# Patient Record
Sex: FEMALE | Race: WHITE | Employment: OTHER | URBAN - METROPOLITAN AREA
[De-identification: names, ages, dates, MRNs, and addresses within clinical notes are randomized per-mention and may not be internally consistent; named-entity substitution may affect disease eponyms.]

---

## 2017-06-08 ENCOUNTER — HOSPITAL ENCOUNTER (OUTPATIENT)
Dept: PULMONOLOGY | Age: 57
Discharge: HOME | End: 2017-06-08
Attending: FAMILY MEDICINE | Admitting: FAMILY MEDICINE

## 2018-12-07 ENCOUNTER — APPOINTMENT (OUTPATIENT)
Dept: GENERAL RADIOLOGY | Age: 58
End: 2018-12-07
Payer: COMMERCIAL

## 2018-12-07 ENCOUNTER — APPOINTMENT (OUTPATIENT)
Dept: CT IMAGING | Age: 58
End: 2018-12-07
Payer: COMMERCIAL

## 2018-12-07 ENCOUNTER — HOSPITAL ENCOUNTER (EMERGENCY)
Age: 58
Discharge: HOME | End: 2018-12-07
Payer: COMMERCIAL

## 2018-12-07 VITALS
OXYGEN SATURATION: 97 % | WEIGHT: 245 LBS | HEART RATE: 96 BPM | DIASTOLIC BLOOD PRESSURE: 96 MMHG | RESPIRATION RATE: 17 BRPM | SYSTOLIC BLOOD PRESSURE: 165 MMHG | BODY MASS INDEX: 40.82 KG/M2 | HEIGHT: 65 IN | TEMPERATURE: 98.2 F

## 2018-12-07 DIAGNOSIS — M79.602 LEFT ARM PAIN: ICD-10-CM
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
## 2018-12-07 DIAGNOSIS — S42.292A OTHER CLOSED DISPLACED FRACTURE OF PROXIMAL END OF LEFT HUMERUS, INITIAL ENCOUNTER: Primary | ICD-10-CM

## 2018-12-07 PROCEDURE — 99284 EMERGENCY DEPT VISIT MOD MDM: CPT

## 2018-12-07 PROCEDURE — 6370000000 HC RX 637 (ALT 250 FOR IP): Performed by: PHYSICIAN ASSISTANT

## 2018-12-07 PROCEDURE — 72125 CT NECK SPINE W/O DYE: CPT

## 2018-12-07 PROCEDURE — 70450 CT HEAD/BRAIN W/O DYE: CPT

## 2018-12-07 PROCEDURE — 73030 X-RAY EXAM OF SHOULDER: CPT

## 2018-12-07 RX ORDER — POLYETHYLENE GLYCOL 3350 17 G/17G
17 POWDER, FOR SOLUTION ORAL 2 TIMES DAILY
Qty: 1020 G | Refills: 0 | Status: SHIPPED | OUTPATIENT
Start: 2018-12-07 | End: 2018-12-07

## 2018-12-07 RX ORDER — POLYETHYLENE GLYCOL 3350 17 G/17G
17 POWDER, FOR SOLUTION ORAL 2 TIMES DAILY
Qty: 1020 G | Refills: 0 | Status: SHIPPED | OUTPATIENT
Start: 2018-12-07 | End: 2019-01-06

## 2018-12-07 RX ORDER — OXYCODONE HYDROCHLORIDE AND ACETAMINOPHEN 5; 325 MG/1; MG/1
1 TABLET ORAL EVERY 6 HOURS PRN
Qty: 12 TABLET | Refills: 0 | Status: ON HOLD | OUTPATIENT
Start: 2018-12-07 | End: 2018-12-10 | Stop reason: HOSPADM

## 2018-12-07 RX ORDER — OXYCODONE HYDROCHLORIDE AND ACETAMINOPHEN 5; 325 MG/1; MG/1
1 TABLET ORAL EVERY 6 HOURS PRN
Qty: 12 TABLET | Refills: 0 | Status: SHIPPED | OUTPATIENT
Start: 2018-12-07 | End: 2018-12-07

## 2018-12-07 RX ORDER — HYDROCODONE BITARTRATE AND ACETAMINOPHEN 5; 325 MG/1; MG/1
1 TABLET ORAL ONCE
Status: COMPLETED | OUTPATIENT
Start: 2018-12-07 | End: 2018-12-07

## 2018-12-07 RX ADMIN — HYDROCODONE BITARTRATE AND ACETAMINOPHEN 1 TABLET: 5; 325 TABLET ORAL at 19:52

## 2018-12-07 RX ADMIN — HYDROCODONE BITARTRATE AND ACETAMINOPHEN 1 TABLET: 5; 325 TABLET ORAL at 18:55

## 2018-12-07 ASSESSMENT — PAIN SCALES - GENERAL
PAINLEVEL_OUTOF10: 10

## 2018-12-07 ASSESSMENT — PAIN DESCRIPTION - PAIN TYPE: TYPE: ACUTE PAIN

## 2018-12-07 ASSESSMENT — PAIN DESCRIPTION - ORIENTATION: ORIENTATION: LEFT

## 2018-12-07 ASSESSMENT — PAIN DESCRIPTION - LOCATION: LOCATION: ARM

## 2018-12-07 NOTE — ED TRIAGE NOTES
Pt presents to ED via EMS for fall and left upper arm pain 10/10. Pt states she was walking into [employer] and tripped over a parking block. Pt states she fell on left arm. Denies LOC. Denies back and neck pain. Geraldo GUZMAN at bedside to assess patient.

## 2018-12-09 ENCOUNTER — HOSPITAL ENCOUNTER (OUTPATIENT)
Age: 58
Setting detail: OBSERVATION
Discharge: HOME HEALTH | End: 2018-12-10
Attending: EMERGENCY MEDICINE | Admitting: HOSPITALIST
Payer: COMMERCIAL

## 2018-12-09 DIAGNOSIS — S42.202D CLOSED FRACTURE OF PROXIMAL END OF LEFT HUMERUS WITH ROUTINE HEALING, UNSPECIFIED FRACTURE MORPHOLOGY, SUBSEQUENT ENCOUNTER: ICD-10-CM

## 2018-12-09 DIAGNOSIS — M25.512 ACUTE PAIN OF LEFT SHOULDER: Primary | ICD-10-CM

## 2018-12-09 PROBLEM — S42.292A HUMERAL HEAD FRACTURE, LEFT, CLOSED, INITIAL ENCOUNTER: Status: ACTIVE | Noted: 2018-12-09

## 2018-12-09 PROBLEM — S42.209A PROXIMAL HUMERAL FRACTURE: Status: ACTIVE | Noted: 2018-12-09

## 2018-12-09 LAB
ALBUMIN SERPL-MCNC: 3.5 GM/DL (ref 3.4–5)
ALP BLD-CCNC: 91 IU/L (ref 40–129)
ALT SERPL-CCNC: 16 U/L (ref 10–40)
ANION GAP SERPL CALCULATED.3IONS-SCNC: 12 MMOL/L (ref 4–16)
AST SERPL-CCNC: 11 IU/L (ref 15–37)
BASOPHILS ABSOLUTE: 0 K/CU MM
BASOPHILS RELATIVE PERCENT: 0.3 % (ref 0–1)
BILIRUB SERPL-MCNC: 0.2 MG/DL (ref 0–1)
BUN BLDV-MCNC: 32 MG/DL (ref 6–23)
CALCIUM SERPL-MCNC: 9.1 MG/DL (ref 8.3–10.6)
CHLORIDE BLD-SCNC: 102 MMOL/L (ref 99–110)
CO2: 23 MMOL/L (ref 21–32)
CREAT SERPL-MCNC: 1.2 MG/DL (ref 0.6–1.1)
DIFFERENTIAL TYPE: ABNORMAL
EOSINOPHILS ABSOLUTE: 0.1 K/CU MM
EOSINOPHILS RELATIVE PERCENT: 1.3 % (ref 0–3)
GFR AFRICAN AMERICAN: 55 ML/MIN/1.73M2
GFR NON-AFRICAN AMERICAN: 46 ML/MIN/1.73M2
GLUCOSE BLD-MCNC: 200 MG/DL (ref 70–99)
GLUCOSE BLD-MCNC: 224 MG/DL (ref 70–99)
HCT VFR BLD CALC: 32.7 % (ref 37–47)
HEMOGLOBIN: 9.8 GM/DL (ref 12.5–16)
IMMATURE NEUTROPHIL %: 0.1 % (ref 0–0.43)
LYMPHOCYTES ABSOLUTE: 2 K/CU MM
LYMPHOCYTES RELATIVE PERCENT: 25.2 % (ref 24–44)
MCH RBC QN AUTO: 28.7 PG (ref 27–31)
MCHC RBC AUTO-ENTMCNC: 30 % (ref 32–36)
MCV RBC AUTO: 95.9 FL (ref 78–100)
MONOCYTES ABSOLUTE: 0.7 K/CU MM
MONOCYTES RELATIVE PERCENT: 8.5 % (ref 0–4)
NUCLEATED RBC %: 0 %
PDW BLD-RTO: 12.7 % (ref 11.7–14.9)
PLATELET # BLD: 238 K/CU MM (ref 140–440)
PMV BLD AUTO: 9.9 FL (ref 7.5–11.1)
POTASSIUM SERPL-SCNC: 4.3 MMOL/L (ref 3.5–5.1)
RBC # BLD: 3.41 M/CU MM (ref 4.2–5.4)
SEGMENTED NEUTROPHILS ABSOLUTE COUNT: 5.1 K/CU MM
SEGMENTED NEUTROPHILS RELATIVE PERCENT: 64.6 % (ref 36–66)
SODIUM BLD-SCNC: 137 MMOL/L (ref 135–145)
TOTAL IMMATURE NEUTOROPHIL: 0.01 K/CU MM
TOTAL NUCLEATED RBC: 0 K/CU MM
TOTAL PROTEIN: 6.2 GM/DL (ref 6.4–8.2)
WBC # BLD: 7.8 K/CU MM (ref 4–10.5)

## 2018-12-09 PROCEDURE — G0378 HOSPITAL OBSERVATION PER HR: HCPCS

## 2018-12-09 PROCEDURE — 6370000000 HC RX 637 (ALT 250 FOR IP): Performed by: HOSPITALIST

## 2018-12-09 PROCEDURE — 85025 COMPLETE CBC W/AUTO DIFF WBC: CPT

## 2018-12-09 PROCEDURE — 82962 GLUCOSE BLOOD TEST: CPT

## 2018-12-09 PROCEDURE — 36415 COLL VENOUS BLD VENIPUNCTURE: CPT

## 2018-12-09 PROCEDURE — 6360000002 HC RX W HCPCS: Performed by: EMERGENCY MEDICINE

## 2018-12-09 PROCEDURE — 96372 THER/PROPH/DIAG INJ SC/IM: CPT

## 2018-12-09 PROCEDURE — 2580000003 HC RX 258: Performed by: HOSPITALIST

## 2018-12-09 PROCEDURE — 99284 EMERGENCY DEPT VISIT MOD MDM: CPT

## 2018-12-09 PROCEDURE — 80053 COMPREHEN METABOLIC PANEL: CPT

## 2018-12-09 RX ORDER — IPRATROPIUM BROMIDE AND ALBUTEROL SULFATE 2.5; .5 MG/3ML; MG/3ML
1 SOLUTION RESPIRATORY (INHALATION) EVERY 4 HOURS PRN
Status: DISCONTINUED | OUTPATIENT
Start: 2018-12-09 | End: 2018-12-10 | Stop reason: HOSPADM

## 2018-12-09 RX ORDER — ONDANSETRON 2 MG/ML
4 INJECTION INTRAMUSCULAR; INTRAVENOUS EVERY 6 HOURS PRN
Status: DISCONTINUED | OUTPATIENT
Start: 2018-12-09 | End: 2018-12-09

## 2018-12-09 RX ORDER — DEXTROSE MONOHYDRATE 50 MG/ML
100 INJECTION, SOLUTION INTRAVENOUS PRN
Status: DISCONTINUED | OUTPATIENT
Start: 2018-12-09 | End: 2018-12-10 | Stop reason: HOSPADM

## 2018-12-09 RX ORDER — ACETAMINOPHEN 325 MG/1
650 TABLET ORAL EVERY 4 HOURS PRN
Status: DISCONTINUED | OUTPATIENT
Start: 2018-12-09 | End: 2018-12-10 | Stop reason: HOSPADM

## 2018-12-09 RX ORDER — SODIUM CHLORIDE 0.9 % (FLUSH) 0.9 %
10 SYRINGE (ML) INJECTION EVERY 12 HOURS SCHEDULED
Status: DISCONTINUED | OUTPATIENT
Start: 2018-12-09 | End: 2018-12-10 | Stop reason: HOSPADM

## 2018-12-09 RX ORDER — ACETAMINOPHEN 325 MG/1
650 TABLET ORAL 4 TIMES DAILY
Status: CANCELLED | OUTPATIENT
Start: 2018-12-09

## 2018-12-09 RX ORDER — OXYCODONE HYDROCHLORIDE AND ACETAMINOPHEN 5; 325 MG/1; MG/1
1 TABLET ORAL EVERY 4 HOURS PRN
Status: DISCONTINUED | OUTPATIENT
Start: 2018-12-09 | End: 2018-12-10 | Stop reason: HOSPADM

## 2018-12-09 RX ORDER — ONDANSETRON 4 MG/1
4 TABLET, ORALLY DISINTEGRATING ORAL ONCE
Status: COMPLETED | OUTPATIENT
Start: 2018-12-09 | End: 2018-12-09

## 2018-12-09 RX ORDER — NICOTINE POLACRILEX 4 MG
15 LOZENGE BUCCAL PRN
Status: DISCONTINUED | OUTPATIENT
Start: 2018-12-09 | End: 2018-12-10 | Stop reason: HOSPADM

## 2018-12-09 RX ORDER — ATORVASTATIN CALCIUM 40 MG/1
40 TABLET, FILM COATED ORAL DAILY
Status: DISCONTINUED | OUTPATIENT
Start: 2018-12-10 | End: 2018-12-10 | Stop reason: HOSPADM

## 2018-12-09 RX ORDER — TOPIRAMATE 50 MG/1
50 TABLET, FILM COATED ORAL NIGHTLY
COMMUNITY

## 2018-12-09 RX ORDER — SODIUM CHLORIDE 0.9 % (FLUSH) 0.9 %
10 SYRINGE (ML) INJECTION PRN
Status: DISCONTINUED | OUTPATIENT
Start: 2018-12-09 | End: 2018-12-10 | Stop reason: HOSPADM

## 2018-12-09 RX ORDER — AMLODIPINE BESYLATE 2.5 MG/1
2.5 TABLET ORAL DAILY
Status: DISCONTINUED | OUTPATIENT
Start: 2018-12-10 | End: 2018-12-10 | Stop reason: HOSPADM

## 2018-12-09 RX ORDER — LISINOPRIL 40 MG/1
40 TABLET ORAL DAILY
Status: DISCONTINUED | OUTPATIENT
Start: 2018-12-10 | End: 2018-12-10 | Stop reason: HOSPADM

## 2018-12-09 RX ORDER — SERTRALINE HYDROCHLORIDE 100 MG/1
150 TABLET, FILM COATED ORAL DAILY
COMMUNITY
End: 2021-08-23 | Stop reason: ALTCHOICE

## 2018-12-09 RX ORDER — METOPROLOL SUCCINATE 25 MG/1
25 TABLET, EXTENDED RELEASE ORAL DAILY
Status: DISCONTINUED | OUTPATIENT
Start: 2018-12-10 | End: 2018-12-10 | Stop reason: HOSPADM

## 2018-12-09 RX ORDER — DEXTROSE MONOHYDRATE 25 G/50ML
12.5 INJECTION, SOLUTION INTRAVENOUS PRN
Status: DISCONTINUED | OUTPATIENT
Start: 2018-12-09 | End: 2018-12-10 | Stop reason: HOSPADM

## 2018-12-09 RX ORDER — CITALOPRAM 40 MG/1
40 TABLET ORAL DAILY
Status: DISCONTINUED | OUTPATIENT
Start: 2018-12-10 | End: 2018-12-09

## 2018-12-09 RX ORDER — POLYETHYLENE GLYCOL 3350 17 G/17G
17 POWDER, FOR SOLUTION ORAL 2 TIMES DAILY
Status: DISCONTINUED | OUTPATIENT
Start: 2018-12-09 | End: 2018-12-10 | Stop reason: HOSPADM

## 2018-12-09 RX ORDER — INSULIN GLARGINE 100 [IU]/ML
32 INJECTION, SOLUTION SUBCUTANEOUS 2 TIMES DAILY
Status: DISCONTINUED | OUTPATIENT
Start: 2018-12-09 | End: 2018-12-10 | Stop reason: HOSPADM

## 2018-12-09 RX ORDER — HYDROMORPHONE HCL 110MG/55ML
0.5 PATIENT CONTROLLED ANALGESIA SYRINGE INTRAVENOUS EVERY 4 HOURS PRN
Status: DISCONTINUED | OUTPATIENT
Start: 2018-12-09 | End: 2018-12-10 | Stop reason: HOSPADM

## 2018-12-09 RX ORDER — OXYCODONE HYDROCHLORIDE AND ACETAMINOPHEN 5; 325 MG/1; MG/1
2 TABLET ORAL EVERY 4 HOURS PRN
Status: DISCONTINUED | OUTPATIENT
Start: 2018-12-09 | End: 2018-12-10 | Stop reason: HOSPADM

## 2018-12-09 RX ORDER — HYDROMORPHONE HCL 110MG/55ML
1 PATIENT CONTROLLED ANALGESIA SYRINGE INTRAVENOUS ONCE
Status: COMPLETED | OUTPATIENT
Start: 2018-12-09 | End: 2018-12-09

## 2018-12-09 RX ORDER — TOPIRAMATE 25 MG/1
50 TABLET ORAL 2 TIMES DAILY
Status: DISCONTINUED | OUTPATIENT
Start: 2018-12-09 | End: 2018-12-10 | Stop reason: HOSPADM

## 2018-12-09 RX ORDER — ONDANSETRON 2 MG/ML
4 INJECTION INTRAMUSCULAR; INTRAVENOUS EVERY 6 HOURS PRN
Status: DISCONTINUED | OUTPATIENT
Start: 2018-12-09 | End: 2018-12-10 | Stop reason: HOSPADM

## 2018-12-09 RX ORDER — CLOPIDOGREL BISULFATE 75 MG/1
75 TABLET ORAL DAILY
Status: DISCONTINUED | OUTPATIENT
Start: 2018-12-10 | End: 2018-12-10 | Stop reason: HOSPADM

## 2018-12-09 RX ORDER — CLOPIDOGREL BISULFATE 75 MG/1
75 TABLET ORAL DAILY
COMMUNITY

## 2018-12-09 RX ADMIN — INSULIN LISPRO 3 UNITS: 100 INJECTION, SOLUTION INTRAVENOUS; SUBCUTANEOUS at 23:34

## 2018-12-09 RX ADMIN — TOPIRAMATE 50 MG: 25 TABLET, FILM COATED ORAL at 23:03

## 2018-12-09 RX ADMIN — OXYCODONE AND ACETAMINOPHEN 2 TABLET: 5; 325 TABLET ORAL at 23:04

## 2018-12-09 RX ADMIN — HYDROMORPHONE HYDROCHLORIDE 1 MG: 2 INJECTION INTRAMUSCULAR; INTRAVENOUS; SUBCUTANEOUS at 17:35

## 2018-12-09 RX ADMIN — SODIUM CHLORIDE, PRESERVATIVE FREE 10 ML: 5 INJECTION INTRAVENOUS at 23:06

## 2018-12-09 RX ADMIN — INSULIN GLARGINE 32 UNITS: 100 INJECTION, SOLUTION SUBCUTANEOUS at 23:33

## 2018-12-09 RX ADMIN — ONDANSETRON 4 MG: 4 TABLET, ORALLY DISINTEGRATING ORAL at 17:35

## 2018-12-09 RX ADMIN — POLYETHYLENE GLYCOL (3350) 17 G: 17 POWDER, FOR SOLUTION ORAL at 23:04

## 2018-12-09 ASSESSMENT — PAIN SCALES - GENERAL
PAINLEVEL_OUTOF10: 9
PAINLEVEL_OUTOF10: 9
PAINLEVEL_OUTOF10: 6
PAINLEVEL_OUTOF10: 6
PAINLEVEL_OUTOF10: 3

## 2018-12-09 ASSESSMENT — PAIN DESCRIPTION - LOCATION
LOCATION: ARM
LOCATION: ARM

## 2018-12-09 ASSESSMENT — PAIN DESCRIPTION - ONSET: ONSET: ON-GOING

## 2018-12-09 ASSESSMENT — PAIN DESCRIPTION - FREQUENCY: FREQUENCY: INTERMITTENT

## 2018-12-09 ASSESSMENT — PAIN DESCRIPTION - ORIENTATION
ORIENTATION: LEFT
ORIENTATION: LEFT

## 2018-12-09 ASSESSMENT — PAIN DESCRIPTION - PAIN TYPE
TYPE: ACUTE PAIN
TYPE: ACUTE PAIN

## 2018-12-09 NOTE — ED PROVIDER NOTES
eMERGENCY dEPARTMENT eNCOUnter      CHIEF COMPLAINT:   Shoulder pain    HPI: Sofiya Gould is a 59 y.o. female who presents to the Emergency Department complaining of severe left shoulder pain. The patient fell 2 days ago and was seen here and had x-rays that showed an impacted proximal left humerus fracture. She was placed in a sling and swath and prescribed Percocet 1 tab every 6 hours. She states that she has been taking the Percocet as directed that her pain is uncontrolled. She rates it as 9 out of 10 pain. It is sharp and achy. The pain is constant. It is worse with movement and better with rest. The patient lives alone and has been having difficulty taking care of herself. The patient denies fevers, chills, chest pain, shortness of breath, abdominal pain, numbness, tingling, weakness, or any other complaints. REVIEW OF SYSTEMS:  CONSTITUTIONAL:  Denies fever, chills, weight loss or weakness  EYES:  Denies photophobia or discharge  ENT:  Denies sore throat or ear pain  CARDIOVASCULAR:  Denies chest pain, palpitations or swelling  RESPIRATORY:  Denies cough or shortness of breath  GI: Denies abdominal pain, nausea, vomiting, or diarrhea  MUSCULOSKELETAL:  Denies back pain, + shoulder pain  SKIN:  No rash  NEUROLOGIC:  Denies headache, focal weakness or sensory changes  All systems negative except as marked. \"Remaining review of systems reviewed and negative. I have reviewed the nursing triage documentation and agree unless otherwise noted below. \"    PAST MEDICAL HISTORY:   Past Medical History:   Diagnosis Date    Arthritis     Asthma     Diabetes mellitus ([address]     Hyperlipidemia     Hypertension        CURRENT MEDICATIONS:   Home medications reviewed. SURGICAL HISTORY:   Past Surgical History:   Procedure Laterality Date    CHOLECYSTECTOMY         FAMILY HISTORY:   History reviewed. No pertinent family history.     SOCIAL HISTORY:   Social History     Social History    Marital status:      Spouse name: N/A    Number of children: N/A    Years of education: N/A     Occupational History    Not on file. Social History Main Topics    Smoking status: Never Smoker    Smokeless tobacco: Never Used    Alcohol use No    Drug use: No    Sexual activity: Not on file     Other Topics Concern    Not on file     Social History Narrative    No narrative on file       ALLERGIES: Patient has no known allergies. PHYSICAL EXAM:  VITAL SIGNS:   ED Triage Vitals   Enc Vitals Group      BP       Pulse       Resp       Temp       Temp src       SpO2       Weight       Height       Head Circumference       Peak Flow       Pain Score       Pain Loc       Pain Edu? Excl. in [address]? Constitutional:  Non-toxic appearance  HENT: Normocephalic, Atraumatic  Eyes: PERRL, conjunctiva normal   Neck: Normal range of motion, No tenderness, Supple, No stridor, No lymphadenopathy  Cardiovascular:  Normal heart rate, Normal rhythm  Pulmonary/Chest:  Normal breath sounds, No respiratory distress, No wheezing  Abdomen:   Bowel sounds normal, Soft, No tenderness, No masses, No pulsatile masses  Back:  No tenderness, No CVA tenderness  Extremities:  Left upper extremity is immobilized in a sling and swath, there is tenderness to palpation of the left shoulder joint, The peripheral pulses are strong, Capillary refill is brisk, there is normal motor and sensory function, the hand is pink, warm, and well perfused, there is no cyanosis  Skin:  Warm, Dry, No erythema, No rash      EKG:    None    Radiology / Procedures:  Labs Reviewed   CBC WITH AUTO DIFFERENTIAL - Abnormal; Notable for the following:        Result Value    RBC 3.41 (*)     Hemoglobin 9.8 (*)     Hematocrit 32.7 (*)     MCHC 30.0 (*)     Monocytes % 8.5 (*)     All other components within normal limits   COMPREHENSIVE METABOLIC PANEL - Abnormal; Notable for the following:     BUN 32 (*)     CREATININE 1.2 (*)     Glucose 224 (*)     Total Protein 6.2 (*)     AST 11 (*)     GFR Non- 46 (*)     GFR  55 (*)     All other components within normal limits   POCT GLUCOSE - Abnormal; Notable for the following:     POC Glucose 200 (*)     All other components within normal limits   BASIC METABOLIC PANEL W/ REFLEX TO MG FOR LOW K   CBC WITH AUTO DIFFERENTIAL   HEMOGLOBIN A1C       ED COURSE & MEDICAL DECISION MAKING:  Pertinent Labs & Imaging studies reviewed. (See chart for details)  On exam, the patient is afebrile and nontoxic appearing. She is hemodynamically stable and neurologically intact. Labs are obtained and are significant for acute on chronic anemia, chronic renal insufficiency and hyperglycemia. The patient was treated with Dilaudid and Zofran and felt significantly better after treatment. I suspect that the patient has Left shoulder pain secondary to recent proximal humerus fracture. I have a low suspicion for DVT, dislocation, compartment syndrome, necrotizing fasciitis, or neurovascular injury. The likelihood of other entities in the differential is insufficient to justify any further testing for them at this time. This was explained to the patient and they were advised that persistent or worsening symptoms would require further evaluation. The patient was seen by case management and ultimately would like to be admitted at least overnight for pain control. I discussed the case with the hospitalist who will admit the patient for further treatment and care. The patient is currently in stable condition awaiting admission. Clinical Impression:  1. Acute pain of left shoulder    2.  Closed fracture of proximal end of left humerus with routine healing, unspecified fracture morphology, subsequent encounter          Comment: Please note this report has been produced using speech recognition software and may contain errors related to that system including errors in grammar, punctuation, and spelling, as well as words and phrases that may be inappropriate. If there are any questions or concerns please feel free to contact the dictating provider for clarification.        Riley Gupta MD  12/10/18 7318

## 2018-12-10 VITALS
HEART RATE: 80 BPM | WEIGHT: 250 LBS | TEMPERATURE: 99.8 F | HEIGHT: 64 IN | RESPIRATION RATE: 12 BRPM | DIASTOLIC BLOOD PRESSURE: 58 MMHG | BODY MASS INDEX: 42.68 KG/M2 | OXYGEN SATURATION: 95 % | SYSTOLIC BLOOD PRESSURE: 127 MMHG

## 2018-12-10 LAB
ANION GAP SERPL CALCULATED.3IONS-SCNC: 14 MMOL/L (ref 4–16)
BASOPHILS ABSOLUTE: 0 K/CU MM
BASOPHILS RELATIVE PERCENT: 0.3 % (ref 0–1)
BUN BLDV-MCNC: 36 MG/DL (ref 6–23)
CALCIUM SERPL-MCNC: 8.8 MG/DL (ref 8.3–10.6)
CHLORIDE BLD-SCNC: 104 MMOL/L (ref 99–110)
CO2: 21 MMOL/L (ref 21–32)
CREAT SERPL-MCNC: 1.4 MG/DL (ref 0.6–1.1)
DIFFERENTIAL TYPE: ABNORMAL
EOSINOPHILS ABSOLUTE: 0.1 K/CU MM
EOSINOPHILS RELATIVE PERCENT: 1.7 % (ref 0–3)
ESTIMATED AVERAGE GLUCOSE: 183 MG/DL
GFR AFRICAN AMERICAN: 46 ML/MIN/1.73M2
GFR NON-AFRICAN AMERICAN: 38 ML/MIN/1.73M2
GLUCOSE BLD-MCNC: 267 MG/DL (ref 70–99)
GLUCOSE BLD-MCNC: 309 MG/DL (ref 70–99)
GLUCOSE BLD-MCNC: 340 MG/DL (ref 70–99)
HBA1C MFR BLD: 8 % (ref 4.2–6.3)
HCT VFR BLD CALC: 34.2 % (ref 37–47)
HEMOGLOBIN: 10 GM/DL (ref 12.5–16)
IMMATURE NEUTROPHIL %: 0.3 % (ref 0–0.43)
LYMPHOCYTES ABSOLUTE: 1.8 K/CU MM
LYMPHOCYTES RELATIVE PERCENT: 22.8 % (ref 24–44)
MCH RBC QN AUTO: 29.1 PG (ref 27–31)
MCHC RBC AUTO-ENTMCNC: 29.2 % (ref 32–36)
MCV RBC AUTO: 99.4 FL (ref 78–100)
MONOCYTES ABSOLUTE: 0.8 K/CU MM
MONOCYTES RELATIVE PERCENT: 9.6 % (ref 0–4)
NUCLEATED RBC %: 0 %
PDW BLD-RTO: 12.8 % (ref 11.7–14.9)
PLATELET # BLD: 239 K/CU MM (ref 140–440)
PMV BLD AUTO: 10.3 FL (ref 7.5–11.1)
POTASSIUM SERPL-SCNC: 5 MMOL/L (ref 3.5–5.1)
RBC # BLD: 3.44 M/CU MM (ref 4.2–5.4)
SEGMENTED NEUTROPHILS ABSOLUTE COUNT: 5.1 K/CU MM
SEGMENTED NEUTROPHILS RELATIVE PERCENT: 65.3 % (ref 36–66)
SODIUM BLD-SCNC: 139 MMOL/L (ref 135–145)
TOTAL IMMATURE NEUTOROPHIL: 0.02 K/CU MM
TOTAL NUCLEATED RBC: 0 K/CU MM
WBC # BLD: 7.8 K/CU MM (ref 4–10.5)

## 2018-12-10 PROCEDURE — 99218 PR INITIAL OBSERVATION CARE/DAY 30 MINUTES: CPT | Performed by: ORTHOPAEDIC SURGERY

## 2018-12-10 PROCEDURE — 83036 HEMOGLOBIN GLYCOSYLATED A1C: CPT

## 2018-12-10 PROCEDURE — G8978 MOBILITY CURRENT STATUS: HCPCS

## 2018-12-10 PROCEDURE — G0378 HOSPITAL OBSERVATION PER HR: HCPCS

## 2018-12-10 PROCEDURE — 2580000003 HC RX 258: Performed by: HOSPITALIST

## 2018-12-10 PROCEDURE — 6360000002 HC RX W HCPCS: Performed by: HOSPITALIST

## 2018-12-10 PROCEDURE — 97162 PT EVAL MOD COMPLEX 30 MIN: CPT

## 2018-12-10 PROCEDURE — 36415 COLL VENOUS BLD VENIPUNCTURE: CPT

## 2018-12-10 PROCEDURE — 97167 OT EVAL HIGH COMPLEX 60 MIN: CPT

## 2018-12-10 PROCEDURE — 6370000000 HC RX 637 (ALT 250 FOR IP): Performed by: HOSPITALIST

## 2018-12-10 PROCEDURE — 80048 BASIC METABOLIC PNL TOTAL CA: CPT

## 2018-12-10 PROCEDURE — 96374 THER/PROPH/DIAG INJ IV PUSH: CPT

## 2018-12-10 PROCEDURE — 85025 COMPLETE CBC W/AUTO DIFF WBC: CPT

## 2018-12-10 PROCEDURE — G8988 SELF CARE GOAL STATUS: HCPCS

## 2018-12-10 PROCEDURE — G8979 MOBILITY GOAL STATUS: HCPCS

## 2018-12-10 PROCEDURE — 96372 THER/PROPH/DIAG INJ SC/IM: CPT

## 2018-12-10 PROCEDURE — G8987 SELF CARE CURRENT STATUS: HCPCS

## 2018-12-10 PROCEDURE — 97116 GAIT TRAINING THERAPY: CPT

## 2018-12-10 PROCEDURE — 82962 GLUCOSE BLOOD TEST: CPT

## 2018-12-10 RX ORDER — ONDANSETRON 4 MG/1
4 TABLET, FILM COATED ORAL DAILY PRN
Qty: 30 TABLET | Refills: 0 | Status: ON HOLD | OUTPATIENT
Start: 2018-12-10 | End: 2020-09-15 | Stop reason: HOSPADM

## 2018-12-10 RX ORDER — TRAMADOL HYDROCHLORIDE 50 MG/1
50 TABLET ORAL EVERY 4 HOURS PRN
Qty: 10 TABLET | Refills: 0 | Status: SHIPPED | OUTPATIENT
Start: 2018-12-10 | End: 2018-12-13

## 2018-12-10 RX ORDER — OXYCODONE HYDROCHLORIDE AND ACETAMINOPHEN 5; 325 MG/1; MG/1
1 TABLET ORAL EVERY 4 HOURS PRN
Qty: 30 TABLET | Refills: 0 | Status: SHIPPED | OUTPATIENT
Start: 2018-12-10 | End: 2018-12-15

## 2018-12-10 RX ADMIN — OXYCODONE AND ACETAMINOPHEN 2 TABLET: 5; 325 TABLET ORAL at 08:58

## 2018-12-10 RX ADMIN — INSULIN LISPRO 12 UNITS: 100 INJECTION, SOLUTION INTRAVENOUS; SUBCUTANEOUS at 12:55

## 2018-12-10 RX ADMIN — POLYETHYLENE GLYCOL (3350) 17 G: 17 POWDER, FOR SOLUTION ORAL at 09:00

## 2018-12-10 RX ADMIN — SERTRALINE HYDROCHLORIDE 150 MG: 100 TABLET ORAL at 08:59

## 2018-12-10 RX ADMIN — ATORVASTATIN CALCIUM 40 MG: 40 TABLET, FILM COATED ORAL at 08:59

## 2018-12-10 RX ADMIN — INSULIN LISPRO 12 UNITS: 100 INJECTION, SOLUTION INTRAVENOUS; SUBCUTANEOUS at 09:01

## 2018-12-10 RX ADMIN — INSULIN GLARGINE 32 UNITS: 100 INJECTION, SOLUTION SUBCUTANEOUS at 09:00

## 2018-12-10 RX ADMIN — ONDANSETRON 4 MG: 2 INJECTION INTRAMUSCULAR; INTRAVENOUS at 14:10

## 2018-12-10 RX ADMIN — OXYCODONE AND ACETAMINOPHEN 2 TABLET: 5; 325 TABLET ORAL at 12:54

## 2018-12-10 RX ADMIN — SODIUM CHLORIDE, PRESERVATIVE FREE 10 ML: 5 INJECTION INTRAVENOUS at 01:41

## 2018-12-10 RX ADMIN — AMLODIPINE BESYLATE 2.5 MG: 2.5 TABLET ORAL at 08:59

## 2018-12-10 RX ADMIN — HYDROMORPHONE HYDROCHLORIDE 0.5 MG: 2 INJECTION INTRAMUSCULAR; INTRAVENOUS; SUBCUTANEOUS at 01:40

## 2018-12-10 RX ADMIN — CLOPIDOGREL BISULFATE 75 MG: 75 TABLET ORAL at 08:59

## 2018-12-10 RX ADMIN — LISINOPRIL 40 MG: 40 TABLET ORAL at 08:58

## 2018-12-10 RX ADMIN — METOPROLOL SUCCINATE 25 MG: 25 TABLET, EXTENDED RELEASE ORAL at 08:59

## 2018-12-10 RX ADMIN — TOPIRAMATE 50 MG: 25 TABLET, FILM COATED ORAL at 08:59

## 2018-12-10 RX ADMIN — ENOXAPARIN SODIUM 40 MG: 40 INJECTION SUBCUTANEOUS at 09:01

## 2018-12-10 ASSESSMENT — PAIN DESCRIPTION - ONSET: ONSET: ON-GOING

## 2018-12-10 ASSESSMENT — PAIN DESCRIPTION - PROGRESSION
CLINICAL_PROGRESSION: GRADUALLY IMPROVING
CLINICAL_PROGRESSION: NOT CHANGED

## 2018-12-10 ASSESSMENT — PAIN SCALES - GENERAL
PAINLEVEL_OUTOF10: 3
PAINLEVEL_OUTOF10: 7
PAINLEVEL_OUTOF10: 9
PAINLEVEL_OUTOF10: 7
PAINLEVEL_OUTOF10: 7

## 2018-12-10 ASSESSMENT — PAIN DESCRIPTION - LOCATION
LOCATION: SHOULDER
LOCATION: ARM;SHOULDER
LOCATION: ARM

## 2018-12-10 ASSESSMENT — PAIN DESCRIPTION - DESCRIPTORS
DESCRIPTORS: ACHING;SHARP
DESCRIPTORS: ACHING;SHARP

## 2018-12-10 ASSESSMENT — PAIN DESCRIPTION - ORIENTATION
ORIENTATION: LEFT

## 2018-12-10 ASSESSMENT — PAIN DESCRIPTION - FREQUENCY
FREQUENCY: CONTINUOUS
FREQUENCY: CONTINUOUS

## 2018-12-10 ASSESSMENT — PAIN DESCRIPTION - PAIN TYPE
TYPE: ACUTE PAIN

## 2018-12-10 ASSESSMENT — ENCOUNTER SYMPTOMS: COLOR CHANGE: 0

## 2018-12-10 NOTE — H&P
S2, no murmurs, rubs, clicks or gallops. No peripheral edema. Dorsal pedis pulses 2+ bilaterally. Respiratory: clear to auscultation, no wheezes, rales or rhonchi, symmetric air entry. Gastrointestinal: soft, nontender, nondistended, no masses or organomegaly. Genitourinary:  No CVA tenderness. Musculoskletal:  no clubbing or cyanosis. No joint swelling, warmth, or tenderness. Skin:  normal coloration and turgor, no rashes, no suspicious skin lesions noted. Neurologic: Normal speech, no focal findings or movement disorder noted. Hematologic/Lymphatic: No cervical lymphadenopathy. Data  (4-points for high level)  Laboratory this visit:  Reviewed    Radiology this visit:  Personally reviewed the following imaging films, and agree with the radiologist readings. Ct Head Wo Contrast    Result Date: 12/7/2018  EXAMINATION: CT OF THE HEAD WITHOUT CONTRAST  12/7/2018 7:23 pm TECHNIQUE: CT of the head was performed without the administration of intravenous contrast. Dose modulation, iterative reconstruction, and/or weight based adjustment of the mA/kV was utilized to reduce the radiation dose to as low as reasonably achievable. COMPARISON: None. HISTORY: ORDERING SYSTEM PROVIDED HISTORY: fall TECHNOLOGIST PROVIDED HISTORY: Has a \"code stroke\" or \"stroke alert\" been called? ->No Ordering Physician Provided Reason for Exam:  walking into [facility] and tripped over a parking block. Pt states she fell Acuity: Acute Type of Exam: Initial Mechanism of Injury: falll Relevant Medical/Surgical History: non sx FINDINGS: BRAIN/VENTRICLES: There is no acute intracranial hemorrhage, mass effect or midline shift. No abnormal extra-axial fluid collection. The gray-white differentiation is maintained without evidence of an acute infarct. There is no evidence of hydrocephalus. ORBITS: The visualized portion of the orbits demonstrate no acute abnormality.  SINUSES: The visualized paranasal sinuses and mastoid air cells

## 2018-12-10 NOTE — CONSULTS
She denies any loss of consciousness stating this was simply a mechanical fall. Review of Systems   Constitutional: Negative for activity change, chills and fever. Musculoskeletal: Positive for arthralgias and myalgias. Negative for gait problem and joint swelling. Skin: Negative for color change, pallor, rash and wound. Neurological: Positive for weakness. Negative for numbness.        Past Medical History:   Diagnosis Date    Arthritis     Asthma     Diabetes mellitus ([facility] [address]     Hyperlipidemia     Hypertension      Current Facility-Administered Medications   Medication Dose Route Frequency Provider Last Rate Last Dose    amLODIPine (NORVASC) tablet 2.5 mg  2.5 mg Oral Daily Cait Clayton MD        atorvastatin (LIPITOR) tablet 40 mg  40 mg Oral Daily Cait Clayton MD        clopidogrel (PLAVIX) tablet 75 mg  75 mg Oral Daily Cait Clayton MD        lisinopril (PRINIVIL;ZESTRIL) tablet 40 mg  40 mg Oral Daily Cait Clayton MD        metoprolol succinate (TOPROL XL) extended release tablet 25 mg  25 mg Oral Daily Cait Clayton MD        polyethylene glycol (GLYCOLAX) packet 17 g  17 g Oral BID Cait Clayton MD   17 g at 12/09/18 2304    sertraline (ZOLOFT) tablet 150 mg  150 mg Oral Daily Cait Clayton MD        topiramate (TOPAMAX) tablet 50 mg  50 mg Oral BID Cait Clayton MD   50 mg at 12/09/18 2303    sodium chloride flush 0.9 % injection 10 mL  10 mL Intravenous 2 times per day Cait Clayton MD   10 mL at 12/09/18 2306    sodium chloride flush 0.9 % injection 10 mL  10 mL Intravenous PRN Cait Clayton MD   10 mL at 12/10/18 0141    magnesium hydroxide (MILK OF MAGNESIA) 400 MG/5ML suspension 30 mL  30 mL Oral Daily PRN Cait Clayton MD        ondansetron (ZOFRAN) injection 4 mg  4 mg Intravenous Q6H PRN Cait Clayton MD        enoxaparin (LOVENOX) injection 40 mg  40 mg Subcutaneous Daily Cati Clayton MD       [facility]

## 2018-12-10 NOTE — CARE COORDINATION
Pt seen on 12-7 for the initial injury to the left humerus-pt was walking in the [employer] parking lot and tripped over a parking block . Pt was released with a follow up with an orthopedic dr on Monday December 10th -but pt claims her discharge instructions were not clear and there pain medicine was percocet 1 every 6 hours-just was not enough to cover the pain involved-(pt still needs to fill her script  for stool softener). Pt does live alone and was told x 2 by MD that its probably not a surgical intervention fracture . Pt says she experienced some relief with pain med giver in [facility] . After some thought pt stated she would rather go home tomorrow, didn't want to stay any longer. -  -was told it would be an observation admission -pt OK with that . CM suggested pt admitted OBSERVATION - pain control, release home tomorrow. Pt verbalized understanding of the plan and would call the orthopedic doctor for a follow up appointment .  Ran Bryan / VERITO/ BENJAMIN

## 2018-12-12 ENCOUNTER — APPOINTMENT (OUTPATIENT)
Dept: ULTRASOUND IMAGING | Age: 58
End: 2018-12-12
Payer: COMMERCIAL

## 2018-12-12 ENCOUNTER — HOSPITAL ENCOUNTER (EMERGENCY)
Age: 58
Discharge: OTHER INSTITUTION | End: 2018-12-12
Attending: EMERGENCY MEDICINE
Payer: COMMERCIAL

## 2018-12-12 ENCOUNTER — APPOINTMENT (OUTPATIENT)
Dept: CT IMAGING | Age: 58
End: 2018-12-12
Payer: COMMERCIAL

## 2018-12-12 VITALS
HEIGHT: 64 IN | HEART RATE: 74 BPM | BODY MASS INDEX: 42.68 KG/M2 | SYSTOLIC BLOOD PRESSURE: 160 MMHG | DIASTOLIC BLOOD PRESSURE: 49 MMHG | TEMPERATURE: 98.1 F | OXYGEN SATURATION: 96 % | RESPIRATION RATE: 16 BRPM | WEIGHT: 250 LBS

## 2018-12-12 DIAGNOSIS — M79.604 RIGHT LEG PAIN: Primary | ICD-10-CM

## 2018-12-12 LAB
ALBUMIN SERPL-MCNC: 3.4 GM/DL (ref 3.4–5)
ALP BLD-CCNC: 122 IU/L (ref 40–129)
ALT SERPL-CCNC: 40 U/L (ref 10–40)
ANION GAP SERPL CALCULATED.3IONS-SCNC: 11 MMOL/L (ref 4–16)
AST SERPL-CCNC: 39 IU/L (ref 15–37)
BASOPHILS ABSOLUTE: 0 K/CU MM
BASOPHILS RELATIVE PERCENT: 0.4 % (ref 0–1)
BILIRUB SERPL-MCNC: 0.3 MG/DL (ref 0–1)
BUN BLDV-MCNC: 36 MG/DL (ref 6–23)
CALCIUM SERPL-MCNC: 8.8 MG/DL (ref 8.3–10.6)
CHLORIDE BLD-SCNC: 100 MMOL/L (ref 99–110)
CO2: 24 MMOL/L (ref 21–32)
CREAT SERPL-MCNC: 1.1 MG/DL (ref 0.6–1.1)
DIFFERENTIAL TYPE: ABNORMAL
EOSINOPHILS ABSOLUTE: 0.2 K/CU MM
EOSINOPHILS RELATIVE PERCENT: 2.2 % (ref 0–3)
GFR AFRICAN AMERICAN: >60 ML/MIN/1.73M2
GFR NON-AFRICAN AMERICAN: 51 ML/MIN/1.73M2
GLUCOSE BLD-MCNC: 341 MG/DL (ref 70–99)
GLUCOSE BLD-MCNC: 366 MG/DL (ref 70–99)
HCT VFR BLD CALC: 31.8 % (ref 37–47)
HEMOGLOBIN: 9.8 GM/DL (ref 12.5–16)
IMMATURE NEUTROPHIL %: 0.4 % (ref 0–0.43)
LYMPHOCYTES ABSOLUTE: 0.9 K/CU MM
LYMPHOCYTES RELATIVE PERCENT: 13.3 % (ref 24–44)
MCH RBC QN AUTO: 29.4 PG (ref 27–31)
MCHC RBC AUTO-ENTMCNC: 30.8 % (ref 32–36)
MCV RBC AUTO: 95.5 FL (ref 78–100)
MONOCYTES ABSOLUTE: 0.5 K/CU MM
MONOCYTES RELATIVE PERCENT: 7.5 % (ref 0–4)
NUCLEATED RBC %: 0 %
PDW BLD-RTO: 12.4 % (ref 11.7–14.9)
PLATELET # BLD: 231 K/CU MM (ref 140–440)
PMV BLD AUTO: 10.1 FL (ref 7.5–11.1)
POTASSIUM SERPL-SCNC: 5.1 MMOL/L (ref 3.5–5.1)
RBC # BLD: 3.33 M/CU MM (ref 4.2–5.4)
SEGMENTED NEUTROPHILS ABSOLUTE COUNT: 5.2 K/CU MM
SEGMENTED NEUTROPHILS RELATIVE PERCENT: 76.2 % (ref 36–66)
SODIUM BLD-SCNC: 135 MMOL/L (ref 135–145)
TOTAL IMMATURE NEUTOROPHIL: 0.03 K/CU MM
TOTAL NUCLEATED RBC: 0 K/CU MM
TOTAL PROTEIN: 6.1 GM/DL (ref 6.4–8.2)
WBC # BLD: 6.8 K/CU MM (ref 4–10.5)

## 2018-12-12 PROCEDURE — 82962 GLUCOSE BLOOD TEST: CPT

## 2018-12-12 PROCEDURE — 97167 OT EVAL HIGH COMPLEX 60 MIN: CPT

## 2018-12-12 PROCEDURE — G8988 SELF CARE GOAL STATUS: HCPCS

## 2018-12-12 PROCEDURE — 97162 PT EVAL MOD COMPLEX 30 MIN: CPT

## 2018-12-12 PROCEDURE — G8979 MOBILITY GOAL STATUS: HCPCS

## 2018-12-12 PROCEDURE — 80053 COMPREHEN METABOLIC PANEL: CPT

## 2018-12-12 PROCEDURE — 97530 THERAPEUTIC ACTIVITIES: CPT

## 2018-12-12 PROCEDURE — 6370000000 HC RX 637 (ALT 250 FOR IP): Performed by: EMERGENCY MEDICINE

## 2018-12-12 PROCEDURE — G8978 MOBILITY CURRENT STATUS: HCPCS

## 2018-12-12 PROCEDURE — 36415 COLL VENOUS BLD VENIPUNCTURE: CPT

## 2018-12-12 PROCEDURE — 93971 EXTREMITY STUDY: CPT

## 2018-12-12 PROCEDURE — 99284 EMERGENCY DEPT VISIT MOD MDM: CPT

## 2018-12-12 PROCEDURE — 72192 CT PELVIS W/O DYE: CPT

## 2018-12-12 PROCEDURE — 85025 COMPLETE CBC W/AUTO DIFF WBC: CPT

## 2018-12-12 PROCEDURE — 97535 SELF CARE MNGMENT TRAINING: CPT

## 2018-12-12 PROCEDURE — G8987 SELF CARE CURRENT STATUS: HCPCS

## 2018-12-12 RX ORDER — TRAMADOL HYDROCHLORIDE 50 MG/1
50 TABLET ORAL ONCE
Status: COMPLETED | OUTPATIENT
Start: 2018-12-12 | End: 2018-12-12

## 2018-12-12 RX ADMIN — TRAMADOL HYDROCHLORIDE 50 MG: 50 TABLET, FILM COATED ORAL at 19:14

## 2018-12-12 RX ADMIN — INSULIN LISPRO 6 UNITS: 100 INJECTION, SOLUTION INTRAVENOUS; SUBCUTANEOUS at 19:39

## 2018-12-12 ASSESSMENT — PAIN SCALES - GENERAL
PAINLEVEL_OUTOF10: 8
PAINLEVEL_OUTOF10: 6
PAINLEVEL_OUTOF10: 6

## 2018-12-12 ASSESSMENT — PAIN DESCRIPTION - ORIENTATION
ORIENTATION: RIGHT
ORIENTATION: RIGHT;UPPER

## 2018-12-12 ASSESSMENT — PAIN DESCRIPTION - LOCATION
LOCATION: LEG
LOCATION: LEG

## 2018-12-12 ASSESSMENT — PAIN DESCRIPTION - PAIN TYPE: TYPE: ACUTE PAIN

## 2018-12-12 NOTE — CARE COORDINATION
Consult request per Dr Jeanine Collins. Chart reviewed. LSW met with pt to initiate discharge planning. Pt was recently discharged on 12/10. Prior to discharge, pt was evaluated by PT/OT and ARU was recommended. Pt declined. Pt is now back in the ED and stated that declining rehab was the \"biggest mistake\" as she states that is not able to do the things that she thought she would be able to do at home. Pt is now requesting ARU. Call to [address]. Pt's PT/OT evals were completed approx 49 hrs ago; must be within 48 hrs for precert. Pt will need updated evaluations. Adrian Watson will review pt's clinicals. Updated Dr Jeanine Collins regarding consult request. [place] requested PT/OT order. Call to PT/OT to make them aware of forthcoming orders for pt. Spoke to Sherryle Mates- they will come to evaluate pt as soon as the order is in and there is a note from Dr Jeanine Collins stating pt is medically cleared for evaluation.

## 2018-12-12 NOTE — DISCHARGE INSTR - COC
Continuity of Care Form    Patient Name: Roscoe Ramos   :  1960  MRN:  [id number]    Admit date:  2018  Discharge date:  ***    Code Status Order: Prior   Advance Directives:     Admitting Physician:  No admitting provider for patient encounter. PCP: Comfort Bailey MD    Discharging Nurse: [facility] Unit/Room#: ED21/ED-21  Discharging Unit Phone Number: ***    Emergency Contact:   Extended Emergency Contact Information  Primary Emergency Contact: [address] [place] [address] Phone: [phone]  Relation: Parent    Past Surgical History:  Past Surgical History:   Procedure Laterality Date    CHOLECYSTECTOMY         Immunization History: There is no immunization history on file for this patient.     Active Problems:  Patient Active Problem List   Diagnosis Code    Proximal humeral fracture S42.209A       Isolation/Infection:   Isolation          No Isolation            Nurse Assessment:  Last Vital Signs: BP (!) 153/64   Pulse 74   Temp 98.1 °F (36.7 °C) (Oral)   Resp 16   Ht 5' 4\" (1.626 m)   Wt 250 lb (113.4 kg)   SpO2 97%   BMI 42.91 kg/m²     Last documented pain score (0-10 scale): Pain Level: 6  Last Weight:   Wt Readings from Last 1 Encounters:   18 250 lb (113.4 kg)     Mental Status:  {IP PT MENTAL STATUS:}    IV Access:  { NANO IV ACCESS:453561234}    Nursing Mobility/ADLs:  Walking   {CHP DME KPMX:309671794}  Transfer  {CHP DME CKOV:552759732}  Bathing  {CHP DME PPYT:605998988}  Dressing  {CHP DME MFHS:205215324}  Toileting  {CHP DME SMFZ:848330774}  Feeding  {CHP DME ZFYO:489436225}  Med Admin  {CHP DME CIJC:577893938}  Med Delivery   [address] [place] [address] MED Delivery:510212528}    Wound Care Documentation and Therapy:        Elimination:  Continence:   · Bowel: {YES / SY:74277}  · Bladder: {YES / RT:11738}  Urinary Catheter: {Urinary Catheter:512699655}   Colostomy/Ileostomy/Ileal Conduit: {YES / DS:}       Date of Last BM:

## 2018-12-12 NOTE — PROGRESS NOTES
Occupational Therapy   Occupational Therapy Initial Assessment  Date: 2018   Patient Name: Jackie Tejada  MRN: [id number]     : 1960    Date of Service: 2018    Discharge Recommendations:  IP Rehab (ARU)  OT Equipment Recommendations  Other: defer      Patient Diagnosis(es): The encounter diagnosis was Right leg pain. has a past medical history of Arthritis; Asthma; Diabetes mellitus ([facility] [address] Hyperlipidemia; and Hypertension. has a past surgical history that includes Cholecystectomy. Restrictions  Restrictions/Precautions  Restrictions/Precautions: Weight Bearing  Upper Extremity Weight Bearing Restrictions  Left Upper Extremity Weight Bearing: Non Weight Bearing  Other: sling   Position Activity Restriction  Other position/activity restrictions: BP cuff, pulse oximeter, sling to LUE    Subjective   Pain Assessment  Patient Currently in Pain: Yes  Pain Level: 6  Pain Type: Acute pain  Pain Location: Leg  Pain Orientation: Right  Pain Intervention: increased movement, repositioned, RN notified       Social/Functional History  Social/Functional History  Lives With: Alone  [facility]  [facility]  Home Layout: One level  Home Access: Level entry  Bathroom Shower/Tub: Tub/Shower unit, Shower chair with back  Bathroom Toilet: Standard  Bathroom Accessibility: Accessible  Home Equipment: 4 wheeled walker, Cane  ADL Assistance: Independent  Homemaking Assistance: Independent  Homemaking Responsibilities: Yes  Ambulation Assistance: Independent  Transfer Assistance: Independent  Active : Yes  Occupation: Full time employment  Type of occupation: [employer]   Additional Comments: Dad near by but hard for him to get out. Friends all work full time.   1 recent fall        Objective        Orientation  Overall Orientation Status: Within Functional Limits  Observation/Palpation  Posture: Fair  Observation: pt w/ LUE in sling, sling placed high and tight,

## 2018-12-12 NOTE — PROGRESS NOTES
Physical Therapy    Facility/Department: [address] [place] [address] EMERGENCY DEPARTMENT  Initial Assessment    NAME: Anjel Farris  : 1960  MRN: [id number]    Date of Service: 2018    Discharge Recommendations:  IP Rehab   PT Equipment Recommendations  Other: to further assess at next level of care     Patient Diagnosis(es): The encounter diagnosis was Right leg pain. has a past medical history of Arthritis; Asthma; Diabetes mellitus ([address] Hyperlipidemia; and Hypertension. has a past surgical history that includes Cholecystectomy.     Restrictions  Restrictions/Precautions  Restrictions/Precautions: Weight Bearing  Upper Extremity Weight Bearing Restrictions  Left Upper Extremity Weight Bearing: Non Weight Bearing  Other: sling   Position Activity Restriction  Other position/activity restrictions: BP cuff, pulse oximeter, sling to LUE  Vision/Hearing  Vision: Impaired  Vision Exceptions: Wears glasses for reading  Hearing: Within functional limits     Subjective  General  Chart Reviewed: Yes  Patient assessed for rehabilitation services?: Yes  Family / Caregiver Present: No  Follows Commands: Within Functional Limits  Pain Screening  Patient Currently in Pain: Yes  Pain Assessment  Pain Level: 6  Pain Location: Leg  Pain Orientation: Right  Vital Signs  Patient Currently in Pain: Yes       Orientation  Orientation  Overall Orientation Status: Within Normal Limits  Social/Functional History  Social/Functional History  Lives With: Alone  Type of Home:  (condo)  Home Layout: One level  Home Access: Level entry  Bathroom Shower/Tub: Tub/Shower unit, Shower chair with back  Bathroom Toilet: Standard  Bathroom Accessibility: Accessible  Home Equipment: 4 wheeled walker, Cane  ADL Assistance: [address]: Independent  Homemaking Responsibilities: Yes  Ambulation Assistance: Independent  Transfer Assistance: Independent  Active : admitted with recent fall and broken L arm with sling and NWB restrictions. Recommend ARU once medically stable. Prior to admission pt was indep in the home and community working full time. Currently, pt is needing 2 person assist for bed mobility and Daysi for short distance ambulation. Pt has no assist at home and is unable to care for herself. She is at a high fall risk and would benefit from continued therapy prior to discharge to address current deficits, dec potential fall risk, and return to OF. Pt able to tolerate 3 hours of therapy and is very motivated to work with therapy to [employer] better and be able to take care of herself\". Treatment Diagnosis: fall   Prognosis: Good  Decision Making: Medium Complexity  Patient Education: POC,role of PT, WB restrictions, DME  REQUIRES PT FOLLOW UP: Yes  Activity Tolerance  Activity Tolerance: Patient Tolerated treatment well;Patient limited by fatigue;Patient limited by pain; Patient limited by endurance         Plan   Plan  Times per week: 3+  Times per day: Daily  Plan weeks: 1  Current Treatment Recommendations: Strengthening, Balance Training, Functional Mobility Training, Transfer Training, ADL/Self-care Training, Gait Training, Endurance Training, Neuromuscular Re-education, Home Exercise Program, Safety Education & Training, Positioning, Equipment Evaluation, Education, & procurement, Patient/Caregiver Education & Training  Safety Devices  Type of devices: All fall risk precautions in place, Gait belt    G-Code  PT G-Codes  Functional Limitation: Mobility: Walking and moving around  Mobility: Walking and Moving Around Current Status (): At least 60 percent but less than 80 percent impaired, limited or restricted  Mobility: Walking and Moving Around Goal Status ():  At least 1 percent but less than 20 percent impaired, limited or restricted  OutComes Score                                           AM-PAC Score  AM-PAC Inpatient Mobility Raw Score :

## 2018-12-12 NOTE — ED TRIAGE NOTES
Pt reports that she woke up this morning with a pain to R thigh. Pt fell Friday, and was seen here for same.

## 2018-12-13 NOTE — ED NOTES
Report called to BENJAMIN Donaldson for transfer to unit and assumption of care     Randy Nageotte, RN  12/12/18 1925

## 2018-12-14 ENCOUNTER — HOSPITAL ENCOUNTER (OUTPATIENT)
Age: 58
Setting detail: SPECIMEN
Discharge: HOME | End: 2018-12-14
Payer: COMMERCIAL

## 2018-12-14 LAB
ALBUMIN SERPL-MCNC: 3.5 GM/DL (ref 3.4–5)
ALP BLD-CCNC: 139 IU/L (ref 40–128)
ALT SERPL-CCNC: 37 U/L (ref 10–40)
ANION GAP SERPL CALCULATED.3IONS-SCNC: 13 MMOL/L (ref 4–16)
AST SERPL-CCNC: 33 IU/L (ref 15–37)
BASOPHILS ABSOLUTE: 0 K/CU MM
BASOPHILS RELATIVE PERCENT: 0.5 % (ref 0–1)
BILIRUB SERPL-MCNC: 0.3 MG/DL (ref 0–1)
BUN BLDV-MCNC: 38 MG/DL (ref 6–23)
CALCIUM SERPL-MCNC: 9 MG/DL (ref 8.3–10.6)
CHLORIDE BLD-SCNC: 98 MMOL/L (ref 99–110)
CO2: 25 MMOL/L (ref 21–32)
CREAT SERPL-MCNC: 1.6 MG/DL (ref 0.6–1.1)
DIFFERENTIAL TYPE: ABNORMAL
EOSINOPHILS ABSOLUTE: 0.2 K/CU MM
EOSINOPHILS RELATIVE PERCENT: 4.1 % (ref 0–3)
GFR AFRICAN AMERICAN: 40 ML/MIN/1.73M2
GFR NON-AFRICAN AMERICAN: 33 ML/MIN/1.73M2
GLUCOSE BLD-MCNC: 355 MG/DL (ref 70–99)
HCT VFR BLD CALC: 33.8 % (ref 37–47)
HEMOGLOBIN: 10.1 GM/DL (ref 12.5–16)
IMMATURE NEUTROPHIL %: 0.3 % (ref 0–0.43)
LYMPHOCYTES ABSOLUTE: 1.5 K/CU MM
LYMPHOCYTES RELATIVE PERCENT: 26.1 % (ref 24–44)
MCH RBC QN AUTO: 28.9 PG (ref 27–31)
MCHC RBC AUTO-ENTMCNC: 29.9 % (ref 32–36)
MCV RBC AUTO: 96.6 FL (ref 78–100)
MONOCYTES ABSOLUTE: 0.5 K/CU MM
MONOCYTES RELATIVE PERCENT: 8.5 % (ref 0–4)
NUCLEATED RBC %: 0 %
PDW BLD-RTO: 12.8 % (ref 11.7–14.9)
PLATELET # BLD: 285 K/CU MM (ref 140–440)
PMV BLD AUTO: 10.6 FL (ref 7.5–11.1)
POTASSIUM SERPL-SCNC: 4.7 MMOL/L (ref 3.5–5.1)
RBC # BLD: 3.5 M/CU MM (ref 4.2–5.4)
SEGMENTED NEUTROPHILS ABSOLUTE COUNT: 3.6 K/CU MM
SEGMENTED NEUTROPHILS RELATIVE PERCENT: 60.5 % (ref 36–66)
SODIUM BLD-SCNC: 136 MMOL/L (ref 135–145)
TOTAL IMMATURE NEUTOROPHIL: 0.02 K/CU MM
TOTAL NUCLEATED RBC: 0 K/CU MM
TOTAL PROTEIN: 5.7 GM/DL (ref 6.4–8.2)
WBC # BLD: 5.9 K/CU MM (ref 4–10.5)

## 2018-12-14 PROCEDURE — 85025 COMPLETE CBC W/AUTO DIFF WBC: CPT

## 2018-12-14 PROCEDURE — 36415 COLL VENOUS BLD VENIPUNCTURE: CPT

## 2018-12-14 PROCEDURE — 80053 COMPREHEN METABOLIC PANEL: CPT

## 2018-12-17 ENCOUNTER — HOSPITAL ENCOUNTER (OUTPATIENT)
Age: 58
Setting detail: SPECIMEN
Discharge: HOME | End: 2018-12-17
Payer: COMMERCIAL

## 2018-12-17 ENCOUNTER — OFFICE VISIT (OUTPATIENT)
Dept: ORTHOPEDIC SURGERY | Age: 58
End: 2018-12-17
Payer: COMMERCIAL

## 2018-12-17 VITALS — RESPIRATION RATE: 14 BRPM | WEIGHT: 264 LBS | HEIGHT: 64 IN | BODY MASS INDEX: 45.07 KG/M2

## 2018-12-17 DIAGNOSIS — R52 PAIN: ICD-10-CM

## 2018-12-17 DIAGNOSIS — S42.295A OTHER CLOSED NONDISPLACED FRACTURE OF PROXIMAL END OF LEFT HUMERUS, INITIAL ENCOUNTER: Primary | ICD-10-CM

## 2018-12-17 PROBLEM — M81.0 OSTEOPOROSIS: Status: ACTIVE | Noted: 2017-06-16

## 2018-12-17 PROBLEM — M54.9 CHRONIC BACK PAIN: Status: ACTIVE | Noted: 2018-12-17

## 2018-12-17 PROBLEM — R10.9 CHRONIC ABDOMINAL PAIN: Status: ACTIVE | Noted: 2018-12-17

## 2018-12-17 PROBLEM — R06.2 WHEEZING: Status: ACTIVE | Noted: 2018-12-17

## 2018-12-17 PROBLEM — G89.29 CHRONIC ABDOMINAL PAIN: Status: ACTIVE | Noted: 2018-12-17

## 2018-12-17 PROBLEM — E78.5 HYPERLIPIDEMIA: Status: ACTIVE | Noted: 2018-12-17

## 2018-12-17 PROBLEM — E78.2 MIXED HYPERLIPIDEMIA: Status: ACTIVE | Noted: 2018-12-17

## 2018-12-17 PROBLEM — E55.9 VITAMIN D DEFICIENCY: Status: ACTIVE | Noted: 2018-12-17

## 2018-12-17 PROBLEM — M19.90 OSTEOARTHROSIS: Status: ACTIVE | Noted: 2018-12-17

## 2018-12-17 PROBLEM — I10 BENIGN ESSENTIAL HYPERTENSION: Status: ACTIVE | Noted: 2018-12-17

## 2018-12-17 PROBLEM — G89.29 CHRONIC BACK PAIN: Status: ACTIVE | Noted: 2018-12-17

## 2018-12-17 PROBLEM — G47.30 SLEEP APNEA: Status: ACTIVE | Noted: 2018-12-17

## 2018-12-17 PROBLEM — J45.909 ASTHMA: Status: ACTIVE | Noted: 2018-12-17

## 2018-12-17 PROBLEM — K76.9 LESION OF LIVER: Status: ACTIVE | Noted: 2018-12-17

## 2018-12-17 PROBLEM — M25.551 PAIN OF BOTH HIP JOINTS: Status: ACTIVE | Noted: 2018-08-24

## 2018-12-17 PROBLEM — J44.9 CHRONIC OBSTRUCTIVE LUNG DISEASE (HCC): Status: ACTIVE | Noted: 2017-06-23

## 2018-12-17 PROBLEM — M25.552 PAIN OF BOTH HIP JOINTS: Status: ACTIVE | Noted: 2018-08-24

## 2018-12-17 PROBLEM — E66.9 OBESITY: Status: ACTIVE | Noted: 2018-02-16

## 2018-12-17 PROBLEM — F32.A DEPRESSIVE DISORDER: Status: ACTIVE | Noted: 2018-12-17

## 2018-12-17 LAB
ALBUMIN SERPL-MCNC: 3.4 GM/DL (ref 3.4–5)
ALP BLD-CCNC: 159 IU/L (ref 40–128)
ALT SERPL-CCNC: 42 U/L (ref 10–40)
ANION GAP SERPL CALCULATED.3IONS-SCNC: 11 MMOL/L (ref 4–16)
AST SERPL-CCNC: 29 IU/L (ref 15–37)
BASOPHILS ABSOLUTE: 0 K/CU MM
BASOPHILS RELATIVE PERCENT: 0.3 % (ref 0–1)
BILIRUB SERPL-MCNC: 0.3 MG/DL (ref 0–1)
BUN BLDV-MCNC: 58 MG/DL (ref 6–23)
CALCIUM SERPL-MCNC: 9 MG/DL (ref 8.3–10.6)
CHLORIDE BLD-SCNC: 102 MMOL/L (ref 99–110)
CO2: 26 MMOL/L (ref 21–32)
CREAT SERPL-MCNC: 1.9 MG/DL (ref 0.6–1.1)
DIFFERENTIAL TYPE: ABNORMAL
EOSINOPHILS ABSOLUTE: 0.3 K/CU MM
EOSINOPHILS RELATIVE PERCENT: 3.7 % (ref 0–3)
ESTIMATED AVERAGE GLUCOSE: 192 MG/DL
GFR AFRICAN AMERICAN: 33 ML/MIN/1.73M2
GFR NON-AFRICAN AMERICAN: 27 ML/MIN/1.73M2
GLUCOSE BLD-MCNC: 161 MG/DL (ref 70–99)
HBA1C MFR BLD: 8.3 % (ref 4.2–6.3)
HCT VFR BLD CALC: 35.1 % (ref 37–47)
HEMOGLOBIN: 10 GM/DL (ref 12.5–16)
IMMATURE NEUTROPHIL %: 0.3 % (ref 0–0.43)
LYMPHOCYTES ABSOLUTE: 1.9 K/CU MM
LYMPHOCYTES RELATIVE PERCENT: 27.3 % (ref 24–44)
MCH RBC QN AUTO: 28.9 PG (ref 27–31)
MCHC RBC AUTO-ENTMCNC: 28.5 % (ref 32–36)
MCV RBC AUTO: 101.4 FL (ref 78–100)
MONOCYTES ABSOLUTE: 0.6 K/CU MM
MONOCYTES RELATIVE PERCENT: 8.1 % (ref 0–4)
NUCLEATED RBC %: 0 %
PDW BLD-RTO: 12.8 % (ref 11.7–14.9)
PLATELET # BLD: 286 K/CU MM (ref 140–440)
PMV BLD AUTO: 10.4 FL (ref 7.5–11.1)
POTASSIUM SERPL-SCNC: 5.1 MMOL/L (ref 3.5–5.1)
RBC # BLD: 3.46 M/CU MM (ref 4.2–5.4)
SEGMENTED NEUTROPHILS ABSOLUTE COUNT: 4.1 K/CU MM
SEGMENTED NEUTROPHILS RELATIVE PERCENT: 60.3 % (ref 36–66)
SODIUM BLD-SCNC: 139 MMOL/L (ref 135–145)
TOTAL IMMATURE NEUTOROPHIL: 0.02 K/CU MM
TOTAL NUCLEATED RBC: 0 K/CU MM
TOTAL PROTEIN: 5.5 GM/DL (ref 6.4–8.2)
WBC # BLD: 6.8 K/CU MM (ref 4–10.5)

## 2018-12-17 PROCEDURE — 85025 COMPLETE CBC W/AUTO DIFF WBC: CPT

## 2018-12-17 PROCEDURE — 99213 OFFICE O/P EST LOW 20 MIN: CPT | Performed by: ORTHOPAEDIC SURGERY

## 2018-12-17 PROCEDURE — 36415 COLL VENOUS BLD VENIPUNCTURE: CPT

## 2018-12-17 PROCEDURE — 80053 COMPREHEN METABOLIC PANEL: CPT

## 2018-12-17 PROCEDURE — 83036 HEMOGLOBIN GLYCOSYLATED A1C: CPT

## 2018-12-17 RX ORDER — INSULIN GLARGINE 100 [IU]/ML
INJECTION, SOLUTION SUBCUTANEOUS
COMMUNITY
End: 2019-01-17

## 2018-12-17 RX ORDER — IBUPROFEN 800 MG/1
TABLET ORAL
COMMUNITY
End: 2019-01-17

## 2018-12-17 RX ORDER — ZOLPIDEM TARTRATE 10 MG/1
TABLET ORAL
COMMUNITY
End: 2018-12-27

## 2018-12-17 RX ORDER — INSULIN GLARGINE 100 [IU]/ML
INJECTION, SOLUTION SUBCUTANEOUS
COMMUNITY
End: 2019-01-17 | Stop reason: CLARIF

## 2018-12-17 RX ORDER — HYDROCHLOROTHIAZIDE 25 MG/1
TABLET ORAL
COMMUNITY
End: 2018-12-27

## 2018-12-17 RX ORDER — ALENDRONATE SODIUM 70 MG/1
TABLET ORAL
COMMUNITY
End: 2019-01-17

## 2018-12-17 RX ORDER — HYDROCODONE BITARTRATE AND ACETAMINOPHEN 5; 325 MG/1; MG/1
TABLET ORAL
COMMUNITY
End: 2018-12-27

## 2018-12-17 ASSESSMENT — ENCOUNTER SYMPTOMS: COLOR CHANGE: 0

## 2018-12-17 NOTE — PROGRESS NOTES
intact. XRAY  X-ray 3 view of the left shoulder obtained and reviewed by me today in the office demonstrates continued comminuted fracture of the left proximal humerus with impaction of the humeral shaft into the humeral head, there has been worsening of the displacement and impaction compared to prior x-rays with posterior angulation of the humeral head, no subluxation or dislocation noted. Assessment:      Left proximal humerus fracture      Plan:      I discussed with her today her x-ray findings. I explained to her that the fracture has displaced more. At this point we could either continue with conservative treatment or could consider surgical treatment. At this point she would like to proceed with surgical treatment. I discussed with her today performing open reduction and internal fixation of her left proximal humerus fracture with possible reverse total shoulder arthroplasty. I explained risks, benefits, possible complications of the procedure and answered all questions for the patient. The patient understands and consents to the procedure. We will schedule surgery at soonest convenience. I explained postoperative rehabilitation protocol and expectations with the patient today. Patient will follow up with their primary care physician prior to surgical treatment for preoperative clearance. No lifting, pushing, pulling with left arm. Ice and elevate as needed. Tylenol or Motrin for pain. Follow up in 1 week postop.         James Guajardo, DO

## 2018-12-18 ENCOUNTER — ANESTHESIA EVENT (OUTPATIENT)
Dept: OPERATING ROOM | Age: 58
End: 2018-12-18
Payer: COMMERCIAL

## 2018-12-18 ENCOUNTER — TELEPHONE (OUTPATIENT)
Dept: ORTHOPEDIC SURGERY | Age: 58
End: 2018-12-18

## 2018-12-18 ENCOUNTER — ANESTHESIA (OUTPATIENT)
Dept: OPERATING ROOM | Age: 58
End: 2018-12-18
Payer: COMMERCIAL

## 2018-12-18 NOTE — ANESTHESIA PRE PROCEDURE
Hyperlipidemia     Hypertension        Past Surgical History:        Procedure Laterality Date    CHOLECYSTECTOMY         Social History:    Social History   Substance Use Topics    Smoking status: Never Smoker    Smokeless tobacco: Never Used    Alcohol use No                                Counseling given: Not Answered      Vital Signs (Current): There were no vitals filed for this visit. BP Readings from Last 3 Encounters:   12/12/18 (!) 160/49   12/10/18 (!) 127/58   12/07/18 (!) 165/96       NPO Status:                                                                                 BMI:   Wt Readings from Last 3 Encounters:   12/17/18 264 lb (119.7 kg)   12/12/18 250 lb (113.4 kg)   12/09/18 250 lb (113.4 kg)     There is no height or weight on file to calculate BMI.    CBC:   Lab Results   Component Value Date    WBC 6.8 12/17/2018    RBC 3.46 12/17/2018    HGB 10.0 12/17/2018    HCT 35.1 12/17/2018    .4 12/17/2018    RDW 12.8 12/17/2018     12/17/2018       CMP:   Lab Results   Component Value Date     12/17/2018    K 5.1 12/17/2018     12/17/2018    CO2 26 12/17/2018    BUN 58 12/17/2018    CREATININE 1.9 12/17/2018    GFRAA 33 12/17/2018    LABGLOM 27 12/17/2018    GLUCOSE 161 12/17/2018    PROT 5.5 12/17/2018    CALCIUM 9.0 12/17/2018    BILITOT 0.3 12/17/2018    ALKPHOS 159 12/17/2018    AST 29 12/17/2018    ALT 42 12/17/2018       POC Tests: No results for input(s): POCGLU, POCNA, POCK, POCCL, POCBUN, POCHEMO, POCHCT in the last 72 hours.     Coags: No results found for: PROTIME, INR, APTT    HCG (If Applicable): No results found for: PREGTESTUR, PREGSERUM, HCG, HCGQUANT     ABGs: No results found for: PHART, PO2ART, LUD1FMQ, BTH4SZE, BEART, G1HRZZYO     Type & Screen (If Applicable):  No results found for: LABABO, [address]    Anesthesia Evaluation  Patient summary reviewed and Nursing notes reviewed  Airway:         Dental:

## 2018-12-18 NOTE — TELEPHONE ENCOUNTER
Received call from patient requesting a returned call as soon as possible to reschedule surgery.  Please contact patient with date immediately at [phone]

## 2018-12-20 ENCOUNTER — HOSPITAL ENCOUNTER (OUTPATIENT)
Age: 58
Setting detail: SPECIMEN
Discharge: HOME | End: 2018-12-20

## 2018-12-20 LAB
ANION GAP SERPL CALCULATED.3IONS-SCNC: 11 MMOL/L (ref 4–16)
BUN BLDV-MCNC: 44 MG/DL (ref 6–23)
CALCIUM SERPL-MCNC: 9 MG/DL (ref 8.3–10.6)
CHLORIDE BLD-SCNC: 101 MMOL/L (ref 99–110)
CO2: 26 MMOL/L (ref 21–32)
CREAT SERPL-MCNC: 1.5 MG/DL (ref 0.6–1.1)
GFR AFRICAN AMERICAN: 43 ML/MIN/1.73M2
GFR NON-AFRICAN AMERICAN: 35 ML/MIN/1.73M2
GLUCOSE BLD-MCNC: 299 MG/DL (ref 70–99)
POTASSIUM SERPL-SCNC: 6.1 MMOL/L (ref 3.5–5.1)
SODIUM BLD-SCNC: 138 MMOL/L (ref 135–145)

## 2018-12-20 PROCEDURE — 36415 COLL VENOUS BLD VENIPUNCTURE: CPT

## 2018-12-20 PROCEDURE — 80048 BASIC METABOLIC PNL TOTAL CA: CPT

## 2018-12-21 ENCOUNTER — TELEPHONE (OUTPATIENT)
Dept: ORTHOPEDIC SURGERY | Age: 58
End: 2018-12-21

## 2018-12-21 ENCOUNTER — HOSPITAL ENCOUNTER (OUTPATIENT)
Age: 58
Setting detail: SPECIMEN
Discharge: HOME | End: 2018-12-21
Payer: COMMERCIAL

## 2018-12-21 DIAGNOSIS — S42.295D OTHER CLOSED NONDISPLACED FRACTURE OF PROXIMAL END OF LEFT HUMERUS WITH ROUTINE HEALING, SUBSEQUENT ENCOUNTER: Primary | ICD-10-CM

## 2018-12-21 LAB
ANION GAP SERPL CALCULATED.3IONS-SCNC: 12 MMOL/L (ref 4–16)
BUN BLDV-MCNC: 44 MG/DL (ref 6–23)
CALCIUM SERPL-MCNC: 8.9 MG/DL (ref 8.3–10.6)
CHLORIDE BLD-SCNC: 101 MMOL/L (ref 99–110)
CO2: 25 MMOL/L (ref 21–32)
CREAT SERPL-MCNC: 1.6 MG/DL (ref 0.6–1.1)
GFR AFRICAN AMERICAN: 40 ML/MIN/1.73M2
GFR NON-AFRICAN AMERICAN: 33 ML/MIN/1.73M2
GLUCOSE BLD-MCNC: 288 MG/DL (ref 70–99)
POTASSIUM SERPL-SCNC: 5 MMOL/L (ref 3.5–5.1)
SODIUM BLD-SCNC: 138 MMOL/L (ref 135–145)

## 2018-12-21 PROCEDURE — 80048 BASIC METABOLIC PNL TOTAL CA: CPT

## 2018-12-21 PROCEDURE — 36415 COLL VENOUS BLD VENIPUNCTURE: CPT

## 2018-12-21 RX ORDER — OXYCODONE HYDROCHLORIDE AND ACETAMINOPHEN 5; 325 MG/1; MG/1
1 TABLET ORAL EVERY 6 HOURS PRN
Qty: 28 TABLET | Refills: 0 | Status: SHIPPED | OUTPATIENT
Start: 2018-12-21 | End: 2018-12-28

## 2018-12-21 RX ORDER — CEPHALEXIN 250 MG/1
250 CAPSULE ORAL 4 TIMES DAILY
Qty: 4 CAPSULE | Refills: 0 | Status: SHIPPED | OUTPATIENT
Start: 2018-12-21 | End: 2018-12-22

## 2018-12-24 ENCOUNTER — HOSPITAL ENCOUNTER (OUTPATIENT)
Age: 58
Setting detail: SPECIMEN
Discharge: HOME | End: 2018-12-24
Payer: COMMERCIAL

## 2018-12-24 LAB
ANION GAP SERPL CALCULATED.3IONS-SCNC: 11 MMOL/L (ref 4–16)
BUN BLDV-MCNC: 21 MG/DL (ref 6–23)
CALCIUM SERPL-MCNC: 9.3 MG/DL (ref 8.3–10.6)
CHLORIDE BLD-SCNC: 103 MMOL/L (ref 99–110)
CO2: 26 MMOL/L (ref 21–32)
CREAT SERPL-MCNC: 1.1 MG/DL (ref 0.6–1.1)
GFR AFRICAN AMERICAN: >60 ML/MIN/1.73M2
GFR NON-AFRICAN AMERICAN: 51 ML/MIN/1.73M2
GLUCOSE BLD-MCNC: 136 MG/DL (ref 70–99)
HCT VFR BLD CALC: 38.8 % (ref 37–47)
HEMOGLOBIN: 11.3 GM/DL (ref 12.5–16)
MCH RBC QN AUTO: 28.9 PG (ref 27–31)
MCHC RBC AUTO-ENTMCNC: 29.1 % (ref 32–36)
MCV RBC AUTO: 99.2 FL (ref 78–100)
PDW BLD-RTO: 12.8 % (ref 11.7–14.9)
PLATELET # BLD: 357 K/CU MM (ref 140–440)
PMV BLD AUTO: 10.6 FL (ref 7.5–11.1)
POTASSIUM SERPL-SCNC: 5.1 MMOL/L (ref 3.5–5.1)
RBC # BLD: 3.91 M/CU MM (ref 4.2–5.4)
SODIUM BLD-SCNC: 140 MMOL/L (ref 135–145)
WBC # BLD: 7.1 K/CU MM (ref 4–10.5)

## 2018-12-24 PROCEDURE — 36415 COLL VENOUS BLD VENIPUNCTURE: CPT

## 2018-12-24 PROCEDURE — 85027 COMPLETE CBC AUTOMATED: CPT

## 2018-12-24 PROCEDURE — 80048 BASIC METABOLIC PNL TOTAL CA: CPT

## 2018-12-27 RX ORDER — GABAPENTIN 100 MG/1
100 CAPSULE ORAL 3 TIMES DAILY
Status: ON HOLD | COMMUNITY
End: 2020-09-15 | Stop reason: HOSPADM

## 2018-12-27 RX ORDER — OXYCODONE HCL 10 MG/1
10 TABLET, FILM COATED, EXTENDED RELEASE ORAL EVERY 12 HOURS
COMMUNITY
End: 2019-01-17

## 2018-12-31 ENCOUNTER — HOSPITAL ENCOUNTER (OUTPATIENT)
Age: 58
Setting detail: OUTPATIENT SURGERY
Discharge: HOME | End: 2018-12-31
Attending: ORTHOPAEDIC SURGERY | Admitting: ORTHOPAEDIC SURGERY
Payer: COMMERCIAL

## 2018-12-31 ENCOUNTER — APPOINTMENT (OUTPATIENT)
Dept: GENERAL RADIOLOGY | Age: 58
End: 2018-12-31
Attending: ORTHOPAEDIC SURGERY
Payer: COMMERCIAL

## 2018-12-31 VITALS
BODY MASS INDEX: 41.65 KG/M2 | WEIGHT: 250 LBS | TEMPERATURE: 97.5 F | RESPIRATION RATE: 16 BRPM | SYSTOLIC BLOOD PRESSURE: 134 MMHG | DIASTOLIC BLOOD PRESSURE: 67 MMHG | HEART RATE: 88 BPM | OXYGEN SATURATION: 99 % | HEIGHT: 65 IN

## 2018-12-31 VITALS
TEMPERATURE: 97.1 F | DIASTOLIC BLOOD PRESSURE: 67 MMHG | SYSTOLIC BLOOD PRESSURE: 121 MMHG | RESPIRATION RATE: 27 BRPM | OXYGEN SATURATION: 100 %

## 2018-12-31 LAB
GLUCOSE BLD-MCNC: 177 MG/DL (ref 70–99)
GLUCOSE BLD-MCNC: 197 MG/DL (ref 70–99)

## 2018-12-31 PROCEDURE — 2720000010 HC SURG SUPPLY STERILE: Performed by: ORTHOPAEDIC SURGERY

## 2018-12-31 PROCEDURE — 7100000000 HC PACU RECOVERY - FIRST 15 MIN: Performed by: ORTHOPAEDIC SURGERY

## 2018-12-31 PROCEDURE — 3600000004 HC SURGERY LEVEL 4 BASE: Performed by: ORTHOPAEDIC SURGERY

## 2018-12-31 PROCEDURE — 76001 FL GREATER THAN 1 HOUR: CPT

## 2018-12-31 PROCEDURE — 2500000003 HC RX 250 WO HCPCS: Performed by: NURSE ANESTHETIST, CERTIFIED REGISTERED

## 2018-12-31 PROCEDURE — 23615 OPTX PROX HUMRL FX W/INT FIX: CPT | Performed by: ORTHOPAEDIC SURGERY

## 2018-12-31 PROCEDURE — 3700000000 HC ANESTHESIA ATTENDED CARE: Performed by: ORTHOPAEDIC SURGERY

## 2018-12-31 PROCEDURE — 7100000001 HC PACU RECOVERY - ADDTL 15 MIN: Performed by: ORTHOPAEDIC SURGERY

## 2018-12-31 PROCEDURE — 7100000010 HC PHASE II RECOVERY - FIRST 15 MIN: Performed by: ORTHOPAEDIC SURGERY

## 2018-12-31 PROCEDURE — 3700000001 HC ADD 15 MINUTES (ANESTHESIA): Performed by: ORTHOPAEDIC SURGERY

## 2018-12-31 PROCEDURE — 64415 NJX AA&/STRD BRCH PLXS IMG: CPT | Performed by: ANESTHESIOLOGY

## 2018-12-31 PROCEDURE — 3600000014 HC SURGERY LEVEL 4 ADDTL 15MIN: Performed by: ORTHOPAEDIC SURGERY

## 2018-12-31 PROCEDURE — C1713 ANCHOR/SCREW BN/BN,TIS/BN: HCPCS | Performed by: ORTHOPAEDIC SURGERY

## 2018-12-31 PROCEDURE — 2580000003 HC RX 258: Performed by: ANESTHESIOLOGY

## 2018-12-31 PROCEDURE — 6360000002 HC RX W HCPCS: Performed by: NURSE ANESTHETIST, CERTIFIED REGISTERED

## 2018-12-31 PROCEDURE — 2709999900 HC NON-CHARGEABLE SUPPLY: Performed by: ORTHOPAEDIC SURGERY

## 2018-12-31 PROCEDURE — 6360000002 HC RX W HCPCS: Performed by: ORTHOPAEDIC SURGERY

## 2018-12-31 PROCEDURE — 7100000011 HC PHASE II RECOVERY - ADDTL 15 MIN: Performed by: ORTHOPAEDIC SURGERY

## 2018-12-31 PROCEDURE — 82962 GLUCOSE BLOOD TEST: CPT

## 2018-12-31 DEVICE — IMPLANTS FOR ORTHOPEDIC BONE FIXATION
Type: IMPLANTABLE DEVICE | Site: HUMERUS | Status: FUNCTIONAL
Brand: VARIABLE ANGLE LOCKING SCREW

## 2018-12-31 DEVICE — IMPLANTS FOR ORTHOPEDIC BONE FIXATION
Type: IMPLANTABLE DEVICE | Status: FUNCTIONAL
Brand: VARIABLE ANGLE LOCKING SCREW

## 2018-12-31 DEVICE — IMPLANTS FOR ORTHOPEDIC BONE FIXATION. ALSO CALLED A BONE SCREW.
Type: IMPLANTABLE DEVICE | Status: FUNCTIONAL
Brand: VARIABLE ANGLE NON-LOCKING SCREW

## 2018-12-31 DEVICE — COMPATIBLE WITH 2.7MM/3.5 MM SCREWS, IMPLANTS FOR ORTHOPEDIC BONE FIXATION
Type: IMPLANTABLE DEVICE | Site: HUMERUS | Status: FUNCTIONAL
Brand: PROXIMAL HUMERUS PLATE, 9  HOLES

## 2018-12-31 DEVICE — IMPLANTABLE DEVICE: Type: IMPLANTABLE DEVICE | Status: FUNCTIONAL

## 2018-12-31 RX ORDER — SODIUM CHLORIDE, SODIUM LACTATE, POTASSIUM CHLORIDE, CALCIUM CHLORIDE 600; 310; 30; 20 MG/100ML; MG/100ML; MG/100ML; MG/100ML
INJECTION, SOLUTION INTRAVENOUS CONTINUOUS
Status: DISCONTINUED | OUTPATIENT
Start: 2018-12-31 | End: 2018-12-31 | Stop reason: HOSPADM

## 2018-12-31 RX ORDER — PROPOFOL 10 MG/ML
INJECTION, EMULSION INTRAVENOUS PRN
Status: DISCONTINUED | OUTPATIENT
Start: 2018-12-31 | End: 2018-12-31 | Stop reason: SDUPTHER

## 2018-12-31 RX ORDER — PROMETHAZINE HYDROCHLORIDE 25 MG/ML
6.25 INJECTION, SOLUTION INTRAMUSCULAR; INTRAVENOUS
Status: DISCONTINUED | OUTPATIENT
Start: 2018-12-31 | End: 2018-12-31 | Stop reason: HOSPADM

## 2018-12-31 RX ORDER — ROCURONIUM BROMIDE 10 MG/ML
INJECTION, SOLUTION INTRAVENOUS PRN
Status: DISCONTINUED | OUTPATIENT
Start: 2018-12-31 | End: 2018-12-31 | Stop reason: SDUPTHER

## 2018-12-31 RX ORDER — KETOROLAC TROMETHAMINE 30 MG/ML
30 INJECTION, SOLUTION INTRAMUSCULAR; INTRAVENOUS EVERY 6 HOURS
Status: CANCELLED | OUTPATIENT
Start: 2018-12-31 | End: 2019-01-02

## 2018-12-31 RX ORDER — CEFAZOLIN SODIUM 2 G/100ML
2 INJECTION, SOLUTION INTRAVENOUS
Status: COMPLETED | OUTPATIENT
Start: 2018-12-31 | End: 2018-12-31

## 2018-12-31 RX ORDER — ACETAMINOPHEN 325 MG/1
650 TABLET ORAL EVERY 4 HOURS PRN
Status: CANCELLED | OUTPATIENT
Start: 2018-12-31

## 2018-12-31 RX ORDER — ONDANSETRON 2 MG/ML
4 INJECTION INTRAMUSCULAR; INTRAVENOUS EVERY 6 HOURS PRN
Status: CANCELLED | OUTPATIENT
Start: 2018-12-31

## 2018-12-31 RX ORDER — SODIUM CHLORIDE 0.9 % (FLUSH) 0.9 %
10 SYRINGE (ML) INJECTION PRN
Status: CANCELLED | OUTPATIENT
Start: 2018-12-31

## 2018-12-31 RX ORDER — CEFAZOLIN SODIUM 2 G/100ML
INJECTION, SOLUTION INTRAVENOUS
Status: COMPLETED
Start: 2018-12-31 | End: 2018-12-31

## 2018-12-31 RX ORDER — SODIUM CHLORIDE 0.9 % (FLUSH) 0.9 %
10 SYRINGE (ML) INJECTION EVERY 12 HOURS SCHEDULED
Status: CANCELLED | OUTPATIENT
Start: 2018-12-31

## 2018-12-31 RX ORDER — ONDANSETRON 2 MG/ML
INJECTION INTRAMUSCULAR; INTRAVENOUS PRN
Status: DISCONTINUED | OUTPATIENT
Start: 2018-12-31 | End: 2018-12-31 | Stop reason: SDUPTHER

## 2018-12-31 RX ORDER — LABETALOL HYDROCHLORIDE 5 MG/ML
5 INJECTION, SOLUTION INTRAVENOUS EVERY 10 MIN PRN
Status: DISCONTINUED | OUTPATIENT
Start: 2018-12-31 | End: 2018-12-31 | Stop reason: HOSPADM

## 2018-12-31 RX ORDER — MEPERIDINE HYDROCHLORIDE 25 MG/ML
12.5 INJECTION INTRAMUSCULAR; INTRAVENOUS; SUBCUTANEOUS EVERY 5 MIN PRN
Status: DISCONTINUED | OUTPATIENT
Start: 2018-12-31 | End: 2018-12-31 | Stop reason: HOSPADM

## 2018-12-31 RX ORDER — HYDROCODONE BITARTRATE AND ACETAMINOPHEN 5; 325 MG/1; MG/1
2 TABLET ORAL EVERY 4 HOURS PRN
Status: CANCELLED | OUTPATIENT
Start: 2018-12-31

## 2018-12-31 RX ORDER — FENTANYL CITRATE 50 UG/ML
50 INJECTION, SOLUTION INTRAMUSCULAR; INTRAVENOUS EVERY 5 MIN PRN
Status: DISCONTINUED | OUTPATIENT
Start: 2018-12-31 | End: 2018-12-31 | Stop reason: HOSPADM

## 2018-12-31 RX ORDER — HYDROMORPHONE HCL 110MG/55ML
0.5 PATIENT CONTROLLED ANALGESIA SYRINGE INTRAVENOUS EVERY 5 MIN PRN
Status: DISCONTINUED | OUTPATIENT
Start: 2018-12-31 | End: 2018-12-31 | Stop reason: HOSPADM

## 2018-12-31 RX ORDER — HYDROCODONE BITARTRATE AND ACETAMINOPHEN 5; 325 MG/1; MG/1
1 TABLET ORAL EVERY 4 HOURS PRN
Status: CANCELLED | OUTPATIENT
Start: 2018-12-31

## 2018-12-31 RX ORDER — SODIUM CHLORIDE 0.9 % (FLUSH) 0.9 %
10 SYRINGE (ML) INJECTION PRN
Status: DISCONTINUED | OUTPATIENT
Start: 2018-12-31 | End: 2018-12-31 | Stop reason: HOSPADM

## 2018-12-31 RX ORDER — DOCUSATE SODIUM 100 MG/1
100 CAPSULE, LIQUID FILLED ORAL 2 TIMES DAILY
Status: CANCELLED | OUTPATIENT
Start: 2018-12-31

## 2018-12-31 RX ORDER — DIPHENHYDRAMINE HYDROCHLORIDE 50 MG/ML
12.5 INJECTION INTRAMUSCULAR; INTRAVENOUS
Status: DISCONTINUED | OUTPATIENT
Start: 2018-12-31 | End: 2018-12-31 | Stop reason: HOSPADM

## 2018-12-31 RX ORDER — ACETAMINOPHEN 650 MG
TABLET, EXTENDED RELEASE ORAL
Status: COMPLETED | OUTPATIENT
Start: 2018-12-31 | End: 2018-12-31

## 2018-12-31 RX ORDER — HYDRALAZINE HYDROCHLORIDE 20 MG/ML
5 INJECTION INTRAMUSCULAR; INTRAVENOUS EVERY 10 MIN PRN
Status: DISCONTINUED | OUTPATIENT
Start: 2018-12-31 | End: 2018-12-31 | Stop reason: HOSPADM

## 2018-12-31 RX ORDER — SODIUM CHLORIDE 0.9 % (FLUSH) 0.9 %
10 SYRINGE (ML) INJECTION EVERY 12 HOURS SCHEDULED
Status: DISCONTINUED | OUTPATIENT
Start: 2018-12-31 | End: 2018-12-31 | Stop reason: HOSPADM

## 2018-12-31 RX ORDER — LIDOCAINE HYDROCHLORIDE 20 MG/ML
INJECTION, SOLUTION INFILTRATION; PERINEURAL PRN
Status: DISCONTINUED | OUTPATIENT
Start: 2018-12-31 | End: 2018-12-31 | Stop reason: SDUPTHER

## 2018-12-31 RX ORDER — SODIUM CHLORIDE, SODIUM LACTATE, POTASSIUM CHLORIDE, CALCIUM CHLORIDE 600; 310; 30; 20 MG/100ML; MG/100ML; MG/100ML; MG/100ML
INJECTION, SOLUTION INTRAVENOUS CONTINUOUS
Status: CANCELLED | OUTPATIENT
Start: 2018-12-31

## 2018-12-31 RX ADMIN — LIDOCAINE HYDROCHLORIDE 50 MG: 20 INJECTION, SOLUTION INFILTRATION; PERINEURAL at 07:46

## 2018-12-31 RX ADMIN — PHENYLEPHRINE HYDROCHLORIDE 100 MCG: 10 INJECTION INTRAVENOUS at 08:35

## 2018-12-31 RX ADMIN — ROCURONIUM BROMIDE 10 MG: 50 INJECTION, SOLUTION INTRAVENOUS at 08:24

## 2018-12-31 RX ADMIN — PHENYLEPHRINE HYDROCHLORIDE 100 MCG: 10 INJECTION INTRAVENOUS at 08:40

## 2018-12-31 RX ADMIN — PROPOFOL 120 MG: 10 INJECTION, EMULSION INTRAVENOUS at 07:46

## 2018-12-31 RX ADMIN — PHENYLEPHRINE HYDROCHLORIDE 100 MCG: 10 INJECTION INTRAVENOUS at 08:12

## 2018-12-31 RX ADMIN — PHENYLEPHRINE HYDROCHLORIDE 100 MCG: 10 INJECTION INTRAVENOUS at 08:15

## 2018-12-31 RX ADMIN — SUGAMMADEX 200 MG: 100 INJECTION, SOLUTION INTRAVENOUS at 09:30

## 2018-12-31 RX ADMIN — SODIUM CHLORIDE, POTASSIUM CHLORIDE, SODIUM LACTATE AND CALCIUM CHLORIDE: 600; 310; 30; 20 INJECTION, SOLUTION INTRAVENOUS at 07:03

## 2018-12-31 RX ADMIN — CEFAZOLIN SODIUM 2 G: 2 INJECTION, SOLUTION INTRAVENOUS at 07:58

## 2018-12-31 RX ADMIN — PHENYLEPHRINE HYDROCHLORIDE 100 MCG: 10 INJECTION INTRAVENOUS at 09:00

## 2018-12-31 RX ADMIN — ONDANSETRON HYDROCHLORIDE 4 MG: 2 SOLUTION INTRAMUSCULAR; INTRAVENOUS at 07:46

## 2018-12-31 RX ADMIN — PHENYLEPHRINE HYDROCHLORIDE 100 MCG: 10 INJECTION INTRAVENOUS at 08:21

## 2018-12-31 RX ADMIN — SODIUM CHLORIDE, POTASSIUM CHLORIDE, SODIUM LACTATE AND CALCIUM CHLORIDE: 600; 310; 30; 20 INJECTION, SOLUTION INTRAVENOUS at 07:38

## 2018-12-31 RX ADMIN — PHENYLEPHRINE HYDROCHLORIDE 100 MCG: 10 INJECTION INTRAVENOUS at 09:15

## 2018-12-31 RX ADMIN — SODIUM CHLORIDE, POTASSIUM CHLORIDE, SODIUM LACTATE AND CALCIUM CHLORIDE: 600; 310; 30; 20 INJECTION, SOLUTION INTRAVENOUS at 08:40

## 2018-12-31 RX ADMIN — PHENYLEPHRINE HYDROCHLORIDE 100 MCG: 10 INJECTION INTRAVENOUS at 08:50

## 2018-12-31 RX ADMIN — PHENYLEPHRINE HYDROCHLORIDE 100 MCG: 10 INJECTION INTRAVENOUS at 08:30

## 2018-12-31 RX ADMIN — ROCURONIUM BROMIDE 50 MG: 50 INJECTION, SOLUTION INTRAVENOUS at 07:46

## 2018-12-31 ASSESSMENT — PULMONARY FUNCTION TESTS
PIF_VALUE: 0
PIF_VALUE: 23
PIF_VALUE: 25
PIF_VALUE: 24
PIF_VALUE: 25
PIF_VALUE: 25
PIF_VALUE: 23
PIF_VALUE: 0
PIF_VALUE: 24
PIF_VALUE: 23
PIF_VALUE: 23
PIF_VALUE: 2
PIF_VALUE: 24
PIF_VALUE: 24
PIF_VALUE: 9
PIF_VALUE: 25
PIF_VALUE: 23
PIF_VALUE: 26
PIF_VALUE: 4
PIF_VALUE: 24
PIF_VALUE: 25
PIF_VALUE: 25
PIF_VALUE: 23
PIF_VALUE: 24
PIF_VALUE: 25
PIF_VALUE: 30
PIF_VALUE: 24
PIF_VALUE: 7
PIF_VALUE: 22
PIF_VALUE: 25
PIF_VALUE: 24
PIF_VALUE: 25
PIF_VALUE: 24
PIF_VALUE: 25
PIF_VALUE: 51
PIF_VALUE: 25
PIF_VALUE: 26
PIF_VALUE: 25
PIF_VALUE: 23
PIF_VALUE: 0
PIF_VALUE: 24
PIF_VALUE: 23
PIF_VALUE: 12
PIF_VALUE: 25
PIF_VALUE: 32
PIF_VALUE: 23
PIF_VALUE: 25
PIF_VALUE: 23
PIF_VALUE: 24
PIF_VALUE: 24
PIF_VALUE: 25
PIF_VALUE: 30
PIF_VALUE: 25
PIF_VALUE: 24
PIF_VALUE: 1
PIF_VALUE: 24
PIF_VALUE: 23
PIF_VALUE: 30
PIF_VALUE: 0
PIF_VALUE: 25
PIF_VALUE: 24
PIF_VALUE: 24
PIF_VALUE: 25
PIF_VALUE: 43
PIF_VALUE: 24
PIF_VALUE: 26
PIF_VALUE: 2
PIF_VALUE: 24
PIF_VALUE: 23
PIF_VALUE: 24
PIF_VALUE: 23
PIF_VALUE: 32
PIF_VALUE: 1
PIF_VALUE: 25
PIF_VALUE: 24
PIF_VALUE: 25
PIF_VALUE: 24
PIF_VALUE: 29
PIF_VALUE: 24
PIF_VALUE: 24
PIF_VALUE: 25
PIF_VALUE: 26
PIF_VALUE: 24
PIF_VALUE: 1
PIF_VALUE: 24
PIF_VALUE: 25
PIF_VALUE: 25
PIF_VALUE: 24
PIF_VALUE: 42
PIF_VALUE: 25
PIF_VALUE: 24
PIF_VALUE: 23
PIF_VALUE: 25
PIF_VALUE: 22
PIF_VALUE: 25
PIF_VALUE: 25
PIF_VALUE: 24
PIF_VALUE: 25
PIF_VALUE: 29
PIF_VALUE: 25
PIF_VALUE: 2
PIF_VALUE: 25
PIF_VALUE: 23
PIF_VALUE: 24
PIF_VALUE: 24
PIF_VALUE: 1
PIF_VALUE: 25
PIF_VALUE: 24
PIF_VALUE: 24
PIF_VALUE: 23
PIF_VALUE: 25
PIF_VALUE: 27
PIF_VALUE: 24

## 2018-12-31 ASSESSMENT — PAIN - FUNCTIONAL ASSESSMENT: PAIN_FUNCTIONAL_ASSESSMENT: 0-10

## 2018-12-31 ASSESSMENT — PAIN DESCRIPTION - DESCRIPTORS: DESCRIPTORS: DISCOMFORT

## 2018-12-31 ASSESSMENT — PAIN SCALES - GENERAL
PAINLEVEL_OUTOF10: 0
PAINLEVEL_OUTOF10: 0

## 2018-12-31 NOTE — ANESTHESIA POSTPROCEDURE EVALUATION
Department of Anesthesiology  Postprocedure Note    Patient: Annie Lyon  MRN: [id number]  YOB: 1960  Date of evaluation: 12/31/2018  Time:  11:28 AM     Procedure Summary     Date:  12/31/18 Room / Location:  [facility] OR [address] [place] [address] OR    Anesthesia Start:  7827 Anesthesia Stop:  2158    Procedures:       HUMERUS OPEN REDUCTION INTERNAL FIXATION LEFT PROXIMAL (Left )      LEFT ARM OPEN REDUCTION INTERNAL FIXATION PROXIMAL HUMERUS POSSIBLE REVERSE SHOULDER ARTHROPLASTY (canceled) Diagnosis:  (LEFT PROXIMAL HUMERUS FRACTURE )    Surgeon:  Hunter Hurtado DO Responsible Provider:  Rober Thomas DO    Anesthesia Type:  general, MAC, regional ASA Status:  3          Anesthesia Type: general, regional    Sathish Phase I: Sathish Score: 9    Sathish Phase II: Sathish Score: 10    Last vitals: Reviewed and per EMR flowsheets.        Anesthesia Post Evaluation    Patient location during evaluation: PACU  Patient participation: complete - patient participated  Level of consciousness: awake and alert  Airway patency: patent  Nausea & Vomiting: no nausea  Complications: no  Cardiovascular status: hemodynamically stable  Respiratory status: acceptable  Hydration status: euvolemic

## 2018-12-31 NOTE — ANESTHESIA PROCEDURE NOTES
Peripheral Block    Patient location during procedure: pre-op  Start time: 12/31/2018 7:28 AM  End time: 12/31/2018 7:36 AM  Staffing  Anesthesiologist: Yoel Venegas  Resident/CRNA: Roby Santoyo  Performed: anesthesiologist   Preanesthetic Checklist  Completed: patient identified, site marked, surgical consent, pre-op evaluation, timeout performed, IV checked, risks and benefits discussed, monitors and equipment checked, anesthesia consent given, oxygen available and patient being monitored  Peripheral Block  Patient position: supine  Prep: ChloraPrep  Patient monitoring: IV access and continuous pulse ox  Block type: Brachial plexus  Laterality: left  Injection technique: single-shot  Procedures: ultrasound guided  Local infiltration: ropivacaine  Infiltration strength: 0.5 %  Dose: 30 mL  Supraclavicular  Provider prep: sterile gloves  Local infiltration: ropivacaine  Assessment  Injection assessment: negative aspiration for heme, no paresthesia on injection and local visualized surrounding nerve on ultrasound  Paresthesia pain: none  Slow fractionated injection: yes  Hemodynamics: stable  Reason for block: post-op pain management

## 2018-12-31 NOTE — ANESTHESIA PRE PROCEDURE
chart 8/2015- TIA    COPD [facility]) [facility]     Diabetes mellitus ([facility]     Diabetic neuropathy (HCC)     per old chart    Fracture     per old chart pt in ER 12/9/2018- after 2 days of arm pain after fall- dx with left humerus fx- for surg 12/31/2018    Hyperlipidemia     Hypertension     Osteoarthritis     per old chart       Past Surgical History:        Procedure Laterality Date    CHOLECYSTECTOMY  2002?  EYE SURGERY      per old chart had right eye cataract ext done 2/2018 and left eye 4/2018    FOOT SURGERY Left 2004?  KNEE SURGERY Right 2009?  LUNG SURGERY  2009?    simone lung lobectomy        Social History:    Social History   Substance Use Topics    Smoking status: Never Smoker    Smokeless tobacco: Never Used      Comment: 12/27/*2018- with phone assessment with caregiver- unsure of social, surgical or family hx for phone assessment    Alcohol use No                                Counseling given: Not Answered      Vital Signs (Current):   Vitals:    12/27/18 1441 12/31/18 0646   BP:  (!) 144/70   Pulse:  72   Resp:  16   Temp:  97.3 °F (36.3 °C)   TempSrc:  Temporal   SpO2:  100%   Weight: 260 lb (117.9 kg) 250 lb (113.4 kg)   Height: 5' 5\" (1.651 m) 5' 5\" (1.651 m)                                              BP Readings from Last 3 Encounters:   12/31/18 (!) 144/70   12/12/18 (!) 160/49   12/10/18 (!) 127/58       NPO Status: Time of last liquid consumption: 2000                        Time of last solid consumption: 2000                        Date of last liquid consumption: 12/30/18                        Date of last solid food consumption: 12/30/18    BMI:   Wt Readings from Last 3 Encounters:   12/31/18 250 lb (113.4 kg)   12/17/18 264 lb (119.7 kg)   12/12/18 250 lb (113.4 kg)     Body mass index is 41.6 kg/m².     CBC:   Lab Results   Component Value Date    WBC 7.1 12/24/2018    RBC 3.91 12/24/2018    HGB 11.3 12/24/2018    HCT 38.8 12/24/2018 and regional     ASA 3       Induction: intravenous. MIPS: Postoperative opioids intended and Prophylactic antiemetics administered. Anesthetic plan and risks discussed with patient. Plan discussed with CRNA.                   Kala Aguero DO   12/31/2018

## 2019-01-03 ENCOUNTER — HOSPITAL ENCOUNTER (OUTPATIENT)
Age: 59
Setting detail: SPECIMEN
Discharge: HOME | End: 2019-01-03

## 2019-01-03 LAB
ANION GAP SERPL CALCULATED.3IONS-SCNC: 11 MMOL/L (ref 4–16)
BASOPHILS ABSOLUTE: 0 K/CU MM
BASOPHILS RELATIVE PERCENT: 0.2 % (ref 0–1)
BUN BLDV-MCNC: 39 MG/DL (ref 6–23)
CALCIUM SERPL-MCNC: 8.3 MG/DL (ref 8.3–10.6)
CHLORIDE BLD-SCNC: 101 MMOL/L (ref 99–110)
CO2: 24 MMOL/L (ref 21–32)
CREAT SERPL-MCNC: 1.5 MG/DL (ref 0.6–1.1)
DIFFERENTIAL TYPE: ABNORMAL
EOSINOPHILS ABSOLUTE: 0.2 K/CU MM
EOSINOPHILS RELATIVE PERCENT: 4.3 % (ref 0–3)
GFR AFRICAN AMERICAN: 43 ML/MIN/1.73M2
GFR NON-AFRICAN AMERICAN: 35 ML/MIN/1.73M2
GLUCOSE BLD-MCNC: 408 MG/DL (ref 70–99)
HCT VFR BLD CALC: 28.2 % (ref 37–47)
HEMOGLOBIN: 8.4 GM/DL (ref 12.5–16)
IMMATURE NEUTROPHIL %: 0.4 % (ref 0–0.43)
LYMPHOCYTES ABSOLUTE: 1.4 K/CU MM
LYMPHOCYTES RELATIVE PERCENT: 27.3 % (ref 24–44)
MCH RBC QN AUTO: 29.6 PG (ref 27–31)
MCHC RBC AUTO-ENTMCNC: 29.8 % (ref 32–36)
MCV RBC AUTO: 99.3 FL (ref 78–100)
MONOCYTES ABSOLUTE: 0.5 K/CU MM
MONOCYTES RELATIVE PERCENT: 8.8 % (ref 0–4)
NUCLEATED RBC %: 0 %
PDW BLD-RTO: 12.4 % (ref 11.7–14.9)
PLATELET # BLD: 191 K/CU MM (ref 140–440)
PMV BLD AUTO: 11.3 FL (ref 7.5–11.1)
POTASSIUM SERPL-SCNC: 5.2 MMOL/L (ref 3.5–5.1)
RBC # BLD: 2.84 M/CU MM (ref 4.2–5.4)
SEGMENTED NEUTROPHILS ABSOLUTE COUNT: 3 K/CU MM
SEGMENTED NEUTROPHILS RELATIVE PERCENT: 59 % (ref 36–66)
SODIUM BLD-SCNC: 136 MMOL/L (ref 135–145)
TOTAL IMMATURE NEUTOROPHIL: 0.02 K/CU MM
TOTAL NUCLEATED RBC: 0 K/CU MM
WBC # BLD: 5.1 K/CU MM (ref 4–10.5)

## 2019-01-03 PROCEDURE — 36415 COLL VENOUS BLD VENIPUNCTURE: CPT

## 2019-01-03 PROCEDURE — 85025 COMPLETE CBC W/AUTO DIFF WBC: CPT

## 2019-01-03 PROCEDURE — 80048 BASIC METABOLIC PNL TOTAL CA: CPT

## 2019-01-07 ENCOUNTER — OFFICE VISIT (OUTPATIENT)
Dept: ORTHOPEDIC SURGERY | Age: 59
End: 2019-01-07

## 2019-01-07 ENCOUNTER — HOSPITAL ENCOUNTER (OUTPATIENT)
Age: 59
Discharge: HOME | End: 2019-01-07
Payer: COMMERCIAL

## 2019-01-07 DIAGNOSIS — R52 PAIN: ICD-10-CM

## 2019-01-07 DIAGNOSIS — Z98.890 S/P ORIF (OPEN REDUCTION INTERNAL FIXATION) FRACTURE: ICD-10-CM

## 2019-01-07 DIAGNOSIS — Z09 POSTOP CHECK: Primary | ICD-10-CM

## 2019-01-07 DIAGNOSIS — Z87.81 S/P ORIF (OPEN REDUCTION INTERNAL FIXATION) FRACTURE: ICD-10-CM

## 2019-01-07 LAB
ANION GAP SERPL CALCULATED.3IONS-SCNC: 11 MMOL/L (ref 4–16)
BUN BLDV-MCNC: 25 MG/DL (ref 6–23)
CALCIUM SERPL-MCNC: 8.9 MG/DL (ref 8.3–10.6)
CHLORIDE BLD-SCNC: 104 MMOL/L (ref 99–110)
CO2: 25 MMOL/L (ref 21–32)
CREAT SERPL-MCNC: 1.2 MG/DL (ref 0.6–1.1)
GFR AFRICAN AMERICAN: 55 ML/MIN/1.73M2
GFR NON-AFRICAN AMERICAN: 46 ML/MIN/1.73M2
GLUCOSE BLD-MCNC: 169 MG/DL (ref 70–99)
HCT VFR BLD CALC: 29.3 % (ref 37–47)
HEMOGLOBIN: 8.8 GM/DL (ref 12.5–16)
MCH RBC QN AUTO: 29.3 PG (ref 27–31)
MCHC RBC AUTO-ENTMCNC: 30 % (ref 32–36)
MCV RBC AUTO: 97.7 FL (ref 78–100)
PDW BLD-RTO: 12.5 % (ref 11.7–14.9)
PLATELET # BLD: 267 K/CU MM (ref 140–440)
PMV BLD AUTO: 10.5 FL (ref 7.5–11.1)
POTASSIUM SERPL-SCNC: 5.2 MMOL/L (ref 3.5–5.1)
RBC # BLD: 3 M/CU MM (ref 4.2–5.4)
SODIUM BLD-SCNC: 140 MMOL/L (ref 135–145)
WBC # BLD: 5.8 K/CU MM (ref 4–10.5)

## 2019-01-07 PROCEDURE — 36415 COLL VENOUS BLD VENIPUNCTURE: CPT

## 2019-01-07 PROCEDURE — 85027 COMPLETE CBC AUTOMATED: CPT

## 2019-01-07 PROCEDURE — 99024 POSTOP FOLLOW-UP VISIT: CPT | Performed by: PHYSICIAN ASSISTANT

## 2019-01-07 PROCEDURE — 80048 BASIC METABOLIC PNL TOTAL CA: CPT

## 2019-01-17 ENCOUNTER — APPOINTMENT (OUTPATIENT)
Dept: CT IMAGING | Age: 59
End: 2019-01-17
Payer: COMMERCIAL

## 2019-01-17 ENCOUNTER — HOSPITAL ENCOUNTER (OUTPATIENT)
Age: 59
Setting detail: OBSERVATION
Discharge: NURSING, REHAB OR LONG-TERM CARE | End: 2019-01-24
Attending: EMERGENCY MEDICINE | Admitting: HOSPITALIST
Payer: COMMERCIAL

## 2019-01-17 DIAGNOSIS — L30.4 INTERTRIGO: ICD-10-CM

## 2019-01-17 DIAGNOSIS — R11.2 NAUSEA VOMITING AND DIARRHEA: Primary | ICD-10-CM

## 2019-01-17 DIAGNOSIS — E86.0 DEHYDRATION: ICD-10-CM

## 2019-01-17 DIAGNOSIS — S42.295D OTHER CLOSED NONDISPLACED FRACTURE OF PROXIMAL END OF LEFT HUMERUS WITH ROUTINE HEALING, SUBSEQUENT ENCOUNTER: ICD-10-CM

## 2019-01-17 DIAGNOSIS — R19.7 NAUSEA VOMITING AND DIARRHEA: Primary | ICD-10-CM

## 2019-01-17 LAB
ALBUMIN SERPL-MCNC: 3.6 GM/DL (ref 3.4–5)
ALP BLD-CCNC: 151 IU/L (ref 40–129)
ALT SERPL-CCNC: 16 U/L (ref 10–40)
ANION GAP SERPL CALCULATED.3IONS-SCNC: 14 MMOL/L (ref 4–16)
AST SERPL-CCNC: 14 IU/L (ref 15–37)
BACTERIA: ABNORMAL /HPF
BASOPHILS ABSOLUTE: 0 K/CU MM
BASOPHILS RELATIVE PERCENT: 0.2 % (ref 0–1)
BETA-HYDROXYBUTYRATE: 19.3 MG/DL (ref 0–3)
BILIRUB SERPL-MCNC: 0.4 MG/DL (ref 0–1)
BILIRUBIN URINE: NEGATIVE MG/DL
BLOOD, URINE: NEGATIVE
BUN BLDV-MCNC: 16 MG/DL (ref 6–23)
CALCIUM SERPL-MCNC: 9.1 MG/DL (ref 8.3–10.6)
CHLORIDE BLD-SCNC: 104 MMOL/L (ref 99–110)
CHP ED QC CHECK: YES
CLARITY: ABNORMAL
CO2: 19 MMOL/L (ref 21–32)
COLOR: YELLOW
CREAT SERPL-MCNC: 1.1 MG/DL (ref 0.6–1.1)
DIFFERENTIAL TYPE: ABNORMAL
EOSINOPHILS ABSOLUTE: 0.1 K/CU MM
EOSINOPHILS RELATIVE PERCENT: 0.6 % (ref 0–3)
GFR AFRICAN AMERICAN: >60 ML/MIN/1.73M2
GFR NON-AFRICAN AMERICAN: 51 ML/MIN/1.73M2
GLUCOSE BLD-MCNC: 192 MG/DL (ref 70–99)
GLUCOSE BLD-MCNC: 209 MG/DL
GLUCOSE BLD-MCNC: 209 MG/DL (ref 70–99)
GLUCOSE, URINE: 150 MG/DL
GRANULAR CASTS: 32 /LPF
HCT VFR BLD CALC: 37 % (ref 37–47)
HEMOGLOBIN: 11.1 GM/DL (ref 12.5–16)
HYALINE CASTS: >20 /LPF
IMMATURE NEUTROPHIL %: 0.4 % (ref 0–0.43)
KETONES, URINE: ABNORMAL MG/DL
LEUKOCYTE ESTERASE, URINE: NEGATIVE
LIPASE: 36 IU/L (ref 13–60)
LYMPHOCYTES ABSOLUTE: 1.4 K/CU MM
LYMPHOCYTES RELATIVE PERCENT: 17.1 % (ref 24–44)
MCH RBC QN AUTO: 29.1 PG (ref 27–31)
MCHC RBC AUTO-ENTMCNC: 30 % (ref 32–36)
MCV RBC AUTO: 97.1 FL (ref 78–100)
MONOCYTES ABSOLUTE: 0.6 K/CU MM
MONOCYTES RELATIVE PERCENT: 6.7 % (ref 0–4)
MUCUS: ABNORMAL HPF
NITRITE URINE, QUANTITATIVE: NEGATIVE
NUCLEATED RBC %: 0 %
PDW BLD-RTO: 12.8 % (ref 11.7–14.9)
PH VENOUS: 7.3 (ref 7.32–7.42)
PH, URINE: 5 (ref 5–8)
PLATELET # BLD: 320 K/CU MM (ref 140–440)
PMV BLD AUTO: 10.1 FL (ref 7.5–11.1)
POTASSIUM SERPL-SCNC: 3.9 MMOL/L (ref 3.5–5.1)
PROTEIN UA: 100 MG/DL
RBC # BLD: 3.81 M/CU MM (ref 4.2–5.4)
RBC URINE: ABNORMAL /HPF (ref 0–6)
SEGMENTED NEUTROPHILS ABSOLUTE COUNT: 6.1 K/CU MM
SEGMENTED NEUTROPHILS RELATIVE PERCENT: 75 % (ref 36–66)
SODIUM BLD-SCNC: 137 MMOL/L (ref 135–145)
SPECIFIC GRAVITY UA: 1.03 (ref 1–1.03)
TOTAL IMMATURE NEUTOROPHIL: 0.03 K/CU MM
TOTAL NUCLEATED RBC: 0 K/CU MM
TOTAL PROTEIN: 6.6 GM/DL (ref 6.4–8.2)
TRICHOMONAS: ABNORMAL /HPF
UROBILINOGEN, URINE: NORMAL MG/DL (ref 0.2–1)
WBC # BLD: 8.2 K/CU MM (ref 4–10.5)
WBC UA: 2 /HPF (ref 0–5)
YEAST: ABNORMAL /HPF

## 2019-01-17 PROCEDURE — 36415 COLL VENOUS BLD VENIPUNCTURE: CPT

## 2019-01-17 PROCEDURE — 83690 ASSAY OF LIPASE: CPT

## 2019-01-17 PROCEDURE — 80053 COMPREHEN METABOLIC PANEL: CPT

## 2019-01-17 PROCEDURE — 99285 EMERGENCY DEPT VISIT HI MDM: CPT

## 2019-01-17 PROCEDURE — 74177 CT ABD & PELVIS W/CONTRAST: CPT

## 2019-01-17 PROCEDURE — 96361 HYDRATE IV INFUSION ADD-ON: CPT

## 2019-01-17 PROCEDURE — 82010 KETONE BODYS QUAN: CPT

## 2019-01-17 PROCEDURE — 81001 URINALYSIS AUTO W/SCOPE: CPT

## 2019-01-17 PROCEDURE — 6360000004 HC RX CONTRAST MEDICATION: Performed by: EMERGENCY MEDICINE

## 2019-01-17 PROCEDURE — 82962 GLUCOSE BLOOD TEST: CPT

## 2019-01-17 PROCEDURE — 2580000003 HC RX 258: Performed by: EMERGENCY MEDICINE

## 2019-01-17 PROCEDURE — 85025 COMPLETE CBC W/AUTO DIFF WBC: CPT

## 2019-01-17 PROCEDURE — 96360 HYDRATION IV INFUSION INIT: CPT

## 2019-01-17 PROCEDURE — 82800 BLOOD PH: CPT

## 2019-01-17 RX ORDER — 0.9 % SODIUM CHLORIDE 0.9 %
1000 INTRAVENOUS SOLUTION INTRAVENOUS ONCE
Status: COMPLETED | OUTPATIENT
Start: 2019-01-17 | End: 2019-01-17

## 2019-01-17 RX ORDER — 0.9 % SODIUM CHLORIDE 0.9 %
10 VIAL (ML) INJECTION
Status: COMPLETED | OUTPATIENT
Start: 2019-01-17 | End: 2019-01-17

## 2019-01-17 RX ORDER — LANOLIN ALCOHOL/MO/W.PET/CERES
650 CREAM (GRAM) TOPICAL
COMMUNITY
End: 2022-07-02

## 2019-01-17 RX ORDER — ONDANSETRON 2 MG/ML
4 INJECTION INTRAMUSCULAR; INTRAVENOUS EVERY 6 HOURS PRN
Status: DISCONTINUED | OUTPATIENT
Start: 2019-01-17 | End: 2019-01-18

## 2019-01-17 RX ORDER — NYSTATIN 100000 [USP'U]/G
POWDER TOPICAL
Qty: 60 G | Refills: 0 | Status: ON HOLD | OUTPATIENT
Start: 2019-01-17 | End: 2020-09-15 | Stop reason: HOSPADM

## 2019-01-17 RX ORDER — IBUPROFEN 200 MG
400 TABLET ORAL EVERY 8 HOURS PRN
Status: ON HOLD | COMMUNITY
End: 2019-01-24 | Stop reason: HOSPADM

## 2019-01-17 RX ADMIN — IOPAMIDOL 75 ML: 755 INJECTION, SOLUTION INTRAVENOUS at 18:20

## 2019-01-17 RX ADMIN — SODIUM CHLORIDE 1000 ML: 9 INJECTION, SOLUTION INTRAVENOUS at 20:07

## 2019-01-17 RX ADMIN — SODIUM CHLORIDE 10 ML: 9 INJECTION, SOLUTION INTRAMUSCULAR; INTRAVENOUS; SUBCUTANEOUS at 18:21

## 2019-01-17 RX ADMIN — SODIUM CHLORIDE 1000 ML: 9 INJECTION, SOLUTION INTRAVENOUS at 17:59

## 2019-01-17 ASSESSMENT — PAIN DESCRIPTION - PAIN TYPE: TYPE: ACUTE PAIN

## 2019-01-17 ASSESSMENT — PAIN DESCRIPTION - LOCATION: LOCATION: ABDOMEN;FLANK;BACK

## 2019-01-17 ASSESSMENT — PAIN DESCRIPTION - DESCRIPTORS: DESCRIPTORS: ACHING;CRAMPING

## 2019-01-17 ASSESSMENT — PAIN SCALES - GENERAL: PAINLEVEL_OUTOF10: 10

## 2019-01-18 LAB
ANION GAP SERPL CALCULATED.3IONS-SCNC: 17 MMOL/L (ref 4–16)
BUN BLDV-MCNC: 17 MG/DL (ref 6–23)
CALCIUM SERPL-MCNC: 8.7 MG/DL (ref 8.3–10.6)
CHLORIDE BLD-SCNC: 106 MMOL/L (ref 99–110)
CO2: 18 MMOL/L (ref 21–32)
CREAT SERPL-MCNC: 1.2 MG/DL (ref 0.6–1.1)
ESTIMATED AVERAGE GLUCOSE: 174 MG/DL
GFR AFRICAN AMERICAN: 55 ML/MIN/1.73M2
GFR NON-AFRICAN AMERICAN: 46 ML/MIN/1.73M2
GLUCOSE BLD-MCNC: 142 MG/DL (ref 70–99)
GLUCOSE BLD-MCNC: 167 MG/DL (ref 70–99)
GLUCOSE BLD-MCNC: 180 MG/DL (ref 70–99)
GLUCOSE BLD-MCNC: 202 MG/DL (ref 70–99)
GLUCOSE BLD-MCNC: 254 MG/DL (ref 70–99)
GLUCOSE BLD-MCNC: 268 MG/DL (ref 70–99)
HBA1C MFR BLD: 7.7 % (ref 4.2–6.3)
HCT VFR BLD CALC: 33.7 % (ref 37–47)
HEMOGLOBIN: 9.9 GM/DL (ref 12.5–16)
MCH RBC QN AUTO: 28.8 PG (ref 27–31)
MCHC RBC AUTO-ENTMCNC: 29.4 % (ref 32–36)
MCV RBC AUTO: 98 FL (ref 78–100)
PDW BLD-RTO: 12.8 % (ref 11.7–14.9)
PLATELET # BLD: 276 K/CU MM (ref 140–440)
PMV BLD AUTO: 10.2 FL (ref 7.5–11.1)
POTASSIUM SERPL-SCNC: 4.2 MMOL/L (ref 3.5–5.1)
RBC # BLD: 3.44 M/CU MM (ref 4.2–5.4)
SODIUM BLD-SCNC: 141 MMOL/L (ref 135–145)
WBC # BLD: 8.7 K/CU MM (ref 4–10.5)

## 2019-01-18 PROCEDURE — C9113 INJ PANTOPRAZOLE SODIUM, VIA: HCPCS | Performed by: INTERNAL MEDICINE

## 2019-01-18 PROCEDURE — G0378 HOSPITAL OBSERVATION PER HR: HCPCS

## 2019-01-18 PROCEDURE — 96372 THER/PROPH/DIAG INJ SC/IM: CPT

## 2019-01-18 PROCEDURE — 96375 TX/PRO/DX INJ NEW DRUG ADDON: CPT

## 2019-01-18 PROCEDURE — 6360000002 HC RX W HCPCS: Performed by: HOSPITALIST

## 2019-01-18 PROCEDURE — 97163 PT EVAL HIGH COMPLEX 45 MIN: CPT

## 2019-01-18 PROCEDURE — 6360000002 HC RX W HCPCS: Performed by: INTERNAL MEDICINE

## 2019-01-18 PROCEDURE — 85027 COMPLETE CBC AUTOMATED: CPT

## 2019-01-18 PROCEDURE — 96376 TX/PRO/DX INJ SAME DRUG ADON: CPT

## 2019-01-18 PROCEDURE — 6370000000 HC RX 637 (ALT 250 FOR IP): Performed by: HOSPITALIST

## 2019-01-18 PROCEDURE — 82962 GLUCOSE BLOOD TEST: CPT

## 2019-01-18 PROCEDURE — 97530 THERAPEUTIC ACTIVITIES: CPT

## 2019-01-18 PROCEDURE — 2500000003 HC RX 250 WO HCPCS: Performed by: HOSPITALIST

## 2019-01-18 PROCEDURE — 2580000003 HC RX 258: Performed by: HOSPITALIST

## 2019-01-18 PROCEDURE — 83036 HEMOGLOBIN GLYCOSYLATED A1C: CPT

## 2019-01-18 PROCEDURE — 97166 OT EVAL MOD COMPLEX 45 MIN: CPT

## 2019-01-18 PROCEDURE — 36415 COLL VENOUS BLD VENIPUNCTURE: CPT

## 2019-01-18 PROCEDURE — 94761 N-INVAS EAR/PLS OXIMETRY MLT: CPT

## 2019-01-18 PROCEDURE — 80048 BASIC METABOLIC PNL TOTAL CA: CPT

## 2019-01-18 RX ORDER — SODIUM CHLORIDE 0.9 % (FLUSH) 0.9 %
10 SYRINGE (ML) INJECTION PRN
Status: DISCONTINUED | OUTPATIENT
Start: 2019-01-18 | End: 2019-01-24 | Stop reason: HOSPADM

## 2019-01-18 RX ORDER — ATORVASTATIN CALCIUM 40 MG/1
40 TABLET, FILM COATED ORAL NIGHTLY
Status: DISCONTINUED | OUTPATIENT
Start: 2019-01-18 | End: 2019-01-24 | Stop reason: HOSPADM

## 2019-01-18 RX ORDER — SODIUM CHLORIDE 9 MG/ML
INJECTION, SOLUTION INTRAVENOUS CONTINUOUS
Status: DISCONTINUED | OUTPATIENT
Start: 2019-01-18 | End: 2019-01-19

## 2019-01-18 RX ORDER — FERROUS SULFATE 325(65) MG
650 TABLET ORAL
Status: DISCONTINUED | OUTPATIENT
Start: 2019-01-18 | End: 2019-01-24 | Stop reason: HOSPADM

## 2019-01-18 RX ORDER — ONDANSETRON 2 MG/ML
4 INJECTION INTRAMUSCULAR; INTRAVENOUS EVERY 6 HOURS PRN
Status: DISCONTINUED | OUTPATIENT
Start: 2019-01-18 | End: 2019-01-24 | Stop reason: HOSPADM

## 2019-01-18 RX ORDER — ONDANSETRON 4 MG/1
4 TABLET, ORALLY DISINTEGRATING ORAL DAILY PRN
Status: DISCONTINUED | OUTPATIENT
Start: 2019-01-18 | End: 2019-01-24 | Stop reason: HOSPADM

## 2019-01-18 RX ORDER — LACTOBACILLUS RHAMNOSUS GG 10B CELL
1 CAPSULE ORAL
Status: DISCONTINUED | OUTPATIENT
Start: 2019-01-19 | End: 2019-01-24 | Stop reason: HOSPADM

## 2019-01-18 RX ORDER — GABAPENTIN 100 MG/1
100 CAPSULE ORAL 3 TIMES DAILY
Status: DISCONTINUED | OUTPATIENT
Start: 2019-01-18 | End: 2019-01-24 | Stop reason: HOSPADM

## 2019-01-18 RX ORDER — INSULIN GLARGINE 100 [IU]/ML
45 INJECTION, SOLUTION SUBCUTANEOUS NIGHTLY
Status: DISCONTINUED | OUTPATIENT
Start: 2019-01-18 | End: 2019-01-19

## 2019-01-18 RX ORDER — METOPROLOL SUCCINATE 25 MG/1
25 TABLET, EXTENDED RELEASE ORAL DAILY
Status: DISCONTINUED | OUTPATIENT
Start: 2019-01-18 | End: 2019-01-19

## 2019-01-18 RX ORDER — METOCLOPRAMIDE HYDROCHLORIDE 5 MG/ML
10 INJECTION INTRAMUSCULAR; INTRAVENOUS EVERY 6 HOURS
Status: DISCONTINUED | OUTPATIENT
Start: 2019-01-18 | End: 2019-01-20

## 2019-01-18 RX ORDER — TOPIRAMATE 25 MG/1
50 TABLET ORAL NIGHTLY
Status: DISCONTINUED | OUTPATIENT
Start: 2019-01-18 | End: 2019-01-24 | Stop reason: HOSPADM

## 2019-01-18 RX ORDER — SODIUM CHLORIDE 0.9 % (FLUSH) 0.9 %
10 SYRINGE (ML) INJECTION EVERY 12 HOURS SCHEDULED
Status: DISCONTINUED | OUTPATIENT
Start: 2019-01-18 | End: 2019-01-24 | Stop reason: HOSPADM

## 2019-01-18 RX ORDER — LISINOPRIL 40 MG/1
40 TABLET ORAL DAILY
Status: DISCONTINUED | OUTPATIENT
Start: 2019-01-18 | End: 2019-01-24 | Stop reason: HOSPADM

## 2019-01-18 RX ORDER — AMLODIPINE BESYLATE 2.5 MG/1
2.5 TABLET ORAL DAILY
Status: DISCONTINUED | OUTPATIENT
Start: 2019-01-18 | End: 2019-01-19

## 2019-01-18 RX ORDER — PANTOPRAZOLE SODIUM 40 MG/10ML
40 INJECTION, POWDER, LYOPHILIZED, FOR SOLUTION INTRAVENOUS 2 TIMES DAILY
Status: DISCONTINUED | OUTPATIENT
Start: 2019-01-18 | End: 2019-01-24 | Stop reason: HOSPADM

## 2019-01-18 RX ORDER — CLOPIDOGREL BISULFATE 75 MG/1
75 TABLET ORAL DAILY
Status: DISCONTINUED | OUTPATIENT
Start: 2019-01-18 | End: 2019-01-24 | Stop reason: HOSPADM

## 2019-01-18 RX ORDER — DICYCLOMINE HYDROCHLORIDE 10 MG/ML
20 INJECTION INTRAMUSCULAR 4 TIMES DAILY
Status: DISCONTINUED | OUTPATIENT
Start: 2019-01-18 | End: 2019-01-19

## 2019-01-18 RX ORDER — PROMETHAZINE HYDROCHLORIDE 25 MG/ML
12.5 INJECTION, SOLUTION INTRAMUSCULAR; INTRAVENOUS EVERY 6 HOURS PRN
Status: DISCONTINUED | OUTPATIENT
Start: 2019-01-18 | End: 2019-01-24 | Stop reason: HOSPADM

## 2019-01-18 RX ADMIN — AMLODIPINE BESYLATE 2.5 MG: 2.5 TABLET ORAL at 08:33

## 2019-01-18 RX ADMIN — INSULIN GLARGINE 22.5 UNITS: 100 INJECTION, SOLUTION SUBCUTANEOUS at 01:51

## 2019-01-18 RX ADMIN — TOPIRAMATE 50 MG: 25 TABLET, FILM COATED ORAL at 23:20

## 2019-01-18 RX ADMIN — PANTOPRAZOLE SODIUM 40 MG: 40 INJECTION, POWDER, FOR SOLUTION INTRAVENOUS at 23:19

## 2019-01-18 RX ADMIN — LISINOPRIL 40 MG: 40 TABLET ORAL at 08:33

## 2019-01-18 RX ADMIN — CLOPIDOGREL BISULFATE 75 MG: 75 TABLET ORAL at 08:33

## 2019-01-18 RX ADMIN — DICYCLOMINE HYDROCHLORIDE 20 MG: 20 INJECTION, SOLUTION INTRAMUSCULAR at 13:48

## 2019-01-18 RX ADMIN — ENOXAPARIN SODIUM 40 MG: 100 INJECTION SUBCUTANEOUS at 08:33

## 2019-01-18 RX ADMIN — INSULIN LISPRO 1 UNITS: 100 INJECTION, SOLUTION INTRAVENOUS; SUBCUTANEOUS at 23:18

## 2019-01-18 RX ADMIN — MICONAZOLE NITRATE: 2 POWDER TOPICAL at 06:06

## 2019-01-18 RX ADMIN — ATORVASTATIN CALCIUM 40 MG: 40 TABLET, FILM COATED ORAL at 01:14

## 2019-01-18 RX ADMIN — FERROUS SULFATE TAB 325 MG (65 MG ELEMENTAL FE) 650 MG: 325 (65 FE) TAB at 08:33

## 2019-01-18 RX ADMIN — GABAPENTIN 100 MG: 100 CAPSULE ORAL at 15:04

## 2019-01-18 RX ADMIN — DICYCLOMINE HYDROCHLORIDE 20 MG: 20 INJECTION, SOLUTION INTRAMUSCULAR at 23:19

## 2019-01-18 RX ADMIN — METOCLOPRAMIDE 10 MG: 5 INJECTION, SOLUTION INTRAMUSCULAR; INTRAVENOUS at 13:46

## 2019-01-18 RX ADMIN — SODIUM CHLORIDE, PRESERVATIVE FREE 10 ML: 5 INJECTION INTRAVENOUS at 06:32

## 2019-01-18 RX ADMIN — TOPIRAMATE 50 MG: 25 TABLET, FILM COATED ORAL at 01:14

## 2019-01-18 RX ADMIN — SODIUM CHLORIDE: 9 INJECTION, SOLUTION INTRAVENOUS at 01:14

## 2019-01-18 RX ADMIN — SERTRALINE HYDROCHLORIDE 150 MG: 100 TABLET ORAL at 08:32

## 2019-01-18 RX ADMIN — SODIUM CHLORIDE, PRESERVATIVE FREE 10 ML: 5 INJECTION INTRAVENOUS at 08:33

## 2019-01-18 RX ADMIN — GABAPENTIN 100 MG: 100 CAPSULE ORAL at 23:47

## 2019-01-18 RX ADMIN — PANTOPRAZOLE SODIUM 40 MG: 40 INJECTION, POWDER, FOR SOLUTION INTRAVENOUS at 13:46

## 2019-01-18 RX ADMIN — METOPROLOL SUCCINATE 25 MG: 25 TABLET, EXTENDED RELEASE ORAL at 08:33

## 2019-01-18 RX ADMIN — SODIUM CHLORIDE: 9 INJECTION, SOLUTION INTRAVENOUS at 13:49

## 2019-01-18 RX ADMIN — GABAPENTIN 100 MG: 100 CAPSULE ORAL at 08:32

## 2019-01-18 RX ADMIN — DICYCLOMINE HYDROCHLORIDE 20 MG: 20 INJECTION, SOLUTION INTRAMUSCULAR at 18:35

## 2019-01-18 RX ADMIN — PROMETHAZINE HYDROCHLORIDE 12.5 MG: 25 INJECTION INTRAMUSCULAR; INTRAVENOUS at 06:32

## 2019-01-18 RX ADMIN — ATORVASTATIN CALCIUM 40 MG: 40 TABLET, FILM COATED ORAL at 23:20

## 2019-01-18 RX ADMIN — LINAGLIPTIN 5 MG: 5 TABLET, FILM COATED ORAL at 08:33

## 2019-01-18 RX ADMIN — METOCLOPRAMIDE 10 MG: 5 INJECTION, SOLUTION INTRAMUSCULAR; INTRAVENOUS at 18:36

## 2019-01-18 RX ADMIN — INSULIN LISPRO 4 UNITS: 100 INJECTION, SOLUTION INTRAVENOUS; SUBCUTANEOUS at 14:01

## 2019-01-18 RX ADMIN — INSULIN GLARGINE 45 UNITS: 100 INJECTION, SOLUTION SUBCUTANEOUS at 23:18

## 2019-01-18 ASSESSMENT — PAIN DESCRIPTION - LOCATION
LOCATION: ABDOMEN
LOCATION: ABDOMEN

## 2019-01-18 ASSESSMENT — PAIN DESCRIPTION - PAIN TYPE
TYPE: ACUTE PAIN
TYPE: ACUTE PAIN

## 2019-01-18 ASSESSMENT — PAIN SCALES - GENERAL
PAINLEVEL_OUTOF10: 9
PAINLEVEL_OUTOF10: 8

## 2019-01-19 LAB
ANION GAP SERPL CALCULATED.3IONS-SCNC: 10 MMOL/L (ref 4–16)
BASOPHILS ABSOLUTE: 0 K/CU MM
BASOPHILS RELATIVE PERCENT: 0.3 % (ref 0–1)
BUN BLDV-MCNC: 14 MG/DL (ref 6–23)
CALCIUM SERPL-MCNC: 8.7 MG/DL (ref 8.3–10.6)
CHLORIDE BLD-SCNC: 114 MMOL/L (ref 99–110)
CO2: 22 MMOL/L (ref 21–32)
CREAT SERPL-MCNC: 1.1 MG/DL (ref 0.6–1.1)
DIFFERENTIAL TYPE: ABNORMAL
EOSINOPHILS ABSOLUTE: 0.1 K/CU MM
EOSINOPHILS RELATIVE PERCENT: 1.8 % (ref 0–3)
GFR AFRICAN AMERICAN: >60 ML/MIN/1.73M2
GFR NON-AFRICAN AMERICAN: 51 ML/MIN/1.73M2
GLUCOSE BLD-MCNC: 193 MG/DL (ref 70–99)
GLUCOSE BLD-MCNC: 202 MG/DL (ref 70–99)
GLUCOSE BLD-MCNC: 247 MG/DL (ref 70–99)
GLUCOSE BLD-MCNC: 45 MG/DL (ref 70–99)
GLUCOSE BLD-MCNC: 52 MG/DL (ref 70–99)
GLUCOSE BLD-MCNC: 63 MG/DL (ref 70–99)
GLUCOSE BLD-MCNC: 64 MG/DL (ref 70–99)
GLUCOSE BLD-MCNC: 72 MG/DL (ref 70–99)
GLUCOSE BLD-MCNC: 88 MG/DL (ref 70–99)
HCT VFR BLD CALC: 35.4 % (ref 37–47)
HEMOGLOBIN: 10.4 GM/DL (ref 12.5–16)
IMMATURE NEUTROPHIL %: 0.3 % (ref 0–0.43)
LYMPHOCYTES ABSOLUTE: 2.3 K/CU MM
LYMPHOCYTES RELATIVE PERCENT: 30.8 % (ref 24–44)
MCH RBC QN AUTO: 28.8 PG (ref 27–31)
MCHC RBC AUTO-ENTMCNC: 29.4 % (ref 32–36)
MCV RBC AUTO: 98.1 FL (ref 78–100)
MONOCYTES ABSOLUTE: 0.6 K/CU MM
MONOCYTES RELATIVE PERCENT: 8.6 % (ref 0–4)
NUCLEATED RBC %: 0 %
PDW BLD-RTO: 12.6 % (ref 11.7–14.9)
PLATELET # BLD: 290 K/CU MM (ref 140–440)
PMV BLD AUTO: 9.9 FL (ref 7.5–11.1)
POTASSIUM SERPL-SCNC: 4 MMOL/L (ref 3.5–5.1)
RBC # BLD: 3.61 M/CU MM (ref 4.2–5.4)
SEGMENTED NEUTROPHILS ABSOLUTE COUNT: 4.3 K/CU MM
SEGMENTED NEUTROPHILS RELATIVE PERCENT: 58.2 % (ref 36–66)
SODIUM BLD-SCNC: 146 MMOL/L (ref 135–145)
TOTAL IMMATURE NEUTOROPHIL: 0.02 K/CU MM
TOTAL NUCLEATED RBC: 0 K/CU MM
WBC # BLD: 7.3 K/CU MM (ref 4–10.5)

## 2019-01-19 PROCEDURE — 36415 COLL VENOUS BLD VENIPUNCTURE: CPT

## 2019-01-19 PROCEDURE — 96372 THER/PROPH/DIAG INJ SC/IM: CPT

## 2019-01-19 PROCEDURE — 6360000002 HC RX W HCPCS: Performed by: INTERNAL MEDICINE

## 2019-01-19 PROCEDURE — 6370000000 HC RX 637 (ALT 250 FOR IP): Performed by: HOSPITALIST

## 2019-01-19 PROCEDURE — 2580000003 HC RX 258: Performed by: INTERNAL MEDICINE

## 2019-01-19 PROCEDURE — 6370000000 HC RX 637 (ALT 250 FOR IP): Performed by: INTERNAL MEDICINE

## 2019-01-19 PROCEDURE — G0378 HOSPITAL OBSERVATION PER HR: HCPCS

## 2019-01-19 PROCEDURE — 82962 GLUCOSE BLOOD TEST: CPT

## 2019-01-19 PROCEDURE — 96361 HYDRATE IV INFUSION ADD-ON: CPT

## 2019-01-19 PROCEDURE — 85025 COMPLETE CBC W/AUTO DIFF WBC: CPT

## 2019-01-19 PROCEDURE — 6360000002 HC RX W HCPCS: Performed by: HOSPITALIST

## 2019-01-19 PROCEDURE — 6370000000 HC RX 637 (ALT 250 FOR IP): Performed by: NURSE PRACTITIONER

## 2019-01-19 PROCEDURE — C9113 INJ PANTOPRAZOLE SODIUM, VIA: HCPCS | Performed by: INTERNAL MEDICINE

## 2019-01-19 PROCEDURE — 80048 BASIC METABOLIC PNL TOTAL CA: CPT

## 2019-01-19 PROCEDURE — 96365 THER/PROPH/DIAG IV INF INIT: CPT

## 2019-01-19 PROCEDURE — 2580000003 HC RX 258: Performed by: HOSPITALIST

## 2019-01-19 PROCEDURE — 6370000000 HC RX 637 (ALT 250 FOR IP)

## 2019-01-19 PROCEDURE — 96376 TX/PRO/DX INJ SAME DRUG ADON: CPT

## 2019-01-19 RX ORDER — METOPROLOL SUCCINATE 50 MG/1
50 TABLET, EXTENDED RELEASE ORAL DAILY
Status: DISCONTINUED | OUTPATIENT
Start: 2019-01-20 | End: 2019-01-24 | Stop reason: HOSPADM

## 2019-01-19 RX ORDER — DEXTROSE MONOHYDRATE 50 MG/ML
100 INJECTION, SOLUTION INTRAVENOUS PRN
Status: DISCONTINUED | OUTPATIENT
Start: 2019-01-19 | End: 2019-01-24 | Stop reason: HOSPADM

## 2019-01-19 RX ORDER — HYDRALAZINE HYDROCHLORIDE 20 MG/ML
10 INJECTION INTRAMUSCULAR; INTRAVENOUS EVERY 6 HOURS PRN
Status: DISCONTINUED | OUTPATIENT
Start: 2019-01-19 | End: 2019-01-19 | Stop reason: CLARIF

## 2019-01-19 RX ORDER — DICYCLOMINE HCL 20 MG
20 TABLET ORAL
Status: DISCONTINUED | OUTPATIENT
Start: 2019-01-19 | End: 2019-01-24 | Stop reason: HOSPADM

## 2019-01-19 RX ORDER — DEXTROSE MONOHYDRATE 25 G/50ML
12.5 INJECTION, SOLUTION INTRAVENOUS PRN
Status: DISCONTINUED | OUTPATIENT
Start: 2019-01-19 | End: 2019-01-24 | Stop reason: HOSPADM

## 2019-01-19 RX ORDER — NICOTINE POLACRILEX 4 MG
15 LOZENGE BUCCAL PRN
Status: DISCONTINUED | OUTPATIENT
Start: 2019-01-19 | End: 2019-01-24 | Stop reason: HOSPADM

## 2019-01-19 RX ORDER — AMLODIPINE BESYLATE 5 MG/1
5 TABLET ORAL DAILY
Status: DISCONTINUED | OUTPATIENT
Start: 2019-01-20 | End: 2019-01-24 | Stop reason: HOSPADM

## 2019-01-19 RX ORDER — INSULIN GLARGINE 100 [IU]/ML
12 INJECTION, SOLUTION SUBCUTANEOUS NIGHTLY
Status: DISCONTINUED | OUTPATIENT
Start: 2019-01-19 | End: 2019-01-20

## 2019-01-19 RX ORDER — NICOTINE POLACRILEX 4 MG
LOZENGE BUCCAL
Status: COMPLETED
Start: 2019-01-19 | End: 2019-01-19

## 2019-01-19 RX ORDER — SODIUM CHLORIDE 450 MG/100ML
INJECTION, SOLUTION INTRAVENOUS CONTINUOUS
Status: DISCONTINUED | OUTPATIENT
Start: 2019-01-19 | End: 2019-01-21

## 2019-01-19 RX ADMIN — AMLODIPINE BESYLATE 2.5 MG: 2.5 TABLET ORAL at 10:07

## 2019-01-19 RX ADMIN — DEXTROSE 15 G: 15 GEL ORAL at 07:01

## 2019-01-19 RX ADMIN — METOCLOPRAMIDE 10 MG: 5 INJECTION, SOLUTION INTRAMUSCULAR; INTRAVENOUS at 07:03

## 2019-01-19 RX ADMIN — PROMETHAZINE HYDROCHLORIDE 12.5 MG: 25 INJECTION INTRAMUSCULAR; INTRAVENOUS at 11:01

## 2019-01-19 RX ADMIN — ATORVASTATIN CALCIUM 40 MG: 40 TABLET, FILM COATED ORAL at 21:07

## 2019-01-19 RX ADMIN — GABAPENTIN 100 MG: 100 CAPSULE ORAL at 10:08

## 2019-01-19 RX ADMIN — SODIUM CHLORIDE: 4.5 INJECTION, SOLUTION INTRAVENOUS at 11:44

## 2019-01-19 RX ADMIN — METOPROLOL SUCCINATE 25 MG: 25 TABLET, EXTENDED RELEASE ORAL at 10:09

## 2019-01-19 RX ADMIN — DEXTROSE 15 G: 15 GEL ORAL at 06:34

## 2019-01-19 RX ADMIN — TOPIRAMATE 50 MG: 25 TABLET, FILM COATED ORAL at 21:07

## 2019-01-19 RX ADMIN — DICYCLOMINE HYDROCHLORIDE 20 MG: 20 INJECTION, SOLUTION INTRAMUSCULAR at 10:21

## 2019-01-19 RX ADMIN — HYDRALAZINE HYDROCHLORIDE 10 MG: 20 INJECTION INTRAMUSCULAR; INTRAVENOUS at 18:56

## 2019-01-19 RX ADMIN — PANTOPRAZOLE SODIUM 40 MG: 40 INJECTION, POWDER, FOR SOLUTION INTRAVENOUS at 21:08

## 2019-01-19 RX ADMIN — SERTRALINE HYDROCHLORIDE 150 MG: 100 TABLET ORAL at 10:09

## 2019-01-19 RX ADMIN — METOCLOPRAMIDE 10 MG: 5 INJECTION, SOLUTION INTRAMUSCULAR; INTRAVENOUS at 01:28

## 2019-01-19 RX ADMIN — GABAPENTIN 100 MG: 100 CAPSULE ORAL at 21:07

## 2019-01-19 RX ADMIN — Medication 1 CAPSULE: at 08:27

## 2019-01-19 RX ADMIN — INSULIN GLARGINE 12 UNITS: 100 INJECTION, SOLUTION SUBCUTANEOUS at 21:08

## 2019-01-19 RX ADMIN — FERROUS SULFATE TAB 325 MG (65 MG ELEMENTAL FE) 650 MG: 325 (65 FE) TAB at 08:27

## 2019-01-19 RX ADMIN — SODIUM CHLORIDE: 9 INJECTION, SOLUTION INTRAVENOUS at 01:28

## 2019-01-19 RX ADMIN — INSULIN LISPRO 2 UNITS: 100 INJECTION, SOLUTION INTRAVENOUS; SUBCUTANEOUS at 21:08

## 2019-01-19 RX ADMIN — GABAPENTIN 100 MG: 100 CAPSULE ORAL at 14:07

## 2019-01-19 RX ADMIN — LISINOPRIL 40 MG: 40 TABLET ORAL at 10:08

## 2019-01-19 RX ADMIN — DICYCLOMINE HYDROCHLORIDE 20 MG: 20 TABLET ORAL at 21:07

## 2019-01-19 RX ADMIN — INSULIN LISPRO 4 UNITS: 100 INJECTION, SOLUTION INTRAVENOUS; SUBCUTANEOUS at 13:17

## 2019-01-19 RX ADMIN — INSULIN LISPRO 2 UNITS: 100 INJECTION, SOLUTION INTRAVENOUS; SUBCUTANEOUS at 17:27

## 2019-01-19 RX ADMIN — ENOXAPARIN SODIUM 40 MG: 100 INJECTION SUBCUTANEOUS at 10:07

## 2019-01-19 RX ADMIN — METOCLOPRAMIDE 10 MG: 5 INJECTION, SOLUTION INTRAMUSCULAR; INTRAVENOUS at 13:23

## 2019-01-19 RX ADMIN — SODIUM CHLORIDE, PRESERVATIVE FREE 10 ML: 5 INJECTION INTRAVENOUS at 21:07

## 2019-01-19 RX ADMIN — PANTOPRAZOLE SODIUM 40 MG: 40 INJECTION, POWDER, FOR SOLUTION INTRAVENOUS at 10:05

## 2019-01-19 RX ADMIN — CLOPIDOGREL BISULFATE 75 MG: 75 TABLET ORAL at 10:07

## 2019-01-19 RX ADMIN — DICYCLOMINE HYDROCHLORIDE 20 MG: 20 TABLET ORAL at 17:26

## 2019-01-19 RX ADMIN — SODIUM CHLORIDE: 4.5 INJECTION, SOLUTION INTRAVENOUS at 21:18

## 2019-01-19 ASSESSMENT — PAIN SCALES - GENERAL: PAINLEVEL_OUTOF10: 0

## 2019-01-20 LAB
GLUCOSE BLD-MCNC: 215 MG/DL (ref 70–99)
GLUCOSE BLD-MCNC: 232 MG/DL (ref 70–99)
GLUCOSE BLD-MCNC: 250 MG/DL (ref 70–99)
GLUCOSE BLD-MCNC: 328 MG/DL (ref 70–99)

## 2019-01-20 PROCEDURE — G0378 HOSPITAL OBSERVATION PER HR: HCPCS

## 2019-01-20 PROCEDURE — 6370000000 HC RX 637 (ALT 250 FOR IP): Performed by: HOSPITALIST

## 2019-01-20 PROCEDURE — 6360000002 HC RX W HCPCS: Performed by: INTERNAL MEDICINE

## 2019-01-20 PROCEDURE — 94761 N-INVAS EAR/PLS OXIMETRY MLT: CPT

## 2019-01-20 PROCEDURE — 6370000000 HC RX 637 (ALT 250 FOR IP): Performed by: NURSE PRACTITIONER

## 2019-01-20 PROCEDURE — 6360000002 HC RX W HCPCS: Performed by: HOSPITALIST

## 2019-01-20 PROCEDURE — 82962 GLUCOSE BLOOD TEST: CPT

## 2019-01-20 PROCEDURE — 96372 THER/PROPH/DIAG INJ SC/IM: CPT

## 2019-01-20 PROCEDURE — 96366 THER/PROPH/DIAG IV INF ADDON: CPT

## 2019-01-20 PROCEDURE — C9113 INJ PANTOPRAZOLE SODIUM, VIA: HCPCS | Performed by: INTERNAL MEDICINE

## 2019-01-20 PROCEDURE — 96376 TX/PRO/DX INJ SAME DRUG ADON: CPT

## 2019-01-20 PROCEDURE — 2580000003 HC RX 258: Performed by: HOSPITALIST

## 2019-01-20 PROCEDURE — 96361 HYDRATE IV INFUSION ADD-ON: CPT

## 2019-01-20 PROCEDURE — 6370000000 HC RX 637 (ALT 250 FOR IP): Performed by: INTERNAL MEDICINE

## 2019-01-20 PROCEDURE — 2580000003 HC RX 258: Performed by: INTERNAL MEDICINE

## 2019-01-20 RX ORDER — INSULIN GLARGINE 100 [IU]/ML
20 INJECTION, SOLUTION SUBCUTANEOUS NIGHTLY
Status: DISCONTINUED | OUTPATIENT
Start: 2019-01-20 | End: 2019-01-21

## 2019-01-20 RX ORDER — METOCLOPRAMIDE 10 MG/1
10 TABLET ORAL
Status: DISCONTINUED | OUTPATIENT
Start: 2019-01-20 | End: 2019-01-24 | Stop reason: HOSPADM

## 2019-01-20 RX ADMIN — MICONAZOLE NITRATE: 2 POWDER TOPICAL at 10:53

## 2019-01-20 RX ADMIN — PANTOPRAZOLE SODIUM 40 MG: 40 INJECTION, POWDER, FOR SOLUTION INTRAVENOUS at 09:25

## 2019-01-20 RX ADMIN — SODIUM CHLORIDE: 4.5 INJECTION, SOLUTION INTRAVENOUS at 05:56

## 2019-01-20 RX ADMIN — CLOPIDOGREL BISULFATE 75 MG: 75 TABLET ORAL at 09:07

## 2019-01-20 RX ADMIN — GABAPENTIN 100 MG: 100 CAPSULE ORAL at 22:03

## 2019-01-20 RX ADMIN — DICYCLOMINE HYDROCHLORIDE 20 MG: 20 TABLET ORAL at 12:20

## 2019-01-20 RX ADMIN — INSULIN LISPRO 4 UNITS: 100 INJECTION, SOLUTION INTRAVENOUS; SUBCUTANEOUS at 22:02

## 2019-01-20 RX ADMIN — DICYCLOMINE HYDROCHLORIDE 20 MG: 20 TABLET ORAL at 22:03

## 2019-01-20 RX ADMIN — Medication 1 CAPSULE: at 09:05

## 2019-01-20 RX ADMIN — METOCLOPRAMIDE 10 MG: 5 INJECTION, SOLUTION INTRAMUSCULAR; INTRAVENOUS at 02:40

## 2019-01-20 RX ADMIN — SODIUM CHLORIDE: 4.5 INJECTION, SOLUTION INTRAVENOUS at 16:36

## 2019-01-20 RX ADMIN — PROMETHAZINE HYDROCHLORIDE 12.5 MG: 25 INJECTION INTRAMUSCULAR; INTRAVENOUS at 10:23

## 2019-01-20 RX ADMIN — GABAPENTIN 100 MG: 100 CAPSULE ORAL at 09:07

## 2019-01-20 RX ADMIN — INSULIN LISPRO 6 UNITS: 100 INJECTION, SOLUTION INTRAVENOUS; SUBCUTANEOUS at 13:50

## 2019-01-20 RX ADMIN — ENOXAPARIN SODIUM 40 MG: 100 INJECTION SUBCUTANEOUS at 09:12

## 2019-01-20 RX ADMIN — ATORVASTATIN CALCIUM 40 MG: 40 TABLET, FILM COATED ORAL at 22:03

## 2019-01-20 RX ADMIN — FERROUS SULFATE TAB 325 MG (65 MG ELEMENTAL FE) 650 MG: 325 (65 FE) TAB at 09:05

## 2019-01-20 RX ADMIN — SODIUM CHLORIDE, PRESERVATIVE FREE 10 ML: 5 INJECTION INTRAVENOUS at 10:50

## 2019-01-20 RX ADMIN — HYDRALAZINE HYDROCHLORIDE 10 MG: 20 INJECTION INTRAMUSCULAR; INTRAVENOUS at 20:15

## 2019-01-20 RX ADMIN — HYDRALAZINE HYDROCHLORIDE 10 MG: 20 INJECTION INTRAMUSCULAR; INTRAVENOUS at 05:52

## 2019-01-20 RX ADMIN — DICYCLOMINE HYDROCHLORIDE 20 MG: 20 TABLET ORAL at 16:34

## 2019-01-20 RX ADMIN — TOPIRAMATE 50 MG: 25 TABLET, FILM COATED ORAL at 22:02

## 2019-01-20 RX ADMIN — PANTOPRAZOLE SODIUM 40 MG: 40 INJECTION, POWDER, FOR SOLUTION INTRAVENOUS at 22:02

## 2019-01-20 RX ADMIN — METOCLOPRAMIDE 10 MG: 5 INJECTION, SOLUTION INTRAMUSCULAR; INTRAVENOUS at 09:06

## 2019-01-20 RX ADMIN — SERTRALINE HYDROCHLORIDE 150 MG: 100 TABLET ORAL at 09:25

## 2019-01-20 RX ADMIN — INSULIN LISPRO 4 UNITS: 100 INJECTION, SOLUTION INTRAVENOUS; SUBCUTANEOUS at 17:25

## 2019-01-20 RX ADMIN — GABAPENTIN 100 MG: 100 CAPSULE ORAL at 14:23

## 2019-01-20 RX ADMIN — DICYCLOMINE HYDROCHLORIDE 20 MG: 20 TABLET ORAL at 09:06

## 2019-01-20 RX ADMIN — METOPROLOL SUCCINATE 50 MG: 50 TABLET, EXTENDED RELEASE ORAL at 09:07

## 2019-01-20 RX ADMIN — LISINOPRIL 40 MG: 40 TABLET ORAL at 10:49

## 2019-01-20 RX ADMIN — INSULIN GLARGINE 20 UNITS: 100 INJECTION, SOLUTION SUBCUTANEOUS at 22:02

## 2019-01-20 RX ADMIN — AMLODIPINE BESYLATE 5 MG: 5 TABLET ORAL at 09:07

## 2019-01-20 RX ADMIN — METOCLOPRAMIDE 10 MG: 5 INJECTION, SOLUTION INTRAMUSCULAR; INTRAVENOUS at 13:47

## 2019-01-20 RX ADMIN — METOCLOPRAMIDE 10 MG: 10 TABLET ORAL at 22:03

## 2019-01-20 RX ADMIN — INSULIN LISPRO 4 UNITS: 100 INJECTION, SOLUTION INTRAVENOUS; SUBCUTANEOUS at 08:57

## 2019-01-20 RX ADMIN — METOCLOPRAMIDE 10 MG: 10 TABLET ORAL at 17:15

## 2019-01-20 ASSESSMENT — PAIN SCALES - GENERAL: PAINLEVEL_OUTOF10: 0

## 2019-01-21 LAB
ANION GAP SERPL CALCULATED.3IONS-SCNC: 14 MMOL/L (ref 4–16)
BASOPHILS ABSOLUTE: 0 K/CU MM
BASOPHILS RELATIVE PERCENT: 0.4 % (ref 0–1)
BUN BLDV-MCNC: 15 MG/DL (ref 6–23)
CALCIUM SERPL-MCNC: 8.3 MG/DL (ref 8.3–10.6)
CHLORIDE BLD-SCNC: 103 MMOL/L (ref 99–110)
CO2: 19 MMOL/L (ref 21–32)
CREAT SERPL-MCNC: 1.1 MG/DL (ref 0.6–1.1)
DIFFERENTIAL TYPE: ABNORMAL
EOSINOPHILS ABSOLUTE: 0.1 K/CU MM
EOSINOPHILS RELATIVE PERCENT: 2.6 % (ref 0–3)
GFR AFRICAN AMERICAN: >60 ML/MIN/1.73M2
GFR NON-AFRICAN AMERICAN: 51 ML/MIN/1.73M2
GLUCOSE BLD-MCNC: 213 MG/DL (ref 70–99)
GLUCOSE BLD-MCNC: 304 MG/DL (ref 70–99)
GLUCOSE BLD-MCNC: 318 MG/DL (ref 70–99)
GLUCOSE BLD-MCNC: 327 MG/DL (ref 70–99)
GLUCOSE BLD-MCNC: 363 MG/DL (ref 70–99)
HCT VFR BLD CALC: 30.3 % (ref 37–47)
HEMOGLOBIN: 9.1 GM/DL (ref 12.5–16)
IMMATURE NEUTROPHIL %: 0.2 % (ref 0–0.43)
LYMPHOCYTES ABSOLUTE: 1.7 K/CU MM
LYMPHOCYTES RELATIVE PERCENT: 31.5 % (ref 24–44)
MCH RBC QN AUTO: 28.6 PG (ref 27–31)
MCHC RBC AUTO-ENTMCNC: 30 % (ref 32–36)
MCV RBC AUTO: 95.3 FL (ref 78–100)
MONOCYTES ABSOLUTE: 0.5 K/CU MM
MONOCYTES RELATIVE PERCENT: 8.9 % (ref 0–4)
NUCLEATED RBC %: 0 %
PDW BLD-RTO: 12.5 % (ref 11.7–14.9)
PLATELET # BLD: 210 K/CU MM (ref 140–440)
PMV BLD AUTO: 10.2 FL (ref 7.5–11.1)
POTASSIUM SERPL-SCNC: 3.5 MMOL/L (ref 3.5–5.1)
RBC # BLD: 3.18 M/CU MM (ref 4.2–5.4)
SEGMENTED NEUTROPHILS ABSOLUTE COUNT: 3 K/CU MM
SEGMENTED NEUTROPHILS RELATIVE PERCENT: 56.4 % (ref 36–66)
SODIUM BLD-SCNC: 136 MMOL/L (ref 135–145)
TOTAL IMMATURE NEUTOROPHIL: 0.01 K/CU MM
TOTAL NUCLEATED RBC: 0 K/CU MM
WBC # BLD: 5.4 K/CU MM (ref 4–10.5)

## 2019-01-21 PROCEDURE — 96376 TX/PRO/DX INJ SAME DRUG ADON: CPT

## 2019-01-21 PROCEDURE — 96361 HYDRATE IV INFUSION ADD-ON: CPT

## 2019-01-21 PROCEDURE — 97530 THERAPEUTIC ACTIVITIES: CPT

## 2019-01-21 PROCEDURE — 80048 BASIC METABOLIC PNL TOTAL CA: CPT

## 2019-01-21 PROCEDURE — 97110 THERAPEUTIC EXERCISES: CPT

## 2019-01-21 PROCEDURE — 82962 GLUCOSE BLOOD TEST: CPT

## 2019-01-21 PROCEDURE — 94761 N-INVAS EAR/PLS OXIMETRY MLT: CPT

## 2019-01-21 PROCEDURE — 85025 COMPLETE CBC W/AUTO DIFF WBC: CPT

## 2019-01-21 PROCEDURE — G0378 HOSPITAL OBSERVATION PER HR: HCPCS

## 2019-01-21 PROCEDURE — 2580000003 HC RX 258: Performed by: HOSPITALIST

## 2019-01-21 PROCEDURE — 2580000003 HC RX 258: Performed by: INTERNAL MEDICINE

## 2019-01-21 PROCEDURE — C9113 INJ PANTOPRAZOLE SODIUM, VIA: HCPCS | Performed by: INTERNAL MEDICINE

## 2019-01-21 PROCEDURE — 97116 GAIT TRAINING THERAPY: CPT

## 2019-01-21 PROCEDURE — 6370000000 HC RX 637 (ALT 250 FOR IP): Performed by: INTERNAL MEDICINE

## 2019-01-21 PROCEDURE — 96366 THER/PROPH/DIAG IV INF ADDON: CPT

## 2019-01-21 PROCEDURE — 6360000002 HC RX W HCPCS: Performed by: INTERNAL MEDICINE

## 2019-01-21 PROCEDURE — 6370000000 HC RX 637 (ALT 250 FOR IP): Performed by: NURSE PRACTITIONER

## 2019-01-21 PROCEDURE — 6370000000 HC RX 637 (ALT 250 FOR IP): Performed by: HOSPITALIST

## 2019-01-21 PROCEDURE — 36415 COLL VENOUS BLD VENIPUNCTURE: CPT

## 2019-01-21 RX ORDER — INSULIN GLARGINE 100 [IU]/ML
45 INJECTION, SOLUTION SUBCUTANEOUS NIGHTLY
Status: DISCONTINUED | OUTPATIENT
Start: 2019-01-21 | End: 2019-01-24 | Stop reason: HOSPADM

## 2019-01-21 RX ADMIN — GABAPENTIN 100 MG: 100 CAPSULE ORAL at 21:57

## 2019-01-21 RX ADMIN — METOCLOPRAMIDE 10 MG: 10 TABLET ORAL at 21:58

## 2019-01-21 RX ADMIN — PANTOPRAZOLE SODIUM 40 MG: 40 INJECTION, POWDER, FOR SOLUTION INTRAVENOUS at 21:57

## 2019-01-21 RX ADMIN — INSULIN LISPRO 10 UNITS: 100 INJECTION, SOLUTION INTRAVENOUS; SUBCUTANEOUS at 17:12

## 2019-01-21 RX ADMIN — INSULIN GLARGINE 45 UNITS: 100 INJECTION, SOLUTION SUBCUTANEOUS at 21:58

## 2019-01-21 RX ADMIN — INSULIN LISPRO 8 UNITS: 100 INJECTION, SOLUTION INTRAVENOUS; SUBCUTANEOUS at 12:43

## 2019-01-21 RX ADMIN — Medication 1 CAPSULE: at 09:42

## 2019-01-21 RX ADMIN — TOPIRAMATE 50 MG: 25 TABLET, FILM COATED ORAL at 21:57

## 2019-01-21 RX ADMIN — GABAPENTIN 100 MG: 100 CAPSULE ORAL at 14:59

## 2019-01-21 RX ADMIN — SODIUM CHLORIDE, PRESERVATIVE FREE 10 ML: 5 INJECTION INTRAVENOUS at 21:58

## 2019-01-21 RX ADMIN — SODIUM CHLORIDE: 4.5 INJECTION, SOLUTION INTRAVENOUS at 03:37

## 2019-01-21 RX ADMIN — INSULIN LISPRO 8 UNITS: 100 INJECTION, SOLUTION INTRAVENOUS; SUBCUTANEOUS at 17:12

## 2019-01-21 RX ADMIN — METOCLOPRAMIDE 10 MG: 10 TABLET ORAL at 09:42

## 2019-01-21 RX ADMIN — DICYCLOMINE HYDROCHLORIDE 20 MG: 20 TABLET ORAL at 12:19

## 2019-01-21 RX ADMIN — DICYCLOMINE HYDROCHLORIDE 20 MG: 20 TABLET ORAL at 21:58

## 2019-01-21 RX ADMIN — PANTOPRAZOLE SODIUM 40 MG: 40 INJECTION, POWDER, FOR SOLUTION INTRAVENOUS at 09:42

## 2019-01-21 RX ADMIN — DICYCLOMINE HYDROCHLORIDE 20 MG: 20 TABLET ORAL at 17:00

## 2019-01-21 RX ADMIN — AMLODIPINE BESYLATE 5 MG: 5 TABLET ORAL at 09:41

## 2019-01-21 RX ADMIN — METOCLOPRAMIDE 10 MG: 10 TABLET ORAL at 12:19

## 2019-01-21 RX ADMIN — INSULIN LISPRO 8 UNITS: 100 INJECTION, SOLUTION INTRAVENOUS; SUBCUTANEOUS at 09:44

## 2019-01-21 RX ADMIN — SERTRALINE HYDROCHLORIDE 150 MG: 100 TABLET ORAL at 09:40

## 2019-01-21 RX ADMIN — INSULIN LISPRO 2 UNITS: 100 INJECTION, SOLUTION INTRAVENOUS; SUBCUTANEOUS at 22:02

## 2019-01-21 RX ADMIN — ATORVASTATIN CALCIUM 40 MG: 40 TABLET, FILM COATED ORAL at 21:57

## 2019-01-21 RX ADMIN — LISINOPRIL 40 MG: 40 TABLET ORAL at 09:42

## 2019-01-21 RX ADMIN — GABAPENTIN 100 MG: 100 CAPSULE ORAL at 09:41

## 2019-01-21 RX ADMIN — METOCLOPRAMIDE 10 MG: 10 TABLET ORAL at 17:00

## 2019-01-21 RX ADMIN — METOPROLOL SUCCINATE 50 MG: 50 TABLET, EXTENDED RELEASE ORAL at 09:41

## 2019-01-21 RX ADMIN — FERROUS SULFATE TAB 325 MG (65 MG ELEMENTAL FE) 650 MG: 325 (65 FE) TAB at 09:41

## 2019-01-21 RX ADMIN — SODIUM CHLORIDE, PRESERVATIVE FREE 10 ML: 5 INJECTION INTRAVENOUS at 09:42

## 2019-01-21 RX ADMIN — HYDRALAZINE HYDROCHLORIDE 10 MG: 20 INJECTION INTRAMUSCULAR; INTRAVENOUS at 06:16

## 2019-01-21 RX ADMIN — CLOPIDOGREL BISULFATE 75 MG: 75 TABLET ORAL at 09:41

## 2019-01-21 RX ADMIN — DICYCLOMINE HYDROCHLORIDE 20 MG: 20 TABLET ORAL at 09:41

## 2019-01-21 ASSESSMENT — PAIN SCALES - GENERAL
PAINLEVEL_OUTOF10: 0
PAINLEVEL_OUTOF10: 0

## 2019-01-22 ENCOUNTER — TELEPHONE (OUTPATIENT)
Dept: ORTHOPEDIC SURGERY | Age: 59
End: 2019-01-22

## 2019-01-22 ENCOUNTER — APPOINTMENT (OUTPATIENT)
Dept: GENERAL RADIOLOGY | Age: 59
End: 2019-01-22
Payer: COMMERCIAL

## 2019-01-22 LAB
GLUCOSE BLD-MCNC: 156 MG/DL (ref 70–99)
GLUCOSE BLD-MCNC: 225 MG/DL (ref 70–99)
GLUCOSE BLD-MCNC: 250 MG/DL (ref 70–99)
GLUCOSE BLD-MCNC: 97 MG/DL (ref 70–99)

## 2019-01-22 PROCEDURE — 2580000003 HC RX 258: Performed by: INTERNAL MEDICINE

## 2019-01-22 PROCEDURE — 6360000002 HC RX W HCPCS: Performed by: INTERNAL MEDICINE

## 2019-01-22 PROCEDURE — 6370000000 HC RX 637 (ALT 250 FOR IP): Performed by: INTERNAL MEDICINE

## 2019-01-22 PROCEDURE — C9113 INJ PANTOPRAZOLE SODIUM, VIA: HCPCS | Performed by: INTERNAL MEDICINE

## 2019-01-22 PROCEDURE — 96366 THER/PROPH/DIAG IV INF ADDON: CPT

## 2019-01-22 PROCEDURE — G0378 HOSPITAL OBSERVATION PER HR: HCPCS

## 2019-01-22 PROCEDURE — 96376 TX/PRO/DX INJ SAME DRUG ADON: CPT

## 2019-01-22 PROCEDURE — 73030 X-RAY EXAM OF SHOULDER: CPT

## 2019-01-22 PROCEDURE — 6370000000 HC RX 637 (ALT 250 FOR IP): Performed by: HOSPITALIST

## 2019-01-22 PROCEDURE — 6370000000 HC RX 637 (ALT 250 FOR IP): Performed by: NURSE PRACTITIONER

## 2019-01-22 PROCEDURE — 2580000003 HC RX 258: Performed by: HOSPITALIST

## 2019-01-22 PROCEDURE — 82962 GLUCOSE BLOOD TEST: CPT

## 2019-01-22 RX ADMIN — Medication 1 CAPSULE: at 09:24

## 2019-01-22 RX ADMIN — DICYCLOMINE HYDROCHLORIDE 20 MG: 20 TABLET ORAL at 06:30

## 2019-01-22 RX ADMIN — PANTOPRAZOLE SODIUM 40 MG: 40 INJECTION, POWDER, FOR SOLUTION INTRAVENOUS at 22:06

## 2019-01-22 RX ADMIN — ATORVASTATIN CALCIUM 40 MG: 40 TABLET, FILM COATED ORAL at 22:05

## 2019-01-22 RX ADMIN — INSULIN GLARGINE 45 UNITS: 100 INJECTION, SOLUTION SUBCUTANEOUS at 22:01

## 2019-01-22 RX ADMIN — GABAPENTIN 100 MG: 100 CAPSULE ORAL at 09:24

## 2019-01-22 RX ADMIN — DICYCLOMINE HYDROCHLORIDE 20 MG: 20 TABLET ORAL at 17:03

## 2019-01-22 RX ADMIN — GABAPENTIN 100 MG: 100 CAPSULE ORAL at 14:50

## 2019-01-22 RX ADMIN — METOPROLOL SUCCINATE 50 MG: 50 TABLET, EXTENDED RELEASE ORAL at 09:24

## 2019-01-22 RX ADMIN — DICYCLOMINE HYDROCHLORIDE 20 MG: 20 TABLET ORAL at 11:16

## 2019-01-22 RX ADMIN — LISINOPRIL 40 MG: 40 TABLET ORAL at 09:24

## 2019-01-22 RX ADMIN — GABAPENTIN 100 MG: 100 CAPSULE ORAL at 22:05

## 2019-01-22 RX ADMIN — INSULIN LISPRO 2 UNITS: 100 INJECTION, SOLUTION INTRAVENOUS; SUBCUTANEOUS at 12:27

## 2019-01-22 RX ADMIN — INSULIN LISPRO 10 UNITS: 100 INJECTION, SOLUTION INTRAVENOUS; SUBCUTANEOUS at 12:27

## 2019-01-22 RX ADMIN — INSULIN LISPRO 3 UNITS: 100 INJECTION, SOLUTION INTRAVENOUS; SUBCUTANEOUS at 22:01

## 2019-01-22 RX ADMIN — SODIUM CHLORIDE, PRESERVATIVE FREE 10 ML: 5 INJECTION INTRAVENOUS at 09:37

## 2019-01-22 RX ADMIN — SODIUM CHLORIDE, PRESERVATIVE FREE 10 ML: 5 INJECTION INTRAVENOUS at 05:40

## 2019-01-22 RX ADMIN — METOCLOPRAMIDE 10 MG: 10 TABLET ORAL at 06:29

## 2019-01-22 RX ADMIN — PANTOPRAZOLE SODIUM 40 MG: 40 INJECTION, POWDER, FOR SOLUTION INTRAVENOUS at 09:26

## 2019-01-22 RX ADMIN — SODIUM CHLORIDE, PRESERVATIVE FREE 10 ML: 5 INJECTION INTRAVENOUS at 22:07

## 2019-01-22 RX ADMIN — INSULIN LISPRO 10 UNITS: 100 INJECTION, SOLUTION INTRAVENOUS; SUBCUTANEOUS at 09:32

## 2019-01-22 RX ADMIN — DICYCLOMINE HYDROCHLORIDE 20 MG: 20 TABLET ORAL at 22:05

## 2019-01-22 RX ADMIN — METOCLOPRAMIDE 10 MG: 10 TABLET ORAL at 22:05

## 2019-01-22 RX ADMIN — SERTRALINE HYDROCHLORIDE 150 MG: 100 TABLET ORAL at 09:25

## 2019-01-22 RX ADMIN — AMLODIPINE BESYLATE 5 MG: 5 TABLET ORAL at 09:26

## 2019-01-22 RX ADMIN — TOPIRAMATE 50 MG: 25 TABLET, FILM COATED ORAL at 22:05

## 2019-01-22 RX ADMIN — CLOPIDOGREL BISULFATE 75 MG: 75 TABLET ORAL at 09:26

## 2019-01-22 RX ADMIN — METOCLOPRAMIDE 10 MG: 10 TABLET ORAL at 11:16

## 2019-01-22 RX ADMIN — INSULIN LISPRO 4 UNITS: 100 INJECTION, SOLUTION INTRAVENOUS; SUBCUTANEOUS at 09:32

## 2019-01-22 RX ADMIN — HYDRALAZINE HYDROCHLORIDE 10 MG: 20 INJECTION INTRAMUSCULAR; INTRAVENOUS at 05:40

## 2019-01-22 RX ADMIN — METOCLOPRAMIDE 10 MG: 10 TABLET ORAL at 17:04

## 2019-01-22 RX ADMIN — FERROUS SULFATE TAB 325 MG (65 MG ELEMENTAL FE) 650 MG: 325 (65 FE) TAB at 09:24

## 2019-01-22 ASSESSMENT — PAIN SCALES - GENERAL
PAINLEVEL_OUTOF10: 0
PAINLEVEL_OUTOF10: 0

## 2019-01-23 PROBLEM — E11.43 DIABETIC GASTROPARESIS (HCC): Status: ACTIVE | Noted: 2019-01-23

## 2019-01-23 PROBLEM — K31.84 DIABETIC GASTROPARESIS (HCC): Status: ACTIVE | Noted: 2019-01-23

## 2019-01-23 LAB
GLUCOSE BLD-MCNC: 189 MG/DL (ref 70–99)
GLUCOSE BLD-MCNC: 257 MG/DL (ref 70–99)
GLUCOSE BLD-MCNC: 276 MG/DL (ref 70–99)
GLUCOSE BLD-MCNC: 78 MG/DL (ref 70–99)

## 2019-01-23 PROCEDURE — 96375 TX/PRO/DX INJ NEW DRUG ADDON: CPT

## 2019-01-23 PROCEDURE — G0378 HOSPITAL OBSERVATION PER HR: HCPCS

## 2019-01-23 PROCEDURE — 97116 GAIT TRAINING THERAPY: CPT

## 2019-01-23 PROCEDURE — 6370000000 HC RX 637 (ALT 250 FOR IP): Performed by: HOSPITALIST

## 2019-01-23 PROCEDURE — 94761 N-INVAS EAR/PLS OXIMETRY MLT: CPT

## 2019-01-23 PROCEDURE — C9113 INJ PANTOPRAZOLE SODIUM, VIA: HCPCS | Performed by: INTERNAL MEDICINE

## 2019-01-23 PROCEDURE — 82962 GLUCOSE BLOOD TEST: CPT

## 2019-01-23 PROCEDURE — 6360000002 HC RX W HCPCS: Performed by: INTERNAL MEDICINE

## 2019-01-23 PROCEDURE — 97530 THERAPEUTIC ACTIVITIES: CPT

## 2019-01-23 PROCEDURE — 96376 TX/PRO/DX INJ SAME DRUG ADON: CPT

## 2019-01-23 PROCEDURE — 97110 THERAPEUTIC EXERCISES: CPT

## 2019-01-23 PROCEDURE — 2580000003 HC RX 258: Performed by: INTERNAL MEDICINE

## 2019-01-23 PROCEDURE — 96372 THER/PROPH/DIAG INJ SC/IM: CPT

## 2019-01-23 PROCEDURE — 6360000002 HC RX W HCPCS: Performed by: HOSPITALIST

## 2019-01-23 PROCEDURE — 6370000000 HC RX 637 (ALT 250 FOR IP): Performed by: NURSE PRACTITIONER

## 2019-01-23 PROCEDURE — 96366 THER/PROPH/DIAG IV INF ADDON: CPT

## 2019-01-23 PROCEDURE — 6370000000 HC RX 637 (ALT 250 FOR IP): Performed by: INTERNAL MEDICINE

## 2019-01-23 PROCEDURE — 2580000003 HC RX 258: Performed by: HOSPITALIST

## 2019-01-23 RX ADMIN — METOPROLOL SUCCINATE 50 MG: 50 TABLET, EXTENDED RELEASE ORAL at 09:46

## 2019-01-23 RX ADMIN — HYDRALAZINE HYDROCHLORIDE 10 MG: 20 INJECTION INTRAMUSCULAR; INTRAVENOUS at 04:46

## 2019-01-23 RX ADMIN — GABAPENTIN 100 MG: 100 CAPSULE ORAL at 14:09

## 2019-01-23 RX ADMIN — Medication 1 CAPSULE: at 09:46

## 2019-01-23 RX ADMIN — GABAPENTIN 100 MG: 100 CAPSULE ORAL at 21:06

## 2019-01-23 RX ADMIN — SODIUM CHLORIDE, PRESERVATIVE FREE 10 ML: 5 INJECTION INTRAVENOUS at 21:15

## 2019-01-23 RX ADMIN — INSULIN LISPRO 2 UNITS: 100 INJECTION, SOLUTION INTRAVENOUS; SUBCUTANEOUS at 09:47

## 2019-01-23 RX ADMIN — DICYCLOMINE HYDROCHLORIDE 20 MG: 20 TABLET ORAL at 17:02

## 2019-01-23 RX ADMIN — ENOXAPARIN SODIUM 40 MG: 100 INJECTION SUBCUTANEOUS at 09:46

## 2019-01-23 RX ADMIN — TOPIRAMATE 50 MG: 25 TABLET, FILM COATED ORAL at 21:06

## 2019-01-23 RX ADMIN — METOCLOPRAMIDE 10 MG: 10 TABLET ORAL at 06:32

## 2019-01-23 RX ADMIN — DICYCLOMINE HYDROCHLORIDE 20 MG: 20 TABLET ORAL at 11:52

## 2019-01-23 RX ADMIN — INSULIN GLARGINE 45 UNITS: 100 INJECTION, SOLUTION SUBCUTANEOUS at 21:06

## 2019-01-23 RX ADMIN — ONDANSETRON 4 MG: 2 INJECTION INTRAMUSCULAR; INTRAVENOUS at 11:52

## 2019-01-23 RX ADMIN — PANTOPRAZOLE SODIUM 40 MG: 40 INJECTION, POWDER, FOR SOLUTION INTRAVENOUS at 09:46

## 2019-01-23 RX ADMIN — METOCLOPRAMIDE 10 MG: 10 TABLET ORAL at 21:06

## 2019-01-23 RX ADMIN — CLOPIDOGREL BISULFATE 75 MG: 75 TABLET ORAL at 09:47

## 2019-01-23 RX ADMIN — PANTOPRAZOLE SODIUM 40 MG: 40 INJECTION, POWDER, FOR SOLUTION INTRAVENOUS at 21:05

## 2019-01-23 RX ADMIN — SERTRALINE HYDROCHLORIDE 150 MG: 100 TABLET ORAL at 09:46

## 2019-01-23 RX ADMIN — METOCLOPRAMIDE 10 MG: 10 TABLET ORAL at 17:02

## 2019-01-23 RX ADMIN — METOCLOPRAMIDE 10 MG: 10 TABLET ORAL at 11:52

## 2019-01-23 RX ADMIN — SODIUM CHLORIDE, PRESERVATIVE FREE 10 ML: 5 INJECTION INTRAVENOUS at 04:46

## 2019-01-23 RX ADMIN — INSULIN LISPRO 10 UNITS: 100 INJECTION, SOLUTION INTRAVENOUS; SUBCUTANEOUS at 09:49

## 2019-01-23 RX ADMIN — SODIUM CHLORIDE, PRESERVATIVE FREE 10 ML: 5 INJECTION INTRAVENOUS at 09:47

## 2019-01-23 RX ADMIN — GABAPENTIN 100 MG: 100 CAPSULE ORAL at 09:46

## 2019-01-23 RX ADMIN — INSULIN LISPRO 3 UNITS: 100 INJECTION, SOLUTION INTRAVENOUS; SUBCUTANEOUS at 21:08

## 2019-01-23 RX ADMIN — INSULIN LISPRO 10 UNITS: 100 INJECTION, SOLUTION INTRAVENOUS; SUBCUTANEOUS at 12:26

## 2019-01-23 RX ADMIN — ATORVASTATIN CALCIUM 40 MG: 40 TABLET, FILM COATED ORAL at 21:06

## 2019-01-23 RX ADMIN — DICYCLOMINE HYDROCHLORIDE 20 MG: 20 TABLET ORAL at 06:32

## 2019-01-23 RX ADMIN — DICYCLOMINE HYDROCHLORIDE 20 MG: 20 TABLET ORAL at 21:06

## 2019-01-23 RX ADMIN — FERROUS SULFATE TAB 325 MG (65 MG ELEMENTAL FE) 650 MG: 325 (65 FE) TAB at 09:47

## 2019-01-24 VITALS
WEIGHT: 242.9 LBS | HEIGHT: 65 IN | OXYGEN SATURATION: 98 % | TEMPERATURE: 97.5 F | RESPIRATION RATE: 17 BRPM | SYSTOLIC BLOOD PRESSURE: 151 MMHG | HEART RATE: 80 BPM | DIASTOLIC BLOOD PRESSURE: 67 MMHG | BODY MASS INDEX: 40.47 KG/M2

## 2019-01-24 LAB
GLUCOSE BLD-MCNC: 117 MG/DL (ref 70–99)
GLUCOSE BLD-MCNC: 207 MG/DL (ref 70–99)
GLUCOSE BLD-MCNC: 224 MG/DL (ref 70–99)
GLUCOSE BLD-MCNC: 226 MG/DL (ref 70–99)
GLUCOSE BLD-MCNC: 399 MG/DL (ref 70–99)
GLUCOSE BLD-MCNC: 460 MG/DL (ref 70–99)
GLUCOSE BLD-MCNC: 500 MG/DL (ref 70–99)

## 2019-01-24 PROCEDURE — 6370000000 HC RX 637 (ALT 250 FOR IP): Performed by: HOSPITALIST

## 2019-01-24 PROCEDURE — 96376 TX/PRO/DX INJ SAME DRUG ADON: CPT

## 2019-01-24 PROCEDURE — 96372 THER/PROPH/DIAG INJ SC/IM: CPT

## 2019-01-24 PROCEDURE — 97530 THERAPEUTIC ACTIVITIES: CPT

## 2019-01-24 PROCEDURE — 6360000002 HC RX W HCPCS: Performed by: INTERNAL MEDICINE

## 2019-01-24 PROCEDURE — 94761 N-INVAS EAR/PLS OXIMETRY MLT: CPT

## 2019-01-24 PROCEDURE — 6370000000 HC RX 637 (ALT 250 FOR IP): Performed by: NURSE PRACTITIONER

## 2019-01-24 PROCEDURE — 6360000002 HC RX W HCPCS: Performed by: HOSPITALIST

## 2019-01-24 PROCEDURE — 6370000000 HC RX 637 (ALT 250 FOR IP): Performed by: INTERNAL MEDICINE

## 2019-01-24 PROCEDURE — 36415 COLL VENOUS BLD VENIPUNCTURE: CPT

## 2019-01-24 PROCEDURE — C9113 INJ PANTOPRAZOLE SODIUM, VIA: HCPCS | Performed by: INTERNAL MEDICINE

## 2019-01-24 PROCEDURE — 97116 GAIT TRAINING THERAPY: CPT

## 2019-01-24 PROCEDURE — 97110 THERAPEUTIC EXERCISES: CPT

## 2019-01-24 PROCEDURE — 82962 GLUCOSE BLOOD TEST: CPT

## 2019-01-24 PROCEDURE — G0378 HOSPITAL OBSERVATION PER HR: HCPCS

## 2019-01-24 RX ORDER — METOPROLOL SUCCINATE 50 MG/1
50 TABLET, EXTENDED RELEASE ORAL DAILY
Qty: 30 TABLET | Refills: 0
Start: 2019-01-25 | End: 2022-07-18

## 2019-01-24 RX ORDER — AMLODIPINE BESYLATE 5 MG/1
5 TABLET ORAL DAILY
Qty: 30 TABLET | Refills: 0 | Status: ON HOLD | OUTPATIENT
Start: 2019-01-25 | End: 2020-09-15 | Stop reason: HOSPADM

## 2019-01-24 RX ORDER — TRAMADOL HYDROCHLORIDE 50 MG/1
50 TABLET ORAL EVERY 6 HOURS PRN
Qty: 12 TABLET | Refills: 0 | Status: SHIPPED | OUTPATIENT
Start: 2019-01-24 | End: 2019-01-27

## 2019-01-24 RX ORDER — PANTOPRAZOLE SODIUM 20 MG/1
20 TABLET, DELAYED RELEASE ORAL
Qty: 30 TABLET | Refills: 0 | Status: SHIPPED | OUTPATIENT
Start: 2019-01-24 | End: 2019-01-24 | Stop reason: HOSPADM

## 2019-01-24 RX ORDER — PANTOPRAZOLE SODIUM 40 MG/1
40 TABLET, DELAYED RELEASE ORAL
Qty: 60 TABLET | Refills: 0
Start: 2019-01-24 | End: 2022-07-18

## 2019-01-24 RX ORDER — METOCLOPRAMIDE 10 MG/1
10 TABLET ORAL
Qty: 120 TABLET | Refills: 0
Start: 2019-01-24 | End: 2019-01-24

## 2019-01-24 RX ORDER — METOCLOPRAMIDE 10 MG/1
10 TABLET ORAL
Qty: 120 TABLET | Refills: 0 | Status: ON HOLD
Start: 2019-01-24 | End: 2020-09-15 | Stop reason: HOSPADM

## 2019-01-24 RX ORDER — DICYCLOMINE HCL 20 MG
20 TABLET ORAL
Qty: 120 TABLET | Refills: 0
Start: 2019-01-24 | End: 2022-07-02

## 2019-01-24 RX ADMIN — INSULIN LISPRO 10 UNITS: 100 INJECTION, SOLUTION INTRAVENOUS; SUBCUTANEOUS at 12:28

## 2019-01-24 RX ADMIN — METOPROLOL SUCCINATE 50 MG: 50 TABLET, EXTENDED RELEASE ORAL at 10:50

## 2019-01-24 RX ADMIN — SERTRALINE HYDROCHLORIDE 150 MG: 100 TABLET ORAL at 10:50

## 2019-01-24 RX ADMIN — DICYCLOMINE HYDROCHLORIDE 20 MG: 20 TABLET ORAL at 06:37

## 2019-01-24 RX ADMIN — LISINOPRIL 40 MG: 40 TABLET ORAL at 10:49

## 2019-01-24 RX ADMIN — INSULIN LISPRO 10 UNITS: 100 INJECTION, SOLUTION INTRAVENOUS; SUBCUTANEOUS at 12:34

## 2019-01-24 RX ADMIN — DICYCLOMINE HYDROCHLORIDE 20 MG: 20 TABLET ORAL at 17:13

## 2019-01-24 RX ADMIN — GABAPENTIN 100 MG: 100 CAPSULE ORAL at 12:26

## 2019-01-24 RX ADMIN — INSULIN LISPRO 10 UNITS: 100 INJECTION, SOLUTION INTRAVENOUS; SUBCUTANEOUS at 11:06

## 2019-01-24 RX ADMIN — METOCLOPRAMIDE 10 MG: 10 TABLET ORAL at 10:50

## 2019-01-24 RX ADMIN — FERROUS SULFATE TAB 325 MG (65 MG ELEMENTAL FE) 650 MG: 325 (65 FE) TAB at 10:49

## 2019-01-24 RX ADMIN — CLOPIDOGREL BISULFATE 75 MG: 75 TABLET ORAL at 10:50

## 2019-01-24 RX ADMIN — METOCLOPRAMIDE 10 MG: 10 TABLET ORAL at 17:13

## 2019-01-24 RX ADMIN — PANTOPRAZOLE SODIUM 40 MG: 40 INJECTION, POWDER, FOR SOLUTION INTRAVENOUS at 10:49

## 2019-01-24 RX ADMIN — GABAPENTIN 100 MG: 100 CAPSULE ORAL at 10:49

## 2019-01-24 RX ADMIN — INSULIN LISPRO 4 UNITS: 100 INJECTION, SOLUTION INTRAVENOUS; SUBCUTANEOUS at 10:58

## 2019-01-24 RX ADMIN — Medication 1 CAPSULE: at 10:49

## 2019-01-24 RX ADMIN — AMLODIPINE BESYLATE 5 MG: 5 TABLET ORAL at 10:49

## 2019-01-24 RX ADMIN — DICYCLOMINE HYDROCHLORIDE 20 MG: 20 TABLET ORAL at 10:50

## 2019-01-24 RX ADMIN — ENOXAPARIN SODIUM 40 MG: 100 INJECTION SUBCUTANEOUS at 10:49

## 2019-01-24 RX ADMIN — METOCLOPRAMIDE 10 MG: 10 TABLET ORAL at 06:37

## 2019-01-25 ENCOUNTER — HOSPITAL ENCOUNTER (OUTPATIENT)
Age: 59
Setting detail: SPECIMEN
Discharge: HOME | End: 2019-01-25

## 2019-01-25 LAB
ALBUMIN SERPL-MCNC: 3.3 GM/DL (ref 3.4–5)
ALP BLD-CCNC: 118 IU/L (ref 40–128)
ALT SERPL-CCNC: 17 U/L (ref 10–40)
ANION GAP SERPL CALCULATED.3IONS-SCNC: 11 MMOL/L (ref 4–16)
AST SERPL-CCNC: 11 IU/L (ref 15–37)
BASOPHILS ABSOLUTE: 0 K/CU MM
BASOPHILS RELATIVE PERCENT: 0.4 % (ref 0–1)
BILIRUB SERPL-MCNC: 0.2 MG/DL (ref 0–1)
BUN BLDV-MCNC: 22 MG/DL (ref 6–23)
CALCIUM SERPL-MCNC: 8.5 MG/DL (ref 8.3–10.6)
CHLORIDE BLD-SCNC: 106 MMOL/L (ref 99–110)
CO2: 25 MMOL/L (ref 21–32)
CREAT SERPL-MCNC: 1.3 MG/DL (ref 0.6–1.1)
DIFFERENTIAL TYPE: ABNORMAL
EOSINOPHILS ABSOLUTE: 0.1 K/CU MM
EOSINOPHILS RELATIVE PERCENT: 2.8 % (ref 0–3)
GFR AFRICAN AMERICAN: 51 ML/MIN/1.73M2
GFR NON-AFRICAN AMERICAN: 42 ML/MIN/1.73M2
GLUCOSE BLD-MCNC: 234 MG/DL (ref 70–99)
HCT VFR BLD CALC: 31.5 % (ref 37–47)
HEMOGLOBIN: 9.4 GM/DL (ref 12.5–16)
IMMATURE NEUTROPHIL %: 0.2 % (ref 0–0.43)
LYMPHOCYTES ABSOLUTE: 1.7 K/CU MM
LYMPHOCYTES RELATIVE PERCENT: 34.7 % (ref 24–44)
MCH RBC QN AUTO: 28.8 PG (ref 27–31)
MCHC RBC AUTO-ENTMCNC: 29.8 % (ref 32–36)
MCV RBC AUTO: 96.6 FL (ref 78–100)
MONOCYTES ABSOLUTE: 0.6 K/CU MM
MONOCYTES RELATIVE PERCENT: 11.7 % (ref 0–4)
NUCLEATED RBC %: 0 %
PDW BLD-RTO: 12.7 % (ref 11.7–14.9)
PLATELET # BLD: 213 K/CU MM (ref 140–440)
PMV BLD AUTO: 11 FL (ref 7.5–11.1)
POTASSIUM SERPL-SCNC: 4.3 MMOL/L (ref 3.5–5.1)
RBC # BLD: 3.26 M/CU MM (ref 4.2–5.4)
SEGMENTED NEUTROPHILS ABSOLUTE COUNT: 2.5 K/CU MM
SEGMENTED NEUTROPHILS RELATIVE PERCENT: 50.2 % (ref 36–66)
SODIUM BLD-SCNC: 142 MMOL/L (ref 135–145)
TOTAL IMMATURE NEUTOROPHIL: 0.01 K/CU MM
TOTAL NUCLEATED RBC: 0 K/CU MM
TOTAL PROTEIN: 5.1 GM/DL (ref 6.4–8.2)
WBC # BLD: 5 K/CU MM (ref 4–10.5)

## 2019-01-25 PROCEDURE — 80053 COMPREHEN METABOLIC PANEL: CPT

## 2019-01-25 PROCEDURE — 36415 COLL VENOUS BLD VENIPUNCTURE: CPT

## 2019-01-25 PROCEDURE — 85025 COMPLETE CBC W/AUTO DIFF WBC: CPT

## 2019-01-31 DIAGNOSIS — S42.295D OTHER CLOSED NONDISPLACED FRACTURE OF PROXIMAL END OF LEFT HUMERUS WITH ROUTINE HEALING, SUBSEQUENT ENCOUNTER: Primary | ICD-10-CM

## 2019-02-07 ENCOUNTER — OFFICE VISIT (OUTPATIENT)
Dept: ORTHOPEDIC SURGERY | Age: 59
End: 2019-02-07

## 2019-02-07 ENCOUNTER — HOSPITAL ENCOUNTER (OUTPATIENT)
Age: 59
Discharge: HOME | End: 2019-02-07
Payer: COMMERCIAL

## 2019-02-07 ENCOUNTER — HOSPITAL ENCOUNTER (OUTPATIENT)
Dept: GENERAL RADIOLOGY | Age: 59
Discharge: HOME | End: 2019-02-07
Payer: COMMERCIAL

## 2019-02-07 VITALS
HEART RATE: 82 BPM | OXYGEN SATURATION: 95 % | RESPIRATION RATE: 14 BRPM | HEIGHT: 63 IN | WEIGHT: 245 LBS | BODY MASS INDEX: 43.41 KG/M2

## 2019-02-07 DIAGNOSIS — Z09 POSTOP CHECK: Primary | ICD-10-CM

## 2019-02-07 DIAGNOSIS — S42.295D OTHER CLOSED NONDISPLACED FRACTURE OF PROXIMAL END OF LEFT HUMERUS WITH ROUTINE HEALING, SUBSEQUENT ENCOUNTER: ICD-10-CM

## 2019-02-07 DIAGNOSIS — Z09 POSTOP CHECK: ICD-10-CM

## 2019-02-07 PROCEDURE — 73060 X-RAY EXAM OF HUMERUS: CPT

## 2019-02-07 PROCEDURE — 99024 POSTOP FOLLOW-UP VISIT: CPT | Performed by: PHYSICIAN ASSISTANT

## 2019-02-08 ENCOUNTER — HOSPITAL ENCOUNTER (OUTPATIENT)
Dept: PHYSICAL THERAPY | Age: 59
Setting detail: THERAPIES SERIES
Discharge: HOME | End: 2019-02-08
Payer: COMMERCIAL

## 2019-02-08 PROCEDURE — 97162 PT EVAL MOD COMPLEX 30 MIN: CPT

## 2019-02-08 PROCEDURE — 97140 MANUAL THERAPY 1/> REGIONS: CPT

## 2019-02-08 PROCEDURE — 97110 THERAPEUTIC EXERCISES: CPT

## 2019-02-08 ASSESSMENT — PAIN DESCRIPTION - FREQUENCY: FREQUENCY: INTERMITTENT

## 2019-02-08 ASSESSMENT — PAIN DESCRIPTION - ORIENTATION: ORIENTATION: LEFT

## 2019-02-08 ASSESSMENT — PAIN DESCRIPTION - PAIN TYPE: TYPE: SURGICAL PAIN

## 2019-02-11 ENCOUNTER — HOSPITAL ENCOUNTER (OUTPATIENT)
Dept: PHYSICAL THERAPY | Age: 59
Setting detail: THERAPIES SERIES
Discharge: HOME | End: 2019-02-11
Payer: COMMERCIAL

## 2019-02-11 PROCEDURE — 97110 THERAPEUTIC EXERCISES: CPT

## 2019-02-11 PROCEDURE — 97140 MANUAL THERAPY 1/> REGIONS: CPT

## 2019-02-11 PROCEDURE — 97535 SELF CARE MNGMENT TRAINING: CPT

## 2019-02-14 ENCOUNTER — HOSPITAL ENCOUNTER (OUTPATIENT)
Dept: PHYSICAL THERAPY | Age: 59
Setting detail: THERAPIES SERIES
Discharge: HOME | End: 2019-02-14
Payer: COMMERCIAL

## 2019-02-14 PROCEDURE — 97110 THERAPEUTIC EXERCISES: CPT

## 2019-02-14 PROCEDURE — 97140 MANUAL THERAPY 1/> REGIONS: CPT

## 2019-02-14 PROCEDURE — 97016 VASOPNEUMATIC DEVICE THERAPY: CPT

## 2019-02-19 ENCOUNTER — HOSPITAL ENCOUNTER (OUTPATIENT)
Dept: PHYSICAL THERAPY | Age: 59
Setting detail: THERAPIES SERIES
Discharge: HOME | End: 2019-02-19
Payer: COMMERCIAL

## 2019-02-19 PROCEDURE — 97016 VASOPNEUMATIC DEVICE THERAPY: CPT

## 2019-02-19 PROCEDURE — 97110 THERAPEUTIC EXERCISES: CPT

## 2019-02-19 PROCEDURE — 97140 MANUAL THERAPY 1/> REGIONS: CPT

## 2019-02-21 ENCOUNTER — HOSPITAL ENCOUNTER (OUTPATIENT)
Dept: PHYSICAL THERAPY | Age: 59
Discharge: HOME | End: 2019-02-21

## 2019-02-21 ENCOUNTER — OFFICE VISIT (OUTPATIENT)
Dept: ORTHOPEDIC SURGERY | Age: 59
End: 2019-02-21

## 2019-02-21 DIAGNOSIS — R52 PAIN: Primary | ICD-10-CM

## 2019-02-21 PROCEDURE — 99024 POSTOP FOLLOW-UP VISIT: CPT | Performed by: PHYSICIAN ASSISTANT

## 2019-02-21 NOTE — FLOWSHEET NOTE
Physical Therapy  Cancellation/No-show Note  Patient Name:  Ilya Berger  :  1960   Date:  2019  Cancelled visits to date: 1  No-shows to date: 0    For today's appointment patient:  [x]  Cancelled  []  Rescheduled appointment  []  No-show     Reason given by patient:  []  Patient ill  []  Conflicting appointment  []  No transportation    []  Conflict with work  []  No reason given  [x]  Other:     Comments:  Patient got into see MD office - states fell again, NO new Fracture, No lifting over 2#, and brace on for carpel tunnel     Electronically signed by:  Robe Davis PTA

## 2019-02-22 PROBLEM — E86.0 DEHYDRATION: Status: RESOLVED | Noted: 2019-01-17 | Resolved: 2019-02-22

## 2019-02-26 ENCOUNTER — HOSPITAL ENCOUNTER (OUTPATIENT)
Dept: PHYSICAL THERAPY | Age: 59
Setting detail: THERAPIES SERIES
Discharge: HOME | End: 2019-02-26
Payer: COMMERCIAL

## 2019-02-26 PROCEDURE — 97110 THERAPEUTIC EXERCISES: CPT

## 2019-02-26 PROCEDURE — 97016 VASOPNEUMATIC DEVICE THERAPY: CPT

## 2019-02-26 PROCEDURE — 97140 MANUAL THERAPY 1/> REGIONS: CPT

## 2019-03-02 ENCOUNTER — HOSPITAL ENCOUNTER (OUTPATIENT)
Dept: PHYSICAL THERAPY | Age: 59
Setting detail: THERAPIES SERIES
Discharge: HOME | End: 2019-03-02
Payer: COMMERCIAL

## 2019-03-02 PROCEDURE — 97110 THERAPEUTIC EXERCISES: CPT

## 2019-03-02 PROCEDURE — 97140 MANUAL THERAPY 1/> REGIONS: CPT

## 2019-03-02 PROCEDURE — 97016 VASOPNEUMATIC DEVICE THERAPY: CPT

## 2019-03-04 ENCOUNTER — HOSPITAL ENCOUNTER (OUTPATIENT)
Dept: PHYSICAL THERAPY | Age: 59
Discharge: HOME | End: 2019-03-04

## 2019-03-04 NOTE — FLOWSHEET NOTE
Physical Therapy  Cancellation/No-show Note  Patient Name:  Cele Median  :  1960   Date:  3/4/2019  Cancelled visits to date: 2  No-shows to date: 0    For today's appointment patient:  [x]  Cancelled  []  Rescheduled appointment  []  No-show     Reason given by patient:  []  Patient ill  []  Conflicting appointment  []  No transportation    []  Conflict with work  []  No reason given  [x]  Other:     Comments:  Patient states unable to make appt    Electronically signed by:  Brenda Sherman PTA

## 2019-03-06 ENCOUNTER — HOSPITAL ENCOUNTER (OUTPATIENT)
Dept: PHYSICAL THERAPY | Age: 59
Setting detail: THERAPIES SERIES
Discharge: HOME | End: 2019-03-06
Payer: COMMERCIAL

## 2019-03-06 PROCEDURE — 97110 THERAPEUTIC EXERCISES: CPT

## 2019-03-06 PROCEDURE — 97016 VASOPNEUMATIC DEVICE THERAPY: CPT

## 2019-03-06 PROCEDURE — 97140 MANUAL THERAPY 1/> REGIONS: CPT

## 2019-03-13 ENCOUNTER — HOSPITAL ENCOUNTER (OUTPATIENT)
Dept: PHYSICAL THERAPY | Age: 59
Setting detail: THERAPIES SERIES
Discharge: HOME | End: 2019-03-13
Payer: COMMERCIAL

## 2019-03-13 PROCEDURE — 97016 VASOPNEUMATIC DEVICE THERAPY: CPT

## 2019-03-13 PROCEDURE — 97110 THERAPEUTIC EXERCISES: CPT

## 2019-03-13 PROCEDURE — 97140 MANUAL THERAPY 1/> REGIONS: CPT

## 2019-03-16 ENCOUNTER — HOSPITAL ENCOUNTER (OUTPATIENT)
Dept: PHYSICAL THERAPY | Age: 59
Setting detail: THERAPIES SERIES
Discharge: HOME | End: 2019-03-16
Payer: COMMERCIAL

## 2019-03-16 PROCEDURE — 97140 MANUAL THERAPY 1/> REGIONS: CPT

## 2019-03-16 PROCEDURE — 97110 THERAPEUTIC EXERCISES: CPT

## 2019-03-21 ENCOUNTER — HOSPITAL ENCOUNTER (OUTPATIENT)
Dept: PHYSICAL THERAPY | Age: 59
Setting detail: THERAPIES SERIES
Discharge: HOME | End: 2019-03-21
Payer: COMMERCIAL

## 2019-03-21 PROCEDURE — 97016 VASOPNEUMATIC DEVICE THERAPY: CPT

## 2019-03-21 PROCEDURE — 97110 THERAPEUTIC EXERCISES: CPT

## 2019-03-21 PROCEDURE — 97140 MANUAL THERAPY 1/> REGIONS: CPT

## 2019-03-23 ENCOUNTER — HOSPITAL ENCOUNTER (OUTPATIENT)
Dept: PHYSICAL THERAPY | Age: 59
Setting detail: THERAPIES SERIES
Discharge: HOME | End: 2019-03-23
Payer: COMMERCIAL

## 2019-03-23 PROCEDURE — 97016 VASOPNEUMATIC DEVICE THERAPY: CPT

## 2019-03-23 PROCEDURE — 97110 THERAPEUTIC EXERCISES: CPT

## 2019-03-23 PROCEDURE — 97140 MANUAL THERAPY 1/> REGIONS: CPT

## 2019-03-25 ENCOUNTER — OFFICE VISIT (OUTPATIENT)
Dept: ORTHOPEDIC SURGERY | Age: 59
End: 2019-03-25

## 2019-03-25 VITALS — HEART RATE: 92 BPM | RESPIRATION RATE: 14 BRPM | OXYGEN SATURATION: 99 %

## 2019-03-25 DIAGNOSIS — S42.295D OTHER CLOSED NONDISPLACED FRACTURE OF PROXIMAL END OF LEFT HUMERUS WITH ROUTINE HEALING, SUBSEQUENT ENCOUNTER: ICD-10-CM

## 2019-03-25 DIAGNOSIS — Z98.890 S/P ORIF (OPEN REDUCTION INTERNAL FIXATION) FRACTURE: Primary | ICD-10-CM

## 2019-03-25 DIAGNOSIS — Z87.81 S/P ORIF (OPEN REDUCTION INTERNAL FIXATION) FRACTURE: Primary | ICD-10-CM

## 2019-03-25 PROBLEM — K21.9 GASTROESOPHAGEAL REFLUX DISEASE: Status: ACTIVE | Noted: 2019-02-08

## 2019-03-25 PROBLEM — G25.81 RESTLESS LEGS: Status: ACTIVE | Noted: 2019-02-13

## 2019-03-25 PROCEDURE — 99024 POSTOP FOLLOW-UP VISIT: CPT | Performed by: ORTHOPAEDIC SURGERY

## 2019-03-25 ASSESSMENT — ENCOUNTER SYMPTOMS: COLOR CHANGE: 0

## 2019-03-27 ENCOUNTER — HOSPITAL ENCOUNTER (OUTPATIENT)
Dept: PHYSICAL THERAPY | Age: 59
Discharge: HOME | End: 2019-03-27

## 2019-03-27 NOTE — FLOWSHEET NOTE
Physical Therapy  Cancellation/No-show Note  Patient Name:  Radhika Rawls  :  1960   Date:  3/27/2019  Cancelled visits to date: 1  No-shows to date: 0    For today's appointment patient:  [x]  Cancelled  []  Rescheduled appointment  []  No-show     Reason given by patient:  []  Patient ill  []  Conflicting appointment  []  No transportation    []  Conflict with work  []  No reason given  [x]  Other:     Comments:  Stuck in traffic in [place] will not make it here in time.   Electronically signed by:  Leon Crowe, 3/27/2019, 4:25 PM

## 2019-04-02 ENCOUNTER — HOSPITAL ENCOUNTER (OUTPATIENT)
Dept: PHYSICAL THERAPY | Age: 59
Setting detail: THERAPIES SERIES
Discharge: HOME | End: 2019-04-02
Payer: COMMERCIAL

## 2019-04-02 PROCEDURE — 97016 VASOPNEUMATIC DEVICE THERAPY: CPT

## 2019-04-02 PROCEDURE — 97110 THERAPEUTIC EXERCISES: CPT

## 2019-04-02 PROCEDURE — 97140 MANUAL THERAPY 1/> REGIONS: CPT

## 2019-04-02 NOTE — FLOWSHEET NOTE
Outpatient Physical Therapy  [place]           [x] Phone: [phone]   Fax: [phone]  [place]           [] Phone: [phone]   Fax: [phone]        Physical Therapy Daily Treatment Note  Date:  2019    Patient Name:  Radhika Rawls    :  1960  MRN: [id number]  Restrictions/Precautions: Other position/activity restrictions: None noted  Diagnosis:   Diagnosis: Left Humerus Fx  Date of Injury/Surgery: ORIF on 18. Treatment Diagnosis: Treatment Diagnosis: Muscular weakness, decreased left shoulder AROM, decreased function    Insurance/Certification information: PT Insurance Information: [facility] ($30 co-pay)  Referring Physician:  Referring Practitioner: Lian Black PA-C  Next Doctor Visit:    Plan of care signed (Y/N):    Outcome Measure: Quick DASH  Visit# / total visits:  12/10  Pain level: 4/10 @ rest;   Goals:          Long term goals  Time Frame for Long term goals :  All goals to be assessed by the 10th visit  Long term goal 1: Pt to be independent with HEP  Long term goal 2: Pt to increase left shoulder AROM 19  Seated AROM flex  135°, scaption 100°,EXT 45°  Long term goal 3: Pt to increase left shoulder strength 19 flex 3-/5, abd 3-/5, IR 2/5 ER 2/5  Long term goal 4: Pt to improve Quick DASH 19    Summary of Evaluation: Assessment: Patient is a  58 yo Female who presents with Left Humerus Fx which impacts on reaching out/overhead, ADL's, working;patient's goal is to improve ROM and strength back to normal activities ;PT to address patient's goals, impairments and activity limitations with skilled interventions checked in plan of care;patient's level of function prior to onset of Left Humerus Fx was [facility]; did not observe any barriers to learning during PT eval; learning preferences include demonstration, practice, and handouts; patient expressed understanding of HEP; patient appears to be motivated to participate in an active PT program and to be compliant with HEP expectations;patient assisted in developing treatment plan and goals; [employer] is currently being used; Subjective:  Patient reports of 4/10 pain upon arrival        Reports  changes in Ambulatory Summary Sheet? None    Objective:  Seated AROM flex  135°, scaption 100°,EXT 45°             Exercises: (No more than 4 columns)   Exercise/Equipment 3-16-19 #9 3/21/19 3/23/19 4-2-19 #12       Check goals- Quick DASH  Quick dash 47 points    81%   WARM UP                        Manual   See below     Table slides flex/scaption x15 ea --     ER  Sitting  2 x 10 ---     Supine punch AROM /supine FLEX  5 reps ea 10x punches 10x punches   pulleys  FLEX x15 Not today    Supine ER AROM @45* ABD- L stars on ribs  Arm on towell roll 10 ct isometric @ 8 ER  mikel ER 10x mikel ER 10x             Supine AAAROMwith R hand on L wrist  5ct 2 x 5reps next 10x5\" 10x5\"   STANDING       Walk aways 5ct x 8 reps 5ct x 8 reps 10x5\" 10x5\"   Seated beach chair position  FLEX AROM x10 reps Flex 10x Flex 10x   Standing 45* table angle  FLEX x10 reps 10x3\" 10x3\"   Wall slide vertical   X 5 reps 10x3\" Declined                              PROPRIOCEPTION                                          MODALITIES See  below See  below  See  below                     Other Therapeutic Activities/Education: Pt to use ice for soreness after therapy/HEP x 10-15 Min. Home Exercise Program:  2/8/2019 - HEP established as above, HO to pt/chart   2-11-19 added ER and scap squeeze reviewed and given written handout      Manual Treatments: PROM into shoulder flexion, abduction, ER and IR 45* abduction in supine see measurement in Objective       Modalities: Patient received vasocompression on their L shoulder for pain and inflammation for 10min on low pressure. Patient had negative skin reaction afterwards.    Declined today had to leave for another appt      Communication with other providers:  none      Assessment:7/10 pain

## 2019-04-03 ENCOUNTER — TELEPHONE (OUTPATIENT)
Dept: ORTHOPEDIC SURGERY | Age: 59
End: 2019-04-03

## 2019-04-03 NOTE — TELEPHONE ENCOUNTER
Patient states that she had a fall recently and hurt her L Wrist and Patient had EMG done and states that she is in a severe pain and she wants to know If she can follow up here or f/u with a neuro surgeon

## 2019-04-08 ENCOUNTER — HOSPITAL ENCOUNTER (OUTPATIENT)
Dept: PHYSICAL THERAPY | Age: 59
Setting detail: THERAPIES SERIES
Discharge: HOME | End: 2019-04-08
Payer: COMMERCIAL

## 2019-04-08 PROCEDURE — 97140 MANUAL THERAPY 1/> REGIONS: CPT

## 2019-04-08 PROCEDURE — 97110 THERAPEUTIC EXERCISES: CPT

## 2019-04-08 NOTE — FLOWSHEET NOTE
Outpatient Physical Therapy  [place]           [x] Phone: [phone]   Fax: [phone]  [place]           [] Phone: [phone]   Fax: [phone]        Physical Therapy Daily Treatment Note  Date:  2019    Patient Name:  Sarah Dukes    :  1960  MRN: [id number]  Restrictions/Precautions: Other position/activity restrictions: None noted  Diagnosis:   Diagnosis: Left Humerus Fx  Date of Injury/Surgery: ORIF on 18. Treatment Diagnosis: Treatment Diagnosis: Muscular weakness, decreased left shoulder AROM, decreased function    Insurance/Certification information: PT Insurance Information: [facility] ($30 co-pay)  Referring Physician:  Referring Practitioner: Ron Monroy. Hu Grier PA-C  Next Doctor Visit:   KATHY MUNIZ//   [address] [place] [address] signed (Y/N):    Outcome Measure: Quick DASH  Visit# / total visits:    Pain level: .5/10   Goals:          Long term goals  Time Frame for Long term goals :  All goals to be assessed by the 10th visit  Long term goal 1: Pt to be independent with HEP  Long term goal 2: Pt to increase left shoulder AROM -19  Seated AROM flex  135°, scaption 100°,EXT 45°  Long term goal 3: Pt to increase left shoulder strength 19 flex 3-/5, abd 3-/5, IR 2/5 ER 2/5  Long term goal 4: Pt to improve Quick DASH -    Summary of Evaluation: Assessment: Patient is a  58 yo Female who presents with Left Humerus Fx which impacts on reaching out/overhead, ADL's, working;patient's goal is to improve ROM and strength back to normal activities ;PT to address patient's goals, impairments and activity limitations with skilled interventions checked in plan of care;patient's level of function prior to onset of Left Humerus Fx was [facility]; did not observe any barriers to learning during PT eval; learning preferences include demonstration, practice, and handouts; patient expressed understanding of HEP; patient appears to be motivated to participate in an active PT program and to be compliant with HEP expectations;patient assisted in developing treatment plan and goals; [employer] is currently being used; Subjective:  Patient reports of .5/10 pain upon arrival and reports that she slept good last night. I see the Dr on Thursday and get my EMG results      Reports  changes in Ambulatory Summary Sheet? None    Objective:  Seated AROM flex  132°,   Strength ER  3-/5   Swollen fingers             Exercises: (No more than 4 columns)   Exercise/Equipment 3-16-19 #9 3/21/19 3/23/19 4-2-19 #12 4-8-19 #13       Check goals- Quick DASH  Quick dash 47 points    81%    WARM UP                           Manual   See below      Table slides flex/scaption x15 ea --   x15 ea   ER  Sitting  2 x 10 ---      Supine punch AROM /supine FLEX  5 reps ea 10x punches 10x punches 15x punches  12x flex   pulleys  FLEX x15 Not today  FLEX x15   Supine ER AROM @45* ABD- L stars on ribs  Arm on towell roll 10 ct isometric @ 8 ER  mikel ER 10x mikel ER 10x mikel ER 10x              Supine AAAROMwith R hand on L wrist  5ct 2 x 5reps next 10x5\" 10x5\" 10x5\"   STANDING        Walk aways 5ct x 8 reps 5ct x 8 reps 10x5\" 10x5\" 10x5\"   Seated beach chair position  FLEX AROM x10 reps Flex 10x Flex 10x Flex 10x   Standing 45* table angle  FLEX x10 reps 10x3\" 10x3\" 10x3\"   Wall slide vertical   X 5 reps 10x3\" Declined  10x3\"                                PROPRIOCEPTION                                                MODALITIES See  below See  below  See  below declined                       Other Therapeutic Activities/Education: Pt to use ice for soreness after therapy/HEP x 10-15 Min.        Home Exercise Program:  2/8/2019 - HEP established as above, HO to pt/chart   2-11-19 added ER and scap squeeze reviewed and given written handout  4-8-19 added wall slides      Manual Treatments: PROM into shoulder flexion, abduction, ER and IR 45* abduction in supine see measurement in Objective       Modalities: Declined today had to leave for appt      Communication with other providers:  none      Assessment:.5/10  pain post exercises but >6/10 with stretching then decreases - see measurements in objective        Plan for Next Session: Specific instructions for Next Treatment: Pt to increase ex for ROM, strength, posture and may use modalities as needed for pain and swelling      Time In / Time Out:   434/515    Timed Code/Total Treatment Minutes:  39' 15man  26TE    Next Progress Note due:  10th visit or 3/22/2019      Plan of Care Interventions:  [x] Therapeutic Exercise  [] Modalities:  [x] Therapeutic Activity     [] Ultrasound  [x] Estim  [] Gait Training      [] Cervical Traction [] Lumbar Traction  [x] Neuromuscular Re-education    [x] Cold/hotpack [] Iontophoresis   [x] Instruction in HEP      [x] Vasopneumatic   [] Dry Needling    [x] Manual Therapy               [] Aquatic Therapy     [x] Self care home management         Electronically signed by:  Quentin eHrnandez PTA 4/8/2019, 4:34 PM

## 2019-04-10 ENCOUNTER — HOSPITAL ENCOUNTER (OUTPATIENT)
Dept: PHYSICAL THERAPY | Age: 59
Setting detail: THERAPIES SERIES
Discharge: HOME | End: 2019-04-10
Payer: COMMERCIAL

## 2019-04-10 PROCEDURE — 97110 THERAPEUTIC EXERCISES: CPT

## 2019-04-10 PROCEDURE — 97140 MANUAL THERAPY 1/> REGIONS: CPT

## 2019-04-10 NOTE — FLOWSHEET NOTE
Outpatient Physical Therapy  [place]           [x] Phone: [phone]   Fax: [phone]  [place]           [] Phone: [phone]   Fax: [phone]        Physical Therapy Daily Treatment Note  Date:  4/10/2019    Patient Name:  Louann Joaquin    :  1960  MRN: [id number]  Restrictions/Precautions: Other position/activity restrictions: None noted  Diagnosis:   Diagnosis: Left Humerus Fx  Date of Injury/Surgery: ORIF on 18. Treatment Diagnosis: Treatment Diagnosis: Muscular weakness, decreased left shoulder AROM, decreased function    Insurance/Certification information: PT Insurance Information: [facility] ($30 co-pay)  Referring Physician:  Referring Practitioner: Nneka Mann. Lavonne Fontana PA-C  Next Doctor Visit:   KATHY MUNIZ//  [facility]    Outcome Measure: Quick DASH  Visit# / total visits:    Pain level: .5/10   Goals:          Long term goals  Time Frame for Long term goals :  All goals to be assessed by the 10th visit  Long term goal 1: Pt to be independent with HEP  Long term goal 2: Pt to increase left shoulder AROM 19  Seated AROM flex  135°, scaption 100°,EXT 45°  Long term goal 3: Pt to increase left shoulder strength 19 flex 3-/5, abd 3-/5, IR 2/5 ER 2/5  Long term goal 4: Pt to improve Quick DASH 19    Summary of Evaluation: Assessment: Patient is a  60 yo Female who presents with Left Humerus Fx which impacts on reaching out/overhead, ADL's, working;patient's goal is to improve ROM and strength back to normal activities ;PT to address patient's goals, impairments and activity limitations with skilled interventions checked in plan of care;patient's level of function prior to onset of Left Humerus Fx was [facility]; did not observe any barriers to learning during PT eval; learning preferences include demonstration, practice, and handouts; patient expressed understanding of HEP; patient appears to be motivated to participate in an active PT program and to be compliant with HEP expectations;patient assisted in developing treatment plan and goals; [employer] is currently being used; Subjective:  Patient reports of .5/10 pain upon arrival I see the Dr on Thursday and get my EMG results . 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 None    Objective:  Seated AROM flex  132°,   Strength ER  3-/5   Swollen fingers             Exercises: (No more than 4 columns)   Exercise/Equipment 3/23/19 4-2-19 #12 4-8-19 #13 4-10-19 #14       Quick dash 47 points    81%     WARM UP                        Manual        Table slides flex/scaption   x15 ea See below   ER        Supine punch AROM /supine FLEX 10x punches 10x punches 15x punches  12x flex 15x punches     pulleys Not today  FLEX x15 FLEX x15   Supine ER AROM @45* ABD- L stars on ribs mikel ER 10x mikel ER 10x mikel ER 10x mikel ER 10x   rhythmic stabs    Next       Supine AAAROMwith R hand on L wrist 10x5\" 10x5\" 10x5\" 10x5\"   STANDING       Walk aways 10x5\" 10x5\" 10x5\" 10x5\"   Seated beach chair position Flex 10x Flex 10x Flex 10x Flex 15x   Standing 45* table angle 10x3\" 10x3\" 10x3\" x15 ea   Wall slide vertical  10x3\" Declined  10x3\" 10x5\"                             PROPRIOCEPTION                                          MODALITIES  See  below declined declined                     Other Therapeutic Activities/Education: Pt to use ice for soreness after therapy/HEP x 10-15 Min.        Home Exercise Program:  2/8/2019 - HEP established as above, HO to pt/chart   2-11-19 added ER and scap squeeze reviewed and given written handout  4-8-19 added wall slides      Manual Treatments: PROM into shoulder flexion,  ER and IR 45* abduction in supine see measurement in Objective       Modalities:   Declined today had to leave for appt      Communication with other providers: none      Assessment:   5/10  pain post exercises- see measurements in objective        Plan for Next Session: Specific instructions for Next Treatment: Pt to increase ex for ROM, strength, posture and may use modalities as needed for pain and swelling      Time In / Time Out:   441/519    Timed Code/Total Treatment Minutes:  45' 10man  28TE    Next Progress Note due:  10th visit or 3/22/2019      Plan of Care Interventions:  [x] Therapeutic Exercise  [] Modalities:  [x] Therapeutic Activity     [] Ultrasound  [x] Estim  [] Gait Training      [] Cervical Traction [] Lumbar Traction  [x] Neuromuscular Re-education    [x] Cold/hotpack [] Iontophoresis   [x] Instruction in HEP      [x] Vasopneumatic   [] Dry Needling    [x] Manual Therapy               [] Aquatic Therapy     [x] Self care home management         Electronically signed by:  Lenore Azar PTA 4/10/2019, 4:40 PM

## 2019-04-15 ENCOUNTER — HOSPITAL ENCOUNTER (OUTPATIENT)
Dept: PHYSICAL THERAPY | Age: 59
Setting detail: THERAPIES SERIES
Discharge: HOME | End: 2019-04-15
Payer: COMMERCIAL

## 2019-04-15 PROCEDURE — 97016 VASOPNEUMATIC DEVICE THERAPY: CPT

## 2019-04-15 PROCEDURE — 97140 MANUAL THERAPY 1/> REGIONS: CPT

## 2019-04-15 PROCEDURE — 97110 THERAPEUTIC EXERCISES: CPT

## 2019-04-15 NOTE — FLOWSHEET NOTE
Outpatient Physical Therapy  [place]           [x] Phone: [phone]   Fax: [phone]  [place]           [] Phone: [phone]   Fax: [phone]        Physical Therapy Daily Treatment Note  Date:  4/15/2019    Patient Name:  Dano Cooper    :  1960  MRN: [id number]  Restrictions/Precautions: Other position/activity restrictions: None noted  Diagnosis:   Diagnosis: Left Humerus Fx  Date of Injury/Surgery: ORIF on 18. Treatment Diagnosis: Treatment Diagnosis: Muscular weakness, decreased left shoulder AROM, decreased function    Insurance/Certification information: PT Insurance Information: [facility] ($30 co-pay)  Referring Physician:  Referring Practitioner: Snow Black PA-C  [facility]   [facility] ([facility]    Outcome Measure: Quick DASH  Visit# / total visits:  15/20  Pain level: 7/10   Goals:          Long term goals  Time Frame for Long term goals :  All goals to be assessed by the 10th visit  Long term goal 1: Pt to be independent with HEP  Long term goal 2: Pt to increase left shoulder AROM 19  Seated AROM flex  135°, scaption 100°,EXT 45°  Long term goal 3: Pt to increase left shoulder strength 19 flex 3-/5, abd 3-/5, IR 2/5 ER 2/5  Long term goal 4: Pt to improve Quick DASH -    Summary of Evaluation: Assessment: Patient is a  58 yo Female who presents with Left Humerus Fx which impacts on reaching out/overhead, ADL's, working;patient's goal is to improve ROM and strength back to normal activities ;PT to address patient's goals, impairments and activity limitations with skilled interventions checked in plan of care;patient's level of function prior to onset of Left Humerus Fx was [facility]; did not observe any barriers to learning during PT eval; learning preferences include demonstration, practice, and handouts; patient expressed understanding of HEP; patient appears to be motivated to participate in an active PT program and to shoulder for pain and inflammation for 10min on low pressure. Patient had negative skin reaction afterwards.          Communication with other providers:  none      Assessment:   2/10  pain post exercises in hand 0/10 in shoulder- see measurements in objective        Plan for Next Session: Specific instructions for Next Treatment: Pt to increase ex for ROM, strength, posture and may use modalities as needed for pain and swelling      Time In / Time Out:   1640/1735    Timed Code/Total Treatment Minutes:  40'/55' 10 'MAN, 30'TE , Vaso 15'    Next Progress Note due:  10th visit or 3/22/2019      Plan of Care Interventions:  [x] Therapeutic Exercise  [] Modalities:  [x] Therapeutic Activity     [] Ultrasound  [x] Estim  [] Gait Training      [] Cervical Traction [] Lumbar Traction  [x] Neuromuscular Re-education    [x] Cold/hotpack [] Iontophoresis   [x] Instruction in HEP      [x] Vasopneumatic   [] Dry Needling    [x] Manual Therapy               [] Aquatic Therapy     [x] Self care home management         Electronically signed by:  Antionette Cuevas PTA 4/15/2019, 4:35 PM

## 2019-04-17 ENCOUNTER — HOSPITAL ENCOUNTER (OUTPATIENT)
Dept: PHYSICAL THERAPY | Age: 59
Discharge: HOME | End: 2019-04-17

## 2019-04-17 NOTE — FLOWSHEET NOTE
Physical Therapy  Cancellation/No-show Note  Patient Name:  Lary Young  :  1960   Date:  2019  Cancelled visits to date: 1  No-shows to date: 0    For today's appointment patient:  [x]  Cancelled  []  Rescheduled appointment  []  No-show     Reason given by patient:  []  Patient ill  []  Conflicting appointment  []  No transportation    []  Conflict with work  [x]  No reason given  []  Other:     Comments:      Electronically signed by:  Bryanna Gonzalez, 2019, 2:01 PM

## 2019-04-22 ENCOUNTER — HOSPITAL ENCOUNTER (OUTPATIENT)
Dept: PHYSICAL THERAPY | Age: 59
Setting detail: THERAPIES SERIES
Discharge: HOME | End: 2019-04-22
Payer: COMMERCIAL

## 2019-04-22 ENCOUNTER — OFFICE VISIT (OUTPATIENT)
Dept: ORTHOPEDIC SURGERY | Age: 59
End: 2019-04-22
Payer: COMMERCIAL

## 2019-04-22 VITALS — BODY MASS INDEX: 42.68 KG/M2 | HEIGHT: 64 IN | WEIGHT: 250 LBS | RESPIRATION RATE: 14 BRPM

## 2019-04-22 DIAGNOSIS — G56.02 LEFT CARPAL TUNNEL SYNDROME: Primary | ICD-10-CM

## 2019-04-22 PROCEDURE — 1036F TOBACCO NON-USER: CPT | Performed by: ORTHOPAEDIC SURGERY

## 2019-04-22 PROCEDURE — G8417 CALC BMI ABV UP PARAM F/U: HCPCS | Performed by: ORTHOPAEDIC SURGERY

## 2019-04-22 PROCEDURE — 3017F COLORECTAL CA SCREEN DOC REV: CPT | Performed by: ORTHOPAEDIC SURGERY

## 2019-04-22 PROCEDURE — 97116 GAIT TRAINING THERAPY: CPT

## 2019-04-22 PROCEDURE — 97164 PT RE-EVAL EST PLAN CARE: CPT

## 2019-04-22 PROCEDURE — 99214 OFFICE O/P EST MOD 30 MIN: CPT | Performed by: ORTHOPAEDIC SURGERY

## 2019-04-22 PROCEDURE — G8427 DOCREV CUR MEDS BY ELIG CLIN: HCPCS | Performed by: ORTHOPAEDIC SURGERY

## 2019-04-22 PROCEDURE — 97110 THERAPEUTIC EXERCISES: CPT

## 2019-04-22 RX ORDER — FERROUS SULFATE 325(65) MG
TABLET ORAL
COMMUNITY
End: 2019-04-22

## 2019-04-22 RX ORDER — AMLODIPINE BESYLATE 10 MG/1
TABLET ORAL
Status: ON HOLD | COMMUNITY
End: 2022-07-18 | Stop reason: HOSPADM

## 2019-04-22 NOTE — PROGRESS NOTES
INITIAL EVALUATION/RE-EVALUATION  PHYSICAL THERAPY      Patient Name: Julieth Jackson  MRN: [id number]  : 1960      Diagnosis: Idiopathic Peripheral Neuropathy (work on gait, balance, strength training)  Referring Physician: JOAO Wilkes  Date of Surgery if applicable: None        Diagnostic Tests completed: N/A  Complicating Dx/ factors that impair severity:    Previous PT: Not for balance    Social History:  Pt resides with  self    Steps to enter the home: 2-3   Steps within the home:  0      Work/recreation: Works a sit down job       ADLs: Pt has difficulty with vacuuming, takes multiple breaks to finish one room. Equipment in home:    cane       Number of falls in the past month: 2 tripping incidents, but did not fall to the ground  Prior level of function: Independent walker without limitations prior to low back pain onset several years ago. Chief complaint:  Pt c/o tripping and falling quite often. She is using a cane to help with walking. The neurologist has recommended the therapy to help with her balance. Pt does tend to run into things also. Pt also reports that the EMG of the lower spine shows a pinched nerve and she is unable to walk long distances. Walking x 5 min or less, then after this she starts dragging her feet. Pt has kaitlin LE neuropathy (lower legs and feet). Pt states as soon as she sits her pain goes away.       Orientation:  A&Ox3      Patient's goals: Pt wants to have better balance and get pointers of how to correct self  Pain  No pain in hips/back or legs      Sensation  Numbness or tingling?: in kaitlin LE extremities  Sensation on testing:    Abnormal: decreased to light touch in the lower legs    Tone:  Normal tone       Reflexes 2+ throughout        Coordination       AROM  (pain denoted by a *)        Bed Mobility:   Independent with all      Strength testing    Lower extremities:   Grossly intact       Lower Extremities:  Decreased:       Left       Right  Ankle

## 2019-04-26 ASSESSMENT — ENCOUNTER SYMPTOMS: COLOR CHANGE: 0

## 2019-04-26 NOTE — PROGRESS NOTES
Subjective:      Patient ID: Ranell Dandy is a 59 y.o. female. Patients is a 59year old female who complains of left and tingling pain and numbness. Pain level 3/10. Pain increases at night. Onset was approximately 2 months and ago and progressively worsened. EMG complete. She comes in today for a recheck of her left hand numbness and tingling and pain and follow-up after EMG. She states that overall her small finger remains chronically retracted and she continues to have intermittent tingling in the fingers of her left hand. She states that the pain will radiate from her fingers up into her neck. She is scheduled to undergo a nerve block on May 19 to try to help with her neck radiculopathy symptoms. At this point her main issue is continued pain, numbness and tingling in all the fingers of her left hand. She did have an EMG performed at [facility]. Wrist Pain    Associated symptoms include numbness. Pertinent negatives include no fever. Review of Systems   Constitutional: Negative for activity change, chills and fever. Musculoskeletal: Positive for arthralgias and myalgias. Negative for gait problem and joint swelling. Skin: Negative for color change, pallor, rash and wound. Neurological: Positive for weakness and numbness. Objective:   Physical Exam   Constitutional: She is oriented to person, place, and time. Vital signs are normal. She appears well-developed and well-nourished. HENT:   Head: Normocephalic and atraumatic. Eyes: Pupils are equal, round, and reactive to light. Neck: Normal range of motion. Musculoskeletal: She exhibits no edema, tenderness or deformity. Right shoulder: She exhibits normal range of motion, no tenderness, no bony tenderness, no swelling, no effusion, no crepitus, no deformity, no laceration, no pain, no spasm, normal pulse and normal strength.         Left shoulder: She exhibits decreased range of motion, bony tenderness, pain and decreased strength. She exhibits no tenderness, no swelling, no effusion, no crepitus, no deformity, no laceration, no spasm and normal pulse. Left hand: She exhibits disruption of two-point discrimination. Decreased sensation noted. Decreased sensation is present in the ulnar distribution, is present in the medial distribution and is present in the radial distribution. Decreased strength noted. Neurological: She is alert and oriented to person, place, and time. She has normal strength. A sensory deficit is present. Skin: Skin is warm and dry. No rash noted. No erythema. No pallor. Left shoulder-incision well healed, no redness, no drainage, no signs of infection. Abduction 90°  Flexion 130°  External rotation 50°  Internal rotation 35°    Left hand-decreased sensation in the median nerve distribution, small finger is chronically flexed with decreased sensation in the small finger.  strength 4/5. EMG report of the left upper extremity from March 1, 2019 reviewed by me today in the office demonstrates findings consistent with mild left carpal tunnel syndrome with early chronic medial cord compression. Assessment:      Left carpal tunnel syndrome, mild  Left medial cord syndrome      Plan:      I discussed with her today her EMG findings. I explained to her that she does have signs of mild carpal tunnel syndrome. I did briefly discuss proceeding with left carpal tunnel release with her today. However at this point given her upcoming nerve block I recommend that she try that first before we consider surgical treatment. I also discussed with her today that some of her symptoms may be coming from her brachial plexus and that left carpal tunnel release would not help with this. Continue weight-bearing as tolerated. Continue range of motion exercises as instructed. Ice and elevate as needed. Tylenol or Motrin for pain.   If the nerve block does not help with her symptoms and she will contact the office for further discussion of proceeding with left carpal tunnel release. Follow up as needed.           Yvrose Billings DO

## 2019-04-30 ENCOUNTER — HOSPITAL ENCOUNTER (OUTPATIENT)
Dept: PHYSICAL THERAPY | Age: 59
Setting detail: THERAPIES SERIES
Discharge: HOME | End: 2019-04-30
Payer: COMMERCIAL

## 2019-04-30 PROCEDURE — 97110 THERAPEUTIC EXERCISES: CPT

## 2019-04-30 PROCEDURE — 97140 MANUAL THERAPY 1/> REGIONS: CPT

## 2019-04-30 NOTE — FLOWSHEET NOTE
Outpatient Physical Therapy  [place]           [x] Phone: [phone]   Fax: [phone]  [place]           [] Phone: [phone]   Fax: [phone]        Physical Therapy Daily Treatment Note  Date:  2019    Patient Name:  Yuniel Jacobs    :  1960  MRN: [id number]  Restrictions/Precautions: Other position/activity restrictions: None noted  Diagnosis:   Diagnosis: Left Humerus Fx  Date of Injury/Surgery: ORIF on 18. Treatment Diagnosis: Treatment Diagnosis: Muscular weakness, decreased left shoulder AROM, decreased function    Insurance/Certification information: PT Insurance Information: [facility] ($30 co-pay)  Referring Physician:  Referring Practitioner: Samuel Black PA-C  Next Doctor Visit:   pain block/ Family Dr 5-3-19  Plan of care signed (Y/N):    Outcome Measure: Quick DASH  Visit# / total visits:  15/20  Pain level: 6-7/10 hand , 2/10 shoulder  Goals:          Long term goals  Time Frame for Long term goals :  All goals to be assessed by the 10th visit  Long term goal 1: Pt to be independent with HEP  Long term goal 2: Pt to increase left shoulder AROM 19  Seated AROM flex  135°, scaption 100°,EXT 45°  Long term goal 3: Pt to increase left shoulder strength 19 flex 3-/5, abd 3-/5, IR 2/5 ER 2/5  Long term goal 4: Pt to improve Quick DASH 19    Summary of Evaluation: Assessment: Patient is a  60 yo Female who presents with Left Humerus Fx which impacts on reaching out/overhead, ADL's, working;patient's goal is to improve ROM and strength back to normal activities ;PT to address patient's goals, impairments and activity limitations with skilled interventions checked in plan of care;patient's level of function prior to onset of Left Humerus Fx was [facility]; did not observe any barriers to learning during PT eval; learning preferences include demonstration, practice, and handouts; patient expressed understanding of HEP; patient appears to be motivated to participate in an active PT program and to be compliant with HEP expectations;patient assisted in developing treatment plan and goals; [employer] is currently being used; Subjective:  Patient reports of 6- 7/10 pain  In hand. My shoulder isn't badThe  told me I have neuropathy B arms, a pinched nerve L ulnar. My sleeping has been terrible lately and getting hard for me to type with my L hand      Reports  changes in Ambulatory Summary Sheet?   Elavil in evening    Objective:  Seated AROM flex  136°,   Strength ER  3-/5   Swollen fingers and hand very tender  Discussed with patient she might need to get an 30°elbow resting splint for night , to help with pain             Exercises: (No more than 4 columns)   Exercise/Equipment 3/23/19 4-2-19 #12 4-8-19 #13 4-10-19 #14 4-15-19 #15 4-30-19 #16       Quick dash 47 points    81%       WARM UP                              Manual          ER          Supine punch AROM /supine FLEX 10x punches 10x punches 15x punches  12x flex 15x punches   15x punches 20x punches  15x flex   pulleys Not today  FLEX x15 FLEX x15 FLEX x15 FLEX x20   Supine ER AROM @45* ABD- L stars on ribs mikel ER 10x mikel ER 10x mikel ER 10x mikel ER 10x mikel ER 10x mikel ER 10x  ( pain from elbow to fingers   rhythmic stabs    Next  Next  3 x 20 sec       Supine AAAROM with R hand on L wrist 10x5\" 10x5\" 10x5\" 10x5\" 10x5\" 15x5\"   STANDING         Walk aways 10x5\" 10x5\" 10x5\" 10x5\" 10x5\" 10x5\"   Seated beach chair position Flex 10x Flex 10x Flex 10x Flex 15x Flex 15x Flex 15x   Standing 45* table angle flex/scaption 10x3\" 10x3\" 10x3\" x15 ea x15 ea Declined -pain   Wall slide vertical  10x3\" Declined  10x3\" 10x5\" Declined -pain Declined -pain                                   PROPRIOCEPTION                                                      MODALITIES  See  below declined declined See  below declined                         Other Therapeutic Activities/Education: Pt to use ice for soreness after therapy/HEP x 10-15 Min. Home Exercise Program:  2/8/2019 - HEP established as above, HO to pt/chart   2-11-19 added ER and scap squeeze reviewed and given written handout  4-8-19 added wall slides      Manual Treatments: PROM into shoulder flexion,  ER and IR 45* abduction in supine see measurement in Objective       Modalities:  Declined really  bothers hand from the temp change    Communication with other providers:  none      Assessment:   3/10  pain post exercises in hand . 5/10 in shoulder- see measurements in objective        Plan for Next Session: Specific instructions for Next Treatment: Pt to increase ex for ROM, strength, posture and may use modalities as needed for pain and swelling      Time In / Time Out:   1637/1720    Timed Code/Total Treatment Minutes:  43'10 'MAN, 33'TE ,     Next Progress Note due:  10th visit or 3/22/2019      Plan of Care Interventions:  [x] Therapeutic Exercise  [] Modalities:  [x] Therapeutic Activity     [] Ultrasound  [x] Estim  [] Gait Training      [] Cervical Traction [] Lumbar Traction  [x] Neuromuscular Re-education    [x] Cold/hotpack [] Iontophoresis   [x] Instruction in HEP      [x] Vasopneumatic   [] Dry Needling    [x] Manual Therapy               [] Aquatic Therapy     [x] Self care home management         Electronically signed by:  Prachi Bustos PTA 4/30/2019, 4:37 PM

## 2019-05-02 ENCOUNTER — HOSPITAL ENCOUNTER (OUTPATIENT)
Dept: PHYSICAL THERAPY | Age: 59
Setting detail: THERAPIES SERIES
Discharge: HOME | End: 2019-05-02
Payer: COMMERCIAL

## 2019-05-02 PROCEDURE — 97112 NEUROMUSCULAR REEDUCATION: CPT

## 2019-05-02 PROCEDURE — 97110 THERAPEUTIC EXERCISES: CPT

## 2019-05-02 NOTE — FLOWSHEET NOTE
Outpatient Physical Therapy  [place]           [x] Phone: [phone]   Fax: [phone]  [place] [place]           [] Phone: [phone]   Fax: [phone]        Physical Therapy Daily Treatment Note  Date:  2019    Patient Name:  Yuniel Jacobs    :  1960  MRN: [id number]  Restrictions/Precautions: Other position/activity restrictions: None noted  Diagnosis:   Diagnosis: Left Humerus Fx  Date of Injury/Surgery: ORIF on 18. Treatment Diagnosis: Treatment Diagnosis: Muscular weakness, decreased left shoulder AROM, decreased function    Insurance/Certification information: PT Insurance Information: [facility] ($30 co-pay)  Referring Physician:  Referring Practitioner: Samuel Black PA-C  Next Doctor Visit:   pain block/ Family Dr 5-3-19  Plan of care signed (Y/N):    Outcome Measure: Quick DASH  Visit# / total visits:    Pain level: 5/10 hand , 1/10 shoulder  Goals:          Long term goals  Time Frame for Long term goals :  All goals to be assessed by the 10th visit  Long term goal 1: Pt to be independent with HEP  Long term goal 2: Pt to increase left shoulder AROM 19  Seated AROM flex  135°, scaption 100°,EXT 45°  Long term goal 3: Pt to increase left shoulder strength 19 flex 3-/5, abd 3-/5, IR 2/5 ER 2/5  Long term goal 4: Pt to improve Quick DASH 19    Summary of Evaluation: Assessment: Patient is a  58 yo Female who presents with Left Humerus Fx which impacts on reaching out/overhead, ADL's, working;patient's goal is to improve ROM and strength back to normal activities ;PT to address patient's goals, impairments and activity limitations with skilled interventions checked in plan of care;patient's level of function prior to onset of Left Humerus Fx was [facility]; did not observe any barriers to learning during PT eval; learning preferences include demonstration, practice, and handouts; patient expressed understanding of HEP; patient appears to be motivated to use ice for soreness after therapy/HEP x 10-15 Min. Home Exercise Program:  2/8/2019 - HEP established as above, HO to pt/chart   2-11-19 added ER and scap squeeze reviewed and given written handout  4-8-19 added wall slides  5-2-19 Added B hip ABD - RTB, B hip ADD- ball squeeze    Manual Treatments: PROM into shoulder flexion,  ER and IR 45* abduction in supine see measurement in Objective       Modalities:  Declined really  bothers hand from the temp change    Communication with other providers:  none      Assessment: Pt reports seeing family MD tomorrow and hopefully will get answers to her questions, Did well with balance activities.  Also to ask MD for resting splint for OT to make so possibly help with pain in hand    Plan for Next Session: Specific instructions for Next Treatment: Pt to increase ex for ROM, strength, posture and may use modalities as needed for pain and swelling      Time In / Time Out:   1648/1721    Timed Code/Total Treatment Minutes:  29'' 15 'TE , 19' NR    Next Progress Note due:  10th visit or 3/22/2019      Plan of Care Interventions:  [x] Therapeutic Exercise  [] Modalities:  [x] Therapeutic Activity     [] Ultrasound  [x] Estim  [] Gait Training      [] Cervical Traction [] Lumbar Traction  [x] Neuromuscular Re-education    [x] Cold/hotpack [] Iontophoresis   [x] Instruction in HEP      [x] Vasopneumatic   [] Dry Needling    [x] Manual Therapy               [] Aquatic Therapy     [x] Self care home management         Electronically signed by:  Yuridia Taylor PTA 5/2/2019, 4:48 PM

## 2019-05-02 NOTE — PLAN OF CARE
Ultrasound  [] Elec Stimulation  [x] Gait Training     [] Cervical Traction [] Lumbar Traction  [x] Neuromuscular Re-education [] Cold/hotpack [] Iontophoresis   [x] Instruction in HEP       [x] Manual Therapy     [] vasopneumatic            [x] Self care home management        []Dry needling trigger point point/pain management      Plan of Care Date Range:  4/22/2019 to 6/30/2019    ? Frequency/Duration:  # Days per week: [] 1 day # Weeks: [] 1 week [x] 5 weeks     [x] 2 days? [] 2 weeks [] 6 weeks     [] 3 days   [] 3 weeks [] 7 weeks     [] 4 days   [] 4 weeks [] 8 weeks    Rehab Potential: [] Excellent [] Good [x] Fair  [] Poor     Goals:  Long term goals  Time Frame for Long term goals : All goals to be assessed by the 10th visit  Long term goal 1: Pt to be independent with HEP  Long term goal 2: Pt to increase standing balance  Long term goal 3: Pt to increase kaitlin LE strength   Long term goal 4: Pt to improve TUG Test      Electronically signed by:  Lord Johnson PT, 5/2/2019, 3:26 PM          If you have any questions or concerns, please don't hesitate to call.   Thank you for your referral.    Physician Signature:_________________Date:____________Time: ________  By signing above, therapists plan is approved by physician

## 2019-05-06 ENCOUNTER — HOSPITAL ENCOUNTER (OUTPATIENT)
Dept: PHYSICAL THERAPY | Age: 59
Discharge: HOME | End: 2019-05-06

## 2019-05-06 NOTE — FLOWSHEET NOTE
Physical Therapy  Cancellation/No-show Note  Patient Name:  Adama Rich  :  1960   Date:  2019  Cancelled visits to date: 3  No-shows to date: 0     For today's appointment patient:  [x]  Cancelled  []  Rescheduled appointment  []  No-show     Reason given by patient:  []  Patient ill  []  Conflicting appointment  []  No transportation                  []  Conflict with work  [x]  No reason given  []  Other:                Comments:  Electronically signed by:  Reginald Suh PTA

## 2019-05-09 ENCOUNTER — HOSPITAL ENCOUNTER (OUTPATIENT)
Dept: PHYSICAL THERAPY | Age: 59
Setting detail: THERAPIES SERIES
Discharge: HOME | End: 2019-05-09
Payer: COMMERCIAL

## 2019-05-09 PROCEDURE — 97112 NEUROMUSCULAR REEDUCATION: CPT

## 2019-05-09 PROCEDURE — 97110 THERAPEUTIC EXERCISES: CPT

## 2019-05-09 PROCEDURE — 97140 MANUAL THERAPY 1/> REGIONS: CPT

## 2019-05-09 NOTE — FLOWSHEET NOTE
Outpatient Physical Therapy  [place]           [x] Phone: [phone]   Fax: [phone]  [place]           [] Phone: [phone]   Fax: [phone]        Physical Therapy Daily Treatment Note  Date:  2019    Patient Name:  Isaura Phillips    :  1960  MRN: [id number]  Restrictions/Precautions: Other position/activity restrictions: None noted  Diagnosis:   Diagnosis: Left Humerus Fx  Date of Injury/Surgery: ORIF on 18. Treatment Diagnosis: Treatment Diagnosis: Muscular weakness, decreased left shoulder AROM, decreased function    Insurance/Certification information: PT Insurance Information: [facility] ($30 co-pay)  Referring Physician:  Referring Practitioner: cSott Officer. ALDA Black  Next Doctor Visit:   pain block/ Family  5-3-19  Plan of care signed (Y/N):    Outcome Measure: Quick DASH  Visit# / total visits:    Pain level: 2/10 hand , 2/10 shoulder  Goals:          Long term goals  Time Frame for Long term goals :  All goals to be assessed by the 10th visit  Long term goal 1: Pt to be independent with HEP  Long term goal 2: Pt to increase left shoulder AROM 19  Seated AROM flex  135°, scaption 100°,EXT 45°  Long term goal 3: Pt to increase left shoulder strength 19 flex 3-/5, abd 3-/5, IR 2/5 ER 2/5  Long term goal 4: Pt to improve Quick DASH 19    Summary of Evaluation: Assessment: Patient is a  60 yo Female who presents with Left Humerus Fx which impacts on reaching out/overhead, ADL's, working;patient's goal is to improve ROM and strength back to normal activities ;PT to address patient's goals, impairments and activity limitations with skilled interventions checked in plan of care;patient's level of function prior to onset of Left Humerus Fx was [facility]; did not observe any barriers to learning during PT eval; learning preferences include demonstration, practice, and handouts; patient expressed understanding of HEP; patient appears to be motivated to participate in an active PT program and to be compliant with HEP expectations;patient assisted in developing treatment plan and goals; [employer] is currently being used; Subjective:  Patient reports of 2/10 pain  In hand. My sleeping has been terrible lately and getting hard for me to type with my L hand    Pt states that No falls. I get my nerve block in 2 weeks. Reports  changes in Ambulatory Summary Sheet?   Elavil in evening    Objective:  Seated AROM flex  139°, scaption 112°,EXT Rot 43°     fingers decreased in swelling /hand but Tenderness better      No falls but has had some stumbles             Exercises: (No more than 4 columns)   Exercise/Equipment 4-2-19 #12 4-15-19 #15 4-30-19 #16 5-2-19 #17 5-9-19 #18      Quick dash 47 points    81%   Balance      WARM UP  Seat/ arm 11    Nustep x5 min R UE only                       Manual         ER         Supine punch AROM /supine FLEX 10x punches 15x punches 20x punches  15x flex  20x punches  15x flex   pulleys  FLEX x15 FLEX x20  FLEX x20   Supine ER AROM @45* ABD- L stars on ribs tandem mikel ER 10x mikel ER 10x mikel ER 10x  ( pain from elbow to fingers  Supine ER AROM @45*  w cane  X 5 reps   rhythmic stabs  Next  3 x 20 sec   3 x 25 sec       Supine AAAROM with R hand on L wrist 10x5\" 10x5\" 15x5\"  15x5\"   STANDING        Walk aways 10x5\" 10x5\" 10x5\"  10x5\"   Seated beach chair position Flex 10x Flex 15x Flex 15x  Flex 15x   Standing 45* table angle flex/scaption 10x3\" x15 ea Declined -pain  --   Wall slide vertical  Declined  Declined -pain Declined -pain  2 x5 reps                                PROPRIOCEPTION        tandem gait     3 laps    Foam ankle sways    X 20 f/b, s/s    Foam side step    R/L x 10 ea    Marching in place     ll bars  X 20 ea    Toe taps 4\"    X 20 ea    B hip ADD- ball squeeze    X 20    B hip ABD -    RTB  X 20            MODALITIES See  below See  below declined  declined                        Other Therapeutic Activities/Education: Pt to use ice for soreness after therapy/HEP x 10-15 Min.        Home Exercise Program:  2/8/2019 - HEP established as above, HO to pt/chart   2-11-19 added ER and scap squeeze reviewed and given written handout  4-8-19 added wall slides  5-2-19 Added B hip ABD - RTB, B hip ADD- ball squeeze  5-9-19 added EXT ROT w cane - reviewqedd and given written handout    Manual Treatments: PROM into shoulder flexion,  ER 45* abduction in supine see measurement in Objective       Modalities:  Declined really  bothers hand from the temp change    Communication with other providers:  none      Assessment: Pt reports she is getting a pain block in 2 weeks She is so happy pain varies from 2/10>8/10 but goes away right away when done stretching     Plan for Next Session: Specific instructions for Next Treatment: Pt to increase ex for ROM, strength, posture and may use modalities as needed for pain and swelling      Time In / Time Out:   1640/1728    Timed Code/Total Treatment Minutes:  50'' 15 'TE , 18' NR, Man 13'     Next Progress Note due:  10th visit or 3/22/2019      Plan of Care Interventions:  [x] Therapeutic Exercise  [] Modalities:  [x] Therapeutic Activity     [] Ultrasound  [x] Estim  [] Gait Training      [] Cervical Traction [] Lumbar Traction  [x] Neuromuscular Re-education    [x] Cold/hotpack [] Iontophoresis   [x] Instruction in HEP      [x] Vasopneumatic   [] Dry Needling    [x] Manual Therapy               [] Aquatic Therapy     [x] Self care home management         Electronically signed by:  Magaly Rodríguez PTA 5/9/2019, 4:40 PM

## 2019-05-13 ENCOUNTER — HOSPITAL ENCOUNTER (OUTPATIENT)
Dept: PHYSICAL THERAPY | Age: 59
Setting detail: THERAPIES SERIES
Discharge: HOME | End: 2019-05-13
Payer: COMMERCIAL

## 2019-05-13 PROCEDURE — 97110 THERAPEUTIC EXERCISES: CPT

## 2019-05-13 PROCEDURE — 97112 NEUROMUSCULAR REEDUCATION: CPT

## 2019-05-13 NOTE — FLOWSHEET NOTE
Outpatient Physical Therapy  [place]           [x] Phone: [phone]   Fax: [phone]  [place]           [] Phone: [phone]   Fax: [phone]        Physical Therapy Daily Treatment Note  Date:  2019    Patient Name:  Leslee Katz    :  1960  MRN: [id number]  Restrictions/Precautions: Other position/activity restrictions: None noted  Diagnosis:   Diagnosis: Left Humerus Fx  Date of Injury/Surgery: ORIF on 18. Treatment Diagnosis: Treatment Diagnosis: Muscular weakness, decreased left shoulder AROM, decreased function    Insurance/Certification information: PT Insurance Information: [facility] ($30 co-pay)  Referring Physician:  Referring Practitioner: Abena Black PA-C  Next Doctor Visit:   pain block/   Plan of care signed (Y/N):  Y  Outcome Measure: Quick DASH  Visit# / total visits:    Pain level: 2/10 hand , 210 shoulder  Goals:          Long term goals  Time Frame for Long term goals :  All goals to be assessed by the 10th visit  Long term goal 1: Pt to be independent with HEP  Long term goal 2: Pt to increase left shoulder AROM 19  Seated AROM flex  135°, scaption 100°,EXT 45°  Long term goal 3: Pt to increase left shoulder strength 19 flex 3-/5, abd 3-/5, IR 2/5 ER 2/5  Long term goal 4: Pt to improve Quick DASH -    Summary of Evaluation: Assessment: Patient is a  58 yo Female who presents with Left Humerus Fx which impacts on reaching out/overhead, ADL's, working;patient's goal is to improve ROM and strength back to normal activities ;PT to address patient's goals, impairments and activity limitations with skilled interventions checked in plan of care;patient's level of function prior to onset of Left Humerus Fx was [facility]; did not observe any barriers to learning during PT eval; learning preferences include demonstration, practice, and handouts; patient expressed understanding of HEP; patient appears to be motivated to participate in an active PT program and to be compliant with HEP expectations;patient assisted in developing treatment plan and goals; [employer] is currently being used; Subjective:  Patient reports of 2/10 pain In hand. My sleeping has been terrible lately pain increases and getting hard for me to type with my L hand. Pt states that No falls- nothing specific with balance just every once in while it hits me. I get my nerve block in 1 week. Reports  changes in Ambulatory Summary Sheet?   Elavil in evening    Objective:      fingers decreased in swelling /hand but Tenderness  Is worse   Discussed with patient  to get an 30°elbow resting splint for night , to help with pain scheduled next week with OT   No falls but has had some stumbles             Exercises: (No more than 4 columns)   Exercise/Equipment 4-2-19 #12 4-30-19 #16 5-2-19 #17 5-9-19 #18 5-13-19 #19      Quick dash 47 points    81%  Balance    Balance   WARM UP  Seat/ arm 11   Nustep x5 min R UE only  Nustep x8 min Hu UEs                       Manual         ER         Supine punch AROM /supine FLEX 10x punches 20x punches  15x flex  20x punches  15x flex    pulleys  FLEX x20  FLEX x20 FLEX x20   Supine ER AROM @45* ABD- L stars on ribs tandem mikel ER 10x mikel ER 10x  ( pain from elbow to fingers  Supine ER AROM @45*  w cane  X 5 reps    rhythmic stabs  3 x 20 sec   3 x 25 sec        Supine AAAROM with R hand on L wrist 10x5\" 15x5\"  15x5\"    STANDING        Walk aways 10x5\" 10x5\"  10x5\"    Seated beach chair position Flex 10x Flex 15x  Flex 15x    Standing 45* table angle flex/scaption 10x3\" Declined -pain  --    Wall slide vertical  Declined  Declined -pain  2 x5 reps                                 PROPRIOCEPTION        tandem gait    3 laps  4 laps   Foam ankle sways   X 20 f/b, s/s  X 20 f/b, s/s   Foam side step   R/L x 10 ea  R/L x 15 ea   Marching in place    ll bars  X 20 ea  ll bars  X 20 ea   Toe taps 4\"   X 20 ea  X 20 ea   B hip ADD- ball squeeze X 20  X 30   B hip ABD -   RTB  X 20  RTB  X 30   Knee to chest  2\" step     1 x 10 ea R/L    MODALITIES See  below declined  declined                         Other Therapeutic Activities/Education: Pt to use ice for soreness after therapy/HEP x 10-15 Min.        Home Exercise Program:  2/8/2019 - HEP established as above, HO to pt/chart   2-11-19 added ER and scap squeeze reviewed and given written handout  4-8-19 added wall slides  5-2-19 Added B hip ABD - RTB, B hip ADD- ball squeeze  5-9-19 added EXT ROT w cane - reviewqedd and given written handout    Manual Treatments:       Modalities:  Declined really  bothers hand from the temp change    Communication with other providers:  none      Assessment: Pt reports she is getting a pain block next week - I sure hope it helps- her  pain 2/10    Plan for Next Session: Specific instructions for Next Treatment: Pt to increase ex for ROM, strength, posture and may use modalities as needed for pain and swelling      Time In / Time Out:   1645/1729    Timed Code/Total Treatment Minutes:  40'' 15 'TE , 29' NR,    Next Progress Note due:  10th visit or 3/22/2019      Plan of Care Interventions:  [x] Therapeutic Exercise  [] Modalities:  [x] Therapeutic Activity     [] Ultrasound  [x] Estim  [] Gait Training      [] Cervical Traction [] Lumbar Traction  [x] Neuromuscular Re-education    [x] Cold/hotpack [] Iontophoresis   [x] Instruction in HEP      [x] Vasopneumatic   [] Dry Needling    [x] Manual Therapy               [] Aquatic Therapy     [x] Self care home management         Electronically signed by:  Salome Martínez PTA 5/13/2019, 4:44 PM

## 2019-05-15 ENCOUNTER — HOSPITAL ENCOUNTER (OUTPATIENT)
Dept: PHYSICAL THERAPY | Age: 59
Setting detail: THERAPIES SERIES
Discharge: HOME | End: 2019-05-15
Payer: COMMERCIAL

## 2019-05-15 PROCEDURE — 97112 NEUROMUSCULAR REEDUCATION: CPT

## 2019-05-15 PROCEDURE — 97110 THERAPEUTIC EXERCISES: CPT

## 2019-05-15 NOTE — FLOWSHEET NOTE
Outpatient Physical Therapy  [place]           [[place]] Phone: [phone]   Fax: [phone]  [place]           [] Phone: [phone]   Fax: [phone]        Physical Therapy Daily Treatment Note  Date:  5/15/2019    Patient Name:  Roni Morrow    :  1960  MRN: [id number]  Restrictions/Precautions: Other position/activity restrictions: None noted  Diagnosis:   Diagnosis: Left Humerus Fx  Date of Injury/Surgery: ORIF on 18. Treatment Diagnosis: Treatment Diagnosis: Muscular weakness, decreased left shoulder AROM, decreased function    Insurance/Certification information: PT Insurance Information: [facility] ($30 co-pay)  Referring Physician:  Referring Practitioner: Seth Albright. ALDA Black  Next Doctor Visit:   pain block/   Plan of care signed (Y/N):  Y  Outcome Measure: Quick DASH  Visit# / total visits:    Pain level: 1/10 hand , 1/10 shoulder  Goals:          Long term goals  Time Frame for Long term goals :  All goals to be assessed by the 10th visit  Long term goal 1: Pt to be independent with HEP  Long term goal 2: Pt to increase left shoulder AROM 19  Seated AROM flex  135°, scaption 100°,EXT 45°  Long term goal 3: Pt to increase left shoulder strength 19 flex 3-/5, abd 3-/5, IR 2/5 ER 2/5  Long term goal 4: Pt to improve Quick DASH -    Summary of Evaluation: Assessment: Patient is a  58 yo Female who presents with Left Humerus Fx which impacts on reaching out/overhead, ADL's, working;patient's goal is to improve ROM and strength back to normal activities ;PT to address patient's goals, impairments and activity limitations with skilled interventions checked in plan of care;patient's level of function prior to onset of Left Humerus Fx was [facility]; did not observe any barriers to learning during PT eval; learning preferences include demonstration, practice, and handouts; patient expressed understanding of HEP; patient appears to be motivated to participate in an

## 2019-05-20 ENCOUNTER — HOSPITAL ENCOUNTER (OUTPATIENT)
Dept: PHYSICAL THERAPY | Age: 59
Discharge: HOME | End: 2019-05-20

## 2019-05-20 NOTE — FLOWSHEET NOTE
Physical Therapy  Cancellation/No-show Note  Patient Name:  Shila Hernandez  :  1960   Date:  2019  Cancelled visits to date: 4  No-shows to date: 0    For today's appointment patient:  [x]  Cancelled  []  Rescheduled appointment  []  No-show     Reason given by patient:  []  Patient ill  []  Conflicting appointment  []  No transportation    []  Conflict with work  [x]  No reason given  []  Other:     Comments:      Electronically signed by:  Yash Dougherty, 2019, 2:22 PM

## 2019-05-22 ENCOUNTER — HOSPITAL ENCOUNTER (OUTPATIENT)
Dept: OCCUPATIONAL THERAPY | Age: 59
Setting detail: THERAPIES SERIES
Discharge: HOME | End: 2019-05-22
Payer: COMMERCIAL

## 2019-05-22 ENCOUNTER — HOSPITAL ENCOUNTER (OUTPATIENT)
Dept: PHYSICAL THERAPY | Age: 59
Setting detail: THERAPIES SERIES
Discharge: HOME | End: 2019-05-22
Payer: COMMERCIAL

## 2019-05-22 PROCEDURE — 97165 OT EVAL LOW COMPLEX 30 MIN: CPT

## 2019-05-22 PROCEDURE — 97112 NEUROMUSCULAR REEDUCATION: CPT

## 2019-05-22 PROCEDURE — 97110 THERAPEUTIC EXERCISES: CPT

## 2019-05-22 PROCEDURE — 97760 ORTHOTIC MGMT&TRAING 1ST ENC: CPT

## 2019-05-22 NOTE — FLOWSHEET NOTE
Outpatient Physical Therapy  [place]           [x] Phone: [phone]   Fax: [phone]  [place]           [] Phone: [phone]   Fax: [phone]        Physical Therapy Daily Treatment Note  Date:  2019    Patient Name:  Roel Jones    :  1960  MRN: [id number]  Restrictions/Precautions: Other position/activity restrictions: None noted  Diagnosis:   Diagnosis: Left Humerus Fx  Date of Injury/Surgery: ORIF on 18. Treatment Diagnosis: Treatment Diagnosis: Muscular weakness, decreased left shoulder AROM, decreased function    Insurance/Certification information: PT Insurance Information: [facility] ($30 co-pay)  Referring Physician:  Referring Practitioner: Niyah Black PA-C  Next Doctor Visit:   pain block/   Plan of care signed (Y/N):  Y  Outcome Measure: Quick DASH  41 points  Visit# / total visits:    Pain level: 1/10 hand , 1/10 shoulder  Goals:          Long term goals  Time Frame for Long term goals :  All goals to be assessed by the  visit  Long term goal 1: Pt to be independent with HEP  Long term goal 2: Pt to increase left shoulder AROM 19  Seated AROM flex  135°, scaption 100°,EXT 45°  Long term goal 3: Pt to increase left shoulder strength 19 flex 3-/5, abd 3-/5, IR 2/5 ER /  Long term goal 4: Pt to improve Quick DASH 19    Summary of Evaluation: Assessment: Patient is a  60 yo Female who presents with Left Humerus Fx which impacts on reaching out/overhead, ADL's, working;patient's goal is to improve ROM and strength back to normal activities ;PT to address patient's goals, impairments and activity limitations with skilled interventions checked in plan of care;patient's level of function prior to onset of Left Humerus Fx was [facility]; did not observe any barriers to learning during PT eval; learning preferences include demonstration, practice, and handouts; patient expressed understanding of HEP; patient appears to be motivated to participate in an active PT program and to be compliant with HEP expectations;patient assisted in developing treatment plan and goals; [employer] is currently being used; Subjective:  Patient reports of 1/10 pain In hand. My sleeping has been terrible lately pain increases when I roll over on it and wakes me up getting hard for me to type with my L hand at work   nothing  I get my nerve block tomorrow    Reports  changes in Ambulatory Summary Sheet?   Elavil in evening    Objective:  Supine  AROM flex  140°,sitting flex AROM 133°EXT Rot 45°  Strength ER  3-/5    fingers decreased in swelling /hand but Tenderness  Is worse    patient  to get an 30°elbow resting splint for night , to help with pain scheduled  with OT today   No falls but has had some stumbles             Exercises: (No more than 4 columns)   Exercise/Equipment 4-2-19 #12 5-2-19 #17 5-9-19 #18 5-13-19 #19 5-15-19 #20 5-22-19 #21      Quick dash 47 points    81% Balance    Balance Quick dash   Next visit Quick dash  41 points   WARM UP  Seat/ arm 11  Nustep x5 min R UE only  Nustep x8 min Hu UEs                           Manual          ER          Supine punch AROM /supine FLEX 10x punches  20x punches  15x flex  20x punches  15x flex 20x punches  15x flex   pulleys   FLEX x20 FLEX x20 FLEX x20 FLEX x20   Supine ER AROM @45* ABD- L stars on ribs tandem mikel ER 10x  Supine ER AROM @45*  w cane  X 5 reps  Supine ER AROM @45*  w cane  X 10 reps Supine ER AROM @45*  w cane  X 10 reps   rhythmic stabs   3 x 25 sec   3 x 25 sec  3 x 25 sec      Supine AAAROM with R hand on L wrist 10x5\"  15x5\"  15x5\" 15x5\"   STANDING         Walk aways 10x5\"  10x5\"      Seated  chair position Flex 10x  Flex 15x  Flex 15x Flex 15x   Standing 45* table angle flex/scaption 10x3\"  --      Wall slide vertical  Declined   2 x5 reps   declined                                   PROPRIOCEPTION         tandem gait   3 laps  4 laps     Foam ankle sways  X 20 f/b, s/s  X 20 f/b, s/s     Foam side step  R/L x 10 ea  R/L x 15 ea     Marching in place   ll bars  X 20 ea  ll bars  X 20 ea     Toe taps 4\"  X 20 ea  X 20 ea     B hip ADD- ball squeeze  X 20  X 30     B hip ABD -  RTB  X 20  RTB  X 30     Knee to chest  2\" step    1 x 10 ea R/L      MODALITIES See  below  declined                             Other Therapeutic Activities/Education: Pt to use ice for soreness after therapy/HEP x 10-15 Min.        Home Exercise Program:  2/8/2019 - HEP established as above, HO to pt/chart   2-11-19 added ER and scap squeeze reviewed and given written handout  4-8-19 added wall slides  5-2-19 Added B hip ABD - RTB, B hip ADD- ball squeeze  5-9-19 added EXT ROT w cane - reviewed and given written handout    Manual Treatments: PROM into shoulder flexion,       Modalities:  Declined really  bothers hand from the temp change    Communication with other providers:  none      Assessment: Pt reports she is getting a pain block tomorrow-\" I sure hope it helps\"- her  pain 2/10>6/10 following    Plan for Next Session: Specific instructions for Next Treatment: Pt to increase ex for ROM, strength, posture and may use modalities as needed for pain and swelling      Time In / Time Out:   1625/1707    Timed Code/Total Treatment Minutes:  43'' TE25 ' NR,17'    Next Progress Note due:  10th visit or 3/22/2019      Plan of Care Interventions:  [x] Therapeutic Exercise  [] Modalities:  [x] Therapeutic Activity     [] Ultrasound  [x] Estim  [] Gait Training      [] Cervical Traction [] Lumbar Traction  [x] Neuromuscular Re-education    [x] Cold/hotpack [] Iontophoresis   [x] Instruction in HEP      [x] Vasopneumatic   [] Dry Needling    [x] Manual Therapy               [] Aquatic Therapy     [x] Self care home management         Electronically signed by:  Marii Contreras PTA 5/22/2019, 4:27 PM

## 2019-05-22 NOTE — PROGRESS NOTES
Occupational Therapy  Occupational Therapy Initial Assessment  Date:  2019    Patient Name: Arlyn Paget  MRN: [id number]     :  1960     Treatment Diagnosis: M79.642    Restrictions  Restrictions/Precautions  Restrictions/Precautions: General Precautions(Pt seeing PT for exercise)  Subjective   General  Additional Pertinent Hx: Pt fell a few months ago and fractured proximal humerus and has been getting PT. Pt is having ulnar nerve symptoms with numbness in fingers. Is worse at night. Is ordered for an elbow resting splint at 30  degrees. Referring Practitioner: Dr Milly Bailey  Diagnosis: Proximal humerus fx left  Subjective  Subjective: States burning is mostly in thumb  General Comment  Comments: Ordered only for splint for OT        Date of onset: 2019  Date of surgery: 2019  Hand Dominance: Right  Chief complaint:  Pain and numbness L hand    Pain:  4/10 at present  10/10 at worst    Sensation: Reports numbness in all 5 fingers   Barriers to Learning: none noted    Reason for Splint: Positioning of elbow to rest nerves and prevent symptoms    Type of Splint: 30 degree elbow resting splint formed from ezeform. Pt. Instructed in wear and care of splint and verbalized understanding. Pt instructed to call or notify when in PT if any problems    Assessment   Assessment  Performance deficits / Impairments: Decreased sensation;Decreased ROM  Assessment: Pt is multiple nerve symptoms in hand. ROM of wrist is WNL. Finger ext WNL but limited with L finger flexion-painful. Numbness in all fingers with burning pain in thumb. Worse at night. Shoulder is being treated by PT.   Treatment Diagnosis: M79.642  Prognosis: Fair  Decision Making: Low Complexity  History: PMH: Osteoporosis, OA, MI, COPD, asthma   Meds: See list   NkA  PSH: L shoulder, RLE, lung  Exam: ROM, pain, edema, sensation  Assistance / Modification: Mod  Patient Education: Instructed in wear and care of splint  Barriers to Learning: none  REQUIRES OT FOLLOW UP: No(To notify if any problems)  Treatment Initiated : Splint fabrication       Plan   Plan  Times per week: 1  Plan weeks: 1        Therapy Time   Individual Concurrent Group Co-treatment   Time In 1710         Time Out 1745         Minutes 243 [facility], OTRL T

## 2019-05-28 ENCOUNTER — HOSPITAL ENCOUNTER (OUTPATIENT)
Dept: PHYSICAL THERAPY | Age: 59
Discharge: HOME | End: 2019-05-28

## 2019-05-28 NOTE — FLOWSHEET NOTE
Physical Therapy  Cancellation/No-show Note  Patient Name:  Clearance Blizzard  :  1960   Date:  2019  Cancelled visits to date: 5  No-shows to date: 0    For today's appointment patient:  [x]  Cancelled  []  Rescheduled appointment  []  No-show     Reason given by patient:  []  Patient ill  []  Conflicting appointment  []  No transportation    []  Conflict with work  [x]  No reason given  []  Other:     Comments:      Electronically signed by:  Andrade Contreras, 2019, 3:44 PM

## 2019-05-30 ENCOUNTER — HOSPITAL ENCOUNTER (OUTPATIENT)
Dept: PHYSICAL THERAPY | Age: 59
Setting detail: THERAPIES SERIES
Discharge: HOME | End: 2019-05-30
Payer: COMMERCIAL

## 2019-05-30 PROCEDURE — 97112 NEUROMUSCULAR REEDUCATION: CPT

## 2019-05-30 PROCEDURE — 97110 THERAPEUTIC EXERCISES: CPT

## 2019-05-30 NOTE — FLOWSHEET NOTE
reps Supine ER AROM @45*  w cane  X 10 reps   rhythmic stabs   3 x 25 sec   3 x 25 sec  3 x 25 sec 2 x 25 sec      Supine AAAROM with R hand on L wrist 10x5\"  15x5\"  15x5\" 15x5\" 15x5\"   STANDING          Walk aways 10x5\"  10x5\"       Seated  chair position Flex 10x  Flex 15x  Flex 15x Flex 15x    Standing 45* table angle flex/scaption 10x3\"  --       Wall slide vertical  Declined   2 x5 reps   declined                                       PROPRIOCEPTION          tandem gait   3 laps  4 laps      Foam ankle sways  X 20 f/b, s/s  X 20 f/b, s/s      Foam side step  R/L x 10 ea  R/L x 15 ea      Marching in place   ll bars  X 20 ea  ll bars  X 20 ea      Toe taps 4\"  X 20 ea  X 20 ea      B hip ADD- ball squeeze  X 20  X 30      B hip ABD -  RTB  X 20  RTB  X 30      Knee to chest  2\" step    1 x 10 ea R/L       MODALITIES See  below  declined                                Other Therapeutic Activities/Education: Pt to use ice for soreness after therapy/HEP x 10-15 Min. Home Exercise Program:  2/8/2019 - HEP established as above, HO to pt/chart   2-11-19 added ER and scap squeeze reviewed and given written handout  4-8-19 added wall slides  5-2-19 Added B hip ABD - RTB, B hip ADD- ball squeeze  5-9-19 added EXT ROT w cane - reviewed and given written handout    Manual Treatments: PROM into shoulder flexion,       Modalities: -----    Communication with other providers:  none      Assessment:   pain 3/10 following Rx    Plan for Next Session: Specific instructions for Next Treatment: Discharged from shoulder treatment. Pt on hold until after she sees the Neurologist at the end of this month.       Time In / Time Out:   1634/1715    Timed Code/Total Treatment Minutes:  39'' TE28 ' NR,13'    Next Progress Note due:  10th visit       Plan of Care Interventions:  [x] Therapeutic Exercise  [] Modalities:  [x] Therapeutic Activity     [] Ultrasound  [x] Estim  [] Gait Training      [] Cervical Traction [] Lumbar Traction  [x] Neuromuscular Re-education    [x] Cold/hotpack [] Iontophoresis   [x] Instruction in HEP      [x] Vasopneumatic   [] Dry Needling    [x] Manual Therapy               [] Aquatic Therapy     [x] Self care home management         Electronically signed by:  Kaitlin Morrison PT 5/30/2019, 4:34 PM

## 2019-09-05 ENCOUNTER — APPOINTMENT (OUTPATIENT)
Dept: GENERAL RADIOLOGY | Age: 59
End: 2019-09-05
Payer: COMMERCIAL

## 2019-09-05 ENCOUNTER — APPOINTMENT (OUTPATIENT)
Dept: CT IMAGING | Age: 59
End: 2019-09-05
Payer: COMMERCIAL

## 2019-09-05 ENCOUNTER — HOSPITAL ENCOUNTER (EMERGENCY)
Age: 59
Discharge: HOME | End: 2019-09-05
Attending: EMERGENCY MEDICINE
Payer: COMMERCIAL

## 2019-09-05 VITALS
BODY MASS INDEX: 41.65 KG/M2 | HEART RATE: 69 BPM | SYSTOLIC BLOOD PRESSURE: 144 MMHG | OXYGEN SATURATION: 98 % | TEMPERATURE: 98.4 F | DIASTOLIC BLOOD PRESSURE: 52 MMHG | HEIGHT: 65 IN | RESPIRATION RATE: 18 BRPM | WEIGHT: 250 LBS

## 2019-09-05 DIAGNOSIS — S52.501A CLOSED FRACTURE OF DISTAL END OF RIGHT RADIUS, UNSPECIFIED FRACTURE MORPHOLOGY, INITIAL ENCOUNTER: ICD-10-CM

## 2019-09-05 DIAGNOSIS — S52.614A CLOSED NONDISPLACED FRACTURE OF STYLOID PROCESS OF RIGHT ULNA, INITIAL ENCOUNTER: ICD-10-CM

## 2019-09-05 DIAGNOSIS — S09.90XA INJURY OF HEAD, INITIAL ENCOUNTER: ICD-10-CM
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
## 2019-09-05 DIAGNOSIS — R11.0 NAUSEA: ICD-10-CM

## 2019-09-05 DIAGNOSIS — M79.603 PAIN OF UPPER EXTREMITY, UNSPECIFIED LATERALITY: ICD-10-CM

## 2019-09-05 PROCEDURE — 73130 X-RAY EXAM OF HAND: CPT

## 2019-09-05 PROCEDURE — 73110 X-RAY EXAM OF WRIST: CPT

## 2019-09-05 PROCEDURE — 73090 X-RAY EXAM OF FOREARM: CPT

## 2019-09-05 PROCEDURE — 99283 EMERGENCY DEPT VISIT LOW MDM: CPT

## 2019-09-05 PROCEDURE — 71045 X-RAY EXAM CHEST 1 VIEW: CPT

## 2019-09-05 PROCEDURE — 70450 CT HEAD/BRAIN W/O DYE: CPT

## 2019-09-05 PROCEDURE — 6370000000 HC RX 637 (ALT 250 FOR IP): Performed by: EMERGENCY MEDICINE

## 2019-09-05 PROCEDURE — 72125 CT NECK SPINE W/O DYE: CPT

## 2019-09-05 PROCEDURE — 4500000028 HC INTERMEDIATE PROCEDURE

## 2019-09-05 RX ORDER — ONDANSETRON 2 MG/ML
4 INJECTION INTRAMUSCULAR; INTRAVENOUS EVERY 30 MIN PRN
Status: DISCONTINUED | OUTPATIENT
Start: 2019-09-05 | End: 2019-09-05 | Stop reason: HOSPADM

## 2019-09-05 RX ORDER — FENTANYL CITRATE 50 UG/ML
50 INJECTION, SOLUTION INTRAMUSCULAR; INTRAVENOUS
Status: DISCONTINUED | OUTPATIENT
Start: 2019-09-05 | End: 2019-09-05 | Stop reason: HOSPADM

## 2019-09-05 RX ORDER — POLYETHYLENE GLYCOL 3350 17 G/17G
17 POWDER, FOR SOLUTION ORAL DAILY PRN
Qty: 527 G | Refills: 1 | Status: SHIPPED | OUTPATIENT
Start: 2019-09-05 | End: 2019-10-05

## 2019-09-05 RX ORDER — ONDANSETRON 4 MG/1
4 TABLET, ORALLY DISINTEGRATING ORAL ONCE
Status: COMPLETED | OUTPATIENT
Start: 2019-09-05 | End: 2019-09-05

## 2019-09-05 RX ORDER — HYDROCODONE BITARTRATE AND ACETAMINOPHEN 5; 325 MG/1; MG/1
1-2 TABLET ORAL EVERY 6 HOURS PRN
Qty: 15 TABLET | Refills: 0 | Status: SHIPPED | OUTPATIENT
Start: 2019-09-05 | End: 2019-09-07

## 2019-09-05 RX ORDER — HYDROCODONE BITARTRATE AND ACETAMINOPHEN 5; 325 MG/1; MG/1
2 TABLET ORAL ONCE
Status: COMPLETED | OUTPATIENT
Start: 2019-09-05 | End: 2019-09-05

## 2019-09-05 RX ORDER — HYDROCODONE BITARTRATE AND ACETAMINOPHEN 5; 325 MG/1; MG/1
1 TABLET ORAL ONCE
Status: DISCONTINUED | OUTPATIENT
Start: 2019-09-05 | End: 2019-09-05

## 2019-09-05 RX ORDER — ONDANSETRON 4 MG/1
4 TABLET, ORALLY DISINTEGRATING ORAL EVERY 8 HOURS PRN
Qty: 15 TABLET | Refills: 0 | Status: ON HOLD | OUTPATIENT
Start: 2019-09-05 | End: 2020-09-15 | Stop reason: HOSPADM

## 2019-09-05 RX ORDER — NAPROXEN 500 MG/1
500 TABLET ORAL 2 TIMES DAILY
Qty: 60 TABLET | Refills: 0 | Status: ON HOLD | OUTPATIENT
Start: 2019-09-05 | End: 2020-09-15 | Stop reason: HOSPADM

## 2019-09-05 RX ADMIN — ONDANSETRON 4 MG: 4 TABLET, ORALLY DISINTEGRATING ORAL at 21:23

## 2019-09-05 RX ADMIN — HYDROCODONE BITARTRATE AND ACETAMINOPHEN 2 TABLET: 5; 325 TABLET ORAL at 21:23

## 2019-09-05 ASSESSMENT — ENCOUNTER SYMPTOMS
RESPIRATORY NEGATIVE: 1
NAUSEA: 1
EYES NEGATIVE: 1
ALLERGIC/IMMUNOLOGIC NEGATIVE: 1

## 2019-09-05 ASSESSMENT — PAIN SCALES - GENERAL
PAINLEVEL_OUTOF10: 6
PAINLEVEL_OUTOF10: 7

## 2019-09-05 ASSESSMENT — PAIN DESCRIPTION - PAIN TYPE: TYPE: ACUTE PAIN

## 2019-09-05 NOTE — ED PROVIDER NOTES
encounter   Closed nondisplaced fracture of styloid process of right ulna, initial encounter       (Please note that portions of this note may have been completed with a voice recognition program. Efforts were made to edit the dictations but occasionally words aremis-transcribed.)    DISPOSITION REFERRAL (if applicable):  Diya Saul MD  [address] [place] [address]  [phone]    In 1 day      [facility] Emergency Department  [place] [address] Esther Odom [facility] [place] [address]  [phone]    Schedule an appointment as soon as possible for a visit in 1 day        DISPOSITION MEDICATIONS (if applicable):  New Prescriptions    HYDROCODONE-ACETAMINOPHEN (NORCO) 5-325 MG PER TABLET    Take 1-2 tablets by mouth every 6 hours as needed for Pain for up to 2 days.     NAPROXEN (NAPROSYN) 500 MG TABLET    Take 1 tablet by mouth 2 times daily    ONDANSETRON (ZOFRAN ODT) 4 MG DISINTEGRATING TABLET    Take 1 tablet by mouth every 8 hours as needed for Nausea    POLYETHYLENE GLYCOL (MIRALAX) PACKET    Take 17 g by mouth daily as needed for Other (Constipation)          DO Kyle Barfield DO  09/05/19 [id number]

## 2019-12-10 ENCOUNTER — HOSPITAL ENCOUNTER (OUTPATIENT)
Dept: OCCUPATIONAL THERAPY | Age: 59
Setting detail: THERAPIES SERIES
Discharge: HOME | End: 2019-12-10
Payer: COMMERCIAL

## 2019-12-10 PROCEDURE — 97022 WHIRLPOOL THERAPY: CPT

## 2019-12-10 PROCEDURE — 97110 THERAPEUTIC EXERCISES: CPT

## 2019-12-10 PROCEDURE — 97140 MANUAL THERAPY 1/> REGIONS: CPT

## 2019-12-16 ENCOUNTER — HOSPITAL ENCOUNTER (OUTPATIENT)
Dept: OCCUPATIONAL THERAPY | Age: 59
Setting detail: THERAPIES SERIES
Discharge: HOME | End: 2019-12-16
Payer: COMMERCIAL

## 2019-12-23 ENCOUNTER — HOSPITAL ENCOUNTER (OUTPATIENT)
Dept: OCCUPATIONAL THERAPY | Age: 59
Discharge: HOME | End: 2019-12-23

## 2020-01-03 ENCOUNTER — HOSPITAL ENCOUNTER (OUTPATIENT)
Dept: OCCUPATIONAL THERAPY | Age: 60
Setting detail: THERAPIES SERIES
Discharge: HOME | End: 2020-01-03
Payer: COMMERCIAL

## 2020-01-03 PROCEDURE — 97140 MANUAL THERAPY 1/> REGIONS: CPT

## 2020-01-03 PROCEDURE — 97110 THERAPEUTIC EXERCISES: CPT

## 2020-01-03 PROCEDURE — 97022 WHIRLPOOL THERAPY: CPT

## 2020-01-03 NOTE — FLOWSHEET NOTE
Occupational Therapy Out Patient Daily Treatment Note     [x]Raywick Radha [address]      [facility]     [address] [facility] [address]       [facility], [address]       [address]     [phone]  [phone] [phone]  ______________________________________________________________________  Date:  1/3/2020  Patient Name:  Tucker Dai    :  1960  Restrictions/Precautions:  General  Diagnosis:   ORIF R colles fx  Treatment Diagnosis:   hand pain  Insurance/Certification information:  [facility]  Referring Physician:   Dr Evelyn Davidson of care signed (Y/N):    Visit# / total visits:   Pain level: 2/10     Subjective: Pt states she is still so surprised by the pain and talked to doctor about that. Assured pt is not atypical to have pain now. States she will go 3-4 nights without pain and then will keep her up.  Most irritation is in fingers  Prior Level of Function: Full functional use R hand before injury   Patient Goals: Decrease pain    Treatment Flowsheet   Right Left     Fluidotherapy hand x    Massage scar and palm x    AROM x    PROM x    Tendon gliding x    Nerve gliding x      Issued and instructed in HEP x      Issued sponge football for  ex at home x      Instructed in desensitization x                                                                            Interventions/Modalities used:  [x] Therapeutic Exercise   [x] Modalities: prn  [x] Therapeutic Activity    [] Ultrasound [] Elec Stimulation   [] Total Motion Release    [x] Fluido [] Kinesiotaping  [] Neuromuscular Re-education   [] Ionto [] Coldpack/hotpack   [x] Instruction in HEP    Other:    Objective Findings: AROM R wrist ext 45 flex 60  13.6#    Communication with other providers: POC to physician    Education provided to patient: Issued and instructed in HEP    Adverse Reactions to treatment:none  Time in:  1615  Time out:  1700  Timed treatment minutes:  25   Total treatment time: 45      Treatment/Activity Tolerance:     [x]  Patient tolerated treatment well []  Patient limited by fatique    []  Patient limited by pain []  Patient limited by other medical complications   []  Other:     Goals   Pt will decrease pain 1-2/10 at all times to increase functional activity tolerance and get more sleep.   Pt will increase AROM R hand to WNL to increase functional mobility for work   Pt will increase R  15-20# to increase functional grasp   Pt will follow thru and be independent with HEP   Scar management     LTG:       Pt will decrease Quick dash below 30 for significant performance improvement.     Patient Requires Follow-up:  [x]  Yes  []  No    Plan: [x]  Continue per plan of care []  Alter current plan (see comments)   [x]  Plan of care initiated []  Hold pending MD visit []  Discharge    Plan for Next Session:  Start strengthening    Electronically signed by:  ARAMIS Tsang, 1/3/2020, 5:18 PM

## 2020-01-13 ENCOUNTER — HOSPITAL ENCOUNTER (OUTPATIENT)
Dept: OCCUPATIONAL THERAPY | Age: 60
Setting detail: THERAPIES SERIES
Discharge: HOME | End: 2020-01-13
Payer: COMMERCIAL

## 2020-01-13 PROCEDURE — 97530 THERAPEUTIC ACTIVITIES: CPT

## 2020-01-13 PROCEDURE — 97022 WHIRLPOOL THERAPY: CPT

## 2020-01-13 PROCEDURE — 97110 THERAPEUTIC EXERCISES: CPT

## 2020-01-13 NOTE — FLOWSHEET NOTE
treatment well []  Patient limited by fatique    []  Patient limited by pain []  Patient limited by other medical complications   []  Other:     Goals   Pt will decrease pain 1-2/10 at all times to increase functional activity tolerance and get more sleep.   Pt will increase AROM R hand to WNL to increase functional mobility for work   Pt will increase R  15-20# to increase functional grasp   Pt will follow thru and be independent with HEP   Scar management     LTG:       Pt will decrease Quick dash below 30 for significant performance improvement.     Patient Requires Follow-up:  [x]  Yes  []  No    Plan: [x]  Continue per plan of care []  Alter current plan (see comments)   [x]  Plan of care initiated []  Hold pending MD visit []  Discharge    Plan for Next Session:  Start strengthening    Electronically signed by:  CLAUDIO Watosn/L, 1/13/2020, 6:18 PM

## 2020-01-15 ENCOUNTER — HOSPITAL ENCOUNTER (OUTPATIENT)
Dept: OCCUPATIONAL THERAPY | Age: 60
Setting detail: THERAPIES SERIES
Discharge: HOME | End: 2020-01-15
Payer: COMMERCIAL

## 2020-01-15 PROCEDURE — 97140 MANUAL THERAPY 1/> REGIONS: CPT

## 2020-01-15 PROCEDURE — 97022 WHIRLPOOL THERAPY: CPT

## 2020-01-15 PROCEDURE — 97110 THERAPEUTIC EXERCISES: CPT

## 2020-01-20 ENCOUNTER — HOSPITAL ENCOUNTER (OUTPATIENT)
Dept: OCCUPATIONAL THERAPY | Age: 60
Setting detail: THERAPIES SERIES
Discharge: HOME | End: 2020-01-20
Payer: COMMERCIAL

## 2020-01-20 PROCEDURE — 97110 THERAPEUTIC EXERCISES: CPT

## 2020-01-20 PROCEDURE — 97140 MANUAL THERAPY 1/> REGIONS: CPT

## 2020-01-20 PROCEDURE — 97022 WHIRLPOOL THERAPY: CPT

## 2020-01-22 ENCOUNTER — HOSPITAL ENCOUNTER (OUTPATIENT)
Dept: OCCUPATIONAL THERAPY | Age: 60
Setting detail: THERAPIES SERIES
Discharge: HOME | End: 2020-01-22
Payer: COMMERCIAL

## 2020-01-22 PROCEDURE — 97110 THERAPEUTIC EXERCISES: CPT

## 2020-01-22 PROCEDURE — 97140 MANUAL THERAPY 1/> REGIONS: CPT

## 2020-01-22 PROCEDURE — 97022 WHIRLPOOL THERAPY: CPT

## 2020-01-22 NOTE — FLOWSHEET NOTE
1715  Time out:  800  Timed treatment minutes:  25   Total treatment time: 45      Treatment/Activity Tolerance:     [x]  Patient tolerated treatment well []  Patient limited by fatique    []  Patient limited by pain []  Patient limited by other medical complications   []  Other:     Goals   Pt will decrease pain 1-2/10 at all times to increase functional activity tolerance and get more sleep.   Pt will increase AROM R hand to WNL to increase functional mobility for work   Pt will increase R  15-20# to increase functional grasp   Pt will follow thru and be independent with HEP   Scar management     LTG:       Pt will decrease Quick dash below 30 for significant performance improvement.     Patient Requires Follow-up:  [x]  Yes  []  No    Plan: [x]  Continue per plan of care []  Alter current plan (see comments)   [x]  Plan of care initiated []  Hold pending MD visit []  Discharge    Plan for Next Session:  Start strengthening    Electronically signed by:  CLAUDIO Quigley/L, 1/22/2020, 5:40 PM

## 2020-01-29 ENCOUNTER — HOSPITAL ENCOUNTER (OUTPATIENT)
Dept: OCCUPATIONAL THERAPY | Age: 60
Setting detail: THERAPIES SERIES
Discharge: HOME | End: 2020-01-29
Payer: COMMERCIAL

## 2020-01-29 PROCEDURE — 97140 MANUAL THERAPY 1/> REGIONS: CPT

## 2020-01-29 PROCEDURE — 97022 WHIRLPOOL THERAPY: CPT

## 2020-01-29 PROCEDURE — 97110 THERAPEUTIC EXERCISES: CPT

## 2020-01-29 NOTE — FLOWSHEET NOTE
Occupational Therapy Out Patient Daily Treatment Note     [x][place] [address]      [facility]     [address] [facility] [address]       [facility], [address], [address]       [address]     [phone]  [phone] [phone]  ______________________________________________________________________  Date:  2020  Patient Name:  Adelia Aase    :  1960  Restrictions/Precautions:  General  Diagnosis:   ORIF R colles fx  Treatment Diagnosis:   hand pain  Insurance/Certification information:  [facility]  Referring Physician:   Dr Anya Ochoa of care signed (Y/N):    Visit# / total visits:   Pain level: 3/10    Subjective: Pt states hand and wrist are feeling much better.   Prior Level of Function: Full functional use R hand before injury   Patient Goals: Decrease pain    Treatment Flowsheet   Right Left     Fluidotherapy hand x    Massage scar and palm x    AROM x    PROM x    Tendon gliding x    Nerve gliding x      Issued and instructed in HEP x      Issued sponge football for  ex at home       Instructed in desensitization x    3# digiflex x    2# pinch pin lateral and palmar x    Wrist flex 1# X ext difficult    Sup/pron with 8oz hammer x      HP after x                                         Interventions/Modalities used:  [x] Therapeutic Exercise   [x] Modalities: prn  [x] Therapeutic Activity    [] Ultrasound [] Elec Stimulation   [] Total Motion Release    [x] Fluido [] Kinesiotaping  [] Neuromuscular Re-education   [] Ionto [] Coldpack/hotpack   [x] Instruction in HEP    Other:    Objective Findings: AROM R wrist ext 45 flex 60 Supination 75  29.2 States felt better to do supination exercise    Communication with other providers: POC to physician    Education provided to patient: Issued and instructed in HEP    Adverse Reactions to treatment:none  Time in:  6018  Time out:

## 2020-01-31 ENCOUNTER — HOSPITAL ENCOUNTER (OUTPATIENT)
Dept: OCCUPATIONAL THERAPY | Age: 60
Setting detail: THERAPIES SERIES
Discharge: HOME | End: 2020-01-31
Payer: COMMERCIAL

## 2020-01-31 PROCEDURE — 97530 THERAPEUTIC ACTIVITIES: CPT

## 2020-01-31 PROCEDURE — 97110 THERAPEUTIC EXERCISES: CPT

## 2020-01-31 PROCEDURE — 97140 MANUAL THERAPY 1/> REGIONS: CPT

## 2020-01-31 PROCEDURE — 97022 WHIRLPOOL THERAPY: CPT

## 2020-02-07 ENCOUNTER — HOSPITAL ENCOUNTER (OUTPATIENT)
Dept: OCCUPATIONAL THERAPY | Age: 60
Discharge: HOME | End: 2020-02-07

## 2020-08-29 ENCOUNTER — APPOINTMENT (OUTPATIENT)
Dept: CT IMAGING | Age: 60
End: 2020-08-29
Payer: COMMERCIAL

## 2020-08-29 ENCOUNTER — HOSPITAL ENCOUNTER (EMERGENCY)
Age: 60
Discharge: ANOTHER HOSPITAL | End: 2020-08-29
Attending: EMERGENCY MEDICINE
Payer: COMMERCIAL

## 2020-08-29 VITALS
RESPIRATION RATE: 18 BRPM | WEIGHT: 250 LBS | BODY MASS INDEX: 42.68 KG/M2 | SYSTOLIC BLOOD PRESSURE: 164 MMHG | HEART RATE: 69 BPM | DIASTOLIC BLOOD PRESSURE: 56 MMHG | TEMPERATURE: 98.2 F | OXYGEN SATURATION: 98 % | HEIGHT: 64 IN

## 2020-08-29 PROCEDURE — 72125 CT NECK SPINE W/O DYE: CPT

## 2020-08-29 PROCEDURE — 99285 EMERGENCY DEPT VISIT HI MDM: CPT

## 2020-08-29 PROCEDURE — 6370000000 HC RX 637 (ALT 250 FOR IP): Performed by: PHYSICIAN ASSISTANT

## 2020-08-29 PROCEDURE — 71250 CT THORAX DX C-: CPT

## 2020-08-29 PROCEDURE — 70450 CT HEAD/BRAIN W/O DYE: CPT

## 2020-08-29 RX ORDER — IBUPROFEN 400 MG/1
400 TABLET ORAL ONCE
Status: COMPLETED | OUTPATIENT
Start: 2020-08-29 | End: 2020-08-29

## 2020-08-29 RX ORDER — HYDROCODONE BITARTRATE AND ACETAMINOPHEN 5; 325 MG/1; MG/1
1 TABLET ORAL ONCE
Status: COMPLETED | OUTPATIENT
Start: 2020-08-29 | End: 2020-08-29

## 2020-08-29 RX ORDER — LIDOCAINE 4 G/G
2 PATCH TOPICAL ONCE
Status: DISCONTINUED | OUTPATIENT
Start: 2020-08-29 | End: 2020-08-30 | Stop reason: HOSPADM

## 2020-08-29 RX ADMIN — HYDROCODONE BITARTRATE AND ACETAMINOPHEN 1 TABLET: 5; 325 TABLET ORAL at 20:48

## 2020-08-29 RX ADMIN — IBUPROFEN 400 MG: 400 TABLET ORAL at 20:48

## 2020-08-29 ASSESSMENT — PAIN SCALES - GENERAL
PAINLEVEL_OUTOF10: 3
PAINLEVEL_OUTOF10: 6
PAINLEVEL_OUTOF10: 7

## 2020-08-29 ASSESSMENT — PAIN DESCRIPTION - PROGRESSION: CLINICAL_PROGRESSION: GRADUALLY IMPROVING

## 2020-08-29 ASSESSMENT — PAIN DESCRIPTION - ORIENTATION: ORIENTATION: RIGHT;UPPER

## 2020-08-29 ASSESSMENT — PAIN DESCRIPTION - LOCATION: LOCATION: RIB CAGE

## 2020-08-29 NOTE — ED NOTES
Pt presents to ED for right sided rib pain, headache and right shoulder pain. PT states she was trying to hang a picture and fell back on the chair.  Pt denies LOC      Suzy Thomas RN  08/29/20 1912

## 2020-08-29 NOTE — ED PROVIDER NOTES
Triage Chief Complaint:   Fall; Hand Injury; and Rib Injury (pain after fall)    [place]:  Today in the ED I had the pleasure of caring for Ana Santana who is a 62 y.o. female that presents today to the ED after a fall. Pt tried to stand on her bed and fell off hitting her head and her R sided. She endorses R sided rib pain, and chest wall pain underneath the R breast. She endorses a slight headache. Rib pain is ranked 7/10. No syncope, she has felt lightheaded, no changes in vision. ROS:  REVIEW OF SYSTEMS    At least 10 systems reviewed      All other review of systems are negative  See HPI and nursing notes for additional information       Past Medical History:   Diagnosis Date    Arthritis     Asthma     Cerebral artery occlusion with [facility])     per old chart 8/2015- TIA    [facility]ch[facility]     Diabetes mellitus [facility]     Diabetic neuropathy ([facility]     per old chart    Fracture     per old chart pt in ER 12/9/2018- after 2 days of arm pain after fall- dx with left humerus fx- for surg 12/31/2018    Hyperlipidemia     Hypertension     Muscle weakness (generalized)     Osteoarthritis     per old chart     Past Surgical History:   Procedure Laterality Date    CHOLECYSTECTOMY  2002?  EYE SURGERY      per old chart had right eye cataract ext done 2/2018 and left eye 4/2018    FOOT SURGERY Left 2004?  HUMERUS FRACTURE SURGERY Left 12/31/2018    HUMERUS OPEN REDUCTION INTERNAL FIXATION LEFT PROXIMAL performed by Zeb Amaro DO at [id number] [place] [address] Right 2009?  LUNG SURGERY  2009?    simone lung lobectomy      History reviewed. No pertinent family history.   Social History     Socioeconomic History    Marital status:      Spouse name: Not on file    Number of children: Not on file    Years of education: Not on file    Highest education level: Not on file   Occupational History    Not on file   Social Needs    Financial resource strain: Not on file    Food insecurity     Worry: Not on file     Inability: Not on file    Transportation needs     Medical: Not on file     Non-medical: Not on file   Tobacco Use    Smoking status: Never Smoker    Smokeless tobacco: Never Used    Tobacco comment: 12/27/*2018- with phone assessment with caregiver- unsure of social, surgical or family hx for phone assessment   Substance and Sexual Activity    Alcohol use: No    Drug use: No    Sexual activity: Not on file   Lifestyle    Physical activity     Days per week: Not on file     Minutes per session: Not on file    Stress: Not on file   Relationships    Social connections     Talks on phone: Not on file     Gets together: Not on file     Attends [employer] service: Not on file     Active member of club or organization: Not on file     Attends meetings of clubs or organizations: Not on file     Relationship status: Not on file    Intimate partner violence     Fear of current or ex partner: Not on file     Emotionally abused: Not on file     Physically abused: Not on file     Forced sexual activity: Not on file   Other Topics Concern    Not on file   Social History Narrative    Not on file     Current Facility-Administered Medications   Medication Dose Route Frequency Provider Last Rate Last Dose    lidocaine 4 % external patch 2 patch  2 patch Transdermal Once [address], PATristinC   2 patch at 08/29/20 2053     Current Outpatient Medications   Medication Sig Dispense Refill    ondansetron (ZOFRAN ODT) 4 MG disintegrating tablet Take 1 tablet by mouth every 8 hours as needed for Nausea 15 tablet 0    naproxen (NAPROSYN) 500 MG tablet Take 1 tablet by mouth 2 times daily 60 tablet 0    Lactobacillus (ACIDOPHILUS) 0.5 MG TABS Acidophilus      amLODIPine (NORVASC) 10 MG tablet amlodipine 10 mg tablet   TAKE ONE TABLET BY MOUTH DAILY      metoprolol succinate (TOPROL XL) 50 MG extended release tablet Take 1 tablet by mouth daily 30 tablet 0    amLODIPine (NORVASC) 5 MG tablet Take 1 tablet by mouth daily 30 tablet 0    dicyclomine (BENTYL) 20 MG tablet Take 1 tablet by mouth 4 times daily (before meals and nightly) 120 tablet 0    metoclopramide (REGLAN) 10 MG tablet Take 1 tablet by mouth 4 times daily (before meals and nightly) Hold and stop if you have any involuntary movements. Will need to taper in next 2 wks so 10mg 4 times daily for 5 days  then 5mg 4 times for 5 days  for then 5mg twice daily for 4 days and stop. 120 tablet 0    pantoprazole (PROTONIX) 40 MG tablet Take 1 tablet by mouth 2 times daily (before meals) 60 tablet 0    insulin glargine (BASAGLAR KWIKPEN) 100 UNIT/ML injection pen Inject 45 Units into the skin nightly      ferrous sulfate 325 (65 Fe) MG EC tablet Take 650 mg by mouth daily (with breakfast)      linagliptin (TRADJENTA) 5 MG tablet Take 5 mg by mouth daily      nystatin (MYCOSTATIN) 696044 UNIT/GM powder Apply topically 4 times daily. 60 g 0    gabapentin (NEURONTIN) 100 MG capsule Take 100 mg by mouth 3 times daily. .      ondansetron (ZOFRAN) 4 MG tablet Take 1 tablet by mouth daily as needed for Nausea or Vomiting 30 tablet 0    sertraline (ZOLOFT) 100 MG tablet Take 150 mg by mouth daily      topiramate (TOPAMAX) 50 MG tablet Take 50 mg by mouth nightly       clopidogrel (PLAVIX) 75 MG tablet Take 75 mg by mouth daily      atorvastatin (LIPITOR) 40 MG tablet Take 40 mg by mouth nightly       lisinopril (PRINIVIL;ZESTRIL) 40 MG tablet Take 40 mg by mouth daily       No Known Allergies    Nursing Notes Reviewed    Physical Exam:  ED Triage Vitals [08/29/20 1835]   Enc Vitals Group      BP (!) 144/80      Pulse 64      Resp 18      Temp 98.2 °F (36.8 °C)      Temp Source Oral      SpO2 98 %      Weight 250 lb (113.4 kg)      Height 5' 4\" (1.626 m)      Head Circumference       Peak Flow       Pain Score       Pain Loc       Pain Edu? Excl. in [address]?       General :Patient is awake alert oriented person place and time no acute distress nontoxic appearing  HEENT: Pupils are equally round and reactive to light extraocular motors are intact conjunctivae clear sclerae white there is no injection no icterus. Nose without any rhinorrhea or epistaxis. Oral mucosa is moist no exudate buccal mucosa shows no ulcerations. Uvula is midline    Neck: Neck is supple full range of motion trachea midline thyroid nonpalpable  Cardiac: Heart regular rate rhythm no murmurs rubs clicks or gallops  Lungs: Lungs are clear to auscultation there is no wheezing rhonchi or rales. There is no use of accessory muscles no nasal flaring identified. Chest wall: There is no tenderness to palpation over the chest wall or over ribs  Abdomen: Abdomen is soft nontender nondistended. There is no firm or pulsatile masses no rebound rigidity or guarding negative Menard's negative McBurney, no peritoneal signs  Suprapubic:  there is no tenderness to palpation over the external bladder   Musculoskeletal: 5 out of 5 strength in all 4 extremities full flexion extension abduction and adduction supination pronation of all extremities and all digits. No obvious muscle atrophy is noted. No focal muscle deficits are appreciated  Dermatology: Skin is warm and dry there is no obvious abscesses lacerations or lesions noted  Psych: Mentation is grossly normal cognition is grossly normal. Affect is appropriate  Neuro: Motor intact sensory intact cranial nerves II through XII are intact level of consciousness is normal cerebellar function is normal reflexes are grossly normal. No evidence of incontinence or loss of bowel or bladder no saddle anesthesia noted Lymphatic: There is no submandibular or cervical adenopathy appreciated. I have reviewed and interpreted all of the currently available lab results from this visit (if applicable):  No results found for this visit on 08/29/20.    Radiographs (if obtained):  [] The following phrases that may be inappropriate. If there are any questions or concerns please feel free to contact the dictating provider for clarification.       Lofton Loges, [address]  08/29/20 2931

## 2020-08-30 NOTE — ED PROVIDER NOTES
I independently examined and evaluated [address] [place] [address] DrMarkus In brief their history revealed patient fell while attempting to hang a picture above her bed. Patient fell backwards striking her head and back. Patient was down for approximately 4 hours before she could get assistance to get up. Patient is having primarily right-sided chest wall pain that is currently constant, 7 out of 10 and nonradiating. Pain is worse with any breathing or attempting to cough. No neck or back pains. No abdominal pain. No numbness or weakness other than baseline neuropathy. No pain to extremities other than her right knee where she feels like she \"skin did a little\". Their focused exam revealed the patient is afebrile, slightly hypertensive otherwise hemodynamically stable on room air. The patient appears age appropriate, appears well-hydrated, well-nourished. Appears slightly deconditioned. Mucous membranes are moist. Speech is clear. Breathing is unlabored. Speaking clearly in full sentences. Chest wall is with point tenderness palpation primarily on the right. No crepitance. Elevated BMI but abdomen is completely nontender. Skin is dry. Mental status is normal. The patient moves all extremities and is without facial droop. No midline bony TLS tenderness or step-off. ED course: Pt presents as above. Emergent conditions considered. Presentation prompted initial imaging as above. Imaging does demonstrate 5 rib fractures and given patient's underlying COPD, obesity and deconditioned status decision made to transfer the patient to [facility] for trauma assessment. Patient is agreeable with this plan. Pain controlled with Norco.  Transportation to be arranged patient be transferred. Questions sought and answered with the patient. They voice understanding and agree with plan.       All diagnostic, treatment, and disposition decisions were made by myself in conjunction with the Advanced Practice Provider. For all further details of the patient's emergency department visit, please see the Advanced Practice Provider's documentation.        Julee Jerome MD  08/29/20 3035

## 2020-09-04 ENCOUNTER — HOSPITAL ENCOUNTER (INPATIENT)
Age: 60
LOS: 10 days | Discharge: OTHER INSTITUTION | DRG: 420 | End: 2020-09-15
Attending: EMERGENCY MEDICINE | Admitting: INTERNAL MEDICINE
Payer: MEDICAID

## 2020-09-04 PROCEDURE — 99285 EMERGENCY DEPT VISIT HI MDM: CPT

## 2020-09-04 PROCEDURE — 83690 ASSAY OF LIPASE: CPT

## 2020-09-04 PROCEDURE — 85025 COMPLETE CBC W/AUTO DIFF WBC: CPT

## 2020-09-04 PROCEDURE — 82550 ASSAY OF CK (CPK): CPT

## 2020-09-04 PROCEDURE — 80053 COMPREHEN METABOLIC PANEL: CPT

## 2020-09-04 PROCEDURE — 84484 ASSAY OF TROPONIN QUANT: CPT

## 2020-09-04 PROCEDURE — 83880 ASSAY OF NATRIURETIC PEPTIDE: CPT

## 2020-09-05 ENCOUNTER — APPOINTMENT (OUTPATIENT)
Dept: GENERAL RADIOLOGY | Age: 60
DRG: 420 | End: 2020-09-05
Payer: MEDICAID

## 2020-09-05 ENCOUNTER — APPOINTMENT (OUTPATIENT)
Dept: CT IMAGING | Age: 60
DRG: 420 | End: 2020-09-05
Payer: MEDICAID

## 2020-09-05 PROBLEM — E11.10 KETOACIDOSIS, DIABETIC, TYPE 2, NO COMA (HCC): Status: ACTIVE | Noted: 2020-09-05

## 2020-09-05 LAB
ALBUMIN SERPL-MCNC: 3.8 GM/DL (ref 3.4–5)
ALP BLD-CCNC: 135 IU/L (ref 40–128)
ALT SERPL-CCNC: 34 U/L (ref 10–40)
ANION GAP SERPL CALCULATED.3IONS-SCNC: 12 MMOL/L (ref 4–16)
ANION GAP SERPL CALCULATED.3IONS-SCNC: 14 MMOL/L (ref 4–16)
ANION GAP SERPL CALCULATED.3IONS-SCNC: 27 MMOL/L (ref 4–16)
ANION GAP SERPL CALCULATED.3IONS-SCNC: 27 MMOL/L (ref 4–16)
AST SERPL-CCNC: 24 IU/L (ref 15–37)
BACTERIA: ABNORMAL /HPF
BACTERIA: NEGATIVE /HPF
BASE EXCESS: 19 (ref 0–2.4)
BASOPHILS ABSOLUTE: 0 K/CU MM
BASOPHILS RELATIVE PERCENT: 0.3 % (ref 0–1)
BETA-HYDROXYBUTYRATE: >20.8 MG/DL (ref 0–3)
BILIRUB SERPL-MCNC: 0.7 MG/DL (ref 0–1)
BILIRUBIN URINE: NEGATIVE MG/DL
BILIRUBIN URINE: NEGATIVE MG/DL
BLOOD, URINE: ABNORMAL
BLOOD, URINE: ABNORMAL
BUN BLDV-MCNC: 47 MG/DL (ref 6–23)
BUN BLDV-MCNC: 49 MG/DL (ref 6–23)
BUN BLDV-MCNC: 49 MG/DL (ref 6–23)
BUN BLDV-MCNC: 50 MG/DL (ref 6–23)
CALCIUM SERPL-MCNC: 8.4 MG/DL (ref 8.3–10.6)
CALCIUM SERPL-MCNC: 8.5 MG/DL (ref 8.3–10.6)
CALCIUM SERPL-MCNC: 9.6 MG/DL (ref 8.3–10.6)
CALCIUM SERPL-MCNC: 9.8 MG/DL (ref 8.3–10.6)
CHLORIDE BLD-SCNC: 104 MMOL/L (ref 99–110)
CHLORIDE BLD-SCNC: 104 MMOL/L (ref 99–110)
CHLORIDE BLD-SCNC: 97 MMOL/L (ref 99–110)
CHLORIDE BLD-SCNC: 97 MMOL/L (ref 99–110)
CLARITY: ABNORMAL
CLARITY: CLEAR
CO2: 12 MMOL/L (ref 21–32)
CO2: 13 MMOL/L (ref 21–32)
CO2: 19 MMOL/L (ref 21–32)
CO2: 21 MMOL/L (ref 21–32)
COLOR: ABNORMAL
COLOR: YELLOW
COMMENT: ABNORMAL
CREAT SERPL-MCNC: 1.7 MG/DL (ref 0.6–1.1)
CREAT SERPL-MCNC: 1.7 MG/DL (ref 0.6–1.1)
CREAT SERPL-MCNC: 1.8 MG/DL (ref 0.6–1.1)
CREAT SERPL-MCNC: 1.9 MG/DL (ref 0.6–1.1)
DIFFERENTIAL TYPE: ABNORMAL
EKG ATRIAL RATE: 98 BPM
EKG DIAGNOSIS: NORMAL
EKG P AXIS: 57 DEGREES
EKG P-R INTERVAL: 180 MS
EKG Q-T INTERVAL: 392 MS
EKG QRS DURATION: 104 MS
EKG QTC CALCULATION (BAZETT): 500 MS
EKG R AXIS: -7 DEGREES
EKG T AXIS: 79 DEGREES
EKG VENTRICULAR RATE: 98 BPM
EOSINOPHILS ABSOLUTE: 0 K/CU MM
EOSINOPHILS RELATIVE PERCENT: 0 % (ref 0–3)
ESTIMATED AVERAGE GLUCOSE: 180 MG/DL
GFR AFRICAN AMERICAN: 33 ML/MIN/1.73M2
GFR AFRICAN AMERICAN: 35 ML/MIN/1.73M2
GFR AFRICAN AMERICAN: 37 ML/MIN/1.73M2
GFR AFRICAN AMERICAN: 37 ML/MIN/1.73M2
GFR NON-AFRICAN AMERICAN: 27 ML/MIN/1.73M2
GFR NON-AFRICAN AMERICAN: 29 ML/MIN/1.73M2
GFR NON-AFRICAN AMERICAN: 31 ML/MIN/1.73M2
GFR NON-AFRICAN AMERICAN: 31 ML/MIN/1.73M2
GLUCOSE BLD-MCNC: 134 MG/DL (ref 70–99)
GLUCOSE BLD-MCNC: 185 MG/DL (ref 70–99)
GLUCOSE BLD-MCNC: 206 MG/DL (ref 70–99)
GLUCOSE BLD-MCNC: 221 MG/DL (ref 70–99)
GLUCOSE BLD-MCNC: 224 MG/DL (ref 70–99)
GLUCOSE BLD-MCNC: 239 MG/DL (ref 70–99)
GLUCOSE BLD-MCNC: 283 MG/DL (ref 70–99)
GLUCOSE BLD-MCNC: 305 MG/DL (ref 70–99)
GLUCOSE BLD-MCNC: 346 MG/DL (ref 70–99)
GLUCOSE BLD-MCNC: 362 MG/DL (ref 70–99)
GLUCOSE BLD-MCNC: 411 MG/DL (ref 70–99)
GLUCOSE BLD-MCNC: 443 MG/DL (ref 70–99)
GLUCOSE BLD-MCNC: 517 MG/DL (ref 70–99)
GLUCOSE BLD-MCNC: 644 MG/DL (ref 70–99)
GLUCOSE BLD-MCNC: 741 MG/DL (ref 70–99)
GLUCOSE BLD-MCNC: >600 MG/DL (ref 70–99)
GLUCOSE, URINE: >500 MG/DL
GLUCOSE, URINE: >500 MG/DL
HBA1C MFR BLD: 7.9 % (ref 4.2–6.3)
HCO3 VENOUS: 7.8 MMOL/L (ref 19–25)
HCT VFR BLD CALC: 40.8 % (ref 37–47)
HEMOGLOBIN: 11.9 GM/DL (ref 12.5–16)
HYALINE CASTS: 0 /LPF
HYALINE CASTS: 2 /LPF
IMMATURE NEUTROPHIL %: 0.4 % (ref 0–0.43)
KETONES, URINE: ABNORMAL MG/DL
KETONES, URINE: ABNORMAL MG/DL
LEUKOCYTE ESTERASE, URINE: ABNORMAL
LEUKOCYTE ESTERASE, URINE: NEGATIVE
LIPASE: 6 IU/L (ref 13–60)
LYMPHOCYTES ABSOLUTE: 0.9 K/CU MM
LYMPHOCYTES RELATIVE PERCENT: 6.2 % (ref 24–44)
MAGNESIUM: 1.9 MG/DL (ref 1.8–2.4)
MAGNESIUM: 1.9 MG/DL (ref 1.8–2.4)
MCH RBC QN AUTO: 28.8 PG (ref 27–31)
MCHC RBC AUTO-ENTMCNC: 29.2 % (ref 32–36)
MCV RBC AUTO: 98.8 FL (ref 78–100)
MONOCYTES ABSOLUTE: 0.7 K/CU MM
MONOCYTES RELATIVE PERCENT: 5 % (ref 0–4)
MUCUS: ABNORMAL HPF
NITRITE URINE, QUANTITATIVE: NEGATIVE
NITRITE URINE, QUANTITATIVE: NEGATIVE
NUCLEATED RBC %: 0 %
O2 SAT, VEN: 81.2 % (ref 50–70)
PCO2, VEN: 21 MMHG (ref 38–52)
PDW BLD-RTO: 12.5 % (ref 11.7–14.9)
PH VENOUS: 7.18 (ref 7.32–7.42)
PH, URINE: 5 (ref 5–8)
PH, URINE: 5 (ref 5–8)
PHOSPHORUS: 3.2 MG/DL (ref 2.5–4.9)
PHOSPHORUS: 3.3 MG/DL (ref 2.5–4.9)
PLATELET # BLD: 287 K/CU MM (ref 140–440)
PMV BLD AUTO: 10.3 FL (ref 7.5–11.1)
PO2, VEN: 49 MMHG (ref 28–48)
POTASSIUM SERPL-SCNC: 4.4 MMOL/L (ref 3.5–5.1)
POTASSIUM SERPL-SCNC: 5.1 MMOL/L (ref 3.5–5.1)
POTASSIUM SERPL-SCNC: 5.2 MMOL/L (ref 3.5–5.1)
POTASSIUM SERPL-SCNC: 7.1 MMOL/L (ref 3.5–5.1)
PRO-BNP: 1254 PG/ML
PROTEIN UA: 100 MG/DL
PROTEIN UA: 100 MG/DL
RBC # BLD: 4.13 M/CU MM (ref 4.2–5.4)
RBC URINE: <1 /HPF (ref 0–6)
RBC URINE: ABNORMAL /HPF (ref 0–6)
SEGMENTED NEUTROPHILS ABSOLUTE COUNT: 12.2 K/CU MM
SEGMENTED NEUTROPHILS RELATIVE PERCENT: 88.1 % (ref 36–66)
SODIUM BLD-SCNC: 136 MMOL/L (ref 135–145)
SODIUM BLD-SCNC: 137 MMOL/L (ref 135–145)
SPECIFIC GRAVITY UA: 1.02 (ref 1–1.03)
SPECIFIC GRAVITY UA: 1.02 (ref 1–1.03)
SQUAMOUS EPITHELIAL: 1 /HPF
SQUAMOUS EPITHELIAL: 2 /HPF
TOTAL CK: 636 IU/L (ref 26–140)
TOTAL IMMATURE NEUTOROPHIL: 0.06 K/CU MM
TOTAL NUCLEATED RBC: 0 K/CU MM
TOTAL PROTEIN: 7 GM/DL (ref 6.4–8.2)
TRANSITIONAL EPITHELIAL: <1 /HPF
TRICHOMONAS: ABNORMAL /HPF
TRICHOMONAS: ABNORMAL /HPF
TROPONIN T: <0.01 NG/ML
UROBILINOGEN, URINE: NORMAL MG/DL (ref 0.2–1)
UROBILINOGEN, URINE: NORMAL MG/DL (ref 0.2–1)
WBC # BLD: 13.8 K/CU MM (ref 4–10.5)
WBC UA: 3 /HPF (ref 0–5)
WBC UA: 35 /HPF (ref 0–5)

## 2020-09-05 PROCEDURE — 96376 TX/PRO/DX INJ SAME DRUG ADON: CPT

## 2020-09-05 PROCEDURE — 6370000000 HC RX 637 (ALT 250 FOR IP): Performed by: EMERGENCY MEDICINE

## 2020-09-05 PROCEDURE — 80048 BASIC METABOLIC PNL TOTAL CA: CPT

## 2020-09-05 PROCEDURE — 70450 CT HEAD/BRAIN W/O DYE: CPT

## 2020-09-05 PROCEDURE — 96361 HYDRATE IV INFUSION ADD-ON: CPT

## 2020-09-05 PROCEDURE — 2580000003 HC RX 258: Performed by: EMERGENCY MEDICINE

## 2020-09-05 PROCEDURE — 84100 ASSAY OF PHOSPHORUS: CPT

## 2020-09-05 PROCEDURE — 96365 THER/PROPH/DIAG IV INF INIT: CPT

## 2020-09-05 PROCEDURE — 6370000000 HC RX 637 (ALT 250 FOR IP): Performed by: FAMILY MEDICINE

## 2020-09-05 PROCEDURE — 81001 URINALYSIS AUTO W/SCOPE: CPT

## 2020-09-05 PROCEDURE — 71046 X-RAY EXAM CHEST 2 VIEWS: CPT

## 2020-09-05 PROCEDURE — 83735 ASSAY OF MAGNESIUM: CPT

## 2020-09-05 PROCEDURE — 2580000003 HC RX 258: Performed by: FAMILY MEDICINE

## 2020-09-05 PROCEDURE — 2580000003 HC RX 258: Performed by: PHYSICIAN ASSISTANT

## 2020-09-05 PROCEDURE — 93010 ELECTROCARDIOGRAM REPORT: CPT | Performed by: INTERNAL MEDICINE

## 2020-09-05 PROCEDURE — 6360000002 HC RX W HCPCS: Performed by: PHYSICIAN ASSISTANT

## 2020-09-05 PROCEDURE — 6360000002 HC RX W HCPCS: Performed by: EMERGENCY MEDICINE

## 2020-09-05 PROCEDURE — 94761 N-INVAS EAR/PLS OXIMETRY MLT: CPT

## 2020-09-05 PROCEDURE — 82010 KETONE BODYS QUAN: CPT

## 2020-09-05 PROCEDURE — 87086 URINE CULTURE/COLONY COUNT: CPT

## 2020-09-05 PROCEDURE — 6370000000 HC RX 637 (ALT 250 FOR IP): Performed by: PHYSICIAN ASSISTANT

## 2020-09-05 PROCEDURE — 93005 ELECTROCARDIOGRAM TRACING: CPT | Performed by: EMERGENCY MEDICINE

## 2020-09-05 PROCEDURE — 82805 BLOOD GASES W/O2 SATURATION: CPT

## 2020-09-05 PROCEDURE — 82962 GLUCOSE BLOOD TEST: CPT

## 2020-09-05 PROCEDURE — 83036 HEMOGLOBIN GLYCOSYLATED A1C: CPT

## 2020-09-05 PROCEDURE — 96366 THER/PROPH/DIAG IV INF ADDON: CPT

## 2020-09-05 PROCEDURE — 94640 AIRWAY INHALATION TREATMENT: CPT

## 2020-09-05 PROCEDURE — 2000000000 HC ICU R&B

## 2020-09-05 RX ORDER — DEXTROSE AND SODIUM CHLORIDE 5; .45 G/100ML; G/100ML
INJECTION, SOLUTION INTRAVENOUS CONTINUOUS PRN
Status: DISCONTINUED | OUTPATIENT
Start: 2020-09-05 | End: 2020-09-05

## 2020-09-05 RX ORDER — FERROUS SULFATE 325(65) MG
650 TABLET ORAL
Status: DISCONTINUED | OUTPATIENT
Start: 2020-09-05 | End: 2020-09-15 | Stop reason: HOSPADM

## 2020-09-05 RX ORDER — CALCIUM GLUCONATE 94 MG/ML
1 INJECTION, SOLUTION INTRAVENOUS ONCE
Status: COMPLETED | OUTPATIENT
Start: 2020-09-05 | End: 2020-09-05

## 2020-09-05 RX ORDER — DEXTROSE MONOHYDRATE 25 G/50ML
12.5 INJECTION, SOLUTION INTRAVENOUS PRN
Status: DISCONTINUED | OUTPATIENT
Start: 2020-09-05 | End: 2020-09-05 | Stop reason: SDUPTHER

## 2020-09-05 RX ORDER — 0.9 % SODIUM CHLORIDE 0.9 %
1000 INTRAVENOUS SOLUTION INTRAVENOUS ONCE
Status: COMPLETED | OUTPATIENT
Start: 2020-09-05 | End: 2020-09-05

## 2020-09-05 RX ORDER — INSULIN GLARGINE 100 [IU]/ML
45 INJECTION, SOLUTION SUBCUTANEOUS NIGHTLY
Status: DISCONTINUED | OUTPATIENT
Start: 2020-09-05 | End: 2020-09-06

## 2020-09-05 RX ORDER — METOPROLOL SUCCINATE 50 MG/1
50 TABLET, EXTENDED RELEASE ORAL DAILY
Status: DISCONTINUED | OUTPATIENT
Start: 2020-09-05 | End: 2020-09-15 | Stop reason: HOSPADM

## 2020-09-05 RX ORDER — DEXTROSE MONOHYDRATE 50 MG/ML
100 INJECTION, SOLUTION INTRAVENOUS PRN
Status: DISCONTINUED | OUTPATIENT
Start: 2020-09-05 | End: 2020-09-05 | Stop reason: SDUPTHER

## 2020-09-05 RX ORDER — SODIUM CHLORIDE 9 MG/ML
INJECTION, SOLUTION INTRAVENOUS CONTINUOUS
Status: DISCONTINUED | OUTPATIENT
Start: 2020-09-05 | End: 2020-09-05

## 2020-09-05 RX ORDER — DEXTROSE AND SODIUM CHLORIDE 5; .45 G/100ML; G/100ML
INJECTION, SOLUTION INTRAVENOUS CONTINUOUS
Status: DISCONTINUED | OUTPATIENT
Start: 2020-09-05 | End: 2020-09-05

## 2020-09-05 RX ORDER — DEXTROSE MONOHYDRATE 50 MG/ML
100 INJECTION, SOLUTION INTRAVENOUS PRN
Status: DISCONTINUED | OUTPATIENT
Start: 2020-09-05 | End: 2020-09-15 | Stop reason: HOSPADM

## 2020-09-05 RX ORDER — NICOTINE POLACRILEX 4 MG
15 LOZENGE BUCCAL PRN
Status: DISCONTINUED | OUTPATIENT
Start: 2020-09-05 | End: 2020-09-05 | Stop reason: SDUPTHER

## 2020-09-05 RX ORDER — NICOTINE POLACRILEX 4 MG
15 LOZENGE BUCCAL PRN
Status: DISCONTINUED | OUTPATIENT
Start: 2020-09-05 | End: 2020-09-15 | Stop reason: HOSPADM

## 2020-09-05 RX ORDER — SODIUM CHLORIDE 9 MG/ML
INJECTION, SOLUTION INTRAVENOUS CONTINUOUS
Status: DISCONTINUED | OUTPATIENT
Start: 2020-09-05 | End: 2020-09-07

## 2020-09-05 RX ORDER — ALBUTEROL SULFATE 2.5 MG/3ML
2.5 SOLUTION RESPIRATORY (INHALATION) ONCE
Status: COMPLETED | OUTPATIENT
Start: 2020-09-05 | End: 2020-09-05

## 2020-09-05 RX ORDER — LIDOCAINE 4 G/G
1 PATCH TOPICAL DAILY
Status: DISCONTINUED | OUTPATIENT
Start: 2020-09-05 | End: 2020-09-15 | Stop reason: HOSPADM

## 2020-09-05 RX ORDER — CLOPIDOGREL BISULFATE 75 MG/1
75 TABLET ORAL DAILY
Status: DISCONTINUED | OUTPATIENT
Start: 2020-09-05 | End: 2020-09-15 | Stop reason: HOSPADM

## 2020-09-05 RX ORDER — DEXTROSE MONOHYDRATE 25 G/50ML
12.5 INJECTION, SOLUTION INTRAVENOUS PRN
Status: DISCONTINUED | OUTPATIENT
Start: 2020-09-05 | End: 2020-09-15 | Stop reason: HOSPADM

## 2020-09-05 RX ORDER — MAGNESIUM SULFATE 1 G/100ML
1 INJECTION INTRAVENOUS PRN
Status: DISCONTINUED | OUTPATIENT
Start: 2020-09-05 | End: 2020-09-15 | Stop reason: HOSPADM

## 2020-09-05 RX ORDER — HEPARIN SODIUM 5000 [USP'U]/ML
5000 INJECTION, SOLUTION INTRAVENOUS; SUBCUTANEOUS EVERY 8 HOURS SCHEDULED
Status: DISCONTINUED | OUTPATIENT
Start: 2020-09-05 | End: 2020-09-15 | Stop reason: HOSPADM

## 2020-09-05 RX ADMIN — HEPARIN SODIUM 5000 UNITS: 5000 INJECTION, SOLUTION INTRAVENOUS; SUBCUTANEOUS at 06:14

## 2020-09-05 RX ADMIN — SODIUM CHLORIDE: 9 INJECTION, SOLUTION INTRAVENOUS at 15:11

## 2020-09-05 RX ADMIN — HEPARIN SODIUM 5000 UNITS: 5000 INJECTION, SOLUTION INTRAVENOUS; SUBCUTANEOUS at 15:10

## 2020-09-05 RX ADMIN — CLOPIDOGREL BISULFATE 75 MG: 75 TABLET ORAL at 09:29

## 2020-09-05 RX ADMIN — INSULIN GLARGINE 45 UNITS: 100 INJECTION, SOLUTION SUBCUTANEOUS at 21:11

## 2020-09-05 RX ADMIN — SODIUM CHLORIDE: 9 INJECTION, SOLUTION INTRAVENOUS at 06:10

## 2020-09-05 RX ADMIN — METOPROLOL SUCCINATE 50 MG: 50 TABLET, EXTENDED RELEASE ORAL at 09:30

## 2020-09-05 RX ADMIN — CALCIUM GLUCONATE 1 G: 98 INJECTION, SOLUTION INTRAVENOUS at 01:28

## 2020-09-05 RX ADMIN — DEXTROSE AND SODIUM CHLORIDE: 5; 450 INJECTION, SOLUTION INTRAVENOUS at 12:08

## 2020-09-05 RX ADMIN — SODIUM CHLORIDE 0.1 UNITS/KG/HR: 9 INJECTION, SOLUTION INTRAVENOUS at 03:03

## 2020-09-05 RX ADMIN — SODIUM CHLORIDE 1000 ML: 9 INJECTION, SOLUTION INTRAVENOUS at 01:29

## 2020-09-05 RX ADMIN — INSULIN HUMAN 10 UNITS: 100 INJECTION, SOLUTION PARENTERAL at 01:36

## 2020-09-05 RX ADMIN — FERROUS SULFATE TAB 325 MG (65 MG ELEMENTAL FE) 650 MG: 325 (65 FE) TAB at 09:28

## 2020-09-05 RX ADMIN — ALBUTEROL SULFATE 2.5 MG: 2.5 SOLUTION RESPIRATORY (INHALATION) at 02:19

## 2020-09-05 RX ADMIN — HEPARIN SODIUM 5000 UNITS: 5000 INJECTION, SOLUTION INTRAVENOUS; SUBCUTANEOUS at 21:16

## 2020-09-05 RX ADMIN — SODIUM CHLORIDE 1000 ML: 9 INJECTION, SOLUTION INTRAVENOUS at 03:50

## 2020-09-05 ASSESSMENT — ENCOUNTER SYMPTOMS
STRIDOR: 0
SINUS PAIN: 0
RESPIRATORY NEGATIVE: 1
EYE ITCHING: 0
FACIAL SWELLING: 0
BLOOD IN STOOL: 0
CHEST TIGHTNESS: 0
VISUAL CHANGE: 0
COUGH: 0
VOICE CHANGE: 0
EYES NEGATIVE: 1
COLOR CHANGE: 0
CHOKING: 0
DIARRHEA: 0
NAUSEA: 0
GASTROINTESTINAL NEGATIVE: 1
CONSTIPATION: 0
SINUS PRESSURE: 0
EYE REDNESS: 0
RECTAL PAIN: 0
ABDOMINAL PAIN: 0
VOMITING: 0
EYE PAIN: 0
BOWEL INCONTINENCE: 0
APNEA: 0
WHEEZING: 0
BACK PAIN: 0
EYE DISCHARGE: 0
RHINORRHEA: 0
SHORTNESS OF BREATH: 0
TROUBLE SWALLOWING: 0
PHOTOPHOBIA: 0

## 2020-09-05 ASSESSMENT — PAIN SCALES - GENERAL
PAINLEVEL_OUTOF10: 3
PAINLEVEL_OUTOF10: 3

## 2020-09-05 NOTE — H&P
[facility] History & Physical      Name:  Jori Sanabria /Age/Sex: 1960  (62 y.o. female)   MRN & CSN:  [id number] & [id number] Admission Date/Time: 2020 11:21 PM   Location:  ED18/ED-18 PCP: Jess Rodrigues MD       Date of Admission: 2020    Chief Complaint: Fall (down for 30 hours, recent fall with 5 ribs broken from Aug 29th. trying to hang picture this time)      History of Present Illness: The patient is a 62 y.o. female with a history of hypertension, PRIMITIVO, hyperlipidemia, diabetes, diabetic neuropathy, CVA, COPD, asthma, multiple recent falls presents for evaluation in the ER after a fall. Patient states that she fell the day before out of her bed when she was getting up and lay on her abdomen for approximately 30 hours until she was able to roll onto her back for another 3 hours until her family came to check on her. Patient states that on  she also had a fall and broke her ribs on both sides of her chest.  Patient denies any dizziness, chest pain, syncope, or dyspnea prior to the fall. Describes as mechanical.  During the time on the ground she was not able to eat, drink, or take any of her medications. Patient is questioning whether she is able to take care of herself at home anymore. Patient states at this time she is thirsty and feels overall weak. Denies head injuries. Denies chest pain, shortness of breath, fever, chills, abdominal pain, extremity pain, or lightheadedness. Patient was found to have hyperkalemia, rhabdomyolysis, and was found to be in DKA. Patient was started on insulin drip, given IV insulin, calcium gluconate, albuterol. Patient was admitted to the ICU. Patient's past medical, social, and family history have been reviewed.     Patient case discussed with ED provider Dr. Celina Massey    Patient assessment and plan discussed and reviewed with admitting physician:   Dr. Wendy Mayorga    Ten point ROS: reviewed negative, unless as noted in above HPI.    Past Medical History:        Diagnosis Date    Arthritis     Asthma     Cerebral artery occlusion with cerebral infarction [facility])     per old chart 8/2015- TIA    [facility]ch[facility] obstructive [facility] [address]     Diabetes mellitus ([facility] [address]     Diabetic neuropathy ([facility] [address]     per old chart    Fracture     per old chart pt in ER 12/9/2018- after 2 days of arm pain after fall- dx with left humerus fx- for surg 12/31/2018    Hyperlipidemia     Hypertension     Muscle weakness (generalized)     Osteoarthritis     per old chart       Past Surgical History:        Procedure Laterality Date    CHOLECYSTECTOMY  2002?  EYE SURGERY      per old chart had right eye cataract ext done 2/2018 and left eye 4/2018    FOOT SURGERY Left 2004?  HUMERUS FRACTURE SURGERY Left 12/31/2018    HUMERUS OPEN REDUCTION INTERNAL FIXATION LEFT PROXIMAL performed by Jenaro Frazier DO at [place] [id number] Right 2009? [place] LUNG SURGERY  2009?    simone lung lobectomy        Medications Prior to Admission:    Prior to Admission medications    Medication Sig Start Date End Date Taking?  Authorizing Provider   ondansetron (ZOFRAN ODT) 4 MG disintegrating tablet Take 1 tablet by mouth every 8 hours as needed for Nausea 9/5/19   Hetsurya Oregon, DO   naproxen (NAPROSYN) 500 MG tablet Take 1 tablet by mouth 2 times daily 9/5/19   [facility], DO   Lactobacillus (ACIDOPHILUS) 0.5 MG TABS Acidophilus    Historical Provider, MD   amLODIPine (NORVASC) 10 MG tablet amlodipine 10 mg tablet   TAKE ONE TABLET BY MOUTH DAILY    Historical Provider, MD   metoprolol succinate (TOPROL XL) 50 MG extended release tablet Take 1 tablet by mouth daily 1/25/19   Wes Charles MD   amLODIPine (NORVASC) 5 MG tablet Take 1 tablet by mouth daily 1/25/19   Wes Charles MD   dicyclomine (BENTYL) 20 MG tablet Take 1 tablet by mouth 4 times daily (before meals and nightly) 1/24/19   Wes Charles MD   metoclopramide (REGLAN) 10 MG tablet Take 1 tablet by mouth 4 times daily (before meals and nightly) Hold and stop if you have any involuntary movements. Will need to taper in next 2 wks so 10mg 4 times daily for 5 days  then 5mg 4 times for 5 days  for then 5mg twice daily for 4 days and stop. 1/24/19   Sudhakar Avalos MD   pantoprazole (PROTONIX) 40 MG tablet Take 1 tablet by mouth 2 times daily (before meals) 1/24/19   Sudhakar Avalos MD   insulin glargine (BASAGLAR KWIKPEN) 100 UNIT/ML injection pen Inject 45 Units into the skin nightly    Historical Provider, MD   ferrous sulfate 325 (65 Fe) MG EC tablet Take 650 mg by mouth daily (with breakfast)    Historical Provider, MD   linagliptin (TRADJENTA) 5 MG tablet Take 5 mg by mouth daily    Historical Provider, MD   nystatin (MYCOSTATIN) 017828 UNIT/GM powder Apply topically 4 times daily. 1/17/19   Mehreen Ruth DO   gabapentin (NEURONTIN) 100 MG capsule Take 100 mg by mouth 3 times daily. Ronold Kanner Historical Provider, MD   ondansetron (ZOFRAN) 4 MG tablet Take 1 tablet by mouth daily as needed for Nausea or Vomiting 12/10/18   Rosa Maria Slater MD   sertraline (ZOLOFT) 100 MG tablet Take 150 mg by mouth daily    Historical Provider, MD   topiramate (TOPAMAX) 50 MG tablet Take 50 mg by mouth nightly     Historical Provider, MD   clopidogrel (PLAVIX) 75 MG tablet Take 75 mg by mouth daily    Historical Provider, MD   atorvastatin (LIPITOR) 40 MG tablet Take 40 mg by mouth nightly     Historical Provider, MD   lisinopril (PRINIVIL;ZESTRIL) 40 MG tablet Take 40 mg by mouth daily    Historical Provider, MD       Allergies:    Patient has no known allergies. Social History:    TOBACCO:   reports that she has never smoked. She has never used smokeless tobacco.  ETOH:   reports no history of alcohol use. Family History:    History reviewed. No pertinent family history.     Vitals:   Vitals:    09/05/20 0009 09/05/20 0010 09/05/20 0015   BP:   (!) 155/52   Pulse: 87     Resp: (!) 3 14    Temp: 98.2 °F (36.8 °C) TempSrc: Oral     SpO2: 100%     Weight: 250 lb (113.4 kg)     Height: 5' 4\" (1.626 m)         Physical Exam  GEN -Awake. NAD. Obese. Overall weak. EYES -PERRLA. No scleral erythema, discharge, or conjunctivitis. HENT -MM are dry. Oral pharynx without exudates, no evidence of thrush. NECK -Supple, no apparent thyromegaly or masses. RESP -CTA, no wheezes, rales or rhonchi. Symmetric chest movement while on room air. C/V -S1/S2 auscultated. RRR without appreciable M/R/G. No JVD or carotid bruits. Peripheral pulses equal bilaterally and palpable. Cap refill <3 sec. No peripheral edema. GI -Abdomen is soft non distended and without significant tenderness to palpation. + BS. No masses or guarding.  -No CVA/ flank tenderness. Ramos catheter is not present. LYMPH-No palpable cervical lymphadenopathy and no hepatosplenomegaly. No petechiae or ecchymoses. MS -No gross joint deformities. SKIN -Normal coloration, warm, dry. Jacquiline Brush, alert, oriented x  person, place, time, situation. Cranial nerves appear grossly intact, normal speech, no lateralizing weakness. PSYC- Appropriate affect. Imaging: Reviewed. Xr Chest (2 Vw)    Result Date: 9/5/2020  EXAMINATION: TWO XRAY VIEWS OF THE CHEST 9/5/2020 12:49 am COMPARISON: CT on 08/29/2020 HISTORY: Fall. FINDINGS: Cardiomediastinal silhouette and pulmonary vasculature are normal. No consolidation, pleural effusion, or pneumothorax. No acute osseous abnormality seen. Osteopenia. Postoperative changes of the left humerus. No acute abnormality seen. Ct Head Wo Contrast    Result Date: 9/5/2020  EXAMINATION: CT OF THE HEAD WITHOUT CONTRAST  9/5/2020 12:45 am TECHNIQUE: CT of the head was performed without the administration of intravenous contrast. Dose modulation, iterative reconstruction, and/or weight based adjustment of the mA/kV was utilized to reduce the radiation dose to as low as reasonably achievable.  COMPARISON: August 29, 2020 HISTORY: ORDERING SYSTEM PROVIDED HISTORY: Fall. Blood thinners TECHNOLOGIST PROVIDED HISTORY: Reason for exam:->Fall. Blood thinners Has a \"code stroke\" or \"stroke alert\" been called? ->No Reason for Exam: fall blood thinners Acuity: Acute Type of Exam: Initial Mechanism of Injury: pain Relevant Medical/Surgical History: pt alert FINDINGS: BRAIN/VENTRICLES: There is no acute intracranial hemorrhage, mass effect or midline shift. No abnormal extra-axial fluid collection. The gray-white differentiation is maintained without evidence of an acute infarct. There is no evidence of hydrocephalus. ORBITS: The visualized portion of the orbits demonstrate no acute abnormality. SINUSES: The visualized paranasal sinuses and mastoid air cells demonstrate no acute abnormality. SOFT TISSUES/SKULL:  No acute abnormality of the visualized skull or soft tissues. No acute intracranial abnormality. Ct Head Wo Contrast    Result Date: 8/29/2020  EXAMINATION: CT OF THE HEAD WITHOUT CONTRAST  8/29/2020 7:40 pm TECHNIQUE: CT of the head was performed without the administration of intravenous contrast. Dose modulation, iterative reconstruction, and/or weight based adjustment of the mA/kV was utilized to reduce the radiation dose to as low as reasonably achievable. COMPARISON: None. HISTORY: ORDERING SYSTEM PROVIDED HISTORY: head pain TECHNOLOGIST PROVIDED HISTORY: Has a \"code stroke\" or \"stroke alert\" been called? ->No Reason for exam:->head pain Reason for Exam: head pain, fall FINDINGS: BRAIN/VENTRICLES: There is no acute intracranial hemorrhage, mass effect or midline shift. No abnormal extra-axial fluid collection. The gray-white differentiation is maintained without evidence of an acute infarct. There is no evidence of hydrocephalus. There are nonspecific hypoattenuating foci in the subcortical and periventricular white matter that most likely represent chronic microangiopathic ischemic changes in a patient of this age. ORBITS: The visualized portion of the orbits demonstrate no acute abnormality. SINUSES: The visualized paranasal sinuses and mastoid air cells demonstrate no acute abnormality. SOFT TISSUES/SKULL:  No acute abnormality of the visualized skull or soft tissues. No acute intracranial abnormality. Labs:  Reviewed  Lab Results:  CBC   Recent Labs     09/04/20 2350   WBC 13.8*   HGB 11.9*   HCT 40.8         RENAL  Recent Labs     09/04/20 2350      K 7.1*   CL 97*   CO2 13*   BUN 47*   CREATININE 1.7*     LFT'S  Recent Labs     09/04/20  2350   AST 24   ALT 34   BILITOT 0.7   ALKPHOS 135*     COAG  No results for input(s): INR in the last 72 hours. CARDIAC ENZYMES  Recent Labs     09/04/20 2350   CKTOTAL 636*     U/A:    Lab Results   Component Value Date    COLORU STRAW 09/05/2020    WBCUA 3 09/05/2020    RBCUA NONE SEEN 09/05/2020    MUCUS MANY 01/17/2019    BACTERIA NEGATIVE 09/05/2020    CLARITYU CLEAR 09/05/2020    SPECGRAV 1.020 09/05/2020    LEUKOCYTESUR NEGATIVE 09/05/2020    BLOODU SMALL 09/05/2020     ABG  No results found for: WEJ9JZC, BEART, H9OSITJB, PHART, THGBART, SUH4NMN, PO2ART, BHG3RYH      Medical Decision Making:  DKA   Hx of DM II. Likely due to 2/2 recent fall with prolonged time down (33 hours) and inability to take medications, eat, or drink.    VBG pending    Hold at home insulin and PO medications while inpatient  Check HgbA1C   NPO               Monitor FSBS and cover with DKA Insulin protocol               IVF hydration              BMP q4h, replace electrolytes as needed   Antiemetics PRN              Endocrinology consult placed    Mechanical fall likely resulting in rhabdomyolysis and MODESTO with associated hyperkalemia   Was down on her abdomen/side for 33 hours total.   Trend BMP, monitor response to fluids   Given albuterol, calcium gluconate, insulin IV and 2L fluid bolus in ER   Continue IVF, expect K to improve with tx of DKA   EKG showing prolonged QTC, monitor    CM consult placed, may need home health or placement    Hold nephrotoxic home medications     Chronic conditions:  COPD  Asthma  Hyperlipidemia   Continue lipitor  Hypertension   Hold lisinopril due to MODESTO   Continue toprol  Diabetic neuropathy   Hold gabapentin due to MODESTO  History of TIA   Continue plavix    DVT Prophylaxis: Heparin   Diet: NPO   Code Status: full    MEGAN Nazario-C  [facility]

## 2020-09-05 NOTE — ED PROVIDER NOTES
Negative. Negative for photophobia, pain, discharge, redness, itching and visual disturbance. Respiratory: Negative. Negative for apnea, cough, choking, chest tightness, shortness of breath, wheezing and stridor. Cardiovascular: Negative. Negative for chest pain, palpitations and leg swelling. Gastrointestinal: Negative. Negative for abdominal pain, blood in stool, bowel incontinence, constipation, diarrhea, nausea, rectal pain and vomiting. Endocrine: Negative for polyuria. Genitourinary: Negative. Negative for dysuria, flank pain, frequency, hematuria and urgency. Musculoskeletal: Positive for gait problem. Negative for arthralgias, back pain, myalgias, neck pain and neck stiffness. Skin: Negative. Negative for color change, pallor, rash and wound. Neurological: Negative for dizziness, tingling, loss of consciousness, speech difficulty, light-headedness, numbness and headaches. Psychiatric/Behavioral: Negative. Negative for agitation, confusion, self-injury, sleep disturbance and suicidal ideas. The patient is not nervous/anxious. All other systems reviewed and are negative. Except as noted above the remainder of the review of systems was reviewed and negative. PAST MEDICAL HISTORY     Past Medical History:   Diagnosis Date    Arthritis     Asthma     Cerebral artery occlusion with cerebral infarction [facility])     per old chart 8/2015- TIA    COPD [facility]chronic obstructive [facility] disease) [facility]     Diabetes mellitus ([facility]     Diabetic neuropathy ([facility]     per old chart    Fracture     per old chart pt in ER 12/9/2018- after 2 days of arm pain after fall- dx with left humerus fx- for surg 12/31/2018    Hyperlipidemia     Hypertension     Muscle weakness (generalized)     Osteoarthritis     per old chart       Prior to Admission medications    Medication Sig Start Date End Date Taking?  Authorizing Provider   ondansetron (ZOFRAN ODT) 4 MG disintegrating tablet Take 1 tablet by mouth every 8 hours as needed for Nausea 9/5/19   Niki Fay DO   naproxen (NAPROSYN) 500 MG tablet Take 1 tablet by mouth 2 times daily 9/5/19   Niki Fay DO   Lactobacillus (ACIDOPHILUS) 0.5 MG TABS Acidophilus    Historical Provider, MD   amLODIPine (NORVASC) 10 MG tablet amlodipine 10 mg tablet   TAKE ONE TABLET BY MOUTH DAILY    Historical Provider, MD   metoprolol succinate (TOPROL XL) 50 MG extended release tablet Take 1 tablet by mouth daily 1/25/19   Drew Crouch MD   amLODIPine (NORVASC) 5 MG tablet Take 1 tablet by mouth daily 1/25/19   Drew Crouch MD   dicyclomine (BENTYL) 20 MG tablet Take 1 tablet by mouth 4 times daily (before meals and nightly) 1/24/19   Drew Crouch MD   metoclopramide (REGLAN) 10 MG tablet Take 1 tablet by mouth 4 times daily (before meals and nightly) Hold and stop if you have any involuntary movements. Will need to taper in next 2 wks so 10mg 4 times daily for 5 days  then 5mg 4 times for 5 days  for then 5mg twice daily for 4 days and stop. 1/24/19   Drew Crouch MD   pantoprazole (PROTONIX) 40 MG tablet Take 1 tablet by mouth 2 times daily (before meals) 1/24/19   Drew Crouch MD   insulin glargine (BASAGLAR KWIKPEN) 100 UNIT/ML injection pen Inject 45 Units into the skin nightly    Historical Provider, MD   ferrous sulfate 325 (65 Fe) MG EC tablet Take 650 mg by mouth daily (with breakfast)    Historical Provider, MD   linagliptin (TRADJENTA) 5 MG tablet Take 5 mg by mouth daily    Historical Provider, MD   nystatin (MYCOSTATIN) 835857 UNIT/GM powder Apply topically 4 times daily. 1/17/19   Jorge Alberto Velasco DO   gabapentin (NEURONTIN) 100 MG capsule Take 100 mg by mouth 3 times daily. Wale Vincent     Historical Provider, MD   ondansetron (ZOFRAN) 4 MG tablet Take 1 tablet by mouth daily as needed for Nausea or Vomiting 12/10/18   Brynn Libman, MD   sertraline (ZOLOFT) 100 MG tablet Take 150 mg by mouth daily    Historical Provider, MD   topiramate (TOPAMAX) 50 MG tablet Take 50 mg by mouth nightly     Historical Provider, MD   clopidogrel (PLAVIX) 75 MG tablet Take 75 mg by mouth daily    Historical Provider, MD   atorvastatin (LIPITOR) 40 MG tablet Take 40 mg by mouth nightly     Historical Provider, MD   lisinopril (PRINIVIL;ZESTRIL) 40 MG tablet Take 40 mg by mouth daily    Historical Provider, MD        Patient Active Problem List   Diagnosis    Proximal humeral fracture    Asthma    Benign essential hypertension    Bereavement    Pain of both hip joints    Chest pain    Chronic abdominal pain    Chronic back pain    Chronic obstructive lung disease (HCC)    Corns and callosities    Depressive disorder    Type II diabetes mellitus, uncontrolled ([address]    Diabetic polyneuropathy ([address]    HTN (hypertension)    Hyperlipidemia    Lesion of liver    Menopausal symptom    Mixed hyperlipidemia    Onychomycosis    Obesity    Osteoarthrosis    Osteoporosis    Pain in both feet    Sleep apnea    TIA (transient ischemic attack)    Vitamin D deficiency    Wheezing    S/P ORIF (open reduction internal fixation) fracture    Diabetic gastroparesis (HCC)    Gastroesophageal reflux disease    Restless legs    Ketoacidosis, diabetic, type 2, no coma ([address]         SURGICAL HISTORY       Past Surgical History:   Procedure Laterality Date    CHOLECYSTECTOMY  2002?  EYE SURGERY      per old chart had right eye cataract ext done 2/2018 and left eye 4/2018    FOOT SURGERY Left 2004?  HUMERUS FRACTURE SURGERY Left 12/31/2018    HUMERUS OPEN REDUCTION INTERNAL FIXATION LEFT PROXIMAL performed by Vangie Vasquez DO at [facility] 2009?     LUNG SURGERY  2009?    simone lung lobectomy          CURRENT MEDICATIONS       Previous Medications    AMLODIPINE (NORVASC) 10 MG TABLET    amlodipine 10 mg tablet   TAKE ONE TABLET BY MOUTH DAILY    AMLODIPINE (NORVASC) 5 MG TABLET    Take 1 tablet by mouth daily    ATORVASTATIN (LIPITOR) 40 MG TABLET Take 40 mg by mouth nightly     CLOPIDOGREL (PLAVIX) 75 MG TABLET    Take 75 mg by mouth daily    DICYCLOMINE (BENTYL) 20 MG TABLET    Take 1 tablet by mouth 4 times daily (before meals and nightly)    FERROUS SULFATE 325 (65 FE) MG EC TABLET    Take 650 mg by mouth daily (with breakfast)    GABAPENTIN (NEURONTIN) 100 MG CAPSULE    Take 100 mg by mouth 3 times daily. .    INSULIN GLARGINE (BASAGLAR KWIKPEN) 100 UNIT/ML INJECTION PEN    Inject 45 Units into the skin nightly    LACTOBACILLUS (ACIDOPHILUS) 0.5 MG TABS    Acidophilus    LINAGLIPTIN (TRADJENTA) 5 MG TABLET    Take 5 mg by mouth daily    LISINOPRIL (PRINIVIL;ZESTRIL) 40 MG TABLET    Take 40 mg by mouth daily    METOCLOPRAMIDE (REGLAN) 10 MG TABLET    Take 1 tablet by mouth 4 times daily (before meals and nightly) Hold and stop if you have any involuntary movements. Will need to taper in next 2 wks so 10mg 4 times daily for 5 days  then 5mg 4 times for 5 days  for then 5mg twice daily for 4 days and stop. METOPROLOL SUCCINATE (TOPROL XL) 50 MG EXTENDED RELEASE TABLET    Take 1 tablet by mouth daily    NAPROXEN (NAPROSYN) 500 MG TABLET    Take 1 tablet by mouth 2 times daily    NYSTATIN (MYCOSTATIN) 821612 UNIT/GM POWDER    Apply topically 4 times daily. ONDANSETRON (ZOFRAN ODT) 4 MG DISINTEGRATING TABLET    Take 1 tablet by mouth every 8 hours as needed for Nausea    ONDANSETRON (ZOFRAN) 4 MG TABLET    Take 1 tablet by mouth daily as needed for Nausea or Vomiting    PANTOPRAZOLE (PROTONIX) 40 MG TABLET    Take 1 tablet by mouth 2 times daily (before meals)    SERTRALINE (ZOLOFT) 100 MG TABLET    Take 150 mg by mouth daily    TOPIRAMATE (TOPAMAX) 50 MG TABLET    Take 50 mg by mouth nightly        ALLERGIES     Patient has no known allergies. FAMILY HISTORY     History reviewed. No pertinent family history.        SOCIAL HISTORY       Social History     Socioeconomic History    Marital status:      Spouse name: None    Number of children: None    Years of education: None    Highest education level: None   Occupational History    None   Social Needs    Financial resource strain: None    Food insecurity     Worry: None     Inability: None    Transportation needs     Medical: None     Non-medical: None   Tobacco Use    Smoking status: Never Smoker    Smokeless tobacco: Never Used    Tobacco comment: 12/27/*2018- with phone assessment with caregiver- unsure of social, surgical or family hx for phone assessment   Substance and Sexual Activity    Alcohol use: No    Drug use: No    Sexual activity: None   Lifestyle    Physical activity     Days per week: None     Minutes per session: None    Stress: None   Relationships    Social connections     Talks on phone: None     Gets together: None     Attends [employer] service: None     Active member of club or organization: None     Attends meetings of clubs or organizations: None     Relationship status: None    Intimate partner violence     Fear of current or ex partner: None     Emotionally abused: None     Physically abused: None     Forced sexual activity: None   Other Topics Concern    None   Social History Narrative    None       SCREENINGS               PHYSICAL EXAM    (up to 7 for level 4, 8 or more for level 5)     ED Triage Vitals   BP Temp Temp src Pulse Resp SpO2 Height Weight   -- -- -- -- -- -- -- --       Physical Exam  Vitals signs and nursing note reviewed. Constitutional:       General: She is not in acute distress. Appearance: She is well-developed. She is not diaphoretic. HENT:      Head: Normocephalic and atraumatic. Right Ear: External ear normal.      Left Ear: External ear normal.      Nose: Nose normal.      Mouth/Throat:      Pharynx: No oropharyngeal exudate. Eyes:      General: No scleral icterus. Right eye: No discharge. Left eye: No discharge. Pupils: Pupils are equal, round, and reactive to light.    Neck:      Musculoskeletal: Normal range of motion. Thyroid: No thyromegaly. Vascular: No JVD. Trachea: No tracheal deviation. Cardiovascular:      Rate and Rhythm: Normal rate and regular rhythm. Heart sounds: Normal heart sounds. No murmur. No friction rub. No gallop. Pulmonary:      Effort: Pulmonary effort is normal. No respiratory distress. Breath sounds: Normal breath sounds. No stridor. No wheezing or rales. Chest:      Chest wall: No tenderness. Abdominal:      General: Bowel sounds are normal. There is no distension. Palpations: Abdomen is soft. There is no mass. Tenderness: There is no abdominal tenderness. There is no right CVA tenderness, left CVA tenderness, guarding or rebound. Hernia: No hernia is present. Musculoskeletal: Normal range of motion. General: No swelling, tenderness, deformity or signs of injury. Right lower leg: Edema present. Left lower leg: Edema present. Lymphadenopathy:      Cervical: No cervical adenopathy. Skin:     General: Skin is warm. Capillary Refill: Capillary refill takes less than 2 seconds. Coloration: Skin is not pale. Findings: No erythema or rash. Neurological:      Mental Status: She is alert and oriented to person, place, and time. Cranial Nerves: No cranial nerve deficit. Sensory: No sensory deficit. Motor: No abnormal muscle tone. Coordination: Coordination normal.      Deep Tendon Reflexes: Reflexes normal.   Psychiatric:         Behavior: Behavior normal.         Thought Content:  Thought content normal.         Judgment: Judgment normal.         DIAGNOSTIC RESULTS     Labs Reviewed   CBC WITH AUTO DIFFERENTIAL - Abnormal; Notable for the following components:       Result Value    WBC 13.8 (*)     RBC 4.13 (*)     Hemoglobin 11.9 (*)     MCHC 29.2 (*)     Segs Relative 88.1 (*)     Lymphocytes % 6.2 (*)     Monocytes % 5.0 (*)     All other components within normal limits are read by the radiologist. Plain radiographic images are visualized and preliminarily interpreted by the emergency physician. Interpretation per the Radiologist below, if available at the time of this note:    XR CHEST (2 VW)   Final Result   No acute abnormality seen. CT Head WO Contrast   Final Result   No acute intracranial abnormality. ED BEDSIDE ULTRASOUND:   Performed by ED Physician [place] DO       LABS:  Labs Reviewed   CBC WITH AUTO DIFFERENTIAL - Abnormal; Notable for the following components:       Result Value    WBC 13.8 (*)     RBC 4.13 (*)     Hemoglobin 11.9 (*)     MCHC 29.2 (*)     Segs Relative 88.1 (*)     Lymphocytes % 6.2 (*)     Monocytes % 5.0 (*)     All other components within normal limits   COMPREHENSIVE METABOLIC PANEL W/ REFLEX TO MG FOR LOW K - Abnormal; Notable for the following components:    Potassium 7.1 (*)     Chloride 97 (*)     CO2 13 (*)     BUN 47 (*)     CREATININE 1.7 (*)     Glucose 741 (*)     Alkaline Phosphatase 135 (*)     GFR Non- 31 (*)     GFR  37 (*)     Anion Gap 27 (*)     All other components within normal limits   LIPASE - Abnormal; Notable for the following components:    Lipase 6 (*)     All other components within normal limits   BRAIN NATRIURETIC PEPTIDE - Abnormal; Notable for the following components:    Pro-BNP 1,254 (*)     All other components within normal limits   URINALYSIS - Abnormal; Notable for the following components:    Color, UA STRAW (*)     Glucose, Urine >500 (*)     Ketones, Urine MODERATE (*)     Blood, Urine SMALL (*)     Protein,  (*)     All other components within normal limits   CK - Abnormal; Notable for the following components:     Total  (*)     All other components within normal limits   BETA-HYDROXYBUTYRATE - Abnormal; Notable for the following components:    Beta-Hydroxybutyrate >20.8 (*)     All other components within normal limits   CULTURE, URINE   TROPONIN   BLOOD GAS, VENOUS   POCT GLUCOSE   POCT GLUCOSE   POCT GLUCOSE   POCT GLUCOSE   POCT GLUCOSE   POCT GLUCOSE   POCT GLUCOSE   POCT GLUCOSE   POCT GLUCOSE   POCT GLUCOSE   POCT GLUCOSE   POCT GLUCOSE   POCT GLUCOSE   POCT GLUCOSE       All other labs were within normal range or not returned as of this dictation. EMERGENCY DEPARTMENT COURSE and DIFFERENTIAL DIAGNOSIS/MDM:   Vitals:    Vitals:    09/05/20 0009 09/05/20 0010 09/05/20 0015   BP:   (!) 155/52   Pulse: 87     Resp: (!) 3 14    Temp: 98.2 °F (36.8 °C)     TempSrc: Oral     SpO2: 100%     Weight: 250 lb (113.4 kg)     Height: 5' 4\" (1.626 m)             MDM  Number of Diagnoses or Management Options  Diagnosis management comments: 58-year-old female presents emergency department status post fall that occurred yesterday. Patient reports that she was lying on the ground for approximately 30 hours until her father arrived and called 46. CPK is elevated. Blood glucose is above 700. Patient is in DKA. Patient was given IV fluids, insulin. Patient is also hypokalemic. Patient received calcium gluconate, insulin, albuterol. Patient is to be admitted to hospitalist service for further care. Vital signs are stable at this time. We will continue to monitor. Amount and/or Complexity of Data Reviewed  Clinical lab tests: ordered and reviewed  Tests in the radiology section of CPT®: ordered and reviewed  Tests in the medicine section of CPT®: ordered and reviewed    Risk of Complications, Morbidity, and/or Mortality  Presenting problems: moderate  Diagnostic procedures: moderate  Management options: moderate    Critical Care  Total time providing critical care: < 30 minutes    Patient Progress  Patient progress: improved        REASSESSMENT          CRITICAL CARE TIME     This excludes seperately billable procedures and family discussion time.  Critical care time provided for obtaining history, conducting a physical exam, performing and monitoring interventions, ordering, collecting and interpreting tests, and establishing medical decision-making. There was a potential for life/limb threatening pathology requiring close evaluation and intervention with concern for patient decompensation. CONSULTS:  IP CONSULT TO HOSPITALIST  IP CONSULT TO HOSPITALIST  IP CONSULT TO ENDOCRINOLOGY    PROCEDURES:  None performed unless otherwise noted below     Procedures        FINAL IMPRESSION      1. Fall, initial encounter    2. Traumatic rhabdomyolysis, initial encounter ([facility]    3. Diabetic ketoacidosis without coma associated with type 2 diabetes mellitus ([facility]    4. Hyperkalemia          DISPOSITION/PLAN   DISPOSITION        PATIENT REFERRED TO:  Collette Milch, MD  [address] [place] [address] Drive  [phone]            DISCHARGE MEDICATIONS:  New Prescriptions    No medications on file       ED Provider Disposition Time  DISPOSITION        Appropriate personal protective equipment was worn during the patient's evaluation. These included surgical, eye protection, surgical mask or in 95 respirator and gloves. The patient was also placed in a surgical mask for the prevention of possible spread of respiratory viral illnesses. The Patient was instructed to read the package inserts with any medication that was prescribed. Major potential reactions and medication interactions were discussed. The Patient understands that there are numerous possible adverse reactions not covered. The patient was also instructed to arrange follow-up with his or her primary care provider for review of any pending labwork or incidental findings on any radiology results that were obtained. All efforts were made to discuss any incidental findings that require further monitoring. Controlled Substances Monitoring:     No flowsheet data found.     (Please note that portions of this note were completed with a voice recognition program.  Efforts were made to edit the dictations but occasionally words are mis-transcribed.)    Isabel Menjivar DO (electronically signed)  Attending Emergency Physician            Isabel Menjivar DO  09/05/20 3694

## 2020-09-05 NOTE — ED NOTES
Report given to [facility] on ICU.      Lyla Edmonds RN  09/05/20 5789

## 2020-09-05 NOTE — FLOWSHEET NOTE
Dr Diania Hashimoto called back for consult and states he is out of town today and hospitalist will need to manage her today. Perfect Serve to Dr Lynn Vasquez and made aware.

## 2020-09-05 NOTE — PROGRESS NOTES
Hospitalist Progress Note      Name:  Luca Real /Age/Sex: 1960  (62 y.o. female)   MRN & CSN:  [id number] & [id number] Admission Date/Time: 2020 11:21 PM   Location:  -A PCP: Nuvia Jarrett MD       Luca Real is a 62 y.o.  female  who presents with Fall (down for 30 hours, recent fall with 5 ribs broken from Aug 29th. trying to hang picture this time)      Assessment and Plan:   DKA   Hx of DM II. Likely due to 2/2 recent fall with prolonged time down (33 hours) and inability to take medications, eat, or drink.    Hold at home insulin and PO medications while inpatient  Insulin gtt  Check HgbA1C: 7.9              start clears now              Monitor FSBS and cover with DKA Insulin protocol               IVF hydration              BMP q4h, replace electrolytes as needed              Antiemetics PRN              Endocrinology consult      Mechanical fall resulting in rhabdomyolysis and MODESOT with associated hyperkalemia    - improving              Was down on her abdomen/side for 33 hours total.              Trend BMP              Given albuterol, calcium gluconate, insulin IV and 2L fluid bolus in ER (potassium normalized now)              Continue IVF              EKG showing prolonged QTC, monitor               CM consult placed, may need home health or placement                   Hold nephrotoxic home medications     Nondisplaced rib fx on left 5th and 6th ribs and also right 4th through 6th  - occurred   - lidoderm patch         Chronic conditions:  COPD  Asthma  Hyperlipidemia              Continue lipitor  Hypertension              Hold lisinopril due to MODESTO              Continue toprol  Diabetic neuropathy              Hold gabapentin due to MODESTO  History of TIA              Continue plavix               Diet DIET CLEAR LIQUID; Carb Control: 4 carb choices (60 gms)/meal   Code Status Full Code     Medications:   Medications:    clopidogrel  75 mg Oral Daily  ferrous sulfate  650 mg Oral Daily with breakfast    metoprolol succinate  50 mg Oral Daily    heparin (porcine)  5,000 Units Subcutaneous 3 times per day    lidocaine  1 patch Transdermal Daily      Infusions:    dextrose      insulin 3 Units/hr (09/05/20 0940)    sodium chloride 250 mL/hr at 09/05/20 0610    dextrose 5 % and 0.45 % NaCl       PRN Meds: glucose, 15 g, PRN  glucagon (rDNA), 1 mg, PRN  dextrose, 100 mL/hr, PRN  dextrose, 12.5 g, PRN  magnesium sulfate, 1 g, PRN  sodium phosphate IVPB, 10 mmol, PRN    Or  sodium phosphate IVPB, 15 mmol, PRN    Or  sodium phosphate IVPB, 20 mmol, PRN  dextrose 5 % and 0.45 % NaCl, , Continuous PRN      Subjective:     Doing ok, no distress, wants something to drink  Objective: Intake/Output Summary (Last 24 hours) at 9/5/2020 1007  Last data filed at 9/5/2020 0803  Gross per 24 hour   Intake 1875 ml   Output 50 ml   Net 1825 ml      Vitals:   Vitals:    09/05/20 0803   BP: (!) 156/89   Pulse: 93   Resp: 17   Temp: 98.6 °F (37 °C)   SpO2: 100%     Physical Exam:   Gen:  awake, alert, no apparent distress  Head/Eyes:  Normocephalic atraumatic, EOMI   NECK:   symmetrical, trachea midline  LUNGS: Normal Effort   CARDIOVASCULAR:  Normal rate  ABDOMEN:  non distended  MUSCULOSKELETAL:  ROM limited  NEUROLOGIC: Alert and Oriented,  Cranial nerves II-XII are grossly intact.    SKIN:  no bruising or bleeding, normal skin color,  no redness      Data:       CBC   Recent Labs     09/04/20  2350   WBC 13.8*   HGB 11.9*   HCT 40.8         BMP   Recent Labs     09/04/20  2350 09/05/20  0400 09/05/20  0815    136 137   K 7.1* 5.2* 5.1   CL 97* 97* 104   CO2 13* 12* 19*   PHOS  --   --  3.2   BUN 47* 49* 49*   CREATININE 1.7* 1.9* 1.8*         Electronically signed by Stefani Bell MD on 9/5/2020 at 10:07 AM

## 2020-09-06 LAB
ANION GAP SERPL CALCULATED.3IONS-SCNC: 11 MMOL/L (ref 4–16)
BUN BLDV-MCNC: 42 MG/DL (ref 6–23)
CALCIUM SERPL-MCNC: 8.3 MG/DL (ref 8.3–10.6)
CHLORIDE BLD-SCNC: 107 MMOL/L (ref 99–110)
CO2: 19 MMOL/L (ref 21–32)
CREAT SERPL-MCNC: 1.4 MG/DL (ref 0.6–1.1)
GFR AFRICAN AMERICAN: 46 ML/MIN/1.73M2
GFR NON-AFRICAN AMERICAN: 38 ML/MIN/1.73M2
GLUCOSE BLD-MCNC: 178 MG/DL (ref 70–99)
GLUCOSE BLD-MCNC: 210 MG/DL (ref 70–99)
GLUCOSE BLD-MCNC: 250 MG/DL (ref 70–99)
GLUCOSE BLD-MCNC: 274 MG/DL (ref 70–99)
GLUCOSE BLD-MCNC: 93 MG/DL (ref 70–99)
MAGNESIUM: 2.1 MG/DL (ref 1.8–2.4)
PHOSPHORUS: 2.9 MG/DL (ref 2.5–4.9)
POTASSIUM SERPL-SCNC: 4.7 MMOL/L (ref 3.5–5.1)
SODIUM BLD-SCNC: 137 MMOL/L (ref 135–145)
TOTAL CK: 411 IU/L (ref 26–140)

## 2020-09-06 PROCEDURE — 2580000003 HC RX 258: Performed by: FAMILY MEDICINE

## 2020-09-06 PROCEDURE — 6370000000 HC RX 637 (ALT 250 FOR IP): Performed by: PHYSICIAN ASSISTANT

## 2020-09-06 PROCEDURE — 6360000002 HC RX W HCPCS: Performed by: FAMILY MEDICINE

## 2020-09-06 PROCEDURE — 6360000002 HC RX W HCPCS: Performed by: NURSE PRACTITIONER

## 2020-09-06 PROCEDURE — 84100 ASSAY OF PHOSPHORUS: CPT

## 2020-09-06 PROCEDURE — 6360000002 HC RX W HCPCS: Performed by: PHYSICIAN ASSISTANT

## 2020-09-06 PROCEDURE — 94761 N-INVAS EAR/PLS OXIMETRY MLT: CPT

## 2020-09-06 PROCEDURE — 94150 VITAL CAPACITY TEST: CPT

## 2020-09-06 PROCEDURE — 1200000000 HC SEMI PRIVATE

## 2020-09-06 PROCEDURE — 6370000000 HC RX 637 (ALT 250 FOR IP): Performed by: FAMILY MEDICINE

## 2020-09-06 PROCEDURE — 82550 ASSAY OF CK (CPK): CPT

## 2020-09-06 PROCEDURE — 80048 BASIC METABOLIC PNL TOTAL CA: CPT

## 2020-09-06 PROCEDURE — 6370000000 HC RX 637 (ALT 250 FOR IP): Performed by: NURSE PRACTITIONER

## 2020-09-06 PROCEDURE — 83735 ASSAY OF MAGNESIUM: CPT

## 2020-09-06 RX ORDER — LISINOPRIL 20 MG/1
40 TABLET ORAL DAILY
Status: DISCONTINUED | OUTPATIENT
Start: 2020-09-06 | End: 2020-09-06

## 2020-09-06 RX ORDER — TOPIRAMATE 25 MG/1
50 TABLET ORAL NIGHTLY
Status: DISCONTINUED | OUTPATIENT
Start: 2020-09-06 | End: 2020-09-15 | Stop reason: HOSPADM

## 2020-09-06 RX ORDER — AMLODIPINE BESYLATE 10 MG/1
10 TABLET ORAL DAILY
Status: DISCONTINUED | OUTPATIENT
Start: 2020-09-06 | End: 2020-09-15 | Stop reason: HOSPADM

## 2020-09-06 RX ORDER — ONDANSETRON 2 MG/ML
4 INJECTION INTRAMUSCULAR; INTRAVENOUS EVERY 6 HOURS PRN
Status: DISCONTINUED | OUTPATIENT
Start: 2020-09-06 | End: 2020-09-15 | Stop reason: HOSPADM

## 2020-09-06 RX ORDER — HYDRALAZINE HYDROCHLORIDE 20 MG/ML
10 INJECTION INTRAMUSCULAR; INTRAVENOUS EVERY 6 HOURS PRN
Status: DISCONTINUED | OUTPATIENT
Start: 2020-09-06 | End: 2020-09-15 | Stop reason: HOSPADM

## 2020-09-06 RX ORDER — DICYCLOMINE HCL 20 MG
20 TABLET ORAL 4 TIMES DAILY PRN
Status: DISCONTINUED | OUTPATIENT
Start: 2020-09-06 | End: 2020-09-15 | Stop reason: HOSPADM

## 2020-09-06 RX ORDER — OXYCODONE HYDROCHLORIDE AND ACETAMINOPHEN 5; 325 MG/1; MG/1
1 TABLET ORAL EVERY 4 HOURS PRN
Status: DISCONTINUED | OUTPATIENT
Start: 2020-09-06 | End: 2020-09-15 | Stop reason: HOSPADM

## 2020-09-06 RX ORDER — INSULIN GLARGINE 100 [IU]/ML
50 INJECTION, SOLUTION SUBCUTANEOUS NIGHTLY
Status: DISCONTINUED | OUTPATIENT
Start: 2020-09-06 | End: 2020-09-09

## 2020-09-06 RX ORDER — ATORVASTATIN CALCIUM 40 MG/1
40 TABLET, FILM COATED ORAL NIGHTLY
Status: DISCONTINUED | OUTPATIENT
Start: 2020-09-06 | End: 2020-09-15 | Stop reason: HOSPADM

## 2020-09-06 RX ADMIN — CLOPIDOGREL BISULFATE 75 MG: 75 TABLET ORAL at 08:20

## 2020-09-06 RX ADMIN — HYDRALAZINE HYDROCHLORIDE 10 MG: 20 INJECTION INTRAMUSCULAR; INTRAVENOUS at 09:43

## 2020-09-06 RX ADMIN — INSULIN LISPRO 4 UNITS: 100 INJECTION, SOLUTION INTRAVENOUS; SUBCUTANEOUS at 16:32

## 2020-09-06 RX ADMIN — FERROUS SULFATE TAB 325 MG (65 MG ELEMENTAL FE) 650 MG: 325 (65 FE) TAB at 08:20

## 2020-09-06 RX ADMIN — ATORVASTATIN CALCIUM 40 MG: 40 TABLET, FILM COATED ORAL at 21:35

## 2020-09-06 RX ADMIN — ONDANSETRON 4 MG: 2 INJECTION INTRAMUSCULAR; INTRAVENOUS at 04:34

## 2020-09-06 RX ADMIN — HEPARIN SODIUM 5000 UNITS: 5000 INJECTION, SOLUTION INTRAVENOUS; SUBCUTANEOUS at 16:32

## 2020-09-06 RX ADMIN — SODIUM CHLORIDE: 9 INJECTION, SOLUTION INTRAVENOUS at 16:38

## 2020-09-06 RX ADMIN — INSULIN LISPRO 6 UNITS: 100 INJECTION, SOLUTION INTRAVENOUS; SUBCUTANEOUS at 08:20

## 2020-09-06 RX ADMIN — HEPARIN SODIUM 5000 UNITS: 5000 INJECTION, SOLUTION INTRAVENOUS; SUBCUTANEOUS at 21:46

## 2020-09-06 RX ADMIN — SODIUM CHLORIDE: 9 INJECTION, SOLUTION INTRAVENOUS at 04:49

## 2020-09-06 RX ADMIN — TOPIRAMATE 50 MG: 25 TABLET, FILM COATED ORAL at 21:35

## 2020-09-06 RX ADMIN — HEPARIN SODIUM 5000 UNITS: 5000 INJECTION, SOLUTION INTRAVENOUS; SUBCUTANEOUS at 06:55

## 2020-09-06 RX ADMIN — DICYCLOMINE HYDROCHLORIDE 20 MG: 20 TABLET ORAL at 21:36

## 2020-09-06 RX ADMIN — INSULIN LISPRO 15 UNITS: 100 INJECTION, SOLUTION INTRAVENOUS; SUBCUTANEOUS at 16:34

## 2020-09-06 RX ADMIN — OXYCODONE HYDROCHLORIDE AND ACETAMINOPHEN 1 TABLET: 5; 325 TABLET ORAL at 04:33

## 2020-09-06 RX ADMIN — AMLODIPINE BESYLATE 10 MG: 10 TABLET ORAL at 10:01

## 2020-09-06 RX ADMIN — ONDANSETRON 4 MG: 2 INJECTION INTRAMUSCULAR; INTRAVENOUS at 10:47

## 2020-09-06 RX ADMIN — HYDRALAZINE HYDROCHLORIDE 10 MG: 20 INJECTION INTRAMUSCULAR; INTRAVENOUS at 22:54

## 2020-09-06 RX ADMIN — SERTRALINE HYDROCHLORIDE 150 MG: 50 TABLET ORAL at 10:01

## 2020-09-06 RX ADMIN — METOPROLOL SUCCINATE 50 MG: 50 TABLET, EXTENDED RELEASE ORAL at 08:20

## 2020-09-06 ASSESSMENT — PAIN - FUNCTIONAL ASSESSMENT: PAIN_FUNCTIONAL_ASSESSMENT: PREVENTS OR INTERFERES SOME ACTIVE ACTIVITIES AND ADLS

## 2020-09-06 ASSESSMENT — PAIN DESCRIPTION - DESCRIPTORS: DESCRIPTORS: THROBBING

## 2020-09-06 ASSESSMENT — PAIN SCALES - GENERAL
PAINLEVEL_OUTOF10: 10
PAINLEVEL_OUTOF10: 6
PAINLEVEL_OUTOF10: 0
PAINLEVEL_OUTOF10: 0

## 2020-09-06 ASSESSMENT — PAIN DESCRIPTION - PROGRESSION: CLINICAL_PROGRESSION: NOT CHANGED

## 2020-09-06 ASSESSMENT — PAIN DESCRIPTION - PAIN TYPE: TYPE: ACUTE PAIN

## 2020-09-06 ASSESSMENT — PAIN DESCRIPTION - LOCATION: LOCATION: RIB CAGE

## 2020-09-06 ASSESSMENT — PAIN DESCRIPTION - FREQUENCY: FREQUENCY: INTERMITTENT

## 2020-09-06 ASSESSMENT — PAIN DESCRIPTION - ORIENTATION: ORIENTATION: RIGHT

## 2020-09-06 ASSESSMENT — PAIN DESCRIPTION - ONSET: ONSET: ON-GOING

## 2020-09-06 NOTE — CARE COORDINATION
Cm attempted to contact pt by phone in her room with no answer. Chart reviewed and pt was found down at home and had been down approx 30 hrs. Pt lives alone in a 1 floor condo. Pt has PCP and insurance to assist with cost of Rxs. Pt's only emergency contact listed is a parent. CM anticipates possible need for SNF placement. PT/OT ordered today and whiteboard message left re; need for evals and that if SNF needed would require precert. CM to follow up with pt once PT/OT evals complete and recs noted.

## 2020-09-06 NOTE — PROGRESS NOTES
Hospitalist Progress Note      Name:  Brionna Nash /Age/Sex: 1960  (62 y.o. female)   MRN & CSN:  [id number] & [id number] Admission Date/Time: 2020 11:21 PM   Location:  -A PCP: Herb Lowry MD       Brionna Nash is a 62 y.o.  female  who presents with Fall (down for 30 hours, recent fall with 5 ribs broken from Aug 29th. trying to hang picture this time)      Assessment and Plan:   DKA     Resolved and Gap closed, added glargine nightly with SSI  Hx of DM II. Likely due to 2/2 recent fall with prolonged time down (33 hours) and inability to take medications, eat, or drink.    Hold at home insulin and PO medications while inpatient  Insulin gtt off now  Check HgbA1C: 7.9              advanced to carb diet              Monitor FSBS and cover with DKA Insulin protocol               IVF decreased              BMP q4h, replace electrolytes as needed              Antiemetics PRN              Endocrinology consult      Mechanical fall resulting in rhabdomyolysis and MODESTO with associated hyperkalemia               - CK and Cr improving with IVF              Was down on her abdomen/side for 33 hours total.              Trend BMP              Given albuterol, calcium gluconate, insulin IV and 2L fluid bolus in ER (potassium normalized now)               EKG showing prolonged QTC, monitor               CM consult placed, may need home health or placement                   Hold nephrotoxic home medications     - pt/ot    Nondisplaced rib fx on left 5th and 6th ribs and also right 4th through 6th  - occurred   - lidoderm patch  - IS           Chronic conditions:  COPD  Asthma  Hyperlipidemia              Continue lipitor  Hypertension              Hold lisinopril due to MODESTO              Continue toprol    - resume norvasc   Diabetic neuropathy              Hold gabapentin due to MODESTO  History of TIA              Continue plavix    tx to med surg               Diet DIET CARB CONTROL; Carb Control: 4 carb choices (60 gms)/meal   Code Status Full Code     Medications:   Medications:    clopidogrel  75 mg Oral Daily    ferrous sulfate  650 mg Oral Daily with breakfast    metoprolol succinate  50 mg Oral Daily    heparin (porcine)  5,000 Units Subcutaneous 3 times per day    lidocaine  1 patch Transdermal Daily    insulin glargine  45 Units Subcutaneous Nightly    insulin lispro  0-12 Units Subcutaneous TID WC    insulin lispro  0-6 Units Subcutaneous Nightly      Infusions:    dextrose      sodium chloride 100 mL/hr at 09/06/20 0449     PRN Meds: oxyCODONE-acetaminophen, 1 tablet, Q4H PRN  ondansetron, 4 mg, Q6H PRN  magnesium sulfate, 1 g, PRN  sodium phosphate IVPB, 10 mmol, PRN    Or  sodium phosphate IVPB, 15 mmol, PRN    Or  sodium phosphate IVPB, 20 mmol, PRN  glucose, 15 g, PRN  dextrose, 12.5 g, PRN  glucagon (rDNA), 1 mg, PRN  dextrose, 100 mL/hr, PRN      Subjective:   Doing better, no distress    Objective: Intake/Output Summary (Last 24 hours) at 9/6/2020 0858  Last data filed at 9/6/2020 0874  Gross per 24 hour   Intake 355 ml   Output 750 ml   Net -395 ml      Vitals:   Vitals:    09/06/20 0818   BP:    Pulse:    Resp:    Temp:    SpO2: 95%     Physical Exam:   Gen:  awake, alert, no apparent distress  Head/Eyes:  Normocephalic atraumatic, EOMI   NECK:   symmetrical, trachea midline  LUNGS: Normal Effort   CARDIOVASCULAR:  Normal rate  ABDOMEN:  non distended  MUSCULOSKELETAL:  ROM WlimitedNL  NEUROLOGIC: Alert and Oriented,  Cranial nerves II-XII are grossly intact.    SKIN:  no bruising or bleeding, normal skin color,  no redness      Data:       CBC   Recent Labs     09/04/20  2350   WBC 13.8*   HGB 11.9*   HCT 40.8         BMP   Recent Labs     09/05/20  0815 09/05/20  1140 09/06/20  0335    137 137   K 5.1 4.4 4.7    104 107   CO2 19* 21 19*   PHOS 3.2 3.3 2.9   BUN 49* 50* 42*   CREATININE 1.8* 1.7* 1.4*         Electronically signed by Kennth Cheadle, MD on 9/6/2020 at 8:58 AM

## 2020-09-07 LAB
ANION GAP SERPL CALCULATED.3IONS-SCNC: 8 MMOL/L (ref 4–16)
BUN BLDV-MCNC: 25 MG/DL (ref 6–23)
CALCIUM SERPL-MCNC: 8.6 MG/DL (ref 8.3–10.6)
CHLORIDE BLD-SCNC: 110 MMOL/L (ref 99–110)
CO2: 23 MMOL/L (ref 21–32)
CREAT SERPL-MCNC: 1.2 MG/DL (ref 0.6–1.1)
GFR AFRICAN AMERICAN: 55 ML/MIN/1.73M2
GFR NON-AFRICAN AMERICAN: 46 ML/MIN/1.73M2
GLUCOSE BLD-MCNC: 135 MG/DL (ref 70–99)
GLUCOSE BLD-MCNC: 148 MG/DL (ref 70–99)
GLUCOSE BLD-MCNC: 165 MG/DL (ref 70–99)
GLUCOSE BLD-MCNC: 166 MG/DL (ref 70–99)
GLUCOSE BLD-MCNC: 183 MG/DL (ref 70–99)
GLUCOSE BLD-MCNC: 255 MG/DL (ref 70–99)
MAGNESIUM: 2.1 MG/DL (ref 1.8–2.4)
PHOSPHORUS: 2.5 MG/DL (ref 2.5–4.9)
POTASSIUM SERPL-SCNC: 4 MMOL/L (ref 3.5–5.1)
SODIUM BLD-SCNC: 141 MMOL/L (ref 135–145)
TOTAL CK: 189 IU/L (ref 26–140)

## 2020-09-07 PROCEDURE — 1200000000 HC SEMI PRIVATE

## 2020-09-07 PROCEDURE — 6360000002 HC RX W HCPCS: Performed by: FAMILY MEDICINE

## 2020-09-07 PROCEDURE — 36415 COLL VENOUS BLD VENIPUNCTURE: CPT

## 2020-09-07 PROCEDURE — 97530 THERAPEUTIC ACTIVITIES: CPT

## 2020-09-07 PROCEDURE — U0002 COVID-19 LAB TEST NON-CDC: HCPCS

## 2020-09-07 PROCEDURE — 6360000002 HC RX W HCPCS: Performed by: NURSE PRACTITIONER

## 2020-09-07 PROCEDURE — 80048 BASIC METABOLIC PNL TOTAL CA: CPT

## 2020-09-07 PROCEDURE — 6360000002 HC RX W HCPCS: Performed by: PHYSICIAN ASSISTANT

## 2020-09-07 PROCEDURE — 6370000000 HC RX 637 (ALT 250 FOR IP): Performed by: PHYSICIAN ASSISTANT

## 2020-09-07 PROCEDURE — 94150 VITAL CAPACITY TEST: CPT

## 2020-09-07 PROCEDURE — 6370000000 HC RX 637 (ALT 250 FOR IP): Performed by: FAMILY MEDICINE

## 2020-09-07 PROCEDURE — 83735 ASSAY OF MAGNESIUM: CPT

## 2020-09-07 PROCEDURE — 97166 OT EVAL MOD COMPLEX 45 MIN: CPT

## 2020-09-07 PROCEDURE — 82962 GLUCOSE BLOOD TEST: CPT

## 2020-09-07 PROCEDURE — 84100 ASSAY OF PHOSPHORUS: CPT

## 2020-09-07 PROCEDURE — 6370000000 HC RX 637 (ALT 250 FOR IP): Performed by: INTERNAL MEDICINE

## 2020-09-07 PROCEDURE — 97535 SELF CARE MNGMENT TRAINING: CPT

## 2020-09-07 PROCEDURE — 82550 ASSAY OF CK (CPK): CPT

## 2020-09-07 PROCEDURE — 94664 DEMO&/EVAL PT USE INHALER: CPT

## 2020-09-07 PROCEDURE — 94761 N-INVAS EAR/PLS OXIMETRY MLT: CPT

## 2020-09-07 PROCEDURE — 97162 PT EVAL MOD COMPLEX 30 MIN: CPT

## 2020-09-07 RX ORDER — MORPHINE SULFATE 2 MG/ML
2 INJECTION, SOLUTION INTRAMUSCULAR; INTRAVENOUS ONCE
Status: COMPLETED | OUTPATIENT
Start: 2020-09-07 | End: 2020-09-07

## 2020-09-07 RX ORDER — LOPERAMIDE HYDROCHLORIDE 2 MG/1
4 CAPSULE ORAL ONCE
Status: COMPLETED | OUTPATIENT
Start: 2020-09-07 | End: 2020-09-07

## 2020-09-07 RX ORDER — HYDROCHLOROTHIAZIDE 25 MG/1
25 TABLET ORAL DAILY
Status: DISCONTINUED | OUTPATIENT
Start: 2020-09-07 | End: 2020-09-15 | Stop reason: HOSPADM

## 2020-09-07 RX ADMIN — INSULIN LISPRO 15 UNITS: 100 INJECTION, SOLUTION INTRAVENOUS; SUBCUTANEOUS at 08:45

## 2020-09-07 RX ADMIN — HYDROCHLOROTHIAZIDE 25 MG: 25 TABLET ORAL at 12:55

## 2020-09-07 RX ADMIN — LOPERAMIDE HYDROCHLORIDE 4 MG: 2 CAPSULE ORAL at 23:02

## 2020-09-07 RX ADMIN — ATORVASTATIN CALCIUM 40 MG: 40 TABLET, FILM COATED ORAL at 20:51

## 2020-09-07 RX ADMIN — INSULIN LISPRO 2 UNITS: 100 INJECTION, SOLUTION INTRAVENOUS; SUBCUTANEOUS at 13:41

## 2020-09-07 RX ADMIN — SERTRALINE HYDROCHLORIDE 150 MG: 50 TABLET ORAL at 08:45

## 2020-09-07 RX ADMIN — HEPARIN SODIUM 5000 UNITS: 5000 INJECTION, SOLUTION INTRAVENOUS; SUBCUTANEOUS at 14:45

## 2020-09-07 RX ADMIN — FERROUS SULFATE TAB 325 MG (65 MG ELEMENTAL FE) 650 MG: 325 (65 FE) TAB at 08:45

## 2020-09-07 RX ADMIN — MORPHINE SULFATE 2 MG: 2 INJECTION, SOLUTION INTRAMUSCULAR; INTRAVENOUS at 08:45

## 2020-09-07 RX ADMIN — AMLODIPINE BESYLATE 10 MG: 10 TABLET ORAL at 08:45

## 2020-09-07 RX ADMIN — INSULIN LISPRO 2 UNITS: 100 INJECTION, SOLUTION INTRAVENOUS; SUBCUTANEOUS at 08:50

## 2020-09-07 RX ADMIN — HEPARIN SODIUM 5000 UNITS: 5000 INJECTION, SOLUTION INTRAVENOUS; SUBCUTANEOUS at 06:29

## 2020-09-07 RX ADMIN — CLOPIDOGREL BISULFATE 75 MG: 75 TABLET ORAL at 08:45

## 2020-09-07 RX ADMIN — HEPARIN SODIUM 5000 UNITS: 5000 INJECTION, SOLUTION INTRAVENOUS; SUBCUTANEOUS at 20:53

## 2020-09-07 RX ADMIN — METOPROLOL SUCCINATE 50 MG: 50 TABLET, EXTENDED RELEASE ORAL at 08:45

## 2020-09-07 RX ADMIN — ONDANSETRON 4 MG: 2 INJECTION INTRAMUSCULAR; INTRAVENOUS at 10:31

## 2020-09-07 RX ADMIN — INSULIN GLARGINE 50 UNITS: 100 INJECTION, SOLUTION SUBCUTANEOUS at 20:52

## 2020-09-07 RX ADMIN — TOPIRAMATE 50 MG: 25 TABLET, FILM COATED ORAL at 20:51

## 2020-09-07 ASSESSMENT — PAIN SCALES - GENERAL
PAINLEVEL_OUTOF10: 9
PAINLEVEL_OUTOF10: 4

## 2020-09-07 ASSESSMENT — PAIN DESCRIPTION - PROGRESSION: CLINICAL_PROGRESSION: NOT CHANGED

## 2020-09-07 ASSESSMENT — PAIN DESCRIPTION - LOCATION: LOCATION: RIB CAGE

## 2020-09-07 NOTE — CONSULTS
[address] [facility], 1960, 3013/3013-A, 9/7/2020    Discharge Recommendation: Jeremy Ryan      History:  [place]:  The primary encounter diagnosis was Fall, initial encounter. Diagnoses of Traumatic rhabdomyolysis, initial encounter [facility]), Diabetic ketoacidosis without coma associated with type 2 diabetes mellitus ([facility] [address] and Hyperkalemia were also pertinent to this visit. Subjective:  Patient states: Agreeable to therapy. Pain: 6/10 pain in ribs. Pt reports 10/10 pain in RUE with movement. Pt reports pain in L foot with slight edema. Communication with other providers: PT, RN  Restrictions: General Precautions, Fall Risk    Home Setup/Prior level of function:  Social/Functional History  Lives With: Alone  Type of Home: (Condo)  Home Layout: One level  Home Access: Level entry  Bathroom Shower/Tub: Tub/Shower unit  Bathroom Toilet: Handicap height  Bathroom Equipment: Shower chair, Grab bars in shower  Bathroom Accessibility: [facility]: 4 wheeled walker, Cane(Pt reports she walks with 4WW/cane evenly, occasionally walks without AD.)  Receives Help From: (Pt reports she does not have assistance locally.)  ADL Assistance: Independent  Homemaking Assistance: Independent  Homemaking Responsibilities: Yes  Ambulation Assistance: Independent  Transfer Assistance: Independent  Active : Yes  Mode of Transportation: Car  Occupation: Retired  Type of occupation: [employer] Examination:  · Observation: Supine in bed upon arrival.   · Vision: [facility]  · Hearing: [facility]  · Vitals: Stable vitals throughout session    Body Systems and functions:  · ROM: PROM WFL for BUE, pt expressed increased pain when raising RUE to approx 60 degrees. Pt cannot recall is she fell on RUE. · Strength: Not formally tested d/t pain.    · Sensation: WFL  · Tone: Normal  · Coordination: WFL  · Perception: WNL    Activities of Daily Living (ADLs):  · Feeding: Julia (anticipate pt would require Julia d/t increased pain when lifting BUE, specifically RUE). · Grooming: ModA (pt sat EOB and brushed teeth with setup and increased time. Unable to brush teeth with dominant hand (R) d/t increased pain when engaging in shoulder flexion. Pt encouraged to brush with LUE while experiencing pain. Pt required complete assist to comb hair d/t increased pain when engaging in shoulder flexion to approx 90 degrees). · UB bathing: Julia (with setup and increased time pt washed face with warm wash cloth, anticipate increased level of assist to wash thoroughly d/t pain). · LB bathing: MaxA  · UB dressing: ModA  · LB dressing: MaxA  · Toileting: MaxA (anticipate assist with toilet transfers, yusra care and LB clothing in standing). Cognitive and Psychosocial Functioning:  · Overall cognitive status: WFL (self, time, location). · Affect: Normal     Balance:   · Sitting: SBA-ModA (pt sat EOB requiring ModA to remain upright initially d/t increased pain and posterior lean, as time persisted, pt's tolerance increased and pt required SBA for seated at EOB. Pt tolerated upright sitting for approx 30 min). · Standing: Pt attempted stand from EOB with RW, however was unable this date d/t increased pain and fear. Functional Mobility:  · Bed Mobility: MaxAx2 (supine to seated bed mobility with assist for leg positioning and trunk support, VC throughout for sequencing. Pt was DEP for seated to supine bed mobility and scooting toward HOB). · Transfers: Attempted STS with RW, however was unable this date d/t pain and fear. · Ambulation: NT this date. AM-PAC 6 click short form for inpatient daily activity:   How much help from another person does the patient currently need. .. Unable  Dep A Lot  Max A A Lot   Mod A A Little  Min A A Little   CGA  SBA None   Mod I  Indep  Sup   1. Putting on and taking off regular lower body clothing?  [] 1    [x] 2 [] 2   [] 3   [] 3   [] 4      2. Bathing (including washing, rinsing, drying)? [] 1   [] 2   [x] 2 [] 3 [] 3 [] 4   3. Toileting, which includes using toilet, bedpan, or urinal? [] 1    [x] 2   [] 2   [] 3   [] 3   [] 4     4. Putting on and taking off regular upper body clothing? [] 1   [] 2   [x] 2   [] 3   [] 3    [] 4      5. Taking care of personal grooming such as brushing teeth? [] 1   [] 2    [x] 2 [] 3    [] 3   [] 4      6. Eating meals? [] 1   [] 2   [] 2   [x] 3   [] 3   [] 4      Raw Score:  13     [24=0% impaired(CH), 23=1-19%(CI), 20-22=20-39%(CJ), 15-19=40-59%(CK), 10-14=60-79%(CL), 7-9=80-99%(CM), 6=100%(CN)]     Treatment:  Self Care Training:   Cues were given for safety, sequence, UE/LE placement, visual cues, and balance. Activities performed today included grooming, bathing. Therapeutic Activity Training:   Therapeutic activity training was instructed today. Cues were given for safety, sequence, UE/LE placement, awareness, and balance. Activities performed today included bed mobility training, sup-sit, sit-stand (attempted). Safety Measures: Gait belt used, Left in*, Alarm in place    Assessment:  Assessment  Performance deficits / Impairments: Decreased functional mobility , Decreased ROM, Decreased high-level IADLs, Decreased ADL status, Decreased strength, Decreased posture  Treatment Diagnosis: Ketoacidosis, diabetic, type 2, no coma  Prognosis: Good  Decision Making: Medium Complexity  REQUIRES OT FOLLOW UP: Yes  Discharge Recommendations: [address]      Pt is a 62year old F admitted for ketoacidosis, diabetic type 2, and recent fall. Pt recently fell resulting in 5 fx ribs, pt had another fall on 9/3 where she laid on the floor for 30+ hours unable to get up d/t pain from rib fx. Pt reports that prior to admission she was independent in all ADLs, IADLs, and Ajay for functional mobility.  This date, pt demo increased pain with movement, resulting in increased assist for supine to seated and seated to supine bed mobility. Pt was unable to stand this date. Pt also required increased assist with ADLs d/t pain. Pt is currently functioning below baseline and would benefit from skilled OT services at SNF. Plan:  Plan  Times per week: 2+  Times per day: Daily      Goals:  1. Pt will complete all aspects of bed mobility for EOB/OOB ADLs with Julia. 2. Pt will complete UB/LB bathing with Julia and AE as needed. 3. Pt will complete all aspects of LB dressing with Julia. 4. Pt will complete all functional transfers to and from bed, chair, toilet, shower chair with Julia and RW.   5. Pt will ambulate HH distance to bathroom for toileting with Julia and RW. 6. Pt will complete all aspects of toileting task with Julia. 7. Pt will complete oral hygiene/grooming routine in standing at sink with Julia demo good dynamic standing balance for approx 8 minutes. 8. Pt will complete ther ex/ther act with focus on UB strengthening. Time:   Time in: 847  Time out: 938  Timed treatment minutes: 39  Total time: 51      Electronically signed by:       CLAUDIO Mccain/L, [address] [place] [id number]

## 2020-09-07 NOTE — PROGRESS NOTES
Hospitalist Progress Note      Name:  Clarisse Lesch /Age/Sex: 1960  (62 y.o. female)   MRN & CSN:  [id number] & [id number] Admission Date/Time: 2020 11:21 PM   Location:  [address] [place] [address] PCP: Anthony Massey MD       Clarisse Lesch is a 62 y.o.  female  who presents with Fall (down for 30 hours, recent fall with 5 ribs broken from Aug 29th. trying to hang picture this time)      Assessment and Plan:   DKA                   Resolved and Gap closed  Hx of DM II. Likely due to 2/2 recent fall with prolonged time down (33 hours) and inability to take medications, eat, or drink.    Hold at home insulin and PO medications while inpatient  Insulin gtt off now and on subq  Check HgbA1C: 7.9              advanced to carb diet              Monitor FSBS and cover with DKA Insulin protocol               IVF d/c              BMP q4h, replace electrolytes as needed              Antiemetics PRN              Endocrinology consult      Mechanical fall resulting in rhabdomyolysis and MODESTO with associated hyperkalemia               - CK and Cr improved              Was down on her abdomen/side for 33 hours total.              Trend BMP              Given albuterol, calcium gluconate, insulin IV and 2L fluid bolus in ER (potassium normalized now)               EKG showing prolonged QTC, monitor               CM consult placed, may need home health or placement                   Hold nephrotoxic home medications               - pt/ot for rehab needs     Nondisplaced rib fx on left 5th and 6th ribs and also right 4th through 6th  - occurred   - lidoderm patch  - IS  - having a lot of pain and percocet not working, try IV morphine x 1           Chronic conditions:  COPD  Asthma  Hyperlipidemia              Continue lipitor  Hypertension              Hold lisinopril due to MODESTO              Continue toprol               - resume norvasc    - add HCTZ 25  Diabetic neuropathy              Hold gabapentin due to MODESTO  History of TIA              Continue plavix     tx to med surg               Diet DIET CARB CONTROL; Carb Control: 4 carb choices (60 gms)/meal   Code Status Full Code     Medications:   Medications:    atorvastatin  40 mg Oral Nightly    sertraline  150 mg Oral Daily    topiramate  50 mg Oral Nightly    amLODIPine  10 mg Oral Daily    insulin lispro  15 Units Subcutaneous TID WC    insulin glargine  50 Units Subcutaneous Nightly    insulin lispro  0-6 Units Subcutaneous 2 times per day    clopidogrel  75 mg Oral Daily    ferrous sulfate  650 mg Oral Daily with breakfast    metoprolol succinate  50 mg Oral Daily    heparin (porcine)  5,000 Units Subcutaneous 3 times per day    lidocaine  1 patch Transdermal Daily    insulin lispro  0-12 Units Subcutaneous TID WC      Infusions:    dextrose      sodium chloride 50 mL/hr at 09/06/20 1638     PRN Meds: oxyCODONE-acetaminophen, 1 tablet, Q4H PRN  ondansetron, 4 mg, Q6H PRN  hydrALAZINE, 10 mg, Q6H PRN  dicyclomine, 20 mg, 4x Daily PRN  magnesium sulfate, 1 g, PRN  glucose, 15 g, PRN  dextrose, 12.5 g, PRN  glucagon (rDNA), 1 mg, PRN  dextrose, 100 mL/hr, PRN      Subjective:   States rib hurts still    Objective: Intake/Output Summary (Last 24 hours) at 9/7/2020 0950  Last data filed at 9/7/2020 0634  Gross per 24 hour   Intake 600 ml   Output 1600 ml   Net -1000 ml      Vitals:   Vitals:    09/07/20 0843   BP: (!) 184/70   Pulse: 77   Resp: 15   Temp: 98.3 °F (36.8 °C)   SpO2:      Physical Exam:   Gen:  awake, alert, no apparent distress  Head/Eyes:  Normocephalic atraumatic, EOMI   NECK:   symmetrical, trachea midline  LUNGS: Normal Effort   CARDIOVASCULAR:  Normal rate  ABDOMEN:  non distended  MUSCULOSKELETAL:  ROM limited  NEUROLOGIC: Alert and Oriented,  Cranial nerves II-XII are grossly intact.    SKIN:  no bruising or bleeding, normal skin color,  no redness      Data:       CBC   Recent Labs     09/04/20  2350   WBC 13.8*   HGB 11.9*

## 2020-09-07 NOTE — CONSULTS
[address] [facility], 1960, 3013/3013-A, 9/7/2020    History  [place]:  The primary encounter diagnosis was Fall, initial encounter. Diagnoses of Traumatic rhabdomyolysis, initial encounter [facility]), Diabetic ketoacidosis without coma associated with type 2 diabetes mellitus ([facility] [address] and Hyperkalemia were also pertinent to this visit. Patient  has a past medical history of Arthritis, Asthma, Cerebral artery occlusion with cerebral infarction ([address] COPD (chronic obstructive pulmonary disease) ([address] Diabetes mellitus ([address] Diabetic neuropathy ([address] Fracture, Hyperlipidemia, Hypertension, Muscle weakness (generalized), and Osteoarthritis. Patient  has a past surgical history that includes Foot surgery (Left, 2004?); knee surgery (Right, 2009?); Cholecystectomy (2002?); Lung surgery (2009?); eye surgery; and Humerus fracture surgery (Left, 12/31/2018). Subjective:  Patient states:  Patient amenable to therapy. Pain:  8/10, ribs (R worse than L). Communication with other providers:  Handoff to RN, co-eval with AREN Trujillo.   Restrictions: General rehab precautions, Fall risk, R&L Rib protection (fractures 8/29; R 4-6 and L5 & 6)    Home Setup/Prior level of function  Social/Functional History  Lives With: Alone  Type of Home: (Condo)  Home Layout: One level  Home Access: Level entry  Bathroom Shower/Tub: Tub/Shower unit  Bathroom Toilet: Handicap height  Bathroom Equipment: Shower chair, Grab bars in shower  Bathroom Accessibility: Accessible  Home Equipment: 4 wheeled walker, Cane(Pt reports she walks with 4WW/cane evenly, occasionally walks without AD.)  Receives Help From: (Pt reports she does not have assistance locally.)  ADL Assistance: Independent  Homemaking Assistance: Independent  Homemaking Responsibilities: Yes  Ambulation Assistance: Independent  Transfer Assistance: Independent  Active : Yes  Mode of Transportation: Car  Occupation: Retired  Type of occupation: [employer]. Examination of body systems (includes body structures/functions, activity/participation limitations):  · Observation:  Supine in bed upon arrival  · Vision:  Appears WFL  · Hearing:  Appears WFL  · Cardiopulmonary:  WFL, no O2 needs and no significant change in vitals during session  · Cognition: Appears intact, oriented x4, see OT/SLP note for further evaluation. Musculoskeletal  · ROM R/L:  WFL. · Strength R/L:  3+/5, moderate impairment in function and endurance. · Neuro:  Appears intact, does report that L foot feels \"heavy\"      Mobility:  · Rollingt:  Max assist x2  · Supine to sit:  Max assist x2  · Transfers: Attempted but unable to complete due to pain  · Sitting balance:  Initially min assist progressing to SBA at EOB. · Standing balance:  Unable to achieve standing. · Gait: Unable d/t pain    [facility] 6 Clicks Inpatient Mobility:  AM-PAC Inpatient Mobility Raw Score : 8    Treatment:  Pt supine in bed and agreeable to participate in therapy after encouragement. Pt significantly limited d/t R rib pain. Pt completed supine to sit with extended time and max assist x2, cues for flexing knees prior to rolling and utilization of log roll technique for rib protection. Education on exhaling during mobility to prevent Valsalva and promote improved pain control. With initial sit at EOB, pt required min assist to maintain balance d/t posterior lean. Cues for anterior weight shift and relaxation with pt's sitting balance improving to SBA. Pt sat EOB >30 minutes while completing dynamic seated balance tasks including reaching outside JUAN CARLOS and weight shifting laterally. Attempted sit to stand transfers with wheeled walker, pt able to place hands in correct position with cues but on attempts to transfer pt reports significant increase in R rib pain, unable to achieve full or partial standing.  Pt returned to supine with max assist x2,

## 2020-09-07 NOTE — CONSULTS
(!) 184/70   Pulse 77   Temp 98.3 °F (36.8 °C) (Oral)   Resp 15   Ht 5' 4\" (1.626 m)   Wt 261 lb 4.8 oz (118.5 kg)   SpO2 96%   BMI 44.85 kg/m²     CONSTITUTIONAL:  awake, alert, cooperative, appears stated age   EYES:  vision intact Fundoscopic Exam not performed   ENT:Normal  NECK:  Supple, No JVD. Thyroid Exam:Normal   LUNGS:  Has Vesicular Breath Sounds, tenderness to chest wall due to broken ribs  CARDIOVASCULAR:  Normal apical impulse, regular rate and rhythm, normal S1 and S2, no S3 or S4, and has no  murmur   ABDOMEN:  No scars, normal bowel sounds, soft, non-distended, non-tender, no masses palpated, no hepatolienomegaly  Musculoskeletal: Normal  Extremities: Normal, peripheral pulses normal, , has no edema   NEUROLOGIC:  Awake, alert, oriented to name, place and time. Cranial nerves II-XII are grossly intact. Motor weakness t. Sensory neuropathy t. ,  and gait is abnormal.  Unstable    DATA:    CBC:   Recent Labs     09/04/20  2350   WBC 13.8*   HGB 11.9*       CMP:  Recent Labs     09/04/20  2350  09/05/20  1140 09/06/20  0335 09/07/20  0402      < > 137 137 141   K 7.1*   < > 4.4 4.7 4.0   CL 97*   < > 104 107 110   CO2 13*   < > 21 19* 23   BUN 47*   < > 50* 42* 25*   CREATININE 1.7*   < > 1.7* 1.4* 1.2*   CALCIUM 9.8   < > 8.4 8.3 8.6   PROT 7.0  --   --   --   --    LABALBU 3.8  --   --   --   --    BILITOT 0.7  --   --   --   --    ALKPHOS 135*  --   --   --   --    AST 24  --   --   --   --    ALT 34  --   --   --   --     < > = values in this interval not displayed.      Lipids: No results found for: CHOL, HDL, TRIG  Glucose:   Recent Labs     09/06/20  2122 09/07/20  0247 09/07/20  0631   POCGLU 93 166* 183*     Hemoglobin A1C:   Lab Results   Component Value Date    LABA1C 7.9 09/05/2020     Free T4: No results found for: T4FREE  Free T3: No results found for: FT3  TSH High Sensitivity: No results found for: [facility]    Xr Chest (2 Vw)    Result Date: 9/5/2020  EXAMINATION:

## 2020-09-08 LAB
ANION GAP SERPL CALCULATED.3IONS-SCNC: 9 MMOL/L (ref 4–16)
BUN BLDV-MCNC: 20 MG/DL (ref 6–23)
CALCIUM SERPL-MCNC: 8.7 MG/DL (ref 8.3–10.6)
CHLORIDE BLD-SCNC: 107 MMOL/L (ref 99–110)
CO2: 24 MMOL/L (ref 21–32)
CREAT SERPL-MCNC: 1.2 MG/DL (ref 0.6–1.1)
CULTURE: NORMAL
GFR AFRICAN AMERICAN: 55 ML/MIN/1.73M2
GFR NON-AFRICAN AMERICAN: 46 ML/MIN/1.73M2
GLUCOSE BLD-MCNC: 127 MG/DL (ref 70–99)
GLUCOSE BLD-MCNC: 128 MG/DL (ref 70–99)
GLUCOSE BLD-MCNC: 147 MG/DL (ref 70–99)
GLUCOSE BLD-MCNC: 165 MG/DL (ref 70–99)
GLUCOSE BLD-MCNC: 176 MG/DL (ref 70–99)
GLUCOSE BLD-MCNC: 279 MG/DL (ref 70–99)
Lab: NORMAL
MAGNESIUM: 1.9 MG/DL (ref 1.8–2.4)
PHOSPHORUS: 2.6 MG/DL (ref 2.5–4.9)
POTASSIUM SERPL-SCNC: 4 MMOL/L (ref 3.5–5.1)
SODIUM BLD-SCNC: 140 MMOL/L (ref 135–145)
SPECIMEN: NORMAL
TOTAL CK: 110 IU/L (ref 26–140)

## 2020-09-08 PROCEDURE — 6360000002 HC RX W HCPCS: Performed by: NURSE PRACTITIONER

## 2020-09-08 PROCEDURE — 6360000002 HC RX W HCPCS: Performed by: PHYSICIAN ASSISTANT

## 2020-09-08 PROCEDURE — 6370000000 HC RX 637 (ALT 250 FOR IP): Performed by: INTERNAL MEDICINE

## 2020-09-08 PROCEDURE — 80048 BASIC METABOLIC PNL TOTAL CA: CPT

## 2020-09-08 PROCEDURE — 84100 ASSAY OF PHOSPHORUS: CPT

## 2020-09-08 PROCEDURE — 36415 COLL VENOUS BLD VENIPUNCTURE: CPT

## 2020-09-08 PROCEDURE — 94761 N-INVAS EAR/PLS OXIMETRY MLT: CPT

## 2020-09-08 PROCEDURE — 6370000000 HC RX 637 (ALT 250 FOR IP): Performed by: PHYSICIAN ASSISTANT

## 2020-09-08 PROCEDURE — 6370000000 HC RX 637 (ALT 250 FOR IP): Performed by: FAMILY MEDICINE

## 2020-09-08 PROCEDURE — 83735 ASSAY OF MAGNESIUM: CPT

## 2020-09-08 PROCEDURE — 94150 VITAL CAPACITY TEST: CPT

## 2020-09-08 PROCEDURE — 82550 ASSAY OF CK (CPK): CPT

## 2020-09-08 PROCEDURE — 1200000000 HC SEMI PRIVATE

## 2020-09-08 RX ADMIN — FERROUS SULFATE TAB 325 MG (65 MG ELEMENTAL FE) 650 MG: 325 (65 FE) TAB at 08:48

## 2020-09-08 RX ADMIN — TOPIRAMATE 50 MG: 25 TABLET, FILM COATED ORAL at 20:48

## 2020-09-08 RX ADMIN — ATORVASTATIN CALCIUM 40 MG: 40 TABLET, FILM COATED ORAL at 20:48

## 2020-09-08 RX ADMIN — INSULIN LISPRO 2 UNITS: 100 INJECTION, SOLUTION INTRAVENOUS; SUBCUTANEOUS at 17:38

## 2020-09-08 RX ADMIN — METOPROLOL SUCCINATE 50 MG: 50 TABLET, EXTENDED RELEASE ORAL at 08:48

## 2020-09-08 RX ADMIN — AMLODIPINE BESYLATE 10 MG: 10 TABLET ORAL at 08:48

## 2020-09-08 RX ADMIN — ONDANSETRON 4 MG: 2 INJECTION INTRAMUSCULAR; INTRAVENOUS at 09:05

## 2020-09-08 RX ADMIN — INSULIN LISPRO 2 UNITS: 100 INJECTION, SOLUTION INTRAVENOUS; SUBCUTANEOUS at 08:50

## 2020-09-08 RX ADMIN — HEPARIN SODIUM 5000 UNITS: 5000 INJECTION, SOLUTION INTRAVENOUS; SUBCUTANEOUS at 17:37

## 2020-09-08 RX ADMIN — HYDROCHLOROTHIAZIDE 25 MG: 25 TABLET ORAL at 08:48

## 2020-09-08 RX ADMIN — HEPARIN SODIUM 5000 UNITS: 5000 INJECTION, SOLUTION INTRAVENOUS; SUBCUTANEOUS at 07:02

## 2020-09-08 RX ADMIN — INSULIN GLARGINE 50 UNITS: 100 INJECTION, SOLUTION SUBCUTANEOUS at 20:48

## 2020-09-08 RX ADMIN — CLOPIDOGREL BISULFATE 75 MG: 75 TABLET ORAL at 08:48

## 2020-09-08 RX ADMIN — SERTRALINE HYDROCHLORIDE 150 MG: 50 TABLET ORAL at 08:48

## 2020-09-08 RX ADMIN — HEPARIN SODIUM 5000 UNITS: 5000 INJECTION, SOLUTION INTRAVENOUS; SUBCUTANEOUS at 20:48

## 2020-09-08 ASSESSMENT — PAIN DESCRIPTION - LOCATION: LOCATION: RIB CAGE

## 2020-09-08 ASSESSMENT — PAIN DESCRIPTION - DESCRIPTORS: DESCRIPTORS: SORE

## 2020-09-08 ASSESSMENT — PAIN DESCRIPTION - ORIENTATION: ORIENTATION: RIGHT

## 2020-09-08 ASSESSMENT — PAIN DESCRIPTION - PAIN TYPE: TYPE: ACUTE PAIN

## 2020-09-08 ASSESSMENT — PAIN SCALES - GENERAL
PAINLEVEL_OUTOF10: 4
PAINLEVEL_OUTOF10: 3
PAINLEVEL_OUTOF10: 3

## 2020-09-08 ASSESSMENT — PAIN DESCRIPTION - PROGRESSION
CLINICAL_PROGRESSION: NOT CHANGED

## 2020-09-08 NOTE — PROGRESS NOTES
Hospitalist Progress Note      Name:  Fariba Alberts /Age/Sex: 1960  (62 y.o. female)   MRN & CSN:  [id number] & [id number] Admission Date/Time: 2020 11:21 PM   Location:  [address] [place] [address] PCP: Mickey Lewis MD       Fariba Alberts is a 62 y.o.  female  who presents with Fall (down for 30 hours, recent fall with 5 ribs broken from Aug 29th. trying to hang picture this time)      Assessment and Plan:   DKA                   Resolved and Gap closed  Hx of DM II. Likely due to 2/2 recent fall with prolonged time down (33 hours) and inability to take medications, eat, or drink. Hold at home insulin and PO medications while inpatient  Insulin gtt off now and on subq  HgbA1C: 7.9              advanced to carb diet              Monitor FSBS and cover with DKA Insulin protocol               IVF d/c              Antiemetics PRN  -urine culture: Mixed justo. Endocrinology following.     Mechanical fall resulting in rhabdomyolysis and MODESTO with associated hyperkalemia               - CK and Cr improved              Was down on her abdomen/side for 33 hours total.              Trend BMP              Given albuterol, calcium gluconate, insulin IV and 2L fluid bolus in ER (potassium normalized now)               EKG showing prolonged QTC, monitor               CM consult placed, may need home health or placement                   Hold nephrotoxic home medications               - pt/ot for rehab needs  -Creatinine 1.2.   .     Nondisplaced rib fx on left 5th and 6th ribs and also right 4th through 6th  - occurred   - lidoderm patch  - IS  - having a lot of pain and percocet not working, try IV morphine x 1  -Pain appears to be improved.      Hypertension              Hold lisinopril due to MODESTO              Continue toprol               - resume norvasc    - add HCTZ 25     Chronic conditions:  COPD  Asthma  Hyperlipidemia              Continue lipitor    Diabetic neuropathy              Hold gabapentin due to MODESTO  History of TIA              Continue plavix        Disposition: PT OT recommending SNF. Case management consult. Diet DIET CARB CONTROL; Carb Control: 4 carb choices (60 gms)/meal   Code Status Full Code     Medications:   Medications:    hydroCHLOROthiazide  25 mg Oral Daily    atorvastatin  40 mg Oral Nightly    sertraline  150 mg Oral Daily    topiramate  50 mg Oral Nightly    amLODIPine  10 mg Oral Daily    insulin lispro  15 Units Subcutaneous TID WC    insulin glargine  50 Units Subcutaneous Nightly    insulin lispro  0-6 Units Subcutaneous 2 times per day    clopidogrel  75 mg Oral Daily    ferrous sulfate  650 mg Oral Daily with breakfast    metoprolol succinate  50 mg Oral Daily    heparin (porcine)  5,000 Units Subcutaneous 3 times per day    lidocaine  1 patch Transdermal Daily    insulin lispro  0-12 Units Subcutaneous TID WC      Infusions:    dextrose       PRN Meds: oxyCODONE-acetaminophen, 1 tablet, Q4H PRN  ondansetron, 4 mg, Q6H PRN  hydrALAZINE, 10 mg, Q6H PRN  dicyclomine, 20 mg, 4x Daily PRN  magnesium sulfate, 1 g, PRN  glucose, 15 g, PRN  dextrose, 12.5 g, PRN  glucagon (rDNA), 1 mg, PRN  dextrose, 100 mL/hr, PRN      Subjective:     -she says she is doing ok  -still having some rib pain, but doing ok    Objective: Intake/Output Summary (Last 24 hours) at 9/8/2020 1521  Last data filed at 9/8/2020 1200  Gross per 24 hour   Intake 700 ml   Output 1200 ml   Net -500 ml      Vitals:   Vitals:    09/08/20 1152   BP:    Pulse:    Resp:    Temp:    SpO2: 96%     Physical Exam:   Gen:  awake, alert, no apparent distress, laying in bed  Head/Eyes:  Normocephalic atraumatic  LUNGS: Normal Effort   CARDIOVASCULAR:  Normal rate  ABDOMEN:  non distended  MUSCULOSKELETAL:  ROM limited  NEUROLOGIC: Alert and Oriented to person, place, month, year, president,  Cranial nerves II-XII are grossly intact.  Has mild tremor in hands  SKIN:  no bruising or bleeding, normal skin color,  no redness      Data:       CBC   No results for input(s): WBC, HGB, HCT, PLT in the last 72 hours.    BMP   Recent Labs     09/06/20  0335 09/07/20  0402 09/08/20  0408    141 140   K 4.7 4.0 4.0    110 107   CO2 19* 23 24   PHOS 2.9 2.5 2.6   BUN 42* 25* 20   CREATININE 1.4* 1.2* 1.2*         Electronically signed by Justa Posadas MD on 9/8/2020 at 3:21 PM

## 2020-09-09 LAB
ANION GAP SERPL CALCULATED.3IONS-SCNC: 9 MMOL/L (ref 4–16)
BUN BLDV-MCNC: 23 MG/DL (ref 6–23)
CALCIUM SERPL-MCNC: 9.3 MG/DL (ref 8.3–10.6)
CHLORIDE BLD-SCNC: 104 MMOL/L (ref 99–110)
CO2: 27 MMOL/L (ref 21–32)
CREAT SERPL-MCNC: 1.3 MG/DL (ref 0.6–1.1)
GFR AFRICAN AMERICAN: 50 ML/MIN/1.73M2
GFR NON-AFRICAN AMERICAN: 42 ML/MIN/1.73M2
GLUCOSE BLD-MCNC: 111 MG/DL (ref 70–99)
GLUCOSE BLD-MCNC: 116 MG/DL (ref 70–99)
GLUCOSE BLD-MCNC: 127 MG/DL (ref 70–99)
GLUCOSE BLD-MCNC: 152 MG/DL (ref 70–99)
GLUCOSE BLD-MCNC: 203 MG/DL (ref 70–99)
GLUCOSE BLD-MCNC: 80 MG/DL (ref 70–99)
GLUCOSE BLD-MCNC: 82 MG/DL (ref 70–99)
MAGNESIUM: 1.9 MG/DL (ref 1.8–2.4)
PHOSPHORUS: 3.8 MG/DL (ref 2.5–4.9)
POTASSIUM SERPL-SCNC: 3.8 MMOL/L (ref 3.5–5.1)
SARS-COV-2: NOT DETECTED
SODIUM BLD-SCNC: 140 MMOL/L (ref 135–145)
SOURCE: NORMAL
TOTAL CK: 94 IU/L (ref 26–140)

## 2020-09-09 PROCEDURE — 97530 THERAPEUTIC ACTIVITIES: CPT

## 2020-09-09 PROCEDURE — 6370000000 HC RX 637 (ALT 250 FOR IP): Performed by: FAMILY MEDICINE

## 2020-09-09 PROCEDURE — 6360000002 HC RX W HCPCS: Performed by: PHYSICIAN ASSISTANT

## 2020-09-09 PROCEDURE — 97116 GAIT TRAINING THERAPY: CPT

## 2020-09-09 PROCEDURE — 2500000003 HC RX 250 WO HCPCS: Performed by: NURSE PRACTITIONER

## 2020-09-09 PROCEDURE — 36415 COLL VENOUS BLD VENIPUNCTURE: CPT

## 2020-09-09 PROCEDURE — 94761 N-INVAS EAR/PLS OXIMETRY MLT: CPT

## 2020-09-09 PROCEDURE — 80048 BASIC METABOLIC PNL TOTAL CA: CPT

## 2020-09-09 PROCEDURE — 97110 THERAPEUTIC EXERCISES: CPT

## 2020-09-09 PROCEDURE — 82962 GLUCOSE BLOOD TEST: CPT

## 2020-09-09 PROCEDURE — 6370000000 HC RX 637 (ALT 250 FOR IP): Performed by: INTERNAL MEDICINE

## 2020-09-09 PROCEDURE — 84100 ASSAY OF PHOSPHORUS: CPT

## 2020-09-09 PROCEDURE — 94150 VITAL CAPACITY TEST: CPT

## 2020-09-09 PROCEDURE — 82550 ASSAY OF CK (CPK): CPT

## 2020-09-09 PROCEDURE — 83735 ASSAY OF MAGNESIUM: CPT

## 2020-09-09 PROCEDURE — 6370000000 HC RX 637 (ALT 250 FOR IP): Performed by: PHYSICIAN ASSISTANT

## 2020-09-09 PROCEDURE — 1200000000 HC SEMI PRIVATE

## 2020-09-09 RX ORDER — INSULIN GLARGINE 100 [IU]/ML
22 INJECTION, SOLUTION SUBCUTANEOUS ONCE
Status: COMPLETED | OUTPATIENT
Start: 2020-09-09 | End: 2020-09-09

## 2020-09-09 RX ORDER — INSULIN GLARGINE 100 [IU]/ML
45 INJECTION, SOLUTION SUBCUTANEOUS NIGHTLY
Status: DISCONTINUED | OUTPATIENT
Start: 2020-09-09 | End: 2020-09-13

## 2020-09-09 RX ADMIN — SERTRALINE HYDROCHLORIDE 150 MG: 50 TABLET ORAL at 08:11

## 2020-09-09 RX ADMIN — INSULIN LISPRO 4 UNITS: 100 INJECTION, SOLUTION INTRAVENOUS; SUBCUTANEOUS at 17:05

## 2020-09-09 RX ADMIN — MICONAZOLE NITRATE: 20 POWDER TOPICAL at 22:21

## 2020-09-09 RX ADMIN — CLOPIDOGREL BISULFATE 75 MG: 75 TABLET ORAL at 08:11

## 2020-09-09 RX ADMIN — ATORVASTATIN CALCIUM 40 MG: 40 TABLET, FILM COATED ORAL at 20:29

## 2020-09-09 RX ADMIN — FERROUS SULFATE TAB 325 MG (65 MG ELEMENTAL FE) 650 MG: 325 (65 FE) TAB at 08:11

## 2020-09-09 RX ADMIN — METOPROLOL SUCCINATE 50 MG: 50 TABLET, EXTENDED RELEASE ORAL at 08:11

## 2020-09-09 RX ADMIN — HEPARIN SODIUM 5000 UNITS: 5000 INJECTION, SOLUTION INTRAVENOUS; SUBCUTANEOUS at 05:49

## 2020-09-09 RX ADMIN — INSULIN GLARGINE 22 UNITS: 100 INJECTION, SOLUTION SUBCUTANEOUS at 21:13

## 2020-09-09 RX ADMIN — AMLODIPINE BESYLATE 10 MG: 10 TABLET ORAL at 08:11

## 2020-09-09 RX ADMIN — HEPARIN SODIUM 5000 UNITS: 5000 INJECTION, SOLUTION INTRAVENOUS; SUBCUTANEOUS at 20:29

## 2020-09-09 RX ADMIN — HYDROCHLOROTHIAZIDE 25 MG: 25 TABLET ORAL at 08:11

## 2020-09-09 RX ADMIN — HEPARIN SODIUM 5000 UNITS: 5000 INJECTION, SOLUTION INTRAVENOUS; SUBCUTANEOUS at 14:40

## 2020-09-09 RX ADMIN — TOPIRAMATE 50 MG: 25 TABLET, FILM COATED ORAL at 20:29

## 2020-09-09 ASSESSMENT — PAIN SCALES - GENERAL: PAINLEVEL_OUTOF10: 2

## 2020-09-09 NOTE — PROGRESS NOTES
Physical Therapy    Physical Therapy Treatment Note  Name: Mounika Dean MRN: [id number] :   1960   Date:  2020   Admission Date: 2020 Room:  [address] [place] [address]   Restrictions/Precautions:        rib fractures ; R 4-6 and L5 & 6)  Communication with other providers:  OT  Subjective:  Patient states:  She would like to walk and then return to bed  Pain:   Location, Type, Intensity (0/10 to 10/10):  3/10 ribs  Objective:    Observation:  Pt was sitting up in the chair  Treatment, including education/measures:  Transfers  Sit to supine :VC's and CGA  Scooting :VC's and CGA  Rolling :Ind  Sit to stand :VC's for hand placements and CGA  Stand to sit :VC's for hand placements and CGA  Gait:  Pt amb with RW for 70 ft x 2 with CGA  Gait Comments: with VC's' and TC's for B LE placement, walker placement and sequence throughout ambulation; Safety  Patient left safely in the bed, with call light/phone in reach with alarm applied. Gait belt was used for transfers and gait.   Assessment / Impression:     Patient's tolerance of treatment:  Good, pt is very motivated but painful with movements  Adverse Reaction: none  Significant change in status and impact:  none  Barriers to improvement:  pain  Plan for Next Session:    Will cont to work towards pt's goals per her tolerance  Time in:  1430  Time out:  1515  Timed treatment minutes:  45  Total treatment time:  39  Previously filed items:  Social/Functional History  Lives With: Alone  Type of Home: (Condo)  Home Layout: One level  Home Access: Level entry  Bathroom Shower/Tub: Tub/Shower unit  Bathroom Toilet: Handicap height  Bathroom Equipment: Shower chair, Grab bars in shower  Bathroom Accessibility: Accessible  Home Equipment: 4 wheeled walker, Cane(Pt reports she walks with 4WW/cane evenly, occasionally walks without AD.)  Receives Help From: (Pt reports she does not have assistance locally.)  ADL Assistance: [address] [place] [address] Avenue: Independent  Homemaking Responsibilities: Yes  Ambulation Assistance: Independent  Transfer Assistance: Independent  Active : Yes  Mode of Transportation: Car  Occupation: Retired  Type of occupation: [employer]. Short term goals  Time Frame for Short term goals: 1 week  Short term goal 1: Pt will complete bed mobility with minimal assist.  Short term goal 2: Pt will complete all functional transfers with minimal assist.  Short term goal 3: Pt will ambulate 21' with LRAD with minimal assist.   Electronically signed by:     Tamra Linares PTA  9/9/2020, 3:11 PM

## 2020-09-09 NOTE — PROGRESS NOTES
Hospitalist Progress Note      Name:  Luis Moreno /Age/Sex: 1960  (62 y.o. female)   MRN & CSN:  [id number] & [id number] Admission Date/Time: 2020 11:21 PM   Location:  [address] [place] [address] PCP: Grabiel Meraz MD       Luis Moreno is a 62 y.o.  female  who presents with Fall (down for 30 hours, recent fall with 5 ribs broken from Aug 29th. trying to hang picture this time)      Assessment and Plan:   DKA                   Resolved and Gap closed  Hx of DM II. Likely due to 2/2 recent fall with prolonged time down (33 hours) and inability to take medications, eat, or drink. Hold at home insulin and PO medications while inpatient  Insulin gtt off now and on subq  HgbA1C: 7.9              Monitor FSBS and cover with DKA Insulin protocol               IVF d/c              Antiemetics PRN  Urine culture: Mixed justo. Endocrinology following. -COVID-19 negative.   On carb diet.     Mechanical fall resulting in rhabdomyolysis and MODESTO with associated hyperkalemia               - CK and Cr improved              Was down on her abdomen/side for 33 hours total.              Trend BMP              Given albuterol, calcium gluconate, insulin IV and 2L fluid bolus in ER (potassium normalized now)               EKG showing prolonged QTC, monitor               CM consult placed, may need home health or placement                   Hold nephrotoxic home medications               - pt/ot for rehab needs  .  -Creatinine up to 1.3.       Nondisplaced rib fx on left 5th and 6th ribs and also right 4th through 6th  - occurred   - lidoderm patch  - IS  - having a lot of pain and percocet not working, try IV morphine x 1  Pain appears to be improved.      Hypertension              Hold lisinopril due to MODESTO              Continue toprol               - resume norvasc    -Blood pressure improved with HCTZ.     Chronic conditions:  COPD  Asthma  Hyperlipidemia              Continue lipitor    Diabetic

## 2020-09-09 NOTE — CARE COORDINATION
Chart reviewed, CM called and spoke with patient on the hospital phone for dc planning. Introduced self and reason for call. Pt was admitted for fall and was down for 30 hours. States she lives at home alone and needs rehab to get back to baseline. States she has been to [facility] previously following shoulder surgery and is interested in returning. 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 Called referral to Miesha/admissions at [facility] at this time and faxed face sheet. CM following.       3:21 PM  Received call back from Miesha/admissions at [facility] who states patient has been accepted and pre-cert will be initiated today. Updated patient. CM following.

## 2020-09-09 NOTE — PLAN OF CARE
Problem: Skin Integrity:  Goal: Will show no infection signs and symptoms  Description: Will show no infection signs and symptoms  9/9/2020 0123 by Khloe James LPN  Outcome: Ongoing  9/8/2020 1718 by Lizzie Ganser. Adilia Mcmahon RN  Outcome: Ongoing  Goal: Absence of new skin breakdown  Description: Absence of new skin breakdown  9/9/2020 0123 by Khloe James LPN  Outcome: Ongoing  9/8/2020 1718 by Lizzie Ganser. Adilia Mcmahon RN  Outcome: Ongoing     Problem: Falls - Risk of:  Goal: Will remain free from falls  Description: Will remain free from falls  9/9/2020 0123 by Khloe James LPN  Outcome: Ongoing  9/8/2020 1718 by Lizzie Ganser. Adilia Mcmahon RN  Outcome: Ongoing  Goal: Absence of physical injury  Description: Absence of physical injury  9/9/2020 0123 by Khloe James LPN  Outcome: Ongoing  9/8/2020 1718 by Lizzie Ganser. Adilia Mcmahon RN  Outcome: Ongoing     Problem: Pain:  Goal: Pain level will decrease  Description: Pain level will decrease  9/9/2020 0123 by Khloe James LPN  Outcome: Ongoing  9/8/2020 1718 by Lizzie Ganser. Adilia Mcmahon RN  Outcome: Ongoing  Goal: Control of acute pain  Description: Control of acute pain  9/9/2020 0123 by Khloe James LPN  Outcome: Ongoing  9/8/2020 1718 by Lizzie Ganser. Adilia Mcmahon RN  Outcome: Ongoing  Goal: Control of chronic pain  Description: Control of chronic pain  9/9/2020 0123 by Khloe James LPN  Outcome: Ongoing  9/8/2020 1718 by Lizzie Ganser. Adilia Mcmahon RN  Outcome: Ongoing     Problem: Discharge Planning:  Goal: Discharged to appropriate level of care  Description: Discharged to appropriate level of care  9/9/2020 0123 by Khloe James LPN  Outcome: Ongoing  9/8/2020 1718 by Lizzie Ganser.  Adilia Mcmahon RN  Outcome: Ongoing     Problem: Serum Glucose Level - Abnormal:  Goal: Ability to maintain appropriate glucose levels will improve  Description: Ability to maintain appropriate glucose levels will improve  9/9/2020 0123 by Khloe James LPN  Outcome: Ongoing  9/8/2020 1718 by Kvng Medina.  Marilynn Mcduffie, RN  Outcome: Ongoing

## 2020-09-09 NOTE — DISCHARGE INSTR - COC
[facility] E11.65    Diabetic polyneuropathy (HCC) E11.42    HTN (hypertension) I10    Hyperlipidemia E78.5    Lesion of liver K76.9    Menopausal symptom N95.1    Mixed hyperlipidemia E78.2    Onychomycosis B35.1    Obesity E66.9    Osteoarthrosis M19.90    Osteoporosis M81.0    Pain in both feet M79.671, M79.672    Sleep apnea G47.30    TIA (transient ischemic attack) G45.9    Vitamin D deficiency E55.9    Wheezing R06.2    S/P ORIF (open reduction internal fixation) fracture Z98.890, Z87.81    Diabetic gastroparesis (HCC) E11.43, K31.84    Gastroesophageal reflux disease K21.9    Restless legs G25.81    Ketoacidosis, diabetic, type 2, no coma (HCC) E11.10       Isolation/Infection:   Isolation            No Isolation          Patient Infection Status       Infection Onset Added Last Indicated Last Indicated By Review Planned Expiration Resolved Resolved By    None active    Resolved    COVID-19 Rule Out 09/07/20 09/07/20 09/07/20 Covid-19 Ambulatory (Ordered)   09/09/20 Rule-Out Test Resulted            Nurse Assessment:  Last Vital Signs: BP (!) 159/58   Pulse 66   Temp 98.2 °F (36.8 °C) (Oral)   Resp 14   Ht 5' 4\" (1.626 m)   Wt 259 lb 9.6 oz (117.8 kg)   SpO2 96%   BMI 44.56 kg/m²     Last documented pain score (0-10 scale): Pain Level: 2  Last Weight:   Wt Readings from Last 1 Encounters:   09/09/20 259 lb 9.6 oz (117.8 kg)     Mental Status:  alert    IV Access:  - None    Nursing Mobility/ADLs:  Walking   Assisted  Transfer  Assisted  Bathing  Assisted  Dressing  Assisted  Toileting  Assisted  Feeding  Independent  Med Admin  Independent  Med Delivery   whole    Wound Care Documentation and Therapy:        Elimination:  Continence:   · Bowel:  Yes  · Bladder: Yes  Urinary Catheter: None   Colostomy/Ileostomy/Ileal Conduit: No       Date of Last BM:     Intake/Output Summary (Last 24 hours) at 9/9/2020 1435  Last data filed at 9/9/2020 1117  Gross per 24 hour   Intake 120 ml   Output -- Net 120 ml     I/O last 3 completed shifts: In: 18 [P.O.:480]  Out: -     Safety Concerns:     None    Impairments/Disabilities:      None    Patient's personal belongings (please select all that are sent with patient):  None    RN SIGNATURE:  Electronically signed by Miky Gracia LPN on 8/83/24 at 82:95 AM EDT              PHYSICIAN SECTION    Nutrition Therapy:  Current Nutrition Therapy:   - Oral Diet:  Carb Control 4 carbs/meal (1800kcals/day)    Routes of Feeding: Oral  Liquids: No Restrictions  Daily Fluid Restriction: no  Last Modified Barium Swallow with Video (Video Swallowing Test): not done    Treatments at the Time of Hospital Discharge:   Respiratory Treatments:   Oxygen Therapy:  is not on home oxygen therapy. Ventilator:    - No ventilator support    Rehab Therapies: Physical Therapy and Occupational Therapy  Weight Bearing Status/Restrictions: No weight bearing restirctions  Other Medical Equipment (for information only, NOT a DME order):  wheelchair  Other Treatments:     Prognosis: Good    Condition at Discharge: Stable    Rehab Potential (if transferring to Rehab): Good    Recommended Labs or Other Treatments After Discharge:  BMP in one week    Physician Certification: I certify the above information and transfer of Chris Sung  is necessary for the continuing treatment of the diagnosis listed and that she requires [facility] for greater 30 days.      Update Admission H&P: No change in H&P    PHYSICIAN SIGNATURE:  Electronically signed by Isiah Tolbert MD on 9/15/20 at 10:44 AM EDT

## 2020-09-09 NOTE — PROGRESS NOTES
Occupational Therapy    Occupational Therapy Treatment Note  Name: Lindsey Nettles MRN: [id number] :   1960   Date:  2020   Admission Date: 2020 Room:  3013/3013-A   Restrictions/Precautions:    fall risk  Communication with other providers:   Cleared for treatment by Macho Ramos RN. Subjective:  Patient states:  \"I need to get stronger\"  Pain:   Location, Type, Intensity (0/10 to 10/10):  6/10 R ribs    Objective:    Observation:  Pt alert and oriented. Treatment, including education:  Transfers    Sit to stand :Min A requires assistance due to rib pain  Stand to sit :CGA      Therapeutic Exercise:  Cues were given for technique, safety, recruitment, and rationale. Cues were verbal and/or tactile. Pt completed AROM as tolerated to BUEs but limited with movement due to pain in ribs. Pt required min rest breaks due to pain and nausea throughout session. Therapeutic Activity Training:   Therapeutic activity training was instructed today. Cues were given for safety, sequence, UE/LE placement, awareness, and balance. Activities performed today included bed mobility training, sup-sit, sit-stand, SPT. Pt stood x 4 min at RW with CGA while completing dynamic stand task while reaching outside base of support to facilitate increased balance for ADL tasks and transfers. Safety Measures: Gait belt used, Left in bed, Pull/Bed Alarm activated and call light left in reach          Assessment / Impression:        Patient's tolerance of treatment: Good   Adverse Reaction: None  Significant change in status and impact:  None  Barriers to improvement:  Pain and nausea    Plan for Next Session:    Continue with POC.     Time in:  1345  Time out:  1415  Timed treatment minutes:  30  Total treatment time:  30  Electronically signed by:    Robe Dudley [address]    2020, 1:38 PM    Previously filed values:

## 2020-09-10 LAB
ANION GAP SERPL CALCULATED.3IONS-SCNC: 11 MMOL/L (ref 4–16)
BUN BLDV-MCNC: 25 MG/DL (ref 6–23)
CALCIUM SERPL-MCNC: 8.9 MG/DL (ref 8.3–10.6)
CHLORIDE BLD-SCNC: 102 MMOL/L (ref 99–110)
CO2: 26 MMOL/L (ref 21–32)
CREAT SERPL-MCNC: 1.5 MG/DL (ref 0.6–1.1)
GFR AFRICAN AMERICAN: 43 ML/MIN/1.73M2
GFR NON-AFRICAN AMERICAN: 35 ML/MIN/1.73M2
GLUCOSE BLD-MCNC: 167 MG/DL (ref 70–99)
GLUCOSE BLD-MCNC: 174 MG/DL (ref 70–99)
GLUCOSE BLD-MCNC: 195 MG/DL (ref 70–99)
GLUCOSE BLD-MCNC: 270 MG/DL (ref 70–99)
GLUCOSE BLD-MCNC: 58 MG/DL (ref 70–99)
GLUCOSE BLD-MCNC: 79 MG/DL (ref 70–99)
GLUCOSE BLD-MCNC: 89 MG/DL (ref 70–99)
GLUCOSE BLD-MCNC: 98 MG/DL (ref 70–99)
MAGNESIUM: 1.8 MG/DL (ref 1.8–2.4)
PHOSPHORUS: 4 MG/DL (ref 2.5–4.9)
POTASSIUM SERPL-SCNC: 3.6 MMOL/L (ref 3.5–5.1)
SODIUM BLD-SCNC: 139 MMOL/L (ref 135–145)
TOTAL CK: 77 IU/L (ref 26–140)

## 2020-09-10 PROCEDURE — 6370000000 HC RX 637 (ALT 250 FOR IP): Performed by: INTERNAL MEDICINE

## 2020-09-10 PROCEDURE — 97530 THERAPEUTIC ACTIVITIES: CPT

## 2020-09-10 PROCEDURE — 2580000003 HC RX 258: Performed by: HOSPITALIST

## 2020-09-10 PROCEDURE — 82550 ASSAY OF CK (CPK): CPT

## 2020-09-10 PROCEDURE — 80048 BASIC METABOLIC PNL TOTAL CA: CPT

## 2020-09-10 PROCEDURE — 6370000000 HC RX 637 (ALT 250 FOR IP): Performed by: FAMILY MEDICINE

## 2020-09-10 PROCEDURE — 97535 SELF CARE MNGMENT TRAINING: CPT

## 2020-09-10 PROCEDURE — 84100 ASSAY OF PHOSPHORUS: CPT

## 2020-09-10 PROCEDURE — 6360000002 HC RX W HCPCS: Performed by: PHYSICIAN ASSISTANT

## 2020-09-10 PROCEDURE — 6370000000 HC RX 637 (ALT 250 FOR IP): Performed by: PHYSICIAN ASSISTANT

## 2020-09-10 PROCEDURE — 36415 COLL VENOUS BLD VENIPUNCTURE: CPT

## 2020-09-10 PROCEDURE — 82962 GLUCOSE BLOOD TEST: CPT

## 2020-09-10 PROCEDURE — 1200000000 HC SEMI PRIVATE

## 2020-09-10 PROCEDURE — 83735 ASSAY OF MAGNESIUM: CPT

## 2020-09-10 RX ORDER — 0.9 % SODIUM CHLORIDE 0.9 %
1000 INTRAVENOUS SOLUTION INTRAVENOUS ONCE
Status: COMPLETED | OUTPATIENT
Start: 2020-09-10 | End: 2020-09-11

## 2020-09-10 RX ADMIN — ATORVASTATIN CALCIUM 40 MG: 40 TABLET, FILM COATED ORAL at 21:27

## 2020-09-10 RX ADMIN — MICONAZOLE NITRATE: 20 POWDER TOPICAL at 12:05

## 2020-09-10 RX ADMIN — HEPARIN SODIUM 5000 UNITS: 5000 INJECTION, SOLUTION INTRAVENOUS; SUBCUTANEOUS at 21:26

## 2020-09-10 RX ADMIN — INSULIN LISPRO 6 UNITS: 100 INJECTION, SOLUTION INTRAVENOUS; SUBCUTANEOUS at 12:06

## 2020-09-10 RX ADMIN — INSULIN LISPRO 15 UNITS: 100 INJECTION, SOLUTION INTRAVENOUS; SUBCUTANEOUS at 09:19

## 2020-09-10 RX ADMIN — TOPIRAMATE 50 MG: 25 TABLET, FILM COATED ORAL at 21:27

## 2020-09-10 RX ADMIN — SODIUM CHLORIDE 1000 ML: 9 INJECTION, SOLUTION INTRAVENOUS at 21:37

## 2020-09-10 RX ADMIN — INSULIN LISPRO 2 UNITS: 100 INJECTION, SOLUTION INTRAVENOUS; SUBCUTANEOUS at 09:18

## 2020-09-10 RX ADMIN — HEPARIN SODIUM 5000 UNITS: 5000 INJECTION, SOLUTION INTRAVENOUS; SUBCUTANEOUS at 06:09

## 2020-09-10 RX ADMIN — HEPARIN SODIUM 5000 UNITS: 5000 INJECTION, SOLUTION INTRAVENOUS; SUBCUTANEOUS at 14:50

## 2020-09-10 ASSESSMENT — PAIN SCALES - GENERAL
PAINLEVEL_OUTOF10: 5
PAINLEVEL_OUTOF10: 0
PAINLEVEL_OUTOF10: 5

## 2020-09-10 ASSESSMENT — PAIN DESCRIPTION - ORIENTATION: ORIENTATION: RIGHT

## 2020-09-10 ASSESSMENT — PAIN DESCRIPTION - LOCATION: LOCATION: RIB CAGE

## 2020-09-10 ASSESSMENT — PAIN DESCRIPTION - PAIN TYPE: TYPE: ACUTE PAIN

## 2020-09-10 NOTE — PLAN OF CARE
Problem: Skin Integrity:  Goal: Will show no infection signs and symptoms  Description: Will show no infection signs and symptoms  Outcome: Ongoing  Goal: Absence of new skin breakdown  Description: Absence of new skin breakdown  Outcome: Ongoing     Problem: Falls - Risk of:  Goal: Will remain free from falls  Description: Will remain free from falls  Outcome: Ongoing  Goal: Absence of physical injury  Description: Absence of physical injury  Outcome: Ongoing     Problem: Pain:  Goal: Pain level will decrease  Description: Pain level will decrease  Outcome: Ongoing  Goal: Control of acute pain  Description: Control of acute pain  Outcome: Ongoing  Goal: Control of chronic pain  Description: Control of chronic pain  Outcome: Ongoing     Problem: Discharge Planning:  Goal: Discharged to appropriate level of care  Description: Discharged to appropriate level of care  Outcome: Ongoing     Problem: Serum Glucose Level - Abnormal:  Goal: Ability to maintain appropriate glucose levels will improve  Description: Ability to maintain appropriate glucose levels will improve  Outcome: Ongoing

## 2020-09-10 NOTE — PROGRESS NOTES
Hospitalist Progress Note      Name:  Prosper Scott /Age/Sex: 1960  (62 y.o. female)   MRN & CSN:  [id number] & [id number] Admission Date/Time: 2020 11:21 PM   Location:  [address] [place] [address] PCP: Abigail Butler MD       Prosper Scott is a 62 y.o.  female  who presents with Fall (down for 30 hours, recent fall with 5 ribs broken from Aug 29th. trying to hang picture this time)      Assessment and Plan:   DKA                   Resolved and Gap closed  Hx of DM II. Likely due to 2/2 recent fall with prolonged time down (33 hours) and inability to take medications, eat, or drink. Hold at home insulin and PO medications while inpatient  Insulin gtt off now and on subq  HgbA1C: 7.9              Monitor FSBS and cover with DKA Insulin protocol               IVF d/c              Antiemetics PRN  Urine culture: Mixed justo. Endocrinology following. COVID-19 negative. On carb diet.     Mechanical fall resulting in rhabdomyolysis and MODESTO with associated hyperkalemia               - CK and Cr improved              Was down on her abdomen/side for 33 hours total.              Trend BMP              Given albuterol, calcium gluconate, insulin IV and 2L fluid bolus in ER (potassium normalized now)               EKG showing prolonged QTC, monitor               CM consult placed, may need home health or placement                   Hold nephrotoxic home medications               - pt/ot for rehab needs  .  -Creatinine rising up to 1.5. Bladder scan. Give 1 L normal saline. Hold HCTZ. ACE inhibitor is already been held.     Nondisplaced rib fx on left 5th and 6th ribs and also right 4th through 6th  - occurred   - lidoderm patch  Incentive spirometry  Pain appears to be improved.   -On as needed Percocet      Hypertension              Hold lisinopril due to MODESTO              Continue toprol               Resume norvasc   -Hold HCTZ due to MODESTO.     Chronic conditions:  COPD  Asthma  Hyperlipidemia              Continue lipitor    Diabetic neuropathy              Hold gabapentin due to MODESTO  History of TIA              Continue plavix        Disposition: PT OT recommending SNF. Case management consult. Diet DIET CARB CONTROL; Carb Control: 4 carb choices (60 gms)/meal   Code Status Full Code     Medications:   Medications:    insulin glargine  45 Units Subcutaneous Nightly    miconazole   Topical BID    hydroCHLOROthiazide  25 mg Oral Daily    atorvastatin  40 mg Oral Nightly    sertraline  150 mg Oral Daily    topiramate  50 mg Oral Nightly    amLODIPine  10 mg Oral Daily    insulin lispro  15 Units Subcutaneous TID WC    insulin lispro  0-6 Units Subcutaneous 2 times per day    clopidogrel  75 mg Oral Daily    ferrous sulfate  650 mg Oral Daily with breakfast    metoprolol succinate  50 mg Oral Daily    heparin (porcine)  5,000 Units Subcutaneous 3 times per day    lidocaine  1 patch Transdermal Daily    insulin lispro  0-12 Units Subcutaneous TID WC      Infusions:    dextrose       PRN Meds: oxyCODONE-acetaminophen, 1 tablet, Q4H PRN  ondansetron, 4 mg, Q6H PRN  hydrALAZINE, 10 mg, Q6H PRN  dicyclomine, 20 mg, 4x Daily PRN  magnesium sulfate, 1 g, PRN  glucose, 15 g, PRN  dextrose, 12.5 g, PRN  glucagon (rDNA), 1 mg, PRN  dextrose, 100 mL/hr, PRN      Subjective:     -no complaints  -she states she is making urine    Objective:        Intake/Output Summary (Last 24 hours) at 9/10/2020 1444  Last data filed at 9/9/2020 1853  Gross per 24 hour   Intake 480 ml   Output --   Net 480 ml      Vitals:   Vitals:    09/10/20 0826   BP: (!) 145/85   Pulse: 72   Resp: 19   Temp: 98.4 °F (36.9 °C)   SpO2: 98%     Physical Exam:   Gen:  awake, alert, no apparent distress, laying in bed  Head/Eyes:  Normocephalic atraumatic  LUNGS: Normal Effort   CARDIOVASCULAR:  Normal rate  ABDOMEN:  non distended  MUSCULOSKELETAL:  ROM limited  NEUROLOGIC: Alert and

## 2020-09-10 NOTE — PROGRESS NOTES
Progress Note( Dr. Manan Anaya)    Subjective:   Admit Date: 9/4/2020  PCP: Jadiel Brown MD    Admitted For : History of fall patient lying down on the floor for over 30 hours has broken ribs    Consulted For: Better control of blood glucose    Interval History: Planes of chest wall pain    Denies any chest pains,   Has mild SOB . Denies nausea or vomiting. No new bowel or bladder symptoms. Intake/Output Summary (Last 24 hours) at 9/9/2020 2207  Last data filed at 9/9/2020 1853  Gross per 24 hour   Intake 840 ml   Output --   Net 840 ml       DATA    CBC: No results for input(s): WBC, HGB, PLT in the last 72 hours. CMP:  Recent Labs     09/07/20  0402 09/08/20  0408 09/09/20  0335    140 140   K 4.0 4.0 3.8    107 104   CO2 23 24 27   BUN 25* 20 23   CREATININE 1.2* 1.2* 1.3*   CALCIUM 8.6 8.7 9.3     Lipids: No results found for: CHOL, HDL, TRIG  Glucose:  Recent Labs     09/09/20  1157 09/09/20  1632 09/09/20 2014   POCGLU 111* 203* 152*     [id number]:  Lab Results   Component Value Date    LABA1C 7.9 09/05/2020     High Sensitivity TSH: No results found for: TSHHS  Free T3: No results found for: FT3  Free T4:No results found for: T4FREE    Xr Chest (2 Vw)    Result Date: 9/5/2020  EXAMINATION: TWO XRAY VIEWS OF THE CHEST 9/5/2020 12:49 am COMPARISON: CT on 08/29/2020 HISTORY: Fall. FINDINGS: Cardiomediastinal silhouette and pulmonary vasculature are normal. No consolidation, pleural effusion, or pneumothorax. No acute osseous abnormality seen. Osteopenia. Postoperative changes of the left humerus. No acute abnormality seen.      Ct Head Wo Contrast    Result Date: 9/5/2020  EXAMINATION: CT OF THE HEAD WITHOUT CONTRAST  9/5/2020 12:45 am TECHNIQUE: CT of the head was performed without the administration of intravenous contrast. Dose modulation, iterative reconstruction, and/or weight based adjustment of the mA/kV was utilized to reduce the radiation dose to as low as reasonably achievable. COMPARISON: August 29, 2020 HISTORY: ORDERING SYSTEM PROVIDED HISTORY: Fall. Blood thinners TECHNOLOGIST PROVIDED HISTORY: Reason for exam:->Fall. Blood thinners Has a \"code stroke\" or \"stroke alert\" been called? ->No Reason for Exam: fall blood thinners Acuity: Acute Type of Exam: Initial Mechanism of Injury: pain Relevant Medical/Surgical History: pt alert FINDINGS: BRAIN/VENTRICLES: There is no acute intracranial hemorrhage, mass effect or midline shift. No abnormal extra-axial fluid collection. The gray-white differentiation is maintained without evidence of an acute infarct. There is no evidence of hydrocephalus. ORBITS: The visualized portion of the orbits demonstrate no acute abnormality. SINUSES: The visualized paranasal sinuses and mastoid air cells demonstrate no acute abnormality. SOFT TISSUES/SKULL:  No acute abnormality of the visualized skull or soft tissues. No acute intracranial abnormality.        Scheduled Medicines   Medications:    insulin glargine  45 Units Subcutaneous Nightly    miconazole   Topical BID    hydroCHLOROthiazide  25 mg Oral Daily    atorvastatin  40 mg Oral Nightly    sertraline  150 mg Oral Daily    topiramate  50 mg Oral Nightly    amLODIPine  10 mg Oral Daily    insulin lispro  15 Units Subcutaneous TID     insulin lispro  0-6 Units Subcutaneous 2 times per day    clopidogrel  75 mg Oral Daily    ferrous sulfate  650 mg Oral Daily with breakfast    metoprolol succinate  50 mg Oral Daily    heparin (porcine)  5,000 Units Subcutaneous 3 times per day    lidocaine  1 patch Transdermal Daily    insulin lispro  0-12 Units Subcutaneous TID WC      Infusions:    dextrose           Objective:   Vitals: BP (!) 148/82   Pulse 69   Temp 99 °F (37.2 °C) (Oral)   Resp 13   Ht 5' 4\" (1.626 m)   Wt 259 lb 9.6 oz (117.8 kg)   SpO2 96%   BMI 44.56 kg/m²   General appearance: alert and cooperative with exam  Neck: no JVD or bruit  Thyroid : Normal lobes Lungs: Has Vesicular Breath sounds   Heart:  regular rate and rhythm  Abdomen: soft, non-tender; bowel sounds normal; no masses,  no organomegaly  Musculoskeletal: Normal  Extremities: extremities normal, , no edema  Neurologic:  Awake, alert, oriented to name, place and time. Cranial nerves II-XII are grossly intact. Motor weakness. Sensory i neuropathy t.,  and gait is abnormal.    Assessment:     Patient Active Problem List:     Proximal humeral fracture     Asthma     Benign essential hypertension     Bereavement     Pain of both hip joints     Chest pain     Chronic abdominal pain     Chronic back pain     Chronic obstructive lung disease (HCC)     Corns and callosities     Depressive disorder     Type II diabetes mellitus, uncontrolled (HCC)     Diabetic polyneuropathy (HCC)     HTN (hypertension)     Hyperlipidemia     Lesion of liver     Menopausal symptom     Mixed hyperlipidemia     Onychomycosis     Obesity     Osteoarthrosis     Osteoporosis     Pain in both feet     Sleep apnea     TIA (transient ischemic attack)     Vitamin D deficiency     Wheezing     S/P ORIF (open reduction internal fixation) fracture     Diabetic gastroparesis (HCC)     Gastroesophageal reflux disease     Restless legs     Ketoacidosis, diabetic, type 2, no coma ([facility] [address]      Plan:     1. Reviewed POC blood glucose . Labs and X ray results   2. Reviewed Current Medicines   3. On meal/ Correction bolus Humalog/ Basal Lantus Insulin regime /  4. Monitor Blood glucose frequently   5. Modified  the dose of Insulin/ other medicines as needed   6. Will follow     .      Vidya Ledesma MD

## 2020-09-10 NOTE — PROGRESS NOTES
Occupational Therapy    Occupational Therapy Treatment Note  Name: Jori Sanabria MRN: [id number] :   1960   Date:  9/10/2020   Admission Date: 2020 Room:  [address] [place] [address]-   Restrictions/Precautions:    fall risk    Communication with other providers:   Cleared for treatment by Monique Welch RN. Subjective:  Patient states:  \"I'm going to [facility] for rehab later today\"  Pain:   Location, Type, Intensity (0/10 to 10/10):  6/10 R rib area    Objective:    Observation:  Pt alert and oriented. Objective Measures:  HR 77    Treatment, including education:  Transfers  Supine to sit :Mod A due to pain with extra Rest breaks  Scooting :Min A  extra rest breaks due to pain  Sit to stand :CGA  Stand to sit :CGA with min cue to reach back for surface. SPT:CGA with RW      Self Care Training:   Cues were given for safety, sequence, UE/LE placement, visual cues, and balance. Activities performed today included dressing, toileting, hand hygiene, and grooming. Pt required extra time to perform all ADL tasks to due severe pain in ribs. Grooming  Mod I to brush teeth and comb hair. Pt washed hair with shampoo shower cap with sup. Bathing   Mod A for B LEs and buttocks pt unable to reach due to pain. Pt able to stand at RW to wash yusra area. UB Dress  Pt donned gown. LB Dress  Dep for B socks unable to reach due to pain. Therapeutic Activity Training:   Therapeutic activity training was instructed today. Cues were given for safety, sequence, UE/LE placement, awareness, and balance. Activities performed today included bed mobility training, sup-sit, sit-stand, SPT. Pt able to complete transfers with CGA but has pain with transitional movements especially with sit to stand to sit or bed mobility. Pt is able to pace self enough to manage her pain level.       Safety Measures: Gait belt used, Left in bed, Pull/Bed Alarm activated and call light left in reach          Assessment / Impression: Patient's tolerance of treatment: Good   Adverse Reaction: None  Significant change in status and impact:  None  Barriers to improvement:  pain    Plan for Next Session:    Continue with POC.     Time in:  0815  Time out:  0925  Timed treatment minutes:  70  Total treatment time:  79  Electronically signed by:    Josh Reid, [address]    9/10/2020, 9:33 AM    Previously filed values:

## 2020-09-10 NOTE — PROGRESS NOTES
Progress Note( Dr. Rosemarie Morales)  9/9/2020  Subjective:   Admit Date: 9/4/2020  PCP: Jess Rodrigues MD    Admitted For : History of fall patient lying down on the floor for over 30 hours has broken ribs     Consulted For: Better control of blood glucose     Interval History: Planes of chest wall pain     Denies any chest pains,   Has mild SOB . Denies nausea or vomiting. No new bowel or bladder symptoms. Intake/Output Summary (Last 24 hours) at 9/9/2020 2206  Last data filed at 9/9/2020 1853  Gross per 24 hour   Intake 840 ml   Output --   Net 840 ml       DATA    CBC: No results for input(s): WBC, HGB, PLT in the last 72 hours. CMP:  Recent Labs     09/07/20  0402 09/08/20  0408 09/09/20  0335    140 140   K 4.0 4.0 3.8    107 104   CO2 23 24 27   BUN 25* 20 23   CREATININE 1.2* 1.2* 1.3*   CALCIUM 8.6 8.7 9.3     Lipids: No results found for: CHOL, HDL, TRIG  Glucose:  Recent Labs     09/09/20  1157 09/09/20  1632 09/09/20 2014   POCGLU 111* 203* 152*     [id number]:  Lab Results   Component Value Date    LABA1C 7.9 09/05/2020     High Sensitivity TSH: No results found for: TSHHS  Free T3: No results found for: FT3  Free T4:No results found for: T4FREE    Xr Chest (2 Vw)    Result Date: 9/5/2020  EXAMINATION: TWO XRAY VIEWS OF THE CHEST 9/5/2020 12:49 am COMPARISON: CT on 08/29/2020 HISTORY: Fall. FINDINGS: Cardiomediastinal silhouette and pulmonary vasculature are normal. No consolidation, pleural effusion, or pneumothorax. No acute osseous abnormality seen. Osteopenia. Postoperative changes of the left humerus. No acute abnormality seen.      Ct Head Wo Contrast    Result Date: 9/5/2020  EXAMINATION: CT OF THE HEAD WITHOUT CONTRAST  9/5/2020 12:45 am TECHNIQUE: CT of the head was performed without the administration of intravenous contrast. Dose modulation, iterative reconstruction, and/or weight based adjustment of the mA/kV was utilized to reduce the radiation dose to as low as reasonably achievable. COMPARISON: August 29, 2020 HISTORY: ORDERING SYSTEM PROVIDED HISTORY: Fall. Blood thinners TECHNOLOGIST PROVIDED HISTORY: Reason for exam:->Fall. Blood thinners Has a \"code stroke\" or \"stroke alert\" been called? ->No Reason for Exam: fall blood thinners Acuity: Acute Type of Exam: Initial Mechanism of Injury: pain Relevant Medical/Surgical History: pt alert FINDINGS: BRAIN/VENTRICLES: There is no acute intracranial hemorrhage, mass effect or midline shift. No abnormal extra-axial fluid collection. The gray-white differentiation is maintained without evidence of an acute infarct. There is no evidence of hydrocephalus. ORBITS: The visualized portion of the orbits demonstrate no acute abnormality. SINUSES: The visualized paranasal sinuses and mastoid air cells demonstrate no acute abnormality. SOFT TISSUES/SKULL:  No acute abnormality of the visualized skull or soft tissues. No acute intracranial abnormality.        Scheduled Medicines   Medications:    insulin glargine  45 Units Subcutaneous Nightly    miconazole   Topical BID    hydroCHLOROthiazide  25 mg Oral Daily    atorvastatin  40 mg Oral Nightly    sertraline  150 mg Oral Daily    topiramate  50 mg Oral Nightly    amLODIPine  10 mg Oral Daily    insulin lispro  15 Units Subcutaneous TID WC    insulin lispro  0-6 Units Subcutaneous 2 times per day    clopidogrel  75 mg Oral Daily    ferrous sulfate  650 mg Oral Daily with breakfast    metoprolol succinate  50 mg Oral Daily    heparin (porcine)  5,000 Units Subcutaneous 3 times per day    lidocaine  1 patch Transdermal Daily    insulin lispro  0-12 Units Subcutaneous TID WC      Infusions:    dextrose           Objective:   Vitals: BP (!) 148/82   Pulse 69   Temp 99 °F (37.2 °C) (Oral)   Resp 13   Ht 5' 4\" (1.626 m)   Wt 259 lb 9.6 oz (117.8 kg)   SpO2 96%   BMI 44.56 kg/m²   General appearance: alert and cooperative with exam  Neck: no JVD or bruit  Thyroid : Normal lobes   Lungs: Has Vesicular Breath sounds   Heart:  regular rate and rhythm  Abdomen: soft, non-tender; bowel sounds normal; no masses,  no organomegaly  Musculoskeletal: Normal  Extremities: extremities normal, , no edema  Neurologic:  Awake, alert, oriented to name, place and time. Cranial nerves II-XII are grossly intact. Motor weakness t. Sensory neuropathy t.,  and gait is abnormal.    Assessment:     Patient Active Problem List:     Proximal humeral fracture     Asthma     Benign essential hypertension     Bereavement     Pain of both hip joints     Chest pain     Chronic abdominal pain     Chronic back pain     Chronic obstructive lung disease (HCC)     Corns and callosities     Depressive disorder     Type II diabetes mellitus, uncontrolled (HCC)     Diabetic polyneuropathy (HCC)     HTN (hypertension)     Hyperlipidemia     Lesion of liver     Menopausal symptom     Mixed hyperlipidemia     Onychomycosis     Obesity     Osteoarthrosis     Osteoporosis     Pain in both feet     Sleep apnea     TIA (transient ischemic attack)     Vitamin D deficiency     Wheezing     S/P ORIF (open reduction internal fixation) fracture     Diabetic gastroparesis (HCC)     Gastroesophageal reflux disease     Restless legs     Ketoacidosis, diabetic, type 2, no coma ([facility] [address]      Plan:     1. Reviewed POC blood glucose . Labs and X ray results   2. Reviewed Current Medicines   3. On meal/ Correction bolus Humalog/ Basal Lantus Insulin regime /  4. Monitor Blood glucose frequently   5. Modified  the dose of Insulin/ other medicines as needed   6. Will follow     .      Kyle Zambrano MD

## 2020-09-11 LAB
ANION GAP SERPL CALCULATED.3IONS-SCNC: 8 MMOL/L (ref 4–16)
BUN BLDV-MCNC: 20 MG/DL (ref 6–23)
CALCIUM SERPL-MCNC: 8.7 MG/DL (ref 8.3–10.6)
CHLORIDE BLD-SCNC: 106 MMOL/L (ref 99–110)
CO2: 27 MMOL/L (ref 21–32)
CREAT SERPL-MCNC: 1.3 MG/DL (ref 0.6–1.1)
GFR AFRICAN AMERICAN: 50 ML/MIN/1.73M2
GFR NON-AFRICAN AMERICAN: 42 ML/MIN/1.73M2
GLUCOSE BLD-MCNC: 122 MG/DL (ref 70–99)
GLUCOSE BLD-MCNC: 139 MG/DL (ref 70–99)
GLUCOSE BLD-MCNC: 151 MG/DL (ref 70–99)
GLUCOSE BLD-MCNC: 159 MG/DL (ref 70–99)
GLUCOSE BLD-MCNC: 310 MG/DL (ref 70–99)
GLUCOSE BLD-MCNC: 330 MG/DL (ref 70–99)
MAGNESIUM: 1.8 MG/DL (ref 1.8–2.4)
PHOSPHORUS: 3.3 MG/DL (ref 2.5–4.9)
POTASSIUM SERPL-SCNC: 3.6 MMOL/L (ref 3.5–5.1)
SODIUM BLD-SCNC: 141 MMOL/L (ref 135–145)
TOTAL CK: 53 IU/L (ref 26–140)

## 2020-09-11 PROCEDURE — 94761 N-INVAS EAR/PLS OXIMETRY MLT: CPT

## 2020-09-11 PROCEDURE — 94150 VITAL CAPACITY TEST: CPT

## 2020-09-11 PROCEDURE — 82550 ASSAY OF CK (CPK): CPT

## 2020-09-11 PROCEDURE — 82962 GLUCOSE BLOOD TEST: CPT

## 2020-09-11 PROCEDURE — 6370000000 HC RX 637 (ALT 250 FOR IP): Performed by: INTERNAL MEDICINE

## 2020-09-11 PROCEDURE — 84100 ASSAY OF PHOSPHORUS: CPT

## 2020-09-11 PROCEDURE — 6370000000 HC RX 637 (ALT 250 FOR IP): Performed by: PHYSICIAN ASSISTANT

## 2020-09-11 PROCEDURE — 80048 BASIC METABOLIC PNL TOTAL CA: CPT

## 2020-09-11 PROCEDURE — 6370000000 HC RX 637 (ALT 250 FOR IP): Performed by: FAMILY MEDICINE

## 2020-09-11 PROCEDURE — 36415 COLL VENOUS BLD VENIPUNCTURE: CPT

## 2020-09-11 PROCEDURE — 83735 ASSAY OF MAGNESIUM: CPT

## 2020-09-11 PROCEDURE — 6360000002 HC RX W HCPCS: Performed by: PHYSICIAN ASSISTANT

## 2020-09-11 PROCEDURE — 1200000000 HC SEMI PRIVATE

## 2020-09-11 RX ADMIN — INSULIN GLARGINE 45 UNITS: 100 INJECTION, SOLUTION SUBCUTANEOUS at 20:26

## 2020-09-11 RX ADMIN — MICONAZOLE NITRATE: 20 POWDER TOPICAL at 09:45

## 2020-09-11 RX ADMIN — HEPARIN SODIUM 5000 UNITS: 5000 INJECTION, SOLUTION INTRAVENOUS; SUBCUTANEOUS at 14:35

## 2020-09-11 RX ADMIN — ATORVASTATIN CALCIUM 40 MG: 40 TABLET, FILM COATED ORAL at 20:24

## 2020-09-11 RX ADMIN — CLOPIDOGREL BISULFATE 75 MG: 75 TABLET ORAL at 09:44

## 2020-09-11 RX ADMIN — SERTRALINE HYDROCHLORIDE 150 MG: 50 TABLET ORAL at 09:35

## 2020-09-11 RX ADMIN — MICONAZOLE NITRATE: 20 POWDER TOPICAL at 20:31

## 2020-09-11 RX ADMIN — INSULIN LISPRO 15 UNITS: 100 INJECTION, SOLUTION INTRAVENOUS; SUBCUTANEOUS at 14:38

## 2020-09-11 RX ADMIN — AMLODIPINE BESYLATE 10 MG: 10 TABLET ORAL at 09:35

## 2020-09-11 RX ADMIN — HEPARIN SODIUM 5000 UNITS: 5000 INJECTION, SOLUTION INTRAVENOUS; SUBCUTANEOUS at 06:23

## 2020-09-11 RX ADMIN — INSULIN LISPRO 8 UNITS: 100 INJECTION, SOLUTION INTRAVENOUS; SUBCUTANEOUS at 14:34

## 2020-09-11 RX ADMIN — HEPARIN SODIUM 5000 UNITS: 5000 INJECTION, SOLUTION INTRAVENOUS; SUBCUTANEOUS at 20:24

## 2020-09-11 RX ADMIN — INSULIN LISPRO 8 UNITS: 100 INJECTION, SOLUTION INTRAVENOUS; SUBCUTANEOUS at 17:45

## 2020-09-11 RX ADMIN — METOPROLOL SUCCINATE 50 MG: 50 TABLET, EXTENDED RELEASE ORAL at 09:35

## 2020-09-11 RX ADMIN — INSULIN LISPRO 15 UNITS: 100 INJECTION, SOLUTION INTRAVENOUS; SUBCUTANEOUS at 17:45

## 2020-09-11 RX ADMIN — TOPIRAMATE 50 MG: 25 TABLET, FILM COATED ORAL at 20:30

## 2020-09-11 ASSESSMENT — PAIN SCALES - GENERAL: PAINLEVEL_OUTOF10: 0

## 2020-09-11 NOTE — PROGRESS NOTES
Comprehensive Nutrition Assessment    Type and Reason for Visit:  Initial(los 7)    Nutrition Recommendations/Plan:   Continue current diet    Nutrition Assessment:  Labs improving after pt found down following a fall. Improved intake dos, feeding self and generally consuming greater than 76% to all of meals. No recent wt loss reported nor noted. No wounds. Pt is at low nutrition risk at this time.     Malnutrition Assessment:  Malnutrition Status:  No malnutrition    Context:  Acute Illness       Wounds:  None       Current Nutrition Therapies:    DIET CARB CONTROL; Carb Control: 4 carb choices (60 gms)/meal    Anthropometric Measures:  · Height: 5' 4\" (162.6 cm)  · Current Body Weight: 254 lb 9.6 oz (115.5 kg)   · Ideal Body Weight: 120 lbs; % Ideal Body Weight 212.2 %   · BMI: 43.7  · BMI Categories: Obese Class 3 (BMI 40.0 or greater)       Nutrition Diagnosis:   No nutrition diagnosis at this time     Nutrition Interventions:   Food and/or Nutrient Delivery:  Continue Current Diet  Nutrition Education/Counseling:  No recommendation at this time   Coordination of Nutrition Care:  Continued Inpatient Monitoring    Goals:  Pt will continue to consume greater than 50% of her meals and supplements       Nutrition Monitoring and Evaluation:   Food/Nutrient Intake Outcomes:  Food and Nutrient Intake  Physical Signs/Symptoms Outcomes:  Biochemical Data, Weight     Discharge Planning:    No discharge needs at this time     Electronically signed by Alfonzo Garcia RD, LD on 9/11/20 at 12:28 PM EDT    Contact: [phone]

## 2020-09-11 NOTE — PLAN OF CARE
Problem: Skin Integrity:  Goal: Will show no infection signs and symptoms  Description: Will show no infection signs and symptoms  9/11/2020 1111 by Carissa Gaxiola LPN  Outcome: Ongoing  9/11/2020 0128 by Lisa Cramer LPN  Outcome: Ongoing  Goal: Absence of new skin breakdown  Description: Absence of new skin breakdown  9/11/2020 1111 by Carissa Gaxiola LPN  Outcome: Ongoing  9/11/2020 0128 by Lisa Cramer LPN  Outcome: Ongoing     Problem: Falls - Risk of:  Goal: Will remain free from falls  Description: Will remain free from falls  9/11/2020 1111 by Carissa Gaxiola LPN  Outcome: Ongoing  9/11/2020 0128 by Lisa Cramer LPN  Outcome: Ongoing  Goal: Absence of physical injury  Description: Absence of physical injury  9/11/2020 1111 by Carissa Gaxiola LPN  Outcome: Ongoing  9/11/2020 0128 by Lisa Cramer LPN  Outcome: Ongoing     Problem: Pain:  Goal: Pain level will decrease  Description: Pain level will decrease  9/11/2020 1111 by Carissa Gaxiola LPN  Outcome: Ongoing  9/11/2020 0128 by Lisa Cramer LPN  Outcome: Ongoing  Goal: Control of acute pain  Description: Control of acute pain  9/11/2020 1111 by Carissa Gaxiola LPN  Outcome: Ongoing  9/11/2020 0128 by Lisa Cramer LPN  Outcome: Ongoing  Goal: Control of chronic pain  Description: Control of chronic pain  9/11/2020 1111 by Carissa Gaxiola LPN  Outcome: Ongoing  9/11/2020 0128 by Lisa Cramer LPN  Outcome: Ongoing     Problem: Discharge Planning:  Goal: Discharged to appropriate level of care  Description: Discharged to appropriate level of care  9/11/2020 1111 by Carissa Gaxiola LPN  Outcome: Ongoing  9/11/2020 0128 by Lisa Cramer LPN  Outcome: Ongoing     Problem: Serum Glucose Level - Abnormal:  Goal: Ability to maintain appropriate glucose levels will improve  Description: Ability to maintain appropriate glucose levels will improve  9/11/2020 1111 by Carissa Gaxiola LPN  Outcome:

## 2020-09-11 NOTE — PROGRESS NOTES
Hospitalist Progress Note      Name:  Mounika Dean /Age/Sex: 1960  (62 y.o. female)   MRN & CSN:  [id number] & [id number] Admission Date/Time: 2020 11:21 PM   Location:  [address] [place] [address] PCP: Poncho Torres MD       Mounika Dean is a 62 y.o.  female  who presents with Fall (down for 30 hours, recent fall with 5 ribs broken from Aug 29th. trying to hang picture this time)      Assessment and Plan:   DKA                   Resolved and Gap closed  Hx of DM II. Likely due to 2/2 recent fall with prolonged time down (33 hours) and inability to take medications, eat, or drink. Hold at home insulin and PO medications while inpatient  Insulin gtt off now and on subq  HgbA1C: 7.9              Monitor FSBS and cover with DKA Insulin protocol               IVF d/c              Antiemetics PRN  Urine culture: Mixed justo. Endocrinology following. COVID-19 negative. On carb diet. -Stable     Mechanical fall resulting in rhabdomyolysis and MODESTO with associated hyperkalemia               - CK and Cr improved              Was down on her abdomen/side for 33 hours total.              Trend BMP following him of no was  Acute abdominal              Given albuterol, calcium gluconate, insulin IV and 2L fluid bolus in ER (potassium normalized now)               EKG showing prolonged QTC, monitor               CM consult placed, may need home health or placement                   Hold nephrotoxic home medications               - pt/ot for rehab needs  . Creatinine rising up to 1.5. Hold HCTZ. ACE inhibitor is already been held. -Creatinine improved 1.3 with IVF.     Nondisplaced rib fx on left 5th and 6th ribs and also right 4th through 6th  - occurred   Lidoderm patch  Incentive spirometry  Pain appears to be improved.   On as needed Percocet  -pulmonary toilet.      Hypertension              Hold lisinopril due to MODESTO              Continue toprol               Resume norvasc   Hold HCTZ due to MODESTO.     Chronic conditions:  COPD  Asthma  Hyperlipidemia              Continue lipitor    Diabetic neuropathy              Hold gabapentin due to MODESTO  History of TIA              Continue plavix        Disposition:-Awaiting SNF placement.     Diet DIET CARB CONTROL; Carb Control: 4 carb choices (60 gms)/meal   Code Status Full Code     Medications:   Medications:    insulin glargine  45 Units Subcutaneous Nightly    miconazole   Topical BID    [Held by provider] hydroCHLOROthiazide  25 mg Oral Daily    atorvastatin  40 mg Oral Nightly    sertraline  150 mg Oral Daily    topiramate  50 mg Oral Nightly    amLODIPine  10 mg Oral Daily    insulin lispro  15 Units Subcutaneous TID WC    insulin lispro  0-6 Units Subcutaneous 2 times per day    clopidogrel  75 mg Oral Daily    ferrous sulfate  650 mg Oral Daily with breakfast    metoprolol succinate  50 mg Oral Daily    heparin (porcine)  5,000 Units Subcutaneous 3 times per day    lidocaine  1 patch Transdermal Daily    insulin lispro  0-12 Units Subcutaneous TID WC      Infusions:    dextrose       PRN Meds: oxyCODONE-acetaminophen, 1 tablet, Q4H PRN  ondansetron, 4 mg, Q6H PRN  hydrALAZINE, 10 mg, Q6H PRN  dicyclomine, 20 mg, 4x Daily PRN  magnesium sulfate, 1 g, PRN  glucose, 15 g, PRN  dextrose, 12.5 g, PRN  glucagon (rDNA), 1 mg, PRN  dextrose, 100 mL/hr, PRN      Subjective:     -occasional rib pain with movement  -diet is picking up  -urinating without difficulty      Objective:     No intake or output data in the 24 hours ending 09/11/20 0949   Vitals:   Vitals:    09/11/20 0934   BP: (!) 172/93   Pulse: 86   Resp: 17   Temp: 98.3 °F (36.8 °C)   SpO2: 96%     Physical Exam:   Gen:  awake, alert, no apparent distress, sitting in chair  Head/Eyes:  Normocephalic atraumatic  LUNGS: Normal Effort   CARDIOVASCULAR:  Normal rate  ABDOMEN:  non distended  MUSCULOSKELETAL:  Appears move her extremities  NEUROLOGIC: Alert and Oriented,  Cranial nerves II-XII are grossly intact. SKIN:  no bruising or bleeding, normal skin color,  no redness      Data:       CBC   No results for input(s): WBC, HGB, HCT, PLT in the last 72 hours.    BMP   Recent Labs     09/09/20  0335 09/10/20  0553 09/11/20  0600    139 141   K 3.8 3.6 3.6    102 106   CO2 27 26 27   PHOS 3.8 4.0 3.3   BUN 23 25* 20   CREATININE 1.3* 1.5* 1.3*         Electronically signed by Josiah Forde MD on 9/11/2020 at 9:49 AM

## 2020-09-11 NOTE — PROGRESS NOTES
Progress Note( Dr. Henri Siemens)  9/10/2020  Subjective:   Admit Date: 9/4/2020  PCP: Eliezer Marin MD    AAdmitted For : History of fall patient lying down on the floor for over 30 hours has broken ribs     Consulted For: Better control of blood glucose     Interval History: Planes of chest wall pain     Denies any chest pains,   Has mild SOB . Denies nausea or vomiting. No new bowel or bladder symptoms. No intake or output data in the 24 hours ending 09/10/20 2226    DATA    CBC: No results for input(s): WBC, HGB, PLT in the last 72 hours. CMP:  Recent Labs     09/08/20  0408 09/09/20  0335 09/10/20  0553    140 139   K 4.0 3.8 3.6    104 102   CO2 24 27 26   BUN 20 23 25*   CREATININE 1.2* 1.3* 1.5*   CALCIUM 8.7 9.3 8.9     Lipids: No results found for: CHOL, HDL, TRIG  Glucose:  Recent Labs     09/10/20  1137 09/10/20  1633 09/10/20  2123   POCGLU 270* 79 167*     [id number]:  Lab Results   Component Value Date    LABA1C 7.9 09/05/2020     High Sensitivity TSH: No results found for: TSHHS  Free T3: No results found for: FT3  Free T4:No results found for: T4FREE    Xr Chest (2 Vw)    Result Date: 9/5/2020  EXAMINATION: TWO XRAY VIEWS OF THE CHEST 9/5/2020 12:49 am COMPARISON: CT on 08/29/2020 HISTORY: Fall. FINDINGS: Cardiomediastinal silhouette and pulmonary vasculature are normal. No consolidation, pleural effusion, or pneumothorax. No acute osseous abnormality seen. Osteopenia. Postoperative changes of the left humerus. No acute abnormality seen. Ct Head Wo Contrast    Result Date: 9/5/2020  EXAMINATION: CT OF THE HEAD WITHOUT CONTRAST  9/5/2020 12:45 am TECHNIQUE: CT of the head was performed without the administration of intravenous contrast. Dose modulation, iterative reconstruction, and/or weight based adjustment of the mA/kV was utilized to reduce the radiation dose to as low as reasonably achievable.  COMPARISON: August 29, 2020 HISTORY: ORDERING SYSTEM PROVIDED HISTORY: Fall. Blood thinners TECHNOLOGIST PROVIDED HISTORY: Reason for exam:->Fall. Blood thinners Has a \"code stroke\" or \"stroke alert\" been called? ->No Reason for Exam: fall blood thinners Acuity: Acute Type of Exam: Initial Mechanism of Injury: pain Relevant Medical/Surgical History: pt alert FINDINGS: BRAIN/VENTRICLES: There is no acute intracranial hemorrhage, mass effect or midline shift. No abnormal extra-axial fluid collection. The gray-white differentiation is maintained without evidence of an acute infarct. There is no evidence of hydrocephalus. ORBITS: The visualized portion of the orbits demonstrate no acute abnormality. SINUSES: The visualized paranasal sinuses and mastoid air cells demonstrate no acute abnormality. SOFT TISSUES/SKULL:  No acute abnormality of the visualized skull or soft tissues. No acute intracranial abnormality.        Scheduled Medicines   Medications:    sodium chloride  1,000 mL Intravenous Once    insulin glargine  45 Units Subcutaneous Nightly    miconazole   Topical BID    [Held by provider] hydroCHLOROthiazide  25 mg Oral Daily    atorvastatin  40 mg Oral Nightly    sertraline  150 mg Oral Daily    topiramate  50 mg Oral Nightly    amLODIPine  10 mg Oral Daily    insulin lispro  15 Units Subcutaneous TID WC    insulin lispro  0-6 Units Subcutaneous 2 times per day    clopidogrel  75 mg Oral Daily    ferrous sulfate  650 mg Oral Daily with breakfast    metoprolol succinate  50 mg Oral Daily    heparin (porcine)  5,000 Units Subcutaneous 3 times per day    lidocaine  1 patch Transdermal Daily    insulin lispro  0-12 Units Subcutaneous TID WC      Infusions:    dextrose           Objective:   Vitals: BP (!) 153/58   Pulse 70   Temp 98 °F (36.7 °C) (Oral)   Resp 19   Ht 5' 4\" (1.626 m)   Wt 259 lb 13.9 oz (117.9 kg)   SpO2 98%   BMI 44.61 kg/m²   General appearance: alert and cooperative with exam  Neck: no JVD or bruit  Thyroid : Normal lobes   Lungs: Has Vesicular Breath sounds small tenderness  Heart:  regular rate and rhythm  Abdomen: soft, non-tender; bowel sounds normal; no masses,  no organomegaly  Musculoskeletal: Normal  Extremities: extremities normal, , no edema  Neurologic:  Awake, alert, oriented to name, place and time. Cranial nerves II-XII are grossly intact. Motor weakness. Sensory neuropathy. ,  and gait is abnormal.    Assessment:     Patient Active Problem List:     Proximal humeral fracture     Asthma     Benign essential hypertension     Bereavement     Pain of both hip joints     Chest pain     Chronic abdominal pain     Chronic back pain     Chronic obstructive lung disease (HCC)     Corns and callosities     Depressive disorder     Type II diabetes mellitus, uncontrolled (HCC)     Diabetic polyneuropathy (HCC)     HTN (hypertension)     Hyperlipidemia     Lesion of liver     Menopausal symptom     Mixed hyperlipidemia     Onychomycosis     Obesity     Osteoarthrosis     Osteoporosis     Pain in both feet     Sleep apnea     TIA (transient ischemic attack)     Vitamin D deficiency     Wheezing     S/P ORIF (open reduction internal fixation) fracture     Diabetic gastroparesis (HCC)     Gastroesophageal reflux disease     Restless legs     Ketoacidosis, diabetic, type 2, no coma ([facility]      Plan:     1. Reviewed POC blood glucose . Labs and X ray results   2. Reviewed Current Medicines   3. On meal/ Correction bolus Humalog/ Basal Lantus Insulin regime /  4. Monitor Blood glucose frequently   5. Modified  the dose of Insulin/ other medicines as needed   6. Will follow     .      Diane Mcqueen MD

## 2020-09-11 NOTE — CARE COORDINATION
Spoke with Miesha/indira at [facility] who states she is awaiting pre-cert. CM following. Pt new to [facility] at discharge. Please call report to [phone] and fax orders, AVS, and discharge summaries to [phone].

## 2020-09-12 LAB
ANION GAP SERPL CALCULATED.3IONS-SCNC: 10 MMOL/L (ref 4–16)
BUN BLDV-MCNC: 26 MG/DL (ref 6–23)
CALCIUM SERPL-MCNC: 8.9 MG/DL (ref 8.3–10.6)
CHLORIDE BLD-SCNC: 105 MMOL/L (ref 99–110)
CO2: 26 MMOL/L (ref 21–32)
CREAT SERPL-MCNC: 1.5 MG/DL (ref 0.6–1.1)
GFR AFRICAN AMERICAN: 43 ML/MIN/1.73M2
GFR NON-AFRICAN AMERICAN: 35 ML/MIN/1.73M2
GLUCOSE BLD-MCNC: 154 MG/DL (ref 70–99)
GLUCOSE BLD-MCNC: 185 MG/DL (ref 70–99)
GLUCOSE BLD-MCNC: 188 MG/DL (ref 70–99)
GLUCOSE BLD-MCNC: 191 MG/DL (ref 70–99)
GLUCOSE BLD-MCNC: 279 MG/DL (ref 70–99)
GLUCOSE BLD-MCNC: 55 MG/DL (ref 70–99)
GLUCOSE BLD-MCNC: 62 MG/DL (ref 70–99)
GLUCOSE BLD-MCNC: 83 MG/DL (ref 70–99)
GLUCOSE BLD-MCNC: 84 MG/DL (ref 70–99)
MAGNESIUM: 1.9 MG/DL (ref 1.8–2.4)
PHOSPHORUS: 3.7 MG/DL (ref 2.5–4.9)
POTASSIUM SERPL-SCNC: 3.8 MMOL/L (ref 3.5–5.1)
SODIUM BLD-SCNC: 141 MMOL/L (ref 135–145)
TOTAL CK: 51 IU/L (ref 26–140)

## 2020-09-12 PROCEDURE — 82962 GLUCOSE BLOOD TEST: CPT

## 2020-09-12 PROCEDURE — 6360000002 HC RX W HCPCS: Performed by: PHYSICIAN ASSISTANT

## 2020-09-12 PROCEDURE — 84100 ASSAY OF PHOSPHORUS: CPT

## 2020-09-12 PROCEDURE — 97530 THERAPEUTIC ACTIVITIES: CPT

## 2020-09-12 PROCEDURE — 51798 US URINE CAPACITY MEASURE: CPT

## 2020-09-12 PROCEDURE — 6370000000 HC RX 637 (ALT 250 FOR IP): Performed by: INTERNAL MEDICINE

## 2020-09-12 PROCEDURE — 83735 ASSAY OF MAGNESIUM: CPT

## 2020-09-12 PROCEDURE — 2580000003 HC RX 258: Performed by: HOSPITALIST

## 2020-09-12 PROCEDURE — 1200000000 HC SEMI PRIVATE

## 2020-09-12 PROCEDURE — 94150 VITAL CAPACITY TEST: CPT

## 2020-09-12 PROCEDURE — 97116 GAIT TRAINING THERAPY: CPT

## 2020-09-12 PROCEDURE — 80048 BASIC METABOLIC PNL TOTAL CA: CPT

## 2020-09-12 PROCEDURE — 82550 ASSAY OF CK (CPK): CPT

## 2020-09-12 PROCEDURE — 6370000000 HC RX 637 (ALT 250 FOR IP): Performed by: FAMILY MEDICINE

## 2020-09-12 PROCEDURE — 36415 COLL VENOUS BLD VENIPUNCTURE: CPT

## 2020-09-12 PROCEDURE — 6370000000 HC RX 637 (ALT 250 FOR IP): Performed by: PHYSICIAN ASSISTANT

## 2020-09-12 PROCEDURE — 97110 THERAPEUTIC EXERCISES: CPT

## 2020-09-12 PROCEDURE — 94761 N-INVAS EAR/PLS OXIMETRY MLT: CPT

## 2020-09-12 RX ORDER — 0.9 % SODIUM CHLORIDE 0.9 %
1000 INTRAVENOUS SOLUTION INTRAVENOUS ONCE
Status: COMPLETED | OUTPATIENT
Start: 2020-09-12 | End: 2020-09-12

## 2020-09-12 RX ADMIN — SODIUM CHLORIDE 1000 ML: 9 INJECTION, SOLUTION INTRAVENOUS at 14:01

## 2020-09-12 RX ADMIN — INSULIN GLARGINE 45 UNITS: 100 INJECTION, SOLUTION SUBCUTANEOUS at 20:50

## 2020-09-12 RX ADMIN — INSULIN LISPRO 15 UNITS: 100 INJECTION, SOLUTION INTRAVENOUS; SUBCUTANEOUS at 11:39

## 2020-09-12 RX ADMIN — HEPARIN SODIUM 5000 UNITS: 5000 INJECTION, SOLUTION INTRAVENOUS; SUBCUTANEOUS at 13:58

## 2020-09-12 RX ADMIN — SERTRALINE HYDROCHLORIDE 150 MG: 50 TABLET ORAL at 09:14

## 2020-09-12 RX ADMIN — TOPIRAMATE 50 MG: 25 TABLET, FILM COATED ORAL at 20:50

## 2020-09-12 RX ADMIN — INSULIN LISPRO 6 UNITS: 100 INJECTION, SOLUTION INTRAVENOUS; SUBCUTANEOUS at 11:40

## 2020-09-12 RX ADMIN — MICONAZOLE NITRATE: 20 POWDER TOPICAL at 12:38

## 2020-09-12 RX ADMIN — HEPARIN SODIUM 5000 UNITS: 5000 INJECTION, SOLUTION INTRAVENOUS; SUBCUTANEOUS at 20:50

## 2020-09-12 RX ADMIN — AMLODIPINE BESYLATE 10 MG: 10 TABLET ORAL at 09:14

## 2020-09-12 RX ADMIN — METOPROLOL SUCCINATE 50 MG: 50 TABLET, EXTENDED RELEASE ORAL at 09:14

## 2020-09-12 RX ADMIN — ATORVASTATIN CALCIUM 40 MG: 40 TABLET, FILM COATED ORAL at 20:51

## 2020-09-12 RX ADMIN — INSULIN LISPRO 15 UNITS: 100 INJECTION, SOLUTION INTRAVENOUS; SUBCUTANEOUS at 09:17

## 2020-09-12 RX ADMIN — CLOPIDOGREL BISULFATE 75 MG: 75 TABLET ORAL at 09:14

## 2020-09-12 RX ADMIN — HEPARIN SODIUM 5000 UNITS: 5000 INJECTION, SOLUTION INTRAVENOUS; SUBCUTANEOUS at 06:45

## 2020-09-12 RX ADMIN — INSULIN LISPRO 2 UNITS: 100 INJECTION, SOLUTION INTRAVENOUS; SUBCUTANEOUS at 09:16

## 2020-09-12 ASSESSMENT — PAIN DESCRIPTION - LOCATION: LOCATION: RIB CAGE

## 2020-09-12 ASSESSMENT — PAIN SCALES - GENERAL: PAINLEVEL_OUTOF10: 1

## 2020-09-12 ASSESSMENT — PAIN DESCRIPTION - PAIN TYPE: TYPE: ACUTE PAIN

## 2020-09-12 ASSESSMENT — PAIN DESCRIPTION - ORIENTATION: ORIENTATION: RIGHT

## 2020-09-12 NOTE — PROGRESS NOTES
Hospitalist Progress Note      Name:  Chela Rutherford /Age/Sex: 1960  (62 y.o. female)   MRN & CSN:  [id number] & [id number] Admission Date/Time: 2020 11:21 PM   Location:  7780/1058-D PCP: Tammy Duarte MD       Chela Rutherford is a 62 y.o.  female  who presents with Fall (down for 30 hours, recent fall with 5 ribs broken from Aug 29th. trying to hang picture this time)      Assessment and Plan:   DKA                   Resolved and Gap closed  Hx of DM II. Likely due to recent fall with prolonged time down (33 hours) and inability to take medications, eat, or drink. Hold at home insulin and PO medications while inpatient  Insulin gtt off now and on subq  HgbA1C: 7.9              Antiemetics PRN  Urine culture: Mixed justo. Endocrinology following. COVID-19 negative. On carb diet. -Stable and no change.     [place]STO with associated hyperkalemia   likely due to rhabdomyolysis and poor oral intake              Was down on her abdomen/side for 33 hours total.              Given albuterol, calcium gluconate, insulin IV and 2L fluid bolus in ER (potassium normalized now)              Hold nephrotoxic home medications   Hold HCTZ. ACE inhibitor is already been held. -HCTZ and ACE inhibitor held due to elevated creatinine. Creatinine again elevated to 1.5. Give additional IVF. Rhabdomyolysis likely due to mechanical fall.  -CK improved. Resolved. Mechanical fall  - Receiving PT/OT.     Nondisplaced rib fx on left 5th and 6th ribs and also right 4th through 6th  Occurred   Lidoderm patch  Incentive spirometry  Pain appears to be improved.   On as needed Percocet  Pulmonary toilet.      Hypertension              Continue toprol               Resume norvasc   Hold HCTZ and lisinopril due to MODESTO.     Chronic conditions:  COPD  Asthma  Hyperlipidemia              Continue lipitor  Diabetic neuropathy              Hold gabapentin due to MODESTO  History of TIA              Continue plavix        Disposition:-Awaiting SNF placement. Diet DIET CARB CONTROL; Carb Control: 4 carb choices (60 gms)/meal   Code Status Full Code     Medications:   Medications:    insulin glargine  45 Units Subcutaneous Nightly    miconazole   Topical BID    [Held by provider] hydroCHLOROthiazide  25 mg Oral Daily    atorvastatin  40 mg Oral Nightly    sertraline  150 mg Oral Daily    topiramate  50 mg Oral Nightly    amLODIPine  10 mg Oral Daily    insulin lispro  15 Units Subcutaneous TID WC    insulin lispro  0-6 Units Subcutaneous 2 times per day    clopidogrel  75 mg Oral Daily    ferrous sulfate  650 mg Oral Daily with breakfast    metoprolol succinate  50 mg Oral Daily    heparin (porcine)  5,000 Units Subcutaneous 3 times per day    lidocaine  1 patch Transdermal Daily    insulin lispro  0-12 Units Subcutaneous TID WC      Infusions:    dextrose       PRN Meds: oxyCODONE-acetaminophen, 1 tablet, Q4H PRN  ondansetron, 4 mg, Q6H PRN  hydrALAZINE, 10 mg, Q6H PRN  dicyclomine, 20 mg, 4x Daily PRN  magnesium sulfate, 1 g, PRN  glucose, 15 g, PRN  dextrose, 12.5 g, PRN  glucagon (rDNA), 1 mg, PRN  dextrose, 100 mL/hr, PRN      Subjective:     -No complaints, she states she is drinking water and no difficulty urinating      Objective:     No intake or output data in the 24 hours ending 09/12/20 1121   Vitals:   Vitals:    09/12/20 0926   BP:    Pulse:    Resp: 16   Temp:    SpO2: 96%     Physical Exam:   Gen:  awake, alert, no apparent distress, sitting in chair  Head/Eyes:  Normocephalic atraumatic  LUNGS: Normal Effort   CARDIOVASCULAR:  RRR  ABDOMEN:  non distended  MUSCULOSKELETAL:  Appears move her extremities  NEUROLOGIC: Alert and Oriented,  Cranial nerves II-XII are grossly intact. SKIN:  no bruising or bleeding, normal skin color,  no redness      Data:       CBC   No results for input(s): WBC, HGB, HCT, PLT in the last 72 hours.    BMP   Recent Labs     09/10/20  0553 09/11/20  0600

## 2020-09-12 NOTE — PROGRESS NOTES
Physical Therapy    Physical Therapy Treatment Note  Name: Luis Moreno MRN: [id number] :   1960   Date:  2020   Admission Date: 2020 Room:  [address] [place] [address]-   Restrictions/Precautions:      rib fractures  Communication with other providers:ok to see  Subjective:  Patient states:  Pt stated that she would be agreeable to walk. Pt stated that she didn't want anything for pain because it just made her nauseated  Pain:   Location, Type, Intensity (0/10 to 10/10):  5/10   Objective:    Observation:  Asleep in bed    When patient stood up, PTA noticed 5 pills underneath her. Nursing was notified. Treatment, including education/measures:  Pt performed B AP, B QS, B GS, B knee flexion, B hip abduction 10 x2 ea   Pt transferred supine to sit with help of elevated bed and min/ mod assist of 1. Pt came sit to stand with min/mod assist. Pt ambulated a total of 160 ft with  RW  with slow gt and uneven steps. Pt transferred to toilet with min assist. Pt came sit to stand from toilet with mod assist. Pt needed total assist for hygiene. Pt ambulated from toilet to BS chair. Pt left up in chair with alarm, call light and TV remote,  and lunch  Assessment / Impression:       Patient's tolerance of treatment:  Fair .  Pt was difficult to wake for exericses   Adverse Reaction: NA  Significant change in status and impact:  NA  Barriers to improvement:  Endurance and pain due to rib fractures  Plan for Next Session:    Per POC  Time in: 1057   Time out: 1150  Timed treatment minutes:  53'  Total treatment time:  48'    Previously filed items:  Social/Functional History  Lives With: Alone  Type of Home: (Condo)  Home Layout: One level  Home Access: Level entry  Bathroom Shower/Tub: Tub/Shower unit  Bathroom Toilet: Handicap height  Bathroom Equipment: Shower chair, Grab bars in shower  Bathroom Accessibility: Accessible  Home Equipment: 4 wheeled walker, Cane(Pt reports she walks with 4WW/cane evenly, occasionally walks without AD.)  Receives Help From: (Pt reports she does not have assistance locally.)  ADL Assistance: [address]: Independent  Homemaking Responsibilities: Yes  Ambulation Assistance: Independent  Transfer Assistance: Independent  Active : Yes  Mode of Transportation: Car  Occupation: Retired  Type of occupation: [employer].   Short term goals  Time Frame for Short term goals: 1 week  Short term goal 1: Pt will complete bed mobility with minimal assist.  Short term goal 2: Pt will complete all functional transfers with minimal assist.  Short term goal 3: Pt will ambulate 21' with LRAD with minimal assist.       Electronically signed by:    Melody Andrew, PTA  9/12/2020, 11:41 AM

## 2020-09-12 NOTE — PROGRESS NOTES
Pt's BS dropped to 55 during shift. Pt given crackers, peanut butter, and juice. Rechecked and it was 84. 13 [facility] NP made aware and stated to recheck an hr later. Rechecked as instructed and it was 154. 13 [facility] NP made aware. Pt asymptomatic during situation.

## 2020-09-12 NOTE — PROGRESS NOTES
Progress Note( Dr. Afua Rosales)  9/11/2020  Subjective:   Admit Date: 9/4/2020  PCP: Edgar Vann MD    Admitted For : History of fall patient lying down on the floor for over 30 hours has broken ribs     Consulted For: Better control of blood glucose     Interval History: Planes of chest wall pain     Denies any chest pains,   Has mild SOB . Denies nausea or vomiting. No new bowel or bladder symptoms. No intake or output data in the 24 hours ending 09/11/20 2042    DATA    CBC: No results for input(s): WBC, HGB, PLT in the last 72 hours. CMP:  Recent Labs     09/09/20  0335 09/10/20  0553 09/11/20  0600    139 141   K 3.8 3.6 3.6    102 106   CO2 27 26 27   BUN 23 25* 20   CREATININE 1.3* 1.5* 1.3*   CALCIUM 9.3 8.9 8.7     Lipids: No results found for: CHOL, HDL, TRIG  Glucose:  Recent Labs     09/11/20  1428 09/11/20  1737 09/11/20 2021   POCGLU 330* 310* 151*     [id number]:  Lab Results   Component Value Date    LABA1C 7.9 09/05/2020     High Sensitivity TSH: No results found for: TSHHS  Free T3: No results found for: FT3  Free T4:No results found for: T4FREE    Xr Chest (2 Vw)    Result Date: 9/5/2020  EXAMINATION: TWO XRAY VIEWS OF THE CHEST 9/5/2020 12:49 am COMPARISON: CT on 08/29/2020 HISTORY: Fall. FINDINGS: Cardiomediastinal silhouette and pulmonary vasculature are normal. No consolidation, pleural effusion, or pneumothorax. No acute osseous abnormality seen. Osteopenia. Postoperative changes of the left humerus. No acute abnormality seen. Ct Head Wo Contrast    Result Date: 9/5/2020  EXAMINATION: CT OF THE HEAD WITHOUT CONTRAST  9/5/2020 12:45 am TECHNIQUE: CT of the head was performed without the administration of intravenous contrast. Dose modulation, iterative reconstruction, and/or weight based adjustment of the mA/kV was utilized to reduce the radiation dose to as low as reasonably achievable.  COMPARISON: August 29, 2020 HISTORY: ORDERING SYSTEM PROVIDED HISTORY: Fall. Blood thinners TECHNOLOGIST PROVIDED HISTORY: Reason for exam:->Fall. Blood thinners Has a \"code stroke\" or \"stroke alert\" been called? ->No Reason for Exam: fall blood thinners Acuity: Acute Type of Exam: Initial Mechanism of Injury: pain Relevant Medical/Surgical History: pt alert FINDINGS: BRAIN/VENTRICLES: There is no acute intracranial hemorrhage, mass effect or midline shift. No abnormal extra-axial fluid collection. The gray-white differentiation is maintained without evidence of an acute infarct. There is no evidence of hydrocephalus. ORBITS: The visualized portion of the orbits demonstrate no acute abnormality. SINUSES: The visualized paranasal sinuses and mastoid air cells demonstrate no acute abnormality. SOFT TISSUES/SKULL:  No acute abnormality of the visualized skull or soft tissues. No acute intracranial abnormality.        Scheduled Medicines   Medications:    insulin glargine  45 Units Subcutaneous Nightly    miconazole   Topical BID    [Held by provider] hydroCHLOROthiazide  25 mg Oral Daily    atorvastatin  40 mg Oral Nightly    sertraline  150 mg Oral Daily    topiramate  50 mg Oral Nightly    amLODIPine  10 mg Oral Daily    insulin lispro  15 Units Subcutaneous TID WC    insulin lispro  0-6 Units Subcutaneous 2 times per day    clopidogrel  75 mg Oral Daily    ferrous sulfate  650 mg Oral Daily with breakfast    metoprolol succinate  50 mg Oral Daily    heparin (porcine)  5,000 Units Subcutaneous 3 times per day    lidocaine  1 patch Transdermal Daily    insulin lispro  0-12 Units Subcutaneous TID WC      Infusions:    dextrose           Objective:   Vitals: /65   Pulse 68   Temp 98.4 °F (36.9 °C) (Oral)   Resp 14   Ht 5' 4\" (1.626 m)   Wt 254 lb 9.6 oz (115.5 kg)   SpO2 96%   BMI 43.70 kg/m²   General appearance: alert and cooperative with exam  Neck: no JVD or bruit  Thyroid : Normal lobes   Lungs: Has Vesicular Breath sounds   Heart:  regular rate and rhythm  Abdomen: soft, non-tender; bowel sounds normal; no masses,  no organomegaly  Musculoskeletal: Normal  Extremities: extremities normal, , no edema  Neurologic:  Awake, alert, oriented to name, place and time. Cranial nerves II-XII are grossly intact. Motor is  intact. Sensory neuropathy and gait is abnormal.    Assessment:     Patient Active Problem List:     Proximal humeral fracture     Asthma     Benign essential hypertension     Bereavement     Pain of both hip joints     Chest pain     Chronic abdominal pain     Chronic back pain     Chronic obstructive lung disease (HCC)     Corns and callosities     Depressive disorder     Type II diabetes mellitus, uncontrolled (HCC)     Diabetic polyneuropathy (HCC)     HTN (hypertension)     Hyperlipidemia     Lesion of liver     Menopausal symptom     Mixed hyperlipidemia     Onychomycosis     Obesity     Osteoarthrosis     Osteoporosis     Pain in both feet     Sleep apnea     TIA (transient ischemic attack)     Vitamin D deficiency     Wheezing     S/P ORIF (open reduction internal fixation) fracture     Diabetic gastroparesis (HCC)     Gastroesophageal reflux disease     Restless legs     Ketoacidosis, diabetic, type 2, no coma ([facility]      Plan:     1. Reviewed POC blood glucose . Labs and X ray results   2. Reviewed Current Medicines   3. On meal/ Correction bolus Humalog/ Basal Lantus Insulin regime /  4. Monitor Blood glucose frequently   5. Modified  the dose of Insulin/ other medicines as needed   6. Still waiting on insurance approval for transfer to skilled nursing unit  7. Will follow     .      Driss Matthews MD

## 2020-09-13 LAB
ANION GAP SERPL CALCULATED.3IONS-SCNC: 8 MMOL/L (ref 4–16)
BUN BLDV-MCNC: 24 MG/DL (ref 6–23)
CALCIUM SERPL-MCNC: 9.1 MG/DL (ref 8.3–10.6)
CHLORIDE BLD-SCNC: 106 MMOL/L (ref 99–110)
CO2: 26 MMOL/L (ref 21–32)
CREAT SERPL-MCNC: 1.4 MG/DL (ref 0.6–1.1)
GFR AFRICAN AMERICAN: 46 ML/MIN/1.73M2
GFR NON-AFRICAN AMERICAN: 38 ML/MIN/1.73M2
GLUCOSE BLD-MCNC: 126 MG/DL (ref 70–99)
GLUCOSE BLD-MCNC: 161 MG/DL (ref 70–99)
GLUCOSE BLD-MCNC: 163 MG/DL (ref 70–99)
GLUCOSE BLD-MCNC: 199 MG/DL (ref 70–99)
GLUCOSE BLD-MCNC: 296 MG/DL (ref 70–99)
GLUCOSE BLD-MCNC: 78 MG/DL (ref 70–99)
MAGNESIUM: 2 MG/DL (ref 1.8–2.4)
PHOSPHORUS: 3.2 MG/DL (ref 2.5–4.9)
POTASSIUM SERPL-SCNC: 3.9 MMOL/L (ref 3.5–5.1)
SODIUM BLD-SCNC: 140 MMOL/L (ref 135–145)

## 2020-09-13 PROCEDURE — 94150 VITAL CAPACITY TEST: CPT

## 2020-09-13 PROCEDURE — 84100 ASSAY OF PHOSPHORUS: CPT

## 2020-09-13 PROCEDURE — 6370000000 HC RX 637 (ALT 250 FOR IP): Performed by: INTERNAL MEDICINE

## 2020-09-13 PROCEDURE — 6370000000 HC RX 637 (ALT 250 FOR IP): Performed by: FAMILY MEDICINE

## 2020-09-13 PROCEDURE — 1200000000 HC SEMI PRIVATE

## 2020-09-13 PROCEDURE — 82962 GLUCOSE BLOOD TEST: CPT

## 2020-09-13 PROCEDURE — 6360000002 HC RX W HCPCS: Performed by: PHYSICIAN ASSISTANT

## 2020-09-13 PROCEDURE — 94761 N-INVAS EAR/PLS OXIMETRY MLT: CPT

## 2020-09-13 PROCEDURE — 83735 ASSAY OF MAGNESIUM: CPT

## 2020-09-13 PROCEDURE — 36415 COLL VENOUS BLD VENIPUNCTURE: CPT

## 2020-09-13 PROCEDURE — 6370000000 HC RX 637 (ALT 250 FOR IP): Performed by: PHYSICIAN ASSISTANT

## 2020-09-13 PROCEDURE — 80048 BASIC METABOLIC PNL TOTAL CA: CPT

## 2020-09-13 RX ORDER — INSULIN GLARGINE 100 [IU]/ML
40 INJECTION, SOLUTION SUBCUTANEOUS NIGHTLY
Status: DISCONTINUED | OUTPATIENT
Start: 2020-09-13 | End: 2020-09-14

## 2020-09-13 RX ADMIN — HEPARIN SODIUM 5000 UNITS: 5000 INJECTION, SOLUTION INTRAVENOUS; SUBCUTANEOUS at 15:20

## 2020-09-13 RX ADMIN — MICONAZOLE NITRATE: 20 POWDER TOPICAL at 21:34

## 2020-09-13 RX ADMIN — ATORVASTATIN CALCIUM 40 MG: 40 TABLET, FILM COATED ORAL at 21:24

## 2020-09-13 RX ADMIN — SERTRALINE HYDROCHLORIDE 150 MG: 50 TABLET ORAL at 08:32

## 2020-09-13 RX ADMIN — INSULIN LISPRO 6 UNITS: 100 INJECTION, SOLUTION INTRAVENOUS; SUBCUTANEOUS at 16:33

## 2020-09-13 RX ADMIN — HEPARIN SODIUM 5000 UNITS: 5000 INJECTION, SOLUTION INTRAVENOUS; SUBCUTANEOUS at 07:01

## 2020-09-13 RX ADMIN — AMLODIPINE BESYLATE 10 MG: 10 TABLET ORAL at 08:32

## 2020-09-13 RX ADMIN — CLOPIDOGREL BISULFATE 75 MG: 75 TABLET ORAL at 08:32

## 2020-09-13 RX ADMIN — INSULIN LISPRO 15 UNITS: 100 INJECTION, SOLUTION INTRAVENOUS; SUBCUTANEOUS at 16:56

## 2020-09-13 RX ADMIN — MICONAZOLE NITRATE: 20 POWDER TOPICAL at 08:36

## 2020-09-13 RX ADMIN — INSULIN GLARGINE 40 UNITS: 100 INJECTION, SOLUTION SUBCUTANEOUS at 21:28

## 2020-09-13 RX ADMIN — TOPIRAMATE 50 MG: 25 TABLET, FILM COATED ORAL at 21:24

## 2020-09-13 RX ADMIN — HEPARIN SODIUM 5000 UNITS: 5000 INJECTION, SOLUTION INTRAVENOUS; SUBCUTANEOUS at 21:28

## 2020-09-13 RX ADMIN — METOPROLOL SUCCINATE 50 MG: 50 TABLET, EXTENDED RELEASE ORAL at 08:32

## 2020-09-13 ASSESSMENT — PAIN SCALES - GENERAL
PAINLEVEL_OUTOF10: 3
PAINLEVEL_OUTOF10: 0

## 2020-09-13 NOTE — PROGRESS NOTES
to MODESTO  History of TIA              Continue plavix        Disposition:-Awaiting SNF placement. Diet DIET CARB CONTROL; Carb Control: 4 carb choices (60 gms)/meal   Code Status Full Code     Medications:   Medications:    insulin glargine  40 Units Subcutaneous Nightly    miconazole   Topical BID    [Held by provider] hydroCHLOROthiazide  25 mg Oral Daily    atorvastatin  40 mg Oral Nightly    sertraline  150 mg Oral Daily    topiramate  50 mg Oral Nightly    amLODIPine  10 mg Oral Daily    insulin lispro  15 Units Subcutaneous TID WC    insulin lispro  0-6 Units Subcutaneous 2 times per day    clopidogrel  75 mg Oral Daily    ferrous sulfate  650 mg Oral Daily with breakfast    metoprolol succinate  50 mg Oral Daily    heparin (porcine)  5,000 Units Subcutaneous 3 times per day    lidocaine  1 patch Transdermal Daily    insulin lispro  0-12 Units Subcutaneous TID WC      Infusions:    dextrose       PRN Meds: oxyCODONE-acetaminophen, 1 tablet, Q4H PRN  ondansetron, 4 mg, Q6H PRN  hydrALAZINE, 10 mg, Q6H PRN  dicyclomine, 20 mg, 4x Daily PRN  magnesium sulfate, 1 g, PRN  glucose, 15 g, PRN  dextrose, 12.5 g, PRN  glucagon (rDNA), 1 mg, PRN  dextrose, 100 mL/hr, PRN      Subjective:     -No complaints  -does not feel like eating or drinking much      Objective:     No intake or output data in the 24 hours ending 09/13/20 1537   Vitals:   Vitals:    09/13/20 1436   BP: (!) 143/70   Pulse: 82   Resp: 21   Temp: 98.2 °F (36.8 °C)   SpO2: 97%     Physical Exam:   Gen:  awake, alert, no apparent distress, sitting in chair, relaxed  Head/Eyes:  Normocephalic atraumatic  LUNGS: Normal Effort   CARDIOVASCULAR:  RRR  ABDOMEN:  non distended  MUSCULOSKELETAL:  Appears move her extremities  NEUROLOGIC: Alert and Oriented x3,  Cranial nerves II-XII are grossly intact.    SKIN:  no bruising or bleeding, normal skin color,  no redness      Data:       CBC   No results for input(s): WBC, HGB, HCT, PLT in the last 72 hours.    BMP   Recent Labs     09/11/20  0600 09/12/20  0259 09/13/20  0243    141 140   K 3.6 3.8 3.9    105 106   CO2 27 26 26   PHOS 3.3 3.7 3.2   BUN 20 26* 24*   CREATININE 1.3* 1.5* 1.4*         Electronically signed by Sharri Candelaria MD on 9/13/2020 at 3:37 PM

## 2020-09-14 LAB
ANION GAP SERPL CALCULATED.3IONS-SCNC: 8 MMOL/L (ref 4–16)
BUN BLDV-MCNC: 24 MG/DL (ref 6–23)
CALCIUM SERPL-MCNC: 9 MG/DL (ref 8.3–10.6)
CHLORIDE BLD-SCNC: 107 MMOL/L (ref 99–110)
CO2: 27 MMOL/L (ref 21–32)
CREAT SERPL-MCNC: 1.4 MG/DL (ref 0.6–1.1)
GFR AFRICAN AMERICAN: 46 ML/MIN/1.73M2
GFR NON-AFRICAN AMERICAN: 38 ML/MIN/1.73M2
GLUCOSE BLD-MCNC: 107 MG/DL (ref 70–99)
GLUCOSE BLD-MCNC: 144 MG/DL (ref 70–99)
GLUCOSE BLD-MCNC: 287 MG/DL (ref 70–99)
GLUCOSE BLD-MCNC: 79 MG/DL (ref 70–99)
GLUCOSE BLD-MCNC: 86 MG/DL (ref 70–99)
GLUCOSE BLD-MCNC: 97 MG/DL (ref 70–99)
MAGNESIUM: 2 MG/DL (ref 1.8–2.4)
PHOSPHORUS: 3.3 MG/DL (ref 2.5–4.9)
POTASSIUM SERPL-SCNC: 4 MMOL/L (ref 3.5–5.1)
SODIUM BLD-SCNC: 142 MMOL/L (ref 135–145)

## 2020-09-14 PROCEDURE — 97110 THERAPEUTIC EXERCISES: CPT

## 2020-09-14 PROCEDURE — 97530 THERAPEUTIC ACTIVITIES: CPT

## 2020-09-14 PROCEDURE — 6370000000 HC RX 637 (ALT 250 FOR IP): Performed by: FAMILY MEDICINE

## 2020-09-14 PROCEDURE — 36415 COLL VENOUS BLD VENIPUNCTURE: CPT

## 2020-09-14 PROCEDURE — 84100 ASSAY OF PHOSPHORUS: CPT

## 2020-09-14 PROCEDURE — 6360000002 HC RX W HCPCS: Performed by: NURSE PRACTITIONER

## 2020-09-14 PROCEDURE — 94150 VITAL CAPACITY TEST: CPT

## 2020-09-14 PROCEDURE — 94761 N-INVAS EAR/PLS OXIMETRY MLT: CPT

## 2020-09-14 PROCEDURE — 83735 ASSAY OF MAGNESIUM: CPT

## 2020-09-14 PROCEDURE — 1200000000 HC SEMI PRIVATE

## 2020-09-14 PROCEDURE — 6360000002 HC RX W HCPCS: Performed by: PHYSICIAN ASSISTANT

## 2020-09-14 PROCEDURE — 6370000000 HC RX 637 (ALT 250 FOR IP): Performed by: PHYSICIAN ASSISTANT

## 2020-09-14 PROCEDURE — 97116 GAIT TRAINING THERAPY: CPT

## 2020-09-14 PROCEDURE — 82962 GLUCOSE BLOOD TEST: CPT

## 2020-09-14 PROCEDURE — 80048 BASIC METABOLIC PNL TOTAL CA: CPT

## 2020-09-14 RX ORDER — INSULIN GLARGINE 100 [IU]/ML
30 INJECTION, SOLUTION SUBCUTANEOUS NIGHTLY
Status: DISCONTINUED | OUTPATIENT
Start: 2020-09-14 | End: 2020-09-15 | Stop reason: HOSPADM

## 2020-09-14 RX ADMIN — HEPARIN SODIUM 5000 UNITS: 5000 INJECTION, SOLUTION INTRAVENOUS; SUBCUTANEOUS at 14:38

## 2020-09-14 RX ADMIN — HEPARIN SODIUM 5000 UNITS: 5000 INJECTION, SOLUTION INTRAVENOUS; SUBCUTANEOUS at 20:30

## 2020-09-14 RX ADMIN — INSULIN LISPRO 6 UNITS: 100 INJECTION, SOLUTION INTRAVENOUS; SUBCUTANEOUS at 12:04

## 2020-09-14 RX ADMIN — TOPIRAMATE 50 MG: 25 TABLET, FILM COATED ORAL at 20:30

## 2020-09-14 RX ADMIN — INSULIN LISPRO 15 UNITS: 100 INJECTION, SOLUTION INTRAVENOUS; SUBCUTANEOUS at 12:07

## 2020-09-14 RX ADMIN — HEPARIN SODIUM 5000 UNITS: 5000 INJECTION, SOLUTION INTRAVENOUS; SUBCUTANEOUS at 06:03

## 2020-09-14 RX ADMIN — ATORVASTATIN CALCIUM 40 MG: 40 TABLET, FILM COATED ORAL at 20:30

## 2020-09-14 RX ADMIN — MICONAZOLE NITRATE: 20 POWDER TOPICAL at 08:19

## 2020-09-14 RX ADMIN — ONDANSETRON 4 MG: 2 INJECTION INTRAMUSCULAR; INTRAVENOUS at 08:23

## 2020-09-14 RX ADMIN — METOPROLOL SUCCINATE 50 MG: 50 TABLET, EXTENDED RELEASE ORAL at 08:17

## 2020-09-14 ASSESSMENT — PAIN SCALES - GENERAL
PAINLEVEL_OUTOF10: 0
PAINLEVEL_OUTOF10: 2
PAINLEVEL_OUTOF10: 0
PAINLEVEL_OUTOF10: 0

## 2020-09-14 NOTE — PROGRESS NOTES
Prog  Subjective:   Admit Date: 9/4/2020  PCP: Nuvia Jarrett MD    AAdmitted For : History of fall patient lying down on the floor for over 30 hours has broken ribs     Consulted For: Better control of blood glucose     Interval History: Planes of chest wall pain     Denies any chest pains,   Has mild SOB . Denies nausea or vomiting. No new bowel or bladder symptoms. .     No intake or output data in the 24 hours ending 09/13/20 2038    DATA    CBC: No results for input(s): WBC, HGB, PLT in the last 72 hours. CMP:  Recent Labs     09/11/20  0600 09/12/20  0259 09/13/20  0243    141 140   K 3.6 3.8 3.9    105 106   CO2 27 26 26   BUN 20 26* 24*   CREATININE 1.3* 1.5* 1.4*   CALCIUM 8.7 8.9 9.1     Lipids: No results found for: CHOL, HDL, TRIG  Glucose:  Recent Labs     09/13/20  0700 09/13/20  1051 09/13/20  1632   POCGLU 78 126* 296*     [id number]:  Lab Results   Component Value Date    LABA1C 7.9 09/05/2020     High Sensitivity TSH: No results found for: TSHHS  Free T3: No results found for: FT3  Free T4:No results found for: T4FREE    Xr Chest (2 Vw)    Result Date: 9/5/2020  EXAMINATION: TWO XRAY VIEWS OF THE CHEST 9/5/2020 12:49 am COMPARISON: CT on 08/29/2020 HISTORY: Fall. FINDINGS: Cardiomediastinal silhouette and pulmonary vasculature are normal. No consolidation, pleural effusion, or pneumothorax. No acute osseous abnormality seen. Osteopenia. Postoperative changes of the left humerus. No acute abnormality seen. Ct Head Wo Contrast    Result Date: 9/5/2020  EXAMINATION: CT OF THE HEAD WITHOUT CONTRAST  9/5/2020 12:45 am TECHNIQUE: CT of the head was performed without the administration of intravenous contrast. Dose modulation, iterative reconstruction, and/or weight based adjustment of the mA/kV was utilized to reduce the radiation dose to as low as reasonably achievable. COMPARISON: August 29, 2020 HISTORY: ORDERING SYSTEM PROVIDED HISTORY: Fall.  Blood thinners TECHNOLOGIST PROVIDED HISTORY: Reason for exam:->Fall. Blood thinners Has a \"code stroke\" or \"stroke alert\" been called? ->No Reason for Exam: fall blood thinners Acuity: Acute Type of Exam: Initial Mechanism of Injury: pain Relevant Medical/Surgical History: pt alert FINDINGS: BRAIN/VENTRICLES: There is no acute intracranial hemorrhage, mass effect or midline shift. No abnormal extra-axial fluid collection. The gray-white differentiation is maintained without evidence of an acute infarct. There is no evidence of hydrocephalus. ORBITS: The visualized portion of the orbits demonstrate no acute abnormality. SINUSES: The visualized paranasal sinuses and mastoid air cells demonstrate no acute abnormality. SOFT TISSUES/SKULL:  No acute abnormality of the visualized skull or soft tissues. No acute intracranial abnormality.        Scheduled Medicines   Medications:    insulin glargine  40 Units Subcutaneous Nightly    miconazole   Topical BID    [Held by provider] hydroCHLOROthiazide  25 mg Oral Daily    atorvastatin  40 mg Oral Nightly    sertraline  150 mg Oral Daily    topiramate  50 mg Oral Nightly    amLODIPine  10 mg Oral Daily    insulin lispro  15 Units Subcutaneous TID WC    insulin lispro  0-6 Units Subcutaneous 2 times per day    clopidogrel  75 mg Oral Daily    ferrous sulfate  650 mg Oral Daily with breakfast    metoprolol succinate  50 mg Oral Daily    heparin (porcine)  5,000 Units Subcutaneous 3 times per day    lidocaine  1 patch Transdermal Daily    insulin lispro  0-12 Units Subcutaneous TID WC      Infusions:    dextrose           Objective:   Vitals: BP (!) 143/70   Pulse 82   Temp 98.2 °F (36.8 °C) (Oral)   Resp 21   Ht 5' 4\" (1.626 m)   Wt 251 lb 11.2 oz (114.2 kg)   SpO2 97%   BMI 43.20 kg/m²   General appearance: alert and cooperative with exam  Neck: no JVD or bruit  Thyroid : Normal lobes   Lungs: Has Vesicular Breath sounds   Heart:  regular rate and rhythm  Abdomen: soft, non-tender; bowel sounds normal; no masses,  no organomegaly  Musculoskeletal: Normal  Extremities: extremities normal, , no edema  Neurologic:  Awake, alert, oriented to name, place and time. Cranial nerves II-XII are grossly intact. Motor weakness t. Sensory paresthesia. ,  and gait is abnormal.  Unstable    Assessment:     Patient Active Problem List:     Proximal humeral fracture     Asthma     Benign essential hypertension     Bereavement     Pain of both hip joints     Chest pain     Chronic abdominal pain     Chronic back pain     Chronic obstructive lung disease (HCC)     Corns and callosities     Depressive disorder     Type II diabetes mellitus, uncontrolled (HCC)     Diabetic polyneuropathy (HCC)     HTN (hypertension)     Hyperlipidemia     Lesion of liver     Menopausal symptom     Mixed hyperlipidemia     Onychomycosis     Obesity     Osteoarthrosis     Osteoporosis     Pain in both feet     Sleep apnea     TIA (transient ischemic attack)     Vitamin D deficiency     Wheezing     S/P ORIF (open reduction internal fixation) fracture     Diabetic gastroparesis (HCC)     Gastroesophageal reflux disease     Restless legs     Ketoacidosis, diabetic, type 2, no coma ([facility] [address]      Plan:     1. Reviewed POC blood glucose . Labs and X ray results   2. Reviewed Current Medicines   3. On meal/ Correction bolus Humalog/ Basal Lantus Insulin regime /  4. Monitor Blood glucose frequently   5. Modified  the dose of Insulin/ other medicines as needed   6. Still waiting on insurance approval for transfer to skilled nursing unit  7. Will follow     .      Sherly Godinez MD

## 2020-09-14 NOTE — PROGRESS NOTES
Cane(Pt reports she walks with 4WW/cane evenly, occasionally walks without AD.)  Receives Help From: (Pt reports she does not have assistance locally.)  ADL Assistance: [address]: Independent  Homemaking Responsibilities: Yes  Ambulation Assistance: Independent  Transfer Assistance: Independent  Active : Yes  Mode of Transportation: Car  Occupation: Retired  Type of occupation: [employer]. Short term goals  Time Frame for Short term goals: 1 week  Short term goal 1: Pt will complete bed mobility with minimal assist.  Short term goal 2: Pt will complete all functional transfers with minimal assist.  Short term goal 3: Pt will ambulate 21' with LRAD with minimal assist.   Electronically signed by:     Hunter Bullock PTA  9/14/2020, 2:13 PM

## 2020-09-14 NOTE — CARE COORDINATION
Spoke with Miesha/indira at [facility] who states she is still awaiting update from [facility] though needs updated therapy notes. White board entered to alert therapy to need for notes.

## 2020-09-14 NOTE — PROGRESS NOTES
Progress Note( Dr. Catina Jackman)  9/13/2020  Subjective:   Admit Date: 9/4/2020  PCP: Mickey Lewis MD    Admitted For : History of fall patient lying down on the floor for over 30 hours has broken ribs     Consulted For: Better control of blood glucose     Interval History: Planes of chest wall pain     Denies any chest pains,   Has mild SOB . Denies nausea or vomiting. No new bowel or bladder symptoms. No intake or output data in the 24 hours ending 09/13/20 2037    DATA    CBC: No results for input(s): WBC, HGB, PLT in the last 72 hours. CMP:  Recent Labs     09/11/20  0600 09/12/20  0259 09/13/20  0243    141 140   K 3.6 3.8 3.9    105 106   CO2 27 26 26   BUN 20 26* 24*   CREATININE 1.3* 1.5* 1.4*   CALCIUM 8.7 8.9 9.1     Lipids: No results found for: CHOL, HDL, TRIG  Glucose:  Recent Labs     09/13/20  0700 09/13/20  1051 09/13/20  1632   POCGLU 78 126* 296*     WeiyrrzlgtK6M:  Lab Results   Component Value Date    LABA1C 7.9 09/05/2020     High Sensitivity TSH: No results found for: TSHHS  Free T3: No results found for: FT3  Free T4:No results found for: T4FREE    Xr Chest (2 Vw)    Result Date: 9/5/2020  EXAMINATION: TWO XRAY VIEWS OF THE CHEST 9/5/2020 12:49 am COMPARISON: CT on 08/29/2020 HISTORY: Fall. FINDINGS: Cardiomediastinal silhouette and pulmonary vasculature are normal. No consolidation, pleural effusion, or pneumothorax. No acute osseous abnormality seen. Osteopenia. Postoperative changes of the left humerus. No acute abnormality seen. Ct Head Wo Contrast    Result Date: 9/5/2020  EXAMINATION: CT OF THE HEAD WITHOUT CONTRAST  9/5/2020 12:45 am TECHNIQUE: CT of the head was performed without the administration of intravenous contrast. Dose modulation, iterative reconstruction, and/or weight based adjustment of the mA/kV was utilized to reduce the radiation dose to as low as reasonably achievable. COMPARISON: August 29, 2020 HISTORY: ORDERING SYSTEM PROVIDED HISTORY: Fall. Blood thinners TECHNOLOGIST PROVIDED HISTORY: Reason for exam:->Fall. Blood thinners Has a \"code stroke\" or \"stroke alert\" been called? ->No Reason for Exam: fall blood thinners Acuity: Acute Type of Exam: Initial Mechanism of Injury: pain Relevant Medical/Surgical History: pt alert FINDINGS: BRAIN/VENTRICLES: There is no acute intracranial hemorrhage, mass effect or midline shift. No abnormal extra-axial fluid collection. The gray-white differentiation is maintained without evidence of an acute infarct. There is no evidence of hydrocephalus. ORBITS: The visualized portion of the orbits demonstrate no acute abnormality. SINUSES: The visualized paranasal sinuses and mastoid air cells demonstrate no acute abnormality. SOFT TISSUES/SKULL:  No acute abnormality of the visualized skull or soft tissues. No acute intracranial abnormality.        Scheduled Medicines   Medications:    insulin glargine  40 Units Subcutaneous Nightly    miconazole   Topical BID    [Held by provider] hydroCHLOROthiazide  25 mg Oral Daily    atorvastatin  40 mg Oral Nightly    sertraline  150 mg Oral Daily    topiramate  50 mg Oral Nightly    amLODIPine  10 mg Oral Daily    insulin lispro  15 Units Subcutaneous TID WC    insulin lispro  0-6 Units Subcutaneous 2 times per day    clopidogrel  75 mg Oral Daily    ferrous sulfate  650 mg Oral Daily with breakfast    metoprolol succinate  50 mg Oral Daily    heparin (porcine)  5,000 Units Subcutaneous 3 times per day    lidocaine  1 patch Transdermal Daily    insulin lispro  0-12 Units Subcutaneous TID WC      Infusions:    dextrose           Objective:   Vitals: BP (!) 143/70   Pulse 82   Temp 98.2 °F (36.8 °C) (Oral)   Resp 21   Ht 5' 4\" (1.626 m)   Wt 251 lb 11.2 oz (114.2 kg)   SpO2 97%   BMI 43.20 kg/m²   General appearance: alert and cooperative with exam  Neck: no JVD or bruit  Thyroid : Normal lobes   Lungs: Has Vesicular Breath sounds   Heart:  regular rate and rhythm  Abdomen: soft, non-tender; bowel sounds normal; no masses,  no organomegaly  Musculoskeletal: Normal  Extremities: extremities normal, , no edema  Neurologic:  Awake, alert, oriented to name, place and time. Cranial nerves II-XII are grossly intact. Motor weakness t. Sensory esthesia,  and gait is abnormal.  And unstable    Assessment:     Patient Active Problem List:     Proximal humeral fracture     Asthma     Benign essential hypertension     Bereavement     Pain of both hip joints     Chest pain     Chronic abdominal pain     Chronic back pain     Chronic obstructive lung disease (HCC)     Corns and callosities     Depressive disorder     Type II diabetes mellitus, uncontrolled (HCC)     Diabetic polyneuropathy (HCC)     HTN (hypertension)     Hyperlipidemia     Lesion of liver     Menopausal symptom     Mixed hyperlipidemia     Onychomycosis     Obesity     Osteoarthrosis     Osteoporosis     Pain in both feet     Sleep apnea     TIA (transient ischemic attack)     Vitamin D deficiency     Wheezing     S/P ORIF (open reduction internal fixation) fracture     Diabetic gastroparesis (HCC)     Gastroesophageal reflux disease     Restless legs     Ketoacidosis, diabetic, type 2, no coma ([facility] [address]      Plan:     1. Reviewed POC blood glucose . Labs and X ray results   2. Reviewed Current Medicines   3. On meal/ Correction bolus Humalog/ Basal Lantus Insulin regime /  4. Monitor Blood glucose frequently   5. Modified  the dose of Insulin/ other medicines as needed   6. Still waiting on insurance approval for transfer to skilled nursing unit  7. Will follow     .      Helena Smith MD

## 2020-09-14 NOTE — PROGRESS NOTES
BMP   Recent Labs     09/12/20  0259 09/13/20  0243 09/14/20  0554    140 142   K 3.8 3.9 4.0    106 107   CO2 26 26 27   PHOS 3.7 3.2 3.3   BUN 26* 24* 24*   CREATININE 1.5* 1.4* 1.4*         Electronically signed by Genet Medellin MD on 9/14/2020 at 11:30 AM

## 2020-09-14 NOTE — PROGRESS NOTES
Spoke with Dr. Radha Canales in regards to pt's condition and labs. New orders to follow.  Red Rogel RN

## 2020-09-14 NOTE — PROGRESS NOTES
ambulate HH distance to bathroom for toileting with Julia and RW. 6. Pt will complete all aspects of toileting task with Julia. 7. Pt will complete oral hygiene/grooming routine in standing at sink with Julia demo good dynamic standing balance for approx 8 minutes. 8. Pt will complete ther ex/ther act with focus on UB strengthening.      Safety: Left in chair chair with all needs in reach. Left with PTA. Gait belt used for transfer and mobility. Time in:  1355  Time out:  1405  Timed treatment minutes:  10  Total treatment time:  10    Electronically signed by:     [id number], OTR/L, [place]   [id number]   2:21 PM, 9/14/2020      Previously filed values:

## 2020-09-14 NOTE — PLAN OF CARE
Problem: Falls - Risk of:  Goal: Will remain free from falls  Description: Will remain free from falls  Outcome: Met This Shift  Goal: Absence of physical injury  Description: Absence of physical injury  Outcome: Met This Shift     Problem: Skin Integrity:  Goal: Will show no infection signs and symptoms  Description: Will show no infection signs and symptoms  Outcome: Ongoing  Goal: Absence of new skin breakdown  Description: Absence of new skin breakdown  Outcome: Ongoing     Problem: Pain:  Goal: Pain level will decrease  Description: Pain level will decrease  Outcome: Ongoing  Goal: Control of acute pain  Description: Control of acute pain  Outcome: Ongoing  Goal: Control of chronic pain  Description: Control of chronic pain  Outcome: Ongoing     Problem: Discharge Planning:  Goal: Discharged to appropriate level of care  Description: Discharged to appropriate level of care  Outcome: Ongoing     Problem: Serum Glucose Level - Abnormal:  Goal: Ability to maintain appropriate glucose levels will improve  Description: Ability to maintain appropriate glucose levels will improve  Outcome: Ongoing

## 2020-09-15 VITALS
SYSTOLIC BLOOD PRESSURE: 133 MMHG | WEIGHT: 256 LBS | RESPIRATION RATE: 15 BRPM | BODY MASS INDEX: 43.71 KG/M2 | TEMPERATURE: 98 F | DIASTOLIC BLOOD PRESSURE: 71 MMHG | HEART RATE: 69 BPM | HEIGHT: 64 IN | OXYGEN SATURATION: 95 %

## 2020-09-15 LAB
GLUCOSE BLD-MCNC: 119 MG/DL (ref 70–99)
GLUCOSE BLD-MCNC: 133 MG/DL (ref 70–99)
HCT VFR BLD CALC: 34.2 % (ref 37–47)
HEMOGLOBIN: 10.4 GM/DL (ref 12.5–16)
MCH RBC QN AUTO: 29.4 PG (ref 27–31)
MCHC RBC AUTO-ENTMCNC: 30.4 % (ref 32–36)
MCV RBC AUTO: 96.6 FL (ref 78–100)
PDW BLD-RTO: 12.9 % (ref 11.7–14.9)
PLATELET # BLD: 288 K/CU MM (ref 140–440)
PMV BLD AUTO: 9.7 FL (ref 7.5–11.1)
RBC # BLD: 3.54 M/CU MM (ref 4.2–5.4)
WBC # BLD: 6.4 K/CU MM (ref 4–10.5)

## 2020-09-15 PROCEDURE — 36415 COLL VENOUS BLD VENIPUNCTURE: CPT

## 2020-09-15 PROCEDURE — 82962 GLUCOSE BLOOD TEST: CPT

## 2020-09-15 PROCEDURE — 94150 VITAL CAPACITY TEST: CPT

## 2020-09-15 PROCEDURE — 94761 N-INVAS EAR/PLS OXIMETRY MLT: CPT

## 2020-09-15 PROCEDURE — 6370000000 HC RX 637 (ALT 250 FOR IP): Performed by: PHYSICIAN ASSISTANT

## 2020-09-15 PROCEDURE — 85027 COMPLETE CBC AUTOMATED: CPT

## 2020-09-15 PROCEDURE — 6370000000 HC RX 637 (ALT 250 FOR IP): Performed by: FAMILY MEDICINE

## 2020-09-15 PROCEDURE — 6360000002 HC RX W HCPCS: Performed by: PHYSICIAN ASSISTANT

## 2020-09-15 RX ORDER — INSULIN GLARGINE 100 [IU]/ML
30 INJECTION, SOLUTION SUBCUTANEOUS NIGHTLY
Qty: 1 VIAL | Refills: 3 | Status: ON HOLD | OUTPATIENT
Start: 2020-09-15 | End: 2022-03-15 | Stop reason: HOSPADM

## 2020-09-15 RX ORDER — OXYCODONE HYDROCHLORIDE AND ACETAMINOPHEN 5; 325 MG/1; MG/1
1 TABLET ORAL EVERY 4 HOURS PRN
Qty: 15 TABLET | Refills: 0 | Status: SHIPPED | OUTPATIENT
Start: 2020-09-15 | End: 2020-09-18

## 2020-09-15 RX ORDER — LIDOCAINE 4 G/G
1 PATCH TOPICAL DAILY
Qty: 1 BOX | Refills: 1 | Status: ON HOLD | OUTPATIENT
Start: 2020-09-16 | End: 2022-07-30 | Stop reason: SDUPTHER

## 2020-09-15 RX ADMIN — METOPROLOL SUCCINATE 50 MG: 50 TABLET, EXTENDED RELEASE ORAL at 09:06

## 2020-09-15 RX ADMIN — HEPARIN SODIUM 5000 UNITS: 5000 INJECTION, SOLUTION INTRAVENOUS; SUBCUTANEOUS at 06:39

## 2020-09-15 RX ADMIN — MICONAZOLE NITRATE: 20 POWDER TOPICAL at 09:12

## 2020-09-15 RX ADMIN — AMLODIPINE BESYLATE 10 MG: 10 TABLET ORAL at 09:07

## 2020-09-15 RX ADMIN — CLOPIDOGREL BISULFATE 75 MG: 75 TABLET ORAL at 09:06

## 2020-09-15 ASSESSMENT — PAIN SCALES - GENERAL
PAINLEVEL_OUTOF10: 0
PAINLEVEL_OUTOF10: 0

## 2020-09-15 NOTE — CARE COORDINATION
Spoke with Miesha/admissions at [place] who states pre-cert has been received. Updated Dr. Dillon Comment who states he will place discharge orders. Updated patient who is agreeable. [facility] with stretcher secured for 12 pm. Updated patient, Delmis Hortonbertrand ROBBINS and Miesha/admissions at [place]. 11:21 AM  Faxed discharge clinicals and completed packet. Pt new to [place] at discharge. Please call report to [phone].

## 2020-09-15 NOTE — PROGRESS NOTES
Physical Therapy  Pt is leaving at noon to go to [facility]. Pt is packing up, eating lunch and getting ready to leave. No therapy today since pt is leaving soon. Marlon Bruno.  Tristin Bender PTA

## 2020-09-15 NOTE — PROGRESS NOTES
Progress Note( Dr. Katerin Castellon)  9/14/2020  Subjective:   Admit Date: 9/4/2020  PCP: Surjit Farias MD    Admitted For : History of fall patient lying down on the floor for over 30 hours has broken ribs     Consulted For: Better control of blood glucose     Interval History: Planes of chest wall pain     Denies any chest pains,   Has mild SOB . Denies nausea or vomiting. No new bowel or bladder symptoms. Intake/Output Summary (Last 24 hours) at 9/14/2020 2254  Last data filed at 9/14/2020 1722  Gross per 24 hour   Intake 695 ml   Output --   Net 695 ml       DATA    CBC: No results for input(s): WBC, HGB, PLT in the last 72 hours. CMP:  Recent Labs     09/12/20  0259 09/13/20  0243 09/14/20  0554    140 142   K 3.8 3.9 4.0    106 107   CO2 26 26 27   BUN 26* 24* 24*   CREATININE 1.5* 1.4* 1.4*   CALCIUM 8.9 9.1 9.0     Lipids: No results found for: CHOL, HDL, TRIG  Glucose:  Recent Labs     09/14/20  1138 09/14/20  1641 09/14/20 2029   POCGLU 287* 107* 97     [id number]:  Lab Results   Component Value Date    LABA1C 7.9 09/05/2020     High Sensitivity TSH: No results found for: TSHHS  Free T3: No results found for: FT3  Free T4:No results found for: T4FREE    Xr Chest (2 Vw)    Result Date: 9/5/2020  EXAMINATION: TWO XRAY VIEWS OF THE CHEST 9/5/2020 12:49 am COMPARISON: CT on 08/29/2020 HISTORY: Fall. FINDINGS: Cardiomediastinal silhouette and pulmonary vasculature are normal. No consolidation, pleural effusion, or pneumothorax. No acute osseous abnormality seen. Osteopenia. Postoperative changes of the left humerus. No acute abnormality seen.      Ct Head Wo Contrast    Result Date: 9/5/2020  EXAMINATION: CT OF THE HEAD WITHOUT CONTRAST  9/5/2020 12:45 am TECHNIQUE: CT of the head was performed without the administration of intravenous contrast. Dose modulation, iterative reconstruction, and/or weight based adjustment of the mA/kV was utilized to reduce the radiation dose to as low as reasonably achievable. COMPARISON: August 29, 2020 HISTORY: ORDERING SYSTEM PROVIDED HISTORY: Fall. Blood thinners TECHNOLOGIST PROVIDED HISTORY: Reason for exam:->Fall. Blood thinners Has a \"code stroke\" or \"stroke alert\" been called? ->No Reason for Exam: fall blood thinners Acuity: Acute Type of Exam: Initial Mechanism of Injury: pain Relevant Medical/Surgical History: pt alert FINDINGS: BRAIN/VENTRICLES: There is no acute intracranial hemorrhage, mass effect or midline shift. No abnormal extra-axial fluid collection. The gray-white differentiation is maintained without evidence of an acute infarct. There is no evidence of hydrocephalus. ORBITS: The visualized portion of the orbits demonstrate no acute abnormality. SINUSES: The visualized paranasal sinuses and mastoid air cells demonstrate no acute abnormality. SOFT TISSUES/SKULL:  No acute abnormality of the visualized skull or soft tissues. No acute intracranial abnormality.        Scheduled Medicines   Medications:    insulin glargine  30 Units Subcutaneous Nightly    miconazole   Topical BID    [Held by provider] hydroCHLOROthiazide  25 mg Oral Daily    atorvastatin  40 mg Oral Nightly    sertraline  150 mg Oral Daily    topiramate  50 mg Oral Nightly    amLODIPine  10 mg Oral Daily    insulin lispro  15 Units Subcutaneous TID     insulin lispro  0-6 Units Subcutaneous 2 times per day    clopidogrel  75 mg Oral Daily    ferrous sulfate  650 mg Oral Daily with breakfast    metoprolol succinate  50 mg Oral Daily    heparin (porcine)  5,000 Units Subcutaneous 3 times per day    lidocaine  1 patch Transdermal Daily    insulin lispro  0-12 Units Subcutaneous TID WC      Infusions:    dextrose           Objective:   Vitals: BP (!) 141/54   Pulse 68   Temp 98.5 °F (36.9 °C) (Oral)   Resp 18   Ht 5' 4\" (1.626 m)   Wt 251 lb 11.2 oz (114.2 kg)   SpO2 98%   BMI 43.20 kg/m²   General appearance: alert and cooperative with exam  Neck: no JVD or bruit  Thyroid : Normal lobes   Lungs: Has Vesicular Breath sounds   Heart:  regular rate and rhythm  Abdomen: soft, non-tender; bowel sounds normal; no masses,  no organomegaly  Musculoskeletal: Normal  Extremities: extremities normal, , no edema  Neurologic:  Awake, alert, oriented to name, place and time. Cranial nerves II-XII are grossly intact. Motor weakness t. Sensory i anesthesia. ,  and gait is abnormal.    Assessment:     Patient Active Problem List:     Proximal humeral fracture     Asthma     Benign essential hypertension     Bereavement     Pain of both hip joints     Chest pain     Chronic abdominal pain     Chronic back pain     Chronic obstructive lung disease (HCC)     Corns and callosities     Depressive disorder     Type II diabetes mellitus, uncontrolled (HCC)     Diabetic polyneuropathy (HCC)     HTN (hypertension)     Hyperlipidemia     Lesion of liver     Menopausal symptom     Mixed hyperlipidemia     Onychomycosis     Obesity     Osteoarthrosis     Osteoporosis     Pain in both feet     Sleep apnea     TIA (transient ischemic attack)     Vitamin D deficiency     Wheezing     S/P ORIF (open reduction internal fixation) fracture     Diabetic gastroparesis (HCC)     Gastroesophageal reflux disease     Restless legs     Ketoacidosis, diabetic, type 2, no coma ([facility] [address]      Plan:     1. Reviewed POC blood glucose . Labs and X ray results   2. Reviewed Current Medicines   3. On meal/ Correction bolus Humalog/ Basal Lantus Insulin regime /  4. Monitor Blood glucose frequently   5. Modified  the dose of Insulin/ other medicines as needed   6. Still waiting on insurance approval for transfer to skilled nursing unit  7. Will follow     .      Oswaldo Burton MD

## 2020-09-15 NOTE — PROGRESS NOTES
Progress Note( Dr. Karoline Banks)  9/15/2020  Subjective:   Admit Date: 9/4/2020  PCP: Chloe Paniagua MD    Admitted For : History of fall patient lying down on the floor for over 30 hours has broken ribs     Consulted For: Better control of blood glucose     Interval History: Planes of chest wall pain feels somewhat better     Denies any chest pains,   Has mild SOB . Denies nausea or vomiting. No new bowel or bladder symptoms. .       Intake/Output Summary (Last 24 hours) at 9/15/2020 0813  Last data filed at 9/14/2020 1722  Gross per 24 hour   Intake 695 ml   Output --   Net 695 ml       DATA    CBC:   Recent Labs     09/15/20  0334   WBC 6.4   HGB 10.4*       CMP:  Recent Labs     09/13/20  0243 09/14/20  0554    142   K 3.9 4.0    107   CO2 26 27   BUN 24* 24*   CREATININE 1.4* 1.4*   CALCIUM 9.1 9.0     Lipids: No results found for: CHOL, HDL, TRIG  Glucose:  Recent Labs     09/14/20  2029 09/15/20  0214 09/15/20  0641   POCGLU 97 119* 133*     [id number]:  Lab Results   Component Value Date    LABA1C 7.9 09/05/2020     High Sensitivity TSH: No results found for: TSHHS  Free T3: No results found for: FT3  Free T4:No results found for: T4FREE    Xr Chest (2 Vw)    Result Date: 9/5/2020  EXAMINATION: TWO XRAY VIEWS OF THE CHEST 9/5/2020 12:49 am COMPARISON: CT on 08/29/2020 HISTORY: Fall. FINDINGS: Cardiomediastinal silhouette and pulmonary vasculature are normal. No consolidation, pleural effusion, or pneumothorax. No acute osseous abnormality seen. Osteopenia. Postoperative changes of the left humerus. No acute abnormality seen.      Ct Head Wo Contrast    Result Date: 9/5/2020  EXAMINATION: CT OF THE HEAD WITHOUT CONTRAST  9/5/2020 12:45 am TECHNIQUE: CT of the head was performed without the administration of intravenous contrast. Dose modulation, iterative reconstruction, and/or weight based adjustment of the mA/kV was utilized to reduce the radiation dose to as low as reasonably achievable. COMPARISON: August 29, 2020 HISTORY: ORDERING SYSTEM PROVIDED HISTORY: Fall. Blood thinners TECHNOLOGIST PROVIDED HISTORY: Reason for exam:->Fall. Blood thinners Has a \"code stroke\" or \"stroke alert\" been called? ->No Reason for Exam: fall blood thinners Acuity: Acute Type of Exam: Initial Mechanism of Injury: pain Relevant Medical/Surgical History: pt alert FINDINGS: BRAIN/VENTRICLES: There is no acute intracranial hemorrhage, mass effect or midline shift. No abnormal extra-axial fluid collection. The gray-white differentiation is maintained without evidence of an acute infarct. There is no evidence of hydrocephalus. ORBITS: The visualized portion of the orbits demonstrate no acute abnormality. SINUSES: The visualized paranasal sinuses and mastoid air cells demonstrate no acute abnormality. SOFT TISSUES/SKULL:  No acute abnormality of the visualized skull or soft tissues. No acute intracranial abnormality.        Scheduled Medicines   Medications:    insulin glargine  30 Units Subcutaneous Nightly    miconazole   Topical BID    [Held by provider] hydroCHLOROthiazide  25 mg Oral Daily    atorvastatin  40 mg Oral Nightly    sertraline  150 mg Oral Daily    topiramate  50 mg Oral Nightly    amLODIPine  10 mg Oral Daily    insulin lispro  15 Units Subcutaneous TID     insulin lispro  0-6 Units Subcutaneous 2 times per day    clopidogrel  75 mg Oral Daily    ferrous sulfate  650 mg Oral Daily with breakfast    metoprolol succinate  50 mg Oral Daily    heparin (porcine)  5,000 Units Subcutaneous 3 times per day    lidocaine  1 patch Transdermal Daily    insulin lispro  0-12 Units Subcutaneous TID WC      Infusions:    dextrose           Objective:   Vitals: BP (!) 168/55   Pulse 75   Temp 98.2 °F (36.8 °C) (Oral)   Resp 18   Ht 5' 4\" (1.626 m)   Wt 256 lb (116.1 kg)   SpO2 96%   BMI 43.94 kg/m²   General appearance: alert and cooperative with exam  Neck: no JVD or bruit  Thyroid : Normal lobes   Lungs: Has Vesicular Breath sounds chest wall but better  Heart:  regular rate and rhythm  Abdomen: soft, non-tender; bowel sounds normal; no masses,  no organomegaly  Musculoskeletal: Normal  Extremities: extremities normal, , no edema  Neurologic:  Awake, alert, oriented to name, place and time. Cranial nerves II-XII are grossly intact. Motor weakness. Sensory esthesia t.,  and gait is abnormal.    Assessment:     Patient Active Problem List:     Proximal humeral fracture     Asthma     Benign essential hypertension     Bereavement     Pain of both hip joints     Chest pain     Chronic abdominal pain     Chronic back pain     Chronic obstructive lung disease (HCC)     Corns and callosities     Depressive disorder     Type II diabetes mellitus, uncontrolled (HCC)     Diabetic polyneuropathy (HCC)     HTN (hypertension)     Hyperlipidemia     Lesion of liver     Menopausal symptom     Mixed hyperlipidemia     Onychomycosis     Obesity     Osteoarthrosis     Osteoporosis     Pain in both feet     Sleep apnea     TIA (transient ischemic attack)     Vitamin D deficiency     Wheezing     S/P ORIF (open reduction internal fixation) fracture     Diabetic gastroparesis (HCC)     Gastroesophageal reflux disease     Restless legs     Ketoacidosis, diabetic, type 2, no coma ([facility] [address]      Plan:     1. Reviewed POC blood glucose . Labs and X ray results   2. Reviewed Current Medicines   3. On meal/ Correction bolus Humalog/ Basal Lantus Insulin regime /  4. Monitor Blood glucose frequently   5. Modified  the dose of Insulin/ other medicines as needed   6. Still waiting on insurance approval for transfer to skilled nursing unit  7. Will follow     .      Carmen Amador MD

## 2020-09-15 NOTE — DISCHARGE SUMMARY
Discharge Summary    Name:  Lizandro Amaro /Age/Sex: 1960  (62 y.o. female)   MRN & CSN:  [id number] & [id number] Admission Date/Time: 2020 11:21 PM   Attending:  Landy Wagoner MD Discharging Physician: Francesco Duenas MD     HPI and Hospital Course:   Lizandro Amaro is a 62 y.o.  female  who presents with Fall (down for 30 hours, recent fall with 5 ribs broken from Aug 29th. trying to hang picture this time)    HPI- as per H and [address]  1-DKA - unclear precipitating factor- resolved. Seen by endo and insulin regimen adjusted. 2-MODESTO with hyperkalemia- improved. 3-Fall with rib fractures- lidocaine patch, and oxy prn  4-HTN- on norvasc/metoprolol. 5-Debility- going to [facility]    Has hx of asthma/copd- on room air and doing okay without signs of exacerbation    The patient expressed appropriate understanding of and agreement with the discharge recommendations, medications, and plan.      Consults this admission:  IP CONSULT TO HOSPITALIST  IP CONSULT TO HOSPITALIST  IP CONSULT TO ENDOCRINOLOGY  IP CONSULT TO CASE MANAGEMENT  IP CONSULT TO CASE MANAGEMENT    Discharge Instruction:   Follow up appointments:   Primary care physician:  within 2 weeks    Diet:  General/cardiac/ADA/as tolerated  Activity: {discharge activity: as tolerated  Disposition: Discharged to:   []Home, []C, [x]SNF, []Acute Rehab, []Hospice   Condition on discharge: Stable    Discharge Medications:      Gita Garcia, [address] Medication Instructions XKX:[id number]    Printed on:09/15/20 104   Medication Information                      amLODIPine (NORVASC) 10 MG tablet  amlodipine 10 mg tablet   TAKE ONE TABLET BY MOUTH DAILY             atorvastatin (LIPITOR) 40 MG tablet  Take 40 mg by mouth nightly              clopidogrel (PLAVIX) 75 MG tablet  Take 75 mg by mouth daily             dicyclomine (BENTYL) 20 MG tablet  Take 1 tablet by mouth 4 times daily (before meals and nightly) ferrous sulfate 325 (65 Fe) MG EC tablet  Take 650 mg by mouth daily (with breakfast)             insulin glargine (LANTUS) 100 UNIT/ML injection vial  Inject 30 Units into the skin nightly             insulin lispro (HUMALOG) 100 UNIT/ML injection vial  Check blood glucose three times daily before meals and at bedtime- for blood glucose <150- no insulin, 151-200=2, 201-250=4, 251-300=6, 301-350=8, 351-400=10, >400=12 units and call MD             lidocaine 4 % external patch  Place 1 patch onto the skin daily             metoprolol succinate (TOPROL XL) 50 MG extended release tablet  Take 1 tablet by mouth daily             oxyCODONE-acetaminophen (PERCOCET) 5-325 MG per tablet  Take 1 tablet by mouth every 4 hours as needed for Pain for up to 3 days. pantoprazole (PROTONIX) 40 MG tablet  Take 1 tablet by mouth 2 times daily (before meals)             sertraline (ZOLOFT) 100 MG tablet  Take 150 mg by mouth daily             topiramate (TOPAMAX) 50 MG tablet  Take 50 mg by mouth nightly                  Objective Findings at Discharge:   /71   Pulse 69   Temp 98 °F (36.7 °C) (Oral)   Resp 15   Ht 5' 4\" (1.626 m)   Wt 256 lb (116.1 kg)   SpO2 95%   BMI 43.94 kg/m²            PHYSICAL EXAM   GEN Awake female, laying in bed in no apparent distress. EYES Pupils are equally round. No scleral discharge  HENT Atraumatic and symmetric head  NECK No apparent thyromegaly  RESP Symmetric chest movement while on room air. CARDIO/VASC Peripheral pulses equal bilaterally and palpable. No peripheral edema. GI Abdomen is not distended. Rectal exam deferred.  Ramos catheter is not present. HEME/LYMPH No petechiae or ecchymoses. MSK Spontaneous movement of BL upper extremities  SKIN Normal coloration, warm, dry. NEURO Cranial nerves appear grossly intact  PSYCH Awake, alert.     BMP/CBC  Recent Labs     09/13/20  0243 09/14/20  0554 09/15/20  0334    142  --    K 3.9 4.0  --     107  --    CO2 26 27  --    BUN 24* 24*  --    CREATININE 1.4* 1.4*  --    WBC  --   --  6.4   HCT  --   --  34.2*   PLT  --   --  288     SIGNIFICANT IMAGING AND LABS:      Discharge Time of 31 minutes    Electronically signed by Kamaljit Calvin MD on 9/15/2020 at 10:47 AM

## 2020-09-15 NOTE — PLAN OF CARE
Problem: Skin Integrity:  Goal: Will show no infection signs and symptoms  Description: Will show no infection signs and symptoms  9/15/2020 0450 by Rosa Pickett LPN  Outcome: Ongoing  9/14/2020 1854 by Vanda Quintero RN  Outcome: Ongoing  Goal: Absence of new skin breakdown  Description: Absence of new skin breakdown  9/15/2020 0450 by Rosa Pickett LPN  Outcome: Ongoing  9/14/2020 1854 by Vanda Quintero RN  Outcome: Ongoing     Problem: Falls - Risk of:  Goal: Will remain free from falls  Description: Will remain free from falls  9/15/2020 0450 by Rosa Pickett LPN  Outcome: Ongoing  9/14/2020 1854 by Vanda Quintero RN  Outcome: Met This Shift  Goal: Absence of physical injury  Description: Absence of physical injury  9/15/2020 0450 by Rosa Pickett LPN  Outcome: Ongoing  9/14/2020 1854 by Vanda Quintero RN  Outcome: Met This Shift     Problem: Pain:  Goal: Pain level will decrease  Description: Pain level will decrease  9/15/2020 0450 by Rosa Pickett LPN  Outcome: Ongoing  9/14/2020 1854 by Vanda Quintero RN  Outcome: Ongoing  Goal: Control of acute pain  Description: Control of acute pain  9/15/2020 0450 by Rosa Pickett LPN  Outcome: Ongoing  9/14/2020 1854 by Vanda Quintero RN  Outcome: Ongoing  Goal: Control of chronic pain  Description: Control of chronic pain  9/15/2020 0450 by Rosa Pickett LPN  Outcome: Ongoing  9/14/2020 1854 by Vanda Quintero RN  Outcome: Ongoing     Problem: Discharge Planning:  Goal: Discharged to appropriate level of care  Description: Discharged to appropriate level of care  9/15/2020 0450 by Rosa Pickett LPN  Outcome: Ongoing  9/14/2020 1854 by Vanda Quintero RN  Outcome: Ongoing     Problem: Serum Glucose Level - Abnormal:  Goal: Ability to maintain appropriate glucose levels will improve  Description: Ability to maintain appropriate glucose levels will improve  9/15/2020 0450 by Rosa Pickett LPN  Outcome: Ongoing  9/14/2020 1854 by Rosi Weston RN  Outcome: Ongoing

## 2020-09-16 ENCOUNTER — HOSPITAL ENCOUNTER (OUTPATIENT)
Age: 60
Setting detail: SPECIMEN
Discharge: HOME | End: 2020-09-16
Payer: MEDICAID

## 2020-09-16 LAB
ALBUMIN SERPL-MCNC: 3.5 GM/DL (ref 3.4–5)
ALP BLD-CCNC: 163 IU/L (ref 40–128)
ALT SERPL-CCNC: 52 U/L (ref 10–40)
ANION GAP SERPL CALCULATED.3IONS-SCNC: 12 MMOL/L (ref 4–16)
AST SERPL-CCNC: 30 IU/L (ref 15–37)
BASOPHILS ABSOLUTE: 0 K/CU MM
BASOPHILS RELATIVE PERCENT: 0.5 % (ref 0–1)
BILIRUB SERPL-MCNC: 0.4 MG/DL (ref 0–1)
BUN BLDV-MCNC: 26 MG/DL (ref 6–23)
CALCIUM SERPL-MCNC: 9 MG/DL (ref 8.3–10.6)
CHLORIDE BLD-SCNC: 104 MMOL/L (ref 99–110)
CO2: 25 MMOL/L (ref 21–32)
CREAT SERPL-MCNC: 1.4 MG/DL (ref 0.6–1.1)
DIFFERENTIAL TYPE: ABNORMAL
EOSINOPHILS ABSOLUTE: 0.2 K/CU MM
EOSINOPHILS RELATIVE PERCENT: 3.1 % (ref 0–3)
GFR AFRICAN AMERICAN: 46 ML/MIN/1.73M2
GFR NON-AFRICAN AMERICAN: 38 ML/MIN/1.73M2
GLUCOSE BLD-MCNC: 120 MG/DL (ref 70–99)
HCT VFR BLD CALC: 34.5 % (ref 37–47)
HEMOGLOBIN: 10.4 GM/DL (ref 12.5–16)
IMMATURE NEUTROPHIL %: 0.3 % (ref 0–0.43)
LYMPHOCYTES ABSOLUTE: 2.1 K/CU MM
LYMPHOCYTES RELATIVE PERCENT: 34.3 % (ref 24–44)
MCH RBC QN AUTO: 29.4 PG (ref 27–31)
MCHC RBC AUTO-ENTMCNC: 30.1 % (ref 32–36)
MCV RBC AUTO: 97.5 FL (ref 78–100)
MONOCYTES ABSOLUTE: 0.7 K/CU MM
MONOCYTES RELATIVE PERCENT: 11.3 % (ref 0–4)
NUCLEATED RBC %: 0 %
PDW BLD-RTO: 12.9 % (ref 11.7–14.9)
PLATELET # BLD: 304 K/CU MM (ref 140–440)
PMV BLD AUTO: 10.1 FL (ref 7.5–11.1)
POTASSIUM SERPL-SCNC: 4.4 MMOL/L (ref 3.5–5.1)
RBC # BLD: 3.54 M/CU MM (ref 4.2–5.4)
SEGMENTED NEUTROPHILS ABSOLUTE COUNT: 3.1 K/CU MM
SEGMENTED NEUTROPHILS RELATIVE PERCENT: 50.5 % (ref 36–66)
SODIUM BLD-SCNC: 141 MMOL/L (ref 135–145)
TOTAL IMMATURE NEUTOROPHIL: 0.02 K/CU MM
TOTAL NUCLEATED RBC: 0 K/CU MM
TOTAL PROTEIN: 5.4 GM/DL (ref 6.4–8.2)
WBC # BLD: 6.1 K/CU MM (ref 4–10.5)

## 2020-09-16 PROCEDURE — 80053 COMPREHEN METABOLIC PANEL: CPT

## 2020-09-16 PROCEDURE — 85025 COMPLETE CBC W/AUTO DIFF WBC: CPT

## 2020-09-16 PROCEDURE — 36415 COLL VENOUS BLD VENIPUNCTURE: CPT

## 2020-11-03 PROBLEM — I10 BENIGN ESSENTIAL HYPERTENSION: Status: RESOLVED | Noted: 2018-12-17 | Resolved: 2020-11-03

## 2021-03-03 ENCOUNTER — HOSPITAL ENCOUNTER (OUTPATIENT)
Dept: DIABETES SERVICES | Age: 61
Setting detail: THERAPIES SERIES
Discharge: HOME | End: 2021-03-03
Payer: MEDICAID

## 2021-03-03 PROCEDURE — 97802 MEDICAL NUTRITION INDIV IN: CPT

## 2021-03-03 NOTE — PROGRESS NOTES
Outpatient Medical Nutrition Therapy    03/03/2021   10:33-11:40    Reason for referral: Type 1 DM ( E10.40)    Client History:    Pertinent medications: vitamin D, lispro 15 units breakfast, 15 units lunch, 20 units supper, lantus 50 units at 21:00     Social hx: Lives alone. In the past year stopped working, now on medical disability. Rare alcohol use and does not use tobacco products. Family hx: parents type 2 DM, 2 sisters also type 1 DM -both on insulin pump     Pertinent Medical hx: Dm dx 42 years ago, osteoporosis spine and hips, neuropathy hands and feet, frequent falls with hx fractures-femur, wrist, ribs, shoulder -has life alert     Activity habits: Limited due to neuropathy. Uses cane or walker. 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 Food/nutrition habits: Eats 2-3 meals per day. Breakfast 7:30-9:30. Lunch meal 15:00-16:00, snack 20:00. Likes most foods, \"not too picky\". Drinks plain coffee in morning, diet soda during the day, some flavored water. Had stopped drinking soda pop, encouraged by nephrology, but started drinking again. May dine out/get carry out meals. Evening snack usually just an apple. Reports change in meal habits with bout of depression. Biochemical data: SMBG daily in the morning, happy if result under 200 mg/dL. Admits not good about testing, until recently was not testing at all. Plans to discuss option of getting CGM with endocrinology. Anthropometrics:  Ht:64\"   Wt: 250# (stated), hx 256#   IBW: 120-132# % of IBW:189          BMI: 43.9, obese    Weight hx: Gradual gain in past 15 years, after age 37. Diet hx: Hx of DM ed over past 42 years, familiar with exchange lists    Estimated needs: 8102-6035 daily calories (based on current weight with Augmentixin .shawn, low active) reduce for loss 1500 calories    Nutrition dx:  Altered nutrition related lab values related to endocrine dysfunction and hx poor self care as evidenced by patient report and now

## 2021-04-02 ENCOUNTER — HOSPITAL ENCOUNTER (OUTPATIENT)
Age: 61
Setting detail: SPECIMEN
Discharge: HOME | End: 2021-04-02
Payer: MEDICAID

## 2021-04-02 LAB
ALBUMIN SERPL-MCNC: 3.5 GM/DL (ref 3.4–5)
ALP BLD-CCNC: 124 IU/L (ref 40–128)
ALT SERPL-CCNC: 50 U/L (ref 10–40)
ANION GAP SERPL CALCULATED.3IONS-SCNC: 9 MMOL/L (ref 4–16)
AST SERPL-CCNC: 41 IU/L (ref 15–37)
BASOPHILS ABSOLUTE: 0 K/CU MM
BASOPHILS RELATIVE PERCENT: 0.2 % (ref 0–1)
BILIRUB SERPL-MCNC: 0.2 MG/DL (ref 0–1)
BUN BLDV-MCNC: 21 MG/DL (ref 6–23)
CALCIUM SERPL-MCNC: 8.8 MG/DL (ref 8.3–10.6)
CHLORIDE BLD-SCNC: 104 MMOL/L (ref 99–110)
CO2: 27 MMOL/L (ref 21–32)
CREAT SERPL-MCNC: 1.1 MG/DL (ref 0.6–1.1)
DIFFERENTIAL TYPE: ABNORMAL
EOSINOPHILS ABSOLUTE: 0.2 K/CU MM
EOSINOPHILS RELATIVE PERCENT: 3.3 % (ref 0–3)
GFR AFRICAN AMERICAN: >60 ML/MIN/1.73M2
GFR NON-AFRICAN AMERICAN: 50 ML/MIN/1.73M2
GLUCOSE BLD-MCNC: 143 MG/DL (ref 70–99)
HCT VFR BLD CALC: 34.2 % (ref 37–47)
HEMOGLOBIN: 10.8 GM/DL (ref 12.5–16)
IMMATURE NEUTROPHIL %: 0.2 % (ref 0–0.43)
LYMPHOCYTES ABSOLUTE: 1.7 K/CU MM
LYMPHOCYTES RELATIVE PERCENT: 33.7 % (ref 24–44)
MCH RBC QN AUTO: 29.5 PG (ref 27–31)
MCHC RBC AUTO-ENTMCNC: 31.6 % (ref 32–36)
MCV RBC AUTO: 93.4 FL (ref 78–100)
MONOCYTES ABSOLUTE: 0.4 K/CU MM
MONOCYTES RELATIVE PERCENT: 8.3 % (ref 0–4)
NUCLEATED RBC %: 0 %
PDW BLD-RTO: 13 % (ref 11.7–14.9)
PLATELET # BLD: 250 K/CU MM (ref 140–440)
PMV BLD AUTO: 10.2 FL (ref 7.5–11.1)
POTASSIUM SERPL-SCNC: 4.2 MMOL/L (ref 3.5–5.1)
RBC # BLD: 3.66 M/CU MM (ref 4.2–5.4)
SEGMENTED NEUTROPHILS ABSOLUTE COUNT: 2.8 K/CU MM
SEGMENTED NEUTROPHILS RELATIVE PERCENT: 54.3 % (ref 36–66)
SODIUM BLD-SCNC: 140 MMOL/L (ref 135–145)
TOTAL IMMATURE NEUTOROPHIL: 0.01 K/CU MM
TOTAL NUCLEATED RBC: 0 K/CU MM
TOTAL PROTEIN: 5.6 GM/DL (ref 6.4–8.2)
WBC # BLD: 5.1 K/CU MM (ref 4–10.5)

## 2021-04-02 PROCEDURE — 80053 COMPREHEN METABOLIC PANEL: CPT

## 2021-04-02 PROCEDURE — 36415 COLL VENOUS BLD VENIPUNCTURE: CPT

## 2021-04-02 PROCEDURE — 85025 COMPLETE CBC W/AUTO DIFF WBC: CPT

## 2021-04-08 ENCOUNTER — HOSPITAL ENCOUNTER (OUTPATIENT)
Age: 61
Setting detail: SPECIMEN
Discharge: HOME | End: 2021-04-08
Payer: MEDICAID

## 2021-04-08 LAB
ALBUMIN SERPL-MCNC: 3.3 GM/DL (ref 3.4–5)
ALP BLD-CCNC: 119 IU/L (ref 40–128)
ALT SERPL-CCNC: 29 U/L (ref 10–40)
ANION GAP SERPL CALCULATED.3IONS-SCNC: 10 MMOL/L (ref 4–16)
AST SERPL-CCNC: 19 IU/L (ref 15–37)
BILIRUB SERPL-MCNC: 0.2 MG/DL (ref 0–1)
BUN BLDV-MCNC: 45 MG/DL (ref 6–23)
CALCIUM SERPL-MCNC: 8.6 MG/DL (ref 8.3–10.6)
CHLORIDE BLD-SCNC: 99 MMOL/L (ref 99–110)
CO2: 25 MMOL/L (ref 21–32)
CREAT SERPL-MCNC: 1.8 MG/DL (ref 0.6–1.1)
GFR AFRICAN AMERICAN: 34 ML/MIN/1.73M2
GFR NON-AFRICAN AMERICAN: 29 ML/MIN/1.73M2
GLUCOSE BLD-MCNC: 290 MG/DL (ref 70–99)
HCT VFR BLD CALC: 34.5 % (ref 37–47)
HEMOGLOBIN: 10.7 GM/DL (ref 12.5–16)
MCH RBC QN AUTO: 29.5 PG (ref 27–31)
MCHC RBC AUTO-ENTMCNC: 31 % (ref 32–36)
MCV RBC AUTO: 95 FL (ref 78–100)
PDW BLD-RTO: 12.6 % (ref 11.7–14.9)
PLATELET # BLD: 232 K/CU MM (ref 140–440)
PMV BLD AUTO: 10.7 FL (ref 7.5–11.1)
POTASSIUM SERPL-SCNC: 4.7 MMOL/L (ref 3.5–5.1)
RBC # BLD: 3.63 M/CU MM (ref 4.2–5.4)
SODIUM BLD-SCNC: 134 MMOL/L (ref 135–145)
TOTAL PROTEIN: 5.3 GM/DL (ref 6.4–8.2)
WBC # BLD: 5.1 K/CU MM (ref 4–10.5)

## 2021-04-08 PROCEDURE — 80053 COMPREHEN METABOLIC PANEL: CPT

## 2021-04-08 PROCEDURE — 36415 COLL VENOUS BLD VENIPUNCTURE: CPT

## 2021-04-08 PROCEDURE — 85027 COMPLETE CBC AUTOMATED: CPT

## 2021-06-07 ENCOUNTER — HOSPITAL ENCOUNTER (EMERGENCY)
Age: 61
Discharge: HOME | End: 2021-06-07
Payer: MEDICAID

## 2021-06-07 VITALS
OXYGEN SATURATION: 99 % | HEART RATE: 86 BPM | HEIGHT: 64 IN | DIASTOLIC BLOOD PRESSURE: 50 MMHG | RESPIRATION RATE: 15 BRPM | TEMPERATURE: 98.7 F | WEIGHT: 250 LBS | SYSTOLIC BLOOD PRESSURE: 165 MMHG | BODY MASS INDEX: 42.68 KG/M2

## 2021-06-07 DIAGNOSIS — S21.001A WOUND OF RIGHT BREAST, INITIAL ENCOUNTER: Primary | ICD-10-CM

## 2021-06-07 PROCEDURE — 99283 EMERGENCY DEPT VISIT LOW MDM: CPT

## 2021-06-07 RX ORDER — SULFAMETHOXAZOLE AND TRIMETHOPRIM 800; 160 MG/1; MG/1
1 TABLET ORAL 2 TIMES DAILY
Qty: 20 TABLET | Refills: 0 | Status: SHIPPED | OUTPATIENT
Start: 2021-06-07 | End: 2021-06-17

## 2021-06-07 RX ORDER — CEPHALEXIN 500 MG/1
500 CAPSULE ORAL 4 TIMES DAILY
Qty: 40 CAPSULE | Refills: 0 | Status: SHIPPED | OUTPATIENT
Start: 2021-06-07 | End: 2021-06-17

## 2021-06-07 NOTE — ED PROVIDER NOTES
Left 2004?  HUMERUS FRACTURE SURGERY Left 12/31/2018    HUMERUS OPEN REDUCTION INTERNAL FIXATION LEFT PROXIMAL performed by Austin Patten DO at [address] Right 2009?     LUNG SURGERY  2009?    simone lung lobectomy        CURRENT MEDICATIONS    Current Outpatient Rx   Medication Sig Dispense Refill    sulfamethoxazole-trimethoprim (BACTRIM DS) 800-160 MG per tablet Take 1 tablet by mouth 2 times daily for 10 days 20 tablet 0    cephALEXin (KEFLEX) 500 MG capsule Take 1 capsule by mouth 4 times daily for 10 days 40 capsule 0    insulin glargine (LANTUS) 100 UNIT/ML injection vial Inject 30 Units into the skin nightly 1 vial 3    lidocaine 4 % external patch Place 1 patch onto the skin daily 1 box 1    insulin lispro (HUMALOG) 100 UNIT/ML injection vial Check blood glucose three times daily before meals and at bedtime- for blood glucose <150- no insulin, 151-200=2, 201-250=4, 251-300=6, 301-350=8, 351-400=10, >400=12 units and call MD 1 vial 3    amLODIPine (NORVASC) 10 MG tablet amlodipine 10 mg tablet   TAKE ONE TABLET BY MOUTH DAILY      metoprolol succinate (TOPROL XL) 50 MG extended release tablet Take 1 tablet by mouth daily 30 tablet 0    dicyclomine (BENTYL) 20 MG tablet Take 1 tablet by mouth 4 times daily (before meals and nightly) 120 tablet 0    pantoprazole (PROTONIX) 40 MG tablet Take 1 tablet by mouth 2 times daily (before meals) 60 tablet 0    ferrous sulfate 325 (65 Fe) MG EC tablet Take 650 mg by mouth daily (with breakfast)      sertraline (ZOLOFT) 100 MG tablet Take 150 mg by mouth daily      topiramate (TOPAMAX) 50 MG tablet Take 50 mg by mouth nightly       clopidogrel (PLAVIX) 75 MG tablet Take 75 mg by mouth daily      atorvastatin (LIPITOR) 40 MG tablet Take 40 mg by mouth nightly          ALLERGIES    No Known Allergies    SOCIAL & FAMILY HISTORY    Social History     Socioeconomic History    Marital status:      Spouse name: Not on file    Number of children: Not on file    Years of education: Not on file    Highest education level: Not on file   Occupational History    Not on file   Tobacco Use    Smoking status: Never Smoker    Smokeless tobacco: Never Used    Tobacco comment: 12/27/*2018- with phone assessment with caregiver- unsure of social, surgical or family hx for phone assessment   Vaping Use    Vaping Use: Never used   Substance and Sexual Activity    Alcohol use: No    Drug use: No    Sexual activity: Not on file   Other Topics Concern    Not on file   Social History Narrative    Not on file     Social Determinants of Health     Financial Resource Strain:     Difficulty of Paying Living Expenses:    Food Insecurity:     Worried About Running Out of Food in the Last Year:     [address] in the Last Year:    Transportation Needs:     Lack of Transportation (Medical):  Lack of Transportation (Non-Medical):    Physical Activity:     Days of Exercise per Week:     Minutes of Exercise per Session:    Stress:     Feeling of Stress :    Social Connections:     Frequency of Communication with Friends and Family:     Frequency of Social Gatherings with Friends and Family:     Attends [employer] Services:     Active Member of Clubs or Organizations:     Attends Club or Organization Meetings:     Marital Status:    Intimate Partner Violence:     Fear of Current or Ex-Partner:     Emotionally Abused:     Physically Abused:     Sexually Abused:      No family history on file. PHYSICAL EXAM    VITAL SIGNS: BP (!) 165/50   Pulse 86   Temp 98.7 °F (37.1 °C) (Oral)   Resp 15   Ht 5' 4\" (1.626 m)   Wt 250 lb (113.4 kg)   SpO2 99%   BMI 42.91 kg/m²   Constitutional:  Well developed, well nourished  HENT:  Atraumatic, no facial or lip swelling  ORAL EXAM:   No tongue swelling, airway patent/Throat is clear  Neck/Lymphatics: supple, no JVD, no swollen nodes  Respiratory:  Nonlabored breathing. Lungs clear.   Cardiovascular: No JVD   Musculoskeletal:  No edema, no acute deformities. Integument:   There are 2, approximately nickel sized wounds to lateral aspect of the right breast.  There is central scabbing, possible eschar area of both of these wounds and mild surrounding erythema. There is surrounding dry, flaking skin. No palpable induration. No palpable fluctuance or drainage from wounds. ED COURSE & MEDICAL DECISION MAKING       Vital signs and nursing notes reviewed during ED course. I have independently evaluated this patient. Supervising MD present in the Emergency Department, available for consultation, throughout entirety of patient care. All pertinent lab data and radiographic results reviewed with patient at bedside. Patient presents as above. She is hypertensive, mild tachycardic with pulse of 105. She is afebrile. Overall very well-appearing, denies current pain. Wounds appear to be overall healing well, there is some mild surrounding erythema. As she is diabetic, we will plan on starting patient on oral antibiotics as well. We discussed the importance of close glycemic control. She will need to have the area rechecked in the next 2 to 3 days, however discussed with signs and symptoms which would necessitate emergent return including worsening erythema, drainage from wound, fevers, nausea, vomiting or poor glycemic control. Diagnosis, disposition, and plan discussed in detail with patient who understands and agrees. Patient understands and agrees to follow up with PCP in the next 2-3 days. Patient understands and agrees to return to the emergency department for any new or worsening symptoms including but not limited to worsening rash, fever, mouth/tongue/lip swelling, trouble breathing or swallowing, or any new symptoms not present today. Clinical  IMPRESSION    1.  Wound of right breast, initial encounter                Comment: Please note this report has been produced using speech recognition software and may contain errors related to that system including errors in grammar, punctuation, and spelling, as well as words and phrases that may be inappropriate. If there are any questions or concerns please feel free to contact the dictating provider for clarification.       MEGAN Perez  06/07/21 1149

## 2021-06-07 NOTE — ED NOTES
Discharge instructions reviewed with pt and questions addressed at this time. Pt alert and oriented x 4 at time of discharge, no signs of acute distress noted. Pt ambulatory to Emergency Department waiting room and steady gait noted.         Kelsy Conte RN  06/07/21 [id number]

## 2021-07-12 ENCOUNTER — HOSPITAL ENCOUNTER (OUTPATIENT)
Age: 61
Discharge: HOME | End: 2021-07-12
Payer: MEDICAID

## 2021-07-12 ENCOUNTER — HOSPITAL ENCOUNTER (OUTPATIENT)
Dept: GENERAL RADIOLOGY | Age: 61
Discharge: HOME | End: 2021-07-12
Payer: MEDICAID

## 2021-07-12 DIAGNOSIS — M25.551 BILATERAL HIP PAIN: ICD-10-CM

## 2021-07-12 DIAGNOSIS — M25.552 BILATERAL HIP PAIN: ICD-10-CM

## 2021-07-12 PROCEDURE — 73502 X-RAY EXAM HIP UNI 2-3 VIEWS: CPT

## 2021-07-13 ENCOUNTER — HOSPITAL ENCOUNTER (OUTPATIENT)
Dept: WOMENS IMAGING | Age: 61
Discharge: HOME | End: 2021-07-13
Payer: MEDICAID

## 2021-07-13 DIAGNOSIS — Z12.31 ENCOUNTER FOR SCREENING MAMMOGRAM FOR MALIGNANT NEOPLASM OF BREAST: ICD-10-CM

## 2021-07-13 PROCEDURE — 77063 BREAST TOMOSYNTHESIS BI: CPT

## 2021-08-23 ENCOUNTER — OFFICE VISIT (OUTPATIENT)
Dept: ORTHOPEDIC SURGERY | Age: 61
End: 2021-08-23
Payer: MEDICAID

## 2021-08-23 VITALS
RESPIRATION RATE: 18 BRPM | HEART RATE: 77 BPM | WEIGHT: 250 LBS | BODY MASS INDEX: 42.68 KG/M2 | HEIGHT: 64 IN | OXYGEN SATURATION: 98 %

## 2021-08-23 DIAGNOSIS — M25.551 HIP PAIN, BILATERAL: Primary | ICD-10-CM

## 2021-08-23 DIAGNOSIS — M16.12 PRIMARY OSTEOARTHRITIS OF LEFT HIP: ICD-10-CM

## 2021-08-23 DIAGNOSIS — M25.552 HIP PAIN, BILATERAL: Primary | ICD-10-CM

## 2021-08-23 DIAGNOSIS — M16.11 PRIMARY OSTEOARTHRITIS OF RIGHT HIP: ICD-10-CM

## 2021-08-23 PROCEDURE — 1036F TOBACCO NON-USER: CPT | Performed by: ORTHOPAEDIC SURGERY

## 2021-08-23 PROCEDURE — 3017F COLORECTAL CA SCREEN DOC REV: CPT | Performed by: ORTHOPAEDIC SURGERY

## 2021-08-23 PROCEDURE — 20610 DRAIN/INJ JOINT/BURSA W/O US: CPT | Performed by: ORTHOPAEDIC SURGERY

## 2021-08-23 PROCEDURE — G8427 DOCREV CUR MEDS BY ELIG CLIN: HCPCS | Performed by: ORTHOPAEDIC SURGERY

## 2021-08-23 PROCEDURE — 99214 OFFICE O/P EST MOD 30 MIN: CPT | Performed by: ORTHOPAEDIC SURGERY

## 2021-08-23 PROCEDURE — G8417 CALC BMI ABV UP PARAM F/U: HCPCS | Performed by: ORTHOPAEDIC SURGERY

## 2021-08-23 RX ORDER — DULOXETIN HYDROCHLORIDE 60 MG/1
60 CAPSULE, DELAYED RELEASE ORAL DAILY
Status: ON HOLD | COMMUNITY
Start: 2021-04-02 | End: 2022-03-15 | Stop reason: HOSPADM

## 2021-08-23 RX ORDER — AMITRIPTYLINE HYDROCHLORIDE 25 MG/1
25 TABLET, FILM COATED ORAL NIGHTLY
Status: ON HOLD | COMMUNITY
End: 2022-07-05 | Stop reason: HOSPADM

## 2021-08-23 RX ORDER — GABAPENTIN 100 MG/1
400 CAPSULE ORAL 2 TIMES DAILY
Status: ON HOLD | COMMUNITY
End: 2022-03-15 | Stop reason: HOSPADM

## 2021-08-23 RX ORDER — BUSPIRONE HYDROCHLORIDE 10 MG/1
10 TABLET ORAL 3 TIMES DAILY
COMMUNITY
Start: 2021-03-27

## 2021-08-23 RX ORDER — LISINOPRIL 40 MG/1
TABLET ORAL
Status: ON HOLD | COMMUNITY
End: 2022-03-15 | Stop reason: SDUPTHER

## 2021-08-23 RX ORDER — ALENDRONATE SODIUM 70 MG/1
70 TABLET ORAL WEEKLY
COMMUNITY
End: 2022-07-02

## 2021-08-23 ASSESSMENT — ENCOUNTER SYMPTOMS
CHEST TIGHTNESS: 0
COLOR CHANGE: 0
BACK PAIN: 0

## 2021-08-23 NOTE — PROGRESS NOTES
Subjective:      Patient ID: Nancy Vieyra is a 56 y.o. female. Patient returns to the office with complaints of bilateral hip pain with the right hip worse than the left with pain described as a dull, aching pain in office which she rates at a 2/10 along the lateral aspect of the hip joint which has been chronic for the past 20 years and progressively become worse. Pain radiates down the bilateral leg with numbing sensation and intensifies upon prolonged periods of ambulation or standing. Denies previous injuries or surgeries, but has had previous steroid injections administered which did not provide relief. Hx of OA , osteoporosis, and neuropathy issues which are currently being treated by her PCP, Isaias Barker with upcoming Neurology appointment scheduled for September. .           XR HIP LEFT (2-3- VIEWS)  Impression  Mild symmetric osteoarthritic changes involving both hip joints.     Several unchanged sclerotic foci in the right femoral neck and  intertrochanteric femur, sequelae of osteonecrosis versus enchondroma of the  most likely considerations.  No evidence of osteonecrosis of either femoral  head.     Atherosclerotic disease related wall calcifications of iliac arteries and  femoral arteries bilaterally. She comes in today for evaluation of her bilateral hip pain, right worse than left. She states that she has been dealing with sharp stabbing pain in the lateral aspect of both of her hips for the past couple of months. She states that now she is having difficulty standing or walking due to the pain. She also states that she has been having intermittent numbness and tingling as well as burning pains which will radiate from her lower back down her legs. She denies any recent injury to her hips, but denies any groin pain and denies any fever or chills. She denies any loss of bowel or bladder control.         Review of Systems   Constitutional: Negative for activity change, chills and fever.   Respiratory: Negative for chest tightness. Cardiovascular: Negative for chest pain. Musculoskeletal: Positive for arthralgias, gait problem and myalgias. Negative for back pain and joint swelling. Skin: Negative for color change, pallor, rash and wound. Neurological: Positive for numbness. Negative for weakness. Past Medical History:   Diagnosis Date    Arthritis     Asthma     Cerebral artery occlusion with cerebral infarction [facility])     per old chart 8/2015- TIA    COPD (chronic obstructive pulmonary disease) ([facility] [address]     Diabetes mellitus ([facility]     Diabetic neuropathy (HCC)     per old chart    Fracture     per old chart pt in ER 12/9/2018- after 2 days of arm pain after fall- dx with left humerus fx- for surg 12/31/2018    Hyperlipidemia     Hypertension     Muscle weakness (generalized)     Osteoarthritis     per old chart       Objective:   Physical Exam  Constitutional:       Appearance: She is well-developed. HENT:      Head: Normocephalic. Eyes:      Pupils: Pupils are equal, round, and reactive to light. Pulmonary:      Effort: Pulmonary effort is normal.   Musculoskeletal:         General: Tenderness present. No deformity. Normal range of motion. Cervical back: Normal range of motion. Right hip: Tenderness and bony tenderness present. No deformity, lacerations or crepitus. Normal range of motion. Normal strength. Left hip: Tenderness and bony tenderness present. No deformity, lacerations or crepitus. Normal range of motion. Normal strength. Right knee: Normal.      Left knee: Normal.   Skin:     General: Skin is warm and dry. Capillary Refill: Capillary refill takes less than 2 seconds. Coloration: Skin is not pale. Findings: No erythema or rash. Neurological:      Mental Status: She is alert and oriented to person, place, and time. Right hip-Skin intact with no erythema, ecchymosis or lacerations present.   No groin pain with applied. Complications:  None; patient tolerated the procedure well. Symptoms were improved immediately after the injection. Trochanteric bursitis Injection Procedure Note    Pre-operative Diagnosis: left hip trochanteric bursitis    Post-operative Diagnosis: same    Indications: Symptomatic relief of bursitis    Anesthesia: Lidocaine 1% and marcaine 0.5% without epinephrine without added sodium bicarbonate    Procedure Details     Verbal consent was obtained for the procedure. The point of maximal tenderness over the left greater trochanter was localized and prepped with alcohol. A 22 gauge needle was advanced down to the greater trochanter without difficulty  The space was then injected with 1 ml 1% lidocaine and 1 ml 0.5% marcaine and 1 ml of triamcinolone (KENALOG) 40mg/ml. The injection site was cleansed with isopropyl alcohol and a dressing was applied. Complications:  None; patient tolerated the procedure well. Symptoms were improved immediately after the injection.         James 97, DO

## 2021-08-23 NOTE — PATIENT INSTRUCTIONS
Bilateral hip injection  Rest the bilateral hip for 24-48 hours  Follow up with Neurology as scheduled  May continue to take OTC pain medication as needed  Rest, ice, and elevate as needed  Weightbearing and activities as tolerated  Follow up in 6 weeks

## 2021-08-23 NOTE — PROGRESS NOTES
Patient returns to the office with complaints of bilateral hip pain with the right hip worse than the left with pain described as a dull, aching pain in office which she rates at a 2/10 along the lateral aspect of the hip joint which has been chronic for the past 20 years and progressively become worse. Pain radiates down the bilateral leg with numbing sensation and intensifies upon prolonged periods of ambulation or standing. Denies previous injuries or surgeries, but has had previous steroid injections administered which did not provide relief. Hx of OA , osteoporosis, and neuropathy issues which are currently being treated by her PCP, Hipolito Zhu with upcoming Neurology appointment scheduled for September. .           XR HIP LEFT (2-3- VIEWS)  Impression   Mild symmetric osteoarthritic changes involving both hip joints.       Several unchanged sclerotic foci in the right femoral neck and   intertrochanteric femur, sequelae of osteonecrosis versus enchondroma of the   most likely considerations.  No evidence of osteonecrosis of either femoral   head.       Atherosclerotic disease related wall calcifications of iliac arteries and   femoral arteries bilaterally.

## 2021-09-23 ENCOUNTER — APPOINTMENT (OUTPATIENT)
Dept: CT IMAGING | Age: 61
End: 2021-09-23
Payer: MEDICAID

## 2021-09-23 ENCOUNTER — APPOINTMENT (OUTPATIENT)
Dept: GENERAL RADIOLOGY | Age: 61
End: 2021-09-23
Payer: MEDICAID

## 2021-09-23 ENCOUNTER — HOSPITAL ENCOUNTER (EMERGENCY)
Age: 61
Discharge: HOME | End: 2021-09-24
Attending: STUDENT IN AN ORGANIZED HEALTH CARE EDUCATION/TRAINING PROGRAM
Payer: MEDICAID

## 2021-09-23 DIAGNOSIS — S09.90XA CLOSED HEAD INJURY, INITIAL ENCOUNTER: Primary | ICD-10-CM
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
## 2021-09-23 LAB
ANION GAP SERPL CALCULATED.3IONS-SCNC: 14 MMOL/L (ref 4–16)
BASOPHILS ABSOLUTE: 0 K/CU MM
BASOPHILS RELATIVE PERCENT: 0.5 % (ref 0–1)
BUN BLDV-MCNC: 38 MG/DL (ref 6–23)
CALCIUM SERPL-MCNC: 8.4 MG/DL (ref 8.3–10.6)
CHLORIDE BLD-SCNC: 101 MMOL/L (ref 99–110)
CO2: 22 MMOL/L (ref 21–32)
CREAT SERPL-MCNC: 1.4 MG/DL (ref 0.6–1.1)
DIFFERENTIAL TYPE: ABNORMAL
EOSINOPHILS ABSOLUTE: 0.1 K/CU MM
EOSINOPHILS RELATIVE PERCENT: 1.6 % (ref 0–3)
GFR AFRICAN AMERICAN: 46 ML/MIN/1.73M2
GFR NON-AFRICAN AMERICAN: 38 ML/MIN/1.73M2
GLUCOSE BLD-MCNC: 131 MG/DL (ref 70–99)
HCT VFR BLD CALC: 39.3 % (ref 37–47)
HEMOGLOBIN: 12 GM/DL (ref 12.5–16)
IMMATURE NEUTROPHIL %: 0.2 % (ref 0–0.43)
LYMPHOCYTES ABSOLUTE: 1.3 K/CU MM
LYMPHOCYTES RELATIVE PERCENT: 30.6 % (ref 24–44)
MCH RBC QN AUTO: 28.8 PG (ref 27–31)
MCHC RBC AUTO-ENTMCNC: 30.5 % (ref 32–36)
MCV RBC AUTO: 94.5 FL (ref 78–100)
MONOCYTES ABSOLUTE: 0.5 K/CU MM
MONOCYTES RELATIVE PERCENT: 11.5 % (ref 0–4)
NUCLEATED RBC %: 0 %
PDW BLD-RTO: 13.2 % (ref 11.7–14.9)
PLATELET # BLD: 205 K/CU MM (ref 140–440)
PMV BLD AUTO: 9.8 FL (ref 7.5–11.1)
POTASSIUM SERPL-SCNC: 4.4 MMOL/L (ref 3.5–5.1)
RBC # BLD: 4.16 M/CU MM (ref 4.2–5.4)
SEGMENTED NEUTROPHILS ABSOLUTE COUNT: 2.4 K/CU MM
SEGMENTED NEUTROPHILS RELATIVE PERCENT: 55.6 % (ref 36–66)
SODIUM BLD-SCNC: 137 MMOL/L (ref 135–145)
TOTAL IMMATURE NEUTOROPHIL: 0.01 K/CU MM
TOTAL NUCLEATED RBC: 0 K/CU MM
TROPONIN T: <0.01 NG/ML
WBC # BLD: 4.3 K/CU MM (ref 4–10.5)

## 2021-09-23 PROCEDURE — 71045 X-RAY EXAM CHEST 1 VIEW: CPT

## 2021-09-23 PROCEDURE — 96361 HYDRATE IV INFUSION ADD-ON: CPT

## 2021-09-23 PROCEDURE — 99285 EMERGENCY DEPT VISIT HI MDM: CPT

## 2021-09-23 PROCEDURE — 70450 CT HEAD/BRAIN W/O DYE: CPT

## 2021-09-23 PROCEDURE — 2580000003 HC RX 258: Performed by: STUDENT IN AN ORGANIZED HEALTH CARE EDUCATION/TRAINING PROGRAM

## 2021-09-23 PROCEDURE — 84484 ASSAY OF TROPONIN QUANT: CPT

## 2021-09-23 PROCEDURE — 72125 CT NECK SPINE W/O DYE: CPT

## 2021-09-23 PROCEDURE — 93005 ELECTROCARDIOGRAM TRACING: CPT | Performed by: STUDENT IN AN ORGANIZED HEALTH CARE EDUCATION/TRAINING PROGRAM

## 2021-09-23 PROCEDURE — 85025 COMPLETE CBC W/AUTO DIFF WBC: CPT

## 2021-09-23 PROCEDURE — 80048 BASIC METABOLIC PNL TOTAL CA: CPT

## 2021-09-23 PROCEDURE — 96360 HYDRATION IV INFUSION INIT: CPT

## 2021-09-23 RX ORDER — 0.9 % SODIUM CHLORIDE 0.9 %
1000 INTRAVENOUS SOLUTION INTRAVENOUS ONCE
Status: COMPLETED | OUTPATIENT
Start: 2021-09-23 | End: 2021-09-24

## 2021-09-23 RX ADMIN — SODIUM CHLORIDE 1000 ML: 9 INJECTION, SOLUTION INTRAVENOUS at 22:00

## 2021-09-23 ASSESSMENT — PAIN DESCRIPTION - LOCATION: LOCATION: HEAD

## 2021-09-23 ASSESSMENT — PAIN SCALES - GENERAL: PAINLEVEL_OUTOF10: 6

## 2021-09-24 VITALS
HEART RATE: 84 BPM | TEMPERATURE: 99.2 F | BODY MASS INDEX: 39.4 KG/M2 | DIASTOLIC BLOOD PRESSURE: 66 MMHG | OXYGEN SATURATION: 96 % | WEIGHT: 260 LBS | SYSTOLIC BLOOD PRESSURE: 137 MMHG | HEIGHT: 68 IN | RESPIRATION RATE: 18 BRPM

## 2021-09-24 LAB
EKG ATRIAL RATE: 86 BPM
EKG DIAGNOSIS: NORMAL
EKG P AXIS: 34 DEGREES
EKG P-R INTERVAL: 162 MS
EKG Q-T INTERVAL: 382 MS
EKG QRS DURATION: 84 MS
EKG QTC CALCULATION (BAZETT): 457 MS
EKG R AXIS: -8 DEGREES
EKG T AXIS: 108 DEGREES
EKG VENTRICULAR RATE: 86 BPM

## 2021-09-24 PROCEDURE — 93010 ELECTROCARDIOGRAM REPORT: CPT | Performed by: INTERNAL MEDICINE

## 2021-09-24 NOTE — ED NOTES
Bed: H-02  Expected date:   Expected time:   Means of arrival:   Comments:  dick Tiwari RN  09/23/21 2027

## 2021-09-24 NOTE — ED PROVIDER NOTES
Emergency Department Encounter    Patient: El Webb  MRN: [id number]  : 1960  Date of Evaluation: 2021  ED Provider:  Rojas Julio MD    Triage Chief Complaint:   Fall (Stood up from the couch and feet fell forward. Pt is on blood thinners and did hit her head. Denies any LOC)    [place]:  El Webb is a 56 y.o. female presenting with mechanical fall at home. Patient states for the past several months to year she has intermittent episodes or her legs will come out from under her and give out. Patient states she was getting up off the couch and her legs gave out. Patient states she fell to the floor gently but something fell off a shelf and hit her on the head. Patient states she is on Plavix and want to come in for evaluation. Initially the way patient was describing her leg giving out on her it sounded as though she was getting lightheaded but on reevaluation patient denied any lightheadedness or dizziness. Denies chest pain or shortness of breath. States this is purely a mechanical fall and has been having the symptoms for several months to year. Patient states she has follow-up with an orthopedic surgeon and has an appointment with neurology coming up. Patient states she feels safe at home and in the ED is walking without difficulty and has a stable gait. Patient does use walker at home. Patient states she does not need to be admitted to her evaluated inpatient by PT OT and feel safe at home but is coming in for evaluation of headache and neck pain after fall. Patient denies chest pain or shortness of breath, cough or sputum production, abdominal pain, and back pain, pain in her upper or lower extremities. Denies recent fevers or chills or infectious symptoms. Denies change in urination, change in bowel habits, nausea vomiting.     ROS - see HPI, below listed is current ROS at time of my eval:  At least 10 point review of system reviewed, negative other HPI.    Past Medical History:   Diagnosis Date    Arthritis     Asthma     Cerebral artery occlusion with cerebral infarction [facility])     per old chart 8/2015- TIA    [facility] [address]     Diabetes mellitus ([facility] [address] [facility]     Diabetic neuropathy ([facility] [address]     per old chart    Fracture     per old chart pt in ER 12/9/2018- after 2 days of arm pain after fall- dx with left humerus fx- for surg 12/31/2018    Hyperlipidemia     Hypertension     Muscle weakness (generalized)     Osteoarthritis     per old chart     Past Surgical History:   Procedure Laterality Date    BREAST BIOPSY Right     CHOLECYSTECTOMY  2002?  EYE SURGERY      per old chart had right eye cataract ext done 2/2018 and left eye 4/2018    FOOT SURGERY Left 2004?  HUMERUS FRACTURE SURGERY Left 12/31/2018    HUMERUS OPEN REDUCTION INTERNAL FIXATION LEFT PROXIMAL performed by Clement Lowry DO at [facility] Right 2009?    [facility] LUNG SURGERY  2009?    simone lung lobectomy      Family History   Problem Relation Age of Onset    Breast Cancer Maternal Aunt 48    Breast Cancer Maternal Aunt 48     Social History     Socioeconomic History    Marital status:      Spouse name: Not on file    Number of children: Not on file    Years of education: Not on file    Highest education level: Not on file   Occupational History    Not on file   Tobacco Use    Smoking status: Never Smoker    Smokeless tobacco: Never Used    Tobacco comment: 12/27/*2018- with phone assessment with caregiver- unsure of social, surgical or family hx for phone assessment   Vaping Use    Vaping Use: Never used   Substance and Sexual Activity    Alcohol use: No    Drug use: No    Sexual activity: Not on file   Other Topics Concern    Not on file   Social History Narrative    Not on file     Social Determinants of Health     Financial Resource Strain:     Difficulty of Paying Living Expenses:    Food Insecurity:     Worried About Running Out of Food in the Last Year:     Denita of Food in the Last Year:    Transportation Needs:     Lack of Transportation (Medical):  Lack of Transportation (Non-Medical):    Physical Activity:     Days of Exercise per Week:     Minutes of Exercise per Session:    Stress:     Feeling of Stress :    Social Connections:     Frequency of Communication with Friends and Family:     Frequency of Social Gatherings with Friends and Family:     Attends [employer] Services:     Active Member of Clubs or Organizations:     Attends Club or Organization Meetings:     Marital Status:    Intimate Partner Violence:     Fear of Current or Ex-Partner:     Emotionally Abused:     Physically Abused:     Sexually Abused:      No current facility-administered medications for this encounter. Current Outpatient Medications   Medication Sig Dispense Refill    busPIRone (BUSPAR) 10 MG tablet Take 1 tablet by mouth 3 times daily      DULoxetine (CYMBALTA) 60 MG extended release capsule Take 60 mg by mouth daily      gabapentin (NEURONTIN) 100 MG capsule Take 400 mg by mouth 2 times daily.       lisinopril (PRINIVIL;ZESTRIL) 40 MG tablet Take by mouth      Cholecalciferol 50 MCG (2000 UT) CAPS Take 1 tablet by mouth daily      amitriptyline (ELAVIL) 25 MG tablet Take by mouth      alendronate (FOSAMAX) 70 MG tablet Take 70 mg by mouth once a week      insulin glargine (LANTUS) 100 UNIT/ML injection vial Inject 30 Units into the skin nightly 1 vial 3    lidocaine 4 % external patch Place 1 patch onto the skin daily (Patient not taking: Reported on 8/23/2021) 1 box 1    insulin lispro (HUMALOG) 100 UNIT/ML injection vial Check blood glucose three times daily before meals and at bedtime- for blood glucose <150- no insulin, 151-200=2, 201-250=4, 251-300=6, 301-350=8, 351-400=10, >400=12 units and call MD 1 vial 3    amLODIPine (NORVASC) 10 MG tablet amlodipine 10 mg tablet   TAKE ONE TABLET BY MOUTH DAILY      metoprolol succinate (TOPROL XL) 50 MG extended release tablet Take 1 tablet by mouth daily 30 tablet 0    dicyclomine (BENTYL) 20 MG tablet Take 1 tablet by mouth 4 times daily (before meals and nightly) 120 tablet 0    pantoprazole (PROTONIX) 40 MG tablet Take 1 tablet by mouth 2 times daily (before meals) 60 tablet 0    ferrous sulfate 325 (65 Fe) MG EC tablet Take 650 mg by mouth daily (with breakfast)      topiramate (TOPAMAX) 50 MG tablet Take 50 mg by mouth nightly       clopidogrel (PLAVIX) 75 MG tablet Take 75 mg by mouth daily      atorvastatin (LIPITOR) 40 MG tablet Take 40 mg by mouth nightly        No Known Allergies    Nursing Notes Reviewed    Physical Exam:  Triage VS:    ED Triage Vitals [09/23/21 2031]   Enc Vitals Group      BP (!) 142/100      Pulse 98      Resp 16      Temp 99.2 °F (37.3 °C)      Temp Source Oral      SpO2 98 %      Weight 260 lb (117.9 kg)      Height 5' 8\" (1.727 m)      Head Circumference       Peak Flow       Pain Score       Pain Loc       Pain Edu? Excl. in [address]? My pulse ox interpretation is - normal    General appearance:  No acute distress. Skin:  Warm. Dry. Eye:  Extraocular movements intact. Ears, nose, mouth and throat:  Oral mucosa moist   Neck:  Trachea midline. Extremity:  No swelling. Normal ROM     Heart:  Regular rate and rhythm, normal S1 & S2, no extra heart sounds. Perfusion:  intact  Respiratory:  Lungs clear to auscultation bilaterally. Respirations nonlabored. Abdominal:  Normal bowel sounds. Soft. Nontender. Non distended. Back:  No CVA tenderness to palpation     Neurological:  Alert and oriented times 3. No focal neuro deficits.              Psychiatric:  Appropriate    I have reviewed and interpreted all of the currently available lab results from this visit (if applicable):  Results for orders placed or performed during the hospital encounter of 09/23/21   CBC auto diff   Result Value Ref Range WBC 4.3 4.0 - 10.5 K/CU MM    RBC 4.16 (L) 4.2 - 5.4 M/CU MM    Hemoglobin 12.0 (L) 12.5 - 16.0 GM/DL    Hematocrit 39.3 37 - 47 %    MCV 94.5 78 - 100 FL    MCH 28.8 27 - 31 PG    MCHC 30.5 (L) 32.0 - 36.0 %    RDW 13.2 11.7 - 14.9 %    Platelets 399 309 - 476 K/CU MM    MPV 9.8 7.5 - 11.1 FL    Differential Type AUTOMATED DIFFERENTIAL     Segs Relative 55.6 36 - 66 %    Lymphocytes % 30.6 24 - 44 %    Monocytes % 11.5 (H) 0 - 4 %    Eosinophils % 1.6 0 - 3 %    Basophils % 0.5 0 - 1 %    Segs Absolute 2.4 K/CU MM    Lymphocytes Absolute 1.3 K/CU MM    Monocytes Absolute 0.5 K/CU MM    Eosinophils Absolute 0.1 K/CU MM    Basophils Absolute 0.0 K/CU MM    Nucleated RBC % 0.0 %    Total Nucleated RBC 0.0 K/CU MM    Total Immature Neutrophil 0.01 K/CU MM    Immature Neutrophil % 0.2 0 - 0.43 %   BMP   Result Value Ref Range    Sodium 137 135 - 145 MMOL/L    Potassium 4.4 3.5 - 5.1 MMOL/L    Chloride 101 99 - 110 mMol/L    CO2 22 21 - 32 MMOL/L    Anion Gap 14 4 - 16    BUN 38 (H) 6 - 23 MG/DL    CREATININE 1.4 (H) 0.6 - 1.1 MG/DL    Glucose 131 (H) 70 - 99 MG/DL    Calcium 8.4 8.3 - 10.6 MG/DL    GFR Non- 38 (L) >60 mL/min/1.73m2    GFR  46 (L) >60 mL/min/1.73m2   Troponin   Result Value Ref Range    Troponin T <0.010 <0.01 NG/ML   EKG 12 Lead   Result Value Ref Range    Ventricular Rate 86 BPM    Atrial Rate 86 BPM    P-R Interval 162 ms    QRS Duration 84 ms    Q-T Interval 382 ms    QTc Calculation (Bazett) 457 ms    P Axis 34 degrees    R Axis -8 degrees    T Axis 108 degrees    Diagnosis       Normal sinus rhythm  ST & T wave abnormality, consider lateral ischemia  Abnormal ECG  When compared with ECG of 05-SEP-2020 01:54,  QRS duration has decreased  T wave inversion more evident in Lateral leads        Radiographs (if obtained):  Radiologist's Report Reviewed:  CT HEAD WO CONTRAST    Result Date: 9/23/2021  EXAMINATION: CT OF THE HEAD WITHOUT CONTRAST; CT OF THE CERVICAL SPINE ethmoid sinus opacification. The frontal sinus and mastoid air cells are clear. SOFT TISSUES/SKULL: The calvarium is intact. Extracranial soft tissues are unremarkable. CERVICAL SPINE CT: BONES/ALIGNMENT: Fine bony detail is limited within the mid to lower cervical spine and upper thoracic spine secondary to streak artifact generated by the patient's shoulders. This is especially noted on sagittal reconstructed images. Having said that, no acute fracture or traumatic malalignment is seen within the cervical spine. There is mild reversal the normal cervical lordosis. DEGENERATIVE CHANGES: No significant degenerative changes. SOFT TISSUES: There is a retropharyngeal course of the carotid arteries. The prevertebral soft tissues are otherwise unremarkable. Visualized lungs are clear. Head CT: No acute intracranial abnormality detected. Multiple air-fluid levels within the paranasal sinuses paracoronal with any clinical evidence of sinusitis. Cervical spine CT: No acute fracture or traumatic malalignment. Mild reversal of the normal cervical lordosis. That may be secondary to patient positioning, but also raises the possibility of muscle spasm. CT CERVICAL SPINE WO CONTRAST    Result Date: 9/23/2021  EXAMINATION: CT OF THE HEAD WITHOUT CONTRAST; CT OF THE CERVICAL SPINE WITHOUT CONTRAST 9/23/2021 5:53 pm TECHNIQUE: CT of the head was performed without the administration of intravenous contrast. Dose modulation, iterative reconstruction, and/or weight based adjustment of the mA/kV was utilized to reduce the radiation dose to as low as reasonably achievable.; CT of the cervical spine was performed without the administration of intravenous contrast. Multiplanar reformatted images are provided for review. Dose modulation, iterative reconstruction, and/or weight based adjustment of the mA/kV was utilized to reduce the radiation dose to as low as reasonably achievable.  COMPARISON: Head CT 09/05/2020 Cervical spine CT 08/29/2020 HISTORY: ORDERING SYSTEM PROVIDED HISTORY: pain after fall TECHNOLOGIST PROVIDED HISTORY: Reason for exam:->pain after fall Has a \"code stroke\" or \"stroke alert\" been called? ->No Decision Support Exception - unselect if not a suspected or confirmed emergency medical condition->Emergency Medical Condition (MA) Reason for Exam: pain after fall Acuity: Acute Type of Exam: Initial; ORDERING SYSTEM PROVIDED HISTORY: pain after fall TECHNOLOGIST PROVIDED HISTORY: Reason for exam:->pain after fall Decision Support Exception - unselect if not a suspected or confirmed emergency medical condition->Emergency Medical Condition (MA) Reason for Exam: PAIN AFTER FALL Acuity: Acute Type of Exam: Initial FINDINGS: HEAD CT: BRAIN/VENTRICLES:  No acute loss of the gray-white matter differentiation is identified to suggest acute or subacute infarct. No masses or hemorrhages within the brain parenchyma are found. No evidence of midline shift. There is mild periventricular low-attenuation, compatible with chronic small vessel ischemic disease. The intracranial vasculature, including the dural venous sinuses, is within normal limits. ORBITS: No acute orbital abnormalities are identified. SINUSES: Air-fluid levels are noted within the maxillary sinuses and the sphenoid sinus. Moderate ethmoid sinus opacification. The frontal sinus and mastoid air cells are clear. SOFT TISSUES/SKULL: The calvarium is intact. Extracranial soft tissues are unremarkable. CERVICAL SPINE CT: BONES/ALIGNMENT: Fine bony detail is limited within the mid to lower cervical spine and upper thoracic spine secondary to streak artifact generated by the patient's shoulders. This is especially noted on sagittal reconstructed images. Having said that, no acute fracture or traumatic malalignment is seen within the cervical spine. There is mild reversal the normal cervical lordosis. DEGENERATIVE CHANGES: No significant degenerative changes.  SOFT TISSUES: There is a retropharyngeal course of the carotid arteries. The prevertebral soft tissues are otherwise unremarkable. Visualized lungs are clear. Head CT: No acute intracranial abnormality detected. Multiple air-fluid levels within the paranasal sinuses paracoronal with any clinical evidence of sinusitis. Cervical spine CT: No acute fracture or traumatic malalignment. Mild reversal of the normal cervical lordosis. That may be secondary to patient positioning, but also raises the possibility of muscle spasm. XR CHEST PORTABLE    Result Date: 9/23/2021  EXAMINATION: ONE XRAY VIEW OF THE CHEST 9/23/2021 8:59 pm COMPARISON: 09/05/2020 HISTORY: ORDERING SYSTEM PROVIDED HISTORY: syncopy TECHNOLOGIST PROVIDED HISTORY: Reason for exam:->syncopy Reason for Exam: syncopy Acuity: Acute Type of Exam: Initial Mechanism of Injury: syncopy Relevant Medical/Surgical History: syncopy FINDINGS: The lungs are clear. The cardiac and mediastinal contours are normal.  There is no pleural effusion or pneumothorax. No acute osseous abnormality is identified. Proximal left humeral hardware is partially imaged     No acute cardiopulmonary abnormality. EKG (if obtained): (All EKG's are interpreted by myself in the absence of a cardiologist)  Normal sinus rhythm, ventricular rate 86, IA interval 162, QRS duration 84, QTc 457, T wave inversions in lateral leads that are mildly more prominent than previous no ST elevation or depression    MDM:    58-year-old female presenting with mechanical fall at home concerned for head injury in setting of taking Plavix. Initially incident like patient was feeling lightheaded before the event but on reevaluation patient states it was purely mechanical.  She denied any lightheadedness or dizziness. Denied any chest pain or shortness of breath.   States this has been going on for months to years and is following with orthopedics as well as has an appointment with neurology in the near future for evaluation of legs intermittently going on on her. Patient denies any back pain. Denies change in bowel or bladder. States she is back to baseline and walking around the ED without difficulty. EKG read can be seen above. They are mildly more prominent T wave inversions in the lateral leads. There is no ST elevation or depression and initial Trope was within normal limits. CBC is reassuring. BMP reveals a creatinine of 1.4. Patient's previous creatinine was 1.8. Patient was given fluids in the ED. Chest x-ray shows no acute cardiopulmonary abnormalities and CT head and C-spine were nonacute. Patient states the symptoms have been going on for months to a year and denies any changes. Denies any back pain, urinary symptoms or change in bowel habits. Denies any infectious symptoms. I did offer patient admission for PT OT and further evaluation and treatment patient declines and states she feels safe for discharge especially since has been going on for so long and she has close follow-up with neurology. Patient has a steady gait when I walk around the ED. Discussed strict return precautions with patient and patient was discharged home. Clinical Impression:  1. Closed head injury, initial encounter    2.  Fall from standing, initial encounter      Disposition referral (if applicable):  Dylan Marmolejo MD  [address] [place] [address]  [phone]    In 1 day      [facility] Emergency Department  [place] [id number] [phone]    If symptoms worsen    Disposition medications (if applicable):  Discharge Medication List as of 9/24/2021 12:43 AM        ED Provider Disposition Time  DISPOSITION Decision To Discharge 09/24/2021 12:33:35 AM      Comment: Please note this report has been produced using speech recognition software and may contain errors related to that system including errors in grammar, punctuation, and spelling, as well as words and phrases that may be inappropriate. Efforts were made to edit the dictations.         Desiree Burger MD  09/27/21 [id number]

## 2021-09-24 NOTE — ED NOTES
Orthostatic BPs:    Orthostatic B/P and Pulse?: Yes  Blood Pressure Lyin/65  Pulse Lyin PER MINUTE  Blood Pressure Sittin/85  Pulse Sittin PER MINUTE  Blood Pressure Standin/68  Pulse Standin PER MINUTE  Resp: 18  SpO2: 97 %     Alo Baptiste RN  [phone]

## 2021-10-04 ENCOUNTER — OFFICE VISIT (OUTPATIENT)
Dept: ORTHOPEDIC SURGERY | Age: 61
End: 2021-10-04
Payer: MEDICAID

## 2021-10-04 VITALS
HEIGHT: 68 IN | OXYGEN SATURATION: 95 % | BODY MASS INDEX: 39.4 KG/M2 | WEIGHT: 260 LBS | HEART RATE: 76 BPM | RESPIRATION RATE: 15 BRPM

## 2021-10-04 DIAGNOSIS — M16.12 PRIMARY OSTEOARTHRITIS OF LEFT HIP: Primary | ICD-10-CM

## 2021-10-04 DIAGNOSIS — M16.11 PRIMARY OSTEOARTHRITIS OF RIGHT HIP: ICD-10-CM

## 2021-10-04 PROCEDURE — G8417 CALC BMI ABV UP PARAM F/U: HCPCS | Performed by: ORTHOPAEDIC SURGERY

## 2021-10-04 PROCEDURE — 99212 OFFICE O/P EST SF 10 MIN: CPT | Performed by: ORTHOPAEDIC SURGERY

## 2021-10-04 PROCEDURE — 1036F TOBACCO NON-USER: CPT | Performed by: ORTHOPAEDIC SURGERY

## 2021-10-04 PROCEDURE — G8427 DOCREV CUR MEDS BY ELIG CLIN: HCPCS | Performed by: ORTHOPAEDIC SURGERY

## 2021-10-04 PROCEDURE — 3017F COLORECTAL CA SCREEN DOC REV: CPT | Performed by: ORTHOPAEDIC SURGERY

## 2021-10-04 PROCEDURE — G8484 FLU IMMUNIZE NO ADMIN: HCPCS | Performed by: ORTHOPAEDIC SURGERY

## 2021-10-04 RX ORDER — DONEPEZIL HYDROCHLORIDE 10 MG/1
20 TABLET, FILM COATED ORAL NIGHTLY
COMMUNITY
Start: 2021-09-13

## 2021-10-04 RX ORDER — GABAPENTIN 600 MG/1
1200 TABLET ORAL 2 TIMES DAILY
Status: ON HOLD | COMMUNITY
Start: 2021-09-13 | End: 2022-07-18 | Stop reason: HOSPADM

## 2021-10-04 RX ORDER — FLUOXETINE HYDROCHLORIDE 40 MG/1
80 CAPSULE ORAL DAILY
Status: ON HOLD | COMMUNITY
End: 2022-03-15 | Stop reason: HOSPADM

## 2021-10-04 RX ORDER — DULOXETIN HYDROCHLORIDE 30 MG/1
CAPSULE, DELAYED RELEASE ORAL
Status: ON HOLD | COMMUNITY
Start: 2021-10-02 | End: 2022-03-15 | Stop reason: HOSPADM

## 2021-10-04 RX ORDER — ATORVASTATIN CALCIUM 80 MG/1
TABLET, FILM COATED ORAL
Status: ON HOLD | COMMUNITY
Start: 2021-10-03 | End: 2022-03-15 | Stop reason: HOSPADM

## 2021-10-04 ASSESSMENT — ENCOUNTER SYMPTOMS
BACK PAIN: 0
COLOR CHANGE: 0
CHEST TIGHTNESS: 0

## 2021-10-04 NOTE — PROGRESS NOTES
Subjective:      Patient ID: Shraddha Tim is a 56 y.o. female. Patient returns to the office today for bilateral hip injections. Pt states the injections did help with the pain. Pt states that pain. Right hip 1/10 left hip 1/10. Pt states she does have some achy pain along the lateral aspect of the hips    She comes in today for recheck of her bilateral hip pain and follow-up after cortisone injections. She states that since the injection she has had significant improvement in her symptoms. She states that her pain was completely resolved until this weekend and now she is just beginning to feel mild sharp and stabbing pain in both of her hips again. She denies any recent injury to her hips, but denies any groin pain and denies any fever or chills. She denies any loss of bowel or bladder control. Hip Pain   Associated symptoms include numbness. Review of Systems   Constitutional: Negative for activity change, chills and fever. Respiratory: Negative for chest tightness. Cardiovascular: Negative for chest pain. Musculoskeletal: Negative for arthralgias, back pain, gait problem, joint swelling and myalgias. Skin: Negative for color change, pallor, rash and wound. Neurological: Positive for numbness. Negative for weakness. Past Medical History:   Diagnosis Date    Arthritis     Asthma     Cerebral artery occlusion with cerebral infarction [facility])     per old chart 8/2015- TIA    [facility] [address]     Diabetes mellitus ([facility] [address]     Diabetic neuropathy ([facility])     per old chart    Fracture     per old chart pt in ER 12/9/2018- after 2 days of arm pain after fall- dx with left humerus fx- for surg 12/31/2018    Hyperlipidemia     Hypertension     Muscle weakness (generalized)     Osteoarthritis     per old chart       Objective:   Physical Exam  Constitutional:       Appearance: She is well-developed. HENT:      Head: Normocephalic.    Eyes:

## 2021-10-04 NOTE — PROGRESS NOTES
Patient returns to the office today for bilateral hip injections. Pt states the injections did help with the pain. Pt states that pain. Right hip 1/10 left hip 1/10.  Pt states she does have some achy pain along the lateral aspect of the hips

## 2021-10-11 ENCOUNTER — HOSPITAL ENCOUNTER (OUTPATIENT)
Dept: GENERAL RADIOLOGY | Age: 61
Discharge: HOME | End: 2021-10-11
Payer: MEDICAID

## 2021-10-11 ENCOUNTER — HOSPITAL ENCOUNTER (OUTPATIENT)
Age: 61
Discharge: HOME | End: 2021-10-11
Payer: MEDICAID

## 2021-10-11 DIAGNOSIS — M54.50 ACUTE BILATERAL LOW BACK PAIN WITHOUT SCIATICA: ICD-10-CM

## 2021-10-11 PROCEDURE — 72110 X-RAY EXAM L-2 SPINE 4/>VWS: CPT

## 2021-10-20 ENCOUNTER — HOSPITAL ENCOUNTER (OUTPATIENT)
Dept: MRI IMAGING | Age: 61
Discharge: HOME | End: 2021-10-20
Payer: MEDICAID

## 2021-10-20 DIAGNOSIS — R27.0 ATAXIA: ICD-10-CM

## 2021-10-20 DIAGNOSIS — R29.6 FALLS FREQUENTLY: ICD-10-CM

## 2021-10-20 DIAGNOSIS — M54.6 PAIN IN THORACIC SPINE: ICD-10-CM

## 2021-10-20 DIAGNOSIS — F03.90 SENILE DEMENTIA, UNCOMPLICATED (HCC): ICD-10-CM

## 2021-10-20 DIAGNOSIS — M48.05 SPINAL STENOSIS OF THORACOLUMBAR REGION: ICD-10-CM

## 2021-10-20 DIAGNOSIS — M48.07 LUMBOSACRAL STENOSIS: ICD-10-CM

## 2021-10-20 PROCEDURE — 72146 MRI CHEST SPINE W/O DYE: CPT

## 2021-10-20 PROCEDURE — 70551 MRI BRAIN STEM W/O DYE: CPT

## 2021-10-20 PROCEDURE — 72148 MRI LUMBAR SPINE W/O DYE: CPT

## 2021-11-08 ENCOUNTER — OFFICE VISIT (OUTPATIENT)
Dept: ORTHOPEDIC SURGERY | Age: 61
End: 2021-11-08
Payer: MEDICAID

## 2021-11-08 VITALS
OXYGEN SATURATION: 95 % | BODY MASS INDEX: 43.32 KG/M2 | WEIGHT: 260 LBS | HEART RATE: 75 BPM | TEMPERATURE: 97.8 F | HEIGHT: 65 IN

## 2021-11-08 DIAGNOSIS — M25.552 HIP PAIN, BILATERAL: ICD-10-CM

## 2021-11-08 DIAGNOSIS — M16.11 PRIMARY OSTEOARTHRITIS OF RIGHT HIP: ICD-10-CM

## 2021-11-08 DIAGNOSIS — M16.12 PRIMARY OSTEOARTHRITIS OF LEFT HIP: Primary | ICD-10-CM

## 2021-11-08 DIAGNOSIS — M25.551 HIP PAIN, BILATERAL: ICD-10-CM

## 2021-11-08 PROCEDURE — 20610 DRAIN/INJ JOINT/BURSA W/O US: CPT | Performed by: ORTHOPAEDIC SURGERY

## 2021-11-08 PROCEDURE — G8417 CALC BMI ABV UP PARAM F/U: HCPCS | Performed by: ORTHOPAEDIC SURGERY

## 2021-11-08 PROCEDURE — 99213 OFFICE O/P EST LOW 20 MIN: CPT | Performed by: ORTHOPAEDIC SURGERY

## 2021-11-08 PROCEDURE — G8484 FLU IMMUNIZE NO ADMIN: HCPCS | Performed by: ORTHOPAEDIC SURGERY

## 2021-11-08 PROCEDURE — 3017F COLORECTAL CA SCREEN DOC REV: CPT | Performed by: ORTHOPAEDIC SURGERY

## 2021-11-08 PROCEDURE — G8427 DOCREV CUR MEDS BY ELIG CLIN: HCPCS | Performed by: ORTHOPAEDIC SURGERY

## 2021-11-08 PROCEDURE — 1036F TOBACCO NON-USER: CPT | Performed by: ORTHOPAEDIC SURGERY

## 2021-11-08 RX ORDER — BACLOFEN 10 MG/1
10 TABLET ORAL 3 TIMES DAILY
Status: ON HOLD | COMMUNITY
Start: 2021-09-14 | End: 2022-03-22 | Stop reason: HOSPADM

## 2021-11-08 NOTE — PROGRESS NOTES
Patient returns to the office with b/l hip pain. Pt states her right is worse than a left. Pt states her pain started back about a month ago. Pt states her pain will increase with prolonged walking around and when she has been standing too long. Pt states her pain is a 9/10 today and has not subsided since walking around the store today. Pt denies any new injuries.

## 2021-11-08 NOTE — PATIENT INSTRUCTIONS
Continue weight-bearing as tolerated. Continue range of motion exercises as instructed. Ice and elevate as needed. Tylenol or Motrin for pain. B/L hip injections given today.   Follow up as needed

## 2021-11-09 ASSESSMENT — ENCOUNTER SYMPTOMS
COLOR CHANGE: 0
BACK PAIN: 0
CHEST TIGHTNESS: 0

## 2021-11-09 NOTE — PROGRESS NOTES
Subjective:      Patient ID: Bassam Fountain is a 56 y.o. female. Patient returns to the office with b/l hip pain. Pt states her right is worse than a left. Pt states her pain started back about a month ago. Pt states her pain will increase with prolonged walking around and when she has been standing too long. Pt states her pain is a 9/10 today and has not subsided since walking around the store today. Pt denies any new injuries. She comes in today for recheck of her bilateral hip pain. She states that since the last cortisone injection she has been doing very well and they lasted just over 3 months but now the pain is beginning to return. She continues having sharp, stabbing pain in the lateral aspect of her hips. She denies any groin pain. Patient denies any new injury to the involved extremity/ joint, denies numbness or tingling in the involved extremity and denies fever or chills. She would like additional cortisone injections today. Review of Systems   Constitutional: Negative for activity change, chills and fever. Respiratory: Negative for chest tightness. Cardiovascular: Negative for chest pain. Musculoskeletal: Positive for arthralgias and myalgias. Negative for back pain, gait problem and joint swelling. Skin: Negative for color change, pallor, rash and wound. Neurological: Negative for weakness and numbness.        Past Medical History:   Diagnosis Date    Arthritis     Asthma     Cerebral artery occlusion with cerebral infarction [facility])     per old chart 8/2015- TIA    [facility] [address]     Diabetes mellitus [facility] [address]     Diabetic neuropathy (HCC)     per old chart    Fracture     per old chart pt in ER 12/9/2018- after 2 days of arm pain after fall- dx with left humerus fx- for surg 12/31/2018    Hyperlipidemia     Hypertension     Muscle weakness (generalized)     Osteoarthritis     per old chart       Objective:   Physical Exam  Constitutional:       Appearance: She is well-developed. HENT:      Head: Normocephalic. Eyes:      Pupils: Pupils are equal, round, and reactive to light. Pulmonary:      Effort: Pulmonary effort is normal.   Musculoskeletal:         General: Tenderness present. No deformity. Normal range of motion. Cervical back: Normal range of motion. Right hip: Tenderness present. No deformity, lacerations, bony tenderness or crepitus. Normal range of motion. Normal strength. Left hip: Tenderness present. No deformity, lacerations, bony tenderness or crepitus. Normal range of motion. Normal strength. Right knee: Normal.      Left knee: Normal.   Skin:     General: Skin is warm and dry. Capillary Refill: Capillary refill takes less than 2 seconds. Coloration: Skin is not pale. Findings: No erythema or rash. Neurological:      Mental Status: She is alert and oriented to person, place, and time. Right hip-Skin intact with no erythema, ecchymosis or lacerations present. No groin pain with range of motion of the right hip. Moderate tenderness over the right greater trochanter. Left hip-Skin intact with no erythema, ecchymosis or lacerations present. No groin pain with range of motion of the left hip. Moderate tenderness over the left greater trochanter. Assessment:      Right trochanteric bursitis  Left trochanteric bursitis      Plan:      I discussed with her today her response to previous cortisone injections for her hips. At this point given her improved symptoms I recommend that we continue with conservative treatment. I discussed performing a cortisone injection with the patient today. The patient agrees and would like to proceed with the cortisone injection. I explained to them that we can repeat these injections every 3 months if needed. Continue weight-bearing as tolerated. Continue range of motion exercises as instructed.   Ice and elevate as needed. Tylenol or Motrin for pain. Follow up as needed for additional injections. Trochanteric bursitis Injection Procedure Note    Pre-operative Diagnosis: Right hip trochanteric bursitis    Post-operative Diagnosis: same    Indications: Symptomatic relief of bursitis    Anesthesia: Lidocaine 1% and marcaine 0.5% without epinephrine without added sodium bicarbonate    Procedure Details     Verbal consent was obtained for the procedure. The point of maximal tenderness over the right greater trochanter was localized and prepped with alcohol. A 22 gauge needle was advanced down to the greater trochanter without difficulty  The space was then injected with 1 ml 1% lidocaine and 1 ml 0.5% marcaine and 1 ml of triamcinolone (KENALOG) 40mg/ml. The injection site was cleansed with isopropyl alcohol and a dressing was applied. Complications:  None; patient tolerated the procedure well. Symptoms were improved immediately after the injection. Trochanteric bursitis Injection Procedure Note    Pre-operative Diagnosis: left hip trochanteric bursitis    Post-operative Diagnosis: same    Indications: Symptomatic relief of bursitis    Anesthesia: Lidocaine 1% and marcaine 0.5% without epinephrine without added sodium bicarbonate    Procedure Details     Verbal consent was obtained for the procedure. The point of maximal tenderness over the left greater trochanter was localized and prepped with alcohol. A 22 gauge needle was advanced down to the greater trochanter without difficulty  The space was then injected with 1 ml 1% lidocaine and 1 ml 0.5% marcaine and 1 ml of triamcinolone (KENALOG) 40mg/ml. The injection site was cleansed with isopropyl alcohol and a dressing was applied. Complications:  None; patient tolerated the procedure well. Symptoms were improved immediately after the injection.           Andreia Ramirez DO

## 2022-03-11 ENCOUNTER — APPOINTMENT (OUTPATIENT)
Dept: CT IMAGING | Age: 62
DRG: 420 | End: 2022-03-11
Payer: MEDICAID

## 2022-03-11 ENCOUNTER — HOSPITAL ENCOUNTER (INPATIENT)
Age: 62
LOS: 4 days | Discharge: HOME | DRG: 420 | End: 2022-03-15
Attending: EMERGENCY MEDICINE | Admitting: HOSPITALIST
Payer: MEDICAID

## 2022-03-11 ENCOUNTER — APPOINTMENT (OUTPATIENT)
Dept: GENERAL RADIOLOGY | Age: 62
DRG: 420 | End: 2022-03-11
Payer: MEDICAID

## 2022-03-11 DIAGNOSIS — R73.9 HYPERGLYCEMIA: ICD-10-CM

## 2022-03-11 DIAGNOSIS — I10 HYPERTENSION, UNSPECIFIED TYPE: ICD-10-CM

## 2022-03-11 DIAGNOSIS — E13.10 DIABETIC KETOACIDOSIS WITHOUT COMA ASSOCIATED WITH OTHER SPECIFIED DIABETES MELLITUS (HCC): Primary | ICD-10-CM

## 2022-03-11 PROBLEM — E11.10 DKA, TYPE 2, NOT AT GOAL (HCC): Status: ACTIVE | Noted: 2022-03-11

## 2022-03-11 LAB
ALBUMIN SERPL-MCNC: 3.5 GM/DL (ref 3.4–5)
ALP BLD-CCNC: 128 IU/L (ref 40–129)
ALT SERPL-CCNC: 24 U/L (ref 10–40)
ANION GAP SERPL CALCULATED.3IONS-SCNC: 18 MMOL/L (ref 4–16)
ANION GAP SERPL CALCULATED.3IONS-SCNC: 20 MMOL/L (ref 4–16)
AST SERPL-CCNC: 14 IU/L (ref 15–37)
BACTERIA: NEGATIVE /HPF
BASE EXCESS: 7 (ref 0–2.4)
BASOPHILS ABSOLUTE: 0 K/CU MM
BASOPHILS RELATIVE PERCENT: 0.2 % (ref 0–1)
BETA-HYDROXYBUTYRATE: >20.8 MG/DL (ref 0–3)
BILIRUB SERPL-MCNC: 0.6 MG/DL (ref 0–1)
BILIRUBIN URINE: NEGATIVE MG/DL
BLOOD, URINE: ABNORMAL
BUN BLDV-MCNC: 21 MG/DL (ref 6–23)
BUN BLDV-MCNC: 25 MG/DL (ref 6–23)
CALCIUM SERPL-MCNC: 9.1 MG/DL (ref 8.3–10.6)
CALCIUM SERPL-MCNC: 9.2 MG/DL (ref 8.3–10.6)
CHLORIDE BLD-SCNC: 103 MMOL/L (ref 99–110)
CHLORIDE BLD-SCNC: 99 MMOL/L (ref 99–110)
CHP ED QC CHECK: NORMAL
CHP ED QC CHECK: NORMAL
CHP ED QC CHECK: YES
CLARITY: CLEAR
CO2: 18 MMOL/L (ref 21–32)
CO2: 19 MMOL/L (ref 21–32)
COLOR: YELLOW
COMMENT: ABNORMAL
CREAT SERPL-MCNC: 1.3 MG/DL (ref 0.6–1.1)
CREAT SERPL-MCNC: 1.4 MG/DL (ref 0.6–1.1)
DIFFERENTIAL TYPE: ABNORMAL
EOSINOPHILS ABSOLUTE: 0 K/CU MM
EOSINOPHILS RELATIVE PERCENT: 0.1 % (ref 0–3)
GFR AFRICAN AMERICAN: 46 ML/MIN/1.73M2
GFR AFRICAN AMERICAN: 50 ML/MIN/1.73M2
GFR NON-AFRICAN AMERICAN: 38 ML/MIN/1.73M2
GFR NON-AFRICAN AMERICAN: 41 ML/MIN/1.73M2
GLUCOSE BLD-MCNC: 157 MG/DL (ref 70–99)
GLUCOSE BLD-MCNC: 191 MG/DL (ref 70–99)
GLUCOSE BLD-MCNC: 212 MG/DL
GLUCOSE BLD-MCNC: 212 MG/DL (ref 70–99)
GLUCOSE BLD-MCNC: 214 MG/DL (ref 70–99)
GLUCOSE BLD-MCNC: 227 MG/DL (ref 70–99)
GLUCOSE BLD-MCNC: 251 MG/DL (ref 70–99)
GLUCOSE BLD-MCNC: 354 MG/DL (ref 70–99)
GLUCOSE BLD-MCNC: 356 MG/DL
GLUCOSE BLD-MCNC: 356 MG/DL (ref 70–99)
GLUCOSE BLD-MCNC: 411 MG/DL
GLUCOSE BLD-MCNC: 411 MG/DL (ref 70–99)
GLUCOSE BLD-MCNC: 426 MG/DL (ref 70–99)
GLUCOSE, URINE: >1000 MG/DL
HCO3 VENOUS: 18.8 MMOL/L (ref 19–25)
HCT VFR BLD CALC: 41.2 % (ref 37–47)
HEMOGLOBIN: 12.4 GM/DL (ref 12.5–16)
IMMATURE NEUTROPHIL %: 0.4 % (ref 0–0.43)
KETONES, URINE: 40 MG/DL
LACTATE: 2.1 MMOL/L (ref 0.4–2)
LEUKOCYTE ESTERASE, URINE: NEGATIVE
LIPASE: 16 IU/L (ref 13–60)
LYMPHOCYTES ABSOLUTE: 1.4 K/CU MM
LYMPHOCYTES RELATIVE PERCENT: 15.2 % (ref 24–44)
MAGNESIUM: 1.7 MG/DL (ref 1.8–2.4)
MCH RBC QN AUTO: 29.3 PG (ref 27–31)
MCHC RBC AUTO-ENTMCNC: 30.1 % (ref 32–36)
MCV RBC AUTO: 97.4 FL (ref 78–100)
MONOCYTES ABSOLUTE: 0.6 K/CU MM
MONOCYTES RELATIVE PERCENT: 6.1 % (ref 0–4)
MUCUS: ABNORMAL HPF
NITRITE URINE, QUANTITATIVE: NEGATIVE
NUCLEATED RBC %: 0 %
O2 SAT, VEN: 68.6 % (ref 50–70)
PCO2, VEN: 39 MMHG (ref 38–52)
PDW BLD-RTO: 12.6 % (ref 11.7–14.9)
PH VENOUS: 7.29 (ref 7.32–7.42)
PH, URINE: 5.5 (ref 5–8)
PHOSPHORUS: 2.7 MG/DL (ref 2.5–4.9)
PLATELET # BLD: 304 K/CU MM (ref 140–440)
PMV BLD AUTO: 9.8 FL (ref 7.5–11.1)
PO2, VEN: 41 MMHG (ref 28–48)
POTASSIUM SERPL-SCNC: 4.2 MMOL/L (ref 3.5–5.1)
POTASSIUM SERPL-SCNC: 5.1 MMOL/L (ref 3.5–5.1)
PRO-BNP: 908.5 PG/ML
PROTEIN UA: 100 MG/DL
RBC # BLD: 4.23 M/CU MM (ref 4.2–5.4)
RBC URINE: <1 /HPF (ref 0–6)
SEGMENTED NEUTROPHILS ABSOLUTE COUNT: 7.1 K/CU MM
SEGMENTED NEUTROPHILS RELATIVE PERCENT: 78 % (ref 36–66)
SODIUM BLD-SCNC: 138 MMOL/L (ref 135–145)
SODIUM BLD-SCNC: 139 MMOL/L (ref 135–145)
SPECIFIC GRAVITY UA: 1.02 (ref 1–1.03)
TOTAL CK: 80 IU/L (ref 26–140)
TOTAL IMMATURE NEUTOROPHIL: 0.04 K/CU MM
TOTAL NUCLEATED RBC: 0 K/CU MM
TOTAL PROTEIN: 6.1 GM/DL (ref 6.4–8.2)
TROPONIN T: <0.01 NG/ML
TROPONIN T: <0.01 NG/ML
UROBILINOGEN, URINE: 0.2 MG/DL (ref 0.2–1)
WBC # BLD: 9.1 K/CU MM (ref 4–10.5)
WBC UA: 31 /HPF (ref 0–5)

## 2022-03-11 PROCEDURE — 96366 THER/PROPH/DIAG IV INF ADDON: CPT

## 2022-03-11 PROCEDURE — 96375 TX/PRO/DX INJ NEW DRUG ADDON: CPT

## 2022-03-11 PROCEDURE — 74176 CT ABD & PELVIS W/O CONTRAST: CPT

## 2022-03-11 PROCEDURE — 83690 ASSAY OF LIPASE: CPT

## 2022-03-11 PROCEDURE — 6370000000 HC RX 637 (ALT 250 FOR IP): Performed by: INTERNAL MEDICINE

## 2022-03-11 PROCEDURE — 85025 COMPLETE CBC W/AUTO DIFF WBC: CPT

## 2022-03-11 PROCEDURE — 2580000003 HC RX 258: Performed by: EMERGENCY MEDICINE

## 2022-03-11 PROCEDURE — 84484 ASSAY OF TROPONIN QUANT: CPT

## 2022-03-11 PROCEDURE — 71045 X-RAY EXAM CHEST 1 VIEW: CPT

## 2022-03-11 PROCEDURE — 93005 ELECTROCARDIOGRAM TRACING: CPT | Performed by: EMERGENCY MEDICINE

## 2022-03-11 PROCEDURE — 82805 BLOOD GASES W/O2 SATURATION: CPT

## 2022-03-11 PROCEDURE — 82010 KETONE BODYS QUAN: CPT

## 2022-03-11 PROCEDURE — 1200000000 HC SEMI PRIVATE

## 2022-03-11 PROCEDURE — 96372 THER/PROPH/DIAG INJ SC/IM: CPT

## 2022-03-11 PROCEDURE — 96361 HYDRATE IV INFUSION ADD-ON: CPT

## 2022-03-11 PROCEDURE — 6370000000 HC RX 637 (ALT 250 FOR IP): Performed by: EMERGENCY MEDICINE

## 2022-03-11 PROCEDURE — 82550 ASSAY OF CK (CPK): CPT

## 2022-03-11 PROCEDURE — 84100 ASSAY OF PHOSPHORUS: CPT

## 2022-03-11 PROCEDURE — 81001 URINALYSIS AUTO W/SCOPE: CPT

## 2022-03-11 PROCEDURE — 83605 ASSAY OF LACTIC ACID: CPT

## 2022-03-11 PROCEDURE — 96365 THER/PROPH/DIAG IV INF INIT: CPT

## 2022-03-11 PROCEDURE — 83880 ASSAY OF NATRIURETIC PEPTIDE: CPT

## 2022-03-11 PROCEDURE — 6370000000 HC RX 637 (ALT 250 FOR IP): Performed by: HOSPITALIST

## 2022-03-11 PROCEDURE — 6360000002 HC RX W HCPCS: Performed by: EMERGENCY MEDICINE

## 2022-03-11 PROCEDURE — 2580000003 HC RX 258: Performed by: HOSPITALIST

## 2022-03-11 PROCEDURE — 80053 COMPREHEN METABOLIC PANEL: CPT

## 2022-03-11 PROCEDURE — 2500000003 HC RX 250 WO HCPCS: Performed by: EMERGENCY MEDICINE

## 2022-03-11 PROCEDURE — 83735 ASSAY OF MAGNESIUM: CPT

## 2022-03-11 PROCEDURE — 82962 GLUCOSE BLOOD TEST: CPT

## 2022-03-11 PROCEDURE — 2580000003 HC RX 258: Performed by: INTERNAL MEDICINE

## 2022-03-11 PROCEDURE — 80048 BASIC METABOLIC PNL TOTAL CA: CPT

## 2022-03-11 RX ORDER — POTASSIUM CHLORIDE 7.45 MG/ML
10 INJECTION INTRAVENOUS PRN
Status: DISCONTINUED | OUTPATIENT
Start: 2022-03-11 | End: 2022-03-13

## 2022-03-11 RX ORDER — METOPROLOL SUCCINATE 50 MG/1
50 TABLET, EXTENDED RELEASE ORAL DAILY
Status: DISCONTINUED | OUTPATIENT
Start: 2022-03-11 | End: 2022-03-15 | Stop reason: HOSPADM

## 2022-03-11 RX ORDER — GABAPENTIN 300 MG/1
600 CAPSULE ORAL 2 TIMES DAILY
Status: DISCONTINUED | OUTPATIENT
Start: 2022-03-11 | End: 2022-03-15 | Stop reason: HOSPADM

## 2022-03-11 RX ORDER — DEXTROSE MONOHYDRATE 50 MG/ML
100 INJECTION, SOLUTION INTRAVENOUS PRN
Status: DISCONTINUED | OUTPATIENT
Start: 2022-03-11 | End: 2022-03-15 | Stop reason: HOSPADM

## 2022-03-11 RX ORDER — CLOPIDOGREL BISULFATE 75 MG/1
75 TABLET ORAL DAILY
Status: DISCONTINUED | OUTPATIENT
Start: 2022-03-11 | End: 2022-03-15 | Stop reason: HOSPADM

## 2022-03-11 RX ORDER — DICYCLOMINE HYDROCHLORIDE 10 MG/ML
20 INJECTION INTRAMUSCULAR ONCE
Status: COMPLETED | OUTPATIENT
Start: 2022-03-11 | End: 2022-03-11

## 2022-03-11 RX ORDER — TOPIRAMATE 25 MG/1
50 TABLET ORAL NIGHTLY
Status: DISCONTINUED | OUTPATIENT
Start: 2022-03-11 | End: 2022-03-15 | Stop reason: HOSPADM

## 2022-03-11 RX ORDER — DEXTROSE MONOHYDRATE 25 G/50ML
12.5 INJECTION, SOLUTION INTRAVENOUS PRN
Status: DISCONTINUED | OUTPATIENT
Start: 2022-03-11 | End: 2022-03-15 | Stop reason: HOSPADM

## 2022-03-11 RX ORDER — PANTOPRAZOLE SODIUM 40 MG/1
40 TABLET, DELAYED RELEASE ORAL
Status: DISCONTINUED | OUTPATIENT
Start: 2022-03-11 | End: 2022-03-15 | Stop reason: HOSPADM

## 2022-03-11 RX ORDER — BUSPIRONE HYDROCHLORIDE 5 MG/1
10 TABLET ORAL 3 TIMES DAILY
Status: DISCONTINUED | OUTPATIENT
Start: 2022-03-11 | End: 2022-03-15 | Stop reason: HOSPADM

## 2022-03-11 RX ORDER — AMITRIPTYLINE HYDROCHLORIDE 25 MG/1
25 TABLET, FILM COATED ORAL NIGHTLY PRN
Status: DISCONTINUED | OUTPATIENT
Start: 2022-03-11 | End: 2022-03-15 | Stop reason: HOSPADM

## 2022-03-11 RX ORDER — DONEPEZIL HYDROCHLORIDE 5 MG/1
5 TABLET, FILM COATED ORAL NIGHTLY
Status: DISCONTINUED | OUTPATIENT
Start: 2022-03-11 | End: 2022-03-15 | Stop reason: HOSPADM

## 2022-03-11 RX ORDER — ONDANSETRON 2 MG/ML
4 INJECTION INTRAMUSCULAR; INTRAVENOUS EVERY 30 MIN PRN
Status: DISCONTINUED | OUTPATIENT
Start: 2022-03-11 | End: 2022-03-11 | Stop reason: HOSPADM

## 2022-03-11 RX ORDER — DEXTROSE MONOHYDRATE 25 G/50ML
12.5 INJECTION, SOLUTION INTRAVENOUS PRN
Status: DISCONTINUED | OUTPATIENT
Start: 2022-03-11 | End: 2022-03-11 | Stop reason: RX

## 2022-03-11 RX ORDER — FERROUS SULFATE 325(65) MG
650 TABLET ORAL
Status: DISCONTINUED | OUTPATIENT
Start: 2022-03-12 | End: 2022-03-15 | Stop reason: HOSPADM

## 2022-03-11 RX ORDER — NICOTINE POLACRILEX 4 MG
15 LOZENGE BUCCAL PRN
Status: DISCONTINUED | OUTPATIENT
Start: 2022-03-11 | End: 2022-03-15 | Stop reason: HOSPADM

## 2022-03-11 RX ORDER — ATORVASTATIN CALCIUM 40 MG/1
40 TABLET, FILM COATED ORAL NIGHTLY
Status: DISCONTINUED | OUTPATIENT
Start: 2022-03-11 | End: 2022-03-15 | Stop reason: HOSPADM

## 2022-03-11 RX ORDER — SODIUM CHLORIDE 450 MG/100ML
INJECTION, SOLUTION INTRAVENOUS CONTINUOUS
Status: DISCONTINUED | OUTPATIENT
Start: 2022-03-11 | End: 2022-03-12 | Stop reason: ALTCHOICE

## 2022-03-11 RX ORDER — DEXTROSE AND SODIUM CHLORIDE 5; .45 G/100ML; G/100ML
INJECTION, SOLUTION INTRAVENOUS CONTINUOUS PRN
Status: DISCONTINUED | OUTPATIENT
Start: 2022-03-11 | End: 2022-03-15 | Stop reason: HOSPADM

## 2022-03-11 RX ORDER — MAGNESIUM SULFATE 1 G/100ML
1000 INJECTION INTRAVENOUS PRN
Status: DISCONTINUED | OUTPATIENT
Start: 2022-03-11 | End: 2022-03-13

## 2022-03-11 RX ORDER — AMLODIPINE BESYLATE 5 MG/1
10 TABLET ORAL NIGHTLY
Status: DISCONTINUED | OUTPATIENT
Start: 2022-03-11 | End: 2022-03-15 | Stop reason: HOSPADM

## 2022-03-11 RX ORDER — 0.9 % SODIUM CHLORIDE 0.9 %
1000 INTRAVENOUS SOLUTION INTRAVENOUS ONCE
Status: COMPLETED | OUTPATIENT
Start: 2022-03-11 | End: 2022-03-11

## 2022-03-11 RX ADMIN — AMLODIPINE BESYLATE 10 MG: 5 TABLET ORAL at 20:27

## 2022-03-11 RX ADMIN — TOPIRAMATE 50 MG: 25 TABLET, FILM COATED ORAL at 20:27

## 2022-03-11 RX ADMIN — METOPROLOL SUCCINATE 50 MG: 50 TABLET, EXTENDED RELEASE ORAL at 18:54

## 2022-03-11 RX ADMIN — SODIUM CHLORIDE 0.1 UNITS/KG/HR: 9 INJECTION, SOLUTION INTRAVENOUS at 15:21

## 2022-03-11 RX ADMIN — ATORVASTATIN CALCIUM 40 MG: 40 TABLET, FILM COATED ORAL at 20:27

## 2022-03-11 RX ADMIN — BUSPIRONE HYDROCHLORIDE 10 MG: 5 TABLET ORAL at 20:28

## 2022-03-11 RX ADMIN — AMITRIPTYLINE HYDROCHLORIDE 25 MG: 25 TABLET, FILM COATED ORAL at 20:27

## 2022-03-11 RX ADMIN — DICYCLOMINE HYDROCHLORIDE 20 MG: 20 INJECTION, SOLUTION INTRAMUSCULAR at 11:00

## 2022-03-11 RX ADMIN — SODIUM CHLORIDE 1000 ML: 9 INJECTION, SOLUTION INTRAVENOUS at 13:59

## 2022-03-11 RX ADMIN — DEXTROSE AND SODIUM CHLORIDE: 5; 450 INJECTION, SOLUTION INTRAVENOUS at 20:31

## 2022-03-11 RX ADMIN — SODIUM CHLORIDE 4 UNITS/HR: 9 INJECTION, SOLUTION INTRAVENOUS at 18:04

## 2022-03-11 RX ADMIN — FAMOTIDINE 20 MG: 10 INJECTION INTRAVENOUS at 10:56

## 2022-03-11 RX ADMIN — ONDANSETRON 4 MG: 2 INJECTION INTRAMUSCULAR; INTRAVENOUS at 10:56

## 2022-03-11 RX ADMIN — DONEPEZIL HYDROCHLORIDE 5 MG: 5 TABLET ORAL at 20:27

## 2022-03-11 ASSESSMENT — ENCOUNTER SYMPTOMS
EYES NEGATIVE: 1
ALLERGIC/IMMUNOLOGIC NEGATIVE: 1
ABDOMINAL PAIN: 1
NAUSEA: 1
VOMITING: 1
RESPIRATORY NEGATIVE: 1

## 2022-03-11 NOTE — ED NOTES
Pt assisted to the bedside commode. Urine sample obtained and sent to lab. Pt changed into a gown at this time.       Felicitas Morel RN  03/11/22 1123

## 2022-03-11 NOTE — H&P
Department of Internal Medicine  Hospitalist  Attending History and Physical      CHIEF COMPLAINT:  Vomiting and diarrhea    Reason for Admission:  DKA    History Obtained From:  patient    HISTORY OF PRESENT ILLNESS:      The patient is a 59 y.o. female with significant past medical history of DM, HTN, obesity, resting tremor presetns as she has been having abd pain lower abd with vomiting and diarrhea since Wednesday and feels as she is burning up with chills. No shortness of breath or cough. No other sick contacts with similar symptoms. She did not take her insulin as was not eating as it was low in the 40s but last night she took her insulin this morning it was 218 and then when ambulance came it was over 300. Past Medical History:        Diagnosis Date    Arthritis     Asthma     Cerebral artery occlusion with cerebral infarction [facility])     per old chart 8/2015- TIA    COPD [facility]) [facility] [address]     Diabetes mellitus ([facility] [address]     Diabetic neuropathy ([facility] [address]     per old chart    Fracture     per old chart pt in ER 12/9/2018- after 2 days of arm pain after fall- dx with left humerus fx- for surg 12/31/2018    Hyperlipidemia     Hypertension     Muscle weakness (generalized)     Osteoarthritis     per old chart     Past Surgical History:        Procedure Laterality Date    BREAST BIOPSY Right     CHOLECYSTECTOMY  2002?  EYE SURGERY      per old chart had right eye cataract ext done 2/2018 and left eye 4/2018    FOOT SURGERY Left 2004?  HUMERUS FRACTURE SURGERY Left 12/31/2018    HUMERUS OPEN REDUCTION INTERNAL FIXATION LEFT PROXIMAL performed by Caitlyn Man DO at [facility] Right 2009?  LUNG SURGERY  2009?    simone lung lobectomy      IMedications Prior to Admission:    No current facility-administered medications on file prior to encounter.      Current Outpatient Medications on File Prior to Encounter   Medication Sig Dispense Refill    baclofen (LIORESAL) 10 MG tablet       donepezil (ARICEPT) 5 MG tablet       DULoxetine (CYMBALTA) 30 MG extended release capsule       FLUoxetine (PROZAC) 40 MG capsule Take 80 mg by mouth daily      atorvastatin (LIPITOR) 80 MG tablet       gabapentin (NEURONTIN) 600 MG tablet       busPIRone (BUSPAR) 10 MG tablet Take 1 tablet by mouth 3 times daily      DULoxetine (CYMBALTA) 60 MG extended release capsule Take 60 mg by mouth daily      gabapentin (NEURONTIN) 100 MG capsule Take 400 mg by mouth 2 times daily.       lisinopril (PRINIVIL;ZESTRIL) 40 MG tablet Take by mouth      Cholecalciferol 50 MCG (2000 UT) CAPS Take 1 tablet by mouth daily      amitriptyline (ELAVIL) 25 MG tablet Take by mouth      alendronate (FOSAMAX) 70 MG tablet Take 70 mg by mouth once a week      insulin glargine (LANTUS) 100 UNIT/ML injection vial Inject 30 Units into the skin nightly 1 vial 3    lidocaine 4 % external patch Place 1 patch onto the skin daily 1 box 1    insulin lispro (HUMALOG) 100 UNIT/ML injection vial Check blood glucose three times daily before meals and at bedtime- for blood glucose <150- no insulin, 151-200=2, 201-250=4, 251-300=6, 301-350=8, 351-400=10, >400=12 units and call MD 1 vial 3    amLODIPine (NORVASC) 10 MG tablet amlodipine 10 mg tablet   TAKE ONE TABLET BY MOUTH DAILY      metoprolol succinate (TOPROL XL) 50 MG extended release tablet Take 1 tablet by mouth daily 30 tablet 0    dicyclomine (BENTYL) 20 MG tablet Take 1 tablet by mouth 4 times daily (before meals and nightly) 120 tablet 0    pantoprazole (PROTONIX) 40 MG tablet Take 1 tablet by mouth 2 times daily (before meals) 60 tablet 0    ferrous sulfate 325 (65 Fe) MG EC tablet Take 650 mg by mouth daily (with breakfast)      topiramate (TOPAMAX) 50 MG tablet Take 50 mg by mouth nightly       clopidogrel (PLAVIX) 75 MG tablet Take 75 mg by mouth daily      atorvastatin (LIPITOR) 40 MG tablet Take 40 mg by mouth nightly Allergies:  Patient has no known allergies. Social History:   Social History     Socioeconomic History    Marital status:      Spouse name: Not on file    Number of children: Not on file    Years of education: Not on file    Highest education level: Not on file   Occupational History    Not on file   Tobacco Use    Smoking status: Never Smoker    Smokeless tobacco: Never Used    Tobacco comment: 12/27/*2018- with phone assessment with caregiver- unsure of social, surgical or family hx for phone assessment   Vaping Use    Vaping Use: Never used   Substance and Sexual Activity    Alcohol use: No    Drug use: No    Sexual activity: Not on file   Other Topics Concern    Not on file   Social History Narrative    Not on file     Social Determinants of Health     Financial Resource Strain:     Difficulty of Paying Living Expenses: Not on file   Food Insecurity:     Worried About [address] [email or web address] in the Last Year: Not on file    [address] [facility] in the Last Year: Not on file   Transportation Needs:     Lack of Transportation (Medical): Not on file    Lack of Transportation (Non-Medical):  Not on file   Physical Activity:     Days of Exercise per Week: Not on file    Minutes of Exercise per Session: Not on file   Stress:     Feeling of Stress : Not on file   Social Connections:     Frequency of Communication with Friends and Family: Not on file    Frequency of Social Gatherings with Friends and Family: Not on file    Attends Yazidi Services: Not on file    Active Member of Clubs or Organizations: Not on file    Attends Club or Organization Meetings: Not on file    Marital Status: Not on file   Intimate Partner Violence:     Fear of Current or Ex-Partner: Not on file    Emotionally Abused: Not on file    Physically Abused: Not on file    Sexually Abused: Not on file   Housing Stability:     Unable to Pay for Housing in the Last Year: Not on file    Number of Places Lived in the Last Year: Not on file    Unstable Housing in the Last Year: Not on file         Family History:   Family History   Problem Relation Age of Onset    Breast Cancer Maternal Aunt 48    Breast Cancer Maternal Aunt 50       REVIEW OF SYSTEMS:  CONSTITUTIONAL:  positive for  chills  EYES:  negative  HEENT:  negative  RESPIRATORY:  negative  CARDIOVASCULAR:  negative  GASTROINTESTINAL:  positive for nausea, vomiting, diarrhea and abdominal pain  MUSCULOSKELETAL:  negative  NEUROLOGICAL:  positive for tremor  BEHAVIOR/PSYCH:  negative  PHYSICAL EXAM:    Vitals:  BP (!) 185/64   Pulse 94   Temp 98.2 °F (36.8 °C) (Oral)   Resp 11   Ht 5' 4\" (1.626 m)   Wt 255 lb (115.7 kg)   SpO2 98%   BMI 43.77 kg/m²     CONSTITUTIONAL:  awake  EYES:  PEERLA  ENT:  Normocephalic, without obvious abnormality, atraumatic, sinuses nontender on palpation, external ears without lesions, oral pharynx with moist mucus membranes, tonsils without erythema or exudates, gums normal and good dentition. LUNGS:  No increased work of breathing, good air exchange, clear to auscultation bilaterally, no crackles or wheezing  CARDIOVASCULAR:  normal apical pulses  ABDOMEN:  Soft, NDNT  MUSCULOSKELETAL:  full range of motion noted  NEUROLOGIC:  Awake and alert and CN intact.  Tremors at bedside  SKIN:  no bruising or bleeding    DATA:  CT Abd  NAD  CXR  NAD  EKG 94 SR  ASSESSMENT AND PLAN:    DKA with AG 20  -insulin IVF, BMP q 4hrs and replace electrolytes  -check trop as had some upper GI discomfort on my exam and also GI PCR panel  -check bld cx with elevated lactic acid  -consult endo  HTN  -continue home medications CCB and BB  -on ACE and start tomorrow  REsting tremor  -on high dose neurontin and this can cause toxic effects with uncontrollable tremors and decrease dose  -on topomax for this also  -checkk TSH

## 2022-03-11 NOTE — ED TRIAGE NOTES
Pt presents to the ED via EMS with c/o HTN and hyperglycemia. Pt states her sugars have been over 300. She has not taken any of her meds since Wednesday d/t not feeling well.

## 2022-03-11 NOTE — CONSULTS
Endocrinology   Consult Note    Dear Doctor Jess Londono for the Consult     Pt. Was Admitted for : Nausea vomiting and diarrhea and was in DKA    Reason for Consult: Control blood glucose    History Obtained From:  Patient/ EMR       HISTORY OF PRESENT ILLNESS:                The patient is a 59 y.o. female with significant past medical history of asthma, CVA, COPD, diabetes mellitus, peripheral neuropathy, hypertension, hyperlipidemia, malaise muscle weakness left upper lobe lobectomy came in complaining of nausea vomiting and high blood glucoses patient did not take her insulin due to unable to eat and concern about hypoglycemia. Patient has had hypoglycemia earlier so that was afraid to take more insulin. I was  consulted for better control of blood glucose. ROS:   Pt's ROS done in detail. Abnormal ROS are noted in Medical and Surgical History Section below: Other Medical History:        Diagnosis Date    Arthritis     Asthma     Cerebral artery occlusion with cerebral infarction [facility])     per old chart 8/2015- TIA    COPD (chronic obstructive pulmonary disease) ([address]     Diabetes mellitus ([facility] [address]     Diabetic neuropathy ([facility] [address]     per old chart    Fracture     per old chart pt in ER 12/9/2018- after 2 days of arm pain after fall- dx with left humerus fx- for surg 12/31/2018    Hyperlipidemia     Hypertension     Muscle weakness (generalized)     Osteoarthritis     per old chart     Surgical History:        Procedure Laterality Date    BREAST BIOPSY Right     CHOLECYSTECTOMY  2002?  EYE SURGERY      per old chart had right eye cataract ext done 2/2018 and left eye 4/2018    FOOT SURGERY Left 2004?  HUMERUS FRACTURE SURGERY Left 12/31/2018    HUMERUS OPEN REDUCTION INTERNAL FIXATION LEFT PROXIMAL performed by Rupinder Tabor DO at [facility] Right 2009?  LUNG SURGERY  2009?    simone lung lobectomy        Allergies:  Patient has no known allergies.     Family History:       Problem Relation Age of Onset    Breast Cancer Maternal Aunt 48    Breast Cancer Maternal Aunt 48     REVIEW OF SYSTEMS:  Review of System Done as noted above     PHYSICAL EXAM:      Vitals:    BP (!) 185/64   Pulse 94   Temp 98.2 °F (36.8 °C) (Oral)   Resp 11   Ht 5' 4\" (1.626 m)   Wt 255 lb (115.7 kg)   SpO2 98%   BMI 43.77 kg/m²     CONSTITUTIONAL:  awake, alert, cooperative, appears stated age   EYES:  vision intact Fundoscopic Exam not performed   ENT:Normal  NECK:  Supple, No JVD. Thyroid Exam:Normal   LUNGS:  Has Vesicular Breath Sounds,   CARDIOVASCULAR:  Normal apical impulse, regular rate and rhythm, normal S1 and S2, no S3 or S4, and has no  murmur   ABDOMEN:  No scars, normal bowel sounds, soft, non-distended, moderately tender, no masses palpated, no hepatolienomegaly  Musculoskeletal: Normal  Extremities: Normal, peripheral pulses normal, , has no edema   NEUROLOGIC:  Awake, alert, oriented to name, place and time. Cranial nerves II-XII are grossly intact. Motor is  intact. Sensory is intact. ,  and gait is normal.    DATA:    CBC:   Recent Labs     03/11/22  1029   WBC 9.1   HGB 12.4*       CMP:  Recent Labs     03/11/22  1029      K 5.1   CL 99   CO2 19*   BUN 21   CREATININE 1.3*   CALCIUM 9.2   PROT 6.1*   LABALBU 3.5   BILITOT 0.6   ALKPHOS 128   AST 14*   ALT 24     Lipids: No results found for: CHOL, HDL, TRIG  Glucose:   Recent Labs     03/11/22  0958 03/11/22  1626 03/11/22  1741   POCGLU 356* 354* 251*     Hemoglobin A1C:   Lab Results   Component Value Date    LABA1C 7.9 09/05/2020     Free T4: No results found for: T4FREE  Free T3: No results found for: FT3  TSH High Sensitivity: No results found for: TSHHS    XR CHEST PORTABLE   Final Result   No acute cardiopulmonary findings. CT ABDOMEN PELVIS WO CONTRAST Additional Contrast? None   Final Result   1. No acute abnormality in the abdomen or pelvis on the noncontrast CT. Scheduled Medicines   Medications:    clopidogrel  75 mg Oral Daily    donepezil  5 mg Oral Nightly    amLODIPine  10 mg Oral Nightly    atorvastatin  40 mg Oral Nightly    busPIRone  10 mg Oral TID    [START ON 3/12/2022] ferrous sulfate  650 mg Oral Daily with breakfast    metoprolol succinate  50 mg Oral Daily    pantoprazole  40 mg Oral BID AC    topiramate  50 mg Oral Nightly    gabapentin  600 mg Oral BID      Infusions:    dextrose      sodium chloride      dextrose 5 % and 0.45 % NaCl      insulin           IMPRESSION    Patient Active Problem List   Diagnosis    Proximal humeral fracture    Asthma    Bereavement    Pain of both hip joints    Chest pain    Chronic abdominal pain    Chronic back pain    Chronic obstructive lung disease (HCC)    Corns and callosities    Depressive disorder    Type II diabetes mellitus, uncontrolled (HCC)    Diabetic polyneuropathy (HCC)    HTN (hypertension)    Hyperlipidemia    Lesion of liver    Menopausal symptom    Mixed hyperlipidemia    Onychomycosis    Obesity    Osteoarthrosis    Osteoporosis    Pain in both feet    Sleep apnea    TIA (transient ischemic attack)    Vitamin D deficiency    Wheezing    S/P ORIF (open reduction internal fixation) fracture    Diabetic gastroparesis (HCC)    Gastroesophageal reflux disease    Restless legs    Ketoacidosis, diabetic, type 2, no coma ([facility] [address]    DKA, type 2, not at goal [facility])         RECOMMENDATIONS:      1. Reviewed POC blood glucose . Labs and X ray results   2. Reviewed Home and Current Medicines   3. Will continue on IV insulion infusion protocol   4. Monitor Blood glucose frequently   5. Modify  the dose of Insulin  as needed        Will follow with you  Again thank you for sharing pt's care with me.      Truly yours,       Yovany Blackman MD

## 2022-03-11 NOTE — ED PROVIDER NOTES
[facility]      TRIAGE CHIEF COMPLAINT:   Hypertension (has not taken any meds since Wednesday morning ), Hyperglycemia, and Illness      [place]o:  Butch Covington is a 59 y.o. female that presents with complaint of hypertension, nausea vomiting, hyperglycemia for the past couple days. She denies any fever chest pain shortness of breath has general weakness and fatigue malaise abdominal pain nausea vomiting. Patient is hyperglycemic on arrival hypertensive again not taking her medications for the past couple days. REVIEW OF SYSTEMS:  At least 10 systems reviewed and otherwise acutely negative except as in the [address] Review of Systems   Constitutional: Positive for activity change, appetite change and fatigue. HENT: Negative. Eyes: Negative. Respiratory: Negative. Cardiovascular: Negative. Gastrointestinal: Positive for abdominal pain, nausea and vomiting. Endocrine: Negative. Genitourinary: Negative. Musculoskeletal: Negative. Allergic/Immunologic: Negative. Neurological: Negative. Hematological: Negative. Psychiatric/Behavioral: Negative. All other systems reviewed and are negative. Past Medical History:   Diagnosis Date    Arthritis     Asthma     Cerebral artery occlusion with cerebral infarction [facility])     per old chart 8/2015- TIA    COPD [facility]ch[facility] obstructive [facility] disease) [facility]     Diabetes mellitus ([facility]     Diabetic neuropathy ([facility] [address] [facility]     per old chart    Fracture     per old chart pt in ER 12/9/2018- after 2 days of arm pain after fall- dx with left humerus fx- for surg 12/31/2018    Hyperlipidemia     Hypertension     Muscle weakness (generalized)     Osteoarthritis     per old chart     Past Surgical History:   Procedure Laterality Date    BREAST BIOPSY Right     CHOLECYSTECTOMY  2002?  EYE SURGERY      per old chart had right eye cataract ext done 2/2018 and left eye 4/2018    FOOT SURGERY Left 2004?     HUMERUS FRACTURE SURGERY Left 12/31/2018    HUMERUS OPEN REDUCTION INTERNAL FIXATION LEFT PROXIMAL performed by Alfonso Erickson DO at [facility] Right 2009? [employer] LUNG SURGERY  2009?    simone lung lobectomy      Family History   Problem Relation Age of Onset    Breast Cancer Maternal Aunt 48    Breast Cancer Maternal Aunt 48     Social History     Socioeconomic History    Marital status:      Spouse name: Not on file    Number of children: Not on file    Years of education: Not on file    Highest education level: Not on file   Occupational History    Not on file   Tobacco Use    Smoking status: Never Smoker    Smokeless tobacco: Never Used    Tobacco comment: 12/27/*2018- with phone assessment with caregiver- unsure of social, surgical or family hx for phone assessment   Vaping Use    Vaping Use: Never used   Substance and Sexual Activity    Alcohol use: No    Drug use: No    Sexual activity: Not on file   Other Topics Concern    Not on file   Social History Narrative    Not on file     Social Determinants of Health     Financial Resource Strain:     Difficulty of Paying Living Expenses: Not on file   Food Insecurity:     Worried About [address] in the Last Year: Not on file    [address] in the Last Year: Not on file   Transportation Needs:     Lack of Transportation (Medical): Not on file    Lack of Transportation (Non-Medical):  Not on file   Physical Activity:     Days of Exercise per Week: Not on file    Minutes of Exercise per Session: Not on file   Stress:     Feeling of Stress : Not on file   Social Connections:     Frequency of Communication with Friends and Family: Not on file    Frequency of Social Gatherings with Friends and Family: Not on file    Attends Taoist Services: Not on file    Active Member of Clubs or Organizations: Not on file    Attends Club or Organization Meetings: Not on file    Marital Status: Not on file   Intimate Partner Violence:     Fear of Current or Ex-Partner: Not on file    Emotionally Abused: Not on file    Physically Abused: Not on file    Sexually Abused: Not on file   Housing Stability:     Unable to Pay for Housing in the Last Year: Not on file    Number of Places Lived in the Last Year: Not on file    Unstable Housing in the Last Year: Not on file     Current Facility-Administered Medications   Medication Dose Route Frequency Provider Last Rate Last Admin    ondansetron (ZOFRAN) injection 4 mg  4 mg IntraVENous Q30 Min PRN dev9k, DO   4 mg at 03/11/22 1056    glucose (GLUTOSE) 40 % oral gel 15 g  15 g Oral PRN dev9k, DO        dextrose 50 % IV solution  12.5 g IntraVENous PRN dev9k, DO        glucagon (rDNA) injection 1 mg  1 mg IntraMUSCular PRN [employer], DO        dextrose 5 % solution  100 mL/hr IntraVENous PRN dev9k, DO        insulin regular (HUMULIN R;NOVOLIN R) 100 Units in sodium chloride 0.9 % 100 mL infusion  0.1 Units/kg/hr IntraVENous Continuous Jabari Lawrence, DO        0.9 % sodium chloride bolus  1,000 mL IntraVENous Once [employer], DO         Current Outpatient Medications   Medication Sig Dispense Refill    baclofen (LIORESAL) 10 MG tablet       donepezil (ARICEPT) 5 MG tablet       DULoxetine (CYMBALTA) 30 MG extended release capsule       FLUoxetine (PROZAC) 40 MG capsule Take 80 mg by mouth daily      atorvastatin (LIPITOR) 80 MG tablet       gabapentin (NEURONTIN) 600 MG tablet       busPIRone (BUSPAR) 10 MG tablet Take 1 tablet by mouth 3 times daily      DULoxetine (CYMBALTA) 60 MG extended release capsule Take 60 mg by mouth daily      gabapentin (NEURONTIN) 100 MG capsule Take 400 mg by mouth 2 times daily.       lisinopril (PRINIVIL;ZESTRIL) 40 MG tablet Take by mouth      Cholecalciferol 50 MCG (2000 UT) CAPS Take 1 tablet by mouth daily      amitriptyline (ELAVIL) 25 MG tablet Take by mouth      alendronate (FOSAMAX) 70 MG tablet Take 70 mg by mouth once a week      insulin glargine (LANTUS) 100 UNIT/ML injection vial Inject 30 Units into the skin nightly 1 vial 3    lidocaine 4 % external patch Place 1 patch onto the skin daily 1 box 1    insulin lispro (HUMALOG) 100 UNIT/ML injection vial Check blood glucose three times daily before meals and at bedtime- for blood glucose <150- no insulin, 151-200=2, 201-250=4, 251-300=6, 301-350=8, 351-400=10, >400=12 units and call MD 1 vial 3    amLODIPine (NORVASC) 10 MG tablet amlodipine 10 mg tablet   TAKE ONE TABLET BY MOUTH DAILY      metoprolol succinate (TOPROL XL) 50 MG extended release tablet Take 1 tablet by mouth daily 30 tablet 0    dicyclomine (BENTYL) 20 MG tablet Take 1 tablet by mouth 4 times daily (before meals and nightly) 120 tablet 0    pantoprazole (PROTONIX) 40 MG tablet Take 1 tablet by mouth 2 times daily (before meals) 60 tablet 0    ferrous sulfate 325 (65 Fe) MG EC tablet Take 650 mg by mouth daily (with breakfast)      topiramate (TOPAMAX) 50 MG tablet Take 50 mg by mouth nightly       clopidogrel (PLAVIX) 75 MG tablet Take 75 mg by mouth daily      atorvastatin (LIPITOR) 40 MG tablet Take 40 mg by mouth nightly         No Known Allergies  Current Facility-Administered Medications   Medication Dose Route Frequency Provider Last Rate Last Admin    ondansetron (ZOFRAN) injection 4 mg  4 mg IntraVENous Q30 Min PRN [employer], DO   4 mg at 03/11/22 1056    glucose (GLUTOSE) 40 % oral gel 15 g  15 g Oral PRN [employer], DO        dextrose 50 % IV solution  12.5 g IntraVENous PRN [employer], DO        glucagon (rDNA) injection 1 mg  1 mg IntraMUSCular PRN [employer], DO        dextrose 5 % solution  100 mL/hr IntraVENous PRN [employer], DO        insulin regular (HUMULIN R;NOVOLIN R) 100 Units in sodium chloride 0.9 % 100 mL infusion  0.1 Units/kg/hr IntraVENous Continuous Jabari Lawrence, DO        0.9 % sodium chloride bolus  1,000 mL IntraVENous Once Shantel Lau, DO         Current Outpatient Medications   Medication Sig Dispense Refill    baclofen (LIORESAL) 10 MG tablet       donepezil (ARICEPT) 5 MG tablet       DULoxetine (CYMBALTA) 30 MG extended release capsule       FLUoxetine (PROZAC) 40 MG capsule Take 80 mg by mouth daily      atorvastatin (LIPITOR) 80 MG tablet       gabapentin (NEURONTIN) 600 MG tablet       busPIRone (BUSPAR) 10 MG tablet Take 1 tablet by mouth 3 times daily      DULoxetine (CYMBALTA) 60 MG extended release capsule Take 60 mg by mouth daily      gabapentin (NEURONTIN) 100 MG capsule Take 400 mg by mouth 2 times daily.       lisinopril (PRINIVIL;ZESTRIL) 40 MG tablet Take by mouth      Cholecalciferol 50 MCG (2000 UT) CAPS Take 1 tablet by mouth daily      amitriptyline (ELAVIL) 25 MG tablet Take by mouth      alendronate (FOSAMAX) 70 MG tablet Take 70 mg by mouth once a week      insulin glargine (LANTUS) 100 UNIT/ML injection vial Inject 30 Units into the skin nightly 1 vial 3    lidocaine 4 % external patch Place 1 patch onto the skin daily 1 box 1    insulin lispro (HUMALOG) 100 UNIT/ML injection vial Check blood glucose three times daily before meals and at bedtime- for blood glucose <150- no insulin, 151-200=2, 201-250=4, 251-300=6, 301-350=8, 351-400=10, >400=12 units and call MD 1 vial 3    amLODIPine (NORVASC) 10 MG tablet amlodipine 10 mg tablet   TAKE ONE TABLET BY MOUTH DAILY      metoprolol succinate (TOPROL XL) 50 MG extended release tablet Take 1 tablet by mouth daily 30 tablet 0    dicyclomine (BENTYL) 20 MG tablet Take 1 tablet by mouth 4 times daily (before meals and nightly) 120 tablet 0    pantoprazole (PROTONIX) 40 MG tablet Take 1 tablet by mouth 2 times daily (before meals) 60 tablet 0    ferrous sulfate 325 (65 Fe) MG EC tablet Take 650 mg by mouth daily (with breakfast)      topiramate (TOPAMAX) 50 MG tablet Take 50 mg by mouth nightly       clopidogrel (PLAVIX) 75 MG tablet Take 75 mg by mouth daily      atorvastatin (LIPITOR) 40 MG tablet Take 40 mg by mouth nightly          Nursing Notes Reviewed    VITAL SIGNS:  ED Triage Vitals   Enc Vitals Group      BP       Pulse       Resp       Temp       Temp src       SpO2       Weight       Height       Head Circumference       Peak Flow       Pain Score       Pain Loc       Pain Edu? Excl. in [address]? PHYSICAL EXAM:  Physical Exam  Vitals and nursing note reviewed. Constitutional:       General: She is not in acute distress. Appearance: Normal appearance. She is well-developed, well-groomed and overweight. She is ill-appearing. She is not toxic-appearing or diaphoretic. HENT:      Head: Normocephalic and atraumatic. Right Ear: External ear normal.      Left Ear: External ear normal.      Nose: No congestion or rhinorrhea. Eyes:      General: No scleral icterus. Right eye: No discharge. Left eye: No discharge. Extraocular Movements: Extraocular movements intact. Conjunctiva/sclera: Conjunctivae normal.   Cardiovascular:      Rate and Rhythm: Normal rate and regular rhythm. Pulses: Normal pulses. Heart sounds: Normal heart sounds. No murmur heard. No friction rub. No gallop. Pulmonary:      Effort: Pulmonary effort is normal. No respiratory distress. Breath sounds: Normal breath sounds. No stridor. No wheezing, rhonchi or rales. Abdominal:      General: Bowel sounds are normal. There is no distension. Palpations: Abdomen is soft. There is no mass. Tenderness: There is generalized abdominal tenderness. There is no guarding or rebound. Negative signs include Menard's sign and Rovsing's sign. Hernia: No hernia is present. Musculoskeletal:         General: No swelling, tenderness, deformity or signs of injury. Normal range of motion. Cervical back: Normal range of motion. No rigidity. Right lower leg: No edema. Left lower leg: No edema. Skin:     General: Skin is warm. Coloration: Skin is not jaundiced or pale. Findings: No bruising, erythema, lesion or rash. Neurological:      General: No focal deficit present. Mental Status: She is alert and oriented to person, place, and time. GCS: GCS eye subscore is 4. GCS verbal subscore is 5. GCS motor subscore is 6. Cranial Nerves: Cranial nerves are intact. No cranial nerve deficit, dysarthria or facial asymmetry. Sensory: Sensation is intact. No sensory deficit. Motor: Motor function is intact. No weakness, tremor, atrophy, abnormal muscle tone or seizure activity. Coordination: Coordination normal.   Psychiatric:         Mood and Affect: Mood normal.         Behavior: Behavior normal. Behavior is cooperative. Thought Content:  Thought content normal.         Judgment: Judgment normal.           I have reviewed andinterpreted all of the currently available lab results from this visit (if applicable):    Results for orders placed or performed during the hospital encounter of 03/11/22   CBC with Auto Differential   Result Value Ref Range    WBC 9.1 4.0 - 10.5 K/CU MM    RBC 4.23 4.2 - 5.4 M/CU MM    Hemoglobin 12.4 (L) 12.5 - 16.0 GM/DL    Hematocrit 41.2 37 - 47 %    MCV 97.4 78 - 100 FL    MCH 29.3 27 - 31 PG    MCHC 30.1 (L) 32.0 - 36.0 %    RDW 12.6 11.7 - 14.9 %    Platelets 615 228 - 878 K/CU MM    MPV 9.8 7.5 - 11.1 FL    Differential Type AUTOMATED DIFFERENTIAL     Segs Relative 78.0 (H) 36 - 66 %    Lymphocytes % 15.2 (L) 24 - 44 %    Monocytes % 6.1 (H) 0 - 4 %    Eosinophils % 0.1 0 - 3 %    Basophils % 0.2 0 - 1 %    Segs Absolute 7.1 K/CU MM    Lymphocytes Absolute 1.4 K/CU MM    Monocytes Absolute 0.6 K/CU MM    Eosinophils Absolute 0.0 K/CU MM    Basophils Absolute 0.0 K/CU MM    Nucleated RBC % 0.0 %    Total Nucleated RBC 0.0 K/CU MM    Total Immature Neutrophil 0.04 K/CU MM    Immature Neutrophil % 0.4 0 - 0.43 % Comprehensive Metabolic Panel   Result Value Ref Range    Sodium 138 135 - 145 MMOL/L    Potassium 5.1 3.5 - 5.1 MMOL/L    Chloride 99 99 - 110 mMol/L    CO2 19 (L) 21 - 32 MMOL/L    BUN 21 6 - 23 MG/DL    CREATININE 1.3 (H) 0.6 - 1.1 MG/DL    Glucose 426 (HH) 70 - 99 MG/DL    Calcium 9.2 8.3 - 10.6 MG/DL    Albumin 3.5 3.4 - 5.0 GM/DL    Total Protein 6.1 (L) 6.4 - 8.2 GM/DL    Total Bilirubin 0.6 0.0 - 1.0 MG/DL    ALT 24 10 - 40 U/L    AST 14 (L) 15 - 37 IU/L    Alkaline Phosphatase 128 40 - 129 IU/L    GFR Non- 41 (L) >60 mL/min/1.73m2    GFR  50 (L) >60 mL/min/1.73m2    Anion Gap 20 (H) 4 - 16   Lipase   Result Value Ref Range    Lipase 16 13 - 60 IU/L   Blood Gas, Venous   Result Value Ref Range    pH, Crescencio 7.29 (L) 7.32 - 7.42    pCO2, Crescencio 39 38 - 52 mmHG    pO2, Crescencio 41 28 - 48 mmHG    Base Excess 7 (H) 0 - 2.4    HCO3, Venous 18.8 (L) 19 - 25 MMOL/L    O2 Sat, Crescencio 68.6 50 - 70 %    Comment VBG    Beta-Hydroxybutyrate   Result Value Ref Range    Beta-Hydroxybutyrate >20.8 (H) 0.0 - 3.0 MG/DL   Urinalysis   Result Value Ref Range    Color, UA YELLOW (A) YELLOW    Clarity, UA CLEAR CLEAR    Glucose, Urine >1,000 (A) NEGATIVE MG/DL    Bilirubin Urine NEGATIVE NEGATIVE MG/DL    Ketones, Urine 40 (A) NEGATIVE MG/DL    Specific Gravity, UA 1.025 1.001 - 1.035    Blood, Urine SMALL (A) NEGATIVE    pH, Urine 5.5 5.0 - 8.0    Protein,  (A) NEGATIVE MG/DL    Urobilinogen, Urine 0.2 0.2 - 1.0 MG/DL    Nitrite Urine, Quantitative NEGATIVE NEGATIVE    Leukocyte Esterase, Urine NEGATIVE NEGATIVE    RBC, UA <1 0 - 6 /HPF    WBC, UA 31 (H) 0 - 5 /HPF    Bacteria, UA NEGATIVE NEGATIVE /HPF    Mucus, UA RARE (A) NEGATIVE HPF   Lactic Acid   Result Value Ref Range    Lactate 2.1 (HH) 0.4 - 2.0 mMOL/L   CK   Result Value Ref Range    Total CK 80 26 - 140 IU/L   Brain Natriuretic Peptide   Result Value Ref Range    Pro-.5 (H) <300 PG/ML   Troponin   Result Value Ref Range    Troponin T <0.010 <0.01 NG/ML   POC Blood Glucose   Result Value Ref Range    Glucose 356 mg/dL    QC OK? yes    POCT Glucose   Result Value Ref Range    POC Glucose 356 (H) 70 - 99 MG/DL   EKG 12 Lead   Result Value Ref Range    Ventricular Rate 94 BPM    Atrial Rate 94 BPM    P-R Interval 158 ms    QRS Duration 94 ms    Q-T Interval 398 ms    QTc Calculation (Bazett) 497 ms    P Axis 60 degrees    R Axis 26 degrees    T Axis 80 degrees    Diagnosis       Normal sinus rhythm  Nonspecific T wave abnormality  Abnormal ECG  When compared with ECG of 23-SEP-2021 21:51,  No significant change was found          Radiographs (if obtained):  [] The following radiograph was interpreted by myself in the absence of a radiologist:  [x] Radiologist's Report Reviewed:    CXR, CT abd/pelv    EKG (if obtained): (All EKG's are interpreted by myself in the absence of a cardiologist)    12 lead EKG per my interpretation:  Normal Sinus Rhythm 94  Axis is   Normal  QTc is  497  There is no specific T wave changes appreciated. There is no specific ST wave changes appreciated. Prior EKG to compare with was not available       Total critical care time today provided was at least 20 minutes. This excludes seperately billable procedure. Critical care time provided for reviewing labs, images giving fluids, insulin, consulting medicine that required close evaluation and/or intervention with concern for patient decompensation. MDM:    Patient has a complaint of abdominal pain hyperglycemia, nausea vomiting, hypertension. Has not felt well for the past couple days. Has not been taking her medications due to not feeling well. Denies any fever chest pain shortness of breath urine complaints bowel complaints otherwise chest pain. No headache or other questions or concerns. Patient had work-up performed here scans are negative labs show mild DKA was given her for IV fluids, insulin drip admit to hospital medicine, ICU otherwise stable. Admitted. She is okay with plan. CLINICAL IMPRESSION:  Final diagnoses:   Hypertension, unspecified type   Hyperglycemia   Diabetic ketoacidosis without coma associated with other specified diabetes mellitus ([facility] [address]       (Please note that portions of this note may have been completed with a voice recognition program. Efforts were made to edit the dictations but occasionally words aremis-transcribed.)    DISPOSITION REFERRAL (if applicable):  No follow-up provider specified.     DISPOSITION MEDICATIONS (if applicable):  New Prescriptions    No medications on file          Gisell Bustos, [facility],   03/11/22 [id number]

## 2022-03-12 LAB
ANION GAP SERPL CALCULATED.3IONS-SCNC: 12 MMOL/L (ref 4–16)
ANION GAP SERPL CALCULATED.3IONS-SCNC: 14 MMOL/L (ref 4–16)
ANION GAP SERPL CALCULATED.3IONS-SCNC: 15 MMOL/L (ref 4–16)
BUN BLDV-MCNC: 26 MG/DL (ref 6–23)
BUN BLDV-MCNC: 27 MG/DL (ref 6–23)
BUN BLDV-MCNC: 28 MG/DL (ref 6–23)
CALCIUM SERPL-MCNC: 8.8 MG/DL (ref 8.3–10.6)
CALCIUM SERPL-MCNC: 8.8 MG/DL (ref 8.3–10.6)
CALCIUM SERPL-MCNC: 9.1 MG/DL (ref 8.3–10.6)
CHLORIDE BLD-SCNC: 103 MMOL/L (ref 99–110)
CHLORIDE BLD-SCNC: 104 MMOL/L (ref 99–110)
CHLORIDE BLD-SCNC: 104 MMOL/L (ref 99–110)
CHP ED QC CHECK: NORMAL
CHP ED QC CHECK: YES
CO2: 20 MMOL/L (ref 21–32)
CO2: 21 MMOL/L (ref 21–32)
CO2: 22 MMOL/L (ref 21–32)
CREAT SERPL-MCNC: 1.5 MG/DL (ref 0.6–1.1)
CREAT SERPL-MCNC: 1.5 MG/DL (ref 0.6–1.1)
CREAT SERPL-MCNC: 1.7 MG/DL (ref 0.6–1.1)
EKG ATRIAL RATE: 94 BPM
EKG DIAGNOSIS: NORMAL
EKG P AXIS: 60 DEGREES
EKG P-R INTERVAL: 158 MS
EKG Q-T INTERVAL: 398 MS
EKG QRS DURATION: 94 MS
EKG QTC CALCULATION (BAZETT): 497 MS
EKG R AXIS: 26 DEGREES
EKG T AXIS: 80 DEGREES
EKG VENTRICULAR RATE: 94 BPM
GFR AFRICAN AMERICAN: 37 ML/MIN/1.73M2
GFR AFRICAN AMERICAN: 42 ML/MIN/1.73M2
GFR AFRICAN AMERICAN: 42 ML/MIN/1.73M2
GFR NON-AFRICAN AMERICAN: 30 ML/MIN/1.73M2
GFR NON-AFRICAN AMERICAN: 35 ML/MIN/1.73M2
GFR NON-AFRICAN AMERICAN: 35 ML/MIN/1.73M2
GLUCOSE BLD-MCNC: 140 MG/DL (ref 70–99)
GLUCOSE BLD-MCNC: 146 MG/DL (ref 70–99)
GLUCOSE BLD-MCNC: 163 MG/DL (ref 70–99)
GLUCOSE BLD-MCNC: 164 MG/DL (ref 70–99)
GLUCOSE BLD-MCNC: 189 MG/DL (ref 70–99)
GLUCOSE BLD-MCNC: 189 MG/DL (ref 70–99)
GLUCOSE BLD-MCNC: 201 MG/DL (ref 70–99)
GLUCOSE BLD-MCNC: 210 MG/DL (ref 70–99)
GLUCOSE BLD-MCNC: 214 MG/DL
GLUCOSE BLD-MCNC: 214 MG/DL (ref 70–99)
GLUCOSE BLD-MCNC: 224 MG/DL (ref 70–99)
GLUCOSE BLD-MCNC: 242 MG/DL (ref 70–99)
GLUCOSE BLD-MCNC: 258 MG/DL (ref 70–99)
GLUCOSE BLD-MCNC: 266 MG/DL (ref 70–99)
GLUCOSE BLD-MCNC: 290 MG/DL
GLUCOSE BLD-MCNC: 290 MG/DL (ref 70–99)
GLUCOSE BLD-MCNC: 300 MG/DL (ref 70–99)
HCT VFR BLD CALC: 40.1 % (ref 37–47)
HEMOGLOBIN: 12.2 GM/DL (ref 12.5–16)
MAGNESIUM: 1.7 MG/DL (ref 1.8–2.4)
MAGNESIUM: 1.8 MG/DL (ref 1.8–2.4)
MAGNESIUM: 1.9 MG/DL (ref 1.8–2.4)
MCH RBC QN AUTO: 29.5 PG (ref 27–31)
MCHC RBC AUTO-ENTMCNC: 30.4 % (ref 32–36)
MCV RBC AUTO: 96.9 FL (ref 78–100)
PDW BLD-RTO: 12.7 % (ref 11.7–14.9)
PHOSPHORUS: 2.3 MG/DL (ref 2.5–4.9)
PHOSPHORUS: 2.6 MG/DL (ref 2.5–4.9)
PHOSPHORUS: 3.4 MG/DL (ref 2.5–4.9)
PLATELET # BLD: 365 K/CU MM (ref 140–440)
PMV BLD AUTO: 9.9 FL (ref 7.5–11.1)
POTASSIUM SERPL-SCNC: 4.2 MMOL/L (ref 3.5–5.1)
POTASSIUM SERPL-SCNC: 4.2 MMOL/L (ref 3.5–5.1)
POTASSIUM SERPL-SCNC: 4.5 MMOL/L (ref 3.5–5.1)
RBC # BLD: 4.14 M/CU MM (ref 4.2–5.4)
SODIUM BLD-SCNC: 138 MMOL/L (ref 135–145)
SODIUM BLD-SCNC: 138 MMOL/L (ref 135–145)
SODIUM BLD-SCNC: 139 MMOL/L (ref 135–145)
TSH HIGH SENSITIVITY: 2.58 UIU/ML (ref 0.27–4.2)
WBC # BLD: 9.9 K/CU MM (ref 4–10.5)

## 2022-03-12 PROCEDURE — 85027 COMPLETE CBC AUTOMATED: CPT

## 2022-03-12 PROCEDURE — 6370000000 HC RX 637 (ALT 250 FOR IP): Performed by: INTERNAL MEDICINE

## 2022-03-12 PROCEDURE — 6370000000 HC RX 637 (ALT 250 FOR IP): Performed by: HOSPITALIST

## 2022-03-12 PROCEDURE — 99285 EMERGENCY DEPT VISIT HI MDM: CPT

## 2022-03-12 PROCEDURE — 83735 ASSAY OF MAGNESIUM: CPT

## 2022-03-12 PROCEDURE — 83036 HEMOGLOBIN GLYCOSYLATED A1C: CPT

## 2022-03-12 PROCEDURE — 96372 THER/PROPH/DIAG INJ SC/IM: CPT

## 2022-03-12 PROCEDURE — 96366 THER/PROPH/DIAG IV INF ADDON: CPT

## 2022-03-12 PROCEDURE — 93010 ELECTROCARDIOGRAM REPORT: CPT | Performed by: INTERNAL MEDICINE

## 2022-03-12 PROCEDURE — 82962 GLUCOSE BLOOD TEST: CPT

## 2022-03-12 PROCEDURE — 1200000000 HC SEMI PRIVATE

## 2022-03-12 PROCEDURE — 84100 ASSAY OF PHOSPHORUS: CPT

## 2022-03-12 PROCEDURE — 80048 BASIC METABOLIC PNL TOTAL CA: CPT

## 2022-03-12 PROCEDURE — 84443 ASSAY THYROID STIM HORMONE: CPT

## 2022-03-12 RX ORDER — SODIUM CHLORIDE 9 MG/ML
INJECTION, SOLUTION INTRAVENOUS CONTINUOUS
Status: DISCONTINUED | OUTPATIENT
Start: 2022-03-12 | End: 2022-03-12 | Stop reason: ALTCHOICE

## 2022-03-12 RX ORDER — INSULIN GLARGINE 100 [IU]/ML
40 INJECTION, SOLUTION SUBCUTANEOUS NIGHTLY
Status: DISCONTINUED | OUTPATIENT
Start: 2022-03-12 | End: 2022-03-13

## 2022-03-12 RX ORDER — INSULIN GLARGINE 100 [IU]/ML
20 INJECTION, SOLUTION SUBCUTANEOUS ONCE
Status: COMPLETED | OUTPATIENT
Start: 2022-03-12 | End: 2022-03-12

## 2022-03-12 RX ADMIN — AMLODIPINE BESYLATE 10 MG: 5 TABLET ORAL at 20:53

## 2022-03-12 RX ADMIN — METOPROLOL SUCCINATE 50 MG: 50 TABLET, EXTENDED RELEASE ORAL at 18:59

## 2022-03-12 RX ADMIN — ATORVASTATIN CALCIUM 40 MG: 40 TABLET, FILM COATED ORAL at 20:53

## 2022-03-12 RX ADMIN — INSULIN GLARGINE 40 UNITS: 100 INJECTION, SOLUTION SUBCUTANEOUS at 20:53

## 2022-03-12 RX ADMIN — AMITRIPTYLINE HYDROCHLORIDE 25 MG: 25 TABLET, FILM COATED ORAL at 20:53

## 2022-03-12 RX ADMIN — GABAPENTIN 600 MG: 300 CAPSULE ORAL at 18:59

## 2022-03-12 RX ADMIN — BUSPIRONE HYDROCHLORIDE 10 MG: 5 TABLET ORAL at 18:59

## 2022-03-12 RX ADMIN — CLOPIDOGREL BISULFATE 75 MG: 75 TABLET ORAL at 18:59

## 2022-03-12 RX ADMIN — DONEPEZIL HYDROCHLORIDE 5 MG: 5 TABLET ORAL at 20:53

## 2022-03-12 RX ADMIN — BUSPIRONE HYDROCHLORIDE 10 MG: 5 TABLET ORAL at 20:53

## 2022-03-12 RX ADMIN — INSULIN GLARGINE 20 UNITS: 100 INJECTION, SOLUTION SUBCUTANEOUS at 06:39

## 2022-03-12 RX ADMIN — PANTOPRAZOLE SODIUM 40 MG: 40 TABLET, DELAYED RELEASE ORAL at 18:59

## 2022-03-12 RX ADMIN — SODIUM CHLORIDE: 9 INJECTION, SOLUTION INTRAVENOUS at 10:15

## 2022-03-12 RX ADMIN — TOPIRAMATE 50 MG: 25 TABLET, FILM COATED ORAL at 20:53

## 2022-03-12 RX ADMIN — GABAPENTIN 600 MG: 300 CAPSULE ORAL at 20:53

## 2022-03-12 ASSESSMENT — PAIN SCALES - GENERAL
PAINLEVEL_OUTOF10: 0
PAINLEVEL_OUTOF10: 0

## 2022-03-12 NOTE — PROGRESS NOTES
V2.0  [facility] Hospitalist Progress Note      Name:  Philippe Murillo /Age/Sex: 1960  (59 y.o. female)   MRN & CSN:  [id number] & [id number] Encounter Date/Time: 3/12/2022 1:35 PM EST    Location:  ED22/ED-22 PCP: Lauren Rodgers MD       Hospital Day: 2    Assessment and Plan:   Philippe Murillo is a 59 y.o. female with pmh of type II who presents with DKA, type 2, not at goal [facility])      Plan:  1. DKA: Endocrinology input appreciated AG gap closed adjust meds as needed  2: Hypertension: Continue outpatient regiment and titrate as needed  3: Abdominal pain: Patient is tolerating regular diet  4: Resting tremor: Neurontin adjusted on Topamax    Diet ADULT DIET; Regular; 4 carb choices (60 gm/meal)   DVT Prophylaxis [] Lovenox, []  Heparin, [] SCDs, [] Ambulation,  [] Eliquis, [] Xarelto   Code Status Prior   Disposition Patient requires continued admission due to   Surrogate Decision Maker/ POA      Subjective:     Chief Complaint: Hypertension (has not taken any meds since Wednesday morning ), Hyperglycemia, and Illness       Philippe Murillo is a 59 y.o. female who presents with abdominal pain and DKA         Review of Systems:    Review of Systems        Objective:   No intake or output data in the 24 hours ending 22 1335     Vitals:   Vitals:    22 1000   BP: (!) 141/103   Pulse: 75   Resp: 15   Temp:    SpO2: 100%       Physical Exam:     General: NAD  Eyes: EOMI  ENT: neck supple  Cardiovascular: Regular rate. Respiratory: Clear to auscultation  Gastrointestinal: Soft, non tender  Genitourinary: no suprapubic tenderness  Musculoskeletal: No edema  Skin: warm, dry  Neuro: Alert. Psych: Mood appropriate.      Medications:   Medications:    insulin glargine  40 Units SubCUTAneous Nightly    insulin lispro  10 Units SubCUTAneous TID WC    insulin lispro  0-12 Units SubCUTAneous TID WC    insulin lispro  0-6 Units SubCUTAneous Once every 5 days    clopidogrel  75 mg Oral Daily    donepezil  5 mg Oral Nightly    amLODIPine  10 mg Oral Nightly    atorvastatin  40 mg Oral Nightly    busPIRone  10 mg Oral TID    ferrous sulfate  650 mg Oral Daily with breakfast    metoprolol succinate  50 mg Oral Daily    pantoprazole  40 mg Oral BID AC    topiramate  50 mg Oral Nightly    gabapentin  600 mg Oral BID      Infusions:    sodium chloride 100 mL/hr at 03/12/22 1015    dextrose      sodium chloride Stopped (03/11/22 1913)    dextrose 5 % and 0.45 % NaCl 150 mL/hr at 03/11/22 2031    insulin Stopped (03/12/22 0829)     PRN Meds: glucose, 15 g, PRN  dextrose, 12.5 g, PRN  glucagon (rDNA), 1 mg, PRN  dextrose, 100 mL/hr, PRN  potassium chloride, 10 mEq, PRN  magnesium sulfate, 1,000 mg, PRN  sodium phosphate IVPB, 10 mmol, PRN   Or  sodium phosphate IVPB, 15 mmol, PRN   Or  sodium phosphate IVPB, 20 mmol, PRN  dextrose 5 % and 0.45 % NaCl, , Continuous PRN  dextrose bolus (hypoglycemia), 125 mL, PRN   Or  dextrose bolus (hypoglycemia), 250 mL, PRN  amitriptyline, 25 mg, Nightly PRN        Labs      Recent Results (from the past 24 hour(s))   POCT Glucose    Collection Time: 03/11/22  3:29 PM   Result Value Ref Range    Glucose 411 mg/dL    QC OK? y    POCT Glucose    Collection Time: 03/11/22  4:26 PM   Result Value Ref Range    POC Glucose 354 (H) 70 - 99 MG/DL   Troponin    Collection Time: 03/11/22  4:50 PM   Result Value Ref Range    Troponin T <0.010 <0.01 NG/ML   POCT Glucose    Collection Time: 03/11/22  5:41 PM   Result Value Ref Range    POC Glucose 251 (H) 70 - 99 MG/DL   Basic Metabolic Panel    Collection Time: 03/11/22  7:00 PM   Result Value Ref Range    Sodium 139 135 - 145 MMOL/L    Potassium 4.2 3.5 - 5.1 MMOL/L    Chloride 103 99 - 110 mMol/L    CO2 18 (L) 21 - 32 MMOL/L    Anion Gap 18 (H) 4 - 16    BUN 25 (H) 6 - 23 MG/DL    CREATININE 1.4 (H) 0.6 - 1.1 MG/DL    Glucose 214 (H) 70 - 99 MG/DL    Calcium 9.1 8.3 - 10.6 MG/DL    GFR Non- 38 (L) >60 mL/min/1.73m2    GFR  46 (L) >60 mL/min/1.73m2   Magnesium    Collection Time: 03/11/22  7:00 PM   Result Value Ref Range    Magnesium 1.7 (L) 1.8 - 2.4 mg/dl   Phosphorus    Collection Time: 03/11/22  7:00 PM   Result Value Ref Range    Phosphorus 2.7 2.5 - 4.9 MG/DL   POCT Glucose    Collection Time: 03/11/22  7:11 PM   Result Value Ref Range    POC Glucose 212 (H) 70 - 99 MG/DL   POCT Glucose    Collection Time: 03/11/22  7:16 PM   Result Value Ref Range    Glucose 212 mg/dL    QC OK? y    POCT Glucose    Collection Time: 03/11/22  8:15 PM   Result Value Ref Range    POC Glucose 157 (H) 70 - 99 MG/DL   POCT Glucose    Collection Time: 03/11/22  9:31 PM   Result Value Ref Range    POC Glucose 191 (H) 70 - 99 MG/DL   POCT Glucose    Collection Time: 03/11/22 10:45 PM   Result Value Ref Range    POC Glucose 227 (H) 70 - 99 MG/DL   Basic Metabolic Panel    Collection Time: 03/11/22 10:58 PM   Result Value Ref Range    Sodium 138 135 - 145 MMOL/L    Potassium 4.5 3.5 - 5.1 MMOL/L    Chloride 103 99 - 110 mMol/L    CO2 20 (L) 21 - 32 MMOL/L    Anion Gap 15 4 - 16    BUN 26 (H) 6 - 23 MG/DL    CREATININE 1.5 (H) 0.6 - 1.1 MG/DL    Glucose 242 (H) 70 - 99 MG/DL    Calcium 8.8 8.3 - 10.6 MG/DL    GFR Non-African American 35 (L) >60 mL/min/1.73m2    GFR  42 (L) >60 mL/min/1.73m2   Magnesium    Collection Time: 03/11/22 10:58 PM   Result Value Ref Range    Magnesium 1.7 (L) 1.8 - 2.4 mg/dl   Phosphorus    Collection Time: 03/11/22 10:58 PM   Result Value Ref Range    Phosphorus 3.4 2.5 - 4.9 MG/DL   POCT Glucose    Collection Time: 03/12/22 12:04 AM   Result Value Ref Range    POC Glucose 210 (H) 70 - 99 MG/DL   POCT Glucose    Collection Time: 03/12/22  1:25 AM   Result Value Ref Range    POC Glucose 146 (H) 70 - 99 MG/DL   POCT Glucose    Collection Time: 03/12/22  2:22 AM   Result Value Ref Range    POC Glucose 140 (H) 70 - 99 MG/DL   Basic Metabolic Panel    Collection Time: 03/12/22 2: 23 AM   Result Value Ref Range    Sodium 139 135 - 145 MMOL/L    Potassium 4.2 3.5 - 5.1 MMOL/L    Chloride 104 99 - 110 mMol/L    CO2 21 21 - 32 MMOL/L    Anion Gap 14 4 - 16    BUN 27 (H) 6 - 23 MG/DL    CREATININE 1.5 (H) 0.6 - 1.1 MG/DL    Glucose 163 (H) 70 - 99 MG/DL    Calcium 8.8 8.3 - 10.6 MG/DL    GFR Non-African American 35 (L) >60 mL/min/1.73m2    GFR  42 (L) >60 mL/min/1.73m2   Magnesium    Collection Time: 03/12/22  2:23 AM   Result Value Ref Range    Magnesium 1.8 1.8 - 2.4 mg/dl   Phosphorus    Collection Time: 03/12/22  2:23 AM   Result Value Ref Range    Phosphorus 2.6 2.5 - 4.9 MG/DL   TSH    Collection Time: 03/12/22  2:23 AM   Result Value Ref Range    TSH, High Sensitivity 2.580 0.270 - 4.20 uIu/ml   POCT Glucose    Collection Time: 03/12/22  3:29 AM   Result Value Ref Range    POC Glucose 164 (H) 70 - 99 MG/DL   POCT Glucose    Collection Time: 03/12/22  4:41 AM   Result Value Ref Range    POC Glucose 189 (H) 70 - 99 MG/DL   POCT Glucose    Collection Time: 03/12/22  5:32 AM   Result Value Ref Range    POC Glucose 189 (H) 70 - 99 MG/DL   POCT Glucose    Collection Time: 03/12/22  6:32 AM   Result Value Ref Range    POC Glucose 201 (H) 70 - 99 MG/DL   Basic Metabolic Panel    Collection Time: 03/12/22  6:35 AM   Result Value Ref Range    Sodium 138 135 - 145 MMOL/L    Potassium 4.2 3.5 - 5.1 MMOL/L    Chloride 104 99 - 110 mMol/L    CO2 22 21 - 32 MMOL/L    Anion Gap 12 4 - 16    BUN 28 (H) 6 - 23 MG/DL    CREATININE 1.7 (H) 0.6 - 1.1 MG/DL    Glucose 224 (H) 70 - 99 MG/DL    Calcium 9.1 8.3 - 10.6 MG/DL    GFR Non-African American 30 (L) >60 mL/min/1.73m2    GFR  37 (L) >60 mL/min/1.73m2   Magnesium    Collection Time: 03/12/22  6:35 AM   Result Value Ref Range    Magnesium 1.9 1.8 - 2.4 mg/dl   Phosphorus    Collection Time: 03/12/22  6:35 AM   Result Value Ref Range    Phosphorus 2.3 (L) 2.5 - 4.9 MG/DL   CBC    Collection Time: 03/12/22  6:35 AM   Result Peritoneum/Retroperitoneum: No abdominal aortic aneurysm. No adenopathy. No ascites. No free intraperitoneal air. Bones/Soft Tissues: No acute osseous abnormality. 1. No acute abnormality in the abdomen or pelvis on the noncontrast CT. XR CHEST PORTABLE    Result Date: 3/11/2022  EXAMINATION: ONE XRAY VIEW OF THE CHEST 3/11/2022 10:24 am COMPARISON: 09/23/2021 HISTORY: ORDERING SYSTEM PROVIDED HISTORY: dyspnea TECHNOLOGIST PROVIDED HISTORY: Reason for exam:->dyspnea Reason for Exam: dyspnea Initial encounter. FINDINGS: Orthopedic hardware is partially visualized in the left humerus. Diffuse osteopenia. No focal consolidation, pleural effusion or pneumothorax. The cardiomediastinal silhouette is stable. No overt pulmonary edema. The osseous structures are stable. No acute cardiopulmonary findings.        Electronically signed by Dequan Lang MD on 3/12/2022 at 1:35 PM

## 2022-03-12 NOTE — PROGRESS NOTES
Progress Note( Dr. Sharon Veras)  3/12/2022  Subjective:   Admit Date: 3/11/2022  PCP: Cristin Gonzalez MD    Admitted For :Nausea vomiting and diarrhea and was in DKA    Consulted For: Better control blood glucose    Interval History: Feels much better    Denies any chest pains,   Denies SOB . Mild nause but no r vomiting. No new bowel or bladder symptoms. No intake or output data in the 24 hours ending 03/12/22 1601    DATA    CBC: Recent Labs     03/11/22  1029 03/12/22  0635   WBC 9.1 9.9   HGB 12.4* 12.2*    365    CMP:  Recent Labs     03/11/22  1029 03/11/22  1900 03/11/22  2258 03/12/22  0223 03/12/22  0635      < > 138 139 138   K 5.1   < > 4.5 4.2 4.2   CL 99   < > 103 104 104   CO2 19*   < > 20* 21 22   BUN 21   < > 26* 27* 28*   CREATININE 1.3*   < > 1.5* 1.5* 1.7*   CALCIUM 9.2   < > 8.8 8.8 9.1   PROT 6.1*  --   --   --   --    LABALBU 3.5  --   --   --   --    BILITOT 0.6  --   --   --   --    ALKPHOS 128  --   --   --   --    AST 14*  --   --   --   --    ALT 24  --   --   --   --     < > = values in this interval not displayed. Lipids: No results found for: CHOL, HDL, TRIG  Glucose:  Recent Labs     03/12/22  0632 03/12/22  0951 03/12/22  1145   POCGLU 201* 258* 214*     [id number]:  Lab Results   Component Value Date    LABA1C 7.9 09/05/2020     High Sensitivity TSH:   Lab Results   Component Value Date    TSHHS 2.580 03/12/2022     Free T3: No results found for: FT3  Free T4:No results found for: T4FREE    XR CHEST PORTABLE   Final Result   No acute cardiopulmonary findings. CT ABDOMEN PELVIS WO CONTRAST Additional Contrast? None   Final Result   1. No acute abnormality in the abdomen or pelvis on the noncontrast CT.               Scheduled Medicines   Medications:    insulin glargine  40 Units SubCUTAneous Nightly    insulin lispro  10 Units SubCUTAneous TID WC    insulin lispro  0-12 Units SubCUTAneous TID WC    insulin lispro  0-6 Units SubCUTAneous Once every 5 days    clopidogrel  75 mg Oral Daily    donepezil  5 mg Oral Nightly    amLODIPine  10 mg Oral Nightly    atorvastatin  40 mg Oral Nightly    busPIRone  10 mg Oral TID    ferrous sulfate  650 mg Oral Daily with breakfast    metoprolol succinate  50 mg Oral Daily    pantoprazole  40 mg Oral BID AC    topiramate  50 mg Oral Nightly    gabapentin  600 mg Oral BID      Infusions:    dextrose      dextrose 5 % and 0.45 % NaCl 150 mL/hr at 03/11/22 2031         Objective:   Vitals: BP (!) 141/103   Pulse 75   Temp 98.2 °F (36.8 °C) (Oral)   Resp 15   Ht 5' 4\" (1.626 m)   Wt 255 lb (115.7 kg)   SpO2 100%   BMI 43.77 kg/m²   General appearance: alert and cooperative with exam  Neck: no JVD or bruit  Thyroid : Normal lobes   Lungs: Has Vesicular Breath sounds   Heart:  regular rate and rhythm  Abdomen: soft, non-tender; bowel sounds normal; no masses,  no organomegaly  Musculoskeletal: Normal  Extremities: extremities normal, , no edema  Neurologic:  Awake, alert, oriented to name, place and time. Cranial nerves II-XII are grossly intact. Motor is  intact. Sensory is neuropathy. ,  and gait is normal.    Assessment:     Patient Active Problem List:     Proximal humeral fracture     Asthma     Bereavement     Pain of both hip joints     Chest pain     Chronic abdominal pain     Chronic back pain     Chronic obstructive lung disease (HCC)     Corns and callosities     Depressive disorder     Type II diabetes mellitus, uncontrolled (HCC)     Diabetic polyneuropathy (HCC)     HTN (hypertension)     Hyperlipidemia     Lesion of liver     Menopausal symptom     Mixed hyperlipidemia     Onychomycosis     Obesity     Osteoarthrosis     Osteoporosis     Pain in both feet     Sleep apnea     TIA (transient ischemic attack)     Vitamin D deficiency     Wheezing     S/P ORIF (open reduction internal fixation) fracture     Diabetic gastroparesis (HCC)     Gastroesophageal reflux disease     Restless legs Ketoacidosis, diabetic, type 2, no coma ([facility] [address]     DKA, type 2, not at goal [facility])      Plan:     1. Reviewed POC blood glucose . Labs and X ray results   2. Reviewed Current Medicines   3. Discontinue IV insulin infusion drip  4. Start on meal/ Correction bolus Humalog/ Basal Lantus Insulin regime   5. Monitor Blood glucose frequently   6. Modified  the dose of Insulin/ other medicines as needed   7. Will follow     .      Brandon Rowe MD, MD

## 2022-03-12 NOTE — ED NOTES
Per Dr. Roel Macias okay to give oral meds. Pt refused all meds but her metoprolol d/t nausea. Offer to see about getting pt medication for nausea, but pt refused.       Jyoti Romero RN  03/11/22 9141

## 2022-03-13 LAB
ANION GAP SERPL CALCULATED.3IONS-SCNC: 16 MMOL/L (ref 4–16)
BUN BLDV-MCNC: 20 MG/DL (ref 6–23)
CALCIUM SERPL-MCNC: 9.1 MG/DL (ref 8.3–10.6)
CHLORIDE BLD-SCNC: 104 MMOL/L (ref 99–110)
CO2: 21 MMOL/L (ref 21–32)
CREAT SERPL-MCNC: 1.3 MG/DL (ref 0.6–1.1)
ESTIMATED AVERAGE GLUCOSE: 214 MG/DL
GFR AFRICAN AMERICAN: 50 ML/MIN/1.73M2
GFR NON-AFRICAN AMERICAN: 41 ML/MIN/1.73M2
GLUCOSE BLD-MCNC: 209 MG/DL (ref 70–99)
GLUCOSE BLD-MCNC: 229 MG/DL (ref 70–99)
GLUCOSE BLD-MCNC: 244 MG/DL (ref 70–99)
GLUCOSE BLD-MCNC: 257 MG/DL (ref 70–99)
GLUCOSE BLD-MCNC: 60 MG/DL (ref 70–99)
GLUCOSE BLD-MCNC: 77 MG/DL (ref 70–99)
HBA1C MFR BLD: 9.1 % (ref 4.2–6.3)
POTASSIUM SERPL-SCNC: 4.1 MMOL/L (ref 3.5–5.1)
SODIUM BLD-SCNC: 141 MMOL/L (ref 135–145)

## 2022-03-13 PROCEDURE — 80048 BASIC METABOLIC PNL TOTAL CA: CPT

## 2022-03-13 PROCEDURE — 6370000000 HC RX 637 (ALT 250 FOR IP): Performed by: HOSPITALIST

## 2022-03-13 PROCEDURE — 1200000000 HC SEMI PRIVATE

## 2022-03-13 PROCEDURE — 82962 GLUCOSE BLOOD TEST: CPT

## 2022-03-13 PROCEDURE — 36415 COLL VENOUS BLD VENIPUNCTURE: CPT

## 2022-03-13 PROCEDURE — 83735 ASSAY OF MAGNESIUM: CPT

## 2022-03-13 PROCEDURE — 94761 N-INVAS EAR/PLS OXIMETRY MLT: CPT

## 2022-03-13 PROCEDURE — 87040 BLOOD CULTURE FOR BACTERIA: CPT

## 2022-03-13 RX ORDER — INSULIN GLARGINE 100 [IU]/ML
30 INJECTION, SOLUTION SUBCUTANEOUS NIGHTLY
Status: DISCONTINUED | OUTPATIENT
Start: 2022-03-13 | End: 2022-03-14

## 2022-03-13 RX ADMIN — BUSPIRONE HYDROCHLORIDE 10 MG: 5 TABLET ORAL at 13:36

## 2022-03-13 RX ADMIN — GABAPENTIN 600 MG: 300 CAPSULE ORAL at 20:12

## 2022-03-13 RX ADMIN — BUSPIRONE HYDROCHLORIDE 10 MG: 5 TABLET ORAL at 06:11

## 2022-03-13 RX ADMIN — PANTOPRAZOLE SODIUM 40 MG: 40 TABLET, DELAYED RELEASE ORAL at 06:11

## 2022-03-13 RX ADMIN — AMITRIPTYLINE HYDROCHLORIDE 25 MG: 25 TABLET, FILM COATED ORAL at 20:12

## 2022-03-13 RX ADMIN — AMLODIPINE BESYLATE 10 MG: 5 TABLET ORAL at 20:12

## 2022-03-13 RX ADMIN — PANTOPRAZOLE SODIUM 40 MG: 40 TABLET, DELAYED RELEASE ORAL at 16:27

## 2022-03-13 RX ADMIN — CLOPIDOGREL BISULFATE 75 MG: 75 TABLET ORAL at 08:21

## 2022-03-13 RX ADMIN — TOPIRAMATE 50 MG: 25 TABLET, FILM COATED ORAL at 20:12

## 2022-03-13 RX ADMIN — BUSPIRONE HYDROCHLORIDE 10 MG: 5 TABLET ORAL at 20:12

## 2022-03-13 RX ADMIN — ATORVASTATIN CALCIUM 40 MG: 40 TABLET, FILM COATED ORAL at 20:12

## 2022-03-13 RX ADMIN — DONEPEZIL HYDROCHLORIDE 5 MG: 5 TABLET ORAL at 20:12

## 2022-03-13 RX ADMIN — GABAPENTIN 600 MG: 300 CAPSULE ORAL at 08:21

## 2022-03-13 RX ADMIN — METOPROLOL SUCCINATE 50 MG: 50 TABLET, EXTENDED RELEASE ORAL at 08:21

## 2022-03-13 ASSESSMENT — PAIN SCALES - GENERAL: PAINLEVEL_OUTOF10: 0

## 2022-03-13 NOTE — PROGRESS NOTES
Patient from home alone, unsteady on her feet. States feeling very weak since last Wednesday. High risk for falling, holds on to things as she walks so walker was given.  notified for pt/ot order to be placed.  CW

## 2022-03-13 NOTE — PROGRESS NOTES
insulin lispro  0-12 Units SubCUTAneous 4x Daily AC & HS    insulin lispro  10 Units SubCUTAneous TID WC    insulin lispro  0-6 Units SubCUTAneous Once every 5 days    clopidogrel  75 mg Oral Daily    donepezil  5 mg Oral Nightly    amLODIPine  10 mg Oral Nightly    atorvastatin  40 mg Oral Nightly    busPIRone  10 mg Oral TID    ferrous sulfate  650 mg Oral Daily with breakfast    metoprolol succinate  50 mg Oral Daily    pantoprazole  40 mg Oral BID AC    topiramate  50 mg Oral Nightly    gabapentin  600 mg Oral BID      Infusions:    dextrose      dextrose 5 % and 0.45 % NaCl 150 mL/hr at 03/11/22 2031     PRN Meds: glucose, 15 g, PRN  dextrose, 12.5 g, PRN  glucagon (rDNA), 1 mg, PRN  dextrose, 100 mL/hr, PRN  dextrose 5 % and 0.45 % NaCl, , Continuous PRN  dextrose bolus (hypoglycemia), 125 mL, PRN   Or  dextrose bolus (hypoglycemia), 250 mL, PRN  amitriptyline, 25 mg, Nightly PRN        Labs      Recent Results (from the past 24 hour(s))   POCT Glucose    Collection Time: 03/12/22 11:45 AM   Result Value Ref Range    POC Glucose 214 (H) 70 - 99 MG/DL   POCT Glucose    Collection Time: 03/12/22 11:46 AM   Result Value Ref Range    Glucose 214 mg/dL    QC OK?  yes    POCT Glucose    Collection Time: 03/12/22  4:13 PM   Result Value Ref Range    POC Glucose 290 (H) 70 - 99 MG/DL   POCT Glucose    Collection Time: 03/12/22  4:20 PM   Result Value Ref Range    Glucose 290 mg/dL    QC OK? y    POCT Glucose    Collection Time: 03/12/22  6:58 PM   Result Value Ref Range    POC Glucose 266 (H) 70 - 99 MG/DL   POCT Glucose    Collection Time: 03/12/22  8:51 PM   Result Value Ref Range    POC Glucose 300 (H) 70 - 99 MG/DL   POCT Glucose    Collection Time: 03/13/22  3:24 AM   Result Value Ref Range    POC Glucose 77 70 - 99 MG/DL   POCT Glucose    Collection Time: 03/13/22  6:09 AM   Result Value Ref Range    POC Glucose 60 (L) 70 - 99 MG/DL   Basic Metabolic Panel    Collection Time: 03/13/22 10:44 AM Result Value Ref Range    Sodium 141 135 - 145 MMOL/L    Potassium 4.1 3.5 - 5.1 MMOL/L    Chloride 104 99 - 110 mMol/L    CO2 21 21 - 32 MMOL/L    Anion Gap 16 4 - 16    BUN 20 6 - 23 MG/DL    CREATININE 1.3 (H) 0.6 - 1.1 MG/DL    Glucose 209 (H) 70 - 99 MG/DL    Calcium 9.1 8.3 - 10.6 MG/DL    GFR Non- 41 (L) >60 mL/min/1.73m2    GFR  50 (L) >60 mL/min/1.73m2   POCT Glucose    Collection Time: 03/13/22 11:07 AM   Result Value Ref Range    POC Glucose 244 (H) 70 - 99 MG/DL        Imaging/Diagnostics Last 24 Hours   XR CHEST PORTABLE    Result Date: 3/11/2022  EXAMINATION: ONE XRAY VIEW OF THE CHEST 3/11/2022 10:24 am COMPARISON: 09/23/2021 HISTORY: ORDERING SYSTEM PROVIDED HISTORY: dyspnea TECHNOLOGIST PROVIDED HISTORY: Reason for exam:->dyspnea Reason for Exam: dyspnea Initial encounter. FINDINGS: Orthopedic hardware is partially visualized in the left humerus. Diffuse osteopenia. No focal consolidation, pleural effusion or pneumothorax. The cardiomediastinal silhouette is stable. No overt pulmonary edema. The osseous structures are stable. No acute cardiopulmonary findings.        Electronically signed by Costella Bamberger, MD on 3/13/2022 at 12:13 PM

## 2022-03-13 NOTE — PROGRESS NOTES
Progress Note( Dr. Jonel Lizama)  3/13/2022  Subjective:   Admit Date: 3/11/2022  PCP: Pamella Wei MD    Admitted For :Nausea vomiting and diarrhea and was in DKA    Consulted For: Better control blood glucose    Interval History: Feels much better    Denies any chest pains,   Denies SOB . Mild nause but no r vomiting. But complains of diarrhea  Able to eat somewhat better  No new bowel or bladder symptoms. No intake or output data in the 24 hours ending 03/13/22 1553    DATA    CBC:   Recent Labs     03/11/22  1029 03/12/22  0635   WBC 9.1 9.9   HGB 12.4* 12.2*    365    CMP:  Recent Labs     03/11/22  1029 03/11/22  1900 03/12/22  0223 03/12/22  0635 03/13/22  1044      < > 139 138 141   K 5.1   < > 4.2 4.2 4.1   CL 99   < > 104 104 104   CO2 19*   < > 21 22 21   BUN 21   < > 27* 28* 20   CREATININE 1.3*   < > 1.5* 1.7* 1.3*   CALCIUM 9.2   < > 8.8 9.1 9.1   PROT 6.1*  --   --   --   --    LABALBU 3.5  --   --   --   --    BILITOT 0.6  --   --   --   --    ALKPHOS 128  --   --   --   --    AST 14*  --   --   --   --    ALT 24  --   --   --   --     < > = values in this interval not displayed. Lipids: No results found for: CHOL, HDL, TRIG  Glucose:  Recent Labs     03/13/22  0324 03/13/22  0609 03/13/22  1107   POCGLU 77 60* 244*     [id number]:  Lab Results   Component Value Date    LABA1C 9.1 03/12/2022     High Sensitivity TSH:   Lab Results   Component Value Date    TSHHS 2.580 03/12/2022     Free T3: No results found for: FT3  Free T4:No results found for: T4FREE    XR CHEST PORTABLE   Final Result   No acute cardiopulmonary findings. CT ABDOMEN PELVIS WO CONTRAST Additional Contrast? None   Final Result   1. No acute abnormality in the abdomen or pelvis on the noncontrast CT.               Scheduled Medicines   Medications:    insulin glargine  30 Units SubCUTAneous Nightly    insulin lispro  0-12 Units SubCUTAneous 4x Daily AC & HS    insulin lispro  10 Units SubCUTAneous TID WC    insulin lispro  0-6 Units SubCUTAneous Once every 5 days    clopidogrel  75 mg Oral Daily    donepezil  5 mg Oral Nightly    amLODIPine  10 mg Oral Nightly    atorvastatin  40 mg Oral Nightly    busPIRone  10 mg Oral TID    ferrous sulfate  650 mg Oral Daily with breakfast    metoprolol succinate  50 mg Oral Daily    pantoprazole  40 mg Oral BID AC    topiramate  50 mg Oral Nightly    gabapentin  600 mg Oral BID      Infusions:    dextrose      dextrose 5 % and 0.45 % NaCl 150 mL/hr at 03/11/22 2031         Objective:   Vitals: BP (!) 156/70   Pulse 64   Temp 97.4 °F (36.3 °C) (Oral)   Resp 15   Ht 5' 4\" (1.626 m)   Wt 252 lb 3.2 oz (114.4 kg)   SpO2 98%   BMI 43.29 kg/m²   General appearance: alert and cooperative with exam  Neck: no JVD or bruit  Thyroid : Normal lobes   Lungs: Has Vesicular Breath sounds   Heart:  regular rate and rhythm  Abdomen: soft, non-tender; bowel sounds normal; no masses,  no organomegaly  Musculoskeletal: Normal  Extremities: extremities normal, , no edema  Neurologic:  Awake, alert, oriented to name, place and time. Cranial nerves II-XII are grossly intact. Motor is  intact. Sensory is neuropathy. ,  and gait is normal.  Has some tremors of hands possibly due to Neurontin    Assessment:     Patient Active Problem List:     Proximal humeral fracture     Asthma     Bereavement     Pain of both hip joints     Chest pain     Chronic abdominal pain     Chronic back pain     Chronic obstructive lung disease (HCC)     Corns and callosities     Depressive disorder     Type II diabetes mellitus, uncontrolled (HCC)     Diabetic polyneuropathy (HCC)     HTN (hypertension)     Hyperlipidemia     Lesion of liver     Menopausal symptom     Mixed hyperlipidemia     Onychomycosis     Obesity     Osteoarthrosis     Osteoporosis     Pain in both feet     Sleep apnea     TIA (transient ischemic attack)     Vitamin D deficiency     Wheezing     S/P ORIF (open reduction internal fixation) fracture     Diabetic gastroparesis (HCC)     Gastroesophageal reflux disease     Restless legs     Ketoacidosis, diabetic, type 2, no coma (HCC)     DKA, type 2, not at goal [facility])      Plan:     1. Reviewed POC blood glucose . Labs and X ray results   2. Reviewed Current Medicines   3. on meal/ Correction bolus Humalog/ Basal Lantus Insulin regime   4. Monitor Blood glucose frequently   5. Modified  the dose of Insulin/ other medicines as needed   6. Will follow     .      Nilesh Stock MD, MD

## 2022-03-14 LAB
ANION GAP SERPL CALCULATED.3IONS-SCNC: 9 MMOL/L (ref 4–16)
BUN BLDV-MCNC: 19 MG/DL (ref 6–23)
CALCIUM SERPL-MCNC: 8.9 MG/DL (ref 8.3–10.6)
CHLORIDE BLD-SCNC: 105 MMOL/L (ref 99–110)
CO2: 24 MMOL/L (ref 21–32)
CREAT SERPL-MCNC: 1.1 MG/DL (ref 0.6–1.1)
GFR AFRICAN AMERICAN: >60 ML/MIN/1.73M2
GFR NON-AFRICAN AMERICAN: 50 ML/MIN/1.73M2
GLUCOSE BLD-MCNC: 148 MG/DL (ref 70–99)
GLUCOSE BLD-MCNC: 185 MG/DL (ref 70–99)
GLUCOSE BLD-MCNC: 227 MG/DL (ref 70–99)
GLUCOSE BLD-MCNC: 52 MG/DL (ref 70–99)
GLUCOSE BLD-MCNC: 56 MG/DL (ref 70–99)
GLUCOSE BLD-MCNC: 67 MG/DL (ref 70–99)
GLUCOSE BLD-MCNC: 71 MG/DL (ref 70–99)
GLUCOSE BLD-MCNC: 75 MG/DL (ref 70–99)
HCT VFR BLD CALC: 37.8 % (ref 37–47)
HEMOGLOBIN: 11.9 GM/DL (ref 12.5–16)
MCH RBC QN AUTO: 30.4 PG (ref 27–31)
MCHC RBC AUTO-ENTMCNC: 31.5 % (ref 32–36)
MCV RBC AUTO: 96.4 FL (ref 78–100)
PDW BLD-RTO: 12.7 % (ref 11.7–14.9)
PLATELET # BLD: 246 K/CU MM (ref 140–440)
PMV BLD AUTO: 10.1 FL (ref 7.5–11.1)
POTASSIUM SERPL-SCNC: 4 MMOL/L (ref 3.5–5.1)
RBC # BLD: 3.92 M/CU MM (ref 4.2–5.4)
SODIUM BLD-SCNC: 138 MMOL/L (ref 135–145)
WBC # BLD: 6.9 K/CU MM (ref 4–10.5)

## 2022-03-14 PROCEDURE — 85027 COMPLETE CBC AUTOMATED: CPT

## 2022-03-14 PROCEDURE — 94761 N-INVAS EAR/PLS OXIMETRY MLT: CPT

## 2022-03-14 PROCEDURE — 6370000000 HC RX 637 (ALT 250 FOR IP): Performed by: INTERNAL MEDICINE

## 2022-03-14 PROCEDURE — 6370000000 HC RX 637 (ALT 250 FOR IP): Performed by: HOSPITALIST

## 2022-03-14 PROCEDURE — 80048 BASIC METABOLIC PNL TOTAL CA: CPT

## 2022-03-14 PROCEDURE — 36415 COLL VENOUS BLD VENIPUNCTURE: CPT

## 2022-03-14 PROCEDURE — 1200000000 HC SEMI PRIVATE

## 2022-03-14 PROCEDURE — 6360000002 HC RX W HCPCS: Performed by: HOSPITALIST

## 2022-03-14 RX ORDER — INSULIN GLARGINE 100 [IU]/ML
20 INJECTION, SOLUTION SUBCUTANEOUS NIGHTLY
Status: DISCONTINUED | OUTPATIENT
Start: 2022-03-14 | End: 2022-03-15 | Stop reason: HOSPADM

## 2022-03-14 RX ORDER — DICYCLOMINE HYDROCHLORIDE 10 MG/1
10 CAPSULE ORAL
Status: DISCONTINUED | OUTPATIENT
Start: 2022-03-14 | End: 2022-03-15 | Stop reason: HOSPADM

## 2022-03-14 RX ADMIN — CLOPIDOGREL BISULFATE 75 MG: 75 TABLET ORAL at 08:22

## 2022-03-14 RX ADMIN — ATORVASTATIN CALCIUM 40 MG: 40 TABLET, FILM COATED ORAL at 20:29

## 2022-03-14 RX ADMIN — DICYCLOMINE HYDROCHLORIDE 10 MG: 10 CAPSULE ORAL at 17:26

## 2022-03-14 RX ADMIN — PANTOPRAZOLE SODIUM 40 MG: 40 TABLET, DELAYED RELEASE ORAL at 16:54

## 2022-03-14 RX ADMIN — INSULIN GLARGINE 30 UNITS: 100 INJECTION, SOLUTION SUBCUTANEOUS at 00:49

## 2022-03-14 RX ADMIN — TOPIRAMATE 50 MG: 25 TABLET, FILM COATED ORAL at 20:30

## 2022-03-14 RX ADMIN — METOPROLOL SUCCINATE 50 MG: 50 TABLET, EXTENDED RELEASE ORAL at 08:23

## 2022-03-14 RX ADMIN — DONEPEZIL HYDROCHLORIDE 5 MG: 5 TABLET ORAL at 20:30

## 2022-03-14 RX ADMIN — AMLODIPINE BESYLATE 10 MG: 5 TABLET ORAL at 20:30

## 2022-03-14 RX ADMIN — PANTOPRAZOLE SODIUM 40 MG: 40 TABLET, DELAYED RELEASE ORAL at 06:23

## 2022-03-14 RX ADMIN — ENOXAPARIN SODIUM 40 MG: 100 INJECTION SUBCUTANEOUS at 16:54

## 2022-03-14 RX ADMIN — BUSPIRONE HYDROCHLORIDE 10 MG: 5 TABLET ORAL at 20:29

## 2022-03-14 RX ADMIN — BUSPIRONE HYDROCHLORIDE 10 MG: 5 TABLET ORAL at 06:24

## 2022-03-14 RX ADMIN — GABAPENTIN 600 MG: 300 CAPSULE ORAL at 20:29

## 2022-03-14 RX ADMIN — GABAPENTIN 600 MG: 300 CAPSULE ORAL at 08:22

## 2022-03-14 RX ADMIN — AMITRIPTYLINE HYDROCHLORIDE 25 MG: 25 TABLET, FILM COATED ORAL at 20:30

## 2022-03-14 RX ADMIN — BUSPIRONE HYDROCHLORIDE 10 MG: 5 TABLET ORAL at 12:51

## 2022-03-14 ASSESSMENT — PAIN SCALES - GENERAL
PAINLEVEL_OUTOF10: 0
PAINLEVEL_OUTOF10: 0

## 2022-03-14 NOTE — CARE COORDINATION
CM into see pt to initiate a safe discharge plan. Cm introduced self and explained role of CM. Pt is kind, alert and oriented. Pt lives alone in a one floor condo. Pt is able to drive. DME includes a walker, cane and shower chr. Pt has one sister that is supportive as able. Pt has a CGM for blood sugars. Pt has a PCP. Pt has insurance and is able to obtain her medications. CM discussed discharge needs. Pt shared that she has been thinking in the future she made look into assisted living. Cm provided her with Senior resources including information for AA on Aging. Discharge plan is for pt to return home. Sister support if needed. PCP/insurance. CGM system. Senior resources if needed. CM provided card and encouraged to call for any needs or concern. CM is available if any needs arise.

## 2022-03-14 NOTE — PROGRESS NOTES
Hospitalist Progress Note    Patient:  Enrique Arvizu  Unit/Bed:3001/3001-A   YOB: 1960       MRN: [id number] Acct: [de-identified]  PCP: Scotty Riojas MD    Date of Admission: 3/11/2022  --------------------------    Chief Complaint:        Hospital Course:     Enrique Arvizu is a 59 y.o. female hospitalized on 3/11/2022   nausea, vomiting, diarrhea, generalized weakness, found to have diabetic ketoacidosis, hospitalized to ICU started on insulin infusion, IV fluid. Anion gap closed, patient transferred to the floor. .        Assessment/plan:        Uncontrolled diabetes mellitus type 1 with DKA  -DKA resolved  -Continue basal bolus insulin therapy per the recommendation from endocrinology, appreciate their assistance    Nausea, vomiting, diarrhea, likely secondary to acute gastroenteritis  -CT scan of the abdomen without acute finding  Awaiting stool analysis    Acute kidney injury  Improving, awaiting lab    . Essential hypertension  Holding ACE, monitor renal function  Continue amlodipine    Dyslipidemia  Continue statin    Anxiety disorder  Continue BuSpar    Chronic tremor  On Neurontin/Topamax      Insomnia  ? On Elavil as needed at night    Code Status: Full Code         DVT prophylaxis: Lovenox     Disposition: Expecting discharge in 1 to 2 days pending better blood sugar control, improvement of renal function. Improvement of gastroenteritis    I discussed my thought processes at length with patient/family and patient understood. Question and concerns  Addressed      Discussed with RN      ----------------      Subjective:     Patient seen and examined  Overnight events noted  RN and ancillary staff note reviewed    Still have loose stool, mild nausea, no abdominal pain, tolerating some food      Diet: ADULT DIET;  Regular; 4 carb choices (60 gm/meal)    OBJECTIVE     Exam:  BP (!) 129/57   Pulse 67   Temp 97.8 °F (36.6 °C) (Oral)   Resp 14   Ht 5' 4\" (1.626 m)   Wt 249 < > 8.8 9.1 9.1   PHOS 3.4  --  2.6 2.3*  --     < > = values in this interval not displayed. No results for input(s): AST, ALT, BILIDIR, BILITOT, ALKPHOS in the last 72 hours. No results for input(s): INR in the last 72 hours. No results for input(s): Bennetta Downy in the last 72 hours. Urinalysis:      Lab Results   Component Value Date    NITRU NEGATIVE 03/11/2022    WBCUA 31 03/11/2022    BACTERIA NEGATIVE 03/11/2022    RBCUA <1 03/11/2022    BLOODU SMALL 03/11/2022    SPECGRAV 1.025 03/11/2022       Radiology:  XR CHEST PORTABLE   Final Result   No acute cardiopulmonary findings. CT ABDOMEN PELVIS WO CONTRAST Additional Contrast? None   Final Result   1. No acute abnormality in the abdomen or pelvis on the noncontrast CT.                      Electronically signed by Ebony Franz MD on 3/14/2022 at 1:23 PM

## 2022-03-15 VITALS
TEMPERATURE: 98.2 F | WEIGHT: 250.5 LBS | HEIGHT: 64 IN | DIASTOLIC BLOOD PRESSURE: 59 MMHG | BODY MASS INDEX: 42.76 KG/M2 | HEART RATE: 65 BPM | SYSTOLIC BLOOD PRESSURE: 136 MMHG | OXYGEN SATURATION: 96 % | RESPIRATION RATE: 15 BRPM

## 2022-03-15 PROBLEM — E11.10 DKA, TYPE 2, NOT AT GOAL (HCC): Status: RESOLVED | Noted: 2022-03-11 | Resolved: 2022-03-15

## 2022-03-15 LAB
ALBUMIN SERPL-MCNC: 2.8 GM/DL (ref 3.4–5)
ANION GAP SERPL CALCULATED.3IONS-SCNC: 8 MMOL/L (ref 4–16)
BASOPHILS ABSOLUTE: 0 K/CU MM
BASOPHILS RELATIVE PERCENT: 0.4 % (ref 0–1)
BUN BLDV-MCNC: 18 MG/DL (ref 6–23)
CALCIUM SERPL-MCNC: 8.6 MG/DL (ref 8.3–10.6)
CHLORIDE BLD-SCNC: 105 MMOL/L (ref 99–110)
CO2: 23 MMOL/L (ref 21–32)
CREAT SERPL-MCNC: 1.2 MG/DL (ref 0.6–1.1)
DIFFERENTIAL TYPE: ABNORMAL
EOSINOPHILS ABSOLUTE: 0.2 K/CU MM
EOSINOPHILS RELATIVE PERCENT: 3.1 % (ref 0–3)
GFR AFRICAN AMERICAN: 55 ML/MIN/1.73M2
GFR NON-AFRICAN AMERICAN: 45 ML/MIN/1.73M2
GLUCOSE BLD-MCNC: 221 MG/DL (ref 70–99)
GLUCOSE BLD-MCNC: 234 MG/DL (ref 70–99)
GLUCOSE BLD-MCNC: 258 MG/DL (ref 70–99)
HCT VFR BLD CALC: 37.5 % (ref 37–47)
HEMOGLOBIN: 11.2 GM/DL (ref 12.5–16)
IMMATURE NEUTROPHIL %: 0.7 % (ref 0–0.43)
LYMPHOCYTES ABSOLUTE: 2.1 K/CU MM
LYMPHOCYTES RELATIVE PERCENT: 37.8 % (ref 24–44)
MCH RBC QN AUTO: 29.3 PG (ref 27–31)
MCHC RBC AUTO-ENTMCNC: 29.9 % (ref 32–36)
MCV RBC AUTO: 98.2 FL (ref 78–100)
MONOCYTES ABSOLUTE: 0.6 K/CU MM
MONOCYTES RELATIVE PERCENT: 10.4 % (ref 0–4)
NUCLEATED RBC %: 0 %
PDW BLD-RTO: 12.5 % (ref 11.7–14.9)
PHOSPHORUS: 3.6 MG/DL (ref 2.5–4.9)
PLATELET # BLD: 221 K/CU MM (ref 140–440)
PMV BLD AUTO: 10.2 FL (ref 7.5–11.1)
POTASSIUM SERPL-SCNC: 3.9 MMOL/L (ref 3.5–5.1)
RBC # BLD: 3.82 M/CU MM (ref 4.2–5.4)
SEGMENTED NEUTROPHILS ABSOLUTE COUNT: 2.6 K/CU MM
SEGMENTED NEUTROPHILS RELATIVE PERCENT: 47.6 % (ref 36–66)
SODIUM BLD-SCNC: 136 MMOL/L (ref 135–145)
TOTAL IMMATURE NEUTOROPHIL: 0.04 K/CU MM
TOTAL NUCLEATED RBC: 0 K/CU MM
WBC # BLD: 5.5 K/CU MM (ref 4–10.5)

## 2022-03-15 PROCEDURE — 6370000000 HC RX 637 (ALT 250 FOR IP): Performed by: HOSPITALIST

## 2022-03-15 PROCEDURE — 36415 COLL VENOUS BLD VENIPUNCTURE: CPT

## 2022-03-15 PROCEDURE — 94761 N-INVAS EAR/PLS OXIMETRY MLT: CPT

## 2022-03-15 PROCEDURE — 6360000002 HC RX W HCPCS: Performed by: HOSPITALIST

## 2022-03-15 PROCEDURE — 80069 RENAL FUNCTION PANEL: CPT

## 2022-03-15 PROCEDURE — 6370000000 HC RX 637 (ALT 250 FOR IP): Performed by: INTERNAL MEDICINE

## 2022-03-15 PROCEDURE — 85025 COMPLETE CBC W/AUTO DIFF WBC: CPT

## 2022-03-15 PROCEDURE — 82962 GLUCOSE BLOOD TEST: CPT

## 2022-03-15 RX ORDER — INSULIN GLARGINE 100 [IU]/ML
20 INJECTION, SOLUTION SUBCUTANEOUS NIGHTLY
Qty: 10 ML | Refills: 3 | Status: ON HOLD | OUTPATIENT
Start: 2022-03-15 | End: 2022-03-22 | Stop reason: HOSPADM

## 2022-03-15 RX ORDER — LISINOPRIL 40 MG/1
40 TABLET ORAL DAILY
Qty: 30 TABLET | Refills: 3
Start: 2022-03-16 | End: 2022-07-30

## 2022-03-15 RX ORDER — ACETAMINOPHEN 325 MG/1
650 TABLET ORAL ONCE
Status: COMPLETED | OUTPATIENT
Start: 2022-03-15 | End: 2022-03-15

## 2022-03-15 RX ADMIN — GABAPENTIN 600 MG: 300 CAPSULE ORAL at 08:23

## 2022-03-15 RX ADMIN — DICYCLOMINE HYDROCHLORIDE 10 MG: 10 CAPSULE ORAL at 06:28

## 2022-03-15 RX ADMIN — METOPROLOL SUCCINATE 50 MG: 50 TABLET, EXTENDED RELEASE ORAL at 08:23

## 2022-03-15 RX ADMIN — ENOXAPARIN SODIUM 40 MG: 100 INJECTION SUBCUTANEOUS at 08:22

## 2022-03-15 RX ADMIN — DICYCLOMINE HYDROCHLORIDE 10 MG: 10 CAPSULE ORAL at 12:38

## 2022-03-15 RX ADMIN — BUSPIRONE HYDROCHLORIDE 10 MG: 5 TABLET ORAL at 06:28

## 2022-03-15 RX ADMIN — CLOPIDOGREL BISULFATE 75 MG: 75 TABLET ORAL at 08:23

## 2022-03-15 RX ADMIN — FERROUS SULFATE TAB 325 MG (65 MG ELEMENTAL FE) 650 MG: 325 (65 FE) TAB at 08:23

## 2022-03-15 RX ADMIN — PANTOPRAZOLE SODIUM 40 MG: 40 TABLET, DELAYED RELEASE ORAL at 06:28

## 2022-03-15 RX ADMIN — ACETAMINOPHEN 650 MG: 325 TABLET ORAL at 01:28

## 2022-03-15 ASSESSMENT — PAIN SCALES - GENERAL: PAINLEVEL_OUTOF10: 6

## 2022-03-15 NOTE — PLAN OF CARE
Problem: Falls - Risk of:  Goal: Will remain free from falls  Description: Will remain free from falls  2/79/8461 [id number] by Sabas Delvalle RN  Outcome: Ongoing  3/14/2022 1140 by Cierra Peoples  Outcome: Ongoing  Goal: Absence of physical injury  Description: Absence of physical injury  8/49/6296 0663 by Sabas Delvalle RN  Outcome: Ongoing  3/14/2022 1140 by Cierra Peoples  Outcome: Ongoing

## 2022-03-15 NOTE — DISCHARGE SUMMARY
Discharge Summary           Name:  Abhishek Colin /Age/Sex: 1960  (59 y.o. female)   MRN & CSN:  [id number] & [id number] Admission Date/Time: 3/11/2022  9:49 AM   Attending:  Quoc River MD Discharging Physician: Quoc River MD     Hospital Course:   Abhishek Colin is a 59 y.o.  female  who presents with DKA, type 2, not at goal [facility]). Patient was initially admitted to the ICU with IV insulin and IV fluid endocrinology followed the patient as well. Patient's gap eventually closed in the ICU when she transition to subcutaneous insulin and transferred to the medical floor. There was noted question gastroenteritis given patient's persistent nausea vomiting however this was likely secondary to DKA and CT abdomen pelvis was unremarkable. On day of discharge her nausea and vomiting had resolved and did not have any diarrhea. Her MODESTO also resolved upon discharge. Patient progressed well throughout the course of her hospitalization was discharged with the same insulin regimen she was on in the hospital.  I provided the patient with follow-up with endocrinology and instructed her to follow-up with endocrinology within 1 to 2 weeks of discharge for further adjustments to her insulin regimen. DKA  Type I DM  - Continue scheduled Lantus and SSI at home  - f/u endocrinology at home    Nausea vomiting  -CT abdomen and pelvis unremarkable  -Likely secondary to above and resolved    MODESTO  -Creatinine about normal on day of discharge; resume ACE inhibitor tomorrow  -Follow-up PCP as outpatient    Hyperlipidemia  -Continue statin    Essential hypertension  -Continue amlodipine and ACE inhibitor tomorrow    Anxiety disorder  -Continue BuSpar    Chronic tremor  -Continue Neurontin/Topamax      The patient expressed appropriate understanding of and agreement with the discharge recommendations, medications, and plan.      Consults this admission:  IP CONSULT TO HOSPITALIST  IP CONSULT TO ENDOCRINOLOGY    Discharge Instruction:   Follow up appointments: Endocrinology  Primary care physician:  within 2 weeks    Diet:  diabetic diet   Activity: activity as tolerated  Disposition: Discharged to:   [x]Home, []HHC, []SNF, []Acute Rehab, []Hospice   Condition on discharge: Stable    Discharge Medications:        Medication List      CHANGE how you take these medications    atorvastatin 40 MG tablet  Commonly known as: LIPITOR  What changed: Another medication with the same name was removed. Continue taking this medication, and follow the directions you see here.     gabapentin 600 MG tablet  Commonly known as: NEURONTIN  What changed: Another medication with the same name was removed. Continue taking this medication, and follow the directions you see here.      insulin glargine 100 UNIT/ML injection vial  Commonly known as: LANTUS  Inject 20 Units into the skin nightly  What changed: how much to take     lisinopril 40 MG tablet  Commonly known as: PRINIVIL;ZESTRIL  Take 1 tablet by mouth daily  Start taking on: March 16, 2022  What changed:   · how much to take  · when to take this        CONTINUE taking these medications    alendronate 70 MG tablet  Commonly known as: FOSAMAX     amitriptyline 25 MG tablet  Commonly known as: ELAVIL     amLODIPine 10 MG tablet  Commonly known as: NORVASC     baclofen 10 MG tablet  Commonly known as: LIORESAL     busPIRone 10 MG tablet  Commonly known as: BUSPAR     Cholecalciferol 50 MCG (2000 UT) Caps     clopidogrel 75 MG tablet  Commonly known as: PLAVIX     dicyclomine 20 MG tablet  Commonly known as: BENTYL  Take 1 tablet by mouth 4 times daily (before meals and nightly)     donepezil 5 MG tablet  Commonly known as: ARICEPT     ferrous sulfate 325 (65 Fe) MG EC tablet  Commonly known as: FE TABS 325     insulin lispro 100 UNIT/ML injection vial  Commonly known as: HUMALOG  Check blood glucose three times daily before meals and at bedtime- for blood glucose <150- no insulin, 151-200=2, 201-250=4, 251-300=6, 301-350=8, 351-400=10, >400=12 units and call MD     lidocaine 4 % external patch  Place 1 patch onto the skin daily     metoprolol succinate 50 MG extended release tablet  Commonly known as: TOPROL XL  Take 1 tablet by mouth daily     pantoprazole 40 MG tablet  Commonly known as: PROTONIX  Take 1 tablet by mouth 2 times daily (before meals)     Topamax 50 MG tablet  Generic drug: topiramate        STOP taking these medications    DULoxetine 30 MG extended release capsule  Commonly known as: CYMBALTA     DULoxetine 60 MG extended release capsule  Commonly known as: CYMBALTA     FLUoxetine 40 MG capsule  Commonly known as: PROZAC           Where to Get Your Medications      These medications were sent to [address] [place] [address], [address] [facility]     Phone: [phone]   · insulin glargine 100 UNIT/ML injection vial     Information about where to get these medications is not yet available    Ask your nurse or doctor about these medications  · lisinopril 40 MG tablet         Objective Findings at Discharge:   BP (!) 136/59   Pulse 65   Temp 98.2 °F (36.8 °C) (Oral)   Resp 15   Ht 5' 4\" (1.626 m)   Wt 250 lb 8 oz (113.6 kg)   SpO2 96%   BMI 43.00 kg/m²            PHYSICAL EXAM   GEN Awake female, sitting upright in bed in no apparent distress. Appears given age. EYES Pupils are equally round. No scleral erythema, discharge, or conjunctivitis. HENT Mucous membranes are moist. Oral pharynx without exudates, no evidence of thrush. NECK Supple, no apparent thyromegaly or masses. RESP Clear to auscultation, no wheezes, rales or rhonchi. Symmetric chest movement while on room air. CARDIO/VASC S1/S2 auscultated. Regular rate without appreciable murmurs, rubs, or gallops.    GI Abdomen is soft without significant tenderness   No costovertebral angle tenderness  HEME/LYMPH No petechiae or ecchymoses. MSK No gross joint deformities. SKIN Normal coloration, warm, dry. NEURO Cranial nerves appear grossly intact, normal speech, no lateralizing weakness. PSYCH Awake, alert, oriented x 4. Affect appropriate. BMP/CBC  Recent Labs     03/13/22  1044 03/14/22  1527 03/15/22  0319    138 136   K 4.1 4.0 3.9    105 105   CO2 21 24 23   BUN 20 19 18   CREATININE 1.3* 1.1 1.2*   WBC  --  6.9 5.5   HCT  --  37.8 37.5   PLT  --  246 221       IMAGING:  CT A/P:  1.  No acute abnormality in the abdomen or pelvis on the noncontrast CT    Discharge Time of 35 minutes    Electronically signed by Denton De Souza MD on 3/15/2022 at 1:37 PM

## 2022-03-15 NOTE — PROGRESS NOTES
Occupational Therapy    Per therapy triage process, the OT referral has been discharged. Pt is ambulating independently in room w/o assistance of nursing and using personal RW.  Pt is discharging home w/ no therapy needs at this time    Justus SNYDER/L [id number]  11:10 AM,3/15/2022

## 2022-03-15 NOTE — PROGRESS NOTES
Outpatient Pharmacy Progress Note for Meds-to-Beds    Total number of Prescriptions Filled: 3  The following medications were dispensed to the patient during the discharge process:  Lantus insulin  Insulin pen needles  Alcohol prep wipes    Additional Documentation:  Patient picked-up the medication(s) in the OP Pharmacy      Thank you for letting us serve your patients.   [facility]    [address], [address] [facility]    Phone: [phone]    Fax: [phone]

## 2022-03-15 NOTE — PROGRESS NOTES
Progress Note( Dr. Destiny Keene)  3/14/2022  Subjective:   Admit Date: 3/11/2022  PCP: Lovely Banks MD    Admitted For :Nausea vomiting and diarrhea and was in DKA    Consulted For: Better control blood glucose    Interval History: Feels much better    Denies any chest pains,   Denies SOB . Mild nause but no r vomiting. But complains of diarrhea  Able to eat somewhat better  No new bowel or bladder symptoms. Intake/Output Summary (Last 24 hours) at 3/14/2022 2310  Last data filed at 3/14/2022 0728  Gross per 24 hour   Intake 240 ml   Output --   Net 240 ml       DATA    CBC:   Recent Labs     03/12/22  0635 03/14/22  1527   WBC 9.9 6.9   HGB 12.2* 11.9*    246    CMP:  Recent Labs     03/12/22  0635 03/13/22  1044 03/14/22  1527    141 138   K 4.2 4.1 4.0    104 105   CO2 22 21 24   BUN 28* 20 19   CREATININE 1.7* 1.3* 1.1   CALCIUM 9.1 9.1 8.9     Lipids: No results found for: CHOL, HDL, TRIG  Glucose:  Recent Labs     03/14/22 2003 03/14/22  2044 03/14/22 2058   POCGLU 56* 52* 67*     [id number]:  Lab Results   Component Value Date    LABA1C 9.1 03/12/2022     High Sensitivity TSH:   Lab Results   Component Value Date    TSHHS 2.580 03/12/2022     Free T3: No results found for: FT3  Free T4:No results found for: T4FREE    XR CHEST PORTABLE   Final Result   No acute cardiopulmonary findings. CT ABDOMEN PELVIS WO CONTRAST Additional Contrast? None   Final Result   1. No acute abnormality in the abdomen or pelvis on the noncontrast CT.               Scheduled Medicines   Medications:    insulin glargine  20 Units SubCUTAneous Nightly    enoxaparin  40 mg SubCUTAneous Daily    dicyclomine  10 mg Oral TID AC    insulin lispro  0-12 Units SubCUTAneous 4x Daily AC & HS    insulin lispro  10 Units SubCUTAneous TID WC    insulin lispro  0-6 Units SubCUTAneous Once every 5 days    clopidogrel  75 mg Oral Daily    donepezil  5 mg Oral Nightly    amLODIPine  10 mg Oral Nightly    atorvastatin  40 mg Oral Nightly    busPIRone  10 mg Oral TID    ferrous sulfate  650 mg Oral Daily with breakfast    metoprolol succinate  50 mg Oral Daily    pantoprazole  40 mg Oral BID AC    topiramate  50 mg Oral Nightly    gabapentin  600 mg Oral BID      Infusions:    dextrose      dextrose 5 % and 0.45 % NaCl 150 mL/hr at 03/11/22 2031         Objective:   Vitals: BP (!) 147/74   Pulse 63   Temp 98.1 °F (36.7 °C) (Oral)   Resp 16   Ht 5' 4\" (1.626 m)   Wt 249 lb 1.6 oz (113 kg)   SpO2 95%   BMI 42.76 kg/m²   General appearance: alert and cooperative with exam  Neck: no JVD or bruit  Thyroid : Normal lobes   Lungs: Has Vesicular Breath sounds   Heart:  regular rate and rhythm  Abdomen: soft, non-tender; bowel sounds normal; no masses,  no organomegaly  Musculoskeletal: Normal  Extremities: extremities normal, , no edema  Neurologic:  Awake, alert, oriented to name, place and time. Cranial nerves II-XII are grossly intact. Motor is  intact. Sensory is neuropathy. ,  and gait is normal.  Has some tremors of hands possibly due to Neurontin    Assessment:     Patient Active Problem List:     Proximal humeral fracture     Asthma     Bereavement     Pain of both hip joints     Chest pain     Chronic abdominal pain     Chronic back pain     Chronic obstructive lung disease (HCC)     Corns and callosities     Depressive disorder     Type II diabetes mellitus, uncontrolled (HCC)     Diabetic polyneuropathy (HCC)     HTN (hypertension)     Hyperlipidemia     Lesion of liver     Menopausal symptom     Mixed hyperlipidemia     Onychomycosis     Obesity     Osteoarthrosis     Osteoporosis     Pain in both feet     Sleep apnea     TIA (transient ischemic attack)     Vitamin D deficiency     Wheezing     S/P ORIF (open reduction internal fixation) fracture     Diabetic gastroparesis (HCC)     Gastroesophageal reflux disease     Restless legs     Ketoacidosis, diabetic, type 2, no coma ([facility] [address]     DKA, type 2, not at goal [facility])      Plan:     1. Reviewed POC blood glucose . Labs and X ray results   2. Reviewed Current Medicines   3. on meal/ Correction bolus Humalog/ Basal Lantus Insulin regime   4. Monitor Blood glucose frequently   5. Modified  the dose of Insulin/ other medicines as needed   6. Will follow     .      Emmett Stewart MD, MD

## 2022-03-16 NOTE — PROGRESS NOTES
Progress Note( Dr. Ralph Levi)  3/15/2022  Subjective:   Admit Date: 3/11/2022  PCP: Damir Timmons MD    Admitted For :Nausea vomiting and diarrhea and was in DKA    Consulted For: Better control blood glucose    Interval History: Feels much better    Denies any chest pains,   Denies SOB . Mild nause but no r vomiting. But complains of diarrhea  Able to eat somewhat better  No new bowel or bladder symptoms. No intake or output data in the 24 hours ending 03/15/22 2330    DATA    CBC:   Recent Labs     03/14/22  1527 03/15/22  0319   WBC 6.9 5.5   HGB 11.9* 11.2*    221    CMP:  Recent Labs     03/13/22  1044 03/14/22  1527 03/15/22  0319    138 136   K 4.1 4.0 3.9    105 105   CO2 21 24 23   BUN 20 19 18   CREATININE 1.3* 1.1 1.2*   CALCIUM 9.1 8.9 8.6   LABALBU  --   --  2.8*     Lipids: No results found for: CHOL, HDL, TRIG  Glucose:  Recent Labs     03/14/22  2058 03/15/22  0629 03/15/22  1240   POCGLU 67* 234* 221*     [id number]:  Lab Results   Component Value Date    LABA1C 9.1 03/12/2022     High Sensitivity TSH:   Lab Results   Component Value Date    TSHHS 2.580 03/12/2022     Free T3: No results found for: FT3  Free T4:No results found for: T4FREE    XR CHEST PORTABLE   Final Result   No acute cardiopulmonary findings. CT ABDOMEN PELVIS WO CONTRAST Additional Contrast? None   Final Result   1. No acute abnormality in the abdomen or pelvis on the noncontrast CT.               Scheduled Medicines   Medications:      Infusions:         Objective:   Vitals: BP (!) 136/59   Pulse 65   Temp 98.2 °F (36.8 °C) (Oral)   Resp 15   Ht 5' 4\" (1.626 m)   Wt 250 lb 8 oz (113.6 kg)   SpO2 96%   BMI 43.00 kg/m²   General appearance: alert and cooperative with exam  Neck: no JVD or bruit  Thyroid : Normal lobes   Lungs: Has Vesicular Breath sounds   Heart:  regular rate and rhythm  Abdomen: soft, non-tender; bowel sounds normal; no masses,  no organomegaly  Musculoskeletal:

## 2022-03-17 ENCOUNTER — HOSPITAL ENCOUNTER (INPATIENT)
Age: 62
LOS: 5 days | Discharge: HOME | DRG: 048 | End: 2022-03-22
Attending: INTERNAL MEDICINE | Admitting: INTERNAL MEDICINE
Payer: MEDICAID

## 2022-03-17 ENCOUNTER — APPOINTMENT (OUTPATIENT)
Dept: GENERAL RADIOLOGY | Age: 62
DRG: 048 | End: 2022-03-17
Payer: MEDICAID

## 2022-03-17 ENCOUNTER — APPOINTMENT (OUTPATIENT)
Dept: CT IMAGING | Age: 62
DRG: 048 | End: 2022-03-17
Payer: MEDICAID

## 2022-03-17 DIAGNOSIS — R53.83 FATIGUE, UNSPECIFIED TYPE: Primary | ICD-10-CM

## 2022-03-17 DIAGNOSIS — R26.2 DIFFICULTY IN WALKING: ICD-10-CM

## 2022-03-17 DIAGNOSIS — R29.6 FREQUENT FALLS: ICD-10-CM

## 2022-03-17 DIAGNOSIS — R79.89 ELEVATED SERUM CREATININE: ICD-10-CM

## 2022-03-17 LAB
ALBUMIN SERPL-MCNC: 3 GM/DL (ref 3.4–5)
ALP BLD-CCNC: 121 IU/L (ref 40–129)
ALT SERPL-CCNC: 18 U/L (ref 10–40)
ANION GAP SERPL CALCULATED.3IONS-SCNC: 11 MMOL/L (ref 4–16)
AST SERPL-CCNC: 14 IU/L (ref 15–37)
BASOPHILS ABSOLUTE: 0 K/CU MM
BASOPHILS RELATIVE PERCENT: 0.3 % (ref 0–1)
BILIRUB SERPL-MCNC: 0.3 MG/DL (ref 0–1)
BUN BLDV-MCNC: 34 MG/DL (ref 6–23)
CALCIUM SERPL-MCNC: 9 MG/DL (ref 8.3–10.6)
CHLORIDE BLD-SCNC: 103 MMOL/L (ref 99–110)
CO2: 21 MMOL/L (ref 21–32)
CREAT SERPL-MCNC: 1.9 MG/DL (ref 0.6–1.1)
DIFFERENTIAL TYPE: ABNORMAL
EKG ATRIAL RATE: 69 BPM
EKG DIAGNOSIS: NORMAL
EKG P AXIS: 24 DEGREES
EKG P-R INTERVAL: 138 MS
EKG Q-T INTERVAL: 428 MS
EKG QRS DURATION: 94 MS
EKG QTC CALCULATION (BAZETT): 458 MS
EKG R AXIS: -7 DEGREES
EKG T AXIS: 99 DEGREES
EKG VENTRICULAR RATE: 69 BPM
EOSINOPHILS ABSOLUTE: 0.1 K/CU MM
EOSINOPHILS RELATIVE PERCENT: 1.6 % (ref 0–3)
GFR AFRICAN AMERICAN: 32 ML/MIN/1.73M2
GFR NON-AFRICAN AMERICAN: 27 ML/MIN/1.73M2
GLUCOSE BLD-MCNC: 102 MG/DL (ref 70–99)
GLUCOSE BLD-MCNC: 176 MG/DL (ref 70–99)
GLUCOSE BLD-MCNC: 217 MG/DL (ref 70–99)
GLUCOSE BLD-MCNC: 252 MG/DL (ref 70–99)
HCT VFR BLD CALC: 37 % (ref 37–47)
HEMOGLOBIN: 10.9 GM/DL (ref 12.5–16)
IMMATURE NEUTROPHIL %: 0.4 % (ref 0–0.43)
LYMPHOCYTES ABSOLUTE: 1.8 K/CU MM
LYMPHOCYTES RELATIVE PERCENT: 23 % (ref 24–44)
MCH RBC QN AUTO: 29.8 PG (ref 27–31)
MCHC RBC AUTO-ENTMCNC: 29.5 % (ref 32–36)
MCV RBC AUTO: 101.1 FL (ref 78–100)
MONOCYTES ABSOLUTE: 0.6 K/CU MM
MONOCYTES RELATIVE PERCENT: 8.3 % (ref 0–4)
NUCLEATED RBC %: 0 %
PDW BLD-RTO: 12.4 % (ref 11.7–14.9)
PLATELET # BLD: 239 K/CU MM (ref 140–440)
PMV BLD AUTO: 10.4 FL (ref 7.5–11.1)
POTASSIUM SERPL-SCNC: 4.1 MMOL/L (ref 3.5–5.1)
PRO-BNP: 334.5 PG/ML
RBC # BLD: 3.66 M/CU MM (ref 4.2–5.4)
SARS-COV-2, NAAT: NOT DETECTED
SEGMENTED NEUTROPHILS ABSOLUTE COUNT: 5.1 K/CU MM
SEGMENTED NEUTROPHILS RELATIVE PERCENT: 66.4 % (ref 36–66)
SODIUM BLD-SCNC: 135 MMOL/L (ref 135–145)
SOURCE: NORMAL
TOTAL IMMATURE NEUTOROPHIL: 0.03 K/CU MM
TOTAL NUCLEATED RBC: 0 K/CU MM
TOTAL PROTEIN: 5.5 GM/DL (ref 6.4–8.2)
TROPONIN T: <0.01 NG/ML
WBC # BLD: 7.7 K/CU MM (ref 4–10.5)

## 2022-03-17 PROCEDURE — 2580000003 HC RX 258: Performed by: PHYSICIAN ASSISTANT

## 2022-03-17 PROCEDURE — 70450 CT HEAD/BRAIN W/O DYE: CPT

## 2022-03-17 PROCEDURE — 87635 SARS-COV-2 COVID-19 AMP PRB: CPT

## 2022-03-17 PROCEDURE — 6370000000 HC RX 637 (ALT 250 FOR IP): Performed by: INTERNAL MEDICINE

## 2022-03-17 PROCEDURE — 93010 ELECTROCARDIOGRAM REPORT: CPT | Performed by: INTERNAL MEDICINE

## 2022-03-17 PROCEDURE — 83880 ASSAY OF NATRIURETIC PEPTIDE: CPT

## 2022-03-17 PROCEDURE — 71045 X-RAY EXAM CHEST 1 VIEW: CPT

## 2022-03-17 PROCEDURE — G0378 HOSPITAL OBSERVATION PER HR: HCPCS

## 2022-03-17 PROCEDURE — 2580000003 HC RX 258: Performed by: INTERNAL MEDICINE

## 2022-03-17 PROCEDURE — 80053 COMPREHEN METABOLIC PANEL: CPT

## 2022-03-17 PROCEDURE — 73610 X-RAY EXAM OF ANKLE: CPT

## 2022-03-17 PROCEDURE — 84484 ASSAY OF TROPONIN QUANT: CPT

## 2022-03-17 PROCEDURE — 85025 COMPLETE CBC W/AUTO DIFF WBC: CPT

## 2022-03-17 PROCEDURE — 73630 X-RAY EXAM OF FOOT: CPT

## 2022-03-17 PROCEDURE — 96360 HYDRATION IV INFUSION INIT: CPT

## 2022-03-17 PROCEDURE — 93005 ELECTROCARDIOGRAM TRACING: CPT | Performed by: PHYSICIAN ASSISTANT

## 2022-03-17 PROCEDURE — 1200000000 HC SEMI PRIVATE

## 2022-03-17 PROCEDURE — 99284 EMERGENCY DEPT VISIT MOD MDM: CPT

## 2022-03-17 PROCEDURE — 82962 GLUCOSE BLOOD TEST: CPT

## 2022-03-17 RX ORDER — DONEPEZIL HYDROCHLORIDE 10 MG/1
10 TABLET, FILM COATED ORAL NIGHTLY
Status: DISCONTINUED | OUTPATIENT
Start: 2022-03-17 | End: 2022-03-22 | Stop reason: HOSPADM

## 2022-03-17 RX ORDER — VITAMIN B COMPLEX
2000 TABLET ORAL DAILY
Status: DISCONTINUED | OUTPATIENT
Start: 2022-03-18 | End: 2022-03-22 | Stop reason: HOSPADM

## 2022-03-17 RX ORDER — ONDANSETRON 4 MG/1
4 TABLET, ORALLY DISINTEGRATING ORAL EVERY 8 HOURS PRN
Status: DISCONTINUED | OUTPATIENT
Start: 2022-03-17 | End: 2022-03-22 | Stop reason: HOSPADM

## 2022-03-17 RX ORDER — 0.9 % SODIUM CHLORIDE 0.9 %
1000 INTRAVENOUS SOLUTION INTRAVENOUS ONCE
Status: DISCONTINUED | OUTPATIENT
Start: 2022-03-17 | End: 2022-03-17

## 2022-03-17 RX ORDER — GABAPENTIN 300 MG/1
600 CAPSULE ORAL 3 TIMES DAILY
Status: DISCONTINUED | OUTPATIENT
Start: 2022-03-17 | End: 2022-03-22 | Stop reason: HOSPADM

## 2022-03-17 RX ORDER — POLYETHYLENE GLYCOL 3350 17 G/17G
17 POWDER, FOR SOLUTION ORAL DAILY PRN
Status: DISCONTINUED | OUTPATIENT
Start: 2022-03-17 | End: 2022-03-22 | Stop reason: HOSPADM

## 2022-03-17 RX ORDER — BUSPIRONE HYDROCHLORIDE 10 MG/1
10 TABLET ORAL 3 TIMES DAILY
Status: DISCONTINUED | OUTPATIENT
Start: 2022-03-17 | End: 2022-03-22 | Stop reason: HOSPADM

## 2022-03-17 RX ORDER — SODIUM CHLORIDE 9 MG/ML
25 INJECTION, SOLUTION INTRAVENOUS PRN
Status: DISCONTINUED | OUTPATIENT
Start: 2022-03-17 | End: 2022-03-22 | Stop reason: HOSPADM

## 2022-03-17 RX ORDER — SODIUM CHLORIDE 0.9 % (FLUSH) 0.9 %
5-40 SYRINGE (ML) INJECTION PRN
Status: DISCONTINUED | OUTPATIENT
Start: 2022-03-17 | End: 2022-03-22 | Stop reason: HOSPADM

## 2022-03-17 RX ORDER — NICOTINE POLACRILEX 4 MG
15 LOZENGE BUCCAL PRN
Status: DISCONTINUED | OUTPATIENT
Start: 2022-03-17 | End: 2022-03-22 | Stop reason: HOSPADM

## 2022-03-17 RX ORDER — ACETAMINOPHEN 325 MG/1
650 TABLET ORAL EVERY 6 HOURS PRN
Status: DISCONTINUED | OUTPATIENT
Start: 2022-03-17 | End: 2022-03-22 | Stop reason: HOSPADM

## 2022-03-17 RX ORDER — BACLOFEN 10 MG/1
5 TABLET ORAL 3 TIMES DAILY
Status: DISCONTINUED | OUTPATIENT
Start: 2022-03-17 | End: 2022-03-22 | Stop reason: HOSPADM

## 2022-03-17 RX ORDER — SODIUM CHLORIDE 0.9 % (FLUSH) 0.9 %
5-40 SYRINGE (ML) INJECTION EVERY 12 HOURS SCHEDULED
Status: DISCONTINUED | OUTPATIENT
Start: 2022-03-17 | End: 2022-03-22 | Stop reason: HOSPADM

## 2022-03-17 RX ORDER — SODIUM CHLORIDE 9 MG/ML
INJECTION, SOLUTION INTRAVENOUS CONTINUOUS
Status: DISCONTINUED | OUTPATIENT
Start: 2022-03-17 | End: 2022-03-20

## 2022-03-17 RX ORDER — TOPIRAMATE 25 MG/1
50 TABLET ORAL NIGHTLY
Status: DISCONTINUED | OUTPATIENT
Start: 2022-03-17 | End: 2022-03-22 | Stop reason: HOSPADM

## 2022-03-17 RX ORDER — AMITRIPTYLINE HYDROCHLORIDE 25 MG/1
25 TABLET, FILM COATED ORAL NIGHTLY
Status: DISCONTINUED | OUTPATIENT
Start: 2022-03-17 | End: 2022-03-22 | Stop reason: HOSPADM

## 2022-03-17 RX ORDER — INSULIN GLARGINE 100 [IU]/ML
20 INJECTION, SOLUTION SUBCUTANEOUS NIGHTLY
Status: DISCONTINUED | OUTPATIENT
Start: 2022-03-17 | End: 2022-03-19

## 2022-03-17 RX ORDER — ACETAMINOPHEN 650 MG/1
650 SUPPOSITORY RECTAL EVERY 6 HOURS PRN
Status: DISCONTINUED | OUTPATIENT
Start: 2022-03-17 | End: 2022-03-22 | Stop reason: HOSPADM

## 2022-03-17 RX ORDER — 0.9 % SODIUM CHLORIDE 0.9 %
500 INTRAVENOUS SOLUTION INTRAVENOUS ONCE
Status: COMPLETED | OUTPATIENT
Start: 2022-03-17 | End: 2022-03-17

## 2022-03-17 RX ORDER — DEXTROSE MONOHYDRATE 25 G/50ML
12.5 INJECTION, SOLUTION INTRAVENOUS PRN
Status: DISCONTINUED | OUTPATIENT
Start: 2022-03-17 | End: 2022-03-17 | Stop reason: CLARIF

## 2022-03-17 RX ORDER — ATORVASTATIN CALCIUM 40 MG/1
40 TABLET, FILM COATED ORAL NIGHTLY
Status: DISCONTINUED | OUTPATIENT
Start: 2022-03-17 | End: 2022-03-22 | Stop reason: HOSPADM

## 2022-03-17 RX ORDER — PANTOPRAZOLE SODIUM 40 MG/1
40 TABLET, DELAYED RELEASE ORAL
Status: DISCONTINUED | OUTPATIENT
Start: 2022-03-18 | End: 2022-03-22 | Stop reason: HOSPADM

## 2022-03-17 RX ORDER — CLOPIDOGREL BISULFATE 75 MG/1
75 TABLET ORAL DAILY
Status: DISCONTINUED | OUTPATIENT
Start: 2022-03-18 | End: 2022-03-22 | Stop reason: HOSPADM

## 2022-03-17 RX ORDER — METOPROLOL SUCCINATE 50 MG/1
50 TABLET, EXTENDED RELEASE ORAL DAILY
Status: DISCONTINUED | OUTPATIENT
Start: 2022-03-18 | End: 2022-03-22 | Stop reason: HOSPADM

## 2022-03-17 RX ORDER — GABAPENTIN 300 MG/1
300 CAPSULE ORAL 3 TIMES DAILY
Status: DISCONTINUED | OUTPATIENT
Start: 2022-03-17 | End: 2022-03-17

## 2022-03-17 RX ORDER — DEXTROSE MONOHYDRATE 50 MG/ML
100 INJECTION, SOLUTION INTRAVENOUS PRN
Status: DISCONTINUED | OUTPATIENT
Start: 2022-03-17 | End: 2022-03-22 | Stop reason: HOSPADM

## 2022-03-17 RX ORDER — ONDANSETRON 2 MG/ML
4 INJECTION INTRAMUSCULAR; INTRAVENOUS EVERY 6 HOURS PRN
Status: DISCONTINUED | OUTPATIENT
Start: 2022-03-17 | End: 2022-03-22 | Stop reason: HOSPADM

## 2022-03-17 RX ADMIN — AMITRIPTYLINE HYDROCHLORIDE 25 MG: 25 TABLET, FILM COATED ORAL at 23:32

## 2022-03-17 RX ADMIN — SODIUM CHLORIDE: 9 INJECTION, SOLUTION INTRAVENOUS at 23:39

## 2022-03-17 RX ADMIN — TOPIRAMATE 50 MG: 25 TABLET, FILM COATED ORAL at 23:32

## 2022-03-17 RX ADMIN — INSULIN GLARGINE 20 UNITS: 100 INJECTION, SOLUTION SUBCUTANEOUS at 23:37

## 2022-03-17 RX ADMIN — SODIUM CHLORIDE 500 ML: 9 INJECTION, SOLUTION INTRAVENOUS at 18:07

## 2022-03-17 RX ADMIN — DONEPEZIL HYDROCHLORIDE 10 MG: 10 TABLET, FILM COATED ORAL at 23:31

## 2022-03-17 RX ADMIN — SODIUM CHLORIDE, PRESERVATIVE FREE 10 ML: 5 INJECTION INTRAVENOUS at 23:36

## 2022-03-17 RX ADMIN — ATORVASTATIN CALCIUM 40 MG: 40 TABLET, FILM COATED ORAL at 23:32

## 2022-03-17 RX ADMIN — BUSPIRONE HYDROCHLORIDE 10 MG: 10 TABLET ORAL at 23:32

## 2022-03-17 RX ADMIN — BACLOFEN 5 MG: 10 TABLET ORAL at 23:32

## 2022-03-17 RX ADMIN — GABAPENTIN 600 MG: 300 CAPSULE ORAL at 23:32

## 2022-03-17 ASSESSMENT — PAIN SCALES - GENERAL: PAINLEVEL_OUTOF10: 0
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
## 2022-03-17 NOTE — ED NOTES
Report given to to [facility] RN. Pt to be transported to floor.       Damien Jacob RN  03/17/22 1946

## 2022-03-17 NOTE — ED PROVIDER NOTES
EKG normal sinus rhythm, left ventricular hypertrophy with repolarization abnormality, rate of 69, NC interval 138, QRS duration 94, QT/QTc 428/450      Amy Bansal, DO  03/17/22 1441

## 2022-03-17 NOTE — H&P
History and Physical      Name:  Roger Butt /Age/Sex: 1960  (59 y.o. female)   MRN & CSN:  [id number] & [id number] Encounter Date/Time: 3/17/2022 7:23 PM EDT   Location:  [facility] PCP: Bela Lazaro MD       Hospital Day: 1    Assessment and Plan:     #. Repeated falls:  -pt denied any weakness, pain, tingling or numbness in LLE.  -has ankle fracture (self-reported), charcot foot and is in a boot. Has seen Dr Catherine Roque. -X-rays of the foot and ankle ordered.  -pt following with neurology- Dr Melvin Villa. Had work up done.  -consult placed for Dr Melvin Villa  -PT/OT evaluation. #.  Self-care deficit  -Patient reports she lives alone and is unable to take care of herself due to repeated falls. #.  MODESTO on CKD  -Creatinine was 1.2-3/15, 1.9 today.  -Continue IV fluids and repeat BMP. #.  Recent admission for DKA (3/11-3/15/2022). -Patient reports her blood sugars are still not controlled at home, random blood glucose ranges around 400, early morning blood sugar is around 70. #.  Hypertension  -Patient is on metoprolol, lisinopril.  -Continue metoprolol, hold lisinopril due to MODESTO on CKD. #.  Diabetes mellitus type 2, on long-term insulin  -Patient was discharged on Lantus 20 units nightly, Humalog sliding scale. -A1c-9.1-3/12/2022.  -Continue Lantus 20 units nightly, insulin sliding scale with hypoglycemia protocol. #.  Diabetic neuropathy  -Patient is on gabapentin 600 mg 4 times a day  -Decrease the dose to 300 mg 3 times daily due to MODESTO on CKD    #. COPD, not on home oxygen    #. Obstructive sleep apnea-noncompliant with CPAP    #. Chronic back pain  -Has seen neurosurgery-10/2021-documented lumbar stenosis with neurogenic claudication referred to physical therapy. Patient reported no improvement with PT.  -Patient is on gabapentin, tizanidine, baclofen  -Continue gabapentin, baclofen. #.  Depression/anxiety/insomnia  -Buspirone, amitriptyline    #.   History of TIA chills, chest pain, shortness of breath, denied any abdominal pain, denied any urinary complaints, denied any constipation or diarrhea. Is not complaining of pain anywhere. Lab work significant for BUN 34, creatinine 1.9, random glucose 252, troponin<0.010, hemoglobin 10.9. Rapid Covid negative. CT head-no acute abnormality. Patient received final cc NS bolus. Review of Systems: Need 10 Elements   10 point review of systems conducted and pertinent positives and negatives as per HPI. Objective:   No intake or output data in the 24 hours ending 03/17/22 1923   Vitals:   Vitals:    03/17/22 1535 03/17/22 1718   BP: (!) 143/61 (!) 112/48   Pulse: 69 69   Resp: 20 20   Temp:  97.8 °F (36.6 °C)   TempSrc:  Oral   SpO2: 100% 98%   Weight: 250 lb (113.4 kg)    Height: 5' 4\" (1.626 m)        Medications Prior to Admission   Reviewed medications with patient    Prior to Admission medications    Medication Sig Start Date End Date Taking?  Authorizing Provider   insulin glargine (LANTUS) 100 UNIT/ML injection vial Inject 20 Units into the skin nightly 3/15/22   Lesly Santos MD   lisinopril (PRINIVIL;ZESTRIL) 40 MG tablet Take 1 tablet by mouth daily 3/16/22   Lesly Santos MD   baclofen (LIORESAL) 10 MG tablet  9/14/21   Historical Provider, MD   donepezil (ARICEPT) 5 MG tablet  9/13/21   Historical Provider, MD   gabapentin (NEURONTIN) 600 MG tablet  9/13/21   Historical Provider, MD   busPIRone (BUSPAR) 10 MG tablet Take 1 tablet by mouth 3 times daily 3/27/21   Historical Provider, MD   Cholecalciferol 50 MCG (2000 UT) CAPS Take 1 tablet by mouth daily 3/13/21   Historical Provider, MD   amitriptyline (ELAVIL) 25 MG tablet Take by mouth    Historical Provider, MD   alendronate (FOSAMAX) 70 MG tablet Take 70 mg by mouth once a week    Historical Provider, MD   lidocaine 4 % external patch Place 1 patch onto the skin daily 9/16/20   Jt Bustamante MD   insulin lispro (HUMALOG) 100 UNIT/ML injection vial Check blood glucose three times daily before meals and at bedtime- for blood glucose <150- no insulin, 151-200=2, 201-250=4, 251-300=6, 301-350=8, 351-400=10, >400=12 units and call MD 9/15/20   Nehemiah Mendes MD   amLODIPine (NORVASC) 10 MG tablet amlodipine 10 mg tablet   TAKE ONE TABLET BY MOUTH DAILY    Historical Provider, MD   metoprolol succinate (TOPROL XL) 50 MG extended release tablet Take 1 tablet by mouth daily 1/25/19   Debora Gonzalez MD   dicyclomine (BENTYL) 20 MG tablet Take 1 tablet by mouth 4 times daily (before meals and nightly) 1/24/19   Debora Gonzalez MD   pantoprazole (PROTONIX) 40 MG tablet Take 1 tablet by mouth 2 times daily (before meals) 1/24/19   Debora Gonzalez MD   ferrous sulfate 325 (65 Fe) MG EC tablet Take 650 mg by mouth daily (with breakfast)    Historical Provider, MD   topiramate (TOPAMAX) 50 MG tablet Take 50 mg by mouth nightly     Historical Provider, MD   clopidogrel (PLAVIX) 75 MG tablet Take 75 mg by mouth daily    Historical Provider, MD   atorvastatin (LIPITOR) 40 MG tablet Take 40 mg by mouth nightly     Historical Provider, MD       Physical Exam: Need 8 Elements   Physical Exam     GEN  -Awake, alert, NAD.   EYES   -PERRL. HENT  -MM are moist.   RESP  -LS CTA equal bilat, no wheezes, rales or rhonchi. Symmetric chest movement. No respiratory distress noted. C/V  -S1/S2 auscultated. RRR without appreciable M/R/G. No peripheral edema. GI  -Abdomen is soft, non-distended, no significant tenderness. No masses or guarding. + BS in all quadrants. Rectal exam deferred.   -No CVA tenderness. Ramos catheter is not present. MS  -B/L extremities strong muscles strength. Full movements. No gross joint deformities. No swelling, intact sensation symmetrical.  Left lower extremity in a boot. SKIN  -Normal coloration, warm, dry. NEURO  - Awake, alert, oriented x 3, no focal deficits. PSYC  - Appropriate affect.        Past Medical History:   Reviewed patient's past medical, surgical, social, family history and allergies. PMHx   Past Medical History:   Diagnosis Date    Arthritis     Asthma     Cerebral artery occlusion with cerebral infarction [facility])     per old chart 8/2015- TIA    COPD (chronic obstructive pulmonary disease) [facility] [address]     Diabetes mellitus [facility] [address]     Diabetic neuropathy ([facility] [address]     per old chart    Fracture     per old chart pt in ER 12/9/2018- after 2 days of arm pain after fall- dx with left humerus fx- for surg 12/31/2018    Hyperlipidemia     Hypertension     Muscle weakness (generalized)     Osteoarthritis     per old chart     PSHX:  has a past surgical history that includes Foot surgery (Left, 2004?); knee surgery (Right, 2009?); Cholecystectomy (2002?); Lung surgery (2009?); eye surgery; Humerus fracture surgery (Left, 12/31/2018); and Breast biopsy (Right). Allergies: No Known Allergies  Fam HX: family history includes Breast Cancer (age of onset: 48) in her maternal aunt and maternal aunt.   Soc HX:   Social History     Socioeconomic History    Marital status:      Spouse name: Not on file    Number of children: Not on file    Years of education: Not on file    Highest education level: Not on file   Occupational History    Not on file   Tobacco Use    Smoking status: Never Smoker    Smokeless tobacco: Never Used    Tobacco comment: 12/27/*2018- with phone assessment with caregiver- unsure of social, surgical or family hx for phone assessment   Vaping Use    Vaping Use: Never used   Substance and Sexual Activity    Alcohol use: No    Drug use: No    Sexual activity: Not on file   Other Topics Concern    Not on file   Social History Narrative    Not on file     Social Determinants of Health     Financial Resource Strain:     Difficulty of Paying Living Expenses: Not on file   Food Insecurity:     Worried About [address] in the Last Year: Not on file    [address] in the Last Year: Not on [place] Needs:     Lack of Transportation (Medical): Not on file    Lack of Transportation (Non-Medical): Not on file   Physical Activity:     Days of Exercise per Week: Not on file    Minutes of Exercise per Session: Not on file   Stress:     Feeling of Stress : Not on file   Social Connections:     Frequency of Communication with Friends and Family: Not on file    Frequency of Social Gatherings with Friends and Family: Not on file    Attends Anabaptist Services: Not on file    Active Member of Clubs or Organizations: Not on file    Attends Club or Organization Meetings: Not on file    Marital Status: Not on file   Intimate Partner Violence:     Fear of Current or Ex-Partner: Not on file    Emotionally Abused: Not on file    Physically Abused: Not on file    Sexually Abused: Not on file   Housing Stability:     Unable to Pay for Housing in the Last Year: Not on file    Number of [place] in the Last Year: Not on file    Unstable Housing in the Last Year: Not on file       Medications:   Medications:    Infusions:   PRN Meds:     Labs      CBC:   Recent Labs     03/15/22  0319 03/17/22  1432   WBC 5.5 7.7   HGB 11.2* 10.9*    239     BMP:    Recent Labs     03/15/22  0319 03/17/22  1432    135   K 3.9 4.1    103   CO2 23 21   BUN 18 34*   CREATININE 1.2* 1.9*   GLUCOSE 258* 252*     Hepatic:   Recent Labs     03/17/22  1432   AST 14*   ALT 18   BILITOT 0.3   ALKPHOS 121     Lipids: No results found for: CHOL, HDL, TRIG  Hemoglobin A1C:   Lab Results   Component Value Date    LABA1C 9.1 03/12/2022     TSH: No results found for: TSH  Troponin:   Lab Results   Component Value Date    TROPONINT <0.010 03/17/2022    TROPONINT <0.010 03/11/2022    TROPONINT <0.010 03/11/2022     Lactic Acid: No results for input(s): LACTA in the last 72 hours.   BNP:   Recent Labs     03/17/22  1435   PROBNP 334.5*     UA:  Lab Results   Component Value Date    NITRU NEGATIVE 03/11/2022    COLORU YELLOW 03/11/2022    WBCUA 31 03/11/2022    RBCUA <1 03/11/2022    MUCUS RARE 03/11/2022    TRICHOMONAS NONE SEEN 09/05/2020    YEAST RARE 01/17/2019    BACTERIA NEGATIVE 03/11/2022    CLARITYU CLEAR 03/11/2022    SPECGRAV 1.025 03/11/2022    LEUKOCYTESUR NEGATIVE 03/11/2022    UROBILINOGEN 0.2 03/11/2022    BILIRUBINUR NEGATIVE 03/11/2022    BLOODU SMALL 03/11/2022    KETUA 40 03/11/2022     Urine Cultures: No results found for: Pickard Gins  Blood Cultures: No results found for: BC  No results found for: BLOODCULT2  Organism: No results found for: ORG    Imaging/Diagnostics Last 24 Hours   CT ABDOMEN PELVIS WO CONTRAST Additional Contrast? None    Result Date: 3/11/2022  EXAMINATION: CT OF THE ABDOMEN AND PELVIS WITHOUT CONTRAST 3/11/2022 10:36 am TECHNIQUE: CT of the abdomen and pelvis was performed without the administration of intravenous contrast. Multiplanar reformatted images are provided for review. Dose modulation, iterative reconstruction, and/or weight based adjustment of the mA/kV was utilized to reduce the radiation dose to as low as reasonably achievable. COMPARISON: 01/17/2019 HISTORY: ORDERING SYSTEM PROVIDED HISTORY: abd pain TECHNOLOGIST PROVIDED HISTORY: Reason for exam:->abd pain Additional Contrast?->None Decision Support Exception - unselect if not a suspected or confirmed emergency medical condition->Emergency Medical Condition (MA) Reason for Exam: ABD PAIN,N/V FINDINGS: Lower Chest:  Visualized portion of the lower chest demonstrates no acute abnormality. Organs: The unenhanced liver, spleen, pancreas, adrenal glands and kidneys demonstrate no acute abnormality. Status post cholecystectomy. GI/Bowel: Small hiatal hernia. Normal appendix. No acutely dilated or inflamed loops of bowel on the noncontrast CT. Pelvis: No pelvic mass, adenopathy, or fluid collection. Peritoneum/Retroperitoneum: No abdominal aortic aneurysm. No adenopathy. No ascites. No free intraperitoneal air.  Bones/Soft Tissues: No acute osseous abnormality. 1. No acute abnormality in the abdomen or pelvis on the noncontrast CT. CT HEAD WO CONTRAST    Result Date: 3/17/2022  EXAMINATION: CT OF THE HEAD WITHOUT CONTRAST  3/17/2022 2:49 pm TECHNIQUE: CT of the head was performed without the administration of intravenous contrast. Dose modulation, iterative reconstruction, and/or weight based adjustment of the mA/kV was utilized to reduce the radiation dose to as low as reasonably achievable. COMPARISON: MRI brain October 20, 2021; CT head September 23, 2021 HISTORY: ORDERING SYSTEM PROVIDED HISTORY: frequent falls on blood thinners TECHNOLOGIST PROVIDED HISTORY: Has a \"code stroke\" or \"stroke alert\" been called? ->No Reason for exam:->frequent falls on blood thinners Decision Support Exception - unselect if not a suspected or confirmed emergency medical condition->Emergency Medical Condition (MA) Reason for Exam: frequent falls on blood thinners Additional signs and symptoms: NONE Relevant Medical/Surgical History: NONE FINDINGS: BRAIN/VENTRICLES: There is no acute intracranial hemorrhage, mass effect or midline shift. No abnormal extra-axial fluid collection. The gray-white differentiation is maintained without evidence of an acute infarct. There is no evidence of hydrocephalus. Atherosclerotic change of the intracranial vasculature. There is mild periventricular and subcortical white matter hypoattenuation most consistent with microvascular ischemic changes. There is mild age appropriate global cerebral atrophy. ORBITS: The visualized portion of the orbits demonstrate no acute abnormality. SINUSES: The visualized paranasal sinuses and mastoid air cells demonstrate small air-fluid level pneumatized secretions of the right sphenoid sinus. Correlate for acute sinusitis. SOFT TISSUES/SKULL:  No acute abnormality of the visualized skull or soft tissues. No acute intracranial abnormality.  None microvascular ischemic changes and global cerebral atrophy. Small air-fluid level pneumatized secretions of the right sphenoid sinus. Correlate for acute sinusitis. XR CHEST PORTABLE    Result Date: 3/17/2022  EXAMINATION: ONE XRAY VIEW OF THE CHEST 3/17/2022 2:51 pm COMPARISON: March 11, 2022 HISTORY: ORDERING SYSTEM PROVIDED HISTORY: fatigue TECHNOLOGIST PROVIDED HISTORY: Reason for exam:->fatigue Reason for Exam: fatigue FINDINGS: The cardiomediastinal silhouette is stable. There are increased lung markings bilaterally, may be related to mild pulmonary vascular congestion versus bronchitis. There is no pleural effusion. There is no pneumothorax. There is no acute osseous abnormality. Increased lung markings bilaterally, may be related to mild pulmonary vascular congestion versus bronchitis. XR CHEST PORTABLE    Result Date: 3/11/2022  EXAMINATION: ONE XRAY VIEW OF THE CHEST 3/11/2022 10:24 am COMPARISON: 09/23/2021 HISTORY: ORDERING SYSTEM PROVIDED HISTORY: dyspnea TECHNOLOGIST PROVIDED HISTORY: Reason for exam:->dyspnea Reason for Exam: dyspnea Initial encounter. FINDINGS: Orthopedic hardware is partially visualized in the left humerus. Diffuse osteopenia. No focal consolidation, pleural effusion or pneumothorax. The cardiomediastinal silhouette is stable. No overt pulmonary edema. The osseous structures are stable. No acute cardiopulmonary findings. Personally reviewed Lab Studies, Imaging, and discussed case with ED provider.     Electronically signed by Severiano Arevalo MD on 3/17/2022 at 7:23 PM

## 2022-03-17 NOTE — ED NOTES
Patient reports her own blood glucose measurement was 66 mg/dl. The patient was given orange juice and a meal. The patient drank 8 oz of juice.      Roberto Warren RN  03/17/22 [id number]

## 2022-03-17 NOTE — ED PROVIDER NOTES
EMERGENCY DEPARTMENT ENCOUNTER      PCP: Flakito Clarke MD    [facility]    Chief Complaint   Patient presents with    Fatigue     Muscle jerking       This patient was not evaluated by the attending physician. I have independently evaluated this patient. HPI    Chris Alcantara is a 59 y.o. female who presents with increased fatigue since being discharged from the hospital this past Monday. Patient states she has been having increased falls for the past few months. Patient denies any new falls since being discharged. Patient states she does not feel she is able to care for herself at home. Patient denies chest pain, shortness breath, abdominal pain, extremity pain, headache, neck or back pain. Patient denies vomiting or diarrhea. Patient denies any urinary symptoms.       REVIEW OF SYSTEMS    Constitutional:  Denies fever   HENT:  Denies sore throat or ear pain   Cardiovascular:  Denies chest pain  Respiratory:  Denies cough or shortness of breath    GI:  Denies abdominal pain, vomiting, or diarrhea  :  Denies any urinary symptoms  Musculoskeletal:  Denies back pain  Skin:  Denies rash  Neurologic:  Denies headache, focal weakness or sensory changes   Lymphatic:  Denies swollen glands     All other review of systems are negative  See HPI and nursing notes for additional information     PAST MEDICAL AND SURGICAL HISTORY    Past Medical History:   Diagnosis Date    Arthritis     Asthma     Cerebral artery occlusion with cerebral infarction [facility])     per old chart 8/2015- TIA    COPD [facility]ch[facility] obstructive [facility] disease[facility] [address]     Diabetes mellitus ([facility] [address]     Diabetic neuropathy ([facility] [address]     per old chart    Fracture     per old chart pt in ER 12/9/2018- after 2 days of arm pain after fall- dx with left humerus fx- for surg 12/31/2018    Hyperlipidemia     Hypertension     Muscle weakness (generalized)     Osteoarthritis     per old chart     Past Surgical History:   Procedure Laterality Date    BREAST BIOPSY Right     CHOLECYSTECTOMY  2002?  EYE SURGERY      per old chart had right eye cataract ext done 2/2018 and left eye 4/2018    FOOT SURGERY Left 2004?  HUMERUS FRACTURE SURGERY Left 12/31/2018    HUMERUS OPEN REDUCTION INTERNAL FIXATION LEFT PROXIMAL performed by Gerri Hope DO at [facility] [place] [id number] Right 2009?     LUNG SURGERY  2009?    simone lung lobectomy        CURRENT MEDICATIONS    Current Outpatient Rx   Medication Sig Dispense Refill    insulin glargine (LANTUS) 100 UNIT/ML injection vial Inject 20 Units into the skin nightly 10 mL 3    lisinopril (PRINIVIL;ZESTRIL) 40 MG tablet Take 1 tablet by mouth daily 30 tablet 3    baclofen (LIORESAL) 10 MG tablet       donepezil (ARICEPT) 5 MG tablet       gabapentin (NEURONTIN) 600 MG tablet       busPIRone (BUSPAR) 10 MG tablet Take 1 tablet by mouth 3 times daily      Cholecalciferol 50 MCG (2000 UT) CAPS Take 1 tablet by mouth daily      amitriptyline (ELAVIL) 25 MG tablet Take by mouth      alendronate (FOSAMAX) 70 MG tablet Take 70 mg by mouth once a week      lidocaine 4 % external patch Place 1 patch onto the skin daily 1 box 1    insulin lispro (HUMALOG) 100 UNIT/ML injection vial Check blood glucose three times daily before meals and at bedtime- for blood glucose <150- no insulin, 151-200=2, 201-250=4, 251-300=6, 301-350=8, 351-400=10, >400=12 units and call MD 1 vial 3    amLODIPine (NORVASC) 10 MG tablet amlodipine 10 mg tablet   TAKE ONE TABLET BY MOUTH DAILY      metoprolol succinate (TOPROL XL) 50 MG extended release tablet Take 1 tablet by mouth daily 30 tablet 0    dicyclomine (BENTYL) 20 MG tablet Take 1 tablet by mouth 4 times daily (before meals and nightly) 120 tablet 0    pantoprazole (PROTONIX) 40 MG tablet Take 1 tablet by mouth 2 times daily (before meals) 60 tablet 0    ferrous sulfate 325 (65 Fe) MG EC tablet Take 650 mg by mouth daily (with breakfast)      topiramate (TOPAMAX) 50 MG tablet Take 50 mg by mouth nightly       clopidogrel (PLAVIX) 75 MG tablet Take 75 mg by mouth daily      atorvastatin (LIPITOR) 40 MG tablet Take 40 mg by mouth nightly          ALLERGIES    No Known Allergies    SOCIAL AND FAMILY HISTORY    Social History     Socioeconomic History    Marital status:      Spouse name: Not on file    Number of children: Not on file    Years of education: Not on file    Highest education level: Not on file   Occupational History    Not on file   Tobacco Use    Smoking status: Never Smoker    Smokeless tobacco: Never Used    Tobacco comment: 12/27/*2018- with phone assessment with caregiver- unsure of social, surgical or family hx for phone assessment   Vaping Use    Vaping Use: Never used   Substance and Sexual Activity    Alcohol use: No    Drug use: No    Sexual activity: Not on file   Other Topics Concern    Not on file   Social History Narrative    Not on file     Social Determinants of Health     Financial Resource Strain:     Difficulty of Paying Living Expenses: Not on file   Food Insecurity:     Worried About Running Out of Food in the Last Year: Not on file    Denita of Food in the Last Year: Not on file   Transportation Needs:     Lack of Transportation (Medical): Not on file    Lack of Transportation (Non-Medical):  Not on file   Physical Activity:     Days of Exercise per Week: Not on file    Minutes of Exercise per Session: Not on file   Stress:     Feeling of Stress : Not on file   Social Connections:     Frequency of Communication with Friends and Family: Not on file    Frequency of Social Gatherings with Friends and Family: Not on file    Attends Roman [employer] Services: Not on file    Active Member of Clubs or Organizations: Not on file    Attends Club or Organization Meetings: Not on file    Marital Status: Not on file   Intimate Partner Violence:     Fear of Current or Ex-Partner: Not on file    Emotionally Abused: 1.6 0 - 3 %    Basophils % 0.3 0 - 1 %    Segs Absolute 5.1 K/CU MM    Lymphocytes Absolute 1.8 K/CU MM    Monocytes Absolute 0.6 K/CU MM    Eosinophils Absolute 0.1 K/CU MM    Basophils Absolute 0.0 K/CU MM    Nucleated RBC % 0.0 %    Total Nucleated RBC 0.0 K/CU MM    Total Immature Neutrophil 0.03 K/CU MM    Immature Neutrophil % 0.4 0 - 0.43 %   Comprehensive Metabolic Panel   Result Value Ref Range    Sodium 135 135 - 145 MMOL/L    Potassium 4.1 3.5 - 5.1 MMOL/L    Chloride 103 99 - 110 mMol/L    CO2 21 21 - 32 MMOL/L    BUN 34 (H) 6 - 23 MG/DL    CREATININE 1.9 (H) 0.6 - 1.1 MG/DL    Glucose 252 (H) 70 - 99 MG/DL    Calcium 9.0 8.3 - 10.6 MG/DL    Albumin 3.0 (L) 3.4 - 5.0 GM/DL    Total Protein 5.5 (L) 6.4 - 8.2 GM/DL    Total Bilirubin 0.3 0.0 - 1.0 MG/DL    ALT 18 10 - 40 U/L    AST 14 (L) 15 - 37 IU/L    Alkaline Phosphatase 121 40 - 129 IU/L    GFR Non- 27 (L) >60 mL/min/1.73m2    GFR  32 (L) >60 mL/min/1.73m2    Anion Gap 11 4 - 16   Troponin   Result Value Ref Range    Troponin T <0.010 <0.01 NG/ML   Brain Natriuretic Peptide   Result Value Ref Range    Pro-.5 (H) <300 PG/ML   POCT Glucose   Result Value Ref Range    POC Glucose 217 (H) 70 - 99 MG/DL   POCT Glucose   Result Value Ref Range    POC Glucose 102 (H) 70 - 99 MG/DL   EKG 12 Lead   Result Value Ref Range    Ventricular Rate 69 BPM    Atrial Rate 69 BPM    P-R Interval 138 ms    QRS Duration 94 ms    Q-T Interval 428 ms    QTc Calculation (Bazett) 458 ms    P Axis 24 degrees    R Axis -7 degrees    T Axis 99 degrees    Diagnosis       Normal sinus rhythm  Left ventricular hypertrophy with repolarization abnormality  Abnormal ECG  When compared with ECG of 11-MAR-2022 10:03,  No significant change was found  Confirmed by MELINA Werner ([id number]) on 3/17/2022 7:37:51 PM             EKG      EKG Interpretation  Please see ED physician's note for EKG interpretation        RADIOLOGY    XR CHEST PORTABLE Final Result   Increased lung markings bilaterally, may be related to mild pulmonary   vascular congestion versus bronchitis. CT HEAD WO CONTRAST   Final Result   No acute intracranial abnormality. None microvascular ischemic changes and global cerebral atrophy. Small air-fluid level pneumatized secretions of the right sphenoid sinus. Correlate for acute sinusitis. ED COURSE & MEDICAL DECISION MAKING      Patient presents as above. See physician note for EKG reading. Rapid Covid negative. CBC shows hemoglobin 10.9 hematocrit of 37. CMP shows glucose of 252, BUN of 34 and creatinine 1.9. Patient provide IV fluids. Troponin negative. Chest x-ray shows lung markings bilaterally may be related to mild pulmonary vascular congestion versus bronchitis. CT head shows no acute intracranial abnormality,  small air-fluid level pneumatized secretions of the right sphenoid sinus correlate for acute sinusitis. Patient denies any cough or nasal congestion. Case management was consulted. I believe patient requires admission for elevated creatinine, difficulty ambulating and possible placement. Consult hospitalist accepts admission. Clinical  IMPRESSION    1. Fatigue, unspecified type    2. Frequent falls    3. Elevated serum creatinine    4. Difficulty in walking        Patient admitted    Comment: Please note this report has been produced using speech recognition software and may contain errors related to that system including errors in grammar, punctuation, and spelling, as well as words and phrases that may be inappropriate. If there are any questions or concerns please feel free to contact the dictating provider for clarification.             Fitz Guzman PA-C  03/17/22 2011

## 2022-03-17 NOTE — ED NOTES
The patient presents to the emergency department alert and oriented with a complaint of muscle jerking that causes her to fall for months. The patient denies any pain. The patient placed on the monitor with vital signs taken. Assessment as follows; Skin is pink, warm and dry. The patient does not want to go home because she feels that she will fall again. Analia Vasquez RN  03/17/22 [id number]

## 2022-03-18 ENCOUNTER — APPOINTMENT (OUTPATIENT)
Dept: MRI IMAGING | Age: 62
DRG: 048 | End: 2022-03-18
Payer: MEDICAID

## 2022-03-18 LAB
ANION GAP SERPL CALCULATED.3IONS-SCNC: 11 MMOL/L (ref 4–16)
BUN BLDV-MCNC: 24 MG/DL (ref 6–23)
CALCIUM SERPL-MCNC: 8.5 MG/DL (ref 8.3–10.6)
CHLORIDE BLD-SCNC: 108 MMOL/L (ref 99–110)
CO2: 23 MMOL/L (ref 21–32)
CREAT SERPL-MCNC: 1.3 MG/DL (ref 0.6–1.1)
CULTURE: NORMAL
CULTURE: NORMAL
GFR AFRICAN AMERICAN: 50 ML/MIN/1.73M2
GFR NON-AFRICAN AMERICAN: 41 ML/MIN/1.73M2
GLUCOSE BLD-MCNC: 210 MG/DL (ref 70–99)
GLUCOSE BLD-MCNC: 224 MG/DL (ref 70–99)
GLUCOSE BLD-MCNC: 269 MG/DL (ref 70–99)
GLUCOSE BLD-MCNC: 313 MG/DL (ref 70–99)
GLUCOSE BLD-MCNC: 380 MG/DL (ref 70–99)
Lab: NORMAL
Lab: NORMAL
POTASSIUM SERPL-SCNC: 4.4 MMOL/L (ref 3.5–5.1)
SODIUM BLD-SCNC: 142 MMOL/L (ref 135–145)
SPECIMEN: NORMAL
SPECIMEN: NORMAL

## 2022-03-18 PROCEDURE — 36415 COLL VENOUS BLD VENIPUNCTURE: CPT

## 2022-03-18 PROCEDURE — 97116 GAIT TRAINING THERAPY: CPT

## 2022-03-18 PROCEDURE — G0378 HOSPITAL OBSERVATION PER HR: HCPCS

## 2022-03-18 PROCEDURE — 1200000000 HC SEMI PRIVATE

## 2022-03-18 PROCEDURE — 82962 GLUCOSE BLOOD TEST: CPT

## 2022-03-18 PROCEDURE — 99223 1ST HOSP IP/OBS HIGH 75: CPT | Performed by: PSYCHIATRY & NEUROLOGY

## 2022-03-18 PROCEDURE — 96372 THER/PROPH/DIAG INJ SC/IM: CPT

## 2022-03-18 PROCEDURE — 97530 THERAPEUTIC ACTIVITIES: CPT

## 2022-03-18 PROCEDURE — 2580000003 HC RX 258: Performed by: INTERNAL MEDICINE

## 2022-03-18 PROCEDURE — 70551 MRI BRAIN STEM W/O DYE: CPT

## 2022-03-18 PROCEDURE — 6370000000 HC RX 637 (ALT 250 FOR IP): Performed by: INTERNAL MEDICINE

## 2022-03-18 PROCEDURE — 80048 BASIC METABOLIC PNL TOTAL CA: CPT

## 2022-03-18 PROCEDURE — 97112 NEUROMUSCULAR REEDUCATION: CPT

## 2022-03-18 PROCEDURE — 97162 PT EVAL MOD COMPLEX 30 MIN: CPT

## 2022-03-18 PROCEDURE — 72148 MRI LUMBAR SPINE W/O DYE: CPT

## 2022-03-18 PROCEDURE — 6360000002 HC RX W HCPCS: Performed by: INTERNAL MEDICINE

## 2022-03-18 RX ADMIN — Medication 2000 UNITS: at 10:01

## 2022-03-18 RX ADMIN — PANTOPRAZOLE SODIUM 40 MG: 40 TABLET, DELAYED RELEASE ORAL at 14:43

## 2022-03-18 RX ADMIN — BUSPIRONE HYDROCHLORIDE 10 MG: 10 TABLET ORAL at 14:43

## 2022-03-18 RX ADMIN — BUSPIRONE HYDROCHLORIDE 10 MG: 10 TABLET ORAL at 22:03

## 2022-03-18 RX ADMIN — ENOXAPARIN SODIUM 30 MG: 100 INJECTION SUBCUTANEOUS at 10:02

## 2022-03-18 RX ADMIN — INSULIN GLARGINE 20 UNITS: 100 INJECTION, SOLUTION SUBCUTANEOUS at 00:01

## 2022-03-18 RX ADMIN — BUSPIRONE HYDROCHLORIDE 10 MG: 10 TABLET ORAL at 10:01

## 2022-03-18 RX ADMIN — PANTOPRAZOLE SODIUM 40 MG: 40 TABLET, DELAYED RELEASE ORAL at 06:30

## 2022-03-18 RX ADMIN — TOPIRAMATE 50 MG: 25 TABLET, FILM COATED ORAL at 22:05

## 2022-03-18 RX ADMIN — DONEPEZIL HYDROCHLORIDE 10 MG: 10 TABLET, FILM COATED ORAL at 22:03

## 2022-03-18 RX ADMIN — BACLOFEN 5 MG: 10 TABLET ORAL at 14:43

## 2022-03-18 RX ADMIN — BACLOFEN 5 MG: 10 TABLET ORAL at 22:03

## 2022-03-18 RX ADMIN — GABAPENTIN 600 MG: 300 CAPSULE ORAL at 06:30

## 2022-03-18 RX ADMIN — SODIUM CHLORIDE: 9 INJECTION, SOLUTION INTRAVENOUS at 10:02

## 2022-03-18 RX ADMIN — SODIUM CHLORIDE: 9 INJECTION, SOLUTION INTRAVENOUS at 22:06

## 2022-03-18 RX ADMIN — CLOPIDOGREL BISULFATE 75 MG: 75 TABLET ORAL at 10:01

## 2022-03-18 RX ADMIN — AMITRIPTYLINE HYDROCHLORIDE 25 MG: 25 TABLET, FILM COATED ORAL at 22:03

## 2022-03-18 RX ADMIN — ATORVASTATIN CALCIUM 40 MG: 40 TABLET, FILM COATED ORAL at 22:03

## 2022-03-18 RX ADMIN — METOPROLOL SUCCINATE 50 MG: 50 TABLET, EXTENDED RELEASE ORAL at 10:01

## 2022-03-18 RX ADMIN — GABAPENTIN 600 MG: 300 CAPSULE ORAL at 14:43

## 2022-03-18 RX ADMIN — BACLOFEN 5 MG: 10 TABLET ORAL at 10:01

## 2022-03-18 RX ADMIN — GABAPENTIN 600 MG: 300 CAPSULE ORAL at 22:05

## 2022-03-18 ASSESSMENT — PAIN SCALES - GENERAL: PAINLEVEL_OUTOF10: 0

## 2022-03-18 NOTE — PROGRESS NOTES
0222  Gross per 24 hour   Intake 1 ml   Output 400 ml   Net -399 ml        Vitals:   Vitals:    03/18/22 1525   BP: (!) 143/46   Pulse: 70   Resp: 14   Temp: 97.9 °F (36.6 °C)   SpO2: 97%       Physical Exam:     General: NAD  Eyes: EOMI  ENT: neck supple  Cardiovascular: Regular rate. Respiratory: Clear to auscultation  Gastrointestinal: Soft, non tender  Genitourinary: no suprapubic tenderness  Musculoskeletal: No edema, has a brace on the left LE  Skin: warm, dry  Neuro: Alert. Psych: Mood appropriate.      Medications:   Medications:    sodium chloride flush  5-40 mL IntraVENous 2 times per day    enoxaparin  30 mg SubCUTAneous Daily    atorvastatin  40 mg Oral Nightly    baclofen  5 mg Oral TID    amitriptyline  25 mg Oral Nightly    busPIRone  10 mg Oral TID    Vitamin D  2,000 Units Oral Daily    clopidogrel  75 mg Oral Daily    donepezil  10 mg Oral Nightly    insulin glargine  20 Units SubCUTAneous Nightly    metoprolol succinate  50 mg Oral Daily    pantoprazole  40 mg Oral BID AC    topiramate  50 mg Oral Nightly    insulin lispro  0-12 Units SubCUTAneous TID WC    insulin lispro  0-6 Units SubCUTAneous Nightly    gabapentin  600 mg Oral TID      Infusions:    sodium chloride      sodium chloride 100 mL/hr at 03/18/22 1002    dextrose       PRN Meds: sodium chloride flush, 5-40 mL, PRN  sodium chloride, 25 mL, PRN  ondansetron, 4 mg, Q8H PRN   Or  ondansetron, 4 mg, Q6H PRN  polyethylene glycol, 17 g, Daily PRN  acetaminophen, 650 mg, Q6H PRN   Or  acetaminophen, 650 mg, Q6H PRN  glucose, 15 g, PRN  glucagon (rDNA), 1 mg, PRN  dextrose, 100 mL/hr, PRN  dextrose bolus (hypoglycemia), 125 mL, PRN   Or  dextrose bolus (hypoglycemia), 250 mL, PRN        Labs      Recent Results (from the past 24 hour(s))   POCT Glucose    Collection Time: 03/17/22  9:14 PM   Result Value Ref Range    POC Glucose 176 (H) 70 - 99 MG/DL   Basic Metabolic Panel w/ Reflex to MG    Collection Time: 03/18/22 6: 17 AM   Result Value Ref Range    Sodium 142 135 - 145 MMOL/L    Potassium 4.4 3.5 - 5.1 MMOL/L    Chloride 108 99 - 110 mMol/L    CO2 23 21 - 32 MMOL/L    Anion Gap 11 4 - 16    BUN 24 (H) 6 - 23 MG/DL    CREATININE 1.3 (H) 0.6 - 1.1 MG/DL    Glucose 313 (H) 70 - 99 MG/DL    Calcium 8.5 8.3 - 10.6 MG/DL    GFR Non- 41 (L) >60 mL/min/1.73m2    GFR  50 (L) >60 mL/min/1.73m2   POCT Glucose    Collection Time: 03/18/22  6:28 AM   Result Value Ref Range    POC Glucose 269 (H) 70 - 99 MG/DL   POCT Glucose    Collection Time: 03/18/22 10:58 AM   Result Value Ref Range    POC Glucose 380 (H) 70 - 99 MG/DL   POCT Glucose    Collection Time: 03/18/22  4:08 PM   Result Value Ref Range    POC Glucose 224 (H) 70 - 99 MG/DL        Imaging/Diagnostics Last 24 Hours   XR ANKLE LEFT (MIN 3 VIEWS)    Result Date: 3/17/2022  EXAMINATION: THREE XRAY VIEWS OF THE LEFT ANKLE; THREE XRAY VIEWS OF THE LEFT FOOT 3/17/2022 9:12 pm COMPARISON: None. HISTORY: ORDERING SYSTEM PROVIDED HISTORY: ankle fracture as per pt TECHNOLOGIST PROVIDED HISTORY: Reason for exam:->ankle fracture as per pt Reason for Exam: LT ANKLE PAIN FINDINGS: There is no acute fracture or dislocation at the left ankle. The ankle mortise and talar dome are intact. Some small ossifications at the tip of the lateral malleolus may relate to subacute to old fracture. There is soft tissue swelling around the ankle. Fixation screw within the 5th metatarsal appears intact. The chronic appearing deformity of the proximal and distal 5th metatarsal but no acute fracture lines are identified. The bones of the foot are osteopenic. There is no dislocation of the tarsal metatarsal junction. Prominent arthritic changes are present at the 1st metatarsophalangeal joint with flattening of the distal metatarsal head. Flattening of the head of the 2nd metatarsal as well but chronic. Moderate plantar calcaneal spur formation is present.  Edema of the heel is suggested but no soft tissue emphysema. No acute fracture lines the main portion of the left ankle and no dislocation. The small ossifications at the tip of the lateral malleolus could relate to subacute or old injury. No acute fractures are seen at the foot but does have a long fixation screw at the 5th metatarsal.  Chronic appearing deformities of the 5th metatarsal. Severe arthritic and or combined posttraumatic changes at the 1st metatarsophalangeal joint with flattening of the metatarsal head. XR FOOT LEFT (MIN 3 VIEWS)    Result Date: 3/17/2022  EXAMINATION: THREE XRAY VIEWS OF THE LEFT ANKLE; THREE XRAY VIEWS OF THE LEFT FOOT 3/17/2022 9:12 pm COMPARISON: None. HISTORY: ORDERING SYSTEM PROVIDED HISTORY: ankle fracture as per pt TECHNOLOGIST PROVIDED HISTORY: Reason for exam:->ankle fracture as per pt Reason for Exam: LT ANKLE PAIN FINDINGS: There is no acute fracture or dislocation at the left ankle. The ankle mortise and talar dome are intact. Some small ossifications at the tip of the lateral malleolus may relate to subacute to old fracture. There is soft tissue swelling around the ankle. Fixation screw within the 5th metatarsal appears intact. The chronic appearing deformity of the proximal and distal 5th metatarsal but no acute fracture lines are identified. The bones of the foot are osteopenic. There is no dislocation of the tarsal metatarsal junction. Prominent arthritic changes are present at the 1st metatarsophalangeal joint with flattening of the distal metatarsal head. Flattening of the head of the 2nd metatarsal as well but chronic. Moderate plantar calcaneal spur formation is present. Edema of the heel is suggested but no soft tissue emphysema. No acute fracture lines the main portion of the left ankle and no dislocation. The small ossifications at the tip of the lateral malleolus could relate to subacute or old injury.  No acute fractures are seen at the foot but does have a long fixation screw at the 5th metatarsal.  Chronic appearing deformities of the 5th metatarsal. Severe arthritic and or combined posttraumatic changes at the 1st metatarsophalangeal joint with flattening of the metatarsal head. CT HEAD WO CONTRAST    Result Date: 3/17/2022  EXAMINATION: CT OF THE HEAD WITHOUT CONTRAST  3/17/2022 2:49 pm TECHNIQUE: CT of the head was performed without the administration of intravenous contrast. Dose modulation, iterative reconstruction, and/or weight based adjustment of the mA/kV was utilized to reduce the radiation dose to as low as reasonably achievable. COMPARISON: MRI brain October 20, 2021; CT head September 23, 2021 HISTORY: ORDERING SYSTEM PROVIDED HISTORY: frequent falls on blood thinners TECHNOLOGIST PROVIDED HISTORY: Has a \"code stroke\" or \"stroke alert\" been called? ->No Reason for exam:->frequent falls on blood thinners Decision Support Exception - unselect if not a suspected or confirmed emergency medical condition->Emergency Medical Condition (MA) Reason for Exam: frequent falls on blood thinners Additional signs and symptoms: NONE Relevant Medical/Surgical History: NONE FINDINGS: BRAIN/VENTRICLES: There is no acute intracranial hemorrhage, mass effect or midline shift. No abnormal extra-axial fluid collection. The gray-white differentiation is maintained without evidence of an acute infarct. There is no evidence of hydrocephalus. Atherosclerotic change of the intracranial vasculature. There is mild periventricular and subcortical white matter hypoattenuation most consistent with microvascular ischemic changes. There is mild age appropriate global cerebral atrophy. ORBITS: The visualized portion of the orbits demonstrate no acute abnormality. SINUSES: The visualized paranasal sinuses and mastoid air cells demonstrate small air-fluid level pneumatized secretions of the right sphenoid sinus. Correlate for acute sinusitis.  SOFT TISSUES/SKULL:  No acute abnormality of the visualized skull or soft tissues. No acute intracranial abnormality. None microvascular ischemic changes and global cerebral atrophy. Small air-fluid level pneumatized secretions of the right sphenoid sinus. Correlate for acute sinusitis. XR CHEST PORTABLE    Result Date: 3/17/2022  EXAMINATION: ONE XRAY VIEW OF THE CHEST 3/17/2022 2:51 pm COMPARISON: March 11, 2022 HISTORY: ORDERING SYSTEM PROVIDED HISTORY: fatigue TECHNOLOGIST PROVIDED HISTORY: Reason for exam:->fatigue Reason for Exam: fatigue FINDINGS: The cardiomediastinal silhouette is stable. There are increased lung markings bilaterally, may be related to mild pulmonary vascular congestion versus bronchitis. There is no pleural effusion. There is no pneumothorax. There is no acute osseous abnormality. Increased lung markings bilaterally, may be related to mild pulmonary vascular congestion versus bronchitis.        Electronically signed by Kemal Gordon MD on 3/18/2022 at 4:48 PM

## 2022-03-18 NOTE — CONSULTS
[address] [facility], 1960, 1104/1104-A, 3/18/2022    History  [place]:  The primary encounter diagnosis was Fatigue, unspecified type. Diagnoses of Frequent falls, Elevated serum creatinine, and Difficulty in walking were also pertinent to this visit. Patient  has a past medical history of Arthritis, Asthma, Cerebral artery occlusion with cerebral infarction ([address] COPD (chronic obstructive pulmonary disease) ([address] Diabetes mellitus ([address] Diabetic neuropathy ([address] Fracture, Hyperlipidemia, Hypertension, Muscle weakness (generalized), and Osteoarthritis. Patient  has a past surgical history that includes Foot surgery (Left, 2004?); knee surgery (Right, 2009?); Cholecystectomy (2002?); Lung surgery (2009?); eye surgery; Humerus fracture surgery (Left, 12/31/2018); and Breast biopsy (Right). Subjective:  Patient states: \"My leg just gives out randomly, I have no warning\", \"I've fallen so many times, I won't go back home til I know what's going on\". Pain:  Pt denies pain sx. Communication with other providers:  Handoff to RN, discussed with [address] [place] [address] [address] Neurology NP. Restrictions: Fall risk, LLE Boot, IV, tele, general precautions.     Home Setup/Prior level of function  Social/Functional History  Lives With: Alone  Type of Home: Apartment (condo)  Home Layout: One level  Home Access: Level entry  Bathroom Shower/Tub: Tub/Shower unit  Bathroom Toilet: Standard  Bathroom Equipment: Shower chair,Grab bars in shower  Home Equipment: Cane,Roll About,Rolling walker (rollator in home, FWW for outside)  ADL Assistance: [facility]: Needs assistance (pt has had assist from senior services 2hr/wk for cleaning)  Homemaking Responsibilities: Yes  Ambulation Assistance: Independent  Transfer Assistance: Independent (pt increased difficulty with tub transfers)  Active : Yes (goes to grocery)  Additional Comments: 10 falls past year    Examination of body systems (includes body structures/functions, activity/participation limitations):  · Observation:  Pt was awake in semi fowlers position upon arrival  · Vision:  [facility] [place]  · Hearing:  [facility] [place]  · Cardiopulmonary:  On room air, stable  · Cognition: WFL, pt is well-oriented, pleasant, see OT/SLP note for further evaluation. Musculoskeletal  · ROM R/L:  WFL BLE. · Strength R/L:  Generally 4/5 BLE, hip flexors 4-/5 equal JOYCELYN, fair in function and endurance. · Neuro:  Pt with glove/stocking neuropathy pattern chronic    · Gait pattern: Pt demonstrates step-through pattern JOYCELYN with RW, min decreased harjinder, increased UE support r/t fear of falling, no evidence of L knee buckling observed. Mobility:  · Supine to sit:  SUP  · Transfers: CGA  · Sitting balance:  SBA-SUP. · Standing balance:  CGA-SBA. · Gait: CGA    [facility] 6 Clicks Inpatient Mobility:  AM-PAC Inpatient Mobility Raw Score : 20    Treatment:    Bed mobility: PT encourages sup>sit, provides v/c for sequencing. Pt demonstrates good ability to advance LE, requires SUP only for LE/ hips to EOB and SUP for trunk to upright. PT v/c for scooting to EOB, pt requires SBA for safety, pt with adequate strength and mobility to perform. Sitting balance: Seated EOB pt demonstrates fair balance, intermittent UE support required for maintaining upright. Pt with adequate balance and ROM to perform donning of socks with forward lean approach, no LOB, Min assist for donning of L boot for AMB. Additional seated balance time on BR commode x3' with nearby SUP for safety. Additional seated balance time in hallway chair x7' during PT instruction in repeated seated flexion x10 with UE support on RW, SBA, as part of Celina approach to lumbar eval. No change in symptoms, PA Amanda arrived, discussed with pt and PT.    Sit<>Stand: Pt performed STS from EOB to RW  with CGA, v/c for proper sequencing. Pt demonstrates efficient time to upright. Return to seated at hallway chair during gait for break CGA. Seated break x7' see above. Sit>stand from arm chair CGA. Final return to  recliner pt demonstrates fair- control, v/c for safe sequencing, CGA. Additional seated time at edge of recliner end of session x3' for assisted seated/repeated trunk extension assessment. PT hands-on for lumbar cueing and min overpressure. x10 reps with no change in symptoms. Plan to trial in standing next session. Gait: Pt AMB x170/x170 ft with  RW, step-through pattern JOYCELYN with RW, min decreased harjinder, increased UE support r/t fear of falling, no evidence of L knee buckling observed. Pt with extended seated rest break between gait trials for eval and rest. Pt tolerated gait well. Education: PT educated pt on spinal anatomy, pathology of spinal stenosis and impact on muscle activation, education on PT approach to assessment of pt symptoms, discussed pt PLOF, activity modifications. End of session pt left in recliner  with lines managed, call light, phone, exit alarm, tray, all needs, RN aware. Assessment:    Pt is a 58 y/o female admitted 3/17 with c/o  Multiple falls at home. Patient with significant h/o  hypertension, uncontrolled diabetes, hyperlipidemia, COPD, not on home oxygen, obstructive sleep apnea, noncompliant with CPAP, depression/anxiety/insomnia, chronic back pain , see chart. Per pt report pt has been performing ADLs/IADLs independently, with ~10 falls in last year, use of RW for AMB- limited by onset of back pain with increased standing time. At this time pt appears to be functioning below baseline. Pt is now presenting with impairments in functional endurance, safety awareness, balance, gait deficits, falls, chronic pain. Pt would benefit from skilled PT services in order to address impairments and promote return to PLOF. PT to recommend d/c to ARU v.s home with initial 24/7 and OP PT services.  This is pending pt continued improvement and results of further neuro testing/exam.    Complexity: Moderate  Prognosis: Good, no significant barriers to participation at this time. Plan Times per week: 3-4/week, 1 week,   Discharge Recommendations: IP Rehab,Continue to assess pending progress,24 hour supervision or assist  Equipment: Defer    Goals:  Short term goals  Time Frame for Short term goals: 1 week  Short term goal 1: Pt will AMB x400 ft with RW, SBA, no episodes of buckling. Short term goal 2: Pt will demonstrate good carryover with spinal mobility exercises, Mod I. Short term goal 3: Pt will perform x10 STS from standard chair in 30 sec SBA. Short term goal 4: Pt will tolerate x15  minutes static/dynamic standing balance with UE support, SBA.        Treatment plan:  Bed mobility, transfers, balance, gait, TA, TX    Recommendations for NURSING mobility: AMB to/from BR with RW and CGA    Time:   Time in: 08:56  Time out: 09:45  Timed treatment minutes: 38  Total time: 49    Electronically signed by:    Sarah Beth Velazquez, PT  1/76/3404, 04:11 PM  PT Lic #: [id number]

## 2022-03-18 NOTE — CONSULTS
cataract ext done 2/2018 and left eye 4/2018    FOOT SURGERY Left 2004? HUMERUS FRACTURE SURGERY Left 12/31/2018    HUMERUS OPEN REDUCTION INTERNAL FIXATION LEFT PROXIMAL performed by Arnaldo Velasco DO at [address] Right 2009?     LUNG SURGERY  2009?    simone lung lobectomy      Medications:  Scheduled Meds:   sodium chloride flush  5-40 mL IntraVENous 2 times per day    enoxaparin  30 mg SubCUTAneous Daily    atorvastatin  40 mg Oral Nightly    baclofen  5 mg Oral TID    amitriptyline  25 mg Oral Nightly    busPIRone  10 mg Oral TID    Vitamin D  2,000 Units Oral Daily    clopidogrel  75 mg Oral Daily    donepezil  10 mg Oral Nightly    insulin glargine  20 Units SubCUTAneous Nightly    metoprolol succinate  50 mg Oral Daily    pantoprazole  40 mg Oral BID AC    topiramate  50 mg Oral Nightly    insulin lispro  0-12 Units SubCUTAneous TID WC    insulin lispro  0-6 Units SubCUTAneous Nightly    gabapentin  600 mg Oral TID     Continuous Infusions:   sodium chloride      sodium chloride 100 mL/hr at 03/18/22 1002    dextrose       PRN Meds:.sodium chloride flush, sodium chloride, ondansetron **OR** ondansetron, polyethylene glycol, acetaminophen **OR** acetaminophen, glucose, glucagon (rDNA), dextrose, dextrose bolus (hypoglycemia) **OR** dextrose bolus (hypoglycemia)    No Known Allergies  Social History     Socioeconomic History    Marital status:      Spouse name: Not on file    Number of children: Not on file    Years of education: Not on file    Highest education level: Not on file   Occupational History    Not on file   Tobacco Use    Smoking status: Never Smoker    Smokeless tobacco: Never Used    Tobacco comment: 12/27/*2018- with phone assessment with caregiver- unsure of social, surgical or family hx for phone assessment   Vaping Use    Vaping Use: Never used   Substance and Sexual Activity    Alcohol use: No    Drug use: No    Sexual activity: Not on file   Other Topics Concern Not on file   Social History Narrative    Not on file     Social Determinants of Health     Financial Resource Strain:     Difficulty of Paying Living Expenses: Not on file   Food Insecurity:     Worried About [address] [employer] in the Last Year: Not on file    Ran Out of Food in the Last Year: Not on file   Transportation Needs:     Lack of Transportation (Medical): Not on file    Lack of Transportation (Non-Medical):  Not on file   Physical Activity:     Days of Exercise per Week: Not on file    Minutes of Exercise per Session: Not on file   Stress:     Feeling of Stress : Not on file   Social Connections:     Frequency of Communication with Friends and Family: Not on file    Frequency of Social Gatherings with Friends and Family: Not on file    Attends Christianity Services: Not on file    Active Member of [address] [place] [address] or Organizations: Not on file    Attends Club or Organization Meetings: Not on file    Marital Status: Not on file   Intimate Partner Violence:     Fear of Current or Ex-Partner: Not on file    Emotionally Abused: Not on file    Physically Abused: Not on file    Sexually Abused: Not on file   Housing Stability:     Unable to Pay for Housing in the Last Year: Not on file    Number of [place] in the Last Year: Not on file    Unstable Housing in the Last Year: Not on file      Family History   Problem Relation Age of Onset    Breast Cancer Maternal Aunt 48    Breast Cancer Maternal Aunt 50         ROS (10 systems)  In addition to that documented in the HPI above, the additional ROS was obtained:  Constitutional: Denies fevers or chills  Eyes: Denies vision changes  ENMT: Denies sore throat  CV: Denies chest pain  Resp: Denies SOB  GI: Denies vomiting or diarrhea  : Denies painful urination  MSK: Denies recent trauma  Skin: Denies new rashes  Neuro: Bilateral lower extremity weakness  Endocrine: Denies unexpected weight loss  Heme: Denies bleeding disorders    Physical Exam:       Vitals:    03/18/22 0400 03/18/22 0500 03/18/22 0600 03/18/22 0959   BP:    (!) 123/52   Pulse: 72 70 76 75   Resp: 21 15 20 14   Temp:    98.6 °F (37 °C)   TempSrc:    Oral   SpO2:    97%   Weight:       Height:           Wt Readings from Last 3 Encounters:   03/17/22 248 lb 14.4 oz (112.9 kg)   03/15/22 250 lb 8 oz (113.6 kg)   11/08/21 260 lb (117.9 kg)     Temp Readings from Last 3 Encounters:   03/18/22 98.6 °F (37 °C) (Oral)   03/15/22 98.2 °F (36.8 °C) (Oral)   11/08/21 97.8 °F (36.6 °C)     BP Readings from Last 3 Encounters:   03/18/22 (!) 123/52   03/15/22 (!) 136/59   09/24/21 137/66     Pulse Readings from Last 3 Encounters:   03/18/22 75   03/15/22 65   11/08/21 75        Gen: A&O x 4, NAD, cooperative  HEENT: NC/AT, EOMI, PERRL, mmm, neck supple, no meningeal signs; Heart: RRR  Lungs: Respirations Unlabored  Ext: no edema, no calf tenderness b/l  Psych: normal mood and affect  Skin: no rashes or lesions    NEUROLOGIC EXAM:    Mental Status: A&O to self, location, month and year, NAD, speech clear, language fluent, repetition and naming intact, follows commands appropriately    Cranial Nerve Exam:   CN II-XII: PERRL, VFF, no nystagmus, no gaze paresis, sensation V1-V3 intact b/l, muscles of facial expression symmetric; hearing intact to conversational tone, palate elevates symmetrically, shoulder elevation symmetric and tongue protrudes midline with movement side to side.     Motor Exam:       Strength 5/5 UE's/LE's b/l  Tone and bulk normal   No pronator drift    Deep Tendon Reflexes: 2/4 biceps, triceps, brachioradialis, patellar, and achilles b/l; flexor plantar responses b/l    Sensation: Intact light touch/pinprick/vibration UE's/LE's b/l    Coordination/Cerebellum:       Tremors--none      Rapidly alternating movements: no dysdiadochokinesia b/l                Heel-to-Shin: no dysmetria b/l      Finger-to-Nose: no dysmetria b/l    Gait and stance:      Gait: deferred      LABS:        CBC:   Recent Labs 03/17/22  1432   WBC 7.7   RBC 3.66*   HGB 10.9*   HCT 37.0      .1*     BMP:    Recent Labs     03/18/22  0617      K 4.4      CO2 23   BUN 24*   CREATININE 1.3*   GLUCOSE 313*   CALCIUM 8.5       IMAGING:    CTH     Impression   No acute intracranial abnormality. None microvascular ischemic changes and global cerebral atrophy. Small air-fluid level pneumatized secretions of the right sphenoid sinus. Correlate for acute sinusitis. Above imaging personally reviewed in detail. ASSESSMENT/PLAN:     59 y.o. -female with PMH of asthma, arthritis, diabetic neuropathy, T1DM, COPD, HLD, and HTN presenting to [facility] for multiple falls. Falls possibly due to worsening of diabetic peripheral neuropathy v.  Lumbar radiculopathy, lumbar plexopathy  --We will obtain MRI of brain and lumbar spine to rule out central causes of falling. Exam is reassuring as it does not show any focal deficits. --Recommend extensive PT/OT    Neurodiagnostics  --CT as above  --MRI brain ordered   --MRI lumbar spine ordered  Patient discussed with attending physician Dr. Charlotte Norwood     Thank you for allowing us to participate in the care of your patient. If there are any questions regarding evaluation please feel free to contact us. (Please note that portions of this note were completed with a voice recognition program.  Efforts were made to edit the dictations but occasionally words are mistranscribed.)      Amanda Garcia, BRIAN - CNP, 3/18/2022    Attending Note:  I have rounded on this patient with Amanda MCGHEE. I have reviewed the chart and we have discussed this case in detail. The patient was seen and examined by myself. Pertinent labs and imaging have been personally reviewed. Our findings and impressions were discussed with the patient. I concur with the NP's assessment and plan.     Anabel Lara, DO

## 2022-03-19 LAB
BACTERIA: NEGATIVE /HPF
BILIRUBIN URINE: NEGATIVE MG/DL
BLOOD, URINE: ABNORMAL
CLARITY: CLEAR
COLOR: YELLOW
GLUCOSE BLD-MCNC: 101 MG/DL (ref 70–99)
GLUCOSE BLD-MCNC: 344 MG/DL (ref 70–99)
GLUCOSE BLD-MCNC: 387 MG/DL (ref 70–99)
GLUCOSE BLD-MCNC: 42 MG/DL (ref 70–99)
GLUCOSE BLD-MCNC: 70 MG/DL (ref 70–99)
GLUCOSE, URINE: >1000 MG/DL
KETONES, URINE: NEGATIVE MG/DL
LEUKOCYTE ESTERASE, URINE: ABNORMAL
NITRITE URINE, QUANTITATIVE: NEGATIVE
PH, URINE: 6.5 (ref 5–8)
PROTEIN UA: ABNORMAL MG/DL
RBC URINE: <1 /HPF (ref 0–6)
SPECIFIC GRAVITY UA: 1.01 (ref 1–1.03)
UROBILINOGEN, URINE: 0.2 MG/DL (ref 0.2–1)
WBC UA: 61 /HPF (ref 0–5)

## 2022-03-19 PROCEDURE — 6370000000 HC RX 637 (ALT 250 FOR IP): Performed by: INTERNAL MEDICINE

## 2022-03-19 PROCEDURE — 82962 GLUCOSE BLOOD TEST: CPT

## 2022-03-19 PROCEDURE — 97110 THERAPEUTIC EXERCISES: CPT

## 2022-03-19 PROCEDURE — 97116 GAIT TRAINING THERAPY: CPT

## 2022-03-19 PROCEDURE — 97530 THERAPEUTIC ACTIVITIES: CPT

## 2022-03-19 PROCEDURE — G0378 HOSPITAL OBSERVATION PER HR: HCPCS

## 2022-03-19 PROCEDURE — 2580000003 HC RX 258: Performed by: INTERNAL MEDICINE

## 2022-03-19 PROCEDURE — 81001 URINALYSIS AUTO W/SCOPE: CPT

## 2022-03-19 PROCEDURE — 96372 THER/PROPH/DIAG INJ SC/IM: CPT

## 2022-03-19 PROCEDURE — 6360000002 HC RX W HCPCS: Performed by: INTERNAL MEDICINE

## 2022-03-19 PROCEDURE — 94761 N-INVAS EAR/PLS OXIMETRY MLT: CPT

## 2022-03-19 PROCEDURE — 1200000000 HC SEMI PRIVATE

## 2022-03-19 RX ORDER — INSULIN GLARGINE 100 [IU]/ML
30 INJECTION, SOLUTION SUBCUTANEOUS NIGHTLY
Status: DISCONTINUED | OUTPATIENT
Start: 2022-03-19 | End: 2022-03-22 | Stop reason: HOSPADM

## 2022-03-19 RX ADMIN — METOPROLOL SUCCINATE 50 MG: 50 TABLET, EXTENDED RELEASE ORAL at 09:55

## 2022-03-19 RX ADMIN — ENOXAPARIN SODIUM 30 MG: 100 INJECTION SUBCUTANEOUS at 09:55

## 2022-03-19 RX ADMIN — METFORMIN HYDROCHLORIDE 500 MG: 500 TABLET ORAL at 09:55

## 2022-03-19 RX ADMIN — BACLOFEN 5 MG: 10 TABLET ORAL at 21:19

## 2022-03-19 RX ADMIN — BUSPIRONE HYDROCHLORIDE 10 MG: 10 TABLET ORAL at 15:21

## 2022-03-19 RX ADMIN — BACLOFEN 5 MG: 10 TABLET ORAL at 09:55

## 2022-03-19 RX ADMIN — Medication 2000 UNITS: at 09:55

## 2022-03-19 RX ADMIN — INSULIN HUMAN 10 UNITS: 100 INJECTION, SUSPENSION SUBCUTANEOUS at 09:53

## 2022-03-19 RX ADMIN — GABAPENTIN 600 MG: 300 CAPSULE ORAL at 21:19

## 2022-03-19 RX ADMIN — BUSPIRONE HYDROCHLORIDE 10 MG: 10 TABLET ORAL at 09:55

## 2022-03-19 RX ADMIN — ATORVASTATIN CALCIUM 40 MG: 40 TABLET, FILM COATED ORAL at 21:19

## 2022-03-19 RX ADMIN — CLOPIDOGREL BISULFATE 75 MG: 75 TABLET ORAL at 09:55

## 2022-03-19 RX ADMIN — SODIUM CHLORIDE: 9 INJECTION, SOLUTION INTRAVENOUS at 09:58

## 2022-03-19 RX ADMIN — BUSPIRONE HYDROCHLORIDE 10 MG: 10 TABLET ORAL at 21:19

## 2022-03-19 RX ADMIN — TOPIRAMATE 50 MG: 25 TABLET, FILM COATED ORAL at 21:19

## 2022-03-19 RX ADMIN — GABAPENTIN 600 MG: 300 CAPSULE ORAL at 06:51

## 2022-03-19 RX ADMIN — PANTOPRAZOLE SODIUM 40 MG: 40 TABLET, DELAYED RELEASE ORAL at 15:21

## 2022-03-19 RX ADMIN — DONEPEZIL HYDROCHLORIDE 10 MG: 10 TABLET, FILM COATED ORAL at 21:19

## 2022-03-19 RX ADMIN — GABAPENTIN 600 MG: 300 CAPSULE ORAL at 15:21

## 2022-03-19 RX ADMIN — BACLOFEN 5 MG: 10 TABLET ORAL at 15:21

## 2022-03-19 RX ADMIN — PANTOPRAZOLE SODIUM 40 MG: 40 TABLET, DELAYED RELEASE ORAL at 06:52

## 2022-03-19 RX ADMIN — AMITRIPTYLINE HYDROCHLORIDE 25 MG: 25 TABLET, FILM COATED ORAL at 21:19

## 2022-03-19 NOTE — PROGRESS NOTES
V2.0  [facility] – [place], [place] Hospitalist Progress Note      Name:  Patsy Pablo /Age/Sex: 1960  (59 y.o. female)   MRN & CSN:  [id number] & [id number] Encounter Date/Time: 3/19/2022 4:48 PM EDT    Location:  [address] [place] [address] PCP: Damir Timmons MD       Hospital Day: 3    Assessment and Plan:   Patsy Pablo is a 59 y.o. female with pmh of recurrent falls, CKD, type 2 DM, HTN, depression, anxiety, history of TIA, PRIMITIVO, COPD, chronic back pain who presents with Repeated falls    Plan:  Frequent falls: Neurology is following. Appreciate recommendations. CT head non acute. Brain and lumbar spine MRI unremarkable. Fall precautions. PT/OT following. Self-care deficit: patient lives alone and says she is unable to take care of herself at home. Wants to go to a SNF if possible  MODESTO on CKD: Improved with IV hydration. Creat back to 1.3 which is her baseline. Type 2 DM with hyperglycemia/DM neuropathy: continue glargine and lispro sliding scale. Endocrinology following for better glycemic control. Continue gabapentin. COPD not on home O2: continue home meds. She is not in acute exacerbation  Chronic medical conditions: low back pain, depression, anxiety, history of TIA, resting tremor - continue all home meds. DVT prophylaxis:     Diet ADULT DIET; Regular; 4 carb choices (60 gm/meal); Low Sodium (2 gm)   DVT Prophylaxis [] Lovenox, []  Heparin, [] SCDs, [] Ambulation,  [] Eliquis, [] Xarelto   Code Status Full Code   Disposition Patient requires continued admission due to self care deficit, frequent falls   Surrogate Decision Maker/ POA -     Subjective:     Chief Complaint: Fatigue (Muscle jerking)     Patsy Pablo is a 59 y.o. female who presents with frequent falls. Denies any complaints. Says that his left leg kathy when she tries to walk. Other than that she has no complaints. Review of Systems:    Review of Systems    10 point ROS otherwise neg    Objective:        Intake/Output Summary (Last 24 hours) at 3/19/2022 1439  Last data filed at 3/19/2022 0554  Gross per 24 hour   Intake 360 ml   Output 1300 ml   Net -940 ml        Vitals:   Vitals:    03/19/22 1419   BP: (!) 164/57   Pulse: 69   Resp: 14   Temp: 98.2 °F (36.8 °C)   SpO2:        Physical Exam:     General: NAD  Eyes: EOMI  ENT: neck supple  Cardiovascular: Regular rate. Respiratory: Clear to auscultation  Gastrointestinal: Soft, non tender  Genitourinary: no suprapubic tenderness  Musculoskeletal: No edema, has a brace on the left LE  Skin: warm, dry  Neuro: Alert. Psych: Mood appropriate.      Medications:   Medications:    insulin glargine  30 Units SubCUTAneous Nightly    metFORMIN  500 mg Oral BID WC    insulin lispro  10 Units SubCUTAneous TID WC    sodium chloride flush  5-40 mL IntraVENous 2 times per day    enoxaparin  30 mg SubCUTAneous Daily    atorvastatin  40 mg Oral Nightly    baclofen  5 mg Oral TID    amitriptyline  25 mg Oral Nightly    busPIRone  10 mg Oral TID    Vitamin D  2,000 Units Oral Daily    clopidogrel  75 mg Oral Daily    donepezil  10 mg Oral Nightly    metoprolol succinate  50 mg Oral Daily    pantoprazole  40 mg Oral BID AC    topiramate  50 mg Oral Nightly    insulin lispro  0-12 Units SubCUTAneous TID WC    insulin lispro  0-6 Units SubCUTAneous Nightly    gabapentin  600 mg Oral TID      Infusions:    sodium chloride      sodium chloride 100 mL/hr at 03/19/22 0958    dextrose       PRN Meds: sodium chloride flush, 5-40 mL, PRN  sodium chloride, 25 mL, PRN  ondansetron, 4 mg, Q8H PRN   Or  ondansetron, 4 mg, Q6H PRN  polyethylene glycol, 17 g, Daily PRN  acetaminophen, 650 mg, Q6H PRN   Or  acetaminophen, 650 mg, Q6H PRN  glucose, 15 g, PRN  glucagon (rDNA), 1 mg, PRN  dextrose, 100 mL/hr, PRN  dextrose bolus (hypoglycemia), 125 mL, PRN   Or  dextrose bolus (hypoglycemia), 250 mL, PRN        Labs      Recent Results (from the past 24 hour(s))   POCT Glucose    Collection Time: 03/18/22  4:08 PM   Result Value Ref Range    POC Glucose 224 (H) 70 - 99 MG/DL   POCT Glucose    Collection Time: 03/18/22 10:03 PM   Result Value Ref Range    POC Glucose 210 (H) 70 - 99 MG/DL   Urinalysis    Collection Time: 03/19/22  3:00 AM   Result Value Ref Range    Color, UA YELLOW YELLOW    Clarity, UA CLEAR CLEAR    Glucose, Urine >1,000 (A) NEGATIVE MG/DL    Bilirubin Urine NEGATIVE NEGATIVE MG/DL    Ketones, Urine NEGATIVE NEGATIVE MG/DL    Specific Gravity, UA 1.010 1.001 - 1.035    Blood, Urine TRACE (A) NEGATIVE    pH, Urine 6.5 5.0 - 8.0    Protein, UA TRACE (A) NEGATIVE MG/DL    Urobilinogen, Urine 0.2 0.2 - 1.0 MG/DL    Nitrite Urine, Quantitative NEGATIVE NEGATIVE    Leukocyte Esterase, Urine SMALL (A) NEGATIVE    RBC, UA <1 0 - 6 /HPF    WBC, UA 61 (H) 0 - 5 /HPF    Bacteria, UA NEGATIVE NEGATIVE /HPF   POCT Glucose    Collection Time: 03/19/22  6:36 AM   Result Value Ref Range    POC Glucose 344 (H) 70 - 99 MG/DL        Imaging/Diagnostics Last 24 Hours   XR ANKLE LEFT (MIN 3 VIEWS)    Result Date: 3/17/2022  EXAMINATION: THREE XRAY VIEWS OF THE LEFT ANKLE; THREE XRAY VIEWS OF THE LEFT FOOT 3/17/2022 9:12 pm COMPARISON: None. HISTORY: ORDERING SYSTEM PROVIDED HISTORY: ankle fracture as per pt TECHNOLOGIST PROVIDED HISTORY: Reason for exam:->ankle fracture as per pt Reason for Exam: LT ANKLE PAIN FINDINGS: There is no acute fracture or dislocation at the left ankle. The ankle mortise and talar dome are intact. Some small ossifications at the tip of the lateral malleolus may relate to subacute to old fracture. There is soft tissue swelling around the ankle. Fixation screw within the 5th metatarsal appears intact. The chronic appearing deformity of the proximal and distal 5th metatarsal but no acute fracture lines are identified. The bones of the foot are osteopenic. There is no dislocation of the tarsal metatarsal junction.   Prominent arthritic changes are present at the 1st metatarsophalangeal joint with flattening of the distal metatarsal head. Flattening of the head of the 2nd metatarsal as well but chronic. Moderate plantar calcaneal spur formation is present. Edema of the heel is suggested but no soft tissue emphysema. No acute fracture lines the main portion of the left ankle and no dislocation. The small ossifications at the tip of the lateral malleolus could relate to subacute or old injury. No acute fractures are seen at the foot but does have a long fixation screw at the 5th metatarsal.  Chronic appearing deformities of the 5th metatarsal. Severe arthritic and or combined posttraumatic changes at the 1st metatarsophalangeal joint with flattening of the metatarsal head. XR FOOT LEFT (MIN 3 VIEWS)    Result Date: 3/17/2022  EXAMINATION: THREE XRAY VIEWS OF THE LEFT ANKLE; THREE XRAY VIEWS OF THE LEFT FOOT 3/17/2022 9:12 pm COMPARISON: None. HISTORY: ORDERING SYSTEM PROVIDED HISTORY: ankle fracture as per pt TECHNOLOGIST PROVIDED HISTORY: Reason for exam:->ankle fracture as per pt Reason for Exam: LT ANKLE PAIN FINDINGS: There is no acute fracture or dislocation at the left ankle. The ankle mortise and talar dome are intact. Some small ossifications at the tip of the lateral malleolus may relate to subacute to old fracture. There is soft tissue swelling around the ankle. Fixation screw within the 5th metatarsal appears intact. The chronic appearing deformity of the proximal and distal 5th metatarsal but no acute fracture lines are identified. The bones of the foot are osteopenic. There is no dislocation of the tarsal metatarsal junction. Prominent arthritic changes are present at the 1st metatarsophalangeal joint with flattening of the distal metatarsal head. Flattening of the head of the 2nd metatarsal as well but chronic. Moderate plantar calcaneal spur formation is present. Edema of the heel is suggested but no soft tissue emphysema.      No acute fracture lines the main portion of the left ankle and no dislocation. The small ossifications at the tip of the lateral malleolus could relate to subacute or old injury. No acute fractures are seen at the foot but does have a long fixation screw at the 5th metatarsal.  Chronic appearing deformities of the 5th metatarsal. Severe arthritic and or combined posttraumatic changes at the 1st metatarsophalangeal joint with flattening of the metatarsal head. CT HEAD WO CONTRAST    Result Date: 3/17/2022  EXAMINATION: CT OF THE HEAD WITHOUT CONTRAST  3/17/2022 2:49 pm TECHNIQUE: CT of the head was performed without the administration of intravenous contrast. Dose modulation, iterative reconstruction, and/or weight based adjustment of the mA/kV was utilized to reduce the radiation dose to as low as reasonably achievable. COMPARISON: MRI brain October 20, 2021; CT head September 23, 2021 HISTORY: ORDERING SYSTEM PROVIDED HISTORY: frequent falls on blood thinners TECHNOLOGIST PROVIDED HISTORY: Has a \"code stroke\" or \"stroke alert\" been called? ->No Reason for exam:->frequent falls on blood thinners Decision Support Exception - unselect if not a suspected or confirmed emergency medical condition->Emergency Medical Condition (MA) Reason for Exam: frequent falls on blood thinners Additional signs and symptoms: NONE Relevant Medical/Surgical History: NONE FINDINGS: BRAIN/VENTRICLES: There is no acute intracranial hemorrhage, mass effect or midline shift. No abnormal extra-axial fluid collection. The gray-white differentiation is maintained without evidence of an acute infarct. There is no evidence of hydrocephalus. Atherosclerotic change of the intracranial vasculature. There is mild periventricular and subcortical white matter hypoattenuation most consistent with microvascular ischemic changes. There is mild age appropriate global cerebral atrophy. ORBITS: The visualized portion of the orbits demonstrate no acute abnormality. SINUSES: The visualized paranasal sinuses and mastoid air cells demonstrate small air-fluid level pneumatized secretions of the right sphenoid sinus. Correlate for acute sinusitis. SOFT TISSUES/SKULL:  No acute abnormality of the visualized skull or soft tissues. No acute intracranial abnormality. None microvascular ischemic changes and global cerebral atrophy. Small air-fluid level pneumatized secretions of the right sphenoid sinus. Correlate for acute sinusitis. XR CHEST PORTABLE    Result Date: 3/17/2022  EXAMINATION: ONE XRAY VIEW OF THE CHEST 3/17/2022 2:51 pm COMPARISON: March 11, 2022 HISTORY: ORDERING SYSTEM PROVIDED HISTORY: fatigue TECHNOLOGIST PROVIDED HISTORY: Reason for exam:->fatigue Reason for Exam: fatigue FINDINGS: The cardiomediastinal silhouette is stable. There are increased lung markings bilaterally, may be related to mild pulmonary vascular congestion versus bronchitis. There is no pleural effusion. There is no pneumothorax. There is no acute osseous abnormality. Increased lung markings bilaterally, may be related to mild pulmonary vascular congestion versus bronchitis.        Electronically signed by Tanner Ivey MD on 3/19/2022 at 2:39 PM

## 2022-03-19 NOTE — PROGRESS NOTES
Physical Therapy    Physical Therapy Treatment Note  Name: El Adrian MRN: [id number] :   1960   Date:  3/19/2022   Admission Date: 3/17/2022 [facility]   Restrictions/Precautions:         Fall risk, LLE Boot, IV, tele, general precautions.     Communication with other providers:  Chart review indicates appropriate to see for treatment   Subjective:  Patient states:  '' I'd like to walk but I never know when I will fall''   Pain:   Location, Type, Intensity (0/10 to 10/10): No pain reported   Objective:    Observation:  Pt in semi fowlers in bed   Treatment, including education/measures:  Pt presneted in room and was agreeable to therapy. Review of POC and daily notes. Nurse summoned to disconnect from IV. She was able to perform all bed mobility with SBA to transfer to EOB. She performed dynamic reaching to don socks, boot, and shoe. Required Daysi for sock. She was able to stand and perform weight shifting activities with UE support on AD. She was able to ambulate 160ft x2 with 2WW and CGA wich close WC follow. Rest break between bouts of walking. She was able to complete 20xea marching, hip abduction, clams, and LAQ with rest breaks to recover from fatigue and TC to correct form and ensure maximum intervention benefit. Upon completion she was left seated in her bed with all needs met. RN handoff. Assessment / Impression:       Patient's tolerance of treatment:  Good   Adverse Reaction: none  Significant change in status and impact:  none  Barriers to improvement:  Fatigue, falling.    Plan for Next Session:    Continue working towards all goals in POC  Time in:  2:35  Time out:  3:20  Timed treatment minutes:  45  Total treatment time:  39    Previously filed items:  Social/Functional History  Lives With: Alone  Type of Home: Apartment (condo)  Home Layout: One level  Home Access: Level entry  Bathroom Shower/Tub: Tub/Shower unit  Bathroom Toilet: Standard  Bathroom Equipment: Shower chair,Grab bars in shower  Home Equipment: Cane,Roll About,Rolling walker (rollator in home, FWW for outside)  ADL Assistance: [address]: Needs assistance (pt has had assist from senior services 2hr/wk for cleaning)  Homemaking Responsibilities: Yes  Ambulation Assistance: Independent  Transfer Assistance: Independent (pt increased difficulty with tub transfers)  Active : Yes (goes to grocery)  Additional Comments: 10 falls past year  Short term goals  Time Frame for Short term goals: 1 week  Short term goal 1: Pt will AMB x400 ft with RW, SBA, no episodes of buckling. Short term goal 2: Pt will demonstrate good carryover with spinal mobility exercises, Mod I. Short term goal 3: Pt will perform x10 STS from standard chair in 30 sec SBA. Short term goal 4: Pt will tolerate x15  minutes static/dynamic standing balance with UE support, SBA.        Electronically signed by:    Dalton Burkett, ADELE  3/19/2022, 3:27 PM

## 2022-03-19 NOTE — CONSULTS
lobectomy        Allergies:  Patient has no known allergies. Family History:       Problem Relation Age of Onset    Breast Cancer Maternal Aunt 48    Breast Cancer Maternal Aunt 48     REVIEW OF SYSTEMS:  Review of System Done as noted above     PHYSICAL EXAM:      Vitals:    BP (!) 163/72   Pulse 70   Temp 98.2 °F (36.8 °C) (Oral)   Resp 14   Ht 5' 4\" (1.626 m)   Wt 248 lb 14.4 oz (112.9 kg)   SpO2 97%   BMI 42.72 kg/m²     CONSTITUTIONAL:  awake, alert, cooperative, appears stated age   EYES:  vision intact Fundoscopic Exam not performed   ENT:Normal  NECK:  Supple, No JVD. Thyroid Exam:Normal   LUNGS:  Has Vesicular Breath Sounds,   CARDIOVASCULAR:  Normal apical impulse, regular rate and rhythm, normal S1 and S2, no S3 or S4, and has no  murmur   ABDOMEN:  No scars, normal bowel sounds, soft, non-distended, non-tender, no masses palpated, no hepatolienomegaly  Musculoskeletal: Normal  Extremities: Normal, peripheral pulses normal, , has no edema   NEUROLOGIC:  Awake, alert, oriented to name, place and time. Cranial nerves II-XII are grossly intact. Motor is  intact.   Sensory neuropathy t. ,  and gait is abnormal.  And unstable    DATA:    CBC:   Recent Labs     03/17/22  1432   WBC 7.7   HGB 10.9*       CMP:  Recent Labs     03/17/22  1432 03/18/22  0617    142   K 4.1 4.4    108   CO2 21 23   BUN 34* 24*   CREATININE 1.9* 1.3*   CALCIUM 9.0 8.5   PROT 5.5*  --    LABALBU 3.0*  --    BILITOT 0.3  --    ALKPHOS 121  --    AST 14*  --    ALT 18  --      Lipids: No results found for: CHOL, HDL, TRIG  Glucose:   Recent Labs     03/18/22  1058 03/18/22  1608 03/18/22  2203   POCGLU 380* 224* 210*     Hemoglobin A1C:   Lab Results   Component Value Date    LABA1C 9.1 03/12/2022     Free T4: No results found for: T4FREE  Free T3: No results found for: FT3  TSH High Sensitivity:   Lab Results   Component Value Date    TSHHS 2.580 03/12/2022       MRI LUMBAR SPINE WO CONTRAST   Final Result   1. No significant spinal canal stenosis or neural foraminal narrowing. 2. Marrow signal appears heterogeneous, raising the possibility of osteopenia. MRI BRAIN WO CONTRAST   Final Result      There is no acute infarction, intracranial hemorrhage, or intracranial mass   lesion. Mild chronic microangiopathic ischemic disease. Mild generalized volume loss. Mild mucosal thickening of right sphenoid sinus. Mild mucosal thickening of   right mastoid air cells. XR ANKLE LEFT (MIN 3 VIEWS)   Final Result   No acute fracture lines the main portion of the left ankle and no   dislocation. The small ossifications at the tip of the lateral malleolus   could relate to subacute or old injury. No acute fractures are seen at the foot but does have a long fixation screw   at the 5th metatarsal.  Chronic appearing deformities of the 5th metatarsal.      Severe arthritic and or combined posttraumatic changes at the 1st   metatarsophalangeal joint with flattening of the metatarsal head. XR FOOT LEFT (MIN 3 VIEWS)   Final Result   No acute fracture lines the main portion of the left ankle and no   dislocation. The small ossifications at the tip of the lateral malleolus   could relate to subacute or old injury. No acute fractures are seen at the foot but does have a long fixation screw   at the 5th metatarsal.  Chronic appearing deformities of the 5th metatarsal.      Severe arthritic and or combined posttraumatic changes at the 1st   metatarsophalangeal joint with flattening of the metatarsal head. XR CHEST PORTABLE   Final Result   Increased lung markings bilaterally, may be related to mild pulmonary   vascular congestion versus bronchitis. CT HEAD WO CONTRAST   Final Result   No acute intracranial abnormality. None microvascular ischemic changes and global cerebral atrophy. Small air-fluid level pneumatized secretions of the right sphenoid sinus. Correlate for acute sinusitis. Scheduled Medicines   Medications:    insulin glargine  30 Units SubCUTAneous Nightly    metFORMIN  500 mg Oral BID WC    insulin lispro  10 Units SubCUTAneous TID WC    sodium chloride flush  5-40 mL IntraVENous 2 times per day    enoxaparin  30 mg SubCUTAneous Daily    atorvastatin  40 mg Oral Nightly    baclofen  5 mg Oral TID    amitriptyline  25 mg Oral Nightly    busPIRone  10 mg Oral TID    Vitamin D  2,000 Units Oral Daily    clopidogrel  75 mg Oral Daily    donepezil  10 mg Oral Nightly    metoprolol succinate  50 mg Oral Daily    pantoprazole  40 mg Oral BID AC    topiramate  50 mg Oral Nightly    insulin lispro  0-12 Units SubCUTAneous TID WC    insulin lispro  0-6 Units SubCUTAneous Nightly    gabapentin  600 mg Oral TID      Infusions:    sodium chloride      sodium chloride 100 mL/hr at 03/18/22 2206    dextrose           IMPRESSION    Patient Active Problem List   Diagnosis    Proximal humeral fracture    Asthma    Bereavement    Pain of both hip joints    Chest pain    Chronic abdominal pain    Chronic back pain    Chronic obstructive lung disease (HCC)    Corns and callosities    Depressive disorder    Type II diabetes mellitus, uncontrolled (HCC)    Diabetic polyneuropathy (HCC)    HTN (hypertension)    Hyperlipidemia    Lesion of liver    Menopausal symptom    Mixed hyperlipidemia    Onychomycosis    Obesity    Osteoarthrosis    Osteoporosis    Pain in both feet    Sleep apnea    TIA (transient ischemic attack)    Vitamin D deficiency    Wheezing    S/P ORIF (open reduction internal fixation) fracture    Diabetic gastroparesis (HCC)    Gastroesophageal reflux disease    Restless legs    Ketoacidosis, diabetic, type 2, no coma ([facility])    Repeated falls         RECOMMENDATIONS:      1. Reviewed POC blood glucose . Labs and X ray results   2. Reviewed Home and Current Medicines   3.  Will Start On meal/ Correction bolus Humalog/ Lantus Insulin regime / OHGD   4. Monitor Blood glucose frequently   5. Modify  the dose of Insulin/ OHGD  as needed        Will follow with you  Again thank you for sharing pt's care with me.      Truly yours,       Jewell Arana MD

## 2022-03-19 NOTE — PROGRESS NOTES
Neurology note:    Chart reviewed. Patient's MRI brain and lumbar spine are largely unremarkable for neurologic etiology to falls. Patient's examination is nonfocal and nonlateralizing with exception of evidence of peripheral neuropathy on examination. Do suspect that peripheral neuropathy probably contributed to patient's falls. No further neurodiagnostics indicated at this time. Patient should follow-up with her outpatient neurologist, Dr. Melvin Villa. Neurology to sign off. Please do not hesitate to contact us with any further questions.     Adia Lim DO 3/19/2022 2:44 PM\

## 2022-03-20 LAB
GLUCOSE BLD-MCNC: 140 MG/DL (ref 70–99)
GLUCOSE BLD-MCNC: 170 MG/DL (ref 70–99)
GLUCOSE BLD-MCNC: 203 MG/DL (ref 70–99)
GLUCOSE BLD-MCNC: 249 MG/DL (ref 70–99)

## 2022-03-20 PROCEDURE — 2580000003 HC RX 258: Performed by: INTERNAL MEDICINE

## 2022-03-20 PROCEDURE — 97166 OT EVAL MOD COMPLEX 45 MIN: CPT

## 2022-03-20 PROCEDURE — G0378 HOSPITAL OBSERVATION PER HR: HCPCS

## 2022-03-20 PROCEDURE — 94761 N-INVAS EAR/PLS OXIMETRY MLT: CPT

## 2022-03-20 PROCEDURE — 6360000002 HC RX W HCPCS: Performed by: INTERNAL MEDICINE

## 2022-03-20 PROCEDURE — 82962 GLUCOSE BLOOD TEST: CPT

## 2022-03-20 PROCEDURE — 97535 SELF CARE MNGMENT TRAINING: CPT

## 2022-03-20 PROCEDURE — 1200000000 HC SEMI PRIVATE

## 2022-03-20 PROCEDURE — 96372 THER/PROPH/DIAG INJ SC/IM: CPT

## 2022-03-20 PROCEDURE — 6370000000 HC RX 637 (ALT 250 FOR IP): Performed by: INTERNAL MEDICINE

## 2022-03-20 RX ORDER — ALOGLIPTIN 12.5 MG/1
6.25 TABLET, FILM COATED ORAL DAILY
Status: DISCONTINUED | OUTPATIENT
Start: 2022-03-20 | End: 2022-03-22 | Stop reason: HOSPADM

## 2022-03-20 RX ORDER — AMLODIPINE BESYLATE 10 MG/1
10 TABLET ORAL DAILY
Status: DISCONTINUED | OUTPATIENT
Start: 2022-03-20 | End: 2022-03-22 | Stop reason: HOSPADM

## 2022-03-20 RX ADMIN — BACLOFEN 5 MG: 10 TABLET ORAL at 21:14

## 2022-03-20 RX ADMIN — Medication 2000 UNITS: at 08:30

## 2022-03-20 RX ADMIN — GABAPENTIN 600 MG: 300 CAPSULE ORAL at 05:55

## 2022-03-20 RX ADMIN — TOPIRAMATE 50 MG: 25 TABLET, FILM COATED ORAL at 21:13

## 2022-03-20 RX ADMIN — BUSPIRONE HYDROCHLORIDE 10 MG: 10 TABLET ORAL at 13:49

## 2022-03-20 RX ADMIN — BUSPIRONE HYDROCHLORIDE 10 MG: 10 TABLET ORAL at 08:30

## 2022-03-20 RX ADMIN — BACLOFEN 5 MG: 10 TABLET ORAL at 08:30

## 2022-03-20 RX ADMIN — PANTOPRAZOLE SODIUM 40 MG: 40 TABLET, DELAYED RELEASE ORAL at 06:15

## 2022-03-20 RX ADMIN — AMITRIPTYLINE HYDROCHLORIDE 25 MG: 25 TABLET, FILM COATED ORAL at 21:14

## 2022-03-20 RX ADMIN — GABAPENTIN 600 MG: 300 CAPSULE ORAL at 21:14

## 2022-03-20 RX ADMIN — CLOPIDOGREL BISULFATE 75 MG: 75 TABLET ORAL at 08:30

## 2022-03-20 RX ADMIN — ATORVASTATIN CALCIUM 40 MG: 40 TABLET, FILM COATED ORAL at 21:14

## 2022-03-20 RX ADMIN — SODIUM CHLORIDE, PRESERVATIVE FREE 10 ML: 5 INJECTION INTRAVENOUS at 21:15

## 2022-03-20 RX ADMIN — ALOGLIPTIN 6.25 MG: 12.5 TABLET, FILM COATED ORAL at 13:49

## 2022-03-20 RX ADMIN — BUSPIRONE HYDROCHLORIDE 10 MG: 10 TABLET ORAL at 21:14

## 2022-03-20 RX ADMIN — DONEPEZIL HYDROCHLORIDE 10 MG: 10 TABLET, FILM COATED ORAL at 21:14

## 2022-03-20 RX ADMIN — GABAPENTIN 600 MG: 300 CAPSULE ORAL at 13:49

## 2022-03-20 RX ADMIN — SODIUM CHLORIDE, PRESERVATIVE FREE 10 ML: 5 INJECTION INTRAVENOUS at 08:31

## 2022-03-20 RX ADMIN — INSULIN GLARGINE 30 UNITS: 100 INJECTION, SOLUTION SUBCUTANEOUS at 21:17

## 2022-03-20 RX ADMIN — SODIUM CHLORIDE: 9 INJECTION, SOLUTION INTRAVENOUS at 01:42

## 2022-03-20 RX ADMIN — PANTOPRAZOLE SODIUM 40 MG: 40 TABLET, DELAYED RELEASE ORAL at 18:22

## 2022-03-20 RX ADMIN — ENOXAPARIN SODIUM 30 MG: 100 INJECTION SUBCUTANEOUS at 08:31

## 2022-03-20 RX ADMIN — BACLOFEN 5 MG: 10 TABLET ORAL at 13:49

## 2022-03-20 RX ADMIN — METOPROLOL SUCCINATE 50 MG: 50 TABLET, EXTENDED RELEASE ORAL at 08:30

## 2022-03-20 ASSESSMENT — PAIN SCALES - GENERAL
PAINLEVEL_OUTOF10: 0

## 2022-03-20 NOTE — PROGRESS NOTES
Danielleyang 89, 1960, 1104/1104-A, 3/20/2022    Discharge Recommendation: Inpatient Rehabilitation vs home w/ assist prn    History:  Tribe:  The primary encounter diagnosis was Fatigue, unspecified type. Diagnoses of Frequent falls, Elevated serum creatinine, and Difficulty in walking were also pertinent to this visit. Subjective:  Patient states: \"I want to go to short term rehab until they figure out what's wrong with me.  It's not safe for me to return home like this\"  Pain: Pt reported no pain  Communication with other providers: handoff to RN  Restrictions: General Precautions, Fall Risk, L LE boot    Home Setup/Prior level of function:  Social/Functional History  Lives With: Alone  Type of Home: [facility]ment ([facility])  Home Layout: One level  Home Access: Level entry  Bathroom Shower/Tub: Tub/Shower unit  Bathroom Toilet: Standard  Bathroom Equipment: Shower chair,Grab bars in [address]: [address] walker (rollator in home, [address] for outside)  ADL Assistance: Independent  Homemaking Assistance: Needs assistance (pt has had assist from senior services 2hr/wk for cleaning)  Homemaking Responsibilities: Yes  Ambulation Assistance: Independent  Transfer Assistance: Independent (pt increased difficulty with tub transfers)  Active : Yes (goes to grocery)  Additional Comments: 10 falls past year     Examination:  · Observation: Supine in bed upon arrival, agreeable to therapy eval.  · Vision: [facility]  · Hearing: [facility]  · Vitals: Stable vitals throughout session on room air    [address] and functions:  · ROM: WFL   · Strength: B UE grossly 4/5 across all major joints   · Sensation: reports neuropathy in B UEs and B LEs  · Tone: Normal  · Coordination: WFL  · Perception: WNL    Activities of Daily Living (ADLs):  · Feeding: set up A while seated in recliner  · Grooming: SBA for brushing teeth, brushing hair, and hand hygiene tasks while standing at the sink w/ FWW   · UB bathing: SBA   · LB bathing: min A  · UB dressing: SBA  · LB dressing: min A to fix socks, min A to don/doff L LE boot  · Toileting: SBA for hygiene and clothing mgmt    Cognitive and Psychosocial Functioning:  · Overall cognitive status: alert and oriented, WFL  · Affect: Normal       Functional Mobility:  · Supine to sit:  supervision  · Transfers: SBA STS from EOB w/ FWW  · Sitting balance:  SBA dynamic and static. · Standing balance:  SBA dynamic and static. · Functional Mobility: ambulated short functional household distance using FWW w/ SBA from bed <> bathroom  · Toilet/Shower Transfers: SBA for toilet transfer       AM-PAC 6 click short form for inpatient daily activity:   How much help from another person does the patient currently need. .. Unable  Dep A Lot  Max A A Lot   Mod A A Little  Min A A Little   CGA  SBA None   Mod I  Indep  Sup   1. Putting on and taking off regular lower body clothing? [] 1    [] 2   [] 2   [x] 3   [] 3   [] 4      2. Bathing (including washing, rinsing, drying)? [] 1   [] 2   [] 2 [x] 3 [] 3 [] 4   3. Toileting, which includes using toilet, bedpan, or urinal? [] 1    [] 2   [] 2   [] 3   [x] 3   [] 4     4. Putting on and taking off regular upper body clothing? [] 1   [] 2   [] 2   [] 3   [x] 3    [] 4      5. Taking care of personal grooming such as brushing teeth? [] 1   [] 2    [] 2 [] 3    [x] 3   [] 4      6. Eating meals? [] 1   [] 2   [] 2   [] 3   [] 3   [x] 4      Raw Score:  19     [24=0% impaired(CH), 23=1-19%(CI), 20-22=20-39%(CJ), 15-19=40-59%(CK), 10-14=60-79%(CL), 7-9=80-99%(CM), 6=100%(CN)]     Treatment:    Self Care Training:   Cues were given for safety, sequence, UE/LE placement, visual cues, and balance. Activities performed today included dressing, toileting,  hand hygiene, oral care, and grooming.     Educated pt on role of OT, therapy POC and functional goals, progression w/ ADLs and transfers, d/c recommendations     Safety Measures: Gait belt used, Left supine in bed, all needs met  Recommendations for NURSING activity: Up to chair for all 3 meals and up to bathroom for all toileting needs     Assessment:  Pt is a 59 y o F  admitted d/t frequent falls. Pt at baseline is independent for ADLs, independent for high level IADLs, and mod I for functional transfers/mobility w/ FWW. Pt currently presents w/ deficits in ADL and high level IADL independence, functional ADL transfers, strength, and functional activity tolerance. Continued OT services recommended to increase safety and independence with ADL routine and to address remaining functional deficits. Pt would benefit from continued acute care OT services w/ discharge to ARU vs home w/ assist prn pending neuro consult. Complexity: Moderate  Prognosis: Good, no significant barriers to participation at this time. Plan  Times per week: 2+  Current Treatment Recommendations: Strengthening,ROM,Balance Training,Endurance Training,Functional Mobility Training,Patient/Caregiver Education & Training,Equipment Evaluation, Education, & procurement,Safety Education & Training,Self-Care / ADL,Positioning,Home Management Training,Cognitive/Perceptual Training         Goals:  · Pt will complete all aspects of bed mobility for EOB/OOB ADLs w/ mod I.   · Pt will complete UB ADLs w/ mod I.  · Pt will complete LB ADLs w/ mod I.  · Pt will complete all functional transfers to and from bed, chair, toilet, shower chair w/ mod I.  · Pt will ambulate functional household distance w/ mod I.  · Pt will complete all aspects of toileting task w/ mod I.  · Pt will perform therex/theract in order to increase strength and functional activity tolerance necessary for increased independence w/ ADL routine.     Pt goal: go home, get stronger  Time Frame for STGs: discharge    Equipment: defer    Time:   Time in: 1528  Time out: 1557  Total time: 29  Timed treatment minutes: 19      Electronically signed by:      CLAUDIO Rod/VIK  [id number]

## 2022-03-20 NOTE — PROGRESS NOTES
Progress Note( Dr. Yesenia Anderson)  3/20/2022  Subjective:   Admit Date: 3/17/2022  PCP: Abe Mckinley MD    Admitted For :  Repeated falls tingling and numbness in the lower extremities of the left side    Consulted For:  Better control of blood glucose    Interval History: Feels okay still decreasing the leg    Denies any chest pains,   Denies SOB . Denies nausea or vomiting. No new bowel or bladder symptoms. No intake or output data in the 24 hours ending 03/20/22 1955    DATA    CBC: No results for input(s): WBC, HGB, PLT in the last 72 hours. CMP:Recent Labs     03/18/22  0617      K 4.4      CO2 23   BUN 24*   CREATININE 1.3*   CALCIUM 8.5     Lipids: No results found for: CHOL, HDL, TRIG  Glucose:  Recent Labs     03/20/22  0649 03/20/22  1127 03/20/22  1620   POCGLU 203* 249* 140*     HfruyctctaE8T:  Lab Results   Component Value Date    LABA1C 9.1 03/12/2022     High Sensitivity TSH:   Lab Results   Component Value Date    TSHHS 2.580 03/12/2022     Free T3: No results found for: FT3  Free T4:No results found for: T4FREE    MRI LUMBAR SPINE WO CONTRAST   Final Result   1. No significant spinal canal stenosis or neural foraminal narrowing. 2. Marrow signal appears heterogeneous, raising the possibility of osteopenia. MRI BRAIN WO CONTRAST   Final Result      There is no acute infarction, intracranial hemorrhage, or intracranial mass   lesion. Mild chronic microangiopathic ischemic disease. Mild generalized volume loss. Mild mucosal thickening of right sphenoid sinus. Mild mucosal thickening of   right mastoid air cells. XR ANKLE LEFT (MIN 3 VIEWS)   Final Result   No acute fracture lines the main portion of the left ankle and no   dislocation. The small ossifications at the tip of the lateral malleolus   could relate to subacute or old injury.       No acute fractures are seen at the foot but does have a long fixation screw   at the 5th metatarsal.  Chronic appearing deformities of the 5th metatarsal.      Severe arthritic and or combined posttraumatic changes at the 1st   metatarsophalangeal joint with flattening of the metatarsal head. XR FOOT LEFT (MIN 3 VIEWS)   Final Result   No acute fracture lines the main portion of the left ankle and no   dislocation. The small ossifications at the tip of the lateral malleolus   could relate to subacute or old injury. No acute fractures are seen at the foot but does have a long fixation screw   at the 5th metatarsal.  Chronic appearing deformities of the 5th metatarsal.      Severe arthritic and or combined posttraumatic changes at the 1st   metatarsophalangeal joint with flattening of the metatarsal head. XR CHEST PORTABLE   Final Result   Increased lung markings bilaterally, may be related to mild pulmonary   vascular congestion versus bronchitis. CT HEAD WO CONTRAST   Final Result   No acute intracranial abnormality. None microvascular ischemic changes and global cerebral atrophy. Small air-fluid level pneumatized secretions of the right sphenoid sinus. Correlate for acute sinusitis.               Scheduled Medicines   Medications:    alogliptin  6.25 mg Oral Daily    amLODIPine  10 mg Oral Daily    insulin glargine  30 Units SubCUTAneous Nightly    metFORMIN  500 mg Oral BID WC    insulin lispro  10 Units SubCUTAneous TID WC    sodium chloride flush  5-40 mL IntraVENous 2 times per day    enoxaparin  30 mg SubCUTAneous Daily    atorvastatin  40 mg Oral Nightly    baclofen  5 mg Oral TID    amitriptyline  25 mg Oral Nightly    busPIRone  10 mg Oral TID    Vitamin D  2,000 Units Oral Daily    clopidogrel  75 mg Oral Daily    donepezil  10 mg Oral Nightly    metoprolol succinate  50 mg Oral Daily    pantoprazole  40 mg Oral BID AC    topiramate  50 mg Oral Nightly    insulin lispro  0-12 Units SubCUTAneous TID WC    insulin lispro  0-6 Units SubCUTAneous Nightly    gabapentin 600 mg Oral TID      Infusions:    sodium chloride      dextrose           Objective:   Vitals: BP (!) 143/102   Pulse 62   Temp 98 °F (36.7 °C)   Resp 12   Ht 5' 4\" (1.626 m)   Wt 248 lb 0.3 oz (112.5 kg)   SpO2 96%   BMI 42.57 kg/m²   General appearance: alert and cooperative with exam  Neck: no JVD or bruit  Thyroid : Normal lobes   Lungs: Has Vesicular Breath sounds some wheezing in the lung  Heart:  regular rate and rhythm  Abdomen: soft, non-tender; bowel sounds normal; no masses,  no organomegaly  Musculoskeletal: Normal  Extremities: extremities normal, , no edema  Neurologic:  Awake, alert, oriented to name, place and time. Cranial nerves II-XII are grossly intact. Motor is  intact. Sensory neuropathy. ,  and gait is abnormal.    Assessment:     Patient Active Problem List:     Proximal humeral fracture     Asthma     Bereavement     Pain of both hip joints     Chest pain     Chronic abdominal pain     Chronic back pain     Chronic obstructive lung disease (HCC)     Corns and callosities     Depressive disorder     Type II diabetes mellitus, uncontrolled (HCC)     Diabetic polyneuropathy (HCC)     HTN (hypertension)     Hyperlipidemia     Lesion of liver     Menopausal symptom     Mixed hyperlipidemia     Onychomycosis     Obesity     Osteoarthrosis     Osteoporosis     Pain in both feet     Sleep apnea     TIA (transient ischemic attack)     Vitamin D deficiency     Wheezing     S/P ORIF (open reduction internal fixation) fracture     Diabetic gastroparesis (HCC)     Gastroesophageal reflux disease     Restless legs     Ketoacidosis, diabetic, type 2, no coma (HCC)     Repeated falls      Plan:     1. Reviewed POC blood glucose . Labs and X ray results   2. Reviewed Current Medicines   3. On meal/ Correction bolus Humalog/ Basal Lantus Insulin regime / and Oral Hypoglycemic drugs   4. Monitor Blood glucose frequently   5. Modified  the dose of Insulin/ other medicines as needed   6.  Will follow Yoel Paige MD, MD

## 2022-03-20 NOTE — PROGRESS NOTES
otherwise neg    Objective:     No intake or output data in the 24 hours ending 03/20/22 1824     Vitals:   Vitals:    03/20/22 0830   BP: (!) 143/102   Pulse: 62   Resp: 12   Temp: 98 °F (36.7 °C)   SpO2: 96%       Physical Exam:     General: NAD  Eyes: EOMI  ENT: neck supple  Cardiovascular: Regular rate. Respiratory: Clear to auscultation  Gastrointestinal: Soft, non tender  Genitourinary: no suprapubic tenderness  Musculoskeletal: No edema, has a brace on the left LE  Skin: warm, dry  Neuro: Alert. Psych: Mood appropriate.      Medications:   Medications:    alogliptin  6.25 mg Oral Daily    insulin glargine  30 Units SubCUTAneous Nightly    metFORMIN  500 mg Oral BID WC    insulin lispro  10 Units SubCUTAneous TID WC    sodium chloride flush  5-40 mL IntraVENous 2 times per day    enoxaparin  30 mg SubCUTAneous Daily    atorvastatin  40 mg Oral Nightly    baclofen  5 mg Oral TID    amitriptyline  25 mg Oral Nightly    busPIRone  10 mg Oral TID    Vitamin D  2,000 Units Oral Daily    clopidogrel  75 mg Oral Daily    donepezil  10 mg Oral Nightly    metoprolol succinate  50 mg Oral Daily    pantoprazole  40 mg Oral BID AC    topiramate  50 mg Oral Nightly    insulin lispro  0-12 Units SubCUTAneous TID WC    insulin lispro  0-6 Units SubCUTAneous Nightly    gabapentin  600 mg Oral TID      Infusions:    sodium chloride      sodium chloride 100 mL/hr at 03/20/22 0142    dextrose       PRN Meds: sodium chloride flush, 5-40 mL, PRN  sodium chloride, 25 mL, PRN  ondansetron, 4 mg, Q8H PRN   Or  ondansetron, 4 mg, Q6H PRN  polyethylene glycol, 17 g, Daily PRN  acetaminophen, 650 mg, Q6H PRN   Or  acetaminophen, 650 mg, Q6H PRN  glucose, 15 g, PRN  glucagon (rDNA), 1 mg, PRN  dextrose, 100 mL/hr, PRN  dextrose bolus (hypoglycemia), 125 mL, PRN   Or  dextrose bolus (hypoglycemia), 250 mL, PRN        Labs      Recent Results (from the past 24 hour(s))   POCT Glucose    Collection Time: 03/19/22 8:24 PM   Result Value Ref Range    POC Glucose 101 (H) 70 - 99 MG/DL   POCT Glucose    Collection Time: 03/20/22  6:49 AM   Result Value Ref Range    POC Glucose 203 (H) 70 - 99 MG/DL   POCT Glucose    Collection Time: 03/20/22 11:27 AM   Result Value Ref Range    POC Glucose 249 (H) 70 - 99 MG/DL   POCT Glucose    Collection Time: 03/20/22  4:20 PM   Result Value Ref Range    POC Glucose 140 (H) 70 - 99 MG/DL        Imaging/Diagnostics Last 24 Hours   XR ANKLE LEFT (MIN 3 VIEWS)    Result Date: 3/17/2022  EXAMINATION: THREE XRAY VIEWS OF THE LEFT ANKLE; THREE XRAY VIEWS OF THE LEFT FOOT 3/17/2022 9:12 pm COMPARISON: None. HISTORY: ORDERING SYSTEM PROVIDED HISTORY: ankle fracture as per pt TECHNOLOGIST PROVIDED HISTORY: Reason for exam:->ankle fracture as per pt Reason for Exam: LT ANKLE PAIN FINDINGS: There is no acute fracture or dislocation at the left ankle. The ankle mortise and talar dome are intact. Some small ossifications at the tip of the lateral malleolus may relate to subacute to old fracture. There is soft tissue swelling around the ankle. Fixation screw within the 5th metatarsal appears intact. The chronic appearing deformity of the proximal and distal 5th metatarsal but no acute fracture lines are identified. The bones of the foot are osteopenic. There is no dislocation of the tarsal metatarsal junction. Prominent arthritic changes are present at the 1st metatarsophalangeal joint with flattening of the distal metatarsal head. Flattening of the head of the 2nd metatarsal as well but chronic. Moderate plantar calcaneal spur formation is present. Edema of the heel is suggested but no soft tissue emphysema. No acute fracture lines the main portion of the left ankle and no dislocation. The small ossifications at the tip of the lateral malleolus could relate to subacute or old injury.  No acute fractures are seen at the foot but does have a long fixation screw at the 5th metatarsal.  Chronic appearing deformities of the 5th metatarsal. Severe arthritic and or combined posttraumatic changes at the 1st metatarsophalangeal joint with flattening of the metatarsal head. XR FOOT LEFT (MIN 3 VIEWS)    Result Date: 3/17/2022  EXAMINATION: THREE XRAY VIEWS OF THE LEFT ANKLE; THREE XRAY VIEWS OF THE LEFT FOOT 3/17/2022 9:12 pm COMPARISON: None. HISTORY: ORDERING SYSTEM PROVIDED HISTORY: ankle fracture as per pt TECHNOLOGIST PROVIDED HISTORY: Reason for exam:->ankle fracture as per pt Reason for Exam: LT ANKLE PAIN FINDINGS: There is no acute fracture or dislocation at the left ankle. The ankle mortise and talar dome are intact. Some small ossifications at the tip of the lateral malleolus may relate to subacute to old fracture. There is soft tissue swelling around the ankle. Fixation screw within the 5th metatarsal appears intact. The chronic appearing deformity of the proximal and distal 5th metatarsal but no acute fracture lines are identified. The bones of the foot are osteopenic. There is no dislocation of the tarsal metatarsal junction. Prominent arthritic changes are present at the 1st metatarsophalangeal joint with flattening of the distal metatarsal head. Flattening of the head of the 2nd metatarsal as well but chronic. Moderate plantar calcaneal spur formation is present. Edema of the heel is suggested but no soft tissue emphysema. No acute fracture lines the main portion of the left ankle and no dislocation. The small ossifications at the tip of the lateral malleolus could relate to subacute or old injury. No acute fractures are seen at the foot but does have a long fixation screw at the 5th metatarsal.  Chronic appearing deformities of the 5th metatarsal. Severe arthritic and or combined posttraumatic changes at the 1st metatarsophalangeal joint with flattening of the metatarsal head.      CT HEAD WO CONTRAST    Result Date: 3/17/2022  EXAMINATION: CT OF THE HEAD WITHOUT CONTRAST 3/17/2022 2:49 pm TECHNIQUE: CT of the head was performed without the administration of intravenous contrast. Dose modulation, iterative reconstruction, and/or weight based adjustment of the mA/kV was utilized to reduce the radiation dose to as low as reasonably achievable. COMPARISON: MRI brain October 20, 2021; CT head September 23, 2021 HISTORY: ORDERING SYSTEM PROVIDED HISTORY: frequent falls on blood thinners TECHNOLOGIST PROVIDED HISTORY: Has a \"code stroke\" or \"stroke alert\" been called? ->No Reason for exam:->frequent falls on blood thinners Decision Support Exception - unselect if not a suspected or confirmed emergency medical condition->Emergency Medical Condition (MA) Reason for Exam: frequent falls on blood thinners Additional signs and symptoms: NONE Relevant Medical/Surgical History: NONE FINDINGS: BRAIN/VENTRICLES: There is no acute intracranial hemorrhage, mass effect or midline shift. No abnormal extra-axial fluid collection. The gray-white differentiation is maintained without evidence of an acute infarct. There is no evidence of hydrocephalus. Atherosclerotic change of the intracranial vasculature. There is mild periventricular and subcortical white matter hypoattenuation most consistent with microvascular ischemic changes. There is mild age appropriate global cerebral atrophy. ORBITS: The visualized portion of the orbits demonstrate no acute abnormality. SINUSES: The visualized paranasal sinuses and mastoid air cells demonstrate small air-fluid level pneumatized secretions of the right sphenoid sinus. Correlate for acute sinusitis. SOFT TISSUES/SKULL:  No acute abnormality of the visualized skull or soft tissues. No acute intracranial abnormality. None microvascular ischemic changes and global cerebral atrophy. Small air-fluid level pneumatized secretions of the right sphenoid sinus. Correlate for acute sinusitis.      XR CHEST PORTABLE    Result Date: 3/17/2022  EXAMINATION: ONE XRAY VIEW OF THE CHEST 3/17/2022 2:51 pm COMPARISON: March 11, 2022 HISTORY: ORDERING SYSTEM PROVIDED HISTORY: fatigue TECHNOLOGIST PROVIDED HISTORY: Reason for exam:->fatigue Reason for Exam: fatigue FINDINGS: The cardiomediastinal silhouette is stable. There are increased lung markings bilaterally, may be related to mild pulmonary vascular congestion versus bronchitis. There is no pleural effusion. There is no pneumothorax. There is no acute osseous abnormality. Increased lung markings bilaterally, may be related to mild pulmonary vascular congestion versus bronchitis.        Electronically signed by Tenzin Carreon MD on 3/20/2022 at 6:24 PM

## 2022-03-21 LAB
ANION GAP SERPL CALCULATED.3IONS-SCNC: 12 MMOL/L (ref 4–16)
BASOPHILS ABSOLUTE: 0 K/CU MM
BASOPHILS RELATIVE PERCENT: 0.5 % (ref 0–1)
BUN BLDV-MCNC: 17 MG/DL (ref 6–23)
CALCIUM SERPL-MCNC: 9 MG/DL (ref 8.3–10.6)
CHLORIDE BLD-SCNC: 105 MMOL/L (ref 99–110)
CO2: 22 MMOL/L (ref 21–32)
CREAT SERPL-MCNC: 1.2 MG/DL (ref 0.6–1.1)
DIFFERENTIAL TYPE: ABNORMAL
EOSINOPHILS ABSOLUTE: 0.2 K/CU MM
EOSINOPHILS RELATIVE PERCENT: 3 % (ref 0–3)
GFR AFRICAN AMERICAN: 55 ML/MIN/1.73M2
GFR NON-AFRICAN AMERICAN: 45 ML/MIN/1.73M2
GLUCOSE BLD-MCNC: 132 MG/DL (ref 70–99)
GLUCOSE BLD-MCNC: 210 MG/DL (ref 70–99)
GLUCOSE BLD-MCNC: 322 MG/DL (ref 70–99)
GLUCOSE BLD-MCNC: 342 MG/DL (ref 70–99)
GLUCOSE BLD-MCNC: 80 MG/DL (ref 70–99)
HCT VFR BLD CALC: 37.4 % (ref 37–47)
HEMOGLOBIN: 11.4 GM/DL (ref 12.5–16)
IMMATURE NEUTROPHIL %: 0.4 % (ref 0–0.43)
LYMPHOCYTES ABSOLUTE: 1.8 K/CU MM
LYMPHOCYTES RELATIVE PERCENT: 31.1 % (ref 24–44)
MCH RBC QN AUTO: 29.5 PG (ref 27–31)
MCHC RBC AUTO-ENTMCNC: 30.5 % (ref 32–36)
MCV RBC AUTO: 96.6 FL (ref 78–100)
MONOCYTES ABSOLUTE: 0.5 K/CU MM
MONOCYTES RELATIVE PERCENT: 9.5 % (ref 0–4)
NUCLEATED RBC %: 0 %
PDW BLD-RTO: 12.7 % (ref 11.7–14.9)
PLATELET # BLD: 230 K/CU MM (ref 140–440)
PMV BLD AUTO: 10.8 FL (ref 7.5–11.1)
POTASSIUM SERPL-SCNC: 4.7 MMOL/L (ref 3.5–5.1)
RBC # BLD: 3.87 M/CU MM (ref 4.2–5.4)
SEGMENTED NEUTROPHILS ABSOLUTE COUNT: 3.2 K/CU MM
SEGMENTED NEUTROPHILS RELATIVE PERCENT: 55.5 % (ref 36–66)
SODIUM BLD-SCNC: 139 MMOL/L (ref 135–145)
TOTAL IMMATURE NEUTOROPHIL: 0.02 K/CU MM
TOTAL NUCLEATED RBC: 0 K/CU MM
WBC # BLD: 5.7 K/CU MM (ref 4–10.5)

## 2022-03-21 PROCEDURE — 2580000003 HC RX 258: Performed by: INTERNAL MEDICINE

## 2022-03-21 PROCEDURE — 6370000000 HC RX 637 (ALT 250 FOR IP): Performed by: INTERNAL MEDICINE

## 2022-03-21 PROCEDURE — 36415 COLL VENOUS BLD VENIPUNCTURE: CPT

## 2022-03-21 PROCEDURE — 97116 GAIT TRAINING THERAPY: CPT

## 2022-03-21 PROCEDURE — 1200000000 HC SEMI PRIVATE

## 2022-03-21 PROCEDURE — 6360000002 HC RX W HCPCS: Performed by: INTERNAL MEDICINE

## 2022-03-21 PROCEDURE — 85025 COMPLETE CBC W/AUTO DIFF WBC: CPT

## 2022-03-21 PROCEDURE — G0378 HOSPITAL OBSERVATION PER HR: HCPCS

## 2022-03-21 PROCEDURE — 94761 N-INVAS EAR/PLS OXIMETRY MLT: CPT

## 2022-03-21 PROCEDURE — 82962 GLUCOSE BLOOD TEST: CPT

## 2022-03-21 PROCEDURE — 96372 THER/PROPH/DIAG INJ SC/IM: CPT

## 2022-03-21 PROCEDURE — 6370000000 HC RX 637 (ALT 250 FOR IP): Performed by: STUDENT IN AN ORGANIZED HEALTH CARE EDUCATION/TRAINING PROGRAM

## 2022-03-21 PROCEDURE — 80048 BASIC METABOLIC PNL TOTAL CA: CPT

## 2022-03-21 RX ORDER — LISINOPRIL 40 MG/1
40 TABLET ORAL DAILY
Status: DISCONTINUED | OUTPATIENT
Start: 2022-03-21 | End: 2022-03-22 | Stop reason: HOSPADM

## 2022-03-21 RX ADMIN — PANTOPRAZOLE SODIUM 40 MG: 40 TABLET, DELAYED RELEASE ORAL at 17:38

## 2022-03-21 RX ADMIN — AMITRIPTYLINE HYDROCHLORIDE 25 MG: 25 TABLET, FILM COATED ORAL at 22:38

## 2022-03-21 RX ADMIN — BACLOFEN 5 MG: 10 TABLET ORAL at 22:39

## 2022-03-21 RX ADMIN — METFORMIN HYDROCHLORIDE 500 MG: 500 TABLET ORAL at 09:14

## 2022-03-21 RX ADMIN — BUSPIRONE HYDROCHLORIDE 10 MG: 10 TABLET ORAL at 14:11

## 2022-03-21 RX ADMIN — ALOGLIPTIN 6.25 MG: 12.5 TABLET, FILM COATED ORAL at 09:14

## 2022-03-21 RX ADMIN — PANTOPRAZOLE SODIUM 40 MG: 40 TABLET, DELAYED RELEASE ORAL at 07:01

## 2022-03-21 RX ADMIN — BACLOFEN 5 MG: 10 TABLET ORAL at 09:15

## 2022-03-21 RX ADMIN — LISINOPRIL 40 MG: 40 TABLET ORAL at 17:38

## 2022-03-21 RX ADMIN — BUSPIRONE HYDROCHLORIDE 10 MG: 10 TABLET ORAL at 09:14

## 2022-03-21 RX ADMIN — AMLODIPINE BESYLATE 10 MG: 10 TABLET ORAL at 09:14

## 2022-03-21 RX ADMIN — DONEPEZIL HYDROCHLORIDE 10 MG: 10 TABLET, FILM COATED ORAL at 22:38

## 2022-03-21 RX ADMIN — METOPROLOL SUCCINATE 50 MG: 50 TABLET, EXTENDED RELEASE ORAL at 09:14

## 2022-03-21 RX ADMIN — Medication 2000 UNITS: at 09:14

## 2022-03-21 RX ADMIN — CLOPIDOGREL BISULFATE 75 MG: 75 TABLET ORAL at 09:14

## 2022-03-21 RX ADMIN — BUSPIRONE HYDROCHLORIDE 10 MG: 10 TABLET ORAL at 22:38

## 2022-03-21 RX ADMIN — SODIUM CHLORIDE, PRESERVATIVE FREE 10 ML: 5 INJECTION INTRAVENOUS at 22:39

## 2022-03-21 RX ADMIN — GABAPENTIN 600 MG: 300 CAPSULE ORAL at 14:11

## 2022-03-21 RX ADMIN — GABAPENTIN 600 MG: 300 CAPSULE ORAL at 07:01

## 2022-03-21 RX ADMIN — SODIUM CHLORIDE, PRESERVATIVE FREE 10 ML: 5 INJECTION INTRAVENOUS at 09:18

## 2022-03-21 RX ADMIN — TOPIRAMATE 50 MG: 25 TABLET, FILM COATED ORAL at 22:38

## 2022-03-21 RX ADMIN — BACLOFEN 5 MG: 10 TABLET ORAL at 14:11

## 2022-03-21 RX ADMIN — INSULIN GLARGINE 30 UNITS: 100 INJECTION, SOLUTION SUBCUTANEOUS at 21:53

## 2022-03-21 RX ADMIN — GABAPENTIN 600 MG: 300 CAPSULE ORAL at 22:38

## 2022-03-21 RX ADMIN — ATORVASTATIN CALCIUM 40 MG: 40 TABLET, FILM COATED ORAL at 22:39

## 2022-03-21 RX ADMIN — ENOXAPARIN SODIUM 30 MG: 100 INJECTION SUBCUTANEOUS at 09:15

## 2022-03-21 ASSESSMENT — PAIN SCALES - GENERAL
PAINLEVEL_OUTOF10: 0

## 2022-03-21 NOTE — PROGRESS NOTES
Physical Therapy    Physical Therapy Treatment Note  Name: Amanda Huertas MRN: [id number] :   1960   Date:  3/21/2022   Admission Date: 3/17/2022 Room:  [address] [place] [address]   Restrictions/Precautions:        Fall risk, LLE Boot, IV, tele, general precautions. Communication with other providers:  Nurse states pt ok for tx  Subjective:  Patient states:  Agreeable to PT  Pain:   Location, Type, Intensity (0/10 to 10/10): Denies pain  Objective:    Observation:  Supine in bed upon entry  Treatment, including education/measures:  Rolling: SBA  Supine to sit :SBA  Sit to supine :SBA  Scooting :SBA  Sit to stand :SBA  Stand to sit :SBA  SPT:SBA    Gait:  Pt amb with FWW for 300 ft with CGA  Pt needed VC's for safety and sequencing. States she has had episodes of \"buckling\" in knee, so close w/c follow given, though no buckling occurs. Safety  Patient left safely in the bed, with call light/phone in reach with alarm applied. Gait belt and mask were used for transfers and gait. Assessment / Impression:  Pt progressing in gait and transfer independence. Increases gait tolerance/distance on this date /c no seated rest breaks required. Patient's tolerance of treatment:  good   Adverse Reaction: na  Significant change in status and impact:  na  Barriers to improvement:  Fall risk  Plan for Next Session:    Cont POC to increase gait tolerance and independence.   Time in:  928  Time out:  048  Timed treatment minutes:  20  Total treatment time:  20    Previously filed items:  Social/Functional History  Lives With: Alone  Type of Home: Apartment (condo)  Home Layout: One level  Home Access: Level entry  Bathroom Shower/Tub: Tub/Shower unit  Bathroom Toilet: Standard  Bathroom Equipment: Shower chair,Grab bars in shower  Home Equipment: Cane,Roll About,Rolling walker (rollator in home, FWW for outside)  ADL Assistance: [address] [place] [address] Avenue: Needs assistance (pt has had assist from senior services 2hr/wk for cleaning)  Homemaking Responsibilities: Yes  Ambulation Assistance: Independent  Transfer Assistance: Independent (pt increased difficulty with tub transfers)  Active : Yes (goes to grocery)  Additional Comments: 10 falls past year  Short term goals  Time Frame for Short term goals: 1 week  Short term goal 1: Pt will AMB x400 ft with RW, SBA, no episodes of buckling. Short term goal 2: Pt will demonstrate good carryover with spinal mobility exercises, Mod I. Short term goal 3: Pt will perform x10 STS from standard chair in 30 sec SBA. Short term goal 4: Pt will tolerate x15  minutes static/dynamic standing balance with UE support, SBA.        Electronically signed by:    Cassidy Molina, ADELE  3/21/2022, 9:52 AM

## 2022-03-21 NOTE — CARE COORDINATION
F/u with chart review. Pt walked 300 ft with therapy. LSW into the room. LSW explained that d/t Pt walking with therapy for 300 ft her insurance will not approve ARU or SNF. LSW offered home care at home. Pt declined. LSW notified [facility] via PS.

## 2022-03-22 VITALS
RESPIRATION RATE: 16 BRPM | BODY MASS INDEX: 42.94 KG/M2 | HEART RATE: 60 BPM | SYSTOLIC BLOOD PRESSURE: 153 MMHG | HEIGHT: 64 IN | TEMPERATURE: 97.6 F | WEIGHT: 251.54 LBS | OXYGEN SATURATION: 96 % | DIASTOLIC BLOOD PRESSURE: 57 MMHG

## 2022-03-22 LAB
GLUCOSE BLD-MCNC: 217 MG/DL (ref 70–99)
GLUCOSE BLD-MCNC: 263 MG/DL (ref 70–99)

## 2022-03-22 PROCEDURE — 6370000000 HC RX 637 (ALT 250 FOR IP): Performed by: INTERNAL MEDICINE

## 2022-03-22 PROCEDURE — 6360000002 HC RX W HCPCS: Performed by: INTERNAL MEDICINE

## 2022-03-22 PROCEDURE — 96372 THER/PROPH/DIAG INJ SC/IM: CPT

## 2022-03-22 PROCEDURE — G0378 HOSPITAL OBSERVATION PER HR: HCPCS

## 2022-03-22 PROCEDURE — 82962 GLUCOSE BLOOD TEST: CPT

## 2022-03-22 PROCEDURE — 6370000000 HC RX 637 (ALT 250 FOR IP): Performed by: STUDENT IN AN ORGANIZED HEALTH CARE EDUCATION/TRAINING PROGRAM

## 2022-03-22 RX ORDER — BACLOFEN 5 MG/1
5 TABLET ORAL 3 TIMES DAILY
Qty: 90 TABLET | Refills: 0 | Status: ON HOLD
Start: 2022-03-22 | End: 2022-07-05 | Stop reason: HOSPADM

## 2022-03-22 RX ORDER — INSULIN GLARGINE 100 [IU]/ML
30 INJECTION, SOLUTION SUBCUTANEOUS NIGHTLY
Qty: 10 ML | Refills: 3 | Status: ON HOLD
Start: 2022-03-22 | End: 2022-07-18 | Stop reason: HOSPADM

## 2022-03-22 RX ORDER — ALOGLIPTIN 6.25 MG/1
6.25 TABLET, FILM COATED ORAL DAILY
Qty: 60 TABLET | Refills: 0 | Status: SHIPPED | OUTPATIENT
Start: 2022-03-23 | End: 2022-07-02

## 2022-03-22 RX ORDER — HYDRALAZINE HYDROCHLORIDE 25 MG/1
25 TABLET, FILM COATED ORAL EVERY 8 HOURS SCHEDULED
Qty: 90 TABLET | Refills: 3 | Status: SHIPPED
Start: 2022-03-22 | End: 2022-07-18

## 2022-03-22 RX ORDER — HYDRALAZINE HYDROCHLORIDE 25 MG/1
25 TABLET, FILM COATED ORAL EVERY 8 HOURS SCHEDULED
Status: DISCONTINUED | OUTPATIENT
Start: 2022-03-22 | End: 2022-03-22 | Stop reason: HOSPADM

## 2022-03-22 RX ADMIN — LISINOPRIL 40 MG: 40 TABLET ORAL at 07:56

## 2022-03-22 RX ADMIN — GABAPENTIN 600 MG: 300 CAPSULE ORAL at 05:57

## 2022-03-22 RX ADMIN — ENOXAPARIN SODIUM 30 MG: 100 INJECTION SUBCUTANEOUS at 07:59

## 2022-03-22 RX ADMIN — AMLODIPINE BESYLATE 10 MG: 10 TABLET ORAL at 07:55

## 2022-03-22 RX ADMIN — HYDRALAZINE HYDROCHLORIDE 25 MG: 25 TABLET, FILM COATED ORAL at 08:12

## 2022-03-22 RX ADMIN — BACLOFEN 5 MG: 10 TABLET ORAL at 07:55

## 2022-03-22 RX ADMIN — CLOPIDOGREL BISULFATE 75 MG: 75 TABLET ORAL at 07:55

## 2022-03-22 RX ADMIN — METOPROLOL SUCCINATE 50 MG: 50 TABLET, EXTENDED RELEASE ORAL at 07:54

## 2022-03-22 RX ADMIN — Medication 2000 UNITS: at 07:56

## 2022-03-22 RX ADMIN — BUSPIRONE HYDROCHLORIDE 10 MG: 10 TABLET ORAL at 07:55

## 2022-03-22 RX ADMIN — ALOGLIPTIN 6.25 MG: 12.5 TABLET, FILM COATED ORAL at 07:56

## 2022-03-22 RX ADMIN — PANTOPRAZOLE SODIUM 40 MG: 40 TABLET, DELAYED RELEASE ORAL at 05:57

## 2022-03-22 NOTE — DISCHARGE SUMMARY
V2.0  Discharge Summary    Name:  Michael Griffith /Age/Sex: 1960 (75 y.o. female)   Admit Date: 3/17/2022  Discharge Date: 3/22/22    MRN & CSN:  [id number] & [id number] Encounter Date and Time 3/22/22 11:06 AM EDT    Attending:  Justin Ventura DO Discharging Provider: Justin Ventura DO       Hospital Course:     Brief HPI: Michael Griffith is a 59 y.o. female who presented with pmh of recurrent falls, CKD, type 2 DM, HTN, depression, anxiety, history of TIA, PRIMITIVO, COPD, chronic back pain who presents with Repeated falls    Brief Problem Based Course:   Frequent falls: Neurology signed off. Falls likely due to peripheral neuropathy. CT head non acute. Brain and lumbar spine MRI unremarkable. Fall precautions. PT/OT following. Patient did not qualify for ARU or SNF. She is cleared for discharge to home. Self-care deficit: patient lives alone and says she is unable to take care of herself at home. Did not qualify for SNF or ARU. Pt is now agreeable for discharge to home, and get pt out, per her request.  MODESTO on CKD: Improved with IV hydration. Creat back to 1.2 which is her baseline. Type 2 DM with hyperglycemia/DM neuropathy: continue glargine and lispro sliding scale. Endocrinology following for better glycemic control. Pt should f/u with Endocrinology as out pt, and keep strict diabetic diet. Nesina and Metformin was prescribed. Increase lantus to 30iu at HS. COPD not on home O2: continue home meds. She is not in acute exacerbation  Uncontrolled hypertension:Continue metoprolol, amlodipine. Lisinopril restarted. Added Hydralazine TID for better control. Chronic medical conditions: low back pain, depression, anxiety, history of TIA, resting tremor - continue all home meds. Disposition: Pt is discharged to home with [facility]. The patient expressed appropriate understanding of, and agreement with the discharge recommendations, medications, and plan.      Consults this admission:  IP CONSULT TO CASE MANAGEMENT  IP CONSULT TO HOSPITALIST  IP CONSULT TO NEUROLOGY  IP CONSULT TO ENDOCRINOLOGY    Discharge Diagnosis:   Repeated falls  MODESTO on CKD III  HTN  DM II non controlled with neuropathy and CKD III  COPD  Low back pain  Depression  Anxiety    Discharge Instruction:   Follow up appointments: F/U with PCP within a week post discharge. F/U with Endocrinology and Neurology, as ordered, and scheduled.   Primary care physician: Joshua Georges MD within 2 weeks  Diet: cardiac diet and diabetic diet   Activity: activity as tolerated  Disposition: Discharged to:   [x]Home, [x][facility], []SNF, []Acute Rehab, []Hospice   Condition on discharge: Stable  Labs and Tests to be Followed up as an outpatient by PCP or Specialist: None    Discharge Medications:        Medication List      START taking these medications    alogliptin 6.25 MG Tabs tablet  Commonly known as: NESINA  Take 1 tablet by mouth daily  Start taking on: March 23, 2022     hydrALAZINE 25 MG tablet  Commonly known as: APRESOLINE  Take 1 tablet by mouth every 8 hours     metFORMIN 500 MG tablet  Commonly known as: GLUCOPHAGE  Take 1 tablet by mouth 2 times daily (with meals)        CHANGE how you take these medications    Baclofen 5 MG tablet  Commonly known as: LIORESAL  Take 1 tablet by mouth 3 times daily  What changed:   · medication strength  · how much to take  · how to take this     insulin glargine 100 UNIT/ML injection vial  Commonly known as: LANTUS  Inject 30 Units into the skin nightly  What changed: how much to take        CONTINUE taking these medications    alendronate 70 MG tablet  Commonly known as: FOSAMAX     amitriptyline 25 MG tablet  Commonly known as: ELAVIL     amLODIPine 10 MG tablet  Commonly known as: NORVASC     atorvastatin 40 MG tablet  Commonly known as: LIPITOR     busPIRone 10 MG tablet  Commonly known as: BUSPAR     Cholecalciferol 50 MCG (2000 UT) Caps     clopidogrel 75 MG tablet  Commonly known as: PLAVIX     dicyclomine 20 MG tablet  Commonly known as: BENTYL  Take 1 tablet by mouth 4 times daily (before meals and nightly)     donepezil 5 MG tablet  Commonly known as: ARICEPT     ferrous sulfate 325 (65 Fe) MG EC tablet  Commonly known as: FE TABS 325     gabapentin 600 MG tablet  Commonly known as: NEURONTIN     insulin lispro 100 UNIT/ML injection vial  Commonly known as: HUMALOG  Check blood glucose three times daily before meals and at bedtime- for blood glucose <150- no insulin, 151-200=2, 201-250=4, 251-300=6, 301-350=8, 351-400=10, >400=12 units and call MD     lidocaine 4 % external patch  Place 1 patch onto the skin daily     lisinopril 40 MG tablet  Commonly known as: PRINIVIL;ZESTRIL  Take 1 tablet by mouth daily     metoprolol succinate 50 MG extended release tablet  Commonly known as: TOPROL XL  Take 1 tablet by mouth daily     pantoprazole 40 MG tablet  Commonly known as: PROTONIX  Take 1 tablet by mouth 2 times daily (before meals)     Topamax 50 MG tablet  Generic drug: topiramate           Where to Get Your Medications      These medications were sent to [facility] [address] , [address] [place] [address] [address]  P [phone] -  [phone]  [address] [place] [address]    Phone: [phone]   · alogliptin 6.25 MG Tabs tablet  · Baclofen 5 MG tablet  · hydrALAZINE 25 MG tablet  · insulin glargine 100 UNIT/ML injection vial  · metFORMIN 500 MG tablet        Objective Findings at Discharge:   BP (!) 153/57   Pulse 60   Temp 97.6 °F (36.4 °C) (Oral)   Resp 16   Ht 5' 4\" (1.626 m)   Wt 251 lb 8.7 oz (114.1 kg)   SpO2 96%   BMI 43.18 kg/m²       Physical Exam:   General: NAD  Eyes: EOMI  ENT: neck supple  Cardiovascular: Regular rate. Respiratory: Clear to auscultation  Gastrointestinal: Soft, non tender  Genitourinary: no suprapubic tenderness  Musculoskeletal: No edema  Skin: warm, dry  Neuro: Alert. Psych: Mood appropriate.          Labs and Imaging   XR ANKLE LEFT (MIN 3 VIEWS)    Result Date: 3/17/2022  EXAMINATION: THREE XRAY VIEWS OF THE LEFT ANKLE; THREE XRAY VIEWS OF THE LEFT FOOT 3/17/2022 9:12 pm COMPARISON: None. HISTORY: ORDERING SYSTEM PROVIDED HISTORY: ankle fracture as per pt TECHNOLOGIST PROVIDED HISTORY: Reason for exam:->ankle fracture as per pt Reason for Exam: LT ANKLE PAIN FINDINGS: There is no acute fracture or dislocation at the left ankle. The ankle mortise and talar dome are intact. Some small ossifications at the tip of the lateral malleolus may relate to subacute to old fracture. There is soft tissue swelling around the ankle. Fixation screw within the 5th metatarsal appears intact. The chronic appearing deformity of the proximal and distal 5th metatarsal but no acute fracture lines are identified. The bones of the foot are osteopenic. There is no dislocation of the tarsal metatarsal junction. Prominent arthritic changes are present at the 1st metatarsophalangeal joint with flattening of the distal metatarsal head. Flattening of the head of the 2nd metatarsal as well but chronic. Moderate plantar calcaneal spur formation is present. Edema of the heel is suggested but no soft tissue emphysema. No acute fracture lines the main portion of the left ankle and no dislocation. The small ossifications at the tip of the lateral malleolus could relate to subacute or old injury. No acute fractures are seen at the foot but does have a long fixation screw at the 5th metatarsal.  Chronic appearing deformities of the 5th metatarsal. Severe arthritic and or combined posttraumatic changes at the 1st metatarsophalangeal joint with flattening of the metatarsal head. XR FOOT LEFT (MIN 3 VIEWS)    Result Date: 3/17/2022  EXAMINATION: THREE XRAY VIEWS OF THE LEFT ANKLE; THREE XRAY VIEWS OF THE LEFT FOOT 3/17/2022 9:12 pm COMPARISON: None.  HISTORY: ORDERING SYSTEM PROVIDED HISTORY: ankle fracture as per pt TECHNOLOGIST PROVIDED HISTORY: Reason for exam:->ankle fracture as per pt Reason for Exam: LT ANKLE PAIN FINDINGS: There is no acute fracture or dislocation at the left ankle. The ankle mortise and talar dome are intact. Some small ossifications at the tip of the lateral malleolus may relate to subacute to old fracture. There is soft tissue swelling around the ankle. Fixation screw within the 5th metatarsal appears intact. The chronic appearing deformity of the proximal and distal 5th metatarsal but no acute fracture lines are identified. The bones of the foot are osteopenic. There is no dislocation of the tarsal metatarsal junction. Prominent arthritic changes are present at the 1st metatarsophalangeal joint with flattening of the distal metatarsal head. Flattening of the head of the 2nd metatarsal as well but chronic. Moderate plantar calcaneal spur formation is present. Edema of the heel is suggested but no soft tissue emphysema. No acute fracture lines the main portion of the left ankle and no dislocation. The small ossifications at the tip of the lateral malleolus could relate to subacute or old injury. No acute fractures are seen at the foot but does have a long fixation screw at the 5th metatarsal.  Chronic appearing deformities of the 5th metatarsal. Severe arthritic and or combined posttraumatic changes at the 1st metatarsophalangeal joint with flattening of the metatarsal head. CT HEAD WO CONTRAST    Result Date: 3/17/2022  EXAMINATION: CT OF THE HEAD WITHOUT CONTRAST  3/17/2022 2:49 pm TECHNIQUE: CT of the head was performed without the administration of intravenous contrast. Dose modulation, iterative reconstruction, and/or weight based adjustment of the mA/kV was utilized to reduce the radiation dose to as low as reasonably achievable.  COMPARISON: MRI brain October 20, 2021; CT head September 23, 2021 HISTORY: ORDERING SYSTEM PROVIDED HISTORY: frequent falls on blood thinners TECHNOLOGIST PROVIDED HISTORY: Has a \"code stroke\" or \"stroke alert\" been called? ->No Reason for exam:->frequent falls on blood thinners Decision Support Exception - unselect if not a suspected or confirmed emergency medical condition->Emergency Medical Condition (MA) Reason for Exam: frequent falls on blood thinners Additional signs and symptoms: NONE Relevant Medical/Surgical History: NONE FINDINGS: BRAIN/VENTRICLES: There is no acute intracranial hemorrhage, mass effect or midline shift. No abnormal extra-axial fluid collection. The gray-white differentiation is maintained without evidence of an acute infarct. There is no evidence of hydrocephalus. Atherosclerotic change of the intracranial vasculature. There is mild periventricular and subcortical white matter hypoattenuation most consistent with microvascular ischemic changes. There is mild age appropriate global cerebral atrophy. ORBITS: The visualized portion of the orbits demonstrate no acute abnormality. SINUSES: The visualized paranasal sinuses and mastoid air cells demonstrate small air-fluid level pneumatized secretions of the right sphenoid sinus. Correlate for acute sinusitis. SOFT TISSUES/SKULL:  No acute abnormality of the visualized skull or soft tissues. No acute intracranial abnormality. None microvascular ischemic changes and global cerebral atrophy. Small air-fluid level pneumatized secretions of the right sphenoid sinus. Correlate for acute sinusitis. MRI LUMBAR SPINE WO CONTRAST    Result Date: 3/18/2022  EXAMINATION: MRI OF THE LUMBAR SPINE WITHOUT CONTRAST, 3/18/2022 5:38 pm TECHNIQUE: Multiplanar multisequence MRI of the lumbar spine was performed without the administration of intravenous contrast. COMPARISON: 10/20/2021 HISTORY: ORDERING SYSTEM PROVIDED HISTORY: bilateral LE weakness TECHNOLOGIST PROVIDED HISTORY: Reason for exam:->bilateral LE weakness Reason for Exam: bilat le weakness FINDINGS: BONES/ALIGNMENT: Normal lordosis of the lumbar spine without significant spondylolisthesis. Vertebral body heights appear preserved. Marrow signal appears heterogeneous. No aggressive osseous lesions. No evidence of acute fracture. Intervertebral disc heights appear preserved. SPINAL CORD: The conus terminates normally. SOFT TISSUES: No paraspinal mass identified. L1-L2: There is no significant disc herniation, spinal canal stenosis or neural foraminal narrowing. L2-L3: There is no significant disc herniation, spinal canal stenosis or neural foraminal narrowing. L3-L4: There is no significant disc herniation, spinal canal stenosis or neural foraminal narrowing. L4-L5: There is no significant disc herniation, spinal canal stenosis or neural foraminal narrowing. L5-S1: There is no significant disc herniation, spinal canal stenosis or neural foraminal narrowing. 1. No significant spinal canal stenosis or neural foraminal narrowing. 2. Marrow signal appears heterogeneous, raising the possibility of osteopenia. XR CHEST PORTABLE    Result Date: 3/17/2022  EXAMINATION: ONE XRAY VIEW OF THE CHEST 3/17/2022 2:51 pm COMPARISON: March 11, 2022 HISTORY: ORDERING SYSTEM PROVIDED HISTORY: fatigue TECHNOLOGIST PROVIDED HISTORY: Reason for exam:->fatigue Reason for Exam: fatigue FINDINGS: The cardiomediastinal silhouette is stable. There are increased lung markings bilaterally, may be related to mild pulmonary vascular congestion versus bronchitis. There is no pleural effusion. There is no pneumothorax. There is no acute osseous abnormality. Increased lung markings bilaterally, may be related to mild pulmonary vascular congestion versus bronchitis.      MRI BRAIN WO CONTRAST    Result Date: 3/18/2022  EXAMINATION: MRI OF THE BRAIN WITHOUT CONTRAST  3/18/2022 5:38 pm TECHNIQUE: Multiplanar multisequence MRI of the brain was performed without the administration of intravenous contrast. COMPARISON: Brain MRI of 10/20/2021 HISTORY: ORDERING SYSTEM PROVIDED HISTORY: Falls TECHNOLOGIST PROVIDED HISTORY: Reason for exam:->Falls Reason for Exam: bilat le weakness FINDINGS: There is no acute infarction, intracranial hemorrhage, or intracranial mass lesion. No mass effect, midline shift, or extra-axial collection is noted. There are mild nonspecific foci of periventricular and subcortical cerebral white matter T2/FLAIR hyperintensity, most likely representing chronic microangiopathic disease in this age group. The brain parenchyma is otherwise normal. The pituitary gland is normal in appearance. The cerebellar tonsils are in normal position. The ventricles, sulci, and cisterns are prominent suggestive of generalized volume loss. The intracranial flow voids are preserved. The globes and orbits are within normal limits. Mild mucosal thickening of right sphenoid sinus. Mild mucosal thickening of right mastoid air cells. The visualized extracranial structures including paranasal sinuses and mastoid air cells are otherwise unremarkable. There is no acute infarction, intracranial hemorrhage, or intracranial mass lesion. Mild chronic microangiopathic ischemic disease. Mild generalized volume loss. Mild mucosal thickening of right sphenoid sinus. Mild mucosal thickening of right mastoid air cells. CBC:   Recent Labs     03/21/22  1651   WBC 5.7   HGB 11.4*        BMP:    Recent Labs     03/21/22  1651      K 4.7      CO2 22   BUN 17   CREATININE 1.2*   GLUCOSE 132*     Hepatic: No results for input(s): AST, ALT, ALB, BILITOT, ALKPHOS in the last 72 hours. Lipids: No results found for: CHOL, HDL, TRIG  Hemoglobin A1C:   Lab Results   Component Value Date    LABA1C 9.1 03/12/2022     TSH: No results found for: TSH  Troponin:   Lab Results   Component Value Date    TROPONINT <0.010 03/17/2022    TROPONINT <0.010 03/11/2022    TROPONINT <0.010 03/11/2022     Lactic Acid: No results for input(s): LACTA in the last 72 hours. BNP: No results for input(s): PROBNP in the last 72 hours.   UA:  Lab Results   Component Value Date    NITRU NEGATIVE 03/19/2022    COLORU YELLOW 03/19/2022    WBCUA 61 03/19/2022    RBCUA <1 03/19/2022    MUCUS RARE 03/11/2022    TRICHOMONAS NONE SEEN 09/05/2020    YEAST RARE 01/17/2019    BACTERIA NEGATIVE 03/19/2022    CLARITYU CLEAR 03/19/2022    SPECGRAV 1.010 03/19/2022    LEUKOCYTESUR SMALL 03/19/2022    UROBILINOGEN 0.2 03/19/2022    BILIRUBINUR NEGATIVE 03/19/2022    BLOODU TRACE 03/19/2022    KETUA NEGATIVE 03/19/2022     Urine Cultures: No results found for: LABURIN  Blood Cultures: No results found for: BC  No results found for: BLOODCULT2  Organism: No results found for: ORG    Time Spent Discharging patient 44 minutes    Electronically signed by Melanie Thao DO on 3/22/2022 at 11:06 AM

## 2022-03-22 NOTE — PROGRESS NOTES
V2.0  [facility] Hospitalist Progress Note      Name:  Barry Hunter /Age/Sex: 1960  (59 y.o. female)   MRN & CSN:  [id number] & [id number] Encounter Date/Time: 3/21/2022 4:48 PM EDT    Location:  [address] [place] [address] PCP: Wei Mai MD       Hospital Day: 5    Assessment and Plan:   Barry Hunter is a 59 y.o. female with pmh of recurrent falls, CKD, type 2 DM, HTN, depression, anxiety, history of TIA, PRIMITIVO, COPD, chronic back pain who presents with Repeated falls    Plan:  Frequent falls: Neurology signed off. Falls likely due to peripheral neuropathy. CT head non acute. Brain and lumbar spine MRI unremarkable. Fall precautions. PT/OT following. Patient did not qualify for ARU or SNF. Self-care deficit: patient lives alone and says she is unable to take care of herself at home. Did not qualify for SNF or ARU. MODESTO on CKD: Improved with IV hydration. Creat back to 1.3 which is her baseline. Type 2 DM with hyperglycemia/DM neuropathy: continue glargine and lispro sliding scale. Endocrinology following for better glycemic control. Continue gabapentin. FSG elevated this morning > 320mg/dL. Will hold off on dc today until glycemic control improved. COPD not on home O2: continue home meds. She is not in acute exacerbation  Uncontrolled hypertension:Continue metoprolol, amlodipine. Lisinopril restarted. Chronic medical conditions: low back pain, depression, anxiety, history of TIA, resting tremor - continue all home meds. DVT prophylaxis: lovenox  Disposition: Plan for dc home tomorrow once BP and glucose well controlled. [facility] Diet ADULT DIET; Regular; 4 carb choices (60 gm/meal);  Low Sodium (2 gm)   DVT Prophylaxis [] Lovenox, []  Heparin, [] SCDs, [] Ambulation,  [] Eliquis, [] Xarelto   Code Status Full Code   Disposition Patient requires continued admission due to self care deficit, frequent falls   Surrogate Decision Maker/ POA -     Subjective:     Chief Complaint: Fatigue (Muscle jerking)     Meera Rendon is a 59 y.o. female who presents with frequent falls. Denies any complaints. Review of Systems:    Review of Systems    10 point ROS otherwise neg    Objective: Intake/Output Summary (Last 24 hours) at 3/21/2022 2001  Last data filed at 3/21/2022 1730  Gross per 24 hour   Intake 250 ml   Output --   Net 250 ml        Vitals:   Vitals:    03/21/22 1502   BP: 119/62   Pulse: 61   Resp: 26   Temp: 98.8 °F (37.1 °C)   SpO2: 95%       Physical Exam:     General: NAD  Eyes: EOMI  ENT: neck supple  Cardiovascular: Regular rate. Respiratory: Clear to auscultation  Gastrointestinal: Soft, non tender  Genitourinary: no suprapubic tenderness  Musculoskeletal: No edema, has a brace on the left LE  Skin: warm, dry  Neuro: Alert. Psych: Mood appropriate.      Medications:   Medications:    lisinopril  40 mg Oral Daily    alogliptin  6.25 mg Oral Daily    amLODIPine  10 mg Oral Daily    insulin glargine  30 Units SubCUTAneous Nightly    metFORMIN  500 mg Oral BID WC    insulin lispro  10 Units SubCUTAneous TID WC    sodium chloride flush  5-40 mL IntraVENous 2 times per day    enoxaparin  30 mg SubCUTAneous Daily    atorvastatin  40 mg Oral Nightly    baclofen  5 mg Oral TID    amitriptyline  25 mg Oral Nightly    busPIRone  10 mg Oral TID    Vitamin D  2,000 Units Oral Daily    clopidogrel  75 mg Oral Daily    donepezil  10 mg Oral Nightly    metoprolol succinate  50 mg Oral Daily    pantoprazole  40 mg Oral BID AC    topiramate  50 mg Oral Nightly    insulin lispro  0-12 Units SubCUTAneous TID WC    insulin lispro  0-6 Units SubCUTAneous Nightly    gabapentin  600 mg Oral TID      Infusions:    sodium chloride      dextrose       PRN Meds: sodium chloride flush, 5-40 mL, PRN  sodium chloride, 25 mL, PRN  ondansetron, 4 mg, Q8H PRN   Or  ondansetron, 4 mg, Q6H PRN  polyethylene glycol, 17 g, Daily PRN  acetaminophen, 650 mg, Q6H PRN Or  acetaminophen, 650 mg, Q6H PRN  glucose, 15 g, PRN  glucagon (rDNA), 1 mg, PRN  dextrose, 100 mL/hr, PRN  dextrose bolus (hypoglycemia), 125 mL, PRN   Or  dextrose bolus (hypoglycemia), 250 mL, PRN        Labs      Recent Results (from the past 24 hour(s))   POCT Glucose    Collection Time: 03/20/22  8:14 PM   Result Value Ref Range    POC Glucose 170 (H) 70 - 99 MG/DL   POCT Glucose    Collection Time: 03/21/22  6:47 AM   Result Value Ref Range    POC Glucose 210 (H) 70 - 99 MG/DL   POCT Glucose    Collection Time: 03/21/22 11:04 AM   Result Value Ref Range    POC Glucose 322 (H) 70 - 99 MG/DL   POCT Glucose    Collection Time: 03/21/22  4:14 PM   Result Value Ref Range    POC Glucose 80 70 - 99 MG/DL   CBC with Auto Differential    Collection Time: 03/21/22  4:51 PM   Result Value Ref Range    WBC 5.7 4.0 - 10.5 K/CU MM    RBC 3.87 (L) 4.2 - 5.4 M/CU MM    Hemoglobin 11.4 (L) 12.5 - 16.0 GM/DL    Hematocrit 37.4 37 - 47 %    MCV 96.6 78 - 100 FL    MCH 29.5 27 - 31 PG    MCHC 30.5 (L) 32.0 - 36.0 %    RDW 12.7 11.7 - 14.9 %    Platelets 699 582 - 349 K/CU MM    MPV 10.8 7.5 - 11.1 FL    Differential Type AUTOMATED DIFFERENTIAL     Segs Relative 55.5 36 - 66 %    Lymphocytes % 31.1 24 - 44 %    Monocytes % 9.5 (H) 0 - 4 %    Eosinophils % 3.0 0 - 3 %    Basophils % 0.5 0 - 1 %    Segs Absolute 3.2 K/CU MM    Lymphocytes Absolute 1.8 K/CU MM    Monocytes Absolute 0.5 K/CU MM    Eosinophils Absolute 0.2 K/CU MM    Basophils Absolute 0.0 K/CU MM    Nucleated RBC % 0.0 %    Total Nucleated RBC 0.0 K/CU MM    Total Immature Neutrophil 0.02 K/CU MM    Immature Neutrophil % 0.4 0 - 0.43 %   Basic Metabolic Panel w/ Reflex to MG    Collection Time: 03/21/22  4:51 PM   Result Value Ref Range    Sodium 139 135 - 145 MMOL/L    Potassium 4.7 3.5 - 5.1 MMOL/L    Chloride 105 99 - 110 mMol/L    CO2 22 21 - 32 MMOL/L    Anion Gap 12 4 - 16    BUN 17 6 - 23 MG/DL    CREATININE 1.2 (H) 0.6 - 1.1 MG/DL    Glucose 132 (H) 70 - 99 MG/DL    Calcium 9.0 8.3 - 10.6 MG/DL    GFR Non- 45 (L) >60 mL/min/1.73m2    GFR  55 (L) >60 mL/min/1.73m2        Imaging/Diagnostics Last 24 Hours   XR ANKLE LEFT (MIN 3 VIEWS)    Result Date: 3/17/2022  EXAMINATION: THREE XRAY VIEWS OF THE LEFT ANKLE; THREE XRAY VIEWS OF THE LEFT FOOT 3/17/2022 9:12 pm COMPARISON: None. HISTORY: ORDERING SYSTEM PROVIDED HISTORY: ankle fracture as per pt TECHNOLOGIST PROVIDED HISTORY: Reason for exam:->ankle fracture as per pt Reason for Exam: LT ANKLE PAIN FINDINGS: There is no acute fracture or dislocation at the left ankle. The ankle mortise and talar dome are intact. Some small ossifications at the tip of the lateral malleolus may relate to subacute to old fracture. There is soft tissue swelling around the ankle. Fixation screw within the 5th metatarsal appears intact. The chronic appearing deformity of the proximal and distal 5th metatarsal but no acute fracture lines are identified. The bones of the foot are osteopenic. There is no dislocation of the tarsal metatarsal junction. Prominent arthritic changes are present at the 1st metatarsophalangeal joint with flattening of the distal metatarsal head. Flattening of the head of the 2nd metatarsal as well but chronic. Moderate plantar calcaneal spur formation is present. Edema of the heel is suggested but no soft tissue emphysema. No acute fracture lines the main portion of the left ankle and no dislocation. The small ossifications at the tip of the lateral malleolus could relate to subacute or old injury. No acute fractures are seen at the foot but does have a long fixation screw at the 5th metatarsal.  Chronic appearing deformities of the 5th metatarsal. Severe arthritic and or combined posttraumatic changes at the 1st metatarsophalangeal joint with flattening of the metatarsal head.      XR FOOT LEFT (MIN 3 VIEWS)    Result Date: 3/17/2022  EXAMINATION: THREE XRAY VIEWS OF THE LEFT ANKLE; THREE XRAY VIEWS OF THE LEFT FOOT 3/17/2022 9:12 pm COMPARISON: None. HISTORY: ORDERING SYSTEM PROVIDED HISTORY: ankle fracture as per pt TECHNOLOGIST PROVIDED HISTORY: Reason for exam:->ankle fracture as per pt Reason for Exam: LT ANKLE PAIN FINDINGS: There is no acute fracture or dislocation at the left ankle. The ankle mortise and talar dome are intact. Some small ossifications at the tip of the lateral malleolus may relate to subacute to old fracture. There is soft tissue swelling around the ankle. Fixation screw within the 5th metatarsal appears intact. The chronic appearing deformity of the proximal and distal 5th metatarsal but no acute fracture lines are identified. The bones of the foot are osteopenic. There is no dislocation of the tarsal metatarsal junction. Prominent arthritic changes are present at the 1st metatarsophalangeal joint with flattening of the distal metatarsal head. Flattening of the head of the 2nd metatarsal as well but chronic. Moderate plantar calcaneal spur formation is present. Edema of the heel is suggested but no soft tissue emphysema. No acute fracture lines the main portion of the left ankle and no dislocation. The small ossifications at the tip of the lateral malleolus could relate to subacute or old injury. No acute fractures are seen at the foot but does have a long fixation screw at the 5th metatarsal.  Chronic appearing deformities of the 5th metatarsal. Severe arthritic and or combined posttraumatic changes at the 1st metatarsophalangeal joint with flattening of the metatarsal head. CT HEAD WO CONTRAST    Result Date: 3/17/2022  EXAMINATION: CT OF THE HEAD WITHOUT CONTRAST  3/17/2022 2:49 pm TECHNIQUE: CT of the head was performed without the administration of intravenous contrast. Dose modulation, iterative reconstruction, and/or weight based adjustment of the mA/kV was utilized to reduce the radiation dose to as low as reasonably achievable. COMPARISON: MRI brain October 20, 2021; CT head September 23, 2021 HISTORY: ORDERING SYSTEM PROVIDED HISTORY: frequent falls on blood thinners TECHNOLOGIST PROVIDED HISTORY: Has a \"code stroke\" or \"stroke alert\" been called? ->No Reason for exam:->frequent falls on blood thinners Decision Support Exception - unselect if not a suspected or confirmed emergency medical condition->Emergency Medical Condition (MA) Reason for Exam: frequent falls on blood thinners Additional signs and symptoms: NONE Relevant Medical/Surgical History: NONE FINDINGS: BRAIN/VENTRICLES: There is no acute intracranial hemorrhage, mass effect or midline shift. No abnormal extra-axial fluid collection. The gray-white differentiation is maintained without evidence of an acute infarct. There is no evidence of hydrocephalus. Atherosclerotic change of the intracranial vasculature. There is mild periventricular and subcortical white matter hypoattenuation most consistent with microvascular ischemic changes. There is mild age appropriate global cerebral atrophy. ORBITS: The visualized portion of the orbits demonstrate no acute abnormality. SINUSES: The visualized paranasal sinuses and mastoid air cells demonstrate small air-fluid level pneumatized secretions of the right sphenoid sinus. Correlate for acute sinusitis. SOFT TISSUES/SKULL:  No acute abnormality of the visualized skull or soft tissues. No acute intracranial abnormality. None microvascular ischemic changes and global cerebral atrophy. Small air-fluid level pneumatized secretions of the right sphenoid sinus. Correlate for acute sinusitis. XR CHEST PORTABLE    Result Date: 3/17/2022  EXAMINATION: ONE XRAY VIEW OF THE CHEST 3/17/2022 2:51 pm COMPARISON: March 11, 2022 HISTORY: ORDERING SYSTEM PROVIDED HISTORY: fatigue TECHNOLOGIST PROVIDED HISTORY: Reason for exam:->fatigue Reason for Exam: fatigue FINDINGS: The cardiomediastinal silhouette is stable.   There are increased lung markings bilaterally, may be related to mild pulmonary vascular congestion versus bronchitis. There is no pleural effusion. There is no pneumothorax. There is no acute osseous abnormality. Increased lung markings bilaterally, may be related to mild pulmonary vascular congestion versus bronchitis.        Electronically signed by Mookie Mazariegos MD on 3/21/2022 at 8:01 PM

## 2022-03-22 NOTE — PLAN OF CARE
Problem: Falls - Risk of:  Goal: Will remain free from falls  Description: Will remain free from falls  3/22/2022 0943 by Ifeanyi Jacobs LPN  Outcome: Ongoing  3/22/2022 0207 by Aleida Gonsalez RN  Outcome: Ongoing  Goal: Absence of physical injury  Description: Absence of physical injury  3/22/2022 0943 by Ifeanyi Jacobs LPN  Outcome: Ongoing  3/22/2022 0207 by Aleida Gonsalez RN  Outcome: Ongoing     Problem: Pain:  Goal: Pain level will decrease  Description: Pain level will decrease  3/22/2022 0943 by Ifeanyi Jacobs LPN  Outcome: Ongoing  3/22/2022 0207 by Aleida Gonsalez RN  Outcome: Ongoing  Goal: Control of acute pain  Description: Control of acute pain  3/22/2022 0943 by Ifeanyi Jacobs LPN  Outcome: Ongoing  3/22/2022 0207 by Aleida Gonsalez RN  Outcome: Ongoing  Goal: Control of chronic pain  Description: Control of chronic pain  3/22/2022 0943 by Ifeanyi Jacobs LPN  Outcome: Ongoing  3/22/2022 0207 by Aleida Gonsalez RN  Outcome: Ongoing     Problem: Discharge Planning:  Goal: Discharged to appropriate level of care  Description: Discharged to appropriate level of care  3/22/2022 0943 by Ifeanyi Jacobs LPN  Outcome: Ongoing  3/22/2022 0207 by Aleida Gonsalez RN  Outcome: Ongoing

## 2022-03-22 NOTE — PROGRESS NOTES
are seen at the foot but does have a long fixation screw   at the 5th metatarsal.  Chronic appearing deformities of the 5th metatarsal.      Severe arthritic and or combined posttraumatic changes at the 1st   metatarsophalangeal joint with flattening of the metatarsal head. XR FOOT LEFT (MIN 3 VIEWS)   Final Result   No acute fracture lines the main portion of the left ankle and no   dislocation. The small ossifications at the tip of the lateral malleolus   could relate to subacute or old injury. No acute fractures are seen at the foot but does have a long fixation screw   at the 5th metatarsal.  Chronic appearing deformities of the 5th metatarsal.      Severe arthritic and or combined posttraumatic changes at the 1st   metatarsophalangeal joint with flattening of the metatarsal head. XR CHEST PORTABLE   Final Result   Increased lung markings bilaterally, may be related to mild pulmonary   vascular congestion versus bronchitis. CT HEAD WO CONTRAST   Final Result   No acute intracranial abnormality. None microvascular ischemic changes and global cerebral atrophy. Small air-fluid level pneumatized secretions of the right sphenoid sinus. Correlate for acute sinusitis.               Scheduled Medicines   Medications:    lisinopril  40 mg Oral Daily    alogliptin  6.25 mg Oral Daily    amLODIPine  10 mg Oral Daily    insulin glargine  30 Units SubCUTAneous Nightly    metFORMIN  500 mg Oral BID WC    insulin lispro  10 Units SubCUTAneous TID WC    sodium chloride flush  5-40 mL IntraVENous 2 times per day    enoxaparin  30 mg SubCUTAneous Daily    atorvastatin  40 mg Oral Nightly    baclofen  5 mg Oral TID    amitriptyline  25 mg Oral Nightly    busPIRone  10 mg Oral TID    Vitamin D  2,000 Units Oral Daily    clopidogrel  75 mg Oral Daily    donepezil  10 mg Oral Nightly    metoprolol succinate  50 mg Oral Daily    pantoprazole  40 mg Oral BID AC    topiramate 50 mg Oral Nightly    insulin lispro  0-12 Units SubCUTAneous TID WC    insulin lispro  0-6 Units SubCUTAneous Nightly    gabapentin  600 mg Oral TID      Infusions:    sodium chloride      dextrose           Objective:   Vitals: /83   Pulse 66   Temp 98 °F (36.7 °C)   Resp 20   Ht 5' 4\" (1.626 m)   Wt 251 lb 8.7 oz (114.1 kg)   SpO2 97%   BMI 43.18 kg/m²   General appearance: alert and cooperative with exam  Neck: no JVD or bruit  Thyroid : Normal lobes   Lungs: Has Vesicular Breath sounds some wheezing in the lung  Heart:  regular rate and rhythm  Abdomen: soft, non-tender; bowel sounds normal; no masses,  no organomegaly  Musculoskeletal: Normal  Extremities: extremities normal, , no edema  Neurologic:  Awake, alert, oriented to name, place and time. Cranial nerves II-XII are grossly intact. Motor is  intact. Sensory neuropathy. ,  and gait is abnormal.    Assessment:     Patient Active Problem List:     Proximal humeral fracture     Asthma     Bereavement     Pain of both hip joints     Chest pain     Chronic abdominal pain     Chronic back pain     Chronic obstructive lung disease (HCC)     Corns and callosities     Depressive disorder     Type II diabetes mellitus, uncontrolled (HCC)     Diabetic polyneuropathy (HCC)     HTN (hypertension)     Hyperlipidemia     Lesion of liver     Menopausal symptom     Mixed hyperlipidemia     Onychomycosis     Obesity     Osteoarthrosis     Osteoporosis     Pain in both feet     Sleep apnea     TIA (transient ischemic attack)     Vitamin D deficiency     Wheezing     S/P ORIF (open reduction internal fixation) fracture     Diabetic gastroparesis (HCC)     Gastroesophageal reflux disease     Restless legs     Ketoacidosis, diabetic, type 2, no coma ([facility])     Repeated falls      Plan:     1. Reviewed POC blood glucose . Labs and X ray results   2. Reviewed Current Medicines   3.  On meal/ Correction bolus Humalog/ Basal Lantus Insulin regime / and Oral Hypoglycemic drugs   4. Monitor Blood glucose frequently   5. Modified  the dose of Insulin/ other medicines as needed   6. Will follow     .      Kvng Cortes MD, MD

## 2022-03-23 NOTE — PROGRESS NOTES
Progress Note( Dr. Trevor Briggs)  3/22/2022  Subjective:   Admit Date: 3/17/2022  PCP: Sukhjinder Ruiz MD    Admitted For :  Repeated falls tingling and numbness in the lower extremities of the left side    Consulted For:  Better control of blood glucose    Interval History: Feels okay still decreasing the leg    Denies any chest pains,   Denies SOB . Denies nausea or vomiting. No new bowel or bladder symptoms. Intake/Output Summary (Last 24 hours) at 3/22/2022 2257  Last data filed at 3/22/2022 0811  Gross per 24 hour   Intake 571 ml   Output --   Net 571 ml       DATA    CBC:   Recent Labs     03/21/22  1651   WBC 5.7   HGB 11.4*       CMP:  Recent Labs     03/21/22  1651      K 4.7      CO2 22   BUN 17   CREATININE 1.2*   CALCIUM 9.0     Lipids: No results found for: CHOL, HDL, TRIG  Glucose:  Recent Labs     03/21/22  2135 03/22/22  0704 03/22/22  1100   POCGLU 342* 217* 263*     [id number]:  Lab Results   Component Value Date    LABA1C 9.1 03/12/2022     High Sensitivity TSH:   Lab Results   Component Value Date    TSHHS 2.580 03/12/2022     Free T3: No results found for: FT3  Free T4:No results found for: T4FREE    MRI LUMBAR SPINE WO CONTRAST   Final Result   1. No significant spinal canal stenosis or neural foraminal narrowing. 2. Marrow signal appears heterogeneous, raising the possibility of osteopenia. MRI BRAIN WO CONTRAST   Final Result      There is no acute infarction, intracranial hemorrhage, or intracranial mass   lesion. Mild chronic microangiopathic ischemic disease. Mild generalized volume loss. Mild mucosal thickening of right sphenoid sinus. Mild mucosal thickening of   right mastoid air cells. XR ANKLE LEFT (MIN 3 VIEWS)   Final Result   No acute fracture lines the main portion of the left ankle and no   dislocation. The small ossifications at the tip of the lateral malleolus   could relate to subacute or old injury.       No acute fractures are seen at the foot but does have a long fixation screw   at the 5th metatarsal.  Chronic appearing deformities of the 5th metatarsal.      Severe arthritic and or combined posttraumatic changes at the 1st   metatarsophalangeal joint with flattening of the metatarsal head. XR FOOT LEFT (MIN 3 VIEWS)   Final Result   No acute fracture lines the main portion of the left ankle and no   dislocation. The small ossifications at the tip of the lateral malleolus   could relate to subacute or old injury. No acute fractures are seen at the foot but does have a long fixation screw   at the 5th metatarsal.  Chronic appearing deformities of the 5th metatarsal.      Severe arthritic and or combined posttraumatic changes at the 1st   metatarsophalangeal joint with flattening of the metatarsal head. XR CHEST PORTABLE   Final Result   Increased lung markings bilaterally, may be related to mild pulmonary   vascular congestion versus bronchitis. CT HEAD WO CONTRAST   Final Result   No acute intracranial abnormality. None microvascular ischemic changes and global cerebral atrophy. Small air-fluid level pneumatized secretions of the right sphenoid sinus. Correlate for acute sinusitis. Scheduled Medicines   Medications:      Infusions:         Objective:   Vitals: BP (!) 153/57   Pulse 60   Temp 97.6 °F (36.4 °C) (Oral)   Resp 16   Ht 5' 4\" (1.626 m)   Wt 251 lb 8.7 oz (114.1 kg)   SpO2 96%   BMI 43.18 kg/m²   General appearance: alert and cooperative with exam  Neck: no JVD or bruit  Thyroid : Normal lobes   Lungs: Has Vesicular Breath sounds some wheezing in the lung  Heart:  regular rate and rhythm  Abdomen: soft, non-tender; bowel sounds normal; no masses,  no organomegaly  Musculoskeletal: Normal  Extremities: extremities normal, , no edema  Neurologic:  Awake, alert, oriented to name, place and time. Cranial nerves II-XII are grossly intact.   Motor is

## 2022-05-12 ENCOUNTER — HOSPITAL ENCOUNTER (OUTPATIENT)
Age: 62
Setting detail: SPECIMEN
Discharge: HOME | End: 2022-05-12
Payer: MEDICAID

## 2022-05-12 LAB
ALBUMIN SERPL-MCNC: 4 GM/DL (ref 3.4–5)
ALP BLD-CCNC: 143 IU/L (ref 40–129)
ALT SERPL-CCNC: 24 U/L (ref 10–40)
ANION GAP SERPL CALCULATED.3IONS-SCNC: 11 MMOL/L (ref 4–16)
AST SERPL-CCNC: 18 IU/L (ref 15–37)
BASOPHILS ABSOLUTE: 0 K/CU MM
BASOPHILS RELATIVE PERCENT: 0.5 % (ref 0–1)
BILIRUB SERPL-MCNC: 0.2 MG/DL (ref 0–1)
BUN BLDV-MCNC: 36 MG/DL (ref 6–23)
CALCIUM SERPL-MCNC: 8.7 MG/DL (ref 8.3–10.6)
CHLORIDE BLD-SCNC: 107 MMOL/L (ref 99–110)
CHOLESTEROL: 176 MG/DL
CO2: 22 MMOL/L (ref 21–32)
CREAT SERPL-MCNC: 1.6 MG/DL (ref 0.6–1.1)
DIFFERENTIAL TYPE: ABNORMAL
EOSINOPHILS ABSOLUTE: 0.3 K/CU MM
EOSINOPHILS RELATIVE PERCENT: 3.5 % (ref 0–3)
FOLATE: 7.6 NG/ML (ref 3.1–17.5)
GFR AFRICAN AMERICAN: 39 ML/MIN/1.73M2
GFR NON-AFRICAN AMERICAN: 33 ML/MIN/1.73M2
GLUCOSE BLD-MCNC: 239 MG/DL (ref 70–99)
HCT VFR BLD CALC: 38.6 % (ref 37–47)
HDLC SERPL-MCNC: 62 MG/DL
HEMOGLOBIN: 11.4 GM/DL (ref 12.5–16)
IMMATURE NEUTROPHIL %: 0.4 % (ref 0–0.43)
LDL CHOLESTEROL CALCULATED: 80 MG/DL
LYMPHOCYTES ABSOLUTE: 2.9 K/CU MM
LYMPHOCYTES RELATIVE PERCENT: 35.1 % (ref 24–44)
MCH RBC QN AUTO: 29.5 PG (ref 27–31)
MCHC RBC AUTO-ENTMCNC: 29.5 % (ref 32–36)
MCV RBC AUTO: 99.7 FL (ref 78–100)
MONOCYTES ABSOLUTE: 0.6 K/CU MM
MONOCYTES RELATIVE PERCENT: 7.5 % (ref 0–4)
NUCLEATED RBC %: 0 %
PDW BLD-RTO: 13.2 % (ref 11.7–14.9)
PLATELET # BLD: 291 K/CU MM (ref 140–440)
PMV BLD AUTO: 9.7 FL (ref 7.5–11.1)
POTASSIUM SERPL-SCNC: 5 MMOL/L (ref 3.5–5.1)
PRO-BNP: 174 PG/ML
RBC # BLD: 3.87 M/CU MM (ref 4.2–5.4)
SEGMENTED NEUTROPHILS ABSOLUTE COUNT: 4.4 K/CU MM
SEGMENTED NEUTROPHILS RELATIVE PERCENT: 53 % (ref 36–66)
SODIUM BLD-SCNC: 140 MMOL/L (ref 135–145)
TOTAL IMMATURE NEUTOROPHIL: 0.03 K/CU MM
TOTAL NUCLEATED RBC: 0 K/CU MM
TOTAL PROTEIN: 6.2 GM/DL (ref 6.4–8.2)
TRIGL SERPL-MCNC: 169 MG/DL
TSH HIGH SENSITIVITY: 5.26 UIU/ML (ref 0.27–4.2)
URIC ACID: 5.3 MG/DL (ref 2.6–6)
VITAMIN B-12: 291.2 PG/ML (ref 211–911)
VITAMIN D 25-HYDROXY: 34.01 NG/ML
WBC # BLD: 8.4 K/CU MM (ref 4–10.5)

## 2022-05-12 PROCEDURE — 82607 VITAMIN B-12: CPT

## 2022-05-12 PROCEDURE — 36415 COLL VENOUS BLD VENIPUNCTURE: CPT

## 2022-05-12 PROCEDURE — 84550 ASSAY OF BLOOD/URIC ACID: CPT

## 2022-05-12 PROCEDURE — 84443 ASSAY THYROID STIM HORMONE: CPT

## 2022-05-12 PROCEDURE — 82746 ASSAY OF FOLIC ACID SERUM: CPT

## 2022-05-12 PROCEDURE — 80061 LIPID PANEL: CPT

## 2022-05-12 PROCEDURE — 80053 COMPREHEN METABOLIC PANEL: CPT

## 2022-05-12 PROCEDURE — 85025 COMPLETE CBC W/AUTO DIFF WBC: CPT

## 2022-05-12 PROCEDURE — 83880 ASSAY OF NATRIURETIC PEPTIDE: CPT

## 2022-05-12 PROCEDURE — 82306 VITAMIN D 25 HYDROXY: CPT

## 2022-06-09 ENCOUNTER — HOSPITAL ENCOUNTER (OUTPATIENT)
Age: 62
Setting detail: SPECIMEN
Discharge: HOME | End: 2022-06-09
Payer: MEDICAID

## 2022-06-09 LAB
ESTIMATED AVERAGE GLUCOSE: 189 MG/DL
HBA1C MFR BLD: 8.2 % (ref 4.2–6.3)

## 2022-06-09 PROCEDURE — 83036 HEMOGLOBIN GLYCOSYLATED A1C: CPT

## 2022-06-09 PROCEDURE — 36415 COLL VENOUS BLD VENIPUNCTURE: CPT

## 2022-06-30 ENCOUNTER — APPOINTMENT (OUTPATIENT)
Dept: GENERAL RADIOLOGY | Age: 62
DRG: 469 | End: 2022-06-30
Payer: MEDICAID

## 2022-06-30 ENCOUNTER — APPOINTMENT (OUTPATIENT)
Dept: CT IMAGING | Age: 62
DRG: 469 | End: 2022-06-30
Payer: MEDICAID

## 2022-06-30 ENCOUNTER — HOSPITAL ENCOUNTER (EMERGENCY)
Age: 62
Discharge: HOME | DRG: 469 | End: 2022-06-30
Payer: MEDICAID

## 2022-06-30 VITALS
TEMPERATURE: 97.7 F | RESPIRATION RATE: 18 BRPM | SYSTOLIC BLOOD PRESSURE: 190 MMHG | HEART RATE: 85 BPM | OXYGEN SATURATION: 98 % | DIASTOLIC BLOOD PRESSURE: 72 MMHG

## 2022-06-30 DIAGNOSIS — R10.10 PAIN OF UPPER ABDOMEN: ICD-10-CM

## 2022-06-30 DIAGNOSIS — R06.02 SHORTNESS OF BREATH: ICD-10-CM

## 2022-06-30 DIAGNOSIS — R79.89 ELEVATED BRAIN NATRIURETIC PEPTIDE (BNP) LEVEL: ICD-10-CM

## 2022-06-30 DIAGNOSIS — R11.2 NAUSEA VOMITING AND DIARRHEA: Primary | ICD-10-CM

## 2022-06-30 DIAGNOSIS — R19.7 NAUSEA VOMITING AND DIARRHEA: Primary | ICD-10-CM

## 2022-06-30 LAB
ALBUMIN SERPL-MCNC: 3.9 GM/DL (ref 3.4–5)
ALP BLD-CCNC: 116 IU/L (ref 40–128)
ALT SERPL-CCNC: 22 U/L (ref 10–40)
ANION GAP SERPL CALCULATED.3IONS-SCNC: 16 MMOL/L (ref 4–16)
AST SERPL-CCNC: 13 IU/L (ref 15–37)
BACTERIA: NEGATIVE /HPF
BASOPHILS ABSOLUTE: 0 K/CU MM
BASOPHILS RELATIVE PERCENT: 0.3 % (ref 0–1)
BILIRUB SERPL-MCNC: 0.5 MG/DL (ref 0–1)
BILIRUBIN URINE: ABNORMAL MG/DL
BLOOD, URINE: ABNORMAL
BUN BLDV-MCNC: 21 MG/DL (ref 6–23)
CALCIUM SERPL-MCNC: 9.3 MG/DL (ref 8.3–10.6)
CHLORIDE BLD-SCNC: 100 MMOL/L (ref 99–110)
CLARITY: CLEAR
CO2: 19 MMOL/L (ref 21–32)
COLOR: YELLOW
CREAT SERPL-MCNC: 1.1 MG/DL (ref 0.6–1.1)
DIFFERENTIAL TYPE: ABNORMAL
EOSINOPHILS ABSOLUTE: 0 K/CU MM
EOSINOPHILS RELATIVE PERCENT: 0.3 % (ref 0–3)
GFR AFRICAN AMERICAN: >60 ML/MIN/1.73M2
GFR NON-AFRICAN AMERICAN: 50 ML/MIN/1.73M2
GLUCOSE BLD-MCNC: 270 MG/DL (ref 70–99)
GLUCOSE, URINE: 250 MG/DL
HCT VFR BLD CALC: 37.9 % (ref 37–47)
HEMOGLOBIN: 11.9 GM/DL (ref 12.5–16)
IMMATURE NEUTROPHIL %: 0.3 % (ref 0–0.43)
KETONES, URINE: 15 MG/DL
LEUKOCYTE ESTERASE, URINE: ABNORMAL
LIPASE: 17 IU/L (ref 13–60)
LYMPHOCYTES ABSOLUTE: 1.3 K/CU MM
LYMPHOCYTES RELATIVE PERCENT: 17.4 % (ref 24–44)
MCH RBC QN AUTO: 30.1 PG (ref 27–31)
MCHC RBC AUTO-ENTMCNC: 31.4 % (ref 32–36)
MCV RBC AUTO: 95.7 FL (ref 78–100)
MONOCYTES ABSOLUTE: 0.5 K/CU MM
MONOCYTES RELATIVE PERCENT: 7.2 % (ref 0–4)
MUCUS: ABNORMAL HPF
NITRITE URINE, QUANTITATIVE: NEGATIVE
NUCLEATED RBC %: 0 %
PDW BLD-RTO: 12.7 % (ref 11.7–14.9)
PH, URINE: 5 (ref 5–8)
PLATELET # BLD: 216 K/CU MM (ref 140–440)
PMV BLD AUTO: 10.1 FL (ref 7.5–11.1)
POTASSIUM SERPL-SCNC: 4.5 MMOL/L (ref 3.5–5.1)
PRO-BNP: 1389 PG/ML
PROTEIN UA: 100 MG/DL
RAPID INFLUENZA  B AGN: NEGATIVE
RAPID INFLUENZA A AGN: NEGATIVE
RBC # BLD: 3.96 M/CU MM (ref 4.2–5.4)
RBC URINE: 2 /HPF (ref 0–6)
SARS-COV-2, NAAT: NOT DETECTED
SEGMENTED NEUTROPHILS ABSOLUTE COUNT: 5.6 K/CU MM
SEGMENTED NEUTROPHILS RELATIVE PERCENT: 74.5 % (ref 36–66)
SODIUM BLD-SCNC: 135 MMOL/L (ref 135–145)
SPECIFIC GRAVITY UA: >1.03 (ref 1–1.03)
SQUAMOUS EPITHELIAL: <1 /HPF
TOTAL IMMATURE NEUTOROPHIL: 0.02 K/CU MM
TOTAL NUCLEATED RBC: 0 K/CU MM
TOTAL PROTEIN: 6.6 GM/DL (ref 6.4–8.2)
TRICHOMONAS: ABNORMAL /HPF
TROPONIN T: <0.01 NG/ML
UROBILINOGEN, URINE: 0.2 MG/DL (ref 0.2–1)
WBC # BLD: 7.5 K/CU MM (ref 4–10.5)
WBC UA: 43 /HPF (ref 0–5)

## 2022-06-30 PROCEDURE — 83690 ASSAY OF LIPASE: CPT

## 2022-06-30 PROCEDURE — 87804 INFLUENZA ASSAY W/OPTIC: CPT

## 2022-06-30 PROCEDURE — 80053 COMPREHEN METABOLIC PANEL: CPT

## 2022-06-30 PROCEDURE — 87635 SARS-COV-2 COVID-19 AMP PRB: CPT

## 2022-06-30 PROCEDURE — 93005 ELECTROCARDIOGRAM TRACING: CPT | Performed by: PHYSICIAN ASSISTANT

## 2022-06-30 PROCEDURE — 99285 EMERGENCY DEPT VISIT HI MDM: CPT

## 2022-06-30 PROCEDURE — 81001 URINALYSIS AUTO W/SCOPE: CPT

## 2022-06-30 PROCEDURE — 96374 THER/PROPH/DIAG INJ IV PUSH: CPT

## 2022-06-30 PROCEDURE — 84484 ASSAY OF TROPONIN QUANT: CPT

## 2022-06-30 PROCEDURE — 85025 COMPLETE CBC W/AUTO DIFF WBC: CPT

## 2022-06-30 PROCEDURE — 87086 URINE CULTURE/COLONY COUNT: CPT

## 2022-06-30 PROCEDURE — 83880 ASSAY OF NATRIURETIC PEPTIDE: CPT

## 2022-06-30 PROCEDURE — 6360000002 HC RX W HCPCS: Performed by: PHYSICIAN ASSISTANT

## 2022-06-30 PROCEDURE — 71045 X-RAY EXAM CHEST 1 VIEW: CPT

## 2022-06-30 PROCEDURE — 74176 CT ABD & PELVIS W/O CONTRAST: CPT

## 2022-06-30 RX ORDER — ONDANSETRON 2 MG/ML
4 INJECTION INTRAMUSCULAR; INTRAVENOUS ONCE
Status: COMPLETED | OUTPATIENT
Start: 2022-06-30 | End: 2022-06-30

## 2022-06-30 RX ORDER — ONDANSETRON 4 MG/1
4 TABLET, ORALLY DISINTEGRATING ORAL EVERY 8 HOURS PRN
Qty: 20 TABLET | Refills: 0 | Status: ON HOLD | OUTPATIENT
Start: 2022-06-30 | End: 2022-07-18 | Stop reason: HOSPADM

## 2022-06-30 RX ADMIN — ONDANSETRON 4 MG: 2 INJECTION INTRAMUSCULAR; INTRAVENOUS at 14:29

## 2022-06-30 ASSESSMENT — ENCOUNTER SYMPTOMS
VOMITING: 0
CHEST TIGHTNESS: 0
ABDOMINAL PAIN: 1
RHINORRHEA: 0
SHORTNESS OF BREATH: 0
WHEEZING: 0
DIARRHEA: 1
COUGH: 1
NAUSEA: 1
SORE THROAT: 0

## 2022-06-30 NOTE — ED NOTES
Discharge education reviewed. Questions answered. Medications clarified. Belongings gathered. Discharge instructions signed. All belongings accounted for. Patient discharged to [facility] via [facility].       Junior Leon RN  06/30/22 2990

## 2022-06-30 NOTE — ED PROVIDER NOTES
Patient's EKG is interpreted by me sinus rhythm rate 76   no ST elevation no ST depression I appreciate no acute ischemia or infarctions EKG no old EKGs with which to compare     Shira Bradley MD  06/30/22 6083

## 2022-07-01 LAB
CULTURE: NORMAL
EKG ATRIAL RATE: 76 BPM
EKG DIAGNOSIS: NORMAL
EKG P AXIS: 29 DEGREES
EKG P-R INTERVAL: 156 MS
EKG Q-T INTERVAL: 416 MS
EKG QRS DURATION: 90 MS
EKG QTC CALCULATION (BAZETT): 468 MS
EKG R AXIS: -4 DEGREES
EKG T AXIS: 94 DEGREES
EKG VENTRICULAR RATE: 76 BPM
Lab: NORMAL
SPECIMEN: NORMAL

## 2022-07-01 PROCEDURE — 93010 ELECTROCARDIOGRAM REPORT: CPT | Performed by: INTERNAL MEDICINE

## 2022-07-02 ENCOUNTER — HOSPITAL ENCOUNTER (INPATIENT)
Age: 62
LOS: 3 days | Discharge: NURSING, REHAB OR LONG-TERM CARE | DRG: 469 | End: 2022-07-05
Attending: EMERGENCY MEDICINE | Admitting: INTERNAL MEDICINE
Payer: MEDICAID

## 2022-07-02 DIAGNOSIS — I95.9 TRANSIENT HYPOTENSION: ICD-10-CM

## 2022-07-02 DIAGNOSIS — R44.3 HALLUCINATIONS: ICD-10-CM

## 2022-07-02 DIAGNOSIS — N17.9 ACUTE KIDNEY INJURY (HCC): Primary | ICD-10-CM

## 2022-07-02 DIAGNOSIS — R19.7 DIARRHEA, UNSPECIFIED TYPE: ICD-10-CM

## 2022-07-02 LAB
ACETAMINOPHEN LEVEL: <5 UG/ML (ref 15–30)
ALBUMIN SERPL-MCNC: 3.7 GM/DL (ref 3.4–5)
ALCOHOL SCREEN SERUM: <0.01 %WT/VOL
ALP BLD-CCNC: 120 IU/L (ref 40–129)
ALT SERPL-CCNC: 24 U/L (ref 10–40)
AMPHETAMINES: NEGATIVE
ANION GAP SERPL CALCULATED.3IONS-SCNC: 15 MMOL/L (ref 4–16)
AST SERPL-CCNC: 23 IU/L (ref 15–37)
BACTERIA: ABNORMAL /HPF
BARBITURATE SCREEN URINE: NEGATIVE
BASOPHILS ABSOLUTE: 0 K/CU MM
BASOPHILS RELATIVE PERCENT: 0.4 % (ref 0–1)
BENZODIAZEPINE SCREEN, URINE: NEGATIVE
BILIRUB SERPL-MCNC: 0.2 MG/DL (ref 0–1)
BILIRUBIN DIRECT: 0.2 MG/DL (ref 0–0.3)
BILIRUBIN URINE: NEGATIVE MG/DL
BILIRUBIN, INDIRECT: 0 MG/DL (ref 0–0.7)
BLOOD, URINE: NEGATIVE
BUN BLDV-MCNC: 51 MG/DL (ref 6–23)
CALCIUM OXALATE CRYSTALS: ABNORMAL /HPF
CALCIUM SERPL-MCNC: 9 MG/DL (ref 8.3–10.6)
CANNABINOID SCREEN URINE: NEGATIVE
CHLORIDE BLD-SCNC: 99 MMOL/L (ref 99–110)
CLARITY: CLEAR
CO2: 21 MMOL/L (ref 21–32)
COCAINE METABOLITE: NEGATIVE
COLOR: YELLOW
CREAT SERPL-MCNC: 3.8 MG/DL (ref 0.6–1.1)
DIFFERENTIAL TYPE: ABNORMAL
DOSE AMOUNT: ABNORMAL
DOSE AMOUNT: ABNORMAL
DOSE TIME: ABNORMAL
DOSE TIME: ABNORMAL
EOSINOPHILS ABSOLUTE: 0.1 K/CU MM
EOSINOPHILS RELATIVE PERCENT: 1.2 % (ref 0–3)
GFR AFRICAN AMERICAN: 15 ML/MIN/1.73M2
GFR NON-AFRICAN AMERICAN: 12 ML/MIN/1.73M2
GLUCOSE BLD-MCNC: 205 MG/DL (ref 70–99)
GLUCOSE BLD-MCNC: 317 MG/DL (ref 70–99)
GLUCOSE, URINE: NEGATIVE MG/DL
HCT VFR BLD CALC: 36.5 % (ref 37–47)
HEMOGLOBIN: 11.1 GM/DL (ref 12.5–16)
HYALINE CASTS: 5 /LPF
IMMATURE NEUTROPHIL %: 0.1 % (ref 0–0.43)
KETONES, URINE: ABNORMAL MG/DL
LEUKOCYTE ESTERASE, URINE: ABNORMAL
LYMPHOCYTES ABSOLUTE: 1.8 K/CU MM
LYMPHOCYTES RELATIVE PERCENT: 21.9 % (ref 24–44)
MAGNESIUM: 2 MG/DL (ref 1.8–2.4)
MCH RBC QN AUTO: 29.8 PG (ref 27–31)
MCHC RBC AUTO-ENTMCNC: 30.4 % (ref 32–36)
MCV RBC AUTO: 98.1 FL (ref 78–100)
MONOCYTES ABSOLUTE: 0.9 K/CU MM
MONOCYTES RELATIVE PERCENT: 11.2 % (ref 0–4)
MUCUS: ABNORMAL HPF
NITRITE URINE, QUANTITATIVE: NEGATIVE
NUCLEATED RBC %: 0 %
OPIATES, URINE: NEGATIVE
OXYCODONE: NEGATIVE
PDW BLD-RTO: 13 % (ref 11.7–14.9)
PH, URINE: 5 (ref 5–8)
PHENCYCLIDINE, URINE: NEGATIVE
PLATELET # BLD: 236 K/CU MM (ref 140–440)
PMV BLD AUTO: 10 FL (ref 7.5–11.1)
POTASSIUM SERPL-SCNC: 4.7 MMOL/L (ref 3.5–5.1)
PROTEIN UA: ABNORMAL MG/DL
RBC # BLD: 3.72 M/CU MM (ref 4.2–5.4)
RBC URINE: 3 /HPF (ref 0–6)
SALICYLATE LEVEL: <0.3 MG/DL (ref 15–30)
SARS-COV-2, NAAT: NOT DETECTED
SEGMENTED NEUTROPHILS ABSOLUTE COUNT: 5.3 K/CU MM
SEGMENTED NEUTROPHILS RELATIVE PERCENT: 65.2 % (ref 36–66)
SODIUM BLD-SCNC: 135 MMOL/L (ref 135–145)
SOURCE: NORMAL
SPECIFIC GRAVITY UA: 1.03 (ref 1–1.03)
SQUAMOUS EPITHELIAL: 1 /HPF
TOTAL IMMATURE NEUTOROPHIL: 0.01 K/CU MM
TOTAL NUCLEATED RBC: 0 K/CU MM
TOTAL PROTEIN: 6.3 GM/DL (ref 6.4–8.2)
TRANSITIONAL EPITHELIAL: 1 /HPF
TRICHOMONAS: ABNORMAL /HPF
UNCLASSIFIED CAST: 11 /LPF
UROBILINOGEN, URINE: 0.2 MG/DL (ref 0.2–1)
WBC # BLD: 8.1 K/CU MM (ref 4–10.5)
WBC UA: 19 /HPF (ref 0–5)
YEAST: ABNORMAL /HPF

## 2022-07-02 PROCEDURE — 80307 DRUG TEST PRSMV CHEM ANLYZR: CPT

## 2022-07-02 PROCEDURE — 87635 SARS-COV-2 COVID-19 AMP PRB: CPT

## 2022-07-02 PROCEDURE — 83735 ASSAY OF MAGNESIUM: CPT

## 2022-07-02 PROCEDURE — 82962 GLUCOSE BLOOD TEST: CPT

## 2022-07-02 PROCEDURE — 6370000000 HC RX 637 (ALT 250 FOR IP): Performed by: EMERGENCY MEDICINE

## 2022-07-02 PROCEDURE — G0480 DRUG TEST DEF 1-7 CLASSES: HCPCS

## 2022-07-02 PROCEDURE — 82248 BILIRUBIN DIRECT: CPT

## 2022-07-02 PROCEDURE — 81001 URINALYSIS AUTO W/SCOPE: CPT

## 2022-07-02 PROCEDURE — 6370000000 HC RX 637 (ALT 250 FOR IP): Performed by: NURSE PRACTITIONER

## 2022-07-02 PROCEDURE — 99285 EMERGENCY DEPT VISIT HI MDM: CPT

## 2022-07-02 PROCEDURE — 80053 COMPREHEN METABOLIC PANEL: CPT

## 2022-07-02 PROCEDURE — 2580000003 HC RX 258: Performed by: EMERGENCY MEDICINE

## 2022-07-02 PROCEDURE — 85025 COMPLETE CBC W/AUTO DIFF WBC: CPT

## 2022-07-02 PROCEDURE — 1200000000 HC SEMI PRIVATE

## 2022-07-02 PROCEDURE — 2580000003 HC RX 258: Performed by: NURSE PRACTITIONER

## 2022-07-02 RX ORDER — DIPHENHYDRAMINE HCL 25 MG
50 TABLET ORAL ONCE
Status: COMPLETED | OUTPATIENT
Start: 2022-07-02 | End: 2022-07-02

## 2022-07-02 RX ORDER — CLOPIDOGREL BISULFATE 75 MG/1
75 TABLET ORAL DAILY
Status: DISCONTINUED | OUTPATIENT
Start: 2022-07-03 | End: 2022-07-05 | Stop reason: HOSPADM

## 2022-07-02 RX ORDER — ACETAMINOPHEN 325 MG/1
650 TABLET ORAL EVERY 6 HOURS PRN
Status: DISCONTINUED | OUTPATIENT
Start: 2022-07-02 | End: 2022-07-05 | Stop reason: HOSPADM

## 2022-07-02 RX ORDER — HYDRALAZINE HYDROCHLORIDE 25 MG/1
25 TABLET, FILM COATED ORAL 3 TIMES DAILY
Status: DISCONTINUED | OUTPATIENT
Start: 2022-07-02 | End: 2022-07-05 | Stop reason: HOSPADM

## 2022-07-02 RX ORDER — DEXTROSE MONOHYDRATE 50 MG/ML
100 INJECTION, SOLUTION INTRAVENOUS PRN
Status: DISCONTINUED | OUTPATIENT
Start: 2022-07-02 | End: 2022-07-05 | Stop reason: HOSPADM

## 2022-07-02 RX ORDER — ASPIRIN 81 MG/1
81 TABLET, CHEWABLE ORAL DAILY
Status: ON HOLD | COMMUNITY
End: 2022-07-30 | Stop reason: SDUPTHER

## 2022-07-02 RX ORDER — 0.9 % SODIUM CHLORIDE 0.9 %
1000 INTRAVENOUS SOLUTION INTRAVENOUS ONCE
Status: COMPLETED | OUTPATIENT
Start: 2022-07-02 | End: 2022-07-02

## 2022-07-02 RX ORDER — FLUOXETINE HYDROCHLORIDE 40 MG/1
80 CAPSULE ORAL DAILY
COMMUNITY

## 2022-07-02 RX ORDER — INSULIN LISPRO 100 [IU]/ML
0-6 INJECTION, SOLUTION INTRAVENOUS; SUBCUTANEOUS NIGHTLY
Status: DISCONTINUED | OUTPATIENT
Start: 2022-07-03 | End: 2022-07-05 | Stop reason: HOSPADM

## 2022-07-02 RX ORDER — INSULIN GLARGINE 100 [IU]/ML
70 INJECTION, SOLUTION SUBCUTANEOUS
Status: ON HOLD | COMMUNITY
End: 2022-07-05 | Stop reason: HOSPADM

## 2022-07-02 RX ORDER — INSULIN LISPRO 100 [IU]/ML
0-12 INJECTION, SOLUTION INTRAVENOUS; SUBCUTANEOUS
Status: DISCONTINUED | OUTPATIENT
Start: 2022-07-03 | End: 2022-07-05 | Stop reason: HOSPADM

## 2022-07-02 RX ORDER — HEPARIN SODIUM 5000 [USP'U]/ML
5000 INJECTION, SOLUTION INTRAVENOUS; SUBCUTANEOUS EVERY 8 HOURS SCHEDULED
Status: DISCONTINUED | OUTPATIENT
Start: 2022-07-03 | End: 2022-07-05 | Stop reason: HOSPADM

## 2022-07-02 RX ORDER — ACETAMINOPHEN 650 MG/1
650 SUPPOSITORY RECTAL EVERY 6 HOURS PRN
Status: DISCONTINUED | OUTPATIENT
Start: 2022-07-02 | End: 2022-07-05 | Stop reason: HOSPADM

## 2022-07-02 RX ORDER — TIZANIDINE 4 MG/1
4 TABLET ORAL NIGHTLY PRN
Status: ON HOLD | COMMUNITY
End: 2022-07-18 | Stop reason: HOSPADM

## 2022-07-02 RX ORDER — INSULIN GLARGINE 100 [IU]/ML
70 INJECTION, SOLUTION SUBCUTANEOUS
Status: DISCONTINUED | OUTPATIENT
Start: 2022-07-03 | End: 2022-07-04

## 2022-07-02 RX ORDER — TIZANIDINE 4 MG/1
4 TABLET ORAL NIGHTLY PRN
Status: DISCONTINUED | OUTPATIENT
Start: 2022-07-02 | End: 2022-07-03

## 2022-07-02 RX ORDER — ONDANSETRON 4 MG/1
4 TABLET, ORALLY DISINTEGRATING ORAL EVERY 8 HOURS PRN
Status: DISCONTINUED | OUTPATIENT
Start: 2022-07-02 | End: 2022-07-05 | Stop reason: HOSPADM

## 2022-07-02 RX ORDER — AMLODIPINE BESYLATE 10 MG/1
10 TABLET ORAL DAILY
Status: DISCONTINUED | OUTPATIENT
Start: 2022-07-03 | End: 2022-07-03

## 2022-07-02 RX ORDER — ATORVASTATIN CALCIUM 40 MG/1
80 TABLET, FILM COATED ORAL NIGHTLY
Status: DISCONTINUED | OUTPATIENT
Start: 2022-07-03 | End: 2022-07-05 | Stop reason: HOSPADM

## 2022-07-02 RX ORDER — VITAMIN B COMPLEX
2000 TABLET ORAL DAILY
Status: DISCONTINUED | OUTPATIENT
Start: 2022-07-03 | End: 2022-07-05 | Stop reason: HOSPADM

## 2022-07-02 RX ORDER — HALOPERIDOL 5 MG
5 TABLET ORAL ONCE
Status: COMPLETED | OUTPATIENT
Start: 2022-07-02 | End: 2022-07-02

## 2022-07-02 RX ORDER — SODIUM CHLORIDE 0.9 % (FLUSH) 0.9 %
5-40 SYRINGE (ML) INJECTION PRN
Status: DISCONTINUED | OUTPATIENT
Start: 2022-07-02 | End: 2022-07-05 | Stop reason: HOSPADM

## 2022-07-02 RX ORDER — TOPIRAMATE 25 MG/1
50 TABLET ORAL NIGHTLY
Status: DISCONTINUED | OUTPATIENT
Start: 2022-07-03 | End: 2022-07-05 | Stop reason: HOSPADM

## 2022-07-02 RX ORDER — LORAZEPAM 1 MG/1
1 TABLET ORAL ONCE
Status: COMPLETED | OUTPATIENT
Start: 2022-07-02 | End: 2022-07-02

## 2022-07-02 RX ORDER — SODIUM CHLORIDE 9 MG/ML
INJECTION, SOLUTION INTRAVENOUS CONTINUOUS
Status: ACTIVE | OUTPATIENT
Start: 2022-07-03 | End: 2022-07-03

## 2022-07-02 RX ORDER — METOPROLOL SUCCINATE 25 MG/1
25 TABLET, EXTENDED RELEASE ORAL DAILY
Status: DISCONTINUED | OUTPATIENT
Start: 2022-07-03 | End: 2022-07-05 | Stop reason: HOSPADM

## 2022-07-02 RX ORDER — DULOXETIN HYDROCHLORIDE 60 MG/1
60 CAPSULE, DELAYED RELEASE ORAL DAILY
Status: ON HOLD | COMMUNITY
End: 2022-07-05 | Stop reason: HOSPADM

## 2022-07-02 RX ORDER — ASPIRIN 81 MG/1
81 TABLET, CHEWABLE ORAL DAILY
Status: DISCONTINUED | OUTPATIENT
Start: 2022-07-03 | End: 2022-07-05 | Stop reason: HOSPADM

## 2022-07-02 RX ORDER — ONDANSETRON 2 MG/ML
4 INJECTION INTRAMUSCULAR; INTRAVENOUS EVERY 6 HOURS PRN
Status: DISCONTINUED | OUTPATIENT
Start: 2022-07-02 | End: 2022-07-05 | Stop reason: HOSPADM

## 2022-07-02 RX ORDER — INSULIN GLARGINE 100 [IU]/ML
50 INJECTION, SOLUTION SUBCUTANEOUS NIGHTLY
Status: DISCONTINUED | OUTPATIENT
Start: 2022-07-03 | End: 2022-07-03

## 2022-07-02 RX ORDER — BUSPIRONE HYDROCHLORIDE 5 MG/1
10 TABLET ORAL 3 TIMES DAILY
Status: DISCONTINUED | OUTPATIENT
Start: 2022-07-03 | End: 2022-07-05 | Stop reason: HOSPADM

## 2022-07-02 RX ORDER — LEVOTHYROXINE SODIUM 0.03 MG/1
25 TABLET ORAL
Status: DISCONTINUED | OUTPATIENT
Start: 2022-07-03 | End: 2022-07-05 | Stop reason: HOSPADM

## 2022-07-02 RX ORDER — BACLOFEN 20 MG/1
20 TABLET ORAL NIGHTLY
Status: ON HOLD | COMMUNITY
End: 2022-07-05 | Stop reason: HOSPADM

## 2022-07-02 RX ORDER — LEVOTHYROXINE SODIUM 0.03 MG/1
25 TABLET ORAL
Status: ON HOLD | COMMUNITY
End: 2022-07-18 | Stop reason: HOSPADM

## 2022-07-02 RX ORDER — SODIUM CHLORIDE 9 MG/ML
INJECTION, SOLUTION INTRAVENOUS PRN
Status: DISCONTINUED | OUTPATIENT
Start: 2022-07-02 | End: 2022-07-05 | Stop reason: HOSPADM

## 2022-07-02 RX ORDER — DONEPEZIL HYDROCHLORIDE 10 MG/1
10 TABLET, FILM COATED ORAL NIGHTLY
Status: DISCONTINUED | OUTPATIENT
Start: 2022-07-03 | End: 2022-07-03

## 2022-07-02 RX ORDER — SODIUM CHLORIDE 0.9 % (FLUSH) 0.9 %
5-40 SYRINGE (ML) INJECTION 2 TIMES DAILY
Status: DISCONTINUED | OUTPATIENT
Start: 2022-07-03 | End: 2022-07-05 | Stop reason: HOSPADM

## 2022-07-02 RX ADMIN — DONEPEZIL HYDROCHLORIDE 10 MG: 10 TABLET ORAL at 23:30

## 2022-07-02 RX ADMIN — TIZANIDINE 4 MG: 4 TABLET ORAL at 23:29

## 2022-07-02 RX ADMIN — LORAZEPAM 1 MG: 1 TABLET ORAL at 20:30

## 2022-07-02 RX ADMIN — HYDRALAZINE HYDROCHLORIDE 25 MG: 25 TABLET, FILM COATED ORAL at 23:42

## 2022-07-02 RX ADMIN — SODIUM CHLORIDE 1000 ML: 9 INJECTION, SOLUTION INTRAVENOUS at 20:58

## 2022-07-02 RX ADMIN — DIPHENHYDRAMINE HYDROCHLORIDE 50 MG: 25 TABLET ORAL at 20:30

## 2022-07-02 RX ADMIN — TOPIRAMATE 50 MG: 25 TABLET, FILM COATED ORAL at 23:29

## 2022-07-02 RX ADMIN — ATORVASTATIN CALCIUM 80 MG: 40 TABLET, FILM COATED ORAL at 23:29

## 2022-07-02 RX ADMIN — BUSPIRONE HYDROCHLORIDE 10 MG: 5 TABLET ORAL at 23:28

## 2022-07-02 RX ADMIN — SODIUM CHLORIDE: 9 INJECTION, SOLUTION INTRAVENOUS at 23:25

## 2022-07-02 RX ADMIN — HALOPERIDOL 5 MG: 5 TABLET ORAL at 20:30

## 2022-07-02 ASSESSMENT — ENCOUNTER SYMPTOMS
DIARRHEA: 1
ABDOMINAL PAIN: 1
COUGH: 0
NAUSEA: 1
SHORTNESS OF BREATH: 0
BACK PAIN: 0
VOMITING: 0

## 2022-07-02 NOTE — ED PROVIDER NOTES
CHIEF COMPLAINT    Chief Complaint   Patient presents with    Diarrhea     complaining of diarrhea since tuesday. states she feels dehydrated and cannot sleep     HPI  Nicolette Knight is a 59 y.o. female with history of CVA, COPD, hyperlipidemia, hypertension, diabetes who presents to the ED with complains of feeling that she cannot control her mind. She feels overwhelmed and has been experiencing auditory and visual hallucinations. She made some mention of some diarrhea complaints which she was actually evaluated for on June 30 at which time she had laboratory studies as well as CT imaging of the emesis pelvis performed which were reassuring. She has experienced some diarrhea still but states it has slightly improved. Patient states she is largely concerned about her mental health today. She states that she hears voices that are mumbling but not actively making out any type of conversation. Patient has also been experiencing the sensation of hearing music. She has been seeing shadows as well that are not there. Patient tells me she has never experienced symptoms such as this in the past.  These symptoms have been present over the last 4 days. The symptoms have patient overwhelmed and she states she cannot take this anymore. Denies explicit homicidal or suicidal ideation. Further denies chest pain, shortness of breath, nausea or vomiting. REVIEW OF SYSTEMS  Constitutional: No fever, chills or recent illness. Eye: No visual changes  HENT: No earache or sore throat. Resp: No SOB or productive cough. Cardio: No chest pain or palpitations. GI: No nausea, vomiting, constipation or diarrhea. No melena. Complains of diarrhea  : No dysuria, urgency or frequency. Endocrine: No heat intolerance, no cold intolerance, no polydipsia   Lymphatics: No adenopathy  Musculoskeletal: No new muscle aches or joint pain. Neuro: No headaches. Psych: No homicidal or suicidal thoughts.   Complains of auditory and visual hallucinations  Skin: No rash, No itching. ?  ? PAST MEDICAL HISTORY  Past Medical History:   Diagnosis Date    Arthritis     Asthma     Cerebral artery occlusion with cerebral infarction [facility])     per old chart 8/2015- TIA    [facility]ch[facility]     Diabetes mellitus ([facility]     Diabetic neuropathy (HCC)     per old chart    Fracture     per old chart pt in ER 12/9/2018- after 2 days of arm pain after fall- dx with left humerus fx- for surg 12/31/2018    Hyperlipidemia     Hypertension     Muscle weakness (generalized)     Osteoarthritis     per old chart     FAMILY HISTORY  Family History   Problem Relation Age of Onset    Breast Cancer Maternal Aunt 48    Breast Cancer Maternal Aunt 48     SOCIAL HISTORY  Social History     Socioeconomic History    Marital status:      Spouse name: Not on file    Number of children: Not on file    Years of education: Not on file    Highest education level: Not on file   Occupational History    Not on file   Tobacco Use    Smoking status: Never Smoker    Smokeless tobacco: Never Used    Tobacco comment: 12/27/*2018- with phone assessment with caregiver- unsure of social, surgical or family hx for phone assessment   Vaping Use    Vaping Use: Never used   Substance and Sexual Activity    Alcohol use: No    Drug use: No    Sexual activity: Not on file   Other Topics Concern    Not on file   Social History Narrative    Not on file     Social Determinants of Health     Financial Resource Strain:     Difficulty of Paying Living Expenses: Not on file   Food Insecurity:     Worried About Running Out of Food in the Last Year: Not on file    Denita of Food in the Last Year: Not on file   Transportation Needs:     Lack of Transportation (Medical): Not on file    Lack of Transportation (Non-Medical):  Not on file   Physical Activity:     Days of Exercise per Week: Not on file    Minutes of Exercise per Session: Not on file   Stress:     Feeling of Stress : Not on file   Social Connections:     Frequency of Communication with Friends and Family: Not on file    Frequency of Social Gatherings with Friends and Family: Not on file    Attends Advent Services: Not on file    Active Member of Clubs or Organizations: Not on file    Attends Club or Organization Meetings: Not on file    Marital Status: Not on file   Intimate Partner Violence:     Fear of Current or Ex-Partner: Not on file    Emotionally Abused: Not on file    Physically Abused: Not on file    Sexually Abused: Not on file   Housing Stability:     Unable to Pay for Housing in the Last Year: Not on file    Number of [place] in the Last Year: Not on file    Unstable Housing in the Last Year: Not on file       SURGICAL HISTORY  Past Surgical History:   Procedure Laterality Date    BREAST BIOPSY Right     CHOLECYSTECTOMY  2002?  EYE SURGERY      per old chart had right eye cataract ext done 2/2018 and left eye 4/2018    FOOT SURGERY Left 2004?  HUMERUS FRACTURE SURGERY Left 12/31/2018    HUMERUS OPEN REDUCTION INTERNAL FIXATION LEFT PROXIMAL performed by Alix Mathis DO at [facility] Right 2009?     LUNG SURGERY  2009?    simone lung lobectomy      CURRENT MEDICATIONS  Previous Medications    ALENDRONATE (FOSAMAX) 70 MG TABLET    Take 70 mg by mouth once a week    ALOGLIPTIN (NESINA) 6.25 MG TABS TABLET    Take 1 tablet by mouth daily    AMITRIPTYLINE (ELAVIL) 25 MG TABLET    Take 25 mg by mouth nightly     AMLODIPINE (NORVASC) 10 MG TABLET    amlodipine 10 mg tablet   TAKE ONE TABLET BY MOUTH DAILY    ATORVASTATIN (LIPITOR) 40 MG TABLET    Take 40 mg by mouth nightly     BACLOFEN (LIORESAL) 5 MG TABLET    Take 1 tablet by mouth 3 times daily    BUSPIRONE (BUSPAR) 10 MG TABLET    Take 1 tablet by mouth 3 times daily    CHOLECALCIFEROL 50 MCG (2000 UT) CAPS    Take 1 tablet by mouth daily    CLOPIDOGREL (PLAVIX) 75 MG TABLET    Take 75 mg by mouth daily    DICYCLOMINE (BENTYL) 20 MG TABLET    Take 1 tablet by mouth 4 times daily (before meals and nightly)    DONEPEZIL (ARICEPT) 5 MG TABLET    Take 10 mg by mouth nightly     FERROUS SULFATE 325 (65 FE) MG EC TABLET    Take 650 mg by mouth daily (with breakfast)    GABAPENTIN (NEURONTIN) 600 MG TABLET    Take 600 mg by mouth 4 times daily. HYDRALAZINE (APRESOLINE) 25 MG TABLET    Take 1 tablet by mouth every 8 hours    INSULIN GLARGINE (LANTUS) 100 UNIT/ML INJECTION VIAL    Inject 30 Units into the skin nightly    INSULIN LISPRO (HUMALOG) 100 UNIT/ML INJECTION VIAL    Check blood glucose three times daily before meals and at bedtime- for blood glucose <150- no insulin, 151-200=2, 201-250=4, 251-300=6, 301-350=8, 351-400=10, >400=12 units and call MD    LIDOCAINE 4 % EXTERNAL PATCH    Place 1 patch onto the skin daily    LISINOPRIL (PRINIVIL;ZESTRIL) 40 MG TABLET    Take 1 tablet by mouth daily    METFORMIN (GLUCOPHAGE) 500 MG TABLET    Take 1 tablet by mouth 2 times daily (with meals)    METOPROLOL SUCCINATE (TOPROL XL) 50 MG EXTENDED RELEASE TABLET    Take 1 tablet by mouth daily    ONDANSETRON (ZOFRAN ODT) 4 MG DISINTEGRATING TABLET    Take 1 tablet by mouth every 8 hours as needed for Nausea    PANTOPRAZOLE (PROTONIX) 40 MG TABLET    Take 1 tablet by mouth 2 times daily (before meals)    TOPIRAMATE (TOPAMAX) 50 MG TABLET    Take 50 mg by mouth nightly      ALLERGIES  No Known Allergies    Nursing notes reviewed by myself for past medical history, family history, social history, surgical history, current medications, and allergies. PHYSICAL EXAM  VITAL SIGNS: Triage VS:    ED Triage Vitals   Enc Vitals Group      BP       Pulse       Resp       Temp       Temp src       SpO2       Weight       Height       Head Circumference       Peak Flow       Pain Score       Pain Loc       Pain Edu? Excl. in [address]?       Constitutional: Well developed, Well nourished, nontoxic appearing  HENT: Normocephalic, Atraumatic, Bilateral external ears normal, Oropharynx moist, No oral exudates, Nose normal.   Eyes: PERRL, EOMI, Conjunctiva normal, No discharge. No scleral icterus. Neck: Normal range of motion, No tenderness, Supple. Lymphatic: No lymphadenopathy noted. Cardiovascular: Normal heart rate, Normal rhythm, No murmurs, gallops or rubs. Thorax & Lungs: Normal breath sounds, No respiratory distress, No wheezing. Abdomen: Soft, No tenderness, No masses, No pulsatile masses, No distention, Normal bowel sounds  Skin: Warm, Dry, Pink, No mottling, No erythema, No rash. Back: No tenderness, No CVA tenderness. Extremities: No edema, No tenderness, No cyanosis, Normal perfusion, No clubbing. Musculoskeletal: Good range of motion in all major joints as observed. No major deformities noted. Neurologic: Alert & oriented x 3, Normal motor function, Normal sensory function, CN II-XII grossly intact as tested, No focal deficits noted. Psychiatric: Anxious and tearful with pressured speech. Cooperative    RADIOLOGY  Labs Reviewed   CBC WITH AUTO DIFFERENTIAL - Abnormal; Notable for the following components:       Result Value    RBC 3.72 (*)     Hemoglobin 11.1 (*)     Hematocrit 36.5 (*)     MCHC 30.4 (*)     Lymphocytes % 21.9 (*)     Monocytes % 11.2 (*)     All other components within normal limits   BASIC METABOLIC PANEL - Abnormal; Notable for the following components:    BUN 51 (*)     CREATININE 3.8 (*)     Glucose 317 (*)     GFR Non- 12 (*)     GFR  15 (*)     All other components within normal limits   HEPATIC FUNCTION PANEL - Abnormal; Notable for the following components:     Total Protein 6.3 (*)     All other components within normal limits   SALICYLATE LEVEL - Abnormal; Notable for the following components:    Salicylate Lvl <4.1 (*)     All other components within normal limits   ACETAMINOPHEN LEVEL - Abnormal; Notable for the following components:    Acetaminophen Level <5.0 (*)     All other components within normal limits   COVID-19, RAPID   ETHANOL   URINALYSIS WITH MICROSCOPIC   URINE DRUG SCREEN     I personally reviewed the images. The radiologist's interpretation reveals:  Last Imaging results   No orders to display       MEDS GIVEN IN ED:  Medications   haloperidol (HALDOL) tablet 5 mg (5 mg Oral Given 7/2/22 2030)   LORazepam (ATIVAN) tablet 1 mg (1 mg Oral Given 7/2/22 2030)   diphenhydrAMINE (BENADRYL) tablet 50 mg (50 mg Oral Given 7/2/22 2030)   0.9 % sodium chloride bolus (1,000 mLs IntraVENous New Bag 7/2/22 2058)     COURSE & MEDICAL DECISION MAKING  77-year-old female presents emergency department complaining feeling overwhelmed due to auditory visual hallucinations which are new for her. Does not have homicidal or suicidal thoughts explicitly but states she does not want to cope with her current symptoms. Her chief complaint entered mentions diarrhea although patient was evaluated for this on June 30 and states the symptoms are very mild now. She is here primarily for her mental health concerns. Her neurological exam is nonfocal and she is appropriately oriented. Does not seem she is experience hallucinations before. She had MRI of the brain performed this year which was without acute intracranial pathology. At this time she was agreeable to take oral meds to help with her sensation of feeling overwhelmed with her hallucinations. We will obtain medical clearance labs in addition to this and have patient evaluated by mental health team.  CBC demonstrates baseline anemia. BMP shows acute kidney injury with a creatinine of 3.8 and BUN of 51. IV fluid bolus ordered for the patient. Hepatic panel lipase are reassuring. Serum tox cream is negative. Of note, the patient did have some transient mild hypotension with systolic blood pressures in the 90s and 80s but this did improve with her IV fluid bolus.   At this time she will require hospitalization for her acute kidney injury but will need evaluation by mental health team during her stay. Patient discussed with hospitalist team who agreed to hospitalize patient for further management. Amount and/or Complexity of Data Reviewed  Clinical lab tests: reviewed  Decide to obtain previous medical records or to obtain history from someone other than the patient: yes       -  Patient seen and evaluated in the emergency department. -  Triage and nursing notes reviewed and incorporated. -  Old chart records reviewed and incorporated. -  Work-up included:  See above  -  Results discussed with patient. Appropriate PPE utilized as indicated for entire patient encounter? Time of Disposition: See timeline  ? New Prescriptions    No medications on file     FINAL IMPRESSION  1. Acute kidney injury ([facility]    2. Hallucinations    3. Diarrhea, unspecified type    4. Transient hypotension        Electronically signed by:  [address], DO, 7/2/2022         [address], DO  07/02/22 2468

## 2022-07-03 LAB
ADENOVIRUS F 40 41 PCR: NOT DETECTED
ALBUMIN SERPL-MCNC: 3.7 GM/DL (ref 3.4–5)
ALP BLD-CCNC: 103 IU/L (ref 40–128)
ALT SERPL-CCNC: 18 U/L (ref 10–40)
ANION GAP SERPL CALCULATED.3IONS-SCNC: 11 MMOL/L (ref 4–16)
AST SERPL-CCNC: 16 IU/L (ref 15–37)
ASTROVIRUS PCR: NOT DETECTED
BACTERIA: ABNORMAL /HPF
BASOPHILS ABSOLUTE: 0 K/CU MM
BASOPHILS RELATIVE PERCENT: 0.5 % (ref 0–1)
BILIRUB SERPL-MCNC: 0.2 MG/DL (ref 0–1)
BILIRUBIN URINE: ABNORMAL MG/DL
BLOOD, URINE: NEGATIVE
BUN BLDV-MCNC: 50 MG/DL (ref 6–23)
C DIFF AG + TOXIN: NORMAL
CALCIUM SERPL-MCNC: 8.5 MG/DL (ref 8.3–10.6)
CAMPYLOBACTER PCR: NOT DETECTED
CHLORIDE BLD-SCNC: 104 MMOL/L (ref 99–110)
CHLORIDE URINE RANDOM: 15 MMOL/L (ref 43–210)
CLARITY: CLEAR
CO2: 22 MMOL/L (ref 21–32)
COLOR: YELLOW
CREAT SERPL-MCNC: 3.9 MG/DL (ref 0.6–1.1)
CREATININE URINE: 246.5 MG/DL (ref 28–217)
CRYPTOSPORIDIUM PCR: NOT DETECTED
CYCLOSPORA CAYETANENSIS PCR: NOT DETECTED
DIFFERENTIAL TYPE: ABNORMAL
E COLI 0157 PCR: NOT DETECTED
E COLI ENTEROAGGREGATIVE PCR: NOT DETECTED
E COLI ENTEROPATHOGENIC PCR: NOT DETECTED
E COLI ENTEROTOXIGENIC PCR: NOT DETECTED
E COLI SHIGA LIKE TOXIN PCR: NOT DETECTED
E COLI SHIGELLA/ENTEROINVASIVE PCR: NOT DETECTED
ENTAMOEBA HISTOLYTICA PCR: NOT DETECTED
EOSINOPHILS ABSOLUTE: 0.2 K/CU MM
EOSINOPHILS RELATIVE PERCENT: 2.7 % (ref 0–3)
ESTIMATED AVERAGE GLUCOSE: 186 MG/DL
GFR AFRICAN AMERICAN: 14 ML/MIN/1.73M2
GFR NON-AFRICAN AMERICAN: 12 ML/MIN/1.73M2
GIARDIA LAMBLIA PCR: NOT DETECTED
GLUCOSE BLD-MCNC: 100 MG/DL (ref 70–99)
GLUCOSE BLD-MCNC: 112 MG/DL (ref 70–99)
GLUCOSE BLD-MCNC: 121 MG/DL (ref 70–99)
GLUCOSE BLD-MCNC: 147 MG/DL (ref 70–99)
GLUCOSE BLD-MCNC: 239 MG/DL (ref 70–99)
GLUCOSE BLD-MCNC: 260 MG/DL (ref 70–99)
GLUCOSE, URINE: NEGATIVE MG/DL
HBA1C MFR BLD: 8.1 % (ref 4.2–6.3)
HCT VFR BLD CALC: 34.7 % (ref 37–47)
HEMOGLOBIN: 10.2 GM/DL (ref 12.5–16)
HYALINE CASTS: 10 /LPF
IMMATURE NEUTROPHIL %: 0.2 % (ref 0–0.43)
KETONES, URINE: NEGATIVE MG/DL
LEUKOCYTE ESTERASE, URINE: ABNORMAL
LYMPHOCYTES ABSOLUTE: 2.4 K/CU MM
LYMPHOCYTES RELATIVE PERCENT: 29.3 % (ref 24–44)
MCH RBC QN AUTO: 28.8 PG (ref 27–31)
MCHC RBC AUTO-ENTMCNC: 29.4 % (ref 32–36)
MCV RBC AUTO: 98 FL (ref 78–100)
MONOCYTES ABSOLUTE: 1 K/CU MM
MONOCYTES RELATIVE PERCENT: 11.9 % (ref 0–4)
MUCUS: ABNORMAL HPF
NITRITE URINE, QUANTITATIVE: NEGATIVE
NOROVIRUS GI GII PCR: NOT DETECTED
NUCLEATED RBC %: 0 %
PDW BLD-RTO: 13 % (ref 11.7–14.9)
PH, URINE: 5.5 (ref 5–8)
PLATELET # BLD: 207 K/CU MM (ref 140–440)
PLESIOMONAS SHIGELLOIDES PCR: NOT DETECTED
PMV BLD AUTO: 10.5 FL (ref 7.5–11.1)
POTASSIUM SERPL-SCNC: 3.9 MMOL/L (ref 3.5–5.1)
POTASSIUM, UR: 24.7 MMOL/L (ref 22–119)
PROT/CREAT RATIO, UR: 0.1
PROTEIN UA: ABNORMAL MG/DL
RBC # BLD: 3.54 M/CU MM (ref 4.2–5.4)
RBC URINE: 1 /HPF (ref 0–6)
ROTAVIRUS A PCR: NOT DETECTED
SALMONELLA PCR: NOT DETECTED
SAPOVIRUS PCR: NOT DETECTED
SEGMENTED NEUTROPHILS ABSOLUTE COUNT: 4.5 K/CU MM
SEGMENTED NEUTROPHILS RELATIVE PERCENT: 55.4 % (ref 36–66)
SODIUM BLD-SCNC: 137 MMOL/L (ref 135–145)
SODIUM URINE: 15 MMOL/L (ref 35–167)
SOURCE: NORMAL
SPECIFIC GRAVITY UA: >1.03 (ref 1–1.03)
SQUAMOUS EPITHELIAL: <1 /HPF
TOTAL IMMATURE NEUTOROPHIL: 0.02 K/CU MM
TOTAL NUCLEATED RBC: 0 K/CU MM
TOTAL PROTEIN: 5.5 GM/DL (ref 6.4–8.2)
TRICHOMONAS: ABNORMAL /HPF
URINE TOTAL PROTEIN: 29.9 MG/DL
UROBILINOGEN, URINE: 0.2 MG/DL (ref 0.2–1)
VIBRIO CHOLERAE PCR: NOT DETECTED
VIBRIO PCR: NOT DETECTED
WBC # BLD: 8.1 K/CU MM (ref 4–10.5)
WBC CLUMP: ABNORMAL /HPF
WBC UA: 15 /HPF (ref 0–5)
YERSINIA ENTEROCOLITICA PCR: NOT DETECTED

## 2022-07-03 PROCEDURE — 94761 N-INVAS EAR/PLS OXIMETRY MLT: CPT

## 2022-07-03 PROCEDURE — 87507 IADNA-DNA/RNA PROBE TQ 12-25: CPT

## 2022-07-03 PROCEDURE — 87324 CLOSTRIDIUM AG IA: CPT

## 2022-07-03 PROCEDURE — 81001 URINALYSIS AUTO W/SCOPE: CPT

## 2022-07-03 PROCEDURE — 84133 ASSAY OF URINE POTASSIUM: CPT

## 2022-07-03 PROCEDURE — 85025 COMPLETE CBC W/AUTO DIFF WBC: CPT

## 2022-07-03 PROCEDURE — 87449 NOS EACH ORGANISM AG IA: CPT

## 2022-07-03 PROCEDURE — 6370000000 HC RX 637 (ALT 250 FOR IP): Performed by: NURSE PRACTITIONER

## 2022-07-03 PROCEDURE — 84300 ASSAY OF URINE SODIUM: CPT

## 2022-07-03 PROCEDURE — 51702 INSERT TEMP BLADDER CATH: CPT

## 2022-07-03 PROCEDURE — 36415 COLL VENOUS BLD VENIPUNCTURE: CPT

## 2022-07-03 PROCEDURE — 6360000002 HC RX W HCPCS: Performed by: NURSE PRACTITIONER

## 2022-07-03 PROCEDURE — 82962 GLUCOSE BLOOD TEST: CPT

## 2022-07-03 PROCEDURE — 83036 HEMOGLOBIN GLYCOSYLATED A1C: CPT

## 2022-07-03 PROCEDURE — 2580000003 HC RX 258: Performed by: NURSE PRACTITIONER

## 2022-07-03 PROCEDURE — 80053 COMPREHEN METABOLIC PANEL: CPT

## 2022-07-03 PROCEDURE — 1200000000 HC SEMI PRIVATE

## 2022-07-03 PROCEDURE — 82570 ASSAY OF URINE CREATININE: CPT

## 2022-07-03 PROCEDURE — 84156 ASSAY OF PROTEIN URINE: CPT

## 2022-07-03 PROCEDURE — 6370000000 HC RX 637 (ALT 250 FOR IP): Performed by: HOSPITALIST

## 2022-07-03 PROCEDURE — 2580000003 HC RX 258: Performed by: INTERNAL MEDICINE

## 2022-07-03 PROCEDURE — 82436 ASSAY OF URINE CHLORIDE: CPT

## 2022-07-03 RX ORDER — INSULIN GLARGINE 100 [IU]/ML
20 INJECTION, SOLUTION SUBCUTANEOUS NIGHTLY
Status: DISCONTINUED | OUTPATIENT
Start: 2022-07-03 | End: 2022-07-04

## 2022-07-03 RX ORDER — SODIUM CHLORIDE 9 MG/ML
INJECTION, SOLUTION INTRAVENOUS CONTINUOUS
Status: DISCONTINUED | OUTPATIENT
Start: 2022-07-03 | End: 2022-07-04

## 2022-07-03 RX ORDER — TIZANIDINE 4 MG/1
2 TABLET ORAL NIGHTLY PRN
Status: DISCONTINUED | OUTPATIENT
Start: 2022-07-03 | End: 2022-07-05 | Stop reason: HOSPADM

## 2022-07-03 RX ORDER — FLUOXETINE HYDROCHLORIDE 20 MG/1
80 CAPSULE ORAL DAILY
Status: DISCONTINUED | OUTPATIENT
Start: 2022-07-03 | End: 2022-07-05 | Stop reason: HOSPADM

## 2022-07-03 RX ADMIN — INSULIN GLARGINE 20 UNITS: 100 INJECTION, SOLUTION SUBCUTANEOUS at 20:10

## 2022-07-03 RX ADMIN — Medication 2000 UNITS: at 09:25

## 2022-07-03 RX ADMIN — INSULIN GLARGINE 70 UNITS: 100 INJECTION, SOLUTION SUBCUTANEOUS at 08:16

## 2022-07-03 RX ADMIN — TOPIRAMATE 50 MG: 25 TABLET, FILM COATED ORAL at 21:07

## 2022-07-03 RX ADMIN — SODIUM CHLORIDE, PRESERVATIVE FREE 10 ML: 5 INJECTION INTRAVENOUS at 09:45

## 2022-07-03 RX ADMIN — INSULIN LISPRO 2 UNITS: 100 INJECTION, SOLUTION INTRAVENOUS; SUBCUTANEOUS at 00:06

## 2022-07-03 RX ADMIN — BUSPIRONE HYDROCHLORIDE 10 MG: 5 TABLET ORAL at 09:26

## 2022-07-03 RX ADMIN — BUSPIRONE HYDROCHLORIDE 10 MG: 5 TABLET ORAL at 13:37

## 2022-07-03 RX ADMIN — INSULIN LISPRO 4 UNITS: 100 INJECTION, SOLUTION INTRAVENOUS; SUBCUTANEOUS at 17:43

## 2022-07-03 RX ADMIN — SODIUM CHLORIDE: 9 INJECTION, SOLUTION INTRAVENOUS at 11:59

## 2022-07-03 RX ADMIN — CLOPIDOGREL BISULFATE 75 MG: 75 TABLET ORAL at 09:26

## 2022-07-03 RX ADMIN — HEPARIN SODIUM 5000 UNITS: 5000 INJECTION INTRAVENOUS; SUBCUTANEOUS at 21:07

## 2022-07-03 RX ADMIN — HEPARIN SODIUM 5000 UNITS: 5000 INJECTION INTRAVENOUS; SUBCUTANEOUS at 13:37

## 2022-07-03 RX ADMIN — HEPARIN SODIUM 5000 UNITS: 5000 INJECTION INTRAVENOUS; SUBCUTANEOUS at 05:58

## 2022-07-03 RX ADMIN — ASPIRIN 81 MG CHEWABLE TABLET 81 MG: 81 TABLET CHEWABLE at 09:25

## 2022-07-03 RX ADMIN — BUSPIRONE HYDROCHLORIDE 10 MG: 5 TABLET ORAL at 21:07

## 2022-07-03 RX ADMIN — LEVOTHYROXINE SODIUM 25 MCG: 25 TABLET ORAL at 07:03

## 2022-07-03 RX ADMIN — INSULIN GLARGINE 50 UNITS: 100 INJECTION, SOLUTION SUBCUTANEOUS at 00:06

## 2022-07-03 RX ADMIN — SODIUM CHLORIDE, PRESERVATIVE FREE 10 ML: 5 INJECTION INTRAVENOUS at 00:30

## 2022-07-03 RX ADMIN — SODIUM CHLORIDE: 9 INJECTION, SOLUTION INTRAVENOUS at 09:45

## 2022-07-03 RX ADMIN — SODIUM CHLORIDE: 9 INJECTION, SOLUTION INTRAVENOUS at 19:44

## 2022-07-03 RX ADMIN — SODIUM CHLORIDE: 9 INJECTION, SOLUTION INTRAVENOUS at 12:16

## 2022-07-03 RX ADMIN — ATORVASTATIN CALCIUM 80 MG: 40 TABLET, FILM COATED ORAL at 21:06

## 2022-07-03 RX ADMIN — INSULIN LISPRO 3 UNITS: 100 INJECTION, SOLUTION INTRAVENOUS; SUBCUTANEOUS at 20:11

## 2022-07-03 RX ADMIN — FLUOXETINE 80 MG: 20 CAPSULE ORAL at 11:58

## 2022-07-03 RX ADMIN — INSULIN LISPRO 2 UNITS: 100 INJECTION, SOLUTION INTRAVENOUS; SUBCUTANEOUS at 12:09

## 2022-07-03 ASSESSMENT — PAIN SCALES - GENERAL
PAINLEVEL_OUTOF10: 0

## 2022-07-03 NOTE — PLAN OF CARE
Problem: Discharge Planning  Goal: Discharge to home or other facility with appropriate resources  7/3/2022 1957 by Virginia Morales RN  Outcome: Progressing  7/3/2022 1010 by Anjum Huang RN  Outcome: Progressing

## 2022-07-03 NOTE — PROGRESS NOTES
Hospitalist Progress Note      Name:  Dodie Leon /Age/Sex: 1960  (59 y.o. female)   MRN & CSN:  [id number] & [id number] Admission Date/Time: 2022  7:07 PM   Location:  [address] [place] [address]-A PCP: Izabella Lopez, [address] Day: 2    Assessment and Plan:   1. Dodie Leon is a 59 y.o.  female with PMH of COPD, TIA, DM, HTN, depression/anxiety, who presented to ED with abdominal discomfort, diarrhea, confusion, hallucinations. 2. Acute kidney injury: most likely prerenal from dehydration, patient has baseline CKD and use to follow with nephrology before. No improvement today, will increase IVf, consult nephrology. Will consider US tomorrow if no improvement. CT A/P on 2022 with no significant finding. 3. Diarrhea: resolved, no stool for  c dif. 4. Hypertension: blood pressure is borderline low today, d/c norvasc. Lisinopril and hydralazine on hold as well  5. DM: decrease night insulin from 50 to 20 continue 70 units of Lantus in am, as she is sleepy and not eating well  6. Hallucination/depression/anxiety: patient has no suicidal thoughts, but she has hallucination, will hold Cymbalta, and donepezil, continue Prozac, buspirone. [facility] 7. COPD: stable no exacerbation    Diet ADULT DIET; Regular; 4 carb choices (60 gm/meal)   DVT Prophylaxis [] Lovenox, [x]  Heparin, [] SCDs, [] Ambulation   GI Prophylaxis [] PPI,  [] H2 Blocker,  [] Carafate,  [] Diet/Tube Feeds   Code Status Full Code   Disposition Patient requires continued admission due to MODESTO   MDM [] Low, [] Moderate,[x]  High  Patient's risk as above due to MODESTO     History of Present Illness:     Chief Complaint: MODESTO (acute kidney injury) ([facility]  Dodie Leon is a 59 y.o.  female  who presents with MODESTO, hallucination  Patient is more awake and alert today, reported cramps in the back but no other significant pain, no more diarrhea, still feels confused.         Ten point ROS reviewed negative, PRN  dextrose bolus, 125 mL, PRN   Or  dextrose bolus, 250 mL, PRN  glucagon (rDNA), 1 mg, PRN  dextrose, 100 mL/hr, PRN  sodium chloride flush, 5-40 mL, PRN  sodium chloride, , PRN  ondansetron, 4 mg, Q8H PRN   Or  ondansetron, 4 mg, Q6H PRN  acetaminophen, 650 mg, Q6H PRN   Or  acetaminophen, 650 mg, Q6H PRN          Electronically signed by Jorge Luis Reyes MD on 7/3/2022 at 9:58 AM

## 2022-07-03 NOTE — PLAN OF CARE
Problem: Discharge Planning  Goal: Discharge to home or other facility with appropriate resources  7/3/2022 1010 by Emelia Barnard RN  Outcome: Progressing  7/3/2022 0051 by Linh Tomlinson RN  Outcome: Progressing  Flowsheets (Taken 7/2/2022 2314)  Discharge to home or other facility with appropriate resources: Identify barriers to discharge with patient and caregiver     Problem: Safety - Adult  Goal: Free from fall injury  7/3/2022 1010 by Emelia Barnard RN  Outcome: Progressing  7/3/2022 0051 by Linh Tomlinson RN  Outcome: Progressing     Problem: Pain  Goal: Verbalizes/displays adequate comfort level or baseline comfort level  7/3/2022 1010 by Emelia Barnard RN  Outcome: Progressing  7/3/2022 0051 by Linh Tomlinson RN  Outcome: Progressing     Problem: Confusion  Goal: Confusion, delirium, dementia, or psychosis is improved or at baseline  Description: INTERVENTIONS:  1. Assess for possible contributors to thought disturbance, including medications, impaired vision or hearing, underlying metabolic abnormalities, dehydration, psychiatric diagnoses, and notify attending LIP  2. [place] high risk fall precautions, as indicated  3. Provide frequent short contacts to provide reality reorientation, refocusing and direction  4. Decrease environmental stimuli, including noise as appropriate  5. Monitor and intervene to maintain adequate nutrition, hydration, elimination, sleep and activity  6. If unable to ensure safety without constant attention obtain sitter and review sitter guidelines with assigned personnel  7. Initiate Psychosocial CNS and Spiritual Care consult, as indicated  Outcome: Progressing     Problem: Skin/Tissue Integrity  Goal: Absence of new skin breakdown  Description: 1. Monitor for areas of redness and/or skin breakdown  2. Assess vascular access sites hourly  3. Every 4-6 hours minimum:  Change oxygen saturation probe site  4.   Every 4-6 hours:  If on nasal continuous positive airway pressure, respiratory therapy assess nares and determine need for appliance change or resting period.   Outcome: Progressing

## 2022-07-03 NOTE — PROGRESS NOTES
Dr Kent Rater notified of nephrology consult per [facility] and responded with Thank you

## 2022-07-03 NOTE — PROGRESS NOTES
4 Eyes Skin Assessment     NAME:  Timothy Whitten  YOB: 1960  MEDICAL RECORD NUMBER:  [id number]    The patient is being assess for  Admission    I agree that 2 RN's have performed a thorough Head to Toe Skin Assessment on the patient. ALL assessment sites listed below have been assessed. Areas assessed by both nurses:    Head, Face, Ears, Shoulders, Back, Chest, Arms, Elbows, Hands, Sacrum. Buttock, Coccyx, Ischium and Legs. Feet and Heels        Does the Patient have a Wound?  No noted wound(s)       Trent Prevention initiated:  No   Wound Care Orders initiated:  No    Pressure Injury (Stage 3,4, Unstageable, DTI, NWPT, and Complex wounds) if present place referral/consult order under [de-identified] No    New and Established Ostomies if present place consult order under : No      Nurse 1 eSignature: Electronically signed by Karthik Vera RN on 7/2/22 at 11:13 PM EDT    **SHARE this note so that the co-signing nurse is able to place an eSignature**    Nurse 2 eSignature: Electronically signed by Tracie Arce RN on 7/2/22 at 11:44 PM EDT

## 2022-07-03 NOTE — PROGRESS NOTES
Pt st cathed to obtain urine for lab orders due to pt not voiding this shift. Pt tolerated well. 1000 ccs clear yellow urine return during cath. No foul odor noted. Urine collected and sent to lab. Notified Dr. Belen Katz of large amount of urine return and no void this shift, order to continue stewart cath at this time. Pt notified and aware.

## 2022-07-03 NOTE — PLAN OF CARE
Problem: Discharge Planning  Goal: Discharge to home or other facility with appropriate resources  Outcome: Progressing  Flowsheets (Taken 7/2/2022 2314)  Discharge to home or other facility with appropriate resources: Identify barriers to discharge with patient and caregiver     Problem: Safety - Adult  Goal: Free from fall injury  Outcome: Progressing     Problem: Pain  Goal: Verbalizes/displays adequate comfort level or baseline comfort level  Outcome: Progressing     Problem: Discharge Planning  Goal: Discharge to home or other facility with appropriate resources  Outcome: Progressing  Flowsheets (Taken 7/2/2022 2314)  Discharge to home or other facility with appropriate resources: Identify barriers to discharge with patient and caregiver     Problem: Safety - Adult  Goal: Free from fall injury  Outcome: Progressing     Problem: Safety - Adult  Goal: Free from fall injury  Outcome: Progressing     Problem: Pain  Goal: Verbalizes/displays adequate comfort level or baseline comfort level  Outcome: Progressing

## 2022-07-03 NOTE — CONSULTS
Nephrology Service Consultation    Patient:  Jerome Franz  MRN: [id number]  Consulting physician:  Pablito Camejo MD  Reason for Consult: arf on ckd 3    History Obtained From:  patient, electronic medical record  PCP: Johny Martines MD    HISTORY OF PRESENT ILLNESS:   The patient is a 59 y.o. female who presents with weakness and sob with n/v and gastropaesis with diarrhea. Pt with ckd 3 see dr Mihai Butts in [facility] and now moved assisted living in [place] and was to see dr Trevor Chavez nephrology as her sister see him and asked for renal to see with arf in this admit. Pt state started n/v Tuesday went er Thursday and then readmitted yesterday with same issue and now arf. Known ckd 3 creat 1.2-1.6 with insulin dep dm2 for 24 years and htn with prior uti. Concern also for confusion. Denies other issues and aware can do better with diet. Past Medical History:        Diagnosis Date    Arthritis     Asthma     Cerebral artery occlusion with cerebral infarction [facility])     per old chart 8/2015- TIA    COPD [facility]ch[facility] obstructive [facility] disease) [facility] [address]     Diabetes mellitus ([facility] [address]     Diabetic neuropathy ([facility] [address]     per old chart    Fracture     per old chart pt in ER 12/9/2018- after 2 days of arm pain after fall- dx with left humerus fx- for surg 12/31/2018    Hyperlipidemia     Hypertension     Muscle weakness (generalized)     Osteoarthritis     per old chart       Past Surgical History:        Procedure Laterality Date    BREAST BIOPSY Right     CHOLECYSTECTOMY  2002?  EYE SURGERY      per old chart had right eye cataract ext done 2/2018 and left eye 4/2018    FOOT SURGERY Left 2004?  HUMERUS FRACTURE SURGERY Left 12/31/2018    HUMERUS OPEN REDUCTION INTERNAL FIXATION LEFT PROXIMAL performed by Dana Madison DO at [facility] Right 2009?     LUNG SURGERY  2009?    simone lung lobectomy        Medications:   Scheduled Meds:   insulin glargine  20 Units SubCUTAneous Nightly    FLUoxetine  80 mg Oral Daily    aspirin  81 mg Oral Daily    atorvastatin  80 mg Oral Nightly    busPIRone  10 mg Oral TID    Vitamin D  2,000 Units Oral Daily    clopidogrel  75 mg Oral Daily    hydrALAZINE  25 mg Oral TID    insulin glargine  70 Units SubCUTAneous QAM AC    levothyroxine  25 mcg Oral QAM AC    metoprolol succinate  25 mg Oral Daily    topiramate  50 mg Oral Nightly    sodium chloride flush  5-40 mL IntraVENous BID    heparin (porcine)  5,000 Units SubCUTAneous 3 times per day    insulin lispro  0-12 Units SubCUTAneous TID WC    insulin lispro  0-6 Units SubCUTAneous Nightly     Continuous Infusions:   dextrose      sodium chloride      sodium chloride 100 mL/hr at 07/03/22 0945     PRN Meds:.tiZANidine, glucose, dextrose bolus **OR** dextrose bolus, glucagon (rDNA), dextrose, sodium chloride flush, sodium chloride, ondansetron **OR** ondansetron, acetaminophen **OR** acetaminophen    Allergies:  Patient has no known allergies. Social History:   TOBACCO:   reports that she has never smoked. She has never used smokeless tobacco.  ETOH:   reports no history of alcohol use. OCCUPATION:      Family History:       Problem Relation Age of Onset    Breast Cancer Maternal Aunt 48    Breast Cancer Maternal Aunt 48       REVIEW OF SYSTEMS:  Negative except for weak sob no fever + n/v diarrhea. Physical Exam:    I/O: No intake/output data recorded. Vitals: BP (!) 113/40   Pulse 59   Temp 98.4 °F (36.9 °C) (Oral)   Resp 17   Wt 250 lb 1.6 oz (113.4 kg)   SpO2 97%   BMI 42.93 kg/m²   General appearance: awake weak  HEENT: Head: Normal, normocephalic, atraumatic.   Neck: supple, symmetrical, trachea midline  Lungs: diminished breath sounds bilaterally  Heart: S1, S2 normal  Abdomen: abnormal findings:  soft NT obese  Extremities: edema trace  Neurologic: Mental status: alertness: alert      CBC:   Recent Labs     06/30/22  1206 07/02/22 2000 07/03/22  0404   WBC 7.5 8.1 8.1   HGB 11.9* 11.1* 10.2*    236 207     BMP:    Recent Labs     06/30/22  1206 07/02/22 2000 07/03/22  0404    135 137   K 4.5 4.7 3.9    99 104   CO2 19* 21 22   BUN 21 51* 50*   CREATININE 1.1 3.8* 3.9*   GLUCOSE 270* 317* 121*     Hepatic:   Recent Labs     06/30/22  1206 07/02/22 2000 07/03/22  0404   AST 13* 23 16   ALT 22 24 18   BILITOT 0.5 0.2 0.2   ALKPHOS 116 120 103     Troponin: No results for input(s): TROPONINI in the last 72 hours. BNP: No results for input(s): BNP in the last 72 hours. Lipids: No results for input(s): CHOL, HDL in the last 72 hours. Invalid input(s): LDLCALCU  ABGs: No results found for: PHART, PO2ART, LSD2MDP  INR: No results for input(s): INR in the last 72 hours.   Renal Labs  Albumin:    Lab Results   Component Value Date/Time    LABALBU 3.7 07/03/2022 04:04 AM     Calcium:    Lab Results   Component Value Date/Time    CALCIUM 8.5 07/03/2022 04:04 AM     Phosphorus:    Lab Results   Component Value Date/Time    PHOS 3.6 03/15/2022 03:19 AM     U/A:    Lab Results   Component Value Date/Time    NITRU NEGATIVE 07/02/2022 10:30 PM    COLORU YELLOW 07/02/2022 10:30 PM    WBCUA 19 07/02/2022 10:30 PM    RBCUA 3 07/02/2022 10:30 PM    MUCUS RARE 07/02/2022 10:30 PM    TRICHOMONAS NONE SEEN 07/02/2022 10:30 PM    YEAST OCCASIONAL 07/02/2022 10:30 PM    BACTERIA OCCASIONAL 07/02/2022 10:30 PM    CLARITYU CLEAR 07/02/2022 10:30 PM    SPECGRAV 1.030 07/02/2022 10:30 PM    UROBILINOGEN 0.2 07/02/2022 10:30 PM    BILIRUBINUR NEGATIVE 07/02/2022 10:30 PM    BLOODU NEGATIVE 07/02/2022 10:30 PM    KETUA TRACE 07/02/2022 10:30 PM     ABG:  No results found for: PHART, XUK0IHJ, PO2ART, PIS1BQX, BEART, THGBART, BSB8UES, I4CDYPTG  HgBA1c:    Lab Results   Component Value Date/Time    LABA1C 8.1 07/03/2022 04:04 AM     Microalbumen/Creatinine ratio:  No components found for: RUCREAT  TSH:  No results found for: TSH  IRON:  No results found for: IRON  Iron Saturation:  No components found for: PERCENTFE  TIBC:  No results found for: TIBC  FERRITIN:  No results found for: FERRITIN  RPR:  No results found for: RPR  RAHEEM:  No results found for: ANATITER, RAHEEM  24 Hour Urine for Creatinine Clearance:  No components found for: CREAT4, UHRS10, UTV10  -----------------------------------------------------------------      Assessment and Recommendations     Patient Active Problem List   Diagnosis Code    Proximal humeral fracture S42.209A    Asthma J45.909    Bereavement Z63.4    Pain of both hip joints M25.551, M25.552    Chest pain R07.9    Chronic abdominal pain R10.9, G89.29    Chronic back pain M54.9, G89.29    Chronic obstructive lung disease (HCC) J44.9    Corns and callosities L84    Depressive disorder F32.9    Type II diabetes mellitus, uncontrolled ([facility]) E11.65    Diabetic polyneuropathy ([facility]) E11.42    HTN (hypertension) I10    Hyperlipidemia E78.5    Lesion of liver K76.9    Menopausal symptom N95.1    Mixed hyperlipidemia E78.2    Onychomycosis B35.1    Obesity E66.9    Osteoarthrosis M19.90    Osteoporosis M81.0    Pain in both feet M79.671, M79.672    Sleep apnea G47.30    TIA (transient ischemic attack) G45.9    Vitamin D deficiency E55.9    Wheezing R06.2    S/P ORIF (open reduction internal fixation) fracture Z98.890, Z87.81    Diabetic gastroparesis ([facility]) E11.43, K31.84    Gastroesophageal reflux disease K21.9    Restless legs G25.81    Ketoacidosis, diabetic, type 2, no coma ([facility]) E11.10    Repeated falls R29.6    MODESTO (acute kidney injury) ([facility]) N17.9     Imp/plan  1 arf from atn/pra on ckd 3 1.2-1.6  2 dm2 uncontrolled  3 n/v with gastroparesis  4 htn  5 anxiety/depression    Plan  1 hold lisinopril and metformin and hydrate monitor for now, not uremic ckd from dm2 and work up infection and other factors  2 ssi and control  3 treat gastroparesis as need and start liquid diet  4 bp stable low monitor  5 affect monitor from assisted living  Will follow      Electronically signed by Jorge Alberto Fuller MD on 7/3/2022 at 10:55 AM

## 2022-07-03 NOTE — H&P
History and Physical      Name:  Santos Pastor /Age/Sex: 1960  (59 y.o. female)   MRN & CSN:  [id number] & [id number] Admission Date/Time: 2022  7:07 PM   Location:  ED22/ED-22 PCP: Millie Chadwick MD       Hospital Day: 1     Attending physician: Dr. Rebecca Schuster and Plan:   Santos Pastor is a 59 y.o.  female  who presents with hallucinations,, confusion, diarrhea seen emergency room on     Assessment and plan:     Acute kidney injury likely secondary to decreased p.o. intake on CKD stage III  Patient reports diarrhea and nausea for the past few days. Received emergency room on -BUN 21, creatinine 1.1 today on admission BUN 51, creatinine 3.8  -Inpatient  -Cr  3.8 in ED w baseline ~1.2 per EMR  -urinalysis with microscopic-pending  -Straight cath for urine sample  -IVF overnight  -BMP in a.m.  -Follow Cr with daily labs  -Monitor I & O  -Heparin for DVT ppx  -Magnesium pending on admission  -If no improvement consider nephrology consult in a.m. Anxiety depression: Patient having auditory and visual hallucinations that started on Tuesday. Polysubstance management, buspirone, donepezil, Cymbalta and Prozac  -Medications held  -Further recommendations as per psychiatry team    Diarrhea: Specified patient was seen in the emergency room  CT abdomen pelvis showed no evidence of intra-abdominal normality. -IV fluids as above  -GI PCR and C. difficile pending, patient is from resident facility    Diabetes Mellitus type II-uncontrolled- with long tern use of insulin.   Complicated with peripheral neuropathy managed on Lantus and gabapentin, hold long acting antiglycemics, continue home lantus twice daily dosing, last Hgb A1C- 8.2, Blood glucose on admission .   - SSI  - continue home lantus  - POCT glucose qACHS  - hypoglycemia management protocol   - target blood glucose 100-180  - ADA carb controlled diet    Hyperlipidemia: Continue statin therapy    Hypertension: Managed on lisinopril, hydralazine   -Hold lisinopril on admission as of MODESTO    Chronic Conditions: Continue all home medications except as stated above or contraindicated. Chronic low back pain  Resting tremor  History of CVA on aspirin high-dose statin    BMI 43.18: kg/m2 Life style modifications  -Follow-up PCP for longitudinal care    Disposition: Inpatient. Patient was accepted for continue evaluation and treatment and improvement of clinical symptoms. Discussed assessment and treatment plan with the admitting and supervising physician who agrees with the plan. Diet ADA   DVT Prophylaxis [] Lovenox, [x]  Heparin, [] SCDs, [] Warfarin  [] NOAC     GI Prophylaxis [x] PPI,  [] H2 Blocker,  [] Carafate,  [] Diet/Tube Feeds   Code Status FULL     History of Present Illness:     Chief Complaint: MODESTO (acute kidney injury) ([facility] [address]  Octavia Cantu is a 59 y.o.  female, with past medical history significant for anxiety, depression, diabetes mellitus, hypertension, chronic tremor, CKD, history of CVA who presented to the emergency room with complaint of feeling overwhelmed and having auditory and visual hallucinations. .  The patient states symptoms started over the past few days. States she was in the emergency room a few days ago for diarrhea. States she started to have hallucinations on Tuesday. She does have a history of anxiety and depression, on multiple medications. Patient states she feels like she is seeing people and hearing music. She states she Makes out the voices are saying it is just mumbling. At times she hears she has a sensation of music. Patient denies any suicidal homicidal ideations states she has not seen psychiatry in the past but states she has seen a therapist.  Patient states she will not come to the emergency room today because she cannot take it anymore. States she also feels dehydrated from having diarrhea.     At the ED, vital signs;T97.8,HR 73, RR 20, /84 mm/hg,SPO2 100% RA. Significant laboratories were the following: BUN 51, creatinine 3.8, GFR 15, glucose 317. On examination patient does not appear in any acute distress, she will keep her eyes closed however she is continuously rubbing her feet together. States she is no longer feels anxious she states she was given medicine in the ER  The patient was brought to the ED and was subsequently admitted for  further evaluation. and management          Review of Systems   Constitutional: Negative for chills and fever. HENT: Negative for congestion. Respiratory: Negative for cough and shortness of breath. Cardiovascular: Negative for chest pain and palpitations. Gastrointestinal: Positive for abdominal pain, diarrhea and nausea. Negative for vomiting. Genitourinary: Negative for dysuria. Musculoskeletal: Negative for back pain. Skin: Negative. Neurological: Negative for headaches. Hematological: Negative. Psychiatric/Behavioral: Positive for hallucinations. Negative for self-injury and suicidal ideas. The patient is nervous/anxious. Objective:   No intake or output data in the 24 hours ending 07/02/22 2218   Vitals:   Vitals:    07/02/22 2101   BP: (!) 110/47   Pulse: 64   Resp: 18   Temp:    SpO2: 99%     Physical Exam:   GEN- Awake female, NAD, sitting up in bed, appears to be stated age. EYES- Pupils are equally round. HENT- Mucous membranes are moist.   NECK- Supple, no apparent thyromegaly or masses. RESP-Clear to auscultation, no wheezes, rales or rhonchi. Symmetric chest movement while on room air. CARDIO/VASC-   Regular rate and rhythm,  Peripheral pulses equal bilaterally and palpable. GI- Abdomen is soft, no tenderness, masses, or guarding. Bowel sounds present. MSK- No gross joint deformities. EXTREMITIES- pulses intact, no swelling, no calf tenderness  SKIN- Normal coloration, warm, dry.   NEURO-Cranial nerves appear grossly intact, normal speech, no lateralizing weakness. PSYCH-Awake, alert, oriented x 4. Past Medical History:      Past Medical History:   Diagnosis Date    Arthritis     Asthma     Cerebral artery occlusion with cerebral infarction [facility])     per old chart 8/2015- TIA    COPD [facility] obstructive [facility] disease) [facility] [facility]     Diabetes mellitus ([facility] [address]     Diabetic neuropathy ([facility] [address]     per old chart    Fracture     per old chart pt in ER 12/9/2018- after 2 days of arm pain after fall- dx with left humerus fx- for surg 12/31/2018    Hyperlipidemia     Hypertension     Muscle weakness (generalized)     Osteoarthritis     per old chart     PSHX:  has a past surgical history that includes Foot surgery (Left, 2004?); knee surgery (Right, 2009?); Cholecystectomy (2002?); Lung surgery (2009?); eye surgery; Humerus fracture surgery (Left, 12/31/2018); and Breast biopsy (Right). Allergies: No Known Allergies    FAM HX: family history includes Breast Cancer (age of onset: 48) in her maternal aunt and maternal aunt. Soc HX:   Social History     Socioeconomic History    Marital status:      Spouse name: Not on file    Number of children: Not on file    Years of education: Not on file    Highest education level: Not on file   Occupational History    Not on file   Tobacco Use    Smoking status: Never Smoker    Smokeless tobacco: Never Used    Tobacco comment: 12/27/*2018- with phone assessment with caregiver- unsure of social, surgical or family hx for phone assessment   Vaping Use    Vaping Use: Never used   Substance and Sexual Activity    Alcohol use: No    Drug use: No    Sexual activity: Not on file   Other Topics Concern    Not on file   Social History Narrative    Not on file     Social and family history reviewed with patient/family. Medications:     Home Medication   Prior to Admission medications    Medication Sig Start Date End Date Taking?  Authorizing Provider   aspirin 81 MG chewable tablet Take 81 mg by mouth daily   Yes Historical Provider, MD   baclofen (LIORESAL) 20 MG tablet Take 20 mg by mouth nightly   Yes Historical Provider, MD   DULoxetine (CYMBALTA) 60 MG extended release capsule Take 60 mg by mouth daily   Yes Historical Provider, MD   FLUoxetine (PROZAC) 40 MG capsule Take 80 mg by mouth daily   Yes Historical Provider, MD   insulin glargine (LANTUS) 100 UNIT/ML injection vial Inject 70 Units into the skin every morning (before breakfast)   Yes Historical Provider, MD   levothyroxine (SYNTHROID) 25 MCG tablet Take 25 mcg by mouth every morning (before breakfast)   Yes Historical Provider, MD   tiZANidine (ZANAFLEX) 4 MG tablet Take 4 mg by mouth nightly as needed   Yes Historical Provider, MD   ondansetron (ZOFRAN ODT) 4 MG disintegrating tablet Take 1 tablet by mouth every 8 hours as needed for Nausea 6/30/22  Yes Shawna Sherwood PA-C   insulin glargine (LANTUS) 100 UNIT/ML injection vial Inject 30 Units into the skin nightly  Patient taking differently: Inject 50 Units into the skin nightly  3/22/22  Yes [place] DO Kiko   baclofen (LIORESAL) 5 MG tablet Take 1 tablet by mouth 3 times daily  Patient taking differently: Take 10 mg by mouth every morning (before breakfast)  3/22/22  Yes [place] Kiko DO   lisinopril (PRINIVIL;ZESTRIL) 40 MG tablet Take 1 tablet by mouth daily 3/16/22  Yes Alexandra Garcia MD   donepezil (ARICEPT) 5 MG tablet Take 10 mg by mouth nightly  9/13/21  Yes Historical Provider, MD   gabapentin (NEURONTIN) 600 MG tablet Take 1,200 mg by mouth 2 times daily.   9/13/21  Yes Historical Provider, MD   busPIRone (BUSPAR) 10 MG tablet Take 10 mg by mouth 3 times daily  3/27/21  Yes Historical Provider, MD   Cholecalciferol 50 MCG (2000 UT) CAPS Take 1 tablet by mouth daily 3/13/21  Yes Historical Provider, MD   amitriptyline (ELAVIL) 25 MG tablet Take 25 mg by mouth nightly    Yes Historical Provider, MD   insulin lispro (HUMALOG) 100 UNIT/ML injection vial Check blood glucose three times daily before meals and at bedtime- for blood glucose <150- no insulin, 151-200=2, 201-250=4, 251-300=6, 301-350=8, 351-400=10, >400=12 units and call MD 9/15/20  Yes Dante Rondon MD   metoprolol succinate (TOPROL XL) 50 MG extended release tablet Take 1 tablet by mouth daily  Patient taking differently: Take 25 mg by mouth daily  1/25/19  Yes Maddie Jacobs MD   pantoprazole (PROTONIX) 40 MG tablet Take 1 tablet by mouth 2 times daily (before meals)  Patient taking differently: Take 40 mg by mouth daily as needed  1/24/19  Yes Maddie Jacobs MD   topiramate (TOPAMAX) 50 MG tablet Take 50 mg by mouth nightly    Yes Historical Provider, MD   clopidogrel (PLAVIX) 75 MG tablet Take 75 mg by mouth daily   Yes Historical Provider, MD   atorvastatin (LIPITOR) 40 MG tablet Take 80 mg by mouth nightly    Yes Historical Provider, MD   metFORMIN (GLUCOPHAGE) 500 MG tablet Take 1 tablet by mouth 2 times daily (with meals) 3/22/22   Tabitha Alberts DO   hydrALAZINE (APRESOLINE) 25 MG tablet Take 1 tablet by mouth every 8 hours 3/22/22   Tabitha Alberts DO   lidocaine 4 % external patch Place 1 patch onto the skin daily  Patient not taking: Reported on 3/17/2022 9/16/20   Dante Rondon MD   amLODIPine (NORVASC) 10 MG tablet amlodipine 10 mg tablet   TAKE ONE TABLET BY MOUTH DAILY    Historical Provider, MD     Medications:    Infusions:   PRN Meds:     Recent Labs     06/30/22 1206 07/02/22 2000   WBC 7.5 8.1   HGB 11.9* 11.1*   HCT 37.9 36.5*    236      Recent Labs     06/30/22  1206 07/02/22 2000    135   K 4.5 4.7    99   CO2 19* 21   BUN 21 51*   CREATININE 1.1 3.8*     Recent Labs     06/30/22  1206 07/02/22 2000   AST 13* 23   ALT 22 24   BILIDIR  --  0.2   BILITOT 0.5 0.2   ALKPHOS 116 120     No results for input(s): INR in the last 72 hours.   Recent Labs     06/30/22  1206   TROPONINT <0.010        Imaging reviewed  CT ABDOMEN PELVIS WO CONTRAST Additional Contrast? Uterus and adnexal structures are unchanged in appearance. Urinary bladder suboptimally evaluated due to decompressed state. No evidence of free fluid within the pelvis. Peritoneum/Retroperitoneum: No new abdominal/pelvic lymphadenopathy is identified. Mild atherosclerotic changes of the abdominal aorta with no evidence of aneurysm. Incidental note is made of extensive arterial vascular calcification about the abdomen. Bones/Soft Tissues: There are degenerative changes of the spine with no evidence of acute osseous abnormality. Mild edema identified within midline subcutaneous soft tissues of the back. Very tiny umbilical hernia containing fat is identified. Induration of subcutaneous soft tissues along the inferior aspect of patient's panniculus again identified (image 144). Finding could be related to areas of scarring. Again noted is posttraumatic deformity involving lateral aspects of several right ribs. There is focal area of bony/calcific synostosis between lateral aspects of these ribs (images 15-19). 1. No evidence of acute intra-abdominal abnormality. No evidence of bowel obstruction, intraperitoneal free air, or abscess. 2. Status post cholecystectomy. 3. Coronary artery calcification. Minimal pericardial effusion which appears slightly increased when compared to the study of 03/11/2022 as described above. 4. Small sliding hiatal hernia. XR CHEST PORTABLE    Result Date: 6/30/2022  EXAMINATION: ONE XRAY VIEW OF THE CHEST 6/30/2022 11:49 am COMPARISON: 03/17/2022 HISTORY: ORDERING SYSTEM PROVIDED HISTORY: cough/fever TECHNOLOGIST PROVIDED HISTORY: Reason for exam:->cough/fever Reason for Exam: cough/ fever FINDINGS: The lungs are without acute focal process. There is no effusion or pneumothorax. The cardiomediastinal silhouette is stable. The osseous structures are stable. No acute process.           Electronically signed by BRIAN Banda CNP on 7/2/2022 at 10:18 PM

## 2022-07-04 ENCOUNTER — APPOINTMENT (OUTPATIENT)
Dept: GENERAL RADIOLOGY | Age: 62
DRG: 469 | End: 2022-07-04
Payer: MEDICAID

## 2022-07-04 LAB
ALBUMIN SERPL-MCNC: 3.2 GM/DL (ref 3.4–5)
ANION GAP SERPL CALCULATED.3IONS-SCNC: 7 MMOL/L (ref 4–16)
BASOPHILS ABSOLUTE: 0 K/CU MM
BASOPHILS RELATIVE PERCENT: 0.3 % (ref 0–1)
BUN BLDV-MCNC: 38 MG/DL (ref 6–23)
CALCIUM SERPL-MCNC: 8 MG/DL (ref 8.3–10.6)
CHLORIDE BLD-SCNC: 112 MMOL/L (ref 99–110)
CO2: 24 MMOL/L (ref 21–32)
CREAT SERPL-MCNC: 1.9 MG/DL (ref 0.6–1.1)
DIFFERENTIAL TYPE: ABNORMAL
EOSINOPHILS ABSOLUTE: 0.1 K/CU MM
EOSINOPHILS RELATIVE PERCENT: 1.8 % (ref 0–3)
FERRITIN: 75 NG/ML (ref 15–150)
GFR AFRICAN AMERICAN: 32 ML/MIN/1.73M2
GFR NON-AFRICAN AMERICAN: 27 ML/MIN/1.73M2
GLUCOSE BLD-MCNC: 112 MG/DL (ref 70–99)
GLUCOSE BLD-MCNC: 177 MG/DL (ref 70–99)
GLUCOSE BLD-MCNC: 178 MG/DL (ref 70–99)
GLUCOSE BLD-MCNC: 45 MG/DL (ref 70–99)
GLUCOSE BLD-MCNC: 48 MG/DL (ref 70–99)
GLUCOSE BLD-MCNC: 86 MG/DL (ref 70–99)
HCT VFR BLD CALC: 33.4 % (ref 37–47)
HEMOGLOBIN: 10.1 GM/DL (ref 12.5–16)
IMMATURE NEUTROPHIL %: 0.2 % (ref 0–0.43)
IRON: 32 UG/DL (ref 37–145)
LYMPHOCYTES ABSOLUTE: 1.7 K/CU MM
LYMPHOCYTES RELATIVE PERCENT: 26.4 % (ref 24–44)
MCH RBC QN AUTO: 29.3 PG (ref 27–31)
MCHC RBC AUTO-ENTMCNC: 30.2 % (ref 32–36)
MCV RBC AUTO: 96.8 FL (ref 78–100)
MONOCYTES ABSOLUTE: 0.7 K/CU MM
MONOCYTES RELATIVE PERCENT: 9.9 % (ref 0–4)
NUCLEATED RBC %: 0 %
PCT TRANSFERRIN: 15 % (ref 10–44)
PDW BLD-RTO: 12.9 % (ref 11.7–14.9)
PHOSPHORUS: 3.3 MG/DL (ref 2.5–4.9)
PLATELET # BLD: 182 K/CU MM (ref 140–440)
PMV BLD AUTO: 10.4 FL (ref 7.5–11.1)
POTASSIUM SERPL-SCNC: 4.4 MMOL/L (ref 3.5–5.1)
RBC # BLD: 3.45 M/CU MM (ref 4.2–5.4)
SEGMENTED NEUTROPHILS ABSOLUTE COUNT: 4 K/CU MM
SEGMENTED NEUTROPHILS RELATIVE PERCENT: 61.4 % (ref 36–66)
SODIUM BLD-SCNC: 143 MMOL/L (ref 135–145)
TOTAL IMMATURE NEUTOROPHIL: 0.01 K/CU MM
TOTAL IRON BINDING CAPACITY: 219 UG/DL (ref 250–450)
TOTAL NUCLEATED RBC: 0 K/CU MM
UNSATURATED IRON BINDING CAPACITY: 187 UG/DL (ref 110–370)
WBC # BLD: 6.6 K/CU MM (ref 4–10.5)

## 2022-07-04 PROCEDURE — 80069 RENAL FUNCTION PANEL: CPT

## 2022-07-04 PROCEDURE — 6360000002 HC RX W HCPCS: Performed by: NURSE PRACTITIONER

## 2022-07-04 PROCEDURE — 6370000000 HC RX 637 (ALT 250 FOR IP): Performed by: NURSE PRACTITIONER

## 2022-07-04 PROCEDURE — 6370000000 HC RX 637 (ALT 250 FOR IP): Performed by: HOSPITALIST

## 2022-07-04 PROCEDURE — 94761 N-INVAS EAR/PLS OXIMETRY MLT: CPT

## 2022-07-04 PROCEDURE — 80053 COMPREHEN METABOLIC PANEL: CPT

## 2022-07-04 PROCEDURE — 83550 IRON BINDING TEST: CPT

## 2022-07-04 PROCEDURE — 51702 INSERT TEMP BLADDER CATH: CPT

## 2022-07-04 PROCEDURE — 36415 COLL VENOUS BLD VENIPUNCTURE: CPT

## 2022-07-04 PROCEDURE — 71045 X-RAY EXAM CHEST 1 VIEW: CPT

## 2022-07-04 PROCEDURE — 85025 COMPLETE CBC W/AUTO DIFF WBC: CPT

## 2022-07-04 PROCEDURE — 2580000003 HC RX 258: Performed by: NURSE PRACTITIONER

## 2022-07-04 PROCEDURE — 2580000003 HC RX 258: Performed by: INTERNAL MEDICINE

## 2022-07-04 PROCEDURE — 1200000000 HC SEMI PRIVATE

## 2022-07-04 PROCEDURE — 83540 ASSAY OF IRON: CPT

## 2022-07-04 PROCEDURE — 82962 GLUCOSE BLOOD TEST: CPT

## 2022-07-04 PROCEDURE — 82728 ASSAY OF FERRITIN: CPT

## 2022-07-04 RX ORDER — INSULIN GLARGINE 100 [IU]/ML
30 INJECTION, SOLUTION SUBCUTANEOUS NIGHTLY
Status: DISCONTINUED | OUTPATIENT
Start: 2022-07-04 | End: 2022-07-05 | Stop reason: HOSPADM

## 2022-07-04 RX ORDER — AMLODIPINE BESYLATE 10 MG/1
10 TABLET ORAL DAILY
Status: DISCONTINUED | OUTPATIENT
Start: 2022-07-04 | End: 2022-07-05 | Stop reason: HOSPADM

## 2022-07-04 RX ORDER — LANOLIN ALCOHOL/MO/W.PET/CERES
6 CREAM (GRAM) TOPICAL
Status: DISCONTINUED | OUTPATIENT
Start: 2022-07-04 | End: 2022-07-05 | Stop reason: HOSPADM

## 2022-07-04 RX ORDER — LANOLIN ALCOHOL/MO/W.PET/CERES
9 CREAM (GRAM) TOPICAL NIGHTLY PRN
Status: DISCONTINUED | OUTPATIENT
Start: 2022-07-04 | End: 2022-07-04

## 2022-07-04 RX ADMIN — HEPARIN SODIUM 5000 UNITS: 5000 INJECTION INTRAVENOUS; SUBCUTANEOUS at 05:28

## 2022-07-04 RX ADMIN — BUSPIRONE HYDROCHLORIDE 10 MG: 5 TABLET ORAL at 07:41

## 2022-07-04 RX ADMIN — ASPIRIN 81 MG CHEWABLE TABLET 81 MG: 81 TABLET CHEWABLE at 07:41

## 2022-07-04 RX ADMIN — SODIUM CHLORIDE: 9 INJECTION, SOLUTION INTRAVENOUS at 05:24

## 2022-07-04 RX ADMIN — Medication 6 MG: at 23:06

## 2022-07-04 RX ADMIN — HYDRALAZINE HYDROCHLORIDE 25 MG: 25 TABLET, FILM COATED ORAL at 21:26

## 2022-07-04 RX ADMIN — CLOPIDOGREL BISULFATE 75 MG: 75 TABLET ORAL at 07:42

## 2022-07-04 RX ADMIN — BUSPIRONE HYDROCHLORIDE 10 MG: 5 TABLET ORAL at 14:13

## 2022-07-04 RX ADMIN — HEPARIN SODIUM 5000 UNITS: 5000 INJECTION INTRAVENOUS; SUBCUTANEOUS at 14:13

## 2022-07-04 RX ADMIN — ATORVASTATIN CALCIUM 80 MG: 40 TABLET, FILM COATED ORAL at 21:27

## 2022-07-04 RX ADMIN — AMLODIPINE BESYLATE 10 MG: 10 TABLET ORAL at 14:14

## 2022-07-04 RX ADMIN — FLUOXETINE 80 MG: 20 CAPSULE ORAL at 07:41

## 2022-07-04 RX ADMIN — TOPIRAMATE 50 MG: 25 TABLET, FILM COATED ORAL at 21:26

## 2022-07-04 RX ADMIN — BUSPIRONE HYDROCHLORIDE 10 MG: 5 TABLET ORAL at 21:26

## 2022-07-04 RX ADMIN — METOPROLOL SUCCINATE 25 MG: 25 TABLET, EXTENDED RELEASE ORAL at 07:41

## 2022-07-04 RX ADMIN — INSULIN LISPRO 2 UNITS: 100 INJECTION, SOLUTION INTRAVENOUS; SUBCUTANEOUS at 12:36

## 2022-07-04 RX ADMIN — INSULIN GLARGINE 30 UNITS: 100 INJECTION, SOLUTION SUBCUTANEOUS at 21:29

## 2022-07-04 RX ADMIN — HEPARIN SODIUM 5000 UNITS: 5000 INJECTION INTRAVENOUS; SUBCUTANEOUS at 21:27

## 2022-07-04 RX ADMIN — LEVOTHYROXINE SODIUM 25 MCG: 25 TABLET ORAL at 05:28

## 2022-07-04 RX ADMIN — Medication 2000 UNITS: at 07:42

## 2022-07-04 RX ADMIN — SODIUM CHLORIDE, PRESERVATIVE FREE 10 ML: 5 INJECTION INTRAVENOUS at 07:42

## 2022-07-04 RX ADMIN — INSULIN LISPRO 2 UNITS: 100 INJECTION, SOLUTION INTRAVENOUS; SUBCUTANEOUS at 17:11

## 2022-07-04 RX ADMIN — SODIUM CHLORIDE, PRESERVATIVE FREE 10 ML: 5 INJECTION INTRAVENOUS at 21:24

## 2022-07-04 RX ADMIN — HYDRALAZINE HYDROCHLORIDE 25 MG: 25 TABLET, FILM COATED ORAL at 14:14

## 2022-07-04 ASSESSMENT — PAIN SCALES - GENERAL
PAINLEVEL_OUTOF10: 0

## 2022-07-04 NOTE — PROGRESS NOTES
Hospitalist Progress Note      Name:  Shraddha Tim /Age/Sex: 1960  (59 y.o. female)   MRN & CSN:  [id number] & [id number] Admission Date/Time: 2022  7:07 PM   Location:  [address] [place] [address] PCP: Adiel Thakkar MD         Hospital Day: 3    Assessment and Plan:   Shraddha Tim is a 59 y.o.  female with PMH of COPD, TIA, DM, HTN, depression/anxiety, who presented to ED with abdominal discomfort, diarrhea, confusion, hallucinations.     2. Acute kidney injury: most likely prerenal from dehydration, patient has baseline CKD and use to follow with nephrology before. Better now, d/c IVF, keep holding lisinopril and metformin per renal recs. 3. Diarrhea:had one liquid stool, c dif negative, may start Imodium if needed. 4. Hypertension: blood pressure was borderline low today, better now, restart norvasc. Lisinopril and hydralazine on hold. 5. DM: significant hypoglycemia over night. Discussed with the patient. She is not taking her insulin as listed in her medication. She takes 30 units at night, variable during the day, will change insulin to 30 at night, no morning dose for now, with SSI. 6. Hallucination/depression/anxiety: patient has no suicidal thoughts, but she has hallucination, will hold Cymbalta, and donepezil, continue Prozac, buspirone. Pscyh consult, not seen yet. COPD: stable no exacerbation  7. Hypothyroidism: on home levothyroxine dose, check TSH, T4 in am    Diet ADULT DIET;  Regular; 4 carb choices (60 gm/meal)   DVT Prophylaxis [] Lovenox, []  Heparin, [] SCDs, [] Ambulation   GI Prophylaxis [] PPI,  [] H2 Blocker,  [] Carafate,  [] Diet/Tube Feeds   Code Status Full Code   Disposition Patient requires continued admission due to MODESTO   MDM [] Low, [] Moderate,[x]  High  Patient's risk as above due to MODESTO     History of Present Illness:     Chief Complaint: MODESTO (acute kidney injury) ([facility] [address]  Shraddha Tim is a 59 y.o.  female  who presents with MODESTO, diarrhea. Patient is doing better today, she had one liquid stool this morning, no abd pain, she is more relaxed today. Ten point ROS reviewed negative, unless as noted above    Objective: Intake/Output Summary (Last 24 hours) at 7/4/2022 1305  Last data filed at 7/4/2022 7188  Gross per 24 hour   Intake 4383 ml   Output 2175 ml   Net 2208 ml      Vitals:   Vitals:    07/04/22 0739   BP: 139/64   Pulse: 82   Resp: 18   Temp: 97.7 °F (36.5 °C)   SpO2: 98%     Physical Exam:   GEN Awake female, flat in bed, obese  EYES Pupils are equally round. No scleral erythema, discharge, or conjunctivitis. RESP Clear to auscultation, no wheezes, rales or rhonchi. Symmetric chest movement while on room air. CARDIO/VASC S1/S2 auscultated. Regular rate without appreciable murmurs, rubs, or gallops. No JVD or carotid bruits. Peripheral pulses equal bilaterally and palpable. No peripheral edema. GI Abdomen is soft without significant tenderness, masses, or guarding, fatty distension. MSK No gross joint deformities. SKIN Normal coloration, warm, dry. NEURO Cranial nerves appear grossly intact, normal speech, no lateralizing weakness. PSYCH Awake, alert, oriented x 4. Affect appropriate.     Medications:   Medications:    insulin glargine  20 Units SubCUTAneous Nightly    FLUoxetine  80 mg Oral Daily    aspirin  81 mg Oral Daily    atorvastatin  80 mg Oral Nightly    busPIRone  10 mg Oral TID    Vitamin D  2,000 Units Oral Daily    clopidogrel  75 mg Oral Daily    hydrALAZINE  25 mg Oral TID    insulin glargine  70 Units SubCUTAneous QAM AC    levothyroxine  25 mcg Oral QAM AC    metoprolol succinate  25 mg Oral Daily    topiramate  50 mg Oral Nightly    sodium chloride flush  5-40 mL IntraVENous BID    heparin (porcine)  5,000 Units SubCUTAneous 3 times per day    insulin lispro  0-12 Units SubCUTAneous TID WC    insulin lispro  0-6 Units SubCUTAneous Nightly      Infusions:    sodium chloride

## 2022-07-05 VITALS
DIASTOLIC BLOOD PRESSURE: 63 MMHG | WEIGHT: 253.6 LBS | HEART RATE: 72 BPM | HEIGHT: 64 IN | OXYGEN SATURATION: 98 % | BODY MASS INDEX: 43.29 KG/M2 | RESPIRATION RATE: 16 BRPM | TEMPERATURE: 98.1 F | SYSTOLIC BLOOD PRESSURE: 174 MMHG

## 2022-07-05 LAB
ALBUMIN SERPL-MCNC: 3.6 GM/DL (ref 3.4–5)
ANION GAP SERPL CALCULATED.3IONS-SCNC: 9 MMOL/L (ref 4–16)
BASOPHILS ABSOLUTE: 0 K/CU MM
BASOPHILS RELATIVE PERCENT: 0.6 % (ref 0–1)
BUN BLDV-MCNC: 21 MG/DL (ref 6–23)
CALCIUM SERPL-MCNC: 9.1 MG/DL (ref 8.3–10.6)
CHLORIDE BLD-SCNC: 110 MMOL/L (ref 99–110)
CO2: 24 MMOL/L (ref 21–32)
CREAT SERPL-MCNC: 1.2 MG/DL (ref 0.6–1.1)
DIFFERENTIAL TYPE: ABNORMAL
EOSINOPHILS ABSOLUTE: 0.2 K/CU MM
EOSINOPHILS RELATIVE PERCENT: 2.8 % (ref 0–3)
GFR AFRICAN AMERICAN: 55 ML/MIN/1.73M2
GFR NON-AFRICAN AMERICAN: 45 ML/MIN/1.73M2
GLUCOSE BLD-MCNC: 101 MG/DL (ref 70–99)
GLUCOSE BLD-MCNC: 132 MG/DL (ref 70–99)
GLUCOSE BLD-MCNC: 135 MG/DL (ref 70–99)
HCT VFR BLD CALC: 37.6 % (ref 37–47)
HEMOGLOBIN: 11.4 GM/DL (ref 12.5–16)
IMMATURE NEUTROPHIL %: 0.2 % (ref 0–0.43)
LYMPHOCYTES ABSOLUTE: 1.5 K/CU MM
LYMPHOCYTES RELATIVE PERCENT: 28.4 % (ref 24–44)
MCH RBC QN AUTO: 29.1 PG (ref 27–31)
MCHC RBC AUTO-ENTMCNC: 30.3 % (ref 32–36)
MCV RBC AUTO: 95.9 FL (ref 78–100)
MONOCYTES ABSOLUTE: 0.5 K/CU MM
MONOCYTES RELATIVE PERCENT: 8.6 % (ref 0–4)
NUCLEATED RBC %: 0 %
PDW BLD-RTO: 13.1 % (ref 11.7–14.9)
PHOSPHORUS: 2.8 MG/DL (ref 2.5–4.9)
PLATELET # BLD: 219 K/CU MM (ref 140–440)
PMV BLD AUTO: 10.2 FL (ref 7.5–11.1)
POTASSIUM SERPL-SCNC: 4.7 MMOL/L (ref 3.5–5.1)
RBC # BLD: 3.92 M/CU MM (ref 4.2–5.4)
SEGMENTED NEUTROPHILS ABSOLUTE COUNT: 3.2 K/CU MM
SEGMENTED NEUTROPHILS RELATIVE PERCENT: 59.4 % (ref 36–66)
SODIUM BLD-SCNC: 143 MMOL/L (ref 135–145)
T4 FREE: 1.14 NG/DL (ref 0.9–1.8)
TOTAL IMMATURE NEUTOROPHIL: 0.01 K/CU MM
TOTAL NUCLEATED RBC: 0 K/CU MM
TSH HIGH SENSITIVITY: 4.84 UIU/ML (ref 0.27–4.2)
WBC # BLD: 5.4 K/CU MM (ref 4–10.5)

## 2022-07-05 PROCEDURE — 6370000000 HC RX 637 (ALT 250 FOR IP): Performed by: HOSPITALIST

## 2022-07-05 PROCEDURE — 84443 ASSAY THYROID STIM HORMONE: CPT

## 2022-07-05 PROCEDURE — 84439 ASSAY OF FREE THYROXINE: CPT

## 2022-07-05 PROCEDURE — 80069 RENAL FUNCTION PANEL: CPT

## 2022-07-05 PROCEDURE — 6360000002 HC RX W HCPCS: Performed by: NURSE PRACTITIONER

## 2022-07-05 PROCEDURE — 6370000000 HC RX 637 (ALT 250 FOR IP): Performed by: NURSE PRACTITIONER

## 2022-07-05 PROCEDURE — 51702 INSERT TEMP BLADDER CATH: CPT

## 2022-07-05 PROCEDURE — 2580000003 HC RX 258: Performed by: NURSE PRACTITIONER

## 2022-07-05 PROCEDURE — 36415 COLL VENOUS BLD VENIPUNCTURE: CPT

## 2022-07-05 PROCEDURE — 94761 N-INVAS EAR/PLS OXIMETRY MLT: CPT

## 2022-07-05 PROCEDURE — 85025 COMPLETE CBC W/AUTO DIFF WBC: CPT

## 2022-07-05 PROCEDURE — 80053 COMPREHEN METABOLIC PANEL: CPT

## 2022-07-05 PROCEDURE — 82962 GLUCOSE BLOOD TEST: CPT

## 2022-07-05 RX ORDER — ONDANSETRON 4 MG/1
4 TABLET, ORALLY DISINTEGRATING ORAL EVERY 8 HOURS PRN
Qty: 20 TABLET | Refills: 0 | Status: ON HOLD | OUTPATIENT
Start: 2022-07-05 | End: 2022-07-18 | Stop reason: HOSPADM

## 2022-07-05 RX ADMIN — HEPARIN SODIUM 5000 UNITS: 5000 INJECTION INTRAVENOUS; SUBCUTANEOUS at 05:46

## 2022-07-05 RX ADMIN — ASPIRIN 81 MG CHEWABLE TABLET 81 MG: 81 TABLET CHEWABLE at 07:43

## 2022-07-05 RX ADMIN — FLUOXETINE 80 MG: 20 CAPSULE ORAL at 07:43

## 2022-07-05 RX ADMIN — Medication 2000 UNITS: at 07:42

## 2022-07-05 RX ADMIN — HYDRALAZINE HYDROCHLORIDE 25 MG: 25 TABLET, FILM COATED ORAL at 05:47

## 2022-07-05 RX ADMIN — METOPROLOL SUCCINATE 25 MG: 25 TABLET, EXTENDED RELEASE ORAL at 07:43

## 2022-07-05 RX ADMIN — BUSPIRONE HYDROCHLORIDE 10 MG: 5 TABLET ORAL at 07:43

## 2022-07-05 RX ADMIN — LEVOTHYROXINE SODIUM 25 MCG: 25 TABLET ORAL at 05:46

## 2022-07-05 RX ADMIN — CLOPIDOGREL BISULFATE 75 MG: 75 TABLET ORAL at 07:43

## 2022-07-05 RX ADMIN — AMLODIPINE BESYLATE 10 MG: 10 TABLET ORAL at 07:43

## 2022-07-05 RX ADMIN — SODIUM CHLORIDE, PRESERVATIVE FREE 10 ML: 5 INJECTION INTRAVENOUS at 07:43

## 2022-07-05 ASSESSMENT — PAIN SCALES - GENERAL: PAINLEVEL_OUTOF10: 0

## 2022-07-05 NOTE — DISCHARGE SUMMARY
V2.0  Discharge Summary    Name:  Lauren Torres /Age/Sex: 1960 (56 y.o. female)   Admit Date: 2022  Discharge Date: 22    MRN & CSN:  [id number] & [id number] Encounter Date and Time 22 10:58 AM EDT    Attending:  Megan Haro MD Discharging Provider: Megan Haro MD       Hospital Course:     Brief HPI: Lauren Torres is a 59 y.o. female with past medical history significant for anxiety, depression, diabetes mellitus, hypertension, chronic tremor, CKD, history of CVA who presented to the emergency room with complaint of feeling overwhelmed and having auditory and visual hallucinations. .  The patient states symptoms started over the past few days. States she was in the emergency room a few days ago for diarrhea. States she started to have hallucinations on Tuesday. She does have a history of anxiety and depression, on multiple medications. Patient states she feels like she is seeing people and hearing music. She states she Makes out the voices are saying it is just mumbling. At times she hears she has a sensation of music. Patient denies any suicidal homicidal ideations states she has not seen psychiatry in the past but states she has seen a therapist.  Patient states she will not come to the emergency room today because she cannot take it anymore. States she also feels dehydrated from having diarrhea.     At the ED, vital signs;T97.8,HR 73, RR 20, /84 mm/hg,SPO2 100% RA. Significant laboratories were the following: BUN 51, creatinine 3.8, GFR 15, glucose 317.     On examination patient does not appear in any acute distress, she will keep her eyes closed however she is continuously rubbing her feet together. States she is no longer feels anxious she states she was given medicine in the ER  The patient was brought to the ED and was subsequently admitted for  further evaluation. and management        Brief Problem Based Course:   Lauren Torres is a 59 y.o.  female with PMH of COPD, TIA, DM, HTN, depression/anxiety, who presented to ED with abdominal discomfort, diarrhea, confusion, hallucinations. 1. Acute kidney injury: most likely prerenal from dehydration, patient has baseline CKD and use to follow with nephrology before. Creatinine improved to 1.9. Nephrology cleared patient for discharge. And recommend restarting RAAS blocker. Outpatient follow-up     2. Diarrhea: Resolved. Patient reports loose bowel movements. C. difficile was negative. Continue supportive care. Outpatient follow-up     3. Hypertension: Resume home blood pressure medications including lisinopril, hydralazine, metoprolol and amlodipine. Outpatient follow-up    4. DM: Hypoglycemia resolved. We will continue long-acting insulin nightly. Short-acting insulin per sliding scale. Resume metformin. Outpatient follow-up for diabetic control   5. Hallucination/depression/anxiety: Resume home medications. Discontinue Cymbalta and amitriptyline. Outpatient follow-up with psych    6. COPD: stable no exacerbation  7. Hypothyroidism: Continue levothyroxine    The patient expressed appropriate understanding of, and agreement with the discharge recommendations, medications, and plan.      Consults this admission:  IP CONSULT TO PSYCHIATRY  IP CONSULT TO PSYCHOLOGY  IP CONSULT TO HOSPITALIST  IP CONSULT TO NEPHROLOGY    Discharge Diagnosis:   MODESTO (acute kidney injury) [facility])        Discharge Instruction:   Follow up appointments:   Primary care physician: Telma Pena MD within 2 weeks  Diet: regular diet   Activity: activity as tolerated  Disposition: Discharged to:   [x]Home, [][facility], []SNF, []Acute Rehab, []Hospice   Condition on discharge: Stable  Labs and Tests to be Followed up as an outpatient by PCP or Specialist:     Discharge Medications:        Medication List        CHANGE how you take these medications      insulin glargine 100 UNIT/ML injection vial  Commonly known as: LANTUS  Inject 30 Units into the skin nightly  What changed:   · how much to take  · Another medication with the same name was removed. Continue taking this medication, and follow the directions you see here. metoprolol succinate 50 MG extended release tablet  Commonly known as: TOPROL XL  Take 1 tablet by mouth daily  What changed: how much to take     * ondansetron 4 MG disintegrating tablet  Commonly known as: Zofran ODT  Take 1 tablet by mouth every 8 hours as needed for Nausea  What changed: Another medication with the same name was added. Make sure you understand how and when to take each. * ondansetron 4 MG disintegrating tablet  Commonly known as: ZOFRAN-ODT  Take 1 tablet by mouth every 8 hours as needed for Nausea or Vomiting  What changed: You were already taking a medication with the same name, and this prescription was added. Make sure you understand how and when to take each. pantoprazole 40 MG tablet  Commonly known as: PROTONIX  Take 1 tablet by mouth 2 times daily (before meals)  What changed:   · when to take this  · reasons to take this           * This list has 2 medication(s) that are the same as other medications prescribed for you. Read the directions carefully, and ask your doctor or other care provider to review them with you.                 CONTINUE taking these medications      amLODIPine 10 MG tablet  Commonly known as: NORVASC     aspirin 81 MG chewable tablet     atorvastatin 40 MG tablet  Commonly known as: LIPITOR     busPIRone 10 MG tablet  Commonly known as: BUSPAR     Cholecalciferol 50 MCG (2000 UT) Caps     clopidogrel 75 MG tablet  Commonly known as: PLAVIX     donepezil 5 MG tablet  Commonly known as: ARICEPT     FLUoxetine 40 MG capsule  Commonly known as: PROzac     gabapentin 600 MG tablet  Commonly known as: NEURONTIN     hydrALAZINE 25 MG tablet  Commonly known as: APRESOLINE  Take 1 tablet by mouth every 8 hours     insulin lispro 100 UNIT/ML injection abdomen and pelvis was performed without the administration of intravenous contrast. Multiplanar reformatted images are provided for review. Automated exposure control, iterative reconstruction, and/or weight based adjustment of the mA/kV was utilized to reduce the radiation dose to as low as reasonably achievable. COMPARISON: Prior studies most recent 03/11/2022. HISTORY: ORDERING SYSTEM PROVIDED HISTORY: fever/chills/cough/nausea/abd pain x 3 days TECHNOLOGIST PROVIDED HISTORY: Reason for exam:->fever/chills/cough/nausea/abd pain x 3 days Additional Contrast?->None Decision Support Exception - unselect if not a suspected or confirmed emergency medical condition->Emergency Medical Condition (MA) Reason for Exam: fever/chills/cough/nausea/abd pain x 3 days FINDINGS: There is degradation of image quality related to patient body habitus. Lower Chest: Linear densities at the left lung base may represent minimal atelectasis and/or scar. Coronary artery calcification is identified. There is minimal pericardial effusion which is increased when compared to the study of 03/11/2022. Organs: Status post cholecystectomy. As visualized on this noncontrast study, liver, spleen, pancreas, adrenal glands, and kidneys are unchanged in appearance. GI/Bowel: Again noted is presence of small hiatal hernia. Small bowel normal in caliber with no findings to suggest presence of obstruction. Normal appearing appendix is identified. There is lipomatous ileocecal valve. Fat deposition within the wall of portion of the ascending colon is again identified. Finding may be on the basis of patient body habitus versus changes of prior infectious/inflammatory process. Left colon is relatively decompressed. No focal fluid collection to suggest presence of abscess. No evidence of intraperitoneal free air. Pelvis: Uterus and adnexal structures are unchanged in appearance. Urinary bladder suboptimally evaluated due to decompressed state. PROVIDED HISTORY: cough/fever TECHNOLOGIST PROVIDED HISTORY: Reason for exam:->cough/fever Reason for Exam: cough/ fever FINDINGS: The lungs are without acute focal process. There is no effusion or pneumothorax. The cardiomediastinal silhouette is stable. The osseous structures are stable. No acute process. CBC:   Recent Labs     07/03/22  0404 07/04/22  0600 07/05/22  0925   WBC 8.1 6.6 5.4   HGB 10.2* 10.1* 11.4*    182 219     BMP:    Recent Labs     07/02/22 2000 07/03/22  0404 07/04/22  0600    137 143   K 4.7 3.9 4.4   CL 99 104 112*   CO2 21 22 24   BUN 51* 50* 38*   CREATININE 3.8* 3.9* 1.9*   GLUCOSE 317* 121* 45*     Hepatic:   Recent Labs     07/02/22 2000 07/03/22  0404   AST 23 16   ALT 24 18   BILITOT 0.2 0.2   ALKPHOS 120 103     Lipids:   Lab Results   Component Value Date/Time    CHOL 176 05/12/2022 06:39 AM    HDL 62 05/12/2022 06:39 AM    TRIG 169 05/12/2022 06:39 AM     Hemoglobin A1C:   Lab Results   Component Value Date/Time    LABA1C 8.1 07/03/2022 04:04 AM     TSH: No results found for: TSH  Troponin:   Lab Results   Component Value Date/Time    TROPONINT <0.010 06/30/2022 12:06 PM    TROPONINT <0.010 03/17/2022 02:32 PM    TROPONINT <0.010 03/11/2022 04:50 PM     Lactic Acid: No results for input(s): LACTA in the last 72 hours. BNP: No results for input(s): PROBNP in the last 72 hours.   UA:  Lab Results   Component Value Date/Time    NITRU NEGATIVE 07/03/2022 02:37 PM    COLORU YELLOW 07/03/2022 02:37 PM    WBCUA 15 07/03/2022 02:37 PM    RBCUA 1 07/03/2022 02:37 PM    MUCUS RARE 07/03/2022 02:37 PM    TRICHOMONAS NONE SEEN 07/03/2022 02:37 PM    YEAST OCCASIONAL 07/02/2022 10:30 PM    BACTERIA RARE 07/03/2022 02:37 PM    CLARITYU CLEAR 07/03/2022 02:37 PM    Gerold Guymon >1.030 07/03/2022 02:37 PM    LEUKOCYTESUR TRACE 07/03/2022 02:37 PM    UROBILINOGEN 0.2 07/03/2022 02:37 PM    BILIRUBINUR SMALL 07/03/2022 02:37 PM    BLOODU NEGATIVE 07/03/2022 02:37 PM    Trena Doss NEGATIVE 07/03/2022 02:37 PM     Urine Cultures: No results found for: LABURIN  Blood Cultures: No results found for: BC  No results found for: BLOODCULT2  Organism: No results found for: ORG    Time Spent Discharging patient 35 minutes    Electronically signed by Juan Carlos King MD on 7/5/2022 at 10:58 AM

## 2022-07-05 NOTE — CARE COORDINATION
Reviewed chart and spoke with pt about discharge needs/plans. Pt lives is [facility], she is independent with ADL's and transportation provided by [facility] facility. Her PCP is the [facility], and she has insurance that covers medications. We discussed HC and she does not feel needed at this time. She will need rides plus to take her home. Attempted to Update [facility] but has to leave a  . Updated pt's nurse Robe Chilel. No other needs identified at this time.

## 2022-07-05 NOTE — PROGRESS NOTES
Nephrology Progress Note  7/5/2022 7:01 AM        Subjective:   Admit Date: 7/2/2022  PCP: Tanya Mccarthy MD    Interval History: Events, leading up to and since admission briefly reviewed    Diet: Per patient eating some    ROS: No shortness of breath, has a Ramos catheter  Urine output recorded 2.5 L for the last 24 hours  No fever and acceptable blood pressure    Data:     Current meds:    insulin glargine  30 Units SubCUTAneous Nightly    amLODIPine  10 mg Oral Daily    FLUoxetine  80 mg Oral Daily    aspirin  81 mg Oral Daily    atorvastatin  80 mg Oral Nightly    busPIRone  10 mg Oral TID    Vitamin D  2,000 Units Oral Daily    clopidogrel  75 mg Oral Daily    hydrALAZINE  25 mg Oral TID    levothyroxine  25 mcg Oral QAM AC    metoprolol succinate  25 mg Oral Daily    topiramate  50 mg Oral Nightly    sodium chloride flush  5-40 mL IntraVENous BID    heparin (porcine)  5,000 Units SubCUTAneous 3 times per day    insulin lispro  0-12 Units SubCUTAneous TID WC    insulin lispro  0-6 Units SubCUTAneous Nightly      dextrose      sodium chloride 150 mL/hr at 07/03/22 1159         I/O last 3 completed shifts: In: 4866 [P.O.:600;  I.V.:3497; IV Piggyback:1000]  Out: 3875 [Urine:3875]    CBC:   Recent Labs     07/02/22 2000 07/03/22  0404 07/04/22  0600   WBC 8.1 8.1 6.6   HGB 11.1* 10.2* 10.1*    207 182          Recent Labs     07/02/22 2000 07/03/22  0404 07/04/22  0600    137 143   K 4.7 3.9 4.4   CL 99 104 112*   CO2 21 22 24   BUN 51* 50* 38*   CREATININE 3.8* 3.9* 1.9*   GLUCOSE 317* 121* 45*       Lab Results   Component Value Date    CALCIUM 8.0 (L) 07/04/2022    PHOS 3.3 07/04/2022       Objective:     Vitals: BP (!) 153/61   Pulse 67   Temp 97.9 °F (36.6 °C) (Oral)   Resp 15   Ht 5' 4\" (1.626 m)   Wt 253 lb 9.6 oz (115 kg)   SpO2 96%   BMI 43.53 kg/m² ,    General appearance: Alert, awake and oriented  HEENT: No conjunctival pallor  Neck: Supple  Lungs: Coarse breath sound but no crackles  Heart: Regular rate and rhythm without S1  Abdomen: Soft, nontender on palpation  Extremities: No edema, she has Ramos catheter      Problem List :         Impression :     1. Acute kidney injury with underlying CKD stage III a A1-presumably from volume depletion induced by diarrhea-creatinine better after volume repletion-her urine did not have any active sediment except some hyaline cast and high specific gravity, all suggestive of reduced renal perfusion for perhaps from volume depletion  2. Diarrhea, etiology not known at this point-but better now  3. Underlying multiple comorbid disease, which include but not limited to diabetes, atherosclerotic cardiovascular disease, hypertension etc.    Recommendation/Plan  :     1. Discontinue Ramos catheter  2. Stop IV fluid  3. Rule out any other acute process,  causing her GI symptoms-if none, she can be discharged from my standpoint  4. She will need a CMP, magnesium, phosphorus, CBC differential in 1 week  5. Follow-up with me in 2 weeks  6. I will rearrange her blood pressure medication-she will need cardiorenal protective medication, specifically renin-angiotensin aldosterone blocker, and good diabetes control perhaps with SGLT2 inhibitors, if she does not have any contraindication  7. For now follow clinically  8. P.o.  food and fluid      Loulou Campos MD MD

## 2022-07-09 ENCOUNTER — HOSPITAL ENCOUNTER (INPATIENT)
Age: 62
LOS: 9 days | Discharge: NURSING, REHAB OR LONG-TERM CARE | DRG: 420 | End: 2022-07-18
Attending: STUDENT IN AN ORGANIZED HEALTH CARE EDUCATION/TRAINING PROGRAM | Admitting: INTERNAL MEDICINE
Payer: MEDICAID

## 2022-07-09 PROBLEM — N17.9 ACUTE RENAL FAILURE (ARF) (HCC): Status: ACTIVE | Noted: 2022-07-09

## 2022-07-09 PROBLEM — R56.9 SEIZURE (HCC): Status: ACTIVE | Noted: 2022-07-09

## 2022-07-09 PROCEDURE — G0379 DIRECT REFER HOSPITAL OBSERV: HCPCS

## 2022-07-09 PROCEDURE — 94761 N-INVAS EAR/PLS OXIMETRY MLT: CPT

## 2022-07-09 PROCEDURE — 6370000000 HC RX 637 (ALT 250 FOR IP): Performed by: INTERNAL MEDICINE

## 2022-07-09 PROCEDURE — G0378 HOSPITAL OBSERVATION PER HR: HCPCS

## 2022-07-09 RX ORDER — LEVOTHYROXINE SODIUM 0.03 MG/1
25 TABLET ORAL
Status: DISCONTINUED | OUTPATIENT
Start: 2022-07-10 | End: 2022-07-16

## 2022-07-09 RX ORDER — FLUOXETINE HYDROCHLORIDE 20 MG/1
80 CAPSULE ORAL DAILY
Status: DISCONTINUED | OUTPATIENT
Start: 2022-07-10 | End: 2022-07-18 | Stop reason: HOSPADM

## 2022-07-09 RX ORDER — DONEPEZIL HYDROCHLORIDE 10 MG/1
10 TABLET, FILM COATED ORAL NIGHTLY
Status: DISCONTINUED | OUTPATIENT
Start: 2022-07-09 | End: 2022-07-18 | Stop reason: HOSPADM

## 2022-07-09 RX ORDER — GABAPENTIN 400 MG/1
1200 CAPSULE ORAL 2 TIMES DAILY
Status: DISCONTINUED | OUTPATIENT
Start: 2022-07-09 | End: 2022-07-11

## 2022-07-09 RX ORDER — VITAMIN B COMPLEX
2000 TABLET ORAL DAILY
Status: DISCONTINUED | OUTPATIENT
Start: 2022-07-10 | End: 2022-07-18 | Stop reason: HOSPADM

## 2022-07-09 RX ORDER — HEPARIN SODIUM 5000 [USP'U]/ML
5000 INJECTION, SOLUTION INTRAVENOUS; SUBCUTANEOUS EVERY 8 HOURS SCHEDULED
Status: DISCONTINUED | OUTPATIENT
Start: 2022-07-09 | End: 2022-07-18 | Stop reason: HOSPADM

## 2022-07-09 RX ORDER — ATORVASTATIN CALCIUM 40 MG/1
80 TABLET, FILM COATED ORAL NIGHTLY
Status: DISCONTINUED | OUTPATIENT
Start: 2022-07-09 | End: 2022-07-18 | Stop reason: HOSPADM

## 2022-07-09 RX ORDER — DEXTROSE MONOHYDRATE 50 MG/ML
100 INJECTION, SOLUTION INTRAVENOUS PRN
Status: DISCONTINUED | OUTPATIENT
Start: 2022-07-09 | End: 2022-07-18 | Stop reason: HOSPADM

## 2022-07-09 RX ORDER — SODIUM CHLORIDE 0.9 % (FLUSH) 0.9 %
10 SYRINGE (ML) INJECTION PRN
Status: DISCONTINUED | OUTPATIENT
Start: 2022-07-09 | End: 2022-07-18 | Stop reason: HOSPADM

## 2022-07-09 RX ORDER — SODIUM CHLORIDE 9 MG/ML
INJECTION, SOLUTION INTRAVENOUS PRN
Status: DISCONTINUED | OUTPATIENT
Start: 2022-07-09 | End: 2022-07-18 | Stop reason: HOSPADM

## 2022-07-09 RX ORDER — METOPROLOL SUCCINATE 25 MG/1
25 TABLET, EXTENDED RELEASE ORAL DAILY
Status: DISCONTINUED | OUTPATIENT
Start: 2022-07-10 | End: 2022-07-18 | Stop reason: HOSPADM

## 2022-07-09 RX ORDER — ONDANSETRON 2 MG/ML
4 INJECTION INTRAMUSCULAR; INTRAVENOUS EVERY 6 HOURS PRN
Status: DISCONTINUED | OUTPATIENT
Start: 2022-07-09 | End: 2022-07-18 | Stop reason: HOSPADM

## 2022-07-09 RX ORDER — ACETAMINOPHEN 325 MG/1
650 TABLET ORAL EVERY 6 HOURS PRN
Status: DISCONTINUED | OUTPATIENT
Start: 2022-07-09 | End: 2022-07-11

## 2022-07-09 RX ORDER — LORAZEPAM 2 MG/ML
4 INJECTION INTRAMUSCULAR PRN
Status: DISCONTINUED | OUTPATIENT
Start: 2022-07-09 | End: 2022-07-18 | Stop reason: HOSPADM

## 2022-07-09 RX ORDER — BUSPIRONE HYDROCHLORIDE 5 MG/1
10 TABLET ORAL 3 TIMES DAILY
Status: DISCONTINUED | OUTPATIENT
Start: 2022-07-09 | End: 2022-07-18 | Stop reason: HOSPADM

## 2022-07-09 RX ORDER — LORAZEPAM 2 MG/ML
1 INJECTION INTRAMUSCULAR EVERY 5 MIN PRN
Status: DISCONTINUED | OUTPATIENT
Start: 2022-07-09 | End: 2022-07-18 | Stop reason: HOSPADM

## 2022-07-09 RX ORDER — ONDANSETRON 4 MG/1
4 TABLET, ORALLY DISINTEGRATING ORAL EVERY 8 HOURS PRN
Status: DISCONTINUED | OUTPATIENT
Start: 2022-07-09 | End: 2022-07-18 | Stop reason: HOSPADM

## 2022-07-09 RX ORDER — TOPIRAMATE 25 MG/1
50 TABLET ORAL NIGHTLY
Status: DISCONTINUED | OUTPATIENT
Start: 2022-07-09 | End: 2022-07-15

## 2022-07-09 RX ORDER — INSULIN LISPRO 100 [IU]/ML
0-3 INJECTION, SOLUTION INTRAVENOUS; SUBCUTANEOUS NIGHTLY
Status: DISCONTINUED | OUTPATIENT
Start: 2022-07-09 | End: 2022-07-11

## 2022-07-09 RX ORDER — SODIUM CHLORIDE 0.9 % (FLUSH) 0.9 %
10 SYRINGE (ML) INJECTION EVERY 12 HOURS SCHEDULED
Status: DISCONTINUED | OUTPATIENT
Start: 2022-07-09 | End: 2022-07-18 | Stop reason: HOSPADM

## 2022-07-09 RX ORDER — CLOPIDOGREL BISULFATE 75 MG/1
75 TABLET ORAL DAILY
Status: DISCONTINUED | OUTPATIENT
Start: 2022-07-10 | End: 2022-07-18 | Stop reason: HOSPADM

## 2022-07-09 RX ORDER — INSULIN LISPRO 100 [IU]/ML
0-6 INJECTION, SOLUTION INTRAVENOUS; SUBCUTANEOUS
Status: DISCONTINUED | OUTPATIENT
Start: 2022-07-10 | End: 2022-07-11

## 2022-07-09 RX ORDER — LISINOPRIL 20 MG/1
40 TABLET ORAL DAILY
Status: DISCONTINUED | OUTPATIENT
Start: 2022-07-10 | End: 2022-07-18 | Stop reason: HOSPADM

## 2022-07-09 RX ORDER — ASPIRIN 81 MG/1
81 TABLET, CHEWABLE ORAL DAILY
Status: DISCONTINUED | OUTPATIENT
Start: 2022-07-10 | End: 2022-07-18 | Stop reason: HOSPADM

## 2022-07-09 RX ORDER — HYDRALAZINE HYDROCHLORIDE 25 MG/1
25 TABLET, FILM COATED ORAL EVERY 8 HOURS SCHEDULED
Status: DISCONTINUED | OUTPATIENT
Start: 2022-07-09 | End: 2022-07-15

## 2022-07-09 RX ORDER — AMLODIPINE BESYLATE 5 MG/1
5 TABLET ORAL DAILY
Status: DISCONTINUED | OUTPATIENT
Start: 2022-07-10 | End: 2022-07-14

## 2022-07-09 RX ADMIN — DONEPEZIL HYDROCHLORIDE 10 MG: 10 TABLET ORAL at 22:57

## 2022-07-09 RX ADMIN — ATORVASTATIN CALCIUM 80 MG: 40 TABLET, FILM COATED ORAL at 22:33

## 2022-07-09 RX ADMIN — ACETAMINOPHEN 325MG 650 MG: 325 TABLET ORAL at 22:38

## 2022-07-09 RX ADMIN — BUSPIRONE HYDROCHLORIDE 10 MG: 5 TABLET ORAL at 22:33

## 2022-07-09 RX ADMIN — TOPIRAMATE 50 MG: 25 TABLET, FILM COATED ORAL at 22:33

## 2022-07-09 RX ADMIN — HYDRALAZINE HYDROCHLORIDE 25 MG: 25 TABLET, FILM COATED ORAL at 22:33

## 2022-07-09 SDOH — ECONOMIC STABILITY: INCOME INSECURITY: IN THE LAST 12 MONTHS, WAS THERE A TIME WHEN YOU WERE NOT ABLE TO PAY THE MORTGAGE OR RENT ON TIME?: NO

## 2022-07-09 SDOH — ECONOMIC STABILITY: TRANSPORTATION INSECURITY
IN THE PAST 12 MONTHS, HAS LACK OF TRANSPORTATION KEPT YOU FROM MEETINGS, WORK, OR FROM GETTING THINGS NEEDED FOR DAILY LIVING?: NO

## 2022-07-09 SDOH — ECONOMIC STABILITY: FOOD INSECURITY: WITHIN THE PAST 12 MONTHS, THE FOOD YOU BOUGHT JUST DIDN'T LAST AND YOU DIDN'T HAVE MONEY TO GET MORE.: NEVER TRUE

## 2022-07-09 SDOH — ECONOMIC STABILITY: FOOD INSECURITY: WITHIN THE PAST 12 MONTHS, YOU WORRIED THAT YOUR FOOD WOULD RUN OUT BEFORE YOU GOT MONEY TO BUY MORE.: NEVER TRUE

## 2022-07-09 SDOH — HEALTH STABILITY: PHYSICAL HEALTH: ON AVERAGE, HOW MANY MINUTES DO YOU ENGAGE IN EXERCISE AT THIS LEVEL?: 0 MIN

## 2022-07-09 SDOH — ECONOMIC STABILITY: TRANSPORTATION INSECURITY
IN THE PAST 12 MONTHS, HAS THE LACK OF TRANSPORTATION KEPT YOU FROM MEDICAL APPOINTMENTS OR FROM GETTING MEDICATIONS?: NO

## 2022-07-09 SDOH — ECONOMIC STABILITY: HOUSING INSECURITY: IN THE LAST 12 MONTHS, HOW MANY PLACES HAVE YOU LIVED?: 2

## 2022-07-09 SDOH — ECONOMIC STABILITY: HOUSING INSECURITY
IN THE LAST 12 MONTHS, WAS THERE A TIME WHEN YOU DID NOT HAVE A STEADY PLACE TO SLEEP OR SLEPT IN A SHELTER (INCLUDING NOW)?: NO

## 2022-07-09 SDOH — HEALTH STABILITY: PHYSICAL HEALTH: ON AVERAGE, HOW MANY DAYS PER WEEK DO YOU ENGAGE IN MODERATE TO STRENUOUS EXERCISE (LIKE A BRISK WALK)?: 0 DAYS

## 2022-07-09 ASSESSMENT — SOCIAL DETERMINANTS OF HEALTH (SDOH)
WITHIN THE LAST YEAR, HAVE YOU BEEN KICKED, HIT, SLAPPED, OR OTHERWISE PHYSICALLY HURT BY YOUR PARTNER OR EX-PARTNER?: NO
WITHIN THE LAST YEAR, HAVE YOU BEEN HUMILIATED OR EMOTIONALLY ABUSED IN OTHER WAYS BY YOUR PARTNER OR EX-PARTNER?: NO
HOW OFTEN DO YOU ATTENT MEETINGS OF THE CLUB OR ORGANIZATION YOU BELONG TO?: NEVER
IN A TYPICAL WEEK, HOW MANY TIMES DO YOU TALK ON THE PHONE WITH FAMILY, FRIENDS, OR NEIGHBORS?: MORE THAN THREE TIMES A WEEK
HOW OFTEN DO YOU ATTEND CHURCH OR RELIGIOUS SERVICES?: 1 TO 4 TIMES PER YEAR
DO YOU BELONG TO ANY CLUBS OR ORGANIZATIONS SUCH AS CHURCH GROUPS UNIONS, FRATERNAL OR ATHLETIC GROUPS, OR SCHOOL GROUPS?: NO
HOW HARD IS IT FOR YOU TO PAY FOR THE VERY BASICS LIKE FOOD, HOUSING, MEDICAL CARE, AND HEATING?: NOT HARD AT ALL
WITHIN THE LAST YEAR, HAVE TO BEEN RAPED OR FORCED TO HAVE ANY KIND OF SEXUAL ACTIVITY BY YOUR PARTNER OR EX-PARTNER?: NO
HOW OFTEN DO YOU GET TOGETHER WITH FRIENDS OR RELATIVES?: ONCE A WEEK
WITHIN THE LAST YEAR, HAVE YOU BEEN AFRAID OF YOUR PARTNER OR EX-PARTNER?: NO

## 2022-07-09 ASSESSMENT — PAIN DESCRIPTION - ORIENTATION: ORIENTATION: LEFT;RIGHT

## 2022-07-09 ASSESSMENT — PAIN DESCRIPTION - LOCATION: LOCATION: LEG

## 2022-07-09 ASSESSMENT — PAIN DESCRIPTION - FREQUENCY: FREQUENCY: CONTINUOUS

## 2022-07-09 ASSESSMENT — LIFESTYLE VARIABLES
HOW OFTEN DO YOU HAVE A DRINK CONTAINING ALCOHOL: MONTHLY OR LESS
HOW MANY STANDARD DRINKS CONTAINING ALCOHOL DO YOU HAVE ON A TYPICAL DAY: 1 OR 2

## 2022-07-09 ASSESSMENT — PATIENT HEALTH QUESTIONNAIRE - PHQ9
2. FEELING DOWN, DEPRESSED OR HOPELESS: SEVERAL DAYS
1. LITTLE INTEREST OR PLEASURE IN DOING THINGS: SEVERAL DAYS
SUM OF ALL RESPONSES TO PHQ9 QUESTIONS 1 & 2: 2
DEPRESSION UNABLE TO ASSESS: YES

## 2022-07-09 ASSESSMENT — PAIN SCALES - GENERAL: PAINLEVEL_OUTOF10: 3

## 2022-07-09 ASSESSMENT — PAIN - FUNCTIONAL ASSESSMENT: PAIN_FUNCTIONAL_ASSESSMENT: PREVENTS OR INTERFERES SOME ACTIVE ACTIVITIES AND ADLS

## 2022-07-09 ASSESSMENT — PAIN DESCRIPTION - ONSET: ONSET: ON-GOING

## 2022-07-09 ASSESSMENT — PAIN DESCRIPTION - PAIN TYPE: TYPE: CHRONIC PAIN

## 2022-07-09 ASSESSMENT — PAIN DESCRIPTION - DESCRIPTORS: DESCRIPTORS: ACHING;DISCOMFORT

## 2022-07-10 ENCOUNTER — APPOINTMENT (OUTPATIENT)
Dept: ULTRASOUND IMAGING | Age: 62
DRG: 420 | End: 2022-07-10
Attending: STUDENT IN AN ORGANIZED HEALTH CARE EDUCATION/TRAINING PROGRAM
Payer: MEDICAID

## 2022-07-10 LAB
ANION GAP SERPL CALCULATED.3IONS-SCNC: 13 MMOL/L (ref 4–16)
BASOPHILS ABSOLUTE: 0 K/CU MM
BASOPHILS RELATIVE PERCENT: 0.3 % (ref 0–1)
BILIRUBIN URINE: NORMAL
BLOOD, URINE: NORMAL
BUN BLDV-MCNC: 36 MG/DL (ref 6–23)
CALCIUM SERPL-MCNC: 8.4 MG/DL (ref 8.3–10.6)
CHLORIDE BLD-SCNC: 105 MMOL/L (ref 99–110)
CLARITY: NORMAL
CO2: 20 MMOL/L (ref 21–32)
COLOR: NORMAL
CREAT SERPL-MCNC: 2.8 MG/DL (ref 0.6–1.1)
DIFFERENTIAL TYPE: ABNORMAL
EOSINOPHILS ABSOLUTE: 0.1 K/CU MM
EOSINOPHILS RELATIVE PERCENT: 1.9 % (ref 0–3)
GFR AFRICAN AMERICAN: 21 ML/MIN/1.73M2
GFR NON-AFRICAN AMERICAN: 17 ML/MIN/1.73M2
GLUCOSE BLD-MCNC: 177 MG/DL (ref 70–99)
GLUCOSE BLD-MCNC: 218 MG/DL (ref 70–99)
GLUCOSE BLD-MCNC: 238 MG/DL (ref 70–99)
GLUCOSE BLD-MCNC: 256 MG/DL (ref 70–99)
GLUCOSE BLD-MCNC: 336 MG/DL (ref 70–99)
GLUCOSE BLD-MCNC: 420 MG/DL (ref 70–99)
GLUCOSE, URINE: NORMAL MG/DL
HCT VFR BLD CALC: 37.3 % (ref 37–47)
HEMOGLOBIN: 10.9 GM/DL (ref 12.5–16)
IMMATURE NEUTROPHIL %: 0.3 % (ref 0–0.43)
KETONES, URINE: NORMAL MG/DL
LEUKOCYTE ESTERASE, URINE: NORMAL
LYMPHOCYTES ABSOLUTE: 2.2 K/CU MM
LYMPHOCYTES RELATIVE PERCENT: 29.8 % (ref 24–44)
MCH RBC QN AUTO: 29.1 PG (ref 27–31)
MCHC RBC AUTO-ENTMCNC: 29.2 % (ref 32–36)
MCV RBC AUTO: 99.7 FL (ref 78–100)
MONOCYTES ABSOLUTE: 0.8 K/CU MM
MONOCYTES RELATIVE PERCENT: 11.2 % (ref 0–4)
NITRITE URINE, QUANTITATIVE: NORMAL
NUCLEATED RBC %: 0 %
PDW BLD-RTO: 12.8 % (ref 11.7–14.9)
PH, URINE: NORMAL
PLATELET # BLD: 250 K/CU MM (ref 140–440)
PMV BLD AUTO: 10.2 FL (ref 7.5–11.1)
POTASSIUM SERPL-SCNC: 4.3 MMOL/L (ref 3.5–5.1)
PROTEIN UA: NORMAL MG/DL
RBC # BLD: 3.74 M/CU MM (ref 4.2–5.4)
SEGMENTED NEUTROPHILS ABSOLUTE COUNT: 4.1 K/CU MM
SEGMENTED NEUTROPHILS RELATIVE PERCENT: 56.5 % (ref 36–66)
SODIUM BLD-SCNC: 138 MMOL/L (ref 135–145)
SPECIFIC GRAVITY UA: NORMAL
TOTAL CK: 77 IU/L (ref 26–140)
TOTAL IMMATURE NEUTOROPHIL: 0.02 K/CU MM
TOTAL NUCLEATED RBC: 0 K/CU MM
UROBILINOGEN, URINE: NORMAL MG/DL
WBC # BLD: 7.2 K/CU MM (ref 4–10.5)

## 2022-07-10 PROCEDURE — 99255 IP/OBS CONSLTJ NEW/EST HI 80: CPT | Performed by: PSYCHIATRY & NEUROLOGY

## 2022-07-10 PROCEDURE — 76775 US EXAM ABDO BACK WALL LIM: CPT

## 2022-07-10 PROCEDURE — 2580000003 HC RX 258: Performed by: INTERNAL MEDICINE

## 2022-07-10 PROCEDURE — 80048 BASIC METABOLIC PNL TOTAL CA: CPT

## 2022-07-10 PROCEDURE — 6360000002 HC RX W HCPCS: Performed by: INTERNAL MEDICINE

## 2022-07-10 PROCEDURE — 82962 GLUCOSE BLOOD TEST: CPT

## 2022-07-10 PROCEDURE — 94761 N-INVAS EAR/PLS OXIMETRY MLT: CPT

## 2022-07-10 PROCEDURE — 85025 COMPLETE CBC W/AUTO DIFF WBC: CPT

## 2022-07-10 PROCEDURE — 6370000000 HC RX 637 (ALT 250 FOR IP): Performed by: INTERNAL MEDICINE

## 2022-07-10 PROCEDURE — 36415 COLL VENOUS BLD VENIPUNCTURE: CPT

## 2022-07-10 PROCEDURE — 1200000000 HC SEMI PRIVATE

## 2022-07-10 PROCEDURE — 82550 ASSAY OF CK (CPK): CPT

## 2022-07-10 RX ADMIN — Medication 2000 UNITS: at 09:34

## 2022-07-10 RX ADMIN — ATORVASTATIN CALCIUM 80 MG: 40 TABLET, FILM COATED ORAL at 20:43

## 2022-07-10 RX ADMIN — INSULIN LISPRO 3 UNITS: 100 INJECTION, SOLUTION INTRAVENOUS; SUBCUTANEOUS at 18:14

## 2022-07-10 RX ADMIN — FLUOXETINE 80 MG: 20 CAPSULE ORAL at 09:33

## 2022-07-10 RX ADMIN — BUSPIRONE HYDROCHLORIDE 10 MG: 5 TABLET ORAL at 13:15

## 2022-07-10 RX ADMIN — ASPIRIN 81 MG CHEWABLE TABLET 81 MG: 81 TABLET CHEWABLE at 09:34

## 2022-07-10 RX ADMIN — INSULIN LISPRO 2 UNITS: 100 INJECTION, SOLUTION INTRAVENOUS; SUBCUTANEOUS at 22:01

## 2022-07-10 RX ADMIN — HEPARIN SODIUM 5000 UNITS: 5000 INJECTION INTRAVENOUS; SUBCUTANEOUS at 13:17

## 2022-07-10 RX ADMIN — DONEPEZIL HYDROCHLORIDE 10 MG: 10 TABLET ORAL at 20:43

## 2022-07-10 RX ADMIN — LEVOTHYROXINE SODIUM 25 MCG: 25 TABLET ORAL at 09:34

## 2022-07-10 RX ADMIN — HYDRALAZINE HYDROCHLORIDE 25 MG: 25 TABLET, FILM COATED ORAL at 20:43

## 2022-07-10 RX ADMIN — ONDANSETRON 4 MG: 4 TABLET, ORALLY DISINTEGRATING ORAL at 13:15

## 2022-07-10 RX ADMIN — HEPARIN SODIUM 5000 UNITS: 5000 INJECTION INTRAVENOUS; SUBCUTANEOUS at 20:43

## 2022-07-10 RX ADMIN — GABAPENTIN 1200 MG: 400 CAPSULE ORAL at 20:42

## 2022-07-10 RX ADMIN — INSULIN LISPRO 5 UNITS: 100 INJECTION, SOLUTION INTRAVENOUS; SUBCUTANEOUS at 12:18

## 2022-07-10 RX ADMIN — TOPIRAMATE 50 MG: 25 TABLET, FILM COATED ORAL at 20:42

## 2022-07-10 RX ADMIN — BUSPIRONE HYDROCHLORIDE 10 MG: 5 TABLET ORAL at 20:43

## 2022-07-10 RX ADMIN — GABAPENTIN 1200 MG: 400 CAPSULE ORAL at 13:15

## 2022-07-10 RX ADMIN — ACETAMINOPHEN 325MG 650 MG: 325 TABLET ORAL at 13:15

## 2022-07-10 RX ADMIN — METOPROLOL SUCCINATE 25 MG: 25 TABLET, EXTENDED RELEASE ORAL at 09:33

## 2022-07-10 RX ADMIN — CLOPIDOGREL BISULFATE 75 MG: 75 TABLET ORAL at 09:34

## 2022-07-10 RX ADMIN — BUSPIRONE HYDROCHLORIDE 10 MG: 5 TABLET ORAL at 09:34

## 2022-07-10 RX ADMIN — ACETAMINOPHEN 325MG 650 MG: 325 TABLET ORAL at 20:43

## 2022-07-10 RX ADMIN — SODIUM CHLORIDE, PRESERVATIVE FREE 10 ML: 5 INJECTION INTRAVENOUS at 20:44

## 2022-07-10 ASSESSMENT — PAIN DESCRIPTION - DESCRIPTORS
DESCRIPTORS: ACHING
DESCRIPTORS: ACHING
DESCRIPTORS: POUNDING;ACHING

## 2022-07-10 ASSESSMENT — PAIN SCALES - GENERAL
PAINLEVEL_OUTOF10: 6
PAINLEVEL_OUTOF10: 5
PAINLEVEL_OUTOF10: 8
PAINLEVEL_OUTOF10: 6
PAINLEVEL_OUTOF10: 0
PAINLEVEL_OUTOF10: 8
PAINLEVEL_OUTOF10: 8

## 2022-07-10 ASSESSMENT — PAIN DESCRIPTION - LOCATION
LOCATION: LEG

## 2022-07-10 ASSESSMENT — PAIN DESCRIPTION - FREQUENCY: FREQUENCY: CONTINUOUS

## 2022-07-10 ASSESSMENT — PAIN DESCRIPTION - ORIENTATION
ORIENTATION: RIGHT;LEFT

## 2022-07-10 ASSESSMENT — PAIN DESCRIPTION - PAIN TYPE: TYPE: CHRONIC PAIN

## 2022-07-10 ASSESSMENT — PAIN - FUNCTIONAL ASSESSMENT: PAIN_FUNCTIONAL_ASSESSMENT: PREVENTS OR INTERFERES SOME ACTIVE ACTIVITIES AND ADLS

## 2022-07-10 NOTE — H&P
History and Physical      Name:  Vangie Villaseñor /Age/Sex: 1960  (59 y.o. female)   MRN & CSN:  [id number] & [id number] Encounter Date/Time: 2022 9:20 PM EDT   Location:  4927/7821- PCP: Tramaine Greco MD       Hospital Day: 1    Assessment and Plan:   Vangei Villaseñor is a 59 y.o. female who presents with     Seizure in setting of hypoglycemia, Diabetes Mellitus type II-uncontrolled- with long tern use of insulin. Complicated with peripheral neuropathy managed on Lantus and gabapentin, hold long acting antiglycemics, continue home lantus twice daily dosing, last Hgb A1C- 8.2, Blood glucose on admission .   - SSI  - hold home lantus  - POCT glucose qACHS  - hypoglycemia management protocol     Acute kidney injury on  CKD stage III  From [facility] ED  BUN 38, creatinine 4.0 today on admission   w baseline Cr ~1.2   -IVF overnight  -BMP in a.m.  -Follow Cr with daily labs  -Monitor I & O  -Heparin for DVT ppx  -nephrology consult in a.m. Anxiety depression:   C/w home meds      Hyperlipidemia: Continue statin therapy    Hypertension: Managed on lisinopril, hydralazine   -Hold lisinopril on admission as of MODESTO    Chronic Conditions: Continue all home medications except as stated above or contraindicated. Chronic low back pain- gabapentin held given profound MODESTO  Resting tremor  History of CVA on aspirin high-dose statin    D/w ED provider  Code status Full  DVT PPx heparin SQ      History of Present Illness:     Vangie Villaseñor is a 59 y.o. female p/w seizure-like episode at assisted living facility, states that she had shaking, however was conscious, denies any tongue biting or loss of consciousness, and had no incontinence. Patient states that she has had these episodes more frequently, possibly twice this last week. Patient denies any her head, and has no other complaints.   Feels back to her baseline, denies any hypoglycemic symptoms, such as diaphoresis, lightheadedness, or palpitations. Patient was noted to have blood glucose of 50 at [facility] ED per facility. Review of Systems: Need 10 Elements       Pt otherwise has no complaints of CP, SOB, dizziness, N/V/C/D, abdominal pain, dysuria, joint pains, rash/boils, cough or fevers. Objective:   No intake or output data in the 24 hours ending 07/09/22 2120   Vitals:   Vitals:    07/09/22 1840 07/09/22 1903   BP: 129/66    Pulse: 64    Resp: 18    Temp: 97.3 °F (36.3 °C)    TempSrc: Oral    SpO2: 100%    Weight:  247 lb (112 kg)   Height:  5' 4.5\" (1.638 m)       Medications Prior to Admission     Prior to Admission medications    Medication Sig Start Date End Date Taking?  Authorizing Provider   ondansetron (ZOFRAN-ODT) 4 MG disintegrating tablet Take 1 tablet by mouth every 8 hours as needed for Nausea or Vomiting 7/5/22   Levon Lim MD   aspirin 81 MG chewable tablet Take 81 mg by mouth daily    Historical Provider, MD   FLUoxetine (PROZAC) 40 MG capsule Take 80 mg by mouth daily    Historical Provider, MD   levothyroxine (SYNTHROID) 25 MCG tablet Take 25 mcg by mouth every morning (before breakfast)    Historical Provider, MD   tiZANidine (ZANAFLEX) 4 MG tablet Take 4 mg by mouth nightly as needed    Historical Provider, MD   ondansetron (ZOFRAN ODT) 4 MG disintegrating tablet Take 1 tablet by mouth every 8 hours as needed for Nausea 6/30/22   Michael Webb PA-C   insulin glargine (LANTUS) 100 UNIT/ML injection vial Inject 30 Units into the skin nightly  Patient taking differently: Inject 50 Units into the skin nightly  3/22/22   Rubia Bile, DO   metFORMIN (GLUCOPHAGE) 500 MG tablet Take 1 tablet by mouth 2 times daily (with meals) 3/22/22   Rubia Bile, DO   hydrALAZINE (APRESOLINE) 25 MG tablet Take 1 tablet by mouth every 8 hours 3/22/22   Rubia Bile, DO   lisinopril (PRINIVIL;ZESTRIL) 40 MG tablet Take 1 tablet by mouth daily 3/16/22   Faith Rose MD   donepezil (ARICEPT) 5 MG tablet  Cerebral artery occlusion with cerebral infarction [facility])     per old chart 8/2015- TIA    COPD (chronic obstructive pulmonary disease) (HCC)     Diabetes mellitus [facility]     Diabetic neuropathy ([facility]     per old chart    Fracture     per old chart pt in ER 12/9/2018- after 2 days of arm pain after fall- dx with left humerus fx- for surg 12/31/2018    History of blood transfusion     Hyperlipidemia     Hypertension     Muscle weakness (generalized)     Osteoarthritis     per old chart     PSHX:  has a past surgical history that includes Foot surgery (Left, 2004?); knee surgery (Right, 2009?); Cholecystectomy (2002?); Lung surgery (2009?); eye surgery; Humerus fracture surgery (Left, 12/31/2018); and Breast biopsy (Right). Allergies: No Known Allergies  Fam HX:  family history includes Breast Cancer (age of onset: 48) in her maternal aunt and maternal aunt. Soc HX:   Social History     Socioeconomic History    Marital status:      Spouse name: None    Number of children: None    Years of education: None    Highest education level: None   Occupational History    None   Tobacco Use    Smoking status: Never Smoker    Smokeless tobacco: Never Used    Tobacco comment: 12/27/*2018- with phone assessment with caregiver- unsure of social, surgical or family hx for phone assessment   Vaping Use    Vaping Use: Never used   Substance and Sexual Activity    Alcohol use: Yes    Drug use: No    Sexual activity: Not Currently   Other Topics Concern    None   Social History Narrative    None     Social Determinants of Health     Financial Resource Strain: Low Risk     Difficulty of Paying Living Expenses: Not hard at all   Food Insecurity: No Food Insecurity    Worried About Running Out of Food in the Last Year: Never true    Denita of Food in the Last Year: Never true   Transportation Needs: No Transportation Needs    Lack of Transportation (Medical):  No    Lack of Transportation (Non-Medical): No   Physical Activity: Inactive    Days of Exercise per Week: 0 days    Minutes of Exercise per Session: 0 min   Stress: Stress Concern Present    Feeling of Stress : Very much   Social Connections: Moderately Isolated    Frequency of Communication with Friends and Family: More than three times a week    Frequency of Social Gatherings with Friends and Family: Once a week    Attends Zoroastrian Services: 1 to 4 times per year    Active Member of [address] [place] [address] [employer] or Organizations: No    Attends Club or Organization Meetings: Never    Marital Status:    Intimate Partner Violence: Not At Risk    Fear of Current or Ex-Partner: No    Emotionally Abused: No    Physically Abused: No    Sexually Abused: No   Housing Stability: [address] Unable to Pay for Housing in the Last Year: No    Number of Places Lived in the Last Year: 2    Unstable Housing in the Last Year: No       Medications:   Medications:    [START ON 7/10/2022] insulin lispro  0-6 Units SubCUTAneous TID WC    insulin lispro  0-3 Units SubCUTAneous Nightly      Infusions:    dextrose       PRN Meds: glucose, 4 tablet, PRN  dextrose bolus, 125 mL, PRN   Or  dextrose bolus, 250 mL, PRN  glucagon (rDNA), 1 mg, PRN  dextrose, 100 mL/hr, PRN        Labs      CBC: No results for input(s): WBC, HGB, PLT in the last 72 hours. BMP:  No results for input(s): NA, K, CL, CO2, BUN, CREATININE, GLUCOSE in the last 72 hours. Hepatic: No results for input(s): AST, ALT, ALB, BILITOT, ALKPHOS in the last 72 hours.   Lipids:   Lab Results   Component Value Date/Time    CHOL 176 05/12/2022 06:39 AM    HDL 62 05/12/2022 06:39 AM    TRIG 169 05/12/2022 06:39 AM     Hemoglobin A1C:   Lab Results   Component Value Date/Time    LABA1C 8.1 07/03/2022 04:04 AM     TSH: No results found for: TSH  Troponin:   Lab Results   Component Value Date/Time    TROPONINT <0.010 06/30/2022 12:06 PM    TROPONINT <0.010 03/17/2022 02:32 PM    TROPONINT <0.010 03/11/2022 04:50 PM     Lactic Acid: No results for input(s): LACTA in the last 72 hours. BNP: No results for input(s): PROBNP in the last 72 hours. UA:  Lab Results   Component Value Date/Time    NITRU NEGATIVE 07/03/2022 02:37 PM    COLORU YELLOW 07/03/2022 02:37 PM    WBCUA 15 07/03/2022 02:37 PM    RBCUA 1 07/03/2022 02:37 PM    MUCUS RARE 07/03/2022 02:37 PM    TRICHOMONAS NONE SEEN 07/03/2022 02:37 PM    YEAST OCCASIONAL 07/02/2022 10:30 PM    BACTERIA RARE 07/03/2022 02:37 PM    CLARITYU CLEAR 07/03/2022 02:37 PM    SPECGRAV >1.030 07/03/2022 02:37 PM    LEUKOCYTESUR TRACE 07/03/2022 02:37 PM    UROBILINOGEN 0.2 07/03/2022 02:37 PM    BILIRUBINUR SMALL 07/03/2022 02:37 PM    BLOODU NEGATIVE 07/03/2022 02:37 PM    KETUA NEGATIVE 07/03/2022 02:37 PM     Urine Cultures: No results found for: John Matthew  Blood Cultures: No results found for: BC  No results found for: BLOODCULT2  Organism: No results found for: ORG    Imaging/Diagnostics Last 24 Hours   XR CHEST PORTABLE    Result Date: 7/4/2022  EXAMINATION: ONE XRAY VIEW OF THE CHEST 7/4/2022 5:12 am COMPARISON: June 30, 2022 HISTORY: ORDERING SYSTEM PROVIDED HISTORY: SOB TECHNOLOGIST PROVIDED HISTORY: Reason for exam:->SOB Reason for Exam: SOB FINDINGS: No lines or tubes. Stable cardiomediastinal silhouette. The lungs are clear without focal consolidation or pleural effusion. No suspicious pulmonary nodules. No pulmonary edema. No pneumothorax. No acute osseous abnormality. No acute cardiopulmonary disease.            Electronically signed by Amy Matson MD on 7/9/2022 at 9:20 PM

## 2022-07-10 NOTE — CONSULTS
utilizing her electric wheelchair more than she ever has. Patient at the bedside now states she has no unilateral arm or leg weakness, no facial droop, no aphasia, no dysarthria  Denies CP and SOB  No headache   No change in vision       Past Medical History:   Diagnosis Date    Arthritis     Asthma     Cerebral artery occlusion with cerebral infarction [facility])     per old chart 8/2015- TIA    [facility] disease) [facility]     Diabetes mellitus [facility]     Diabetic neuropathy ([facility]     per old chart    Fracture     per old chart pt in ER 12/9/2018- after 2 days of arm pain after fall- dx with left humerus fx- for surg 12/31/2018    History of blood transfusion     Hyperlipidemia     Hypertension     Muscle weakness (generalized)     Osteoarthritis     per old chart    :   Past Surgical History:   Procedure Laterality Date    BREAST BIOPSY Right     CHOLECYSTECTOMY  2002?  EYE SURGERY      per old chart had right eye cataract ext done 2/2018 and left eye 4/2018    FOOT SURGERY Left 2004?  HUMERUS FRACTURE SURGERY Left 12/31/2018    HUMERUS OPEN REDUCTION INTERNAL FIXATION LEFT PROXIMAL performed by Sidney Khalil DO at [address] [place] [address] Right 2009?     LUNG SURGERY  2009?    simone lung lobectomy      Medications:  Scheduled Meds:   insulin lispro  0-6 Units SubCUTAneous TID WC    insulin lispro  0-3 Units SubCUTAneous Nightly    [Held by provider] lisinopril  40 mg Oral Daily    atorvastatin  80 mg Oral Nightly    topiramate  50 mg Oral Nightly    clopidogrel  75 mg Oral Daily    metoprolol succinate  25 mg Oral Daily    amLODIPine  5 mg Oral Daily    busPIRone  10 mg Oral TID    Vitamin D  2,000 Units Oral Daily    donepezil  10 mg Oral Nightly    [Held by provider] gabapentin  1,200 mg Oral BID    hydrALAZINE  25 mg Oral 3 times per day    aspirin  81 mg Oral Daily    FLUoxetine  80 mg Oral Daily    levothyroxine  25 mcg Oral QAM AC    sodium chloride flush  10 mL IntraVENous 2 times per day    heparin (porcine)  5,000 Units SubCUTAneous 3 times per day     Continuous Infusions:   dextrose      sodium chloride       PRN Meds:.glucose, dextrose bolus **OR** dextrose bolus, glucagon (rDNA), dextrose, LORazepam, LORazepam, sodium chloride flush, sodium chloride, ondansetron **OR** ondansetron, bisacodyl, acetaminophen **OR** acetaminophen    No Known Allergies  Social History     Socioeconomic History    Marital status:      Spouse name: Not on file    Number of children: Not on file    Years of education: Not on file    Highest education level: Not on file   Occupational History    Not on file   Tobacco Use    Smoking status: Never Smoker    Smokeless tobacco: Never Used    Tobacco comment: 12/27/*2018- with phone assessment with caregiver- unsure of social, surgical or family hx for phone assessment   Vaping Use    Vaping Use: Never used   Substance and Sexual Activity    Alcohol use: Yes    Drug use: No    Sexual activity: Not Currently   Other Topics Concern    Not on file   Social History Narrative    Not on file     Social Determinants of Health     Financial Resource Strain: Low Risk     Difficulty of Paying Living Expenses: Not hard at all   Food Insecurity: No Food Insecurity    Worried About Running Out of Food in the Last Year: Never true    Denita of Food in the Last Year: Never true   Transportation Needs: No Transportation Needs    Lack of Transportation (Medical): No    Lack of Transportation (Non-Medical): No   Physical Activity: Inactive    Days of Exercise per Week: 0 days    Minutes of Exercise per Session: 0 min   Stress: Stress Concern Present    Feeling of Stress : Very much   Social Connections:  Moderately Isolated    Frequency of Communication with Friends and Family: More than three times a week    Frequency of Social Gatherings with Friends and Family: Once a week    Attends Zoroastrianism Services: 1 to 4 times per year    Active Member of Clubs or Organizations: No    Attends Club or Organization Meetings: Never    Marital Status:    Intimate Partner Violence: Not At Risk    Fear of Current or Ex-Partner: No    Emotionally Abused: No    Physically Abused: No    Sexually Abused: No   Housing Stability: Low Risk     Unable to Pay for Housing in the Last Year: No    Number of [place] in the Last Year: 2    Unstable Housing in the Last Year: No      Family History   Problem Relation Age of Onset    Breast Cancer Maternal Aunt 48    Breast Cancer Maternal Aunt 50         ROS (10 systems)  In addition to that documented in the HPI above, the additional ROS was obtained:  Constitutional: Denies fevers or chills  Eyes: Denies vision changes  ENMT: Denies sore throat  CV: Denies chest pain  Resp: Denies SOB  GI: Denies vomiting or diarrhea  : Denies painful urination  MSK: Denies recent trauma  Skin: Denies new rashes  Neuro: Denies new numbness or tingling or weakness  Endocrine: Denies unexpected weight loss  Heme: Denies bleeding disorders    Physical Exam:         Wt Readings from Last 3 Encounters:   07/09/22 247 lb (112 kg)   07/05/22 253 lb 9.6 oz (115 kg)   03/21/22 251 lb 8.7 oz (114.1 kg)     Temp Readings from Last 3 Encounters:   07/10/22 97.8 °F (36.6 °C) (Oral)   07/05/22 98.1 °F (36.7 °C) (Oral)   06/30/22 97.7 °F (36.5 °C) (Oral)     BP Readings from Last 3 Encounters:   07/10/22 (!) 120/55   07/05/22 (!) 174/63   06/30/22 (!) 190/72     Pulse Readings from Last 3 Encounters:   07/10/22 69   07/05/22 72   06/30/22 85        Gen: A&O x 4, NAD, cooperative  HEENT: NC/AT, EOMI, PERRL, mmm, no carotid bruits, neck supple, no meningeal signs;    Heart: Regular  Lungs: No distress  Ext: no edema, no calf tenderness b/l  Psych: normal mood and affect  Skin: no rashes or lesions    NEUROLOGIC EXAM:    Mental Status: A&O to self, location, month and year, NAD, speech clear, language fluent, repetition and naming intact, follows commands appropriately    Cranial Nerve Exam:   CN II-XII:  PERRL, VFF, no nystagmus, no gaze paresis, sensation V1-V3 intact b/l, muscles of facial expression symmetric; hearing intact to conversational tone, palate elevates symmetrically, shoulder elevation symmetric and tongue protrudes midline with movement side to side. Motor Exam:       Strength 5/5 UE's/LE's b/l  Tone and bulk normal   No pronator drift    Deep Tendon Reflexes: 2/4 biceps, triceps, brachioradialis, patellar, and achilles b/l; flexor plantar responses b/l    Sensation: Intact light touch/pinprick/vibration UE's/LE's b/l, all decreased in stocking distribution bilateral lower extremities    Coordination/Cerebellum:       Tremors--none      Rapidly alternating movements: no dysdiadochokinesia b/l                Finger-to-Nose: no dysmetria b/l    Gait and stance:      Gait: deferred for safety      LABS:     Recent Labs     07/10/22  0713   WBC 7.2      K 4.3      CO2 20*   BUN 36*   CREATININE 2.8*   GLUCOSE 218*           IMAGING:      CT head:  1.   No acute intracranial findings.  There is no evidence for hemorrhage, mass effect, or acute large territory infarct.            This dictation was created with voice recognition software.  While attempts have been made to review the dictation as it is transcribed, on occasion the spoken word can be misinterpreted by the technology leading to omissions or inappropriate words, phrases or sentences. ASSESSMENT/PLAN:     3 59year old female with acute AMS secondary to metabolic encephloapathy superimposed on acute acute hypoglycemia episode-description of events during not meet seizure criteria. 2. Chronic falls secondary to decompensating peripheral neuropathy uncontrolled type 2 diabetes without therapy. Plan of care as follows:  1. Neuro exam:  1.  Lateral lower extremity stocking distribution sensation loss  2. Neurodiagnostics:  1. CT head unremarkable  3. Medications:  1. No new medications recommended at this time  4. PT/OT/ST:  1. Patient needs extensive balance therapy either at acute patient rehab or prolonged SNF rehabilitation  5. Follow-up:  1. Pending course of admission welcome to follow-up in our office outpatient for second opinion of her neuropathy and falls along with continued therapy      Thank you for allowing us to participate in the care of your patient. If there are any questions regarding evaluation please feel free to contact us. BRIAN Weathers CNP, 7/10/2022       ------------------------------------    Attending Note:  I have rounded on this patient with Jeremiah Appiah CNP. I have reviewed the chart and we have discussed this case in detail. The patient was seen and examined by myself. Pertinent labs and imaging have been personally reviewed. Our findings and impressions were discussed with the patient. I concur with the Nurse Practioner's assessment and plan. Patient has significant diabetic peripheral neuropathy in her distal lower extremities with neuropathic pain. Neuropathic pain was exacerbated by fall. She is on gabapentin 1200 mg twice daily for neuropathic discomfort. She does admit to significant sensory ataxia due to the peripheral neuropathy and states she falls \"all the time\". Suspect her presentation of altered mentation was secondary to hypoglycemia. No further recommendations on an inpatient basis from our standpoint. She is welcome to follow-up in our office regarding neuropathy treatment and continue therapy recommendations.     Abiel Allen DO 7/10/2022 7:06 PM

## 2022-07-10 NOTE — PROGRESS NOTES
V2.0  [facility] [place]      Name:  Lizbeth Arreola /Age/Sex: 1960  (59 y.o. female)   MRN & CSN:  [id number] & [id number] Encounter Date/Time: 7/10/2022 1:45 PM EDT    Location:  [id number]-R PCP: Mayra Garcia MD       Hospital Day: 2    Assessment and Plan:   Lizbeth Arreola is a 59 y.o. female admitted  with seizure-like episode at assisted living facility where she lives but patient's blood sugar was low in the 50s. 1. Seizure-like activity: Patient admitted for seizure-like activity but her blood sugar was in the 50s most likely hypoglycemia related event. - Last night consulted neurology for seizures, pending recommendations    2. Hypoglycemia, Diabetes Mellitus type II on long tern use of insulin. - Admitted with history of seizure-like activity found out to be hypoglycemic  - Resolved  - Held long-acting insulin on admission and started on sliding scale on on admission for hypoglycemia  - Patient's point of glucose 177 and 238  -- POCT glucose qACHS  - hypoglycemia management protocol   - target blood glucose 140-180  - ADA carb controlled diet       3. Acute kidney injury on  CKD stage III  - Baseline creatinine 1.2, admission creatinine 4.0, this AM 2.8 after IV fluids so most likely prerenal 2/2 possible dehydration vs ATN.  - Obtain CPK to rule out rhabdo, obtain UA, urine lites, renal ultrasound  - Closely monitor  I & O  - Holding lisinopril for MODESTO  - Avoid nephrotoxins PPI, NSAIDS. - Consult nephrology     4. Anxiety depression:   - C/w home meds     5. Hyperlipidemia: Continue statin therapy     6. Hypertension: Managed on lisinopril, hydralazine   - Hold lisinopril as of MODESTO    7. GLF 2/2 diabetic neuropathy  - Continue gabapentin per neurology  - continue Fall precautions  - PT/OT evaluation     Chronic Conditions: Continue all home medications except as stated above or contraindicated.   Chronic low back pain- gabapentin held given profound MODESTO but neurology started. Resting tremor  History of CVA on aspirin high-dose statin       Diet ADULT DIET; Regular   DVT Prophylaxis [] Lovenox, [x]  Heparin, [] SCDs, [] Ambulation,  [] Eliquis, [] Xarelto  [] Coumadin   Code Status Full Code   Disposition From: Assisted living facility  Expected Disposition: Back to assisted living facility  Estimated Date of Discharge: 2 days  Patient requires continued admission due to MODESTO pending nephrology consult   Surrogate Decision Maker/ POA Unknown. Subjective:     Chief Complaint: Seizure-like activity     Patient having no overnight complaints. Denies any fever, chills, dizziness, sweating, tremors, chest pain, shortness of breath but patient states she had frequent falls due to numbness in the legs. Review of Systems:    Review of Systems    As above    Objective: Intake/Output Summary (Last 24 hours) at 7/10/2022 1408  Last data filed at 7/10/2022 0513  Gross per 24 hour   Intake --   Output 0 ml   Net 0 ml        Vitals:   Vitals:    07/10/22 1315   BP: (!) 128/55   Pulse:    Resp:    Temp:    SpO2:        Physical Exam:     General: Afebrile, no distress  Eyes: EOMI  ENT: neck supple, mucous membrane pink/moist  Cardiovascular: S1-S2 normal no murmurs  Respiratory: Clear to auscultation  Gastrointestinal: Soft, non tender  Genitourinary: no suprapubic tenderness  Musculoskeletal: No edema  Skin: warm, dry  Neuro: Alert. Sensation decreased in both feet. Psych: Mood appropriate.      Medications:   Medications:    insulin lispro  0-6 Units SubCUTAneous TID WC    insulin lispro  0-3 Units SubCUTAneous Nightly    [Held by provider] lisinopril  40 mg Oral Daily    atorvastatin  80 mg Oral Nightly    topiramate  50 mg Oral Nightly    clopidogrel  75 mg Oral Daily    metoprolol succinate  25 mg Oral Daily    amLODIPine  5 mg Oral Daily    busPIRone  10 mg Oral TID    Vitamin D  2,000 Units Oral Daily    donepezil  10 mg Oral Nightly    gabapentin 1,200 mg Oral BID    hydrALAZINE  25 mg Oral 3 times per day    aspirin  81 mg Oral Daily    FLUoxetine  80 mg Oral Daily    levothyroxine  25 mcg Oral QAM AC    sodium chloride flush  10 mL IntraVENous 2 times per day    heparin (porcine)  5,000 Units SubCUTAneous 3 times per day      Infusions:    dextrose      sodium chloride       PRN Meds: glucose, 4 tablet, PRN  dextrose bolus, 125 mL, PRN   Or  dextrose bolus, 250 mL, PRN  glucagon (rDNA), 1 mg, PRN  dextrose, 100 mL/hr, PRN  LORazepam, 1 mg, Q5 Min PRN  LORazepam, 4 mg, PRN  sodium chloride flush, 10 mL, PRN  sodium chloride, , PRN  ondansetron, 4 mg, Q8H PRN   Or  ondansetron, 4 mg, Q6H PRN  bisacodyl, 5 mg, Daily PRN  acetaminophen, 650 mg, Q6H PRN   Or  acetaminophen, 650 mg, Q6H PRN        Labs      Recent Results (from the past 24 hour(s))   POCT Glucose    Collection Time: 07/10/22  5:16 AM   Result Value Ref Range    POC Glucose 177 (H) 70 - 99 MG/DL   Basic Metabolic Panel w/ Reflex to MG    Collection Time: 07/10/22  7:13 AM   Result Value Ref Range    Sodium 138 135 - 145 MMOL/L    Potassium 4.3 3.5 - 5.1 MMOL/L    Chloride 105 99 - 110 mMol/L    CO2 20 (L) 21 - 32 MMOL/L    Anion Gap 13 4 - 16    BUN 36 (H) 6 - 23 MG/DL    CREATININE 2.8 (H) 0.6 - 1.1 MG/DL    Glucose 218 (H) 70 - 99 MG/DL    Calcium 8.4 8.3 - 10.6 MG/DL    GFR Non- 17 (L) >60 mL/min/1.73m2    GFR  21 (L) >60 mL/min/1.73m2   CBC auto differential    Collection Time: 07/10/22  7:13 AM   Result Value Ref Range    WBC 7.2 4.0 - 10.5 K/CU MM    RBC 3.74 (L) 4.2 - 5.4 M/CU MM    Hemoglobin 10.9 (L) 12.5 - 16.0 GM/DL    Hematocrit 37.3 37 - 47 %    MCV 99.7 78 - 100 FL    MCH 29.1 27 - 31 PG    MCHC 29.2 (L) 32.0 - 36.0 %    RDW 12.8 11.7 - 14.9 %    Platelets 293 122 - 385 K/CU MM    MPV 10.2 7.5 - 11.1 FL    Differential Type AUTOMATED DIFFERENTIAL     Segs Relative 56.5 36 - 66 %    Lymphocytes % 29.8 24 - 44 %    Monocytes % 11.2 (H) 0 - 4 % Eosinophils % 1.9 0 - 3 %    Basophils % 0.3 0 - 1 %    Segs Absolute 4.1 K/CU MM    Lymphocytes Absolute 2.2 K/CU MM    Monocytes Absolute 0.8 K/CU MM    Eosinophils Absolute 0.1 K/CU MM    Basophils Absolute 0.0 K/CU MM    Nucleated RBC % 0.0 %    Total Nucleated RBC 0.0 K/CU MM    Total Immature Neutrophil 0.02 K/CU MM    Immature Neutrophil % 0.3 0 - 0.43 %   POCT Glucose    Collection Time: 07/10/22  8:36 AM   Result Value Ref Range    POC Glucose 238 (H) 70 - 99 MG/DL        Imaging/Diagnostics Last 24 Hours   US RETROPERITONEAL LIMITED    Result Date: 7/10/2022  1. No evidence of obstructive uropathy to account for patient's renal insufficiency. 2. Mild diffuse thinning of the right renal cortex compared to the left.        Electronically signed by Saloni Disla MD on 7/10/2022 at 1:45 PM

## 2022-07-11 LAB
ANION GAP SERPL CALCULATED.3IONS-SCNC: 11 MMOL/L (ref 4–16)
BACTERIA: NEGATIVE /HPF
BASOPHILS ABSOLUTE: 0 K/CU MM
BASOPHILS RELATIVE PERCENT: 0.5 % (ref 0–1)
BILIRUBIN URINE: NEGATIVE MG/DL
BLOOD, URINE: ABNORMAL
BUN BLDV-MCNC: 39 MG/DL (ref 6–23)
CALCIUM SERPL-MCNC: 8.3 MG/DL (ref 8.3–10.6)
CHLORIDE BLD-SCNC: 100 MMOL/L (ref 99–110)
CHLORIDE URINE RANDOM: 43 MMOL/L (ref 43–210)
CLARITY: ABNORMAL
CO2: 21 MMOL/L (ref 21–32)
COLOR: YELLOW
CREAT SERPL-MCNC: 2.1 MG/DL (ref 0.6–1.1)
CREATININE URINE: 46.5 MG/DL (ref 28–217)
CREATININE URINE: 47.1 MG/DL
DIFFERENTIAL TYPE: ABNORMAL
EOSINOPHILS ABSOLUTE: 0.1 K/CU MM
EOSINOPHILS RELATIVE PERCENT: 1.3 % (ref 0–3)
GFR AFRICAN AMERICAN: 29 ML/MIN/1.73M2
GFR NON-AFRICAN AMERICAN: 24 ML/MIN/1.73M2
GLUCOSE BLD-MCNC: 174 MG/DL (ref 70–99)
GLUCOSE BLD-MCNC: 204 MG/DL (ref 70–99)
GLUCOSE BLD-MCNC: 205 MG/DL (ref 70–99)
GLUCOSE BLD-MCNC: 209 MG/DL (ref 70–99)
GLUCOSE BLD-MCNC: 222 MG/DL (ref 70–99)
GLUCOSE BLD-MCNC: 265 MG/DL (ref 70–99)
GLUCOSE BLD-MCNC: 316 MG/DL (ref 70–99)
GLUCOSE BLD-MCNC: 417 MG/DL (ref 70–99)
GLUCOSE BLD-MCNC: 486 MG/DL (ref 70–99)
GLUCOSE BLD-MCNC: 508 MG/DL (ref 70–99)
GLUCOSE BLD-MCNC: 514 MG/DL (ref 70–99)
GLUCOSE BLD-MCNC: 534 MG/DL (ref 70–99)
GLUCOSE BLD-MCNC: 565 MG/DL (ref 70–99)
GLUCOSE BLD-MCNC: 586 MG/DL (ref 70–99)
GLUCOSE BLD-MCNC: >600 MG/DL (ref 70–99)
GLUCOSE, URINE: >1000 MG/DL
HCT VFR BLD CALC: 35.7 % (ref 37–47)
HEMOGLOBIN: 10.7 GM/DL (ref 12.5–16)
HYALINE CASTS: 0 /LPF
IMMATURE NEUTROPHIL %: 0.3 % (ref 0–0.43)
KETONES, URINE: 15 MG/DL
LEUKOCYTE ESTERASE, URINE: NEGATIVE
LYMPHOCYTES ABSOLUTE: 1.4 K/CU MM
LYMPHOCYTES RELATIVE PERCENT: 21.9 % (ref 24–44)
MCH RBC QN AUTO: 29.6 PG (ref 27–31)
MCHC RBC AUTO-ENTMCNC: 30 % (ref 32–36)
MCV RBC AUTO: 98.6 FL (ref 78–100)
MONOCYTES ABSOLUTE: 0.5 K/CU MM
MONOCYTES RELATIVE PERCENT: 8.5 % (ref 0–4)
MUCUS: ABNORMAL HPF
NITRITE URINE, QUANTITATIVE: POSITIVE
NUCLEATED RBC %: 0 %
OSMOLALITY URINE: 475 MOS/L (ref 292–1090)
PDW BLD-RTO: 12.6 % (ref 11.7–14.9)
PH, URINE: 5 (ref 5–8)
PLATELET # BLD: 225 K/CU MM (ref 140–440)
PMV BLD AUTO: 10.2 FL (ref 7.5–11.1)
POTASSIUM SERPL-SCNC: 5.2 MMOL/L (ref 3.5–5.1)
POTASSIUM, UR: 9.5 MMOL/L (ref 22–119)
PROT/CREAT RATIO, UR: 0.3
PROTEIN UA: NEGATIVE MG/DL
RBC # BLD: 3.62 M/CU MM (ref 4.2–5.4)
RBC URINE: 1 /HPF (ref 0–6)
SEGMENTED NEUTROPHILS ABSOLUTE COUNT: 4.2 K/CU MM
SEGMENTED NEUTROPHILS RELATIVE PERCENT: 67.5 % (ref 36–66)
SODIUM BLD-SCNC: 132 MMOL/L (ref 135–145)
SODIUM URINE: 44 MMOL/L (ref 35–167)
SODIUM URINE: 44 MMOL/L (ref 35–167)
SPECIFIC GRAVITY UA: 1.01 (ref 1–1.03)
SQUAMOUS EPITHELIAL: 1 /HPF
TOTAL IMMATURE NEUTOROPHIL: 0.02 K/CU MM
TOTAL NUCLEATED RBC: 0 K/CU MM
TRICHOMONAS: ABNORMAL /HPF
URINE TOTAL PROTEIN: 12.5 MG/DL
UROBILINOGEN, URINE: 0.2 MG/DL (ref 0.2–1)
WBC # BLD: 6.3 K/CU MM (ref 4–10.5)
WBC CLUMP: ABNORMAL /HPF
WBC UA: 34 /HPF (ref 0–5)
YEAST: ABNORMAL /HPF

## 2022-07-11 PROCEDURE — 82570 ASSAY OF URINE CREATININE: CPT

## 2022-07-11 PROCEDURE — 6360000002 HC RX W HCPCS: Performed by: INTERNAL MEDICINE

## 2022-07-11 PROCEDURE — 84300 ASSAY OF URINE SODIUM: CPT

## 2022-07-11 PROCEDURE — 81001 URINALYSIS AUTO W/SCOPE: CPT

## 2022-07-11 PROCEDURE — 83935 ASSAY OF URINE OSMOLALITY: CPT

## 2022-07-11 PROCEDURE — 2580000003 HC RX 258: Performed by: INTERNAL MEDICINE

## 2022-07-11 PROCEDURE — 97530 THERAPEUTIC ACTIVITIES: CPT

## 2022-07-11 PROCEDURE — 97166 OT EVAL MOD COMPLEX 45 MIN: CPT

## 2022-07-11 PROCEDURE — 84133 ASSAY OF URINE POTASSIUM: CPT

## 2022-07-11 PROCEDURE — 83036 HEMOGLOBIN GLYCOSYLATED A1C: CPT

## 2022-07-11 PROCEDURE — 6370000000 HC RX 637 (ALT 250 FOR IP): Performed by: INTERNAL MEDICINE

## 2022-07-11 PROCEDURE — 97162 PT EVAL MOD COMPLEX 30 MIN: CPT

## 2022-07-11 PROCEDURE — 94761 N-INVAS EAR/PLS OXIMETRY MLT: CPT

## 2022-07-11 PROCEDURE — 82962 GLUCOSE BLOOD TEST: CPT

## 2022-07-11 PROCEDURE — 84156 ASSAY OF PROTEIN URINE: CPT

## 2022-07-11 PROCEDURE — 80048 BASIC METABOLIC PNL TOTAL CA: CPT

## 2022-07-11 PROCEDURE — 2060000000 HC ICU INTERMEDIATE R&B

## 2022-07-11 PROCEDURE — 85025 COMPLETE CBC W/AUTO DIFF WBC: CPT

## 2022-07-11 PROCEDURE — 82436 ASSAY OF URINE CHLORIDE: CPT

## 2022-07-11 PROCEDURE — 36415 COLL VENOUS BLD VENIPUNCTURE: CPT

## 2022-07-11 RX ORDER — SODIUM CHLORIDE 9 MG/ML
INJECTION, SOLUTION INTRAVENOUS CONTINUOUS
Status: DISCONTINUED | OUTPATIENT
Start: 2022-07-11 | End: 2022-07-13

## 2022-07-11 RX ORDER — METHOCARBAMOL 500 MG/1
1000 TABLET, FILM COATED ORAL 4 TIMES DAILY
Status: DISCONTINUED | OUTPATIENT
Start: 2022-07-11 | End: 2022-07-18 | Stop reason: HOSPADM

## 2022-07-11 RX ORDER — GABAPENTIN 100 MG/1
200 CAPSULE ORAL DAILY
Status: DISCONTINUED | OUTPATIENT
Start: 2022-07-12 | End: 2022-07-17

## 2022-07-11 RX ORDER — ACETAMINOPHEN 500 MG
1000 TABLET ORAL EVERY 8 HOURS SCHEDULED
Status: DISCONTINUED | OUTPATIENT
Start: 2022-07-11 | End: 2022-07-18 | Stop reason: HOSPADM

## 2022-07-11 RX ORDER — DEXTROSE MONOHYDRATE 50 MG/ML
100 INJECTION, SOLUTION INTRAVENOUS PRN
Status: DISCONTINUED | OUTPATIENT
Start: 2022-07-11 | End: 2022-07-18 | Stop reason: HOSPADM

## 2022-07-11 RX ADMIN — METOPROLOL SUCCINATE 25 MG: 25 TABLET, EXTENDED RELEASE ORAL at 09:23

## 2022-07-11 RX ADMIN — SODIUM CHLORIDE 11.7 UNITS/HR: 9 INJECTION, SOLUTION INTRAVENOUS at 17:29

## 2022-07-11 RX ADMIN — METHOCARBAMOL TABLETS 1000 MG: 500 TABLET, COATED ORAL at 21:12

## 2022-07-11 RX ADMIN — DONEPEZIL HYDROCHLORIDE 10 MG: 10 TABLET ORAL at 21:12

## 2022-07-11 RX ADMIN — HYDRALAZINE HYDROCHLORIDE 25 MG: 25 TABLET, FILM COATED ORAL at 06:51

## 2022-07-11 RX ADMIN — SODIUM CHLORIDE, PRESERVATIVE FREE 10 ML: 5 INJECTION INTRAVENOUS at 21:11

## 2022-07-11 RX ADMIN — HEPARIN SODIUM 5000 UNITS: 5000 INJECTION INTRAVENOUS; SUBCUTANEOUS at 22:50

## 2022-07-11 RX ADMIN — ACETAMINOPHEN 1000 MG: 500 TABLET ORAL at 21:12

## 2022-07-11 RX ADMIN — SODIUM CHLORIDE 15.78 UNITS/HR: 9 INJECTION, SOLUTION INTRAVENOUS at 11:16

## 2022-07-11 RX ADMIN — BUSPIRONE HYDROCHLORIDE 10 MG: 5 TABLET ORAL at 14:00

## 2022-07-11 RX ADMIN — ASPIRIN 81 MG CHEWABLE TABLET 81 MG: 81 TABLET CHEWABLE at 08:32

## 2022-07-11 RX ADMIN — HEPARIN SODIUM 5000 UNITS: 5000 INJECTION INTRAVENOUS; SUBCUTANEOUS at 06:51

## 2022-07-11 RX ADMIN — GABAPENTIN 1200 MG: 400 CAPSULE ORAL at 08:32

## 2022-07-11 RX ADMIN — ATORVASTATIN CALCIUM 80 MG: 40 TABLET, FILM COATED ORAL at 21:12

## 2022-07-11 RX ADMIN — Medication 2000 UNITS: at 08:33

## 2022-07-11 RX ADMIN — AMLODIPINE BESYLATE 5 MG: 5 TABLET ORAL at 08:32

## 2022-07-11 RX ADMIN — HYDRALAZINE HYDROCHLORIDE 25 MG: 25 TABLET, FILM COATED ORAL at 14:00

## 2022-07-11 RX ADMIN — ACETAMINOPHEN 1000 MG: 500 TABLET ORAL at 14:00

## 2022-07-11 RX ADMIN — METHOCARBAMOL TABLETS 1000 MG: 500 TABLET, COATED ORAL at 16:15

## 2022-07-11 RX ADMIN — SODIUM CHLORIDE: 9 INJECTION, SOLUTION INTRAVENOUS at 23:21

## 2022-07-11 RX ADMIN — BUSPIRONE HYDROCHLORIDE 10 MG: 5 TABLET ORAL at 21:11

## 2022-07-11 RX ADMIN — HEPARIN SODIUM 5000 UNITS: 5000 INJECTION INTRAVENOUS; SUBCUTANEOUS at 14:00

## 2022-07-11 RX ADMIN — SODIUM CHLORIDE: 9 INJECTION, SOLUTION INTRAVENOUS at 12:19

## 2022-07-11 RX ADMIN — SODIUM CHLORIDE, PRESERVATIVE FREE 10 ML: 5 INJECTION INTRAVENOUS at 09:24

## 2022-07-11 RX ADMIN — BUSPIRONE HYDROCHLORIDE 10 MG: 5 TABLET ORAL at 08:32

## 2022-07-11 RX ADMIN — CLOPIDOGREL BISULFATE 75 MG: 75 TABLET ORAL at 08:32

## 2022-07-11 RX ADMIN — FLUOXETINE 80 MG: 20 CAPSULE ORAL at 08:32

## 2022-07-11 RX ADMIN — METHOCARBAMOL TABLETS 1000 MG: 500 TABLET, COATED ORAL at 18:30

## 2022-07-11 RX ADMIN — LEVOTHYROXINE SODIUM 25 MCG: 25 TABLET ORAL at 06:53

## 2022-07-11 RX ADMIN — TOPIRAMATE 50 MG: 25 TABLET, FILM COATED ORAL at 21:12

## 2022-07-11 RX ADMIN — HYDRALAZINE HYDROCHLORIDE 25 MG: 25 TABLET, FILM COATED ORAL at 21:12

## 2022-07-11 ASSESSMENT — PAIN SCALES - GENERAL
PAINLEVEL_OUTOF10: 0
PAINLEVEL_OUTOF10: 5
PAINLEVEL_OUTOF10: 0

## 2022-07-11 NOTE — CONSULTS
Patient:   Santos Pastor    Date:  22  :  1960, 59 y.o. Nephrologist: Evaristo Davis MD  Provider: Millie Chadwick MD    Reason for Consult: Acute kidney injury with likely underlying CKD stage G3    Consult requested by : Dr Brooke Quintana MD    Chief Complaint:   Uncontrollable shaking, probably significant myoclonus, diaphoresis, fatigue and tiredness     HISTORY OF PRESENT ILLNESS:   Ms Lenore Peter is a 64-year-old female, who is in assisted living resident for the last 2 months, was sent to the emergency department with above-mentioned symptoms. Upon the last few days what appeared to me she had myoclonus, along with other symptoms such as fatigue, diaphoresis, tiredness etc.  Combination of symptomatology forced her to come to the emergency department. It is unclear to me what emergency department she presented. I do not see any note from our emergency department, I did try in the care everywhere, looks like she was in the [facility] emergency department, but I could not obtain any medical record. .  Reportedly she found to have hypoglycemia, which was adequately treated. In any event, her initial labs showed serum bicarbonate of 20, BUN/creatinine 36 and 2.8 mg/dL respectively with blood sugar of 218. Other pertinent abnormal lab include low hemoglobin of 10.9. She was started on insulin drip as well as normal saline, her creatinine did go down to 2.1. Her gabapentin is also reduced significantly. Currently 200 mg/day of dose. She is also with Topamax. When I saw her this afternoon, she is rather emotional and teary, and had visible mild myoclonus. I am little bit familiar with her, I saw her once during her inpatient stay last time, she was admitted with hallucination and confusion.   She also sustained an acute kidney injury with peak creatinine of 3.9, thought to be from volume depletion and subsequent acute tubular injury, but her creatinine went down to 1.2 with volume repletion and she was discharged back to assisted living. She is to see a nephrologist [facility], but because I see her sister, and she lives here, she decided to switch and see me instead. She had an appointment with me in my office, but she was admitted in the hospital.    Review of [facility] system revealed , her creatinine runs between 1.3-1.6 range, also has some proteinuria thought to be from diabetes        PMH :   1. CKD stage G3 A3  2. Type 1 diabetes mellitus diagnosed at age 19, on insulin therapy with proteinuria  3. 1 episode of TIA back in 2017  4. Longstanding hypertension  5. Probable thyroid disease  6. Probable COPD  7. 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 Gastroparesis  9. Diabetic neuropathy  10. Significant mood disorder with anxiety        PSH :  1. She had several shoulder surgery,  2. Lung surgery  3. Foot surgery and leg surgery      OB GYN Hx:  1.  2 para 1, she had 1 . 2. No history of eclampsia or preeclampsia, gestational diabetes or hypertension    Habits :   1. No history of smoking, alcohol or illicit drug abuse    Soc Hx:  1. Patient was born in [place], her dad was in [place], she ended up living in many different states and cities. She has been living here for 23 years, she was  but  in . Although she was living by herself at home, but she has difficulty managing at home with frequent falls, forcing her to go to assisted living just about 2 months ago, where she is residing now. She used to work as an  [employer], she retired finally in     [address]   1. Significant for diabetes and chronic kidney disease, her sister which is a patient of mine, about to start peritoneal dialysis  2.  Also another sister had colon cancer        REVIEW OF SYSTEMS:     All pertinent ROS neg except   Emotional, depressed and anxiety  Recurrent fall, myoclonus  Diaphoresis, fatigue and tiredness    Current Facility-Administered Medications   Medication Dose Route Frequency Provider Last Rate Last Admin    0.9 % sodium chloride infusion   IntraVENous Continuous Justin Vargas  mL/hr at 07/11/22 1219 New Bag at 07/11/22 1219    insulin regular (HUMULIN R;NOVOLIN R) 100 Units in sodium chloride 0.9 % 100 mL infusion  0.5 Units/hr IntraVENous Continuous Rene Obando MD 17.9 mL/hr at 07/11/22 1525 17.92 Units/hr at 07/11/22 1525    glucose chewable tablet 16 g  4 tablet Oral PRN Justin Vargas MD        dextrose bolus 10% 125 mL  125 mL IntraVENous PRN Justin Vargas MD        Or    dextrose bolus 10% 250 mL  250 mL IntraVENous PRN Justin Vargas MD        glucagon (rDNA) injection 1 mg  1 mg IntraMUSCular PRN Justin Vargas MD        dextrose 5 % solution  100 mL/hr IntraVENous PRN MD Roz Monroy ON 7/12/2022] gabapentin (NEURONTIN) capsule 200 mg  200 mg Oral Daily Justin Vargas MD        acetaminophen (TYLENOL) tablet 1,000 mg  1,000 mg Oral 3 times per day Justin Vargas MD   1,000 mg at 07/11/22 1400    methocarbamol (ROBAXIN) tablet 1,000 mg  1,000 mg Oral 4x Daily Justin Vargas MD        glucose chewable tablet 16 g  4 tablet Oral PRN Conner Berrios MD        dextrose bolus 10% 125 mL  125 mL IntraVENous PRN Lore APARICIO MD        Or    dextrose bolus 10% 250 mL  250 mL IntraVENous PRN Conner Berrios MD        glucagon (rDNA) injection 1 mg  1 mg IntraMUSCular PRN Conner Berrios MD        dextrose 5 % solution  100 mL/hr IntraVENous PRN Cherrie Bruce MD        [Held by provider] lisinopril (PRINIVIL;ZESTRIL) tablet 40 mg  40 mg Oral Daily Conner Berrios MD        atorvastatin (LIPITOR) tablet 80 mg  80 mg Oral Nightly Conner Monalisa Berrios MD   80 mg at 07/10/22 2043    topiramate (TOPAMAX) tablet 50 mg  50 mg Oral Nightly Conner Berrios MD   50 mg at 07/10/22 2042    clopidogrel (PLAVIX) tablet 75 mg  75 mg Oral Daily Conner Sheila Ruth MD   75 mg at 07/11/22 3190    metoprolol succinate (TOPROL XL) extended release tablet 25 mg  25 mg Oral Daily Conner Sheila Ruth MD   25 mg at 07/11/22 0923    amLODIPine (NORVASC) tablet 5 mg  5 mg Oral Daily Conner Sheila Ruth MD   5 mg at 07/11/22 0832    busPIRone (BUSPAR) tablet 10 mg  10 mg Oral TID Sony Lambert MD   10 mg at 07/11/22 1400    Vitamin D (CHOLECALCIFEROL) tablet 2,000 Units  2,000 Units Oral Daily Kiya APARICIO MD   2,000 Units at 07/11/22 0833    donepezil (ARICEPT) tablet 10 mg  10 mg Oral Nightly Kiya APARICIO MD   10 mg at 07/10/22 2043    hydrALAZINE (APRESOLINE) tablet 25 mg  25 mg Oral 3 times per day Sony Lambert MD   25 mg at 07/11/22 1400    aspirin chewable tablet 81 mg  81 mg Oral Daily Kiya APARICIO MD   81 mg at 07/11/22 0832    FLUoxetine (PROZAC) capsule 80 mg  80 mg Oral Daily Conner Sheila Ruth MD   80 mg at 07/11/22 0832    levothyroxine (SYNTHROID) tablet 25 mcg  25 mcg Oral QAM AC Conner Sheila Ruth MD   25 mcg at 07/11/22 0653    LORazepam (ATIVAN) injection 1 mg  1 mg IntraVENous Q5 Min PRN Conner Sheila Ruth MD        LORazepam (ATIVAN) injection 4 mg  4 mg IntraVENous PRN Conner Sheila Ruth MD        sodium chloride flush 0.9 % injection 10 mL  10 mL IntraVENous 2 times per day Sony Lambert MD   10 mL at 07/11/22 0924    sodium chloride flush 0.9 % injection 10 mL  10 mL IntraVENous PRN Conner Sheila Ruth MD        0.9 % sodium chloride infusion   IntraVENous PRN Conner Sheila Ruth MD        ondansetron (ZOFRAN-ODT) disintegrating tablet 4 mg  4 mg Oral Q8H PRN Conner Sheila Ruth MD   4 mg at 07/10/22 1315    Or    ondansetron (ZOFRAN) injection 4 mg  4 mg IntraVENous Q6H PRN Conner Sheila Ruth MD        bisacodyl (DULCOLAX) EC tablet 5 mg  5 mg Oral Daily PRN Conner Sheila Ruth MD        heparin (porcine) injection 5,000 Units  5,000 Units SubCUTAneous 3 times per day Sony Lambert MD   5,000 Units at 07/11/22 1400       BP (!) 109/48 Pulse 92   Temp 98.2 °F (36.8 °C) (Oral)   Resp 15   Ht 5' 4.5\" (1.638 m)   Wt 247 lb (112 kg)   SpO2 98%   BMI 41.74 kg/m²     PHYSICAL EXAM:  General appearance: Alert, awake, but very emotional and tearful, seems oriented  HEENT: At least 1+ conjunctival pallor  Neck: Supple  Heart: Seems regular rate and rhythm  LUNGS: No crackles  Abdomen: Soft  Extremities: Trace edema  Mild upper extremity tremor and myoclonus    LABS:  CBC:   Lab Results   Component Value Date/Time    WBC 6.3 07/11/2022 07:45 AM    WBC 7.2 07/10/2022 07:13 AM    WBC 5.4 07/05/2022 09:25 AM    HGB 10.7 07/11/2022 07:45 AM    HGB 10.9 07/10/2022 07:13 AM    HGB 11.4 07/05/2022 09:25 AM     07/11/2022 07:45 AM     07/10/2022 07:13 AM     07/05/2022 09:25 AM     Renal Panel:   Lab Results   Component Value Date/Time     07/11/2022 07:45 AM     07/10/2022 07:13 AM     07/05/2022 09:25 AM    K 5.2 07/11/2022 07:45 AM    K 4.3 07/10/2022 07:13 AM    K 4.7 07/05/2022 09:25 AM     07/11/2022 07:45 AM     07/10/2022 07:13 AM     07/05/2022 09:25 AM    CO2 21 07/11/2022 07:45 AM    CO2 20 07/10/2022 07:13 AM    CO2 24 07/05/2022 09:25 AM    BUN 39 07/11/2022 07:45 AM    BUN 36 07/10/2022 07:13 AM    BUN 21 07/05/2022 09:25 AM    CREATININE 2.1 07/11/2022 07:45 AM    CREATININE 2.8 07/10/2022 07:13 AM    CREATININE 1.2 07/05/2022 09:25 AM    GFRAA 29 07/11/2022 07:45 AM    GFRAA 21 07/10/2022 07:13 AM    GFRAA 55 07/05/2022 09:25 AM    LABGLOM 24 07/11/2022 07:45 AM    LABGLOM 17 07/10/2022 07:13 AM    LABGLOM 45 07/05/2022 09:25 AM    LABALBU 3.6 07/05/2022 09:25 AM    LABALBU 3.2 07/04/2022 06:00 AM    LABALBU 3.7 07/03/2022 04:04 AM         Calcium:    Lab Results   Component Value Date/Time    CALCIUM 8.3 07/11/2022 07:45 AM     Phosphorus:    Lab Results   Component Value Date/Time    PHOS 2.8 07/05/2022 09:25 AM       U/A:    Lab Results   Component Value Date/Time    PROTEINU NEGATIVE 07/11/2022 12:05 PM    NITRU POSITIVE 07/11/2022 12:05 PM    COLORU YELLOW 07/11/2022 12:05 PM    WBCUA 34 07/11/2022 12:05 PM    RBCUA 1 07/11/2022 12:05 PM    MUCUS RARE 07/11/2022 12:05 PM    TRICHOMONAS NONE SEEN 07/11/2022 12:05 PM    YEAST MODERATE 07/11/2022 12:05 PM    BACTERIA NEGATIVE 07/11/2022 12:05 PM    CLARITYU SLIGHTLY CLOUDY 07/11/2022 12:05 PM    SPECGRAV 1.010 07/11/2022 12:05 PM    UROBILINOGEN 0.2 07/11/2022 12:05 PM    BILIRUBINUR NEGATIVE 07/11/2022 12:05 PM    BLOODU SMALL 07/11/2022 12:05 PM    KETUA 15 07/11/2022 12:05 PM           IMPRESSION:  1. Acute kidney injury and underlying CKD stage G3 A3-likely from relative volume depletion, I suspect from hyperglycemia induced polyuria and fluid loss, unlikely  rhabdomyolysis with normal CK on July 10, 2020, urinalysis yesterday did not show any active sediment except 34 leukocytosis, and significant glycosuria  2. Myoclonus-suspect mainly from high-dose of gabapentin at the face of acute kidney injury  3. Underlying diabetes, hypertension and dyslipidemia with dysglycemia    PLAN:    1. She is on insulin drip and normal saline, will watch the volume status closely and I would stop normal saline after total of 2 L  2.   3. Probably hold the gabapentin now, until it washes out from her system, and also once her kidney function improved, I have restarted a small dose  4. I would avoid Topamax if possible, given the risk of metabolic acidosis and kidney stone  5. Also I did avoid tizanidine completely, given its mechanism exactly like clonidine  6. UA, urinary indicis and good glycemic control  7. Rule out any other occult infection  8. In the long run, the goal would be to reduce the progression of kidney disease, by controlling diabetes, high blood pressure and cholesterol, with appropriate regimen  9.  Follow clinically and biochemically

## 2022-07-11 NOTE — PROGRESS NOTES
Physical Therapy  [facility], 1960, 2020/2020-A, 7/11/2022    History  [place]:  There were no encounter diagnoses. Patient  has a past medical history of Arthritis, Asthma, Cerebral artery occlusion with cerebral infarction [facility]), COPD (chronic obstructive pulmonary disease) ([facility] [address] Diabetes mellitus ([facility] [address] Diabetic neuropathy ([facility] [address] Fracture, History of blood transfusion, Hyperlipidemia, Hypertension, Muscle weakness (generalized), and Osteoarthritis. Patient  has a past surgical history that includes Foot surgery (Left, 2004?); knee surgery (Right, 2009?); Cholecystectomy (2002?); Lung surgery (2009?); eye surgery; Humerus fracture surgery (Left, 12/31/2018); and Breast biopsy (Right). Subjective:  Patient states:  \"I should be able to do better than this\"   Pain:  Posterior aspect of left leg and left side of neck. Did not rate.  Described it as spasm and shooting pain    Communication with other providers:  Handoff to RN, co-eval with Keara YOUNGER   Restrictions: general precautions, falls     Home Setup/Prior level of function  Social/Functional History  Type of Home: Assisted living  Home Layout: One level  Home Access: Level entry  Bathroom Shower/Tub: Walk-in shower  Bathroom Toilet: Handicap height  Bathroom Equipment: Shower chair,Grab bars in shower,Grab bars around toilet  Bathroom Accessibility: Walker accessible  Home Equipment: Sock aid,Rollator,Wheelchair-electric  Has the patient had two or more falls in the past year or any fall with injury in the past year?: Yes  ADL Assistance: Independent  Homemaking Responsibilities: No  Ambulation Assistance: Independent (typically uses the rollator, will use electric WC occasionally)  Transfer Assistance: Independent  Active : No    Examination of body systems (includes body structures/functions, activity/participation limitations):  · Observation:  Sitting in recliner upon arrival. Cooperative with therapy   · Vision:  [facility]   · Hearing:  [facility]  · Cardiopulmonary:  Stable vitals on room air     Musculoskeletal  · ROM R/L:  [facility] BLEs  · Strength R/L:  RLE 4+/5, L LE at least 3+/5. Did not attempt strong resistance against LLE due to pain. · Neuro:  Myoclonic jerks to UE/LEs (per charting is likely due to Neurontin in [place]). Diminished sensation to bilat feet and hands due to neuropathy. Able to detect light touch. Mobility/treatment:   · Rolling L/R:  NT   · Supine to sit:  NT, sitting in recliner upon arrival   · Transfers:   · Sit to stand: Daysi from recliner (3x). Slow to transition with dec power. Needed steadying assist while transitioning UEs from seat to RW   · Stand to sit: Daysi for controlling sit to recliner. Cues for reaching back to chair each time with dec carry over  · Sitting balance:  SBA from edge of recliner, unsupported static sitting   · Standing balance:  CGA regressing to Daysi with inc tremors and jerking to UE/LEs while standing at RW with BUE support. Tolerated ~1 minute over 3 trials. Attempted to perform in place marches each time but only able to complete on final trial and only 1 march on each leg. · Gait: unable to assess this date   · Educated pt on POC, role of PT, DME use, discharge. Cues provided for sequencing to inc safety and indep with mobility     [facility] 6 Clicks Inpatient Mobility:  AM-PAC Inpatient Mobility Raw Score : 11    Safety: patient left in chair, call light within reach, RN notified, gait belt used. Assessment:  Pt is a 59year old female admitted for seizure like activity and hypoglycemia. After pt was admitted, she quickly turned hyperglycemic and required transfer to step down unit. Recommend subacute rehab once medically stable. At baseline she is David with gross mobility and ADLs.  She performed below her baseline this date and would benefit from continued therapy to address her current deficits, dec potential fall risk, and restore function. Complexity: Moderate  Prognosis: Good, no significant barriers to participation at this time.   Plan: 3-5 times per week  Discharge Recommendations: Subacute/Skilled Nursing Facility   Equipment: 3:1     Goals:  Short Term Goals  Time Frame for Short term goals: 2 weeks  Short term goal 1: Pt will perform sit><supine CGA  Short term goal 2: Pt will transfer sit><Stand CGA  Short term goal 3: Pt will transfer between surfaces CGA  Short term goal 4: Pt will ambulate 20ft with RW Daysi  Short term goal 5: Pt will perform standing light dynamic activity with single UE support x 3 minutes CGA       Treatment plan:  Bed mobility, transfers, balance, gait, TA, TX, WC     Recommendations for NURSING mobility: stand pivot    Time:   Time in: 1350  Time out: 1417  Timed treatment minutes: 12  Total time: 27 minutes     Electronically signed by:    Paulette Owen [id number]  7/11/2022, 3:44 PM

## 2022-07-11 NOTE — PROGRESS NOTES
Went to put pt into bed, very unsteady and shaky. Pt stated she did not feel too good. Checked blood sugar and it was 207. Her tempeture was 95.6. Warm blankets applied. RN notified.

## 2022-07-11 NOTE — CARE COORDINATION
.Chart reviewed. The patient is from home with [facility] and plans to return. The patient has a PCP at the [facility] and insurance and can afford and take her medications as prescribed. The patient has reliable transportation with AL facility. The patient would like [facility] and she has straight medicaid. She uses a cane and walker and electric wheelchair to get around. [facility] sent a perfect serve to [address] [place] [address] AT [facility] to see if they take the patient's insurance. [facility] is following. [facility] will not accept straight medicaid.

## 2022-07-11 NOTE — PROGRESS NOTES
Hospitalist Progress Note      Name:  Sony Newby /Age/Sex: 1960  (59 y.o. female)   MRN & CSN:  [id number] & [id number] Admission Date/Time: 2022  6:35 PM   Location:  -A PCP: Carmelo English, [address] Day: 3  Discharge barrier/Reason for continued hospitalization: Await renal function recovery. Need for optimal glycemic management. Transfer to ICU for insulin gtt. Assessment and Plan:   Sony Newby is a 59 y.o.  female with a past medical history of insulin-dependent type 2 diabetes, hypertension, anxiety with depression, class III obesity, recurrent hospitalization who presented to the ED on 2022 with Seizure-like activity ([facility] [address] On arrival, patient was found to be hypoglycemic and this was corrected. 1.  Seizure-like episode: Reason for hospitalization. Possibly seizure induced by hypoglycemia. Monitor off AEDs    2. Acute toxic metabolic encephalopathy: Present on arrival  Multifactorial-hypoglycemia, MODESTO, high-dose Neurontin  Now resolved    3. Myoclonic jerks: Persistent. Likely due to Neurontin in MODESTO  Current dose of 1200 mg twice daily is not acceptable for degree of renal function  Discussed with patient, agreeable to decrease to 200 mg once daily    4. Recurrent MODESTO/ATN with incomplete renal recovery versus CKD 3B. Recent  Cr baseline seems to be between 1.1-1.2. Was 2.8 on arrival, down to 2.1 today. Initiate gentle IV fluids  Await nephrology evaluation as she could also have CKD due to diabetes. 5.  Hyperglycemia due to poorly controlled diabetes: Blood glucose >500 this AM.  This is due to suboptimal dosing of insulin in the last 24 hours due to recent hypoglycemic episode. Start insulin gtt. given concurrent renal failure to facilitate accurate insulin requirements in 24 hours. May consider endocrinology consult in the next day or 2 if no adequate control.     6.  DJD: Exacerbated by fall/recurrent falls  Start scheduled extra strength Tylenol  Start as needed Robaxin. Decreased dose of Neurontin as this can contribute to falls    7. Heat and cold intolerance: TSH mildly elevated   Now resolved with low-dose Synthroid    8. Class III obesity: Lifestyle modification counseling  DJD precludes exercise    Diet ADULT DIET; Regular   DVT Prophylaxis [] Lovenox, []  Heparin, [] SCDs, [] Ambulation   GI Prophylaxis [] PPI,  [] H2 Blocker,  [] Carafate,  [] Diet/Tube Feeds   Code Status Full Code   MDM [] Low, [] Moderate,[x]  High. 35 minutes  Due to a high probability of clinically significant, life threatening deterioration, the patient required my highest level of preparedness to intervene emergently and I personally spent this critical care time directly and personally managing the patient that excludes separate billable procedures. This critical care time included obtaining a history; examining the patient; pulse oximetry; ordering and review of studies; arranging urgent treatment with development of a management plan; evaluation of patient's response to treatment; frequent reassessment; and, discussions with other providers. This critical care time was performed to assess and manage the high probability of imminent, life-threatening deterioration that could result in multi-organ failure. History of Present Illness:     Chief Complaint: Seizure-like activity ([facility] [address]     eSrgio Doss is a 59 y.o.  female  who presents with seizure-like activity. Patient denied loss of consciousness but did agree that she was probably confused. Her sugars were low on arrival.  She reports that about the last 24 hours, she has not been giving her usual insulin doses. Her sugars are greater than 500 today. She also reports ambulatory dysfunction due to uncontrolled myoclonic jerks. Her legs and hands does give way.   She reports a few weeks of alternating sweating and freezing that improved when she was started on low-dose Synthroid. Ten point ROS reviewed, negative, unless as noted above    Objective: Intake/Output Summary (Last 24 hours) at 7/11/2022 1147  Last data filed at 7/10/2022 1932  Gross per 24 hour   Intake --   Output 800 ml   Net -800 ml        Vitals:   Vitals:    07/10/22 1315 07/10/22 2038 07/11/22 0411 07/11/22 0923   BP: (!) 128/55 (!) 111/52 (!) 118/54 132/78   Pulse:  68 79 78   Resp:  18 16    Temp:  98.8 °F (37.1 °C) 98.1 °F (36.7 °C)    TempSrc:  Oral Oral    SpO2:   98%    Weight:       Height:            Physical Exam:   GEN: Awake female, alert and oriented x3 in no apparent distress. Pleasant. Morbidly obese. HEENT: Normal   RESP: Clear lung fields bilaterally. Symmetric chest movement while on room air  CVS: RRR, S1, S2  GI/: Abdomen is soft, nontender, no organomegaly. . Bowel sounds normal, rectal exam deferred. No CVA tenderness. MSK: No gross joint deformities. No tenderness  SKIN: Normal coloration, warm, dry. NEURO: Cranial nerves appear grossly intact, normal speech, no lateralizing weakness. Intentional and unintentional tremors. Myoclonic jerks most noticeable on extremities.   PSYCH:  Affect appropriate    Medications:   Medications:    [START ON 7/12/2022] gabapentin  200 mg Oral Daily    acetaminophen  1,000 mg Oral 3 times per day    methocarbamol  1,000 mg Oral 4x Daily    [Held by provider] lisinopril  40 mg Oral Daily    atorvastatin  80 mg Oral Nightly    topiramate  50 mg Oral Nightly    clopidogrel  75 mg Oral Daily    metoprolol succinate  25 mg Oral Daily    amLODIPine  5 mg Oral Daily    busPIRone  10 mg Oral TID    Vitamin D  2,000 Units Oral Daily    donepezil  10 mg Oral Nightly    hydrALAZINE  25 mg Oral 3 times per day    aspirin  81 mg Oral Daily    FLUoxetine  80 mg Oral Daily    levothyroxine  25 mcg Oral QAM AC    sodium chloride flush  10 mL IntraVENous 2 times per day    heparin (porcine)  5,000 Units SubCUTAneous 3 times per day Infusions:    sodium chloride      insulin 15.78 Units/hr (07/11/22 1116)    dextrose      dextrose      sodium chloride       PRN Meds: glucose, 4 tablet, PRN  dextrose bolus, 125 mL, PRN   Or  dextrose bolus, 250 mL, PRN  glucagon (rDNA), 1 mg, PRN  dextrose, 100 mL/hr, PRN  glucose, 4 tablet, PRN  dextrose bolus, 125 mL, PRN   Or  dextrose bolus, 250 mL, PRN  glucagon (rDNA), 1 mg, PRN  dextrose, 100 mL/hr, PRN  LORazepam, 1 mg, Q5 Min PRN  LORazepam, 4 mg, PRN  sodium chloride flush, 10 mL, PRN  sodium chloride, , PRN  ondansetron, 4 mg, Q8H PRN   Or  ondansetron, 4 mg, Q6H PRN  bisacodyl, 5 mg, Daily PRN          Electronically signed by Bladimir Duff MD on 7/11/2022 at 11:47 AM

## 2022-07-11 NOTE — PROGRESS NOTES
[facility], 1960, 2020/2020-A, 7/11/2022    Discharge Recommendation: SNF    History:  [place]:  There were no encounter diagnoses. Subjective:  Patient states: \"It shoots up my calf, skips the rest of my body, then burns in my neck! \"  Pain: pt doesn't rate pain but does verbalize it with standing  Communication with other providers: coeval with PT Donna  Restrictions: general precautions, fall risk, polyneuropathy    Home Setup/Prior level of function:  Social/Functional History  Type of Home: Assisted living  Home Layout: One level  Home Access: Level entry  Bathroom Shower/Tub: Walk-in shower  Bathroom Toilet: Handicap height  Bathroom Equipment: Shower chair,Grab bars in shower,Grab bars around toilet  Bathroom Accessibility: Walker accessible  Home Equipment: Sock aid,Rollator,Wheelchair-electric  Has the patient had two or more falls in the past year or any fall with injury in the past year?: Yes  ADL Assistance: Independent  Homemaking Responsibilities: No  Ambulation Assistance: Independent (typically uses the rollator, will use electric WC occasionally)  Transfer Assistance: Independent  Active : No    Examination:  · Observation: Pt was sitting in chair upon arrival, agreeable to session  · Vision: [facility]  · Hearing: [facility]  · Objective Measures: stable    Body Systems and functions:  · ROM: WFL in BUE  · Strength: 3+/5 LUE, 4-/5 RUE  · Sensation: WFL  · Tone: normotonic in BUE  · Coordination: intentional tremors BUE, pt states has it in BLE as well (not noted during MMT but was noted upon standing)  · Posture: normal posture  · Activity Tolerance: poor    Activities of Daily Living (ADLs):  · Feeding: setup A  · Grooming: min A seated   · Toileting: min A  · UB dressing: min A  · LB dressing: min A  · UB bathing: min A  · LB bathing: min A    *pt ADL function inferred from gross functional assessment of mobility, balance, posture, safety awareness, activity tolerance (unless otherwise indicated)    Cognitive and Psychosocial Functioning:  · Overall cognitive status: WNL, A/Ox4  · Affect: friendly    Balance:   · Sitting: good  · Standing: poor- min A    Functional Mobility:  · Rolling Laterally in Bed: DNT  · Supine to Sit: DNT  · Sit to Stand: min A    · Ambulation: DNT d/t safety      AM-PAC 6 click short form for inpatient daily activity:   How much help from another person does the patient currently need. .. Unable  Dep A Lot  Max A A Lot   Mod A A Little  Min A A Little   CGA  SBA None   Mod I  Indep  Sup   1. Putting on and taking off regular lower body clothing? [] 1    [] 2   [] 2   [x] 3   [] 3   [] 4      2. Bathing (including washing, rinsing, drying)? [] 1   [] 2   [] 2 [x] 3 [] 3 [] 4   3. Toileting, which includes using toilet, bedpan, or urinal? [] 1    [] 2   [] 2   [x] 3   [] 3   [] 4     4. Putting on and taking off regular upper body clothing? [] 1   [] 2   [] 2   [x] 3   [] 3    [] 4      5. Taking care of personal grooming such as brushing teeth? [] 1   [] 2    [] 2 [x] 3    [] 3   [] 4      6. Eating meals? [] 1   [] 2   [] 2   [] 3   [x] 3   [] 4        Raw Score:  18      24/24 = unimpaired  23/24 = 1-20% impaired   20/24-22/24 = 21-40% impaired  15/24-19/24 = 41-59% impaired   10/24-14/24 = 60%-79% impaired  7/24-9/24 = 80%-99% impaired  6/24 = 100% impaired     Treatment:  Pt denying needs for self cares today    Therapeutic Activity Training (10 minutes): Therapeutic activity training was instructed today. Cues were given for safety, sequence, UE/LE placement, visual cues, and balance. Activities performed today included pt attempting x3 STS from chair with min A for each, each stand able to tolerate ~1 min of standing before violent jerking begins with min A to stabilize, needing to sit with min A and max cues for sequencing/safe descent.  Trialled marching in place, pt beginning to jerk each time. Pt seated in chair, positioned for comfort. Education: Role of OT, OT POC, discharge needs, safety, benefits of EOB/OOB activity, AE needs  Safety Measures: Gait belt used for safety of pt and therapist, Left in chair, Alarm in place, call light and phone within reach, lines managed    Assessment:  Pt is a 59 yr old female from assisted living who presents with seizure-like activity. Prior to admission, pt was independent with ADLs, mod I with RW/electric wc. Pt currently well below baseline, requiring min A for transfers and standing balance as well as most self cares. Pt with poor tolerance for activity d/t pain and violent spasms. Pt would benefit from continued IP OT services during their stay, and discharge to SNF. Complexity: mod  Prognosis: fair  Barriers: tremors, neuropathy    Plan:  Plan: 3+/week    Treatment to include: Strengthening, ROM, Balance Training, Functional Mobility Training, Endurance Training, Gait Training, Pain Management, Safety Education and Training, Patient+Caregiver Education and Training, Equipment Evaluation Education and Procurement, Positioning, Self Care Training, Home Management Training, Coordination Training    Pt would benefit from continued edu on none  Adaptive Equipment Recommendations: 3-in-1 BSC, trial built up handles for utensils/self cares    Goals:  Time frame for goals: 2 weeks  1. Pt will complete feeding tasks with ind  2. Pt will complete grooming tasks with mod I standing at sink  3. Pt will complete toileting tasks with mod I using standard commode  4. Pt will complete UB dressing tasks with ind  5. Pt will complete LB dressing tasks with mod I  6. Pt will complete UB bathing tasks with ind  7. Pt will complete LB bathing tasks with mod I  8. Pt will complete therapeutic exercise/activity to increase independence in ADL/IADL function  9.  Pt will practice functional transfers and mobility with AD for increased safety and independence    Time: Time in: 1350  Time out: 1417  Treatment Minutes: 10  Evaluation Minutes: 10  Total time: 27    Electronically signed by:      ARAMIS Owens  7/11/2022, 3:57 PM

## 2022-07-12 LAB
ALBUMIN SERPL-MCNC: 3 GM/DL (ref 3.4–5)
ALP BLD-CCNC: 98 IU/L (ref 40–128)
ALT SERPL-CCNC: 13 U/L (ref 10–40)
ANION GAP SERPL CALCULATED.3IONS-SCNC: 14 MMOL/L (ref 4–16)
AST SERPL-CCNC: 8 IU/L (ref 15–37)
BASOPHILS ABSOLUTE: 0 K/CU MM
BASOPHILS ABSOLUTE: 0 K/CU MM
BASOPHILS RELATIVE PERCENT: 0.4 % (ref 0–1)
BASOPHILS RELATIVE PERCENT: 0.4 % (ref 0–1)
BILIRUB SERPL-MCNC: 0.3 MG/DL (ref 0–1)
BUN BLDV-MCNC: 36 MG/DL (ref 6–23)
CALCIUM SERPL-MCNC: 7.7 MG/DL (ref 8.3–10.6)
CHLORIDE BLD-SCNC: 105 MMOL/L (ref 99–110)
CO2: 18 MMOL/L (ref 21–32)
CREAT SERPL-MCNC: 1.8 MG/DL (ref 0.6–1.1)
DIFFERENTIAL TYPE: ABNORMAL
DIFFERENTIAL TYPE: ABNORMAL
EOSINOPHILS ABSOLUTE: 0.1 K/CU MM
EOSINOPHILS ABSOLUTE: 0.1 K/CU MM
EOSINOPHILS RELATIVE PERCENT: 0.9 % (ref 0–3)
EOSINOPHILS RELATIVE PERCENT: 1.1 % (ref 0–3)
ESTIMATED AVERAGE GLUCOSE: 186 MG/DL
GFR AFRICAN AMERICAN: 34 ML/MIN/1.73M2
GFR NON-AFRICAN AMERICAN: 28 ML/MIN/1.73M2
GLUCOSE BLD-MCNC: 113 MG/DL (ref 70–99)
GLUCOSE BLD-MCNC: 115 MG/DL (ref 70–99)
GLUCOSE BLD-MCNC: 121 MG/DL (ref 70–99)
GLUCOSE BLD-MCNC: 183 MG/DL (ref 70–99)
GLUCOSE BLD-MCNC: 250 MG/DL (ref 70–99)
GLUCOSE BLD-MCNC: 306 MG/DL (ref 70–99)
GLUCOSE BLD-MCNC: 310 MG/DL (ref 70–99)
GLUCOSE BLD-MCNC: 376 MG/DL (ref 70–99)
GLUCOSE BLD-MCNC: 404 MG/DL (ref 70–99)
GLUCOSE BLD-MCNC: 67 MG/DL (ref 70–99)
HBA1C MFR BLD: 8.1 % (ref 4.2–6.3)
HCT VFR BLD CALC: 33 % (ref 37–47)
HCT VFR BLD CALC: 33.9 % (ref 37–47)
HEMOGLOBIN: 10.1 GM/DL (ref 12.5–16)
HEMOGLOBIN: 10.2 GM/DL (ref 12.5–16)
IMMATURE NEUTROPHIL %: 0.1 % (ref 0–0.43)
IMMATURE NEUTROPHIL %: 0.4 % (ref 0–0.43)
LACTIC ACID, SEPSIS: 1.4 MMOL/L (ref 0.5–1.9)
LYMPHOCYTES ABSOLUTE: 1.3 K/CU MM
LYMPHOCYTES ABSOLUTE: 1.5 K/CU MM
LYMPHOCYTES RELATIVE PERCENT: 16.5 % (ref 24–44)
LYMPHOCYTES RELATIVE PERCENT: 20.2 % (ref 24–44)
MAGNESIUM: 1.5 MG/DL (ref 1.8–2.4)
MCH RBC QN AUTO: 29.7 PG (ref 27–31)
MCH RBC QN AUTO: 30 PG (ref 27–31)
MCHC RBC AUTO-ENTMCNC: 30.1 % (ref 32–36)
MCHC RBC AUTO-ENTMCNC: 30.6 % (ref 32–36)
MCV RBC AUTO: 97.9 FL (ref 78–100)
MCV RBC AUTO: 98.5 FL (ref 78–100)
MONOCYTES ABSOLUTE: 0.6 K/CU MM
MONOCYTES ABSOLUTE: 0.6 K/CU MM
MONOCYTES RELATIVE PERCENT: 8 % (ref 0–4)
MONOCYTES RELATIVE PERCENT: 8.2 % (ref 0–4)
NUCLEATED RBC %: 0 %
NUCLEATED RBC %: 0 %
PDW BLD-RTO: 12.7 % (ref 11.7–14.9)
PDW BLD-RTO: 12.7 % (ref 11.7–14.9)
PHOSPHORUS: 2.7 MG/DL (ref 2.5–4.9)
PLATELET # BLD: 234 K/CU MM (ref 140–440)
PLATELET # BLD: 247 K/CU MM (ref 140–440)
PMV BLD AUTO: 10.3 FL (ref 7.5–11.1)
PMV BLD AUTO: 10.3 FL (ref 7.5–11.1)
POTASSIUM SERPL-SCNC: 4.6 MMOL/L (ref 3.5–5.1)
RBC # BLD: 3.37 M/CU MM (ref 4.2–5.4)
RBC # BLD: 3.44 M/CU MM (ref 4.2–5.4)
SEGMENTED NEUTROPHILS ABSOLUTE COUNT: 5.3 K/CU MM
SEGMENTED NEUTROPHILS ABSOLUTE COUNT: 5.7 K/CU MM
SEGMENTED NEUTROPHILS RELATIVE PERCENT: 70.2 % (ref 36–66)
SEGMENTED NEUTROPHILS RELATIVE PERCENT: 73.6 % (ref 36–66)
SODIUM BLD-SCNC: 137 MMOL/L (ref 135–145)
TOTAL IMMATURE NEUTOROPHIL: 0.01 K/CU MM
TOTAL IMMATURE NEUTOROPHIL: 0.03 K/CU MM
TOTAL NUCLEATED RBC: 0 K/CU MM
TOTAL NUCLEATED RBC: 0 K/CU MM
TOTAL PROTEIN: 4.6 GM/DL (ref 6.4–8.2)
WBC # BLD: 7.5 K/CU MM (ref 4–10.5)
WBC # BLD: 7.8 K/CU MM (ref 4–10.5)

## 2022-07-12 PROCEDURE — 6370000000 HC RX 637 (ALT 250 FOR IP): Performed by: INTERNAL MEDICINE

## 2022-07-12 PROCEDURE — 87040 BLOOD CULTURE FOR BACTERIA: CPT

## 2022-07-12 PROCEDURE — 83605 ASSAY OF LACTIC ACID: CPT

## 2022-07-12 PROCEDURE — 6370000000 HC RX 637 (ALT 250 FOR IP): Performed by: HOSPITALIST

## 2022-07-12 PROCEDURE — 2580000003 HC RX 258: Performed by: INTERNAL MEDICINE

## 2022-07-12 PROCEDURE — 2060000000 HC ICU INTERMEDIATE R&B

## 2022-07-12 PROCEDURE — 6360000002 HC RX W HCPCS: Performed by: INTERNAL MEDICINE

## 2022-07-12 PROCEDURE — 97110 THERAPEUTIC EXERCISES: CPT

## 2022-07-12 PROCEDURE — 94761 N-INVAS EAR/PLS OXIMETRY MLT: CPT

## 2022-07-12 PROCEDURE — 84100 ASSAY OF PHOSPHORUS: CPT

## 2022-07-12 PROCEDURE — 80053 COMPREHEN METABOLIC PANEL: CPT

## 2022-07-12 PROCEDURE — 97530 THERAPEUTIC ACTIVITIES: CPT

## 2022-07-12 PROCEDURE — 83735 ASSAY OF MAGNESIUM: CPT

## 2022-07-12 PROCEDURE — 36415 COLL VENOUS BLD VENIPUNCTURE: CPT

## 2022-07-12 PROCEDURE — 97116 GAIT TRAINING THERAPY: CPT

## 2022-07-12 PROCEDURE — 6360000002 HC RX W HCPCS: Performed by: HOSPITALIST

## 2022-07-12 PROCEDURE — 85025 COMPLETE CBC W/AUTO DIFF WBC: CPT

## 2022-07-12 RX ORDER — INSULIN LISPRO 100 [IU]/ML
0-6 INJECTION, SOLUTION INTRAVENOUS; SUBCUTANEOUS NIGHTLY
Status: DISCONTINUED | OUTPATIENT
Start: 2022-07-12 | End: 2022-07-14

## 2022-07-12 RX ORDER — INSULIN GLARGINE 100 [IU]/ML
30 INJECTION, SOLUTION SUBCUTANEOUS NIGHTLY
Status: DISCONTINUED | OUTPATIENT
Start: 2022-07-12 | End: 2022-07-14

## 2022-07-12 RX ORDER — INSULIN LISPRO 100 [IU]/ML
0-12 INJECTION, SOLUTION INTRAVENOUS; SUBCUTANEOUS
Status: DISCONTINUED | OUTPATIENT
Start: 2022-07-12 | End: 2022-07-18 | Stop reason: HOSPADM

## 2022-07-12 RX ORDER — INSULIN GLARGINE 100 [IU]/ML
8 INJECTION, SOLUTION SUBCUTANEOUS ONCE
Status: COMPLETED | OUTPATIENT
Start: 2022-07-12 | End: 2022-07-12

## 2022-07-12 RX ORDER — MAGNESIUM SULFATE IN WATER 40 MG/ML
2000 INJECTION, SOLUTION INTRAVENOUS ONCE
Status: COMPLETED | OUTPATIENT
Start: 2022-07-12 | End: 2022-07-12

## 2022-07-12 RX ADMIN — CLOPIDOGREL BISULFATE 75 MG: 75 TABLET ORAL at 07:45

## 2022-07-12 RX ADMIN — SODIUM CHLORIDE, PRESERVATIVE FREE 10 ML: 5 INJECTION INTRAVENOUS at 21:09

## 2022-07-12 RX ADMIN — ASPIRIN 81 MG CHEWABLE TABLET 81 MG: 81 TABLET CHEWABLE at 07:43

## 2022-07-12 RX ADMIN — SODIUM CHLORIDE: 9 INJECTION, SOLUTION INTRAVENOUS at 23:30

## 2022-07-12 RX ADMIN — ATORVASTATIN CALCIUM 80 MG: 40 TABLET, FILM COATED ORAL at 21:08

## 2022-07-12 RX ADMIN — SODIUM CHLORIDE, PRESERVATIVE FREE 10 ML: 5 INJECTION INTRAVENOUS at 07:46

## 2022-07-12 RX ADMIN — INSULIN LISPRO 6 UNITS: 100 INJECTION, SOLUTION INTRAVENOUS; SUBCUTANEOUS at 11:36

## 2022-07-12 RX ADMIN — SODIUM CHLORIDE: 9 INJECTION, SOLUTION INTRAVENOUS at 09:29

## 2022-07-12 RX ADMIN — INSULIN HUMAN 3 UNITS: 100 INJECTION, SOLUTION PARENTERAL at 04:39

## 2022-07-12 RX ADMIN — INSULIN GLARGINE 8 UNITS: 100 INJECTION, SOLUTION SUBCUTANEOUS at 04:39

## 2022-07-12 RX ADMIN — FLUOXETINE 80 MG: 20 CAPSULE ORAL at 07:43

## 2022-07-12 RX ADMIN — ACETAMINOPHEN 1000 MG: 500 TABLET ORAL at 07:01

## 2022-07-12 RX ADMIN — ACETAMINOPHEN 1000 MG: 500 TABLET ORAL at 21:08

## 2022-07-12 RX ADMIN — DONEPEZIL HYDROCHLORIDE 10 MG: 10 TABLET ORAL at 21:08

## 2022-07-12 RX ADMIN — ACETAMINOPHEN 1000 MG: 500 TABLET ORAL at 14:06

## 2022-07-12 RX ADMIN — MAGNESIUM SULFATE HEPTAHYDRATE 2000 MG: 40 INJECTION, SOLUTION INTRAVENOUS at 10:58

## 2022-07-12 RX ADMIN — INSULIN HUMAN 3 UNITS: 100 INJECTION, SOLUTION PARENTERAL at 16:34

## 2022-07-12 RX ADMIN — METHOCARBAMOL TABLETS 1000 MG: 500 TABLET, COATED ORAL at 21:08

## 2022-07-12 RX ADMIN — BUSPIRONE HYDROCHLORIDE 10 MG: 5 TABLET ORAL at 21:08

## 2022-07-12 RX ADMIN — HEPARIN SODIUM 5000 UNITS: 5000 INJECTION INTRAVENOUS; SUBCUTANEOUS at 14:06

## 2022-07-12 RX ADMIN — Medication 2000 UNITS: at 07:44

## 2022-07-12 RX ADMIN — LEVOTHYROXINE SODIUM 25 MCG: 25 TABLET ORAL at 07:01

## 2022-07-12 RX ADMIN — INSULIN LISPRO 2 UNITS: 100 INJECTION, SOLUTION INTRAVENOUS; SUBCUTANEOUS at 16:32

## 2022-07-12 RX ADMIN — AMLODIPINE BESYLATE 5 MG: 5 TABLET ORAL at 07:43

## 2022-07-12 RX ADMIN — METHOCARBAMOL TABLETS 1000 MG: 500 TABLET, COATED ORAL at 07:44

## 2022-07-12 RX ADMIN — HEPARIN SODIUM 5000 UNITS: 5000 INJECTION INTRAVENOUS; SUBCUTANEOUS at 07:01

## 2022-07-12 RX ADMIN — GABAPENTIN 200 MG: 100 CAPSULE ORAL at 07:44

## 2022-07-12 RX ADMIN — HYDRALAZINE HYDROCHLORIDE 25 MG: 25 TABLET, FILM COATED ORAL at 21:08

## 2022-07-12 RX ADMIN — INSULIN LISPRO 10 UNITS: 100 INJECTION, SOLUTION INTRAVENOUS; SUBCUTANEOUS at 07:50

## 2022-07-12 RX ADMIN — TOPIRAMATE 50 MG: 25 TABLET, FILM COATED ORAL at 21:08

## 2022-07-12 RX ADMIN — HYDRALAZINE HYDROCHLORIDE 25 MG: 25 TABLET, FILM COATED ORAL at 14:06

## 2022-07-12 RX ADMIN — METOPROLOL SUCCINATE 25 MG: 25 TABLET, EXTENDED RELEASE ORAL at 07:43

## 2022-07-12 RX ADMIN — HEPARIN SODIUM 5000 UNITS: 5000 INJECTION INTRAVENOUS; SUBCUTANEOUS at 21:09

## 2022-07-12 RX ADMIN — HYDRALAZINE HYDROCHLORIDE 25 MG: 25 TABLET, FILM COATED ORAL at 07:01

## 2022-07-12 RX ADMIN — METHOCARBAMOL TABLETS 1000 MG: 500 TABLET, COATED ORAL at 16:29

## 2022-07-12 RX ADMIN — BUSPIRONE HYDROCHLORIDE 10 MG: 5 TABLET ORAL at 14:06

## 2022-07-12 RX ADMIN — BUSPIRONE HYDROCHLORIDE 10 MG: 5 TABLET ORAL at 07:44

## 2022-07-12 ASSESSMENT — PAIN SCALES - GENERAL
PAINLEVEL_OUTOF10: 0

## 2022-07-12 NOTE — PROGRESS NOTES
Dr. Artis Hoyt notified. Pt glucose check was 67. Per order insulin gtt turned off. Pt requested karla cracker and orange juice. BG checked 121. Per order insulin gtt to resume once BG is within target range 140-180. BG is rechecked and is 306, per current order it does not specify what dose should be administered. Dr. Artis Hoyt is asked to advise. Dr. Artis Hoyt states he will place subQ insulin orders to give now and to recheck before breakfast. New orders obtained.

## 2022-07-12 NOTE — PROGRESS NOTES
Called about blood sugar: Hypoglycemic and non-DKA insulin drip discontinued. Last blood sugar is 300. Plan:  1. Diabetes: Discontinue insulin drip. Start subcutaneous insulin regimen-Lantus and insulin sliding scale. Hold metformin for now. Adjust insulin as necessary.

## 2022-07-12 NOTE — PROGRESS NOTES
Physical Therapy  Name: Иван Morin MRN: [id number] :   1960   Date:  2022   Admission Date: 2022 Room:  -A   Restrictions/Precautions:        fall risk,  Communication with other providers:  Wei Sanabria RN states pt is ok to see for therapy  Subjective:  Patient states:  She wants to try to walk today  Pain:   Location, Type, Intensity (0/10 to 10/10):  0/10  Objective:    Observation:  Pt was resting in the bed  Treatment, including education/measures:  Obtained a HDRW from our dept for pt to use  Transfers with line management of tele, IV  Rolling: SBA and VC's  Supine to sit :CGA and VC's  Scooting :SBA and VC's  Sit to stand :CGA to min A of 1 from bed and toilet and VC's for hand placements pt moves slowly and stiffly  Stand to sit :CGA to min A of 1 to toilet and chair and VC's for hand placements pt moves slowly and stiffly  Gait:  Pt amb with HDRW for 10 ft and 8 ft with Min A of 1  assist  Gait Comments: with VC's' and TC's for B LE placement, walker placement and sequence throughout ambulation; with VC's and TC's to maintain upright posture in order to avoid COM shifting outside of JUAN CARLOS; with VC's for PLB throughout ambulation  Sitting Exercises: Ankle pumps x 20  LAQ's x 10  Marching x 10   Hip Abd x 10  Therapeutic Exercise:  Therapeutic exercises were instructed today. Cues were given for technique, safety, recruitment, and rationale. Cues were verbal and/or tactile. Safety  Patient left safely in the chair, with call light/phone in reach with no alarm available. Gait belt and mask were used for transfers and gait.   Assessment / Impression:     Patient's tolerance of treatment:  Good, pt is moving slowly but tolerating well   Adverse Reaction: none  Significant change in status and impact:  none  Barriers to improvement:  Weakness, slowness, dizziness with activity  Plan for Next Session:    Will cont to work towards pt's goals per her tolerance  Time in:  1430  Time out:  1510  Timed treatment minutes:  40  Total treatment time:  40  Previously filed items:  Social/Functional History  Type of Home: Assisted living  Home Layout: One level  Home Access: Level entry  Bathroom Shower/Tub: Walk-in shower  Bathroom Toilet: Handicap height  Bathroom Equipment: Shower chair,Grab bars in shower,Grab bars around toilet  Bathroom Accessibility: Walker accessible  Home Equipment: Sock aid,Rollator,Wheelchair-electric  Has the patient had two or more falls in the past year or any fall with injury in the past year?: Yes  ADL Assistance: Independent  Homemaking Responsibilities: No  Ambulation Assistance: Independent (typically uses the rollator, will use electric WC occasionally)  Transfer Assistance: Independent  Active : No  Short Term Goals  Time Frame for Short term goals: 2 weeks  Short term goal 1: Pt will perform sit><supine CGA  Short term goal 2: Pt will transfer sit><Stand CGA  Short term goal 3: Pt will transfer between surfaces CGA  Short term goal 4: Pt will ambulate 20ft with RW Daysi  Short term goal 5: Pt will perform standing light dynamic activity with single UE support x 3 minutes CGA     Electronically signed by:     Summer Aviles PTA  7/12/2022, 3:03 PM

## 2022-07-12 NOTE — PROGRESS NOTES
Nephrology Progress Note  7/12/2022 8:02 AM        Subjective:   Admit Date: 7/9/2022  PCP: Halima Galloway MD    Interval History:  doing better this morning and less emotional    Diet:  did not eat much last night    ROS:   no overt shortness of breath or confusion   urine output was not accurately recorded   no fever and acceptable blood pressure   off insulin drip    Data:     Current meds:    insulin glargine  30 Units SubCUTAneous Nightly    insulin lispro  0-12 Units SubCUTAneous TID WC    insulin lispro  0-6 Units SubCUTAneous Nightly    gabapentin  200 mg Oral Daily    acetaminophen  1,000 mg Oral 3 times per day    methocarbamol  1,000 mg Oral 4x Daily    [Held by provider] lisinopril  40 mg Oral Daily    atorvastatin  80 mg Oral Nightly    topiramate  50 mg Oral Nightly    clopidogrel  75 mg Oral Daily    metoprolol succinate  25 mg Oral Daily    amLODIPine  5 mg Oral Daily    busPIRone  10 mg Oral TID    Vitamin D  2,000 Units Oral Daily    donepezil  10 mg Oral Nightly    hydrALAZINE  25 mg Oral 3 times per day    aspirin  81 mg Oral Daily    FLUoxetine  80 mg Oral Daily    levothyroxine  25 mcg Oral QAM AC    sodium chloride flush  10 mL IntraVENous 2 times per day    heparin (porcine)  5,000 Units SubCUTAneous 3 times per day      sodium chloride 100 mL/hr at 07/11/22 2321    dextrose      dextrose      sodium chloride           I/O last 3 completed shifts:   In: 240 [P.O.:240]  Out: 900 [Urine:900]    CBC:   Recent Labs     07/10/22  0713 07/11/22  0745   WBC 7.2 6.3   HGB 10.9* 10.7*    225          Recent Labs     07/10/22  0713 07/11/22  0745    132*   K 4.3 5.2*    100   CO2 20* 21   BUN 36* 39*   CREATININE 2.8* 2.1*   GLUCOSE 218* 565*       Lab Results   Component Value Date    CALCIUM 8.3 07/11/2022    PHOS 2.8 07/05/2022       Objective:     Vitals: BP (!) 116/59   Pulse 87   Temp 98.5 °F (36.9 °C) (Oral)   Resp 21   Ht 5' 4.5\" (1.638 m)   Wt 247 lb (112 kg)   SpO2 96%   BMI 41.74 kg/m² ,    General appearance:   alert, awake and oriented  HEENT:   no gross conjunctival pallor  Neck:   seemed supple  Lungs:   coarse breath sounds but no crackles  Heart:   seemed regular rate and rhythm  Abdomen:  soft, nontender to palpation  Extremities:   trace leg edema      Problem List :         Impression :     1.  acute kidney injury on top of CKD stage G3 A3- urine was rather bland- likely from relative volume depletion, and if she has any infection that might play some role- expected to improve  2.  myoclonus could have been from high-dose of gabapentin at the face of acute kidney injury and much better now- she is with small dose of gabapentin  3.  dysglycemia- off insulin drip now  4.  underlying hypertension, diabetes and uncontrolled mood disorder    Recommendation/Plan  :     1.  looks like, she is tolerating small dose of gabapentin- reasonable to continue, while  watching for adverse effect  2.  if possible or has alternative agent ,then avoid Topamax  3.  stop IV fluid she has trace edema  4.  good glycemic control  5.  she needs stabilization of her mood disorder anxiety  6.  follow clinically and biochemically        Maurisio Conti MD MD

## 2022-07-12 NOTE — PROGRESS NOTES
Hospitalist Progress Note      Name:  Timothy Whitten /Age/Sex: 1960  (59 y.o. female)   MRN & CSN:  [id number] & [id number] Admission Date/Time: 2022  6:35 PM   Location:  -A PCP: Jasmin Pelletier, [address] Day: 4    Assessment and Plan:       Timothy Whitten is a 59 y.o.  female with a past medical history of insulin-dependent type 2 diabetes, hypertension, anxiety with depression, class III obesity, recurrent hospitalization who presented to the ED on 2022 with Seizure-like activity ([facility] [address] On arrival, patient was found to be hypoglycemic and this was corrected.     1.  Seizure-like episode: Reason for hospitalization. Possibly seizure induced by hypoglycemia ? Monitor off AEDs  Consult neurology     2. Acute toxic metabolic encephalopathy: Present on arrival  Multifactorial-hypoglycemia, MODESTO, high-dose Neurontin  Now resolved, back to baseline     3. Myoclonic jerks: Persistent. Consulted neurology  Gabapentin dose of 1200 mg twice daily is not acceptable for degree of renal function  Gabapentin dose  decrease to 200 mg once daily     4.  Recurrent MODESTO/ATN with incomplete renal recovery versus CKD 3B. Recent  Cr baseline seems to be between 1.1-1.2. Was 2.8 on arrival, down to 1.8  Initiate gentle IV fluids  Nephrology on board  Avoid nephrotoxin    Hypomagnesemia on replacement     5. Hyperglycemia due to poorly controlled diabetes: Blood glucose >300  This is due to suboptimal dosing of insulin in the last 24 hours due to recent hypoglycemic episode.,  Patient was started on insulin drip yesterday  Currently off the drip  Currently on long-acting Lantus plus intermediate sliding scale correction  Add premeal insulin 3 unit AC to optimize blood glucose control      6. DJD: Exacerbated by fall/recurrent falls  Start scheduled extra strength Tylenol  Start as needed Robaxin.   Decreased dose of Neurontin as this can contribute to falls  PT OT evaluation the patient interested in acute rehab placement     7.  Heat and cold intolerance: TSH mildly elevated    resolved with low-dose Synthroid     8.  Class III obesity: Lifestyle modification counseling  DJD precludes exercise    Diet ADULT DIET; Regular; 4 carb choices (60 gm/meal)   DVT Prophylaxis [] Lovenox, []  Heparin, [] SCDs, [] Ambulation   GI Prophylaxis [] PPI,  [] H2 Blocker,  [] Carafate,  [] Diet/Tube Feeds   Code Status Full Code   Disposition Patient requires continued admission due to    MDM [] Low, [] Moderate,[]  High  Patient's risk as above due to      History of Present Illness: The patient was seen and examined at the bedside  Patient denies chest pain or shortness of breath  No further seizure activity last 24-hour  Patient interested in acute rehab placement, pending PT OT evaluation    Objective: Intake/Output Summary (Last 24 hours) at 7/12/2022 1026  Last data filed at 7/12/2022 0756  Gross per 24 hour   Intake 360 ml   Output 450 ml   Net -90 ml      Vitals:   Vitals:    07/12/22 0757   BP:    Pulse: 87   Resp: 21   Temp:    SpO2:      Physical Exam:   GEN Awake.  Alert , not in respiratory distress, not in pain  HEENT: PEERLA, , supple neck,   Chest: air entry equal bilaterally, no wheezing or crepitation  Heart: S1 and S2 heard, no murmur, no gallop or rub, regular rate  Abdomen: soft, ND , Nt, +BS  Extremities: no cyanosis, tenderness or erythema, peripheral pulses audible  Neurology: alert, oriented x3, able to move 4 limbs    Medications:   Medications:    insulin glargine  30 Units SubCUTAneous Nightly    insulin lispro  0-12 Units SubCUTAneous TID     insulin lispro  0-6 Units SubCUTAneous Nightly    gabapentin  200 mg Oral Daily    acetaminophen  1,000 mg Oral 3 times per day    methocarbamol  1,000 mg Oral 4x Daily    [Held by provider] lisinopril  40 mg Oral Daily    atorvastatin  80 mg Oral Nightly    topiramate  50 mg Oral Nightly    clopidogrel  75 mg Oral Daily    metoprolol succinate  25 mg Oral Daily    amLODIPine  5 mg Oral Daily    busPIRone  10 mg Oral TID    Vitamin D  2,000 Units Oral Daily    donepezil  10 mg Oral Nightly    hydrALAZINE  25 mg Oral 3 times per day    aspirin  81 mg Oral Daily    FLUoxetine  80 mg Oral Daily    levothyroxine  25 mcg Oral QAM AC    sodium chloride flush  10 mL IntraVENous 2 times per day    heparin (porcine)  5,000 Units SubCUTAneous 3 times per day      Infusions:    sodium chloride 100 mL/hr at 07/12/22 0929    dextrose      dextrose      sodium chloride       PRN Meds: glucose, 4 tablet, PRN  dextrose bolus, 125 mL, PRN   Or  dextrose bolus, 250 mL, PRN  glucagon (rDNA), 1 mg, PRN  dextrose, 100 mL/hr, PRN  glucose, 4 tablet, PRN  dextrose bolus, 125 mL, PRN   Or  dextrose bolus, 250 mL, PRN  glucagon (rDNA), 1 mg, PRN  dextrose, 100 mL/hr, PRN  LORazepam, 1 mg, Q5 Min PRN  LORazepam, 4 mg, PRN  sodium chloride flush, 10 mL, PRN  sodium chloride, , PRN  ondansetron, 4 mg, Q8H PRN   Or  ondansetron, 4 mg, Q6H PRN  bisacodyl, 5 mg, Daily PRN          Electronically signed by Stephani Alexander MD on 7/12/2022 at 10:26 AM

## 2022-07-13 LAB
ALBUMIN SERPL-MCNC: 3.4 GM/DL (ref 3.4–5)
ALP BLD-CCNC: 119 IU/L (ref 40–128)
ALT SERPL-CCNC: 15 U/L (ref 10–40)
ANION GAP SERPL CALCULATED.3IONS-SCNC: 19 MMOL/L (ref 4–16)
AST SERPL-CCNC: 11 IU/L (ref 15–37)
BASOPHILS ABSOLUTE: 0 K/CU MM
BASOPHILS RELATIVE PERCENT: 0.4 % (ref 0–1)
BILIRUB SERPL-MCNC: 0.3 MG/DL (ref 0–1)
BUN BLDV-MCNC: 26 MG/DL (ref 6–23)
CALCIUM SERPL-MCNC: 8.4 MG/DL (ref 8.3–10.6)
CHLORIDE BLD-SCNC: 102 MMOL/L (ref 99–110)
CO2: 15 MMOL/L (ref 21–32)
CREAT SERPL-MCNC: 1.3 MG/DL (ref 0.6–1.1)
DIFFERENTIAL TYPE: ABNORMAL
EOSINOPHILS ABSOLUTE: 0 K/CU MM
EOSINOPHILS RELATIVE PERCENT: 0.5 % (ref 0–3)
GFR AFRICAN AMERICAN: 50 ML/MIN/1.73M2
GFR NON-AFRICAN AMERICAN: 41 ML/MIN/1.73M2
GLUCOSE BLD-MCNC: 121 MG/DL (ref 70–99)
GLUCOSE BLD-MCNC: 127 MG/DL (ref 70–99)
GLUCOSE BLD-MCNC: 285 MG/DL (ref 70–99)
GLUCOSE BLD-MCNC: 417 MG/DL (ref 70–99)
GLUCOSE BLD-MCNC: 480 MG/DL (ref 70–99)
HCT VFR BLD CALC: 35.9 % (ref 37–47)
HEMOGLOBIN: 10.9 GM/DL (ref 12.5–16)
IMMATURE NEUTROPHIL %: 0.2 % (ref 0–0.43)
LYMPHOCYTES ABSOLUTE: 0.9 K/CU MM
LYMPHOCYTES RELATIVE PERCENT: 11.2 % (ref 24–44)
MAGNESIUM: 1.8 MG/DL (ref 1.8–2.4)
MCH RBC QN AUTO: 29.2 PG (ref 27–31)
MCHC RBC AUTO-ENTMCNC: 30.4 % (ref 32–36)
MCV RBC AUTO: 96.2 FL (ref 78–100)
MONOCYTES ABSOLUTE: 0.3 K/CU MM
MONOCYTES RELATIVE PERCENT: 3.8 % (ref 0–4)
NUCLEATED RBC %: 0 %
PDW BLD-RTO: 12.7 % (ref 11.7–14.9)
PHOSPHORUS: 2.7 MG/DL (ref 2.5–4.9)
PLATELET # BLD: 243 K/CU MM (ref 140–440)
PMV BLD AUTO: 10.3 FL (ref 7.5–11.1)
POTASSIUM SERPL-SCNC: 4.4 MMOL/L (ref 3.5–5.1)
RBC # BLD: 3.73 M/CU MM (ref 4.2–5.4)
SEGMENTED NEUTROPHILS ABSOLUTE COUNT: 7 K/CU MM
SEGMENTED NEUTROPHILS RELATIVE PERCENT: 83.9 % (ref 36–66)
SODIUM BLD-SCNC: 136 MMOL/L (ref 135–145)
TOTAL IMMATURE NEUTOROPHIL: 0.02 K/CU MM
TOTAL NUCLEATED RBC: 0 K/CU MM
TOTAL PROTEIN: 5.4 GM/DL (ref 6.4–8.2)
WBC # BLD: 8.4 K/CU MM (ref 4–10.5)

## 2022-07-13 PROCEDURE — 6370000000 HC RX 637 (ALT 250 FOR IP): Performed by: INTERNAL MEDICINE

## 2022-07-13 PROCEDURE — 2580000003 HC RX 258: Performed by: INTERNAL MEDICINE

## 2022-07-13 PROCEDURE — 94761 N-INVAS EAR/PLS OXIMETRY MLT: CPT

## 2022-07-13 PROCEDURE — 83735 ASSAY OF MAGNESIUM: CPT

## 2022-07-13 PROCEDURE — 6370000000 HC RX 637 (ALT 250 FOR IP): Performed by: HOSPITALIST

## 2022-07-13 PROCEDURE — 80053 COMPREHEN METABOLIC PANEL: CPT

## 2022-07-13 PROCEDURE — 85025 COMPLETE CBC W/AUTO DIFF WBC: CPT

## 2022-07-13 PROCEDURE — 6360000002 HC RX W HCPCS: Performed by: INTERNAL MEDICINE

## 2022-07-13 PROCEDURE — 82962 GLUCOSE BLOOD TEST: CPT

## 2022-07-13 PROCEDURE — 84100 ASSAY OF PHOSPHORUS: CPT

## 2022-07-13 PROCEDURE — 2580000003 HC RX 258: Performed by: STUDENT IN AN ORGANIZED HEALTH CARE EDUCATION/TRAINING PROGRAM

## 2022-07-13 PROCEDURE — 2060000000 HC ICU INTERMEDIATE R&B

## 2022-07-13 PROCEDURE — 36415 COLL VENOUS BLD VENIPUNCTURE: CPT

## 2022-07-13 RX ORDER — SODIUM CHLORIDE 9 MG/ML
INJECTION, SOLUTION INTRAVENOUS CONTINUOUS
Status: DISPENSED | OUTPATIENT
Start: 2022-07-13 | End: 2022-07-14

## 2022-07-13 RX ADMIN — SODIUM CHLORIDE, PRESERVATIVE FREE 10 ML: 5 INJECTION INTRAVENOUS at 08:19

## 2022-07-13 RX ADMIN — ACETAMINOPHEN 1000 MG: 500 TABLET ORAL at 14:49

## 2022-07-13 RX ADMIN — DONEPEZIL HYDROCHLORIDE 10 MG: 10 TABLET ORAL at 21:42

## 2022-07-13 RX ADMIN — AMLODIPINE BESYLATE 5 MG: 5 TABLET ORAL at 08:20

## 2022-07-13 RX ADMIN — INSULIN HUMAN 3 UNITS: 100 INJECTION, SOLUTION PARENTERAL at 08:22

## 2022-07-13 RX ADMIN — ONDANSETRON 4 MG: 2 INJECTION INTRAMUSCULAR; INTRAVENOUS at 08:26

## 2022-07-13 RX ADMIN — ATORVASTATIN CALCIUM 80 MG: 40 TABLET, FILM COATED ORAL at 21:41

## 2022-07-13 RX ADMIN — BUSPIRONE HYDROCHLORIDE 10 MG: 5 TABLET ORAL at 21:42

## 2022-07-13 RX ADMIN — TOPIRAMATE 50 MG: 25 TABLET, FILM COATED ORAL at 21:41

## 2022-07-13 RX ADMIN — CLOPIDOGREL BISULFATE 75 MG: 75 TABLET ORAL at 08:20

## 2022-07-13 RX ADMIN — FLUOXETINE 80 MG: 20 CAPSULE ORAL at 08:20

## 2022-07-13 RX ADMIN — ACETAMINOPHEN 1000 MG: 500 TABLET ORAL at 06:27

## 2022-07-13 RX ADMIN — SODIUM CHLORIDE, PRESERVATIVE FREE 10 ML: 5 INJECTION INTRAVENOUS at 21:43

## 2022-07-13 RX ADMIN — INSULIN HUMAN 3 UNITS: 100 INJECTION, SOLUTION PARENTERAL at 16:47

## 2022-07-13 RX ADMIN — HEPARIN SODIUM 5000 UNITS: 5000 INJECTION INTRAVENOUS; SUBCUTANEOUS at 21:42

## 2022-07-13 RX ADMIN — SODIUM CHLORIDE: 9 INJECTION, SOLUTION INTRAVENOUS at 16:46

## 2022-07-13 RX ADMIN — GABAPENTIN 200 MG: 100 CAPSULE ORAL at 08:20

## 2022-07-13 RX ADMIN — BUSPIRONE HYDROCHLORIDE 10 MG: 5 TABLET ORAL at 08:20

## 2022-07-13 RX ADMIN — HYDRALAZINE HYDROCHLORIDE 25 MG: 25 TABLET, FILM COATED ORAL at 21:41

## 2022-07-13 RX ADMIN — INSULIN LISPRO 6 UNITS: 100 INJECTION, SOLUTION INTRAVENOUS; SUBCUTANEOUS at 11:20

## 2022-07-13 RX ADMIN — HYDRALAZINE HYDROCHLORIDE 25 MG: 25 TABLET, FILM COATED ORAL at 14:49

## 2022-07-13 RX ADMIN — METOPROLOL SUCCINATE 25 MG: 25 TABLET, EXTENDED RELEASE ORAL at 08:20

## 2022-07-13 RX ADMIN — BUSPIRONE HYDROCHLORIDE 10 MG: 5 TABLET ORAL at 14:48

## 2022-07-13 RX ADMIN — HEPARIN SODIUM 5000 UNITS: 5000 INJECTION INTRAVENOUS; SUBCUTANEOUS at 14:49

## 2022-07-13 RX ADMIN — LEVOTHYROXINE SODIUM 25 MCG: 25 TABLET ORAL at 06:27

## 2022-07-13 RX ADMIN — HEPARIN SODIUM 5000 UNITS: 5000 INJECTION INTRAVENOUS; SUBCUTANEOUS at 06:27

## 2022-07-13 RX ADMIN — INSULIN LISPRO 12 UNITS: 100 INJECTION, SOLUTION INTRAVENOUS; SUBCUTANEOUS at 08:22

## 2022-07-13 RX ADMIN — ACETAMINOPHEN 1000 MG: 500 TABLET ORAL at 21:41

## 2022-07-13 RX ADMIN — Medication 2000 UNITS: at 08:20

## 2022-07-13 RX ADMIN — ASPIRIN 81 MG CHEWABLE TABLET 81 MG: 81 TABLET CHEWABLE at 08:20

## 2022-07-13 RX ADMIN — HYDRALAZINE HYDROCHLORIDE 25 MG: 25 TABLET, FILM COATED ORAL at 06:27

## 2022-07-13 RX ADMIN — INSULIN HUMAN 3 UNITS: 100 INJECTION, SOLUTION PARENTERAL at 11:21

## 2022-07-13 ASSESSMENT — PAIN SCALES - GENERAL
PAINLEVEL_OUTOF10: 0

## 2022-07-13 NOTE — PROGRESS NOTES
Hospitalist Progress Note      Name:  Rigo Jewell /Age/Sex: 1960  (59 y.o. female)   MRN & CSN:  [id number] & [id number] Admission Date/Time: 2022  6:35 PM   Location:  -A PCP: Dao Hwang, [address] Day: 5    Assessment and Plan:       Hyperglycemia due to poorly controlled diabetes:   Blood glucose > 400  This is due to suboptimal dosing of insulin in the last 24 hours due to recent hypoglycemic episode. Patient was started on insulin drip yesterday. Currently off the drip  Currently on long-acting Lantus plus moderate sliding scale correction  In-house endocrinologist Dr. Jenaro Dennison was consulted however as per patient she did not want to see him. Patient has been informed that he is only in-house endocrinologist available      Rigo Jewell is a 59 y.o.  female with a past medical history of insulin-dependent type 2 diabetes, hypertension, anxiety with depression, class III obesity, recurrent hospitalization who presented to the ED on 2022 with Seizure-like activity ([facility] [address] On arrival, patient was found to be hypoglycemic and this was corrected.     Seizure-like episode: Reason for hospitalization. Possibly seizure induced by hypoglycemia . Neurology consulted. As per which  description of events during not meet seizure criteria. Acute toxic metabolic encephalopathy: Multifactorial-hypoglycemia, MODESTO, high-dose Neurontin - resolved     Myoclonic jerks: Persistent. Consulted neurology. Gabapentin dose of 1200 mg twice daily is not acceptable for degree of renal function. Gabapentin dose  decrease to 200 mg once daily     Recurrent MODESTO/ATN with incomplete renal recovery versus CKD 3B. Recent  Cr baseline seems to be between 1.1-1.2. Was 2.8 on arrival, down to 1.8. gentle IV fluids. Nephrology on board    Hypomagnesemia on replacement     DJD: Exacerbated by fall/recurrent falls.  Start scheduled extra strength Tylenol  Robaxin PRN  Decreased dose of Neurontin as this can contribute to falls  PT OT evaluation - Advised for SNF     Heat and cold intolerance: TSH mildly elevated. resolved with low-dose Synthroid     Class III obesity: Lifestyle modification counseling  DJD precludes exercise    Diet ADULT DIET; Regular; 4 carb choices (60 gm/meal)   DVT Prophylaxis [] Lovenox, []  Heparin, [] SCDs, [] Ambulation   GI Prophylaxis [] PPI,  [] H2 Blocker,  [] Carafate,  [] Diet/Tube Feeds   Code Status Full Code   Disposition Patient requires continued admission due to    MDM [] Low, [] Moderate,[]  High  Patient's risk as above due to      History of Present Illness: The patient was seen and examined at the bedside  Patient denies chest pain or shortness of breath  No further seizure activity last 24-hour  Patient interested in acute rehab placement, pending PT OT evaluation    Objective: Intake/Output Summary (Last 24 hours) at 7/13/2022 0751  Last data filed at 7/12/2022 1844  Gross per 24 hour   Intake 720 ml   Output --   Net 720 ml      Vitals:   Vitals:    07/13/22 0737   BP: (!) 142/67   Pulse: 86   Resp: 19   Temp: 98.1 °F (36.7 °C)   SpO2: 99%     Physical Exam:   GEN Awake.  Alert , not in respiratory distress, not in pain  HEENT: PEERLA, , supple neck,   Chest: air entry equal bilaterally, no wheezing or crepitation  Heart: S1 and S2 heard, no murmur, no gallop or rub, regular rate  Abdomen: soft, ND , Nt, +BS  Extremities: no cyanosis, tenderness or erythema, peripheral pulses audible  Neurology: alert, oriented x3, able to move 4 limbs    Medications:   Medications:    insulin glargine  30 Units SubCUTAneous Nightly    insulin lispro  0-12 Units SubCUTAneous TID WC    insulin lispro  0-6 Units SubCUTAneous Nightly    insulin regular  3 Units SubCUTAneous TID AC    gabapentin  200 mg Oral Daily    acetaminophen  1,000 mg Oral 3 times per day    methocarbamol  1,000 mg Oral 4x Daily    [Held by provider] lisinopril  40 mg Oral Daily  atorvastatin  80 mg Oral Nightly    topiramate  50 mg Oral Nightly    clopidogrel  75 mg Oral Daily    metoprolol succinate  25 mg Oral Daily    amLODIPine  5 mg Oral Daily    busPIRone  10 mg Oral TID    Vitamin D  2,000 Units Oral Daily    donepezil  10 mg Oral Nightly    hydrALAZINE  25 mg Oral 3 times per day    aspirin  81 mg Oral Daily    FLUoxetine  80 mg Oral Daily    levothyroxine  25 mcg Oral QAM AC    sodium chloride flush  10 mL IntraVENous 2 times per day    heparin (porcine)  5,000 Units SubCUTAneous 3 times per day      Infusions:    dextrose      dextrose      sodium chloride       PRN Meds: glucose, 4 tablet, PRN  dextrose bolus, 125 mL, PRN   Or  dextrose bolus, 250 mL, PRN  glucagon (rDNA), 1 mg, PRN  dextrose, 100 mL/hr, PRN  glucose, 4 tablet, PRN  dextrose bolus, 125 mL, PRN   Or  dextrose bolus, 250 mL, PRN  glucagon (rDNA), 1 mg, PRN  dextrose, 100 mL/hr, PRN  LORazepam, 1 mg, Q5 Min PRN  LORazepam, 4 mg, PRN  sodium chloride flush, 10 mL, PRN  sodium chloride, , PRN  ondansetron, 4 mg, Q8H PRN   Or  ondansetron, 4 mg, Q6H PRN  bisacodyl, 5 mg, Daily PRN        Electronically signed by Dia Garcia MD on 7/13/2022 at 7:51 AM

## 2022-07-13 NOTE — PROGRESS NOTES
Took oxygen off patient, While resting in bed. pt oxygen dropped to 85%. per pulmonary pt qualifies for home oxygen.

## 2022-07-13 NOTE — PROGRESS NOTES
Nephrology Progress Note  7/13/2022 9:37 AM        Subjective:   Admit Date: 7/9/2022  PCP: Sharona Tolbret MD    Interval History: C/O feeling cold and shivering     Diet: some    ROS:  Feeling cold , no overt confusion   UOP was not recorded   No fever   Acceptable BP , high BS      Data:     Current meds:    insulin glargine  30 Units SubCUTAneous Nightly    insulin lispro  0-12 Units SubCUTAneous TID WC    insulin lispro  0-6 Units SubCUTAneous Nightly    insulin regular  3 Units SubCUTAneous TID AC    gabapentin  200 mg Oral Daily    acetaminophen  1,000 mg Oral 3 times per day    methocarbamol  1,000 mg Oral 4x Daily    [Held by provider] lisinopril  40 mg Oral Daily    atorvastatin  80 mg Oral Nightly    topiramate  50 mg Oral Nightly    clopidogrel  75 mg Oral Daily    metoprolol succinate  25 mg Oral Daily    amLODIPine  5 mg Oral Daily    busPIRone  10 mg Oral TID    Vitamin D  2,000 Units Oral Daily    donepezil  10 mg Oral Nightly    hydrALAZINE  25 mg Oral 3 times per day    aspirin  81 mg Oral Daily    FLUoxetine  80 mg Oral Daily    levothyroxine  25 mcg Oral QAM AC    sodium chloride flush  10 mL IntraVENous 2 times per day    heparin (porcine)  5,000 Units SubCUTAneous 3 times per day      dextrose      dextrose      sodium chloride           I/O last 3 completed shifts:   In: 720 [P.O.:720]  Out: -     CBC:   Recent Labs     07/11/22  0745 07/12/22  0807 07/12/22  1225   WBC 6.3 7.8 7.5   HGB 10.7* 10.1* 10.2*    234 247          Recent Labs     07/11/22  0745 07/12/22  0807   * 137   K 5.2* 4.6    105   CO2 21 18*   BUN 39* 36*   CREATININE 2.1* 1.8*   GLUCOSE 565* 404*       Lab Results   Component Value Date    CALCIUM 7.7 (L) 07/12/2022    PHOS 2.7 07/12/2022       Objective:     Vitals: BP (!) 142/67   Pulse 86   Temp 98.1 °F (36.7 °C) (Oral)   Resp 17   Ht 5' 4.5\" (1.638 m)   Wt 247 lb (112 kg)   SpO2 99%   BMI 41.74 kg/m² ,    General appearance:  Alert , awake and oriented   HEENT:  Mild conj pallor   Neck:  supple  Lungs:  Coarse BS  Heart:  RRR  Abdomen: soft  Extremities:  Trace LE edema       Problem List :         Impression :     1. MODESTO on top of CKD stage G 3 A3- recovering   2. DM with dysglycemia   3. HTN/ mood d/O   4. Met acidosis - avoid Topamax if possible     Recommendation/Plan  :     1. Review lab  2. Stop IVF  3. Need BS control  4. Also need stabilization of mood d/O   5.  If possible stop Topamax       Facundo Abreu MD MD

## 2022-07-14 LAB
BASOPHILS ABSOLUTE: 0 K/CU MM
BASOPHILS RELATIVE PERCENT: 0.4 % (ref 0–1)
DIFFERENTIAL TYPE: ABNORMAL
EKG ATRIAL RATE: 89 BPM
EKG DIAGNOSIS: NORMAL
EKG P AXIS: 33 DEGREES
EKG P-R INTERVAL: 148 MS
EKG Q-T INTERVAL: 400 MS
EKG QRS DURATION: 94 MS
EKG QTC CALCULATION (BAZETT): 486 MS
EKG R AXIS: 3 DEGREES
EKG T AXIS: 100 DEGREES
EKG VENTRICULAR RATE: 89 BPM
EOSINOPHILS ABSOLUTE: 0.1 K/CU MM
EOSINOPHILS RELATIVE PERCENT: 0.7 % (ref 0–3)
GLUCOSE BLD-MCNC: 174 MG/DL (ref 70–99)
GLUCOSE BLD-MCNC: 195 MG/DL (ref 70–99)
GLUCOSE BLD-MCNC: 196 MG/DL (ref 70–99)
GLUCOSE BLD-MCNC: 343 MG/DL (ref 70–99)
GLUCOSE BLD-MCNC: 343 MG/DL (ref 70–99)
GLUCOSE BLD-MCNC: 62 MG/DL (ref 70–99)
HCT VFR BLD CALC: 35.9 % (ref 37–47)
HEMOGLOBIN: 10.9 GM/DL (ref 12.5–16)
IMMATURE NEUTROPHIL %: 0.4 % (ref 0–0.43)
LV EF: 53 %
LVEF MODALITY: NORMAL
LYMPHOCYTES ABSOLUTE: 1.4 K/CU MM
LYMPHOCYTES RELATIVE PERCENT: 21 % (ref 24–44)
MCH RBC QN AUTO: 28.8 PG (ref 27–31)
MCHC RBC AUTO-ENTMCNC: 30.4 % (ref 32–36)
MCV RBC AUTO: 94.7 FL (ref 78–100)
MONOCYTES ABSOLUTE: 0.4 K/CU MM
MONOCYTES RELATIVE PERCENT: 5.3 % (ref 0–4)
NUCLEATED RBC %: 0 %
PDW BLD-RTO: 12.6 % (ref 11.7–14.9)
PLATELET # BLD: 299 K/CU MM (ref 140–440)
PMV BLD AUTO: 10.4 FL (ref 7.5–11.1)
RBC # BLD: 3.79 M/CU MM (ref 4.2–5.4)
SEGMENTED NEUTROPHILS ABSOLUTE COUNT: 4.9 K/CU MM
SEGMENTED NEUTROPHILS RELATIVE PERCENT: 72.2 % (ref 36–66)
TOTAL IMMATURE NEUTOROPHIL: 0.03 K/CU MM
TOTAL NUCLEATED RBC: 0 K/CU MM
WBC # BLD: 6.8 K/CU MM (ref 4–10.5)

## 2022-07-14 PROCEDURE — 6370000000 HC RX 637 (ALT 250 FOR IP): Performed by: INTERNAL MEDICINE

## 2022-07-14 PROCEDURE — 6360000002 HC RX W HCPCS: Performed by: INTERNAL MEDICINE

## 2022-07-14 PROCEDURE — 2060000000 HC ICU INTERMEDIATE R&B

## 2022-07-14 PROCEDURE — 2580000003 HC RX 258: Performed by: STUDENT IN AN ORGANIZED HEALTH CARE EDUCATION/TRAINING PROGRAM

## 2022-07-14 PROCEDURE — 82962 GLUCOSE BLOOD TEST: CPT

## 2022-07-14 PROCEDURE — 94761 N-INVAS EAR/PLS OXIMETRY MLT: CPT

## 2022-07-14 PROCEDURE — 99254 IP/OBS CNSLTJ NEW/EST MOD 60: CPT | Performed by: INTERNAL MEDICINE

## 2022-07-14 PROCEDURE — 83630 LACTOFERRIN FECAL (QUAL): CPT

## 2022-07-14 PROCEDURE — 93306 TTE W/DOPPLER COMPLETE: CPT

## 2022-07-14 PROCEDURE — 87449 NOS EACH ORGANISM AG IA: CPT

## 2022-07-14 PROCEDURE — 82705 FATS/LIPIDS FECES QUAL: CPT

## 2022-07-14 PROCEDURE — 85025 COMPLETE CBC W/AUTO DIFF WBC: CPT

## 2022-07-14 PROCEDURE — APPNB60 APP NON BILLABLE TIME 46-60 MINS: Performed by: NURSE PRACTITIONER

## 2022-07-14 PROCEDURE — 87324 CLOSTRIDIUM AG IA: CPT

## 2022-07-14 PROCEDURE — 99223 1ST HOSP IP/OBS HIGH 75: CPT | Performed by: INTERNAL MEDICINE

## 2022-07-14 PROCEDURE — 93010 ELECTROCARDIOGRAM REPORT: CPT | Performed by: INTERNAL MEDICINE

## 2022-07-14 PROCEDURE — 87329 GIARDIA AG IA: CPT

## 2022-07-14 PROCEDURE — 93005 ELECTROCARDIOGRAM TRACING: CPT | Performed by: STUDENT IN AN ORGANIZED HEALTH CARE EDUCATION/TRAINING PROGRAM

## 2022-07-14 PROCEDURE — 2580000003 HC RX 258: Performed by: INTERNAL MEDICINE

## 2022-07-14 PROCEDURE — 87328 CRYPTOSPORIDIUM AG IA: CPT

## 2022-07-14 PROCEDURE — 36415 COLL VENOUS BLD VENIPUNCTURE: CPT

## 2022-07-14 RX ORDER — INSULIN GLARGINE 100 [IU]/ML
10 INJECTION, SOLUTION SUBCUTANEOUS ONCE
Status: COMPLETED | OUTPATIENT
Start: 2022-07-14 | End: 2022-07-14

## 2022-07-14 RX ORDER — AMLODIPINE BESYLATE 10 MG/1
10 TABLET ORAL DAILY
Status: DISCONTINUED | OUTPATIENT
Start: 2022-07-15 | End: 2022-07-18 | Stop reason: HOSPADM

## 2022-07-14 RX ORDER — INSULIN LISPRO 100 [IU]/ML
10 INJECTION, SOLUTION INTRAVENOUS; SUBCUTANEOUS
Status: DISCONTINUED | OUTPATIENT
Start: 2022-07-14 | End: 2022-07-15

## 2022-07-14 RX ORDER — INSULIN GLARGINE 100 [IU]/ML
30 INJECTION, SOLUTION SUBCUTANEOUS NIGHTLY
Status: DISCONTINUED | OUTPATIENT
Start: 2022-07-14 | End: 2022-07-14

## 2022-07-14 RX ORDER — INSULIN LISPRO 100 [IU]/ML
0-12 INJECTION, SOLUTION INTRAVENOUS; SUBCUTANEOUS
Status: DISCONTINUED | OUTPATIENT
Start: 2022-07-14 | End: 2022-07-17

## 2022-07-14 RX ORDER — INSULIN GLARGINE 100 [IU]/ML
30 INJECTION, SOLUTION SUBCUTANEOUS NIGHTLY
Status: DISCONTINUED | OUTPATIENT
Start: 2022-07-14 | End: 2022-07-15

## 2022-07-14 RX ORDER — INSULIN LISPRO 100 [IU]/ML
0-6 INJECTION, SOLUTION INTRAVENOUS; SUBCUTANEOUS
Status: DISCONTINUED | OUTPATIENT
Start: 2022-07-14 | End: 2022-07-14

## 2022-07-14 RX ADMIN — ASPIRIN 81 MG CHEWABLE TABLET 81 MG: 81 TABLET CHEWABLE at 08:14

## 2022-07-14 RX ADMIN — HEPARIN SODIUM 5000 UNITS: 5000 INJECTION INTRAVENOUS; SUBCUTANEOUS at 14:29

## 2022-07-14 RX ADMIN — AMLODIPINE BESYLATE 5 MG: 5 TABLET ORAL at 08:14

## 2022-07-14 RX ADMIN — METHOCARBAMOL TABLETS 1000 MG: 500 TABLET, COATED ORAL at 17:24

## 2022-07-14 RX ADMIN — HYDRALAZINE HYDROCHLORIDE 25 MG: 25 TABLET, FILM COATED ORAL at 22:10

## 2022-07-14 RX ADMIN — METOPROLOL SUCCINATE 25 MG: 25 TABLET, EXTENDED RELEASE ORAL at 08:14

## 2022-07-14 RX ADMIN — INSULIN LISPRO 2 UNITS: 100 INJECTION, SOLUTION INTRAVENOUS; SUBCUTANEOUS at 12:35

## 2022-07-14 RX ADMIN — METHOCARBAMOL TABLETS 1000 MG: 500 TABLET, COATED ORAL at 08:14

## 2022-07-14 RX ADMIN — ACETAMINOPHEN 1000 MG: 500 TABLET ORAL at 22:13

## 2022-07-14 RX ADMIN — LEVOTHYROXINE SODIUM 25 MCG: 25 TABLET ORAL at 05:21

## 2022-07-14 RX ADMIN — HEPARIN SODIUM 5000 UNITS: 5000 INJECTION INTRAVENOUS; SUBCUTANEOUS at 22:10

## 2022-07-14 RX ADMIN — HYDRALAZINE HYDROCHLORIDE 25 MG: 25 TABLET, FILM COATED ORAL at 14:30

## 2022-07-14 RX ADMIN — DONEPEZIL HYDROCHLORIDE 10 MG: 10 TABLET ORAL at 22:10

## 2022-07-14 RX ADMIN — METHOCARBAMOL TABLETS 1000 MG: 500 TABLET, COATED ORAL at 12:34

## 2022-07-14 RX ADMIN — HEPARIN SODIUM 5000 UNITS: 5000 INJECTION INTRAVENOUS; SUBCUTANEOUS at 05:30

## 2022-07-14 RX ADMIN — Medication 2000 UNITS: at 08:14

## 2022-07-14 RX ADMIN — GABAPENTIN 200 MG: 100 CAPSULE ORAL at 08:14

## 2022-07-14 RX ADMIN — SODIUM CHLORIDE, PRESERVATIVE FREE 10 ML: 5 INJECTION INTRAVENOUS at 08:17

## 2022-07-14 RX ADMIN — BUSPIRONE HYDROCHLORIDE 10 MG: 5 TABLET ORAL at 08:14

## 2022-07-14 RX ADMIN — ONDANSETRON 4 MG: 4 TABLET, ORALLY DISINTEGRATING ORAL at 09:45

## 2022-07-14 RX ADMIN — HYDRALAZINE HYDROCHLORIDE 25 MG: 25 TABLET, FILM COATED ORAL at 05:19

## 2022-07-14 RX ADMIN — INSULIN LISPRO 8 UNITS: 100 INJECTION, SOLUTION INTRAVENOUS; SUBCUTANEOUS at 08:17

## 2022-07-14 RX ADMIN — ACETAMINOPHEN 1000 MG: 500 TABLET ORAL at 05:19

## 2022-07-14 RX ADMIN — SODIUM CHLORIDE: 9 INJECTION, SOLUTION INTRAVENOUS at 04:05

## 2022-07-14 RX ADMIN — INSULIN LISPRO 2 UNITS: 100 INJECTION, SOLUTION INTRAVENOUS; SUBCUTANEOUS at 17:25

## 2022-07-14 RX ADMIN — BUSPIRONE HYDROCHLORIDE 10 MG: 5 TABLET ORAL at 14:30

## 2022-07-14 RX ADMIN — INSULIN LISPRO 10 UNITS: 100 INJECTION, SOLUTION INTRAVENOUS; SUBCUTANEOUS at 08:18

## 2022-07-14 RX ADMIN — TOPIRAMATE 50 MG: 25 TABLET, FILM COATED ORAL at 22:10

## 2022-07-14 RX ADMIN — INSULIN LISPRO 10 UNITS: 100 INJECTION, SOLUTION INTRAVENOUS; SUBCUTANEOUS at 17:26

## 2022-07-14 RX ADMIN — METHOCARBAMOL TABLETS 1000 MG: 500 TABLET, COATED ORAL at 22:09

## 2022-07-14 RX ADMIN — ONDANSETRON 4 MG: 2 INJECTION INTRAMUSCULAR; INTRAVENOUS at 02:15

## 2022-07-14 RX ADMIN — INSULIN GLARGINE 10 UNITS: 100 INJECTION, SOLUTION SUBCUTANEOUS at 01:54

## 2022-07-14 RX ADMIN — SODIUM CHLORIDE, PRESERVATIVE FREE 10 ML: 5 INJECTION INTRAVENOUS at 21:12

## 2022-07-14 RX ADMIN — CLOPIDOGREL BISULFATE 75 MG: 75 TABLET ORAL at 08:14

## 2022-07-14 RX ADMIN — FLUOXETINE 80 MG: 20 CAPSULE ORAL at 08:13

## 2022-07-14 RX ADMIN — BUSPIRONE HYDROCHLORIDE 10 MG: 5 TABLET ORAL at 22:10

## 2022-07-14 RX ADMIN — ATORVASTATIN CALCIUM 80 MG: 40 TABLET, FILM COATED ORAL at 22:10

## 2022-07-14 RX ADMIN — ACETAMINOPHEN 1000 MG: 500 TABLET ORAL at 14:30

## 2022-07-14 ASSESSMENT — PAIN SCALES - GENERAL
PAINLEVEL_OUTOF10: 7
PAINLEVEL_OUTOF10: 0
PAINLEVEL_OUTOF10: 7

## 2022-07-14 ASSESSMENT — ENCOUNTER SYMPTOMS
CONSTIPATION: 0
ABDOMINAL PAIN: 0
DIARRHEA: 0
VOMITING: 0
BACK PAIN: 0
COLOR CHANGE: 0
EYE PAIN: 0
CHEST TIGHTNESS: 0
NAUSEA: 1
BLOOD IN STOOL: 0
PHOTOPHOBIA: 0
SHORTNESS OF BREATH: 0
WHEEZING: 0
COUGH: 0

## 2022-07-14 ASSESSMENT — PAIN DESCRIPTION - LOCATION
LOCATION: BACK
LOCATION: BACK

## 2022-07-14 ASSESSMENT — PAIN DESCRIPTION - DESCRIPTORS: DESCRIPTORS: ACHING

## 2022-07-14 NOTE — PROGRESS NOTES
Physical Therapy  Attempted pt at 0950. Pt's nurse asked to hold PT this am d/t pt having continued high BS rates and heart problems this morning. Brooke Huitron.  Akhil Bolaños PTA

## 2022-07-14 NOTE — PROGRESS NOTES
Nephrology Progress Note  7/14/2022 8:51 AM        Subjective:   Admit Date: 7/9/2022  PCP: Mayra Garcia MD    Interval History: Complain of significant diarrhea  Not feeling very well    Diet: Poor oral intake    ROS: Diarrhea, no shortness of breath  Urine output recorded 600 cc for the last shift    No fever, blood pressure recorded high but need manual confirmation    Data:     Current meds:    insulin lispro  0-12 Units SubCUTAneous 2 times per day    insulin lispro  10 Units SubCUTAneous TID WC    insulin glargine  30 Units SubCUTAneous Nightly    insulin lispro  0-12 Units SubCUTAneous TID WC    gabapentin  200 mg Oral Daily    acetaminophen  1,000 mg Oral 3 times per day    methocarbamol  1,000 mg Oral 4x Daily    [Held by provider] lisinopril  40 mg Oral Daily    atorvastatin  80 mg Oral Nightly    topiramate  50 mg Oral Nightly    clopidogrel  75 mg Oral Daily    metoprolol succinate  25 mg Oral Daily    amLODIPine  5 mg Oral Daily    busPIRone  10 mg Oral TID    Vitamin D  2,000 Units Oral Daily    donepezil  10 mg Oral Nightly    hydrALAZINE  25 mg Oral 3 times per day    aspirin  81 mg Oral Daily    FLUoxetine  80 mg Oral Daily    levothyroxine  25 mcg Oral QAM AC    sodium chloride flush  10 mL IntraVENous 2 times per day    heparin (porcine)  5,000 Units SubCUTAneous 3 times per day      sodium chloride Stopped (07/14/22 0615)    dextrose      dextrose      sodium chloride           I/O last 3 completed shifts:   In: 120 [P.O.:120]  Out: 630 [Urine:600; Emesis/NG output:30]    CBC:   Recent Labs     07/12/22  0807 07/12/22  1225 07/13/22  0941   WBC 7.8 7.5 8.4   HGB 10.1* 10.2* 10.9*    247 243          Recent Labs     07/12/22  0807 07/13/22  0941    136   K 4.6 4.4    102   CO2 18* 15*   BUN 36* 26*   CREATININE 1.8* 1.3*   GLUCOSE 404* 480*       Lab Results   Component Value Date    CALCIUM 8.4 07/13/2022    PHOS 2.7 07/13/2022       Objective: Vitals: BP (!) 164/63   Pulse 85   Temp 96.8 °F (36 °C) (Infrared)   Resp 18   Ht 5' 4.5\" (1.638 m)   Wt 247 lb (112 kg)   SpO2 96%   BMI 41.74 kg/m² ,    General appearance: Alert, awake and oriented not feeling very well due to diarrhea  HEENT: Positive conjunctival pallor  Neck: Seems supple in the morning  Lungs: No crackles on auscultation  Heart: Seems regular rate and rhythm  Abdomen: Soft minimally tender on palpation  Extremities: No gross leg edema      Problem List :         Impression :     1. Acute kidney injury-with underlying CKD stage G3 A3-recovering may have tubular injury (ATN)  2. Metabolic acidosis likely from diarrhea, recent acute kidney injury, normal saline infusion recently, and potentially  Topamax, if the diarrhea stops, should get better  3. Other electrolytes are acceptable  4. Diabetes with dysglycemia, as well as hypertension, mood disorder    Recommendation/Plan  :     1. Oral food and fluid  2. Antidiarrheal  3. Rule out any colonic infection  4. Good glycemic control  5. Stabilization of her mood disorder  6. Follow clinically and biochemically  7.  Manual blood pressure      Temo Cutler MD MD

## 2022-07-14 NOTE — CONSULTS
Electrophysiology Consult Note      Reason for consultation:  Sinus pause    Chief complaint :  Seizure like activity    Referring physician: Brooke Preciado      Primary care physician: Roni Wang MD      History of Present Illness:     Jewell Beltran is a 67-year-old female with a history of TIA, COPD, diabetes type 2, hyperlipidemia, hypertension and CKD  presents with complaints of feeling unwell. Patient reports that she lives at an assisted living facility. She states that she had an episode of dizziness and shaking. Patient reports that she thinks she had a seizure. Patient states that she did not lose consciousness. She denies loss of bowel or urinary incontinence. Patient states that her blood sugar was normal at the time she had this episode. During this admission patient noted on telemetry to have a sinus pause of 3.67 seconds. Patient reports that she was laying in bed this morning when she was nauseated and she has this feeling multiple times. She felt little different but did not pass out. She denies syncope. She reports that hospital staff entered the room and asked her if she was feeling okay. Patient states that she just did not feel well at the time. Patient currently is feeling better. She also endorses that she has been having episodes of hot and cold flashes. She states currently she is comfortable and feels as though her body temperature is normal.  Patient tells me that the lightheadedness she had this morning is very similar to the \" seizure-like\" activity that she was having at home. Patient tells me that in the last month she has had 2-3 episodes a week of lightheadedness. She states that she usually has the symptoms when standing. She states that she will have to catch herself so that she does not fall. Patient states she has not passed out. She denies chest pain, palpitations, shortness of breath or edema.     She states that she takes her medications as prescribed. She tells me that the assisted living facility dispenses her medications to her daily. During consultation patient's eyes remain closed. Patient found on admission to have MODESTO. Currently ACE inhibitor is on hold. Patient was on metoprolol 25 mg daily and this too has been held as patient had pause this morning. On admission patient also found to be hyperglycemic. Per chart review patient recently admitted with complaints of auditory and visual hallucinations and several days of diarrhea. Patient was treated for MODESTO. Patient denies previous cardiac history. ECHO is pending. Magnesium level 1.5 on addmission      Pastmedical history:   Past Medical History:   Diagnosis Date    Arthritis     Asthma     Cerebral artery occlusion with cerebral infarction [facility])     per old chart 8/2015- TIA    [facility] [address]     Diabetes mellitus ([facility] [address]     Diabetic neuropathy (HCC)     per old chart    Fracture     per old chart pt in ER 12/9/2018- after 2 days of arm pain after fall- dx with left humerus fx- for surg 12/31/2018    History of blood transfusion     Hyperlipidemia     Hypertension     Muscle weakness (generalized)     Osteoarthritis     per old chart       Surgical history :   Past Surgical History:   Procedure Laterality Date    BREAST BIOPSY Right     CHOLECYSTECTOMY  2002?  EYE SURGERY      per old chart had right eye cataract ext done 2/2018 and left eye 4/2018    FOOT SURGERY Left 2004?  HUMERUS FRACTURE SURGERY Left 12/31/2018    HUMERUS OPEN REDUCTION INTERNAL FIXATION LEFT PROXIMAL performed by Rosa Topete DO at [facility] Right 2009?     LUNG SURGERY  2009?    simone lung lobectomy        Family history:   Family History   Problem Relation Age of Onset    Breast Cancer Maternal Aunt 48    Breast Cancer Maternal Aunt 48         Social history :  reports that she has never smoked. She has never used smokeless tobacco. She reports current alcohol use. She reports that she does not use drugs. No Known Allergies    No current facility-administered medications on file prior to encounter. Current Outpatient Medications on File Prior to Encounter   Medication Sig Dispense Refill    ondansetron (ZOFRAN-ODT) 4 MG disintegrating tablet Take 1 tablet by mouth every 8 hours as needed for Nausea or Vomiting 20 tablet 0    aspirin 81 MG chewable tablet Take 81 mg by mouth daily      FLUoxetine (PROZAC) 40 MG capsule Take 80 mg by mouth daily      levothyroxine (SYNTHROID) 25 MCG tablet Take 25 mcg by mouth every morning (before breakfast)      tiZANidine (ZANAFLEX) 4 MG tablet Take 4 mg by mouth nightly as needed      ondansetron (ZOFRAN ODT) 4 MG disintegrating tablet Take 1 tablet by mouth every 8 hours as needed for Nausea 20 tablet 0    insulin glargine (LANTUS) 100 UNIT/ML injection vial Inject 30 Units into the skin nightly (Patient taking differently: Inject 50 Units into the skin nightly ) 10 mL 3    metFORMIN (GLUCOPHAGE) 500 MG tablet Take 1 tablet by mouth 2 times daily (with meals) 60 tablet 3    hydrALAZINE (APRESOLINE) 25 MG tablet Take 1 tablet by mouth every 8 hours 90 tablet 3    lisinopril (PRINIVIL;ZESTRIL) 40 MG tablet Take 1 tablet by mouth daily 30 tablet 3    donepezil (ARICEPT) 5 MG tablet Take 10 mg by mouth nightly       gabapentin (NEURONTIN) 600 MG tablet Take 1,200 mg by mouth 2 times daily.        busPIRone (BUSPAR) 10 MG tablet Take 10 mg by mouth 3 times daily       Cholecalciferol 50 MCG (2000 UT) CAPS Take 1 tablet by mouth daily      lidocaine 4 % external patch Place 1 patch onto the skin daily (Patient not taking: Reported on 3/17/2022) 1 box 1    insulin lispro (HUMALOG) 100 UNIT/ML injection vial Check blood glucose three times daily before meals and at bedtime- for blood glucose <150- no insulin, 151-200=2, 201-250=4, 251-300=6, 301-350=8, 351-400=10, >400=12 units and call MD 1 vial 3    amLODIPine (NORVASC) 10 MG tablet amlodipine 10 mg tablet   TAKE ONE TABLET BY MOUTH DAILY      metoprolol succinate (TOPROL XL) 50 MG extended release tablet Take 1 tablet by mouth daily (Patient taking differently: Take 25 mg by mouth daily ) 30 tablet 0    pantoprazole (PROTONIX) 40 MG tablet Take 1 tablet by mouth 2 times daily (before meals) (Patient taking differently: Take 40 mg by mouth daily as needed ) 60 tablet 0    topiramate (TOPAMAX) 50 MG tablet Take 50 mg by mouth nightly       clopidogrel (PLAVIX) 75 MG tablet Take 75 mg by mouth daily      atorvastatin (LIPITOR) 40 MG tablet Take 80 mg by mouth nightly          Review of Systems:   Review of Systems   Constitutional: Positive for fatigue. Negative for activity change, chills and fever. HENT: Negative for congestion, ear pain and tinnitus. Eyes: Negative for photophobia, pain and visual disturbance. Respiratory: Negative for cough, chest tightness, shortness of breath and wheezing. Cardiovascular: Negative for chest pain, palpitations and leg swelling. Gastrointestinal: Positive for nausea. Negative for abdominal pain, blood in stool, constipation, diarrhea and vomiting. Endocrine: Negative for cold intolerance and heat intolerance. Genitourinary: Negative for dysuria, flank pain and hematuria. Musculoskeletal: Positive for arthralgias. Negative for back pain, myalgias and neck stiffness. Skin: Negative for color change and rash. Allergic/Immunologic: Negative for food allergies. Neurological: Positive for dizziness, tremors and light-headedness. Negative for numbness and headaches. Hematological: Does not bruise/bleed easily. Psychiatric/Behavioral: Positive for agitation. Negative for behavioral problems and confusion. The patient is nervous/anxious.             Examination:      Vitals:    07/14/22 0814 07/14/22 1118 07/14/22 1355 07/14/22 1430   BP: (!) 156/64 (!) 162/62 (!) 159/60   Pulse:  80     Resp:  18     Temp:  98.6 °F (37 °C) 98.7 °F (37.1 °C)    TempSrc:  Oral Oral    SpO2: 98% 98%     Weight:       Height:            Body mass index is 41.74 kg/m². Physical Exam  Constitutional:       General: She is not in acute distress. Appearance: She is not ill-appearing or diaphoretic. HENT:      Head: Normocephalic and atraumatic. Right Ear: External ear normal.      Left Ear: External ear normal.      Nose: No congestion. Mouth/Throat:      Mouth: Mucous membranes are moist.   Eyes:      Extraocular Movements: Extraocular movements intact. Conjunctiva/sclera: Conjunctivae normal.      Pupils: Pupils are equal, round, and reactive to light. Cardiovascular:      Rate and Rhythm: Normal rate and regular rhythm. Heart sounds: No murmur heard. Pulmonary:      Effort: Pulmonary effort is normal. No respiratory distress. Breath sounds: Normal breath sounds. No wheezing or rhonchi. Abdominal:      General: Abdomen is flat. Bowel sounds are normal. There is no distension. Palpations: Abdomen is soft. Tenderness: There is no abdominal tenderness. Musculoskeletal:         General: No tenderness. Cervical back: Normal range of motion. No tenderness. Right lower leg: No edema. Left lower leg: No edema. Skin:     General: Skin is warm. Neurological:      General: No focal deficit present. Mental Status: She is alert and oriented to person, place, and time. Cranial Nerves: No cranial nerve deficit.    Psychiatric:         Mood and Affect: Mood normal.           CBC:   Lab Results   Component Value Date/Time    WBC 6.8 07/14/2022 11:57 AM    HGB 10.9 07/14/2022 11:57 AM    HCT 35.9 07/14/2022 11:57 AM     07/14/2022 11:57 AM     Lipids:  Lab Results   Component Value Date    CHOL 176 05/12/2022    TRIG 169 (H) 05/12/2022    HDL 62 05/12/2022    LDLCALC 80 05/12/2022     PT/INR: No results found for: INR     BMP:    Lab Results   Component Value Date     07/13/2022    K 4.4 07/13/2022     07/13/2022    CO2 15 (L) 07/13/2022    BUN 26 (H) 07/13/2022     CMP:   Lab Results   Component Value Date    AST 11 (L) 07/13/2022    PROT 5.4 (L) 07/13/2022    BILITOT 0.3 07/13/2022    ALKPHOS 119 07/13/2022     TSH:  No results found for: TSH, T4    EKGINTERPRETATION - EKG Interpretation:        IMPRESSION / RECOMMENDATIONS:     Sinus pause  Uncontrolled diabetes mellitus  Acute kidney injury  Anxiety  HLD  HTN    Patient sinus pause appears to be more like a vagal response in the setting of nausea. Patient also having uncontrolled diabetes mellitus. There is some concern of hypoglycemia which have caused her symptom of questionable seizure - but given her recurrent symptoms I cannot rule out vagal response vs autonomic dysfunction given her uncontrolled Diabetes. Patient currently actually has hyperglycemia and also blood pressure is running high. If able to we would like to consider tilt table test to see if the patient is having vagal response or autonomic dysfunction which can be noted with diabetes uncontrolled. Amlodipine appears to be have increased for blood pressure   Up titration of hydralazine also can be considered. Patient is sinus rhythm right now with rate in 70-80's    Will follow along        Thanks again for allowing me to participate in care of this patient. Please call me if you have any questions. With best regards. Richy Jarrett MD, 7/14/2022 3:44 PM     Please note this report has been partially produced using speech recognition software and may contain errors related to that system including errors in grammar, punctuation, and spelling, as well as words and phrases that may be inappropriate. If there are any questions or concerns please feel free to contact the dictating provider for clarification.

## 2022-07-14 NOTE — PROGRESS NOTES
Pt says that she no longer want to be on IV fluids because they are causing diarrhea. Pt educated that it will help with fluid resuscitation. Pt still declines. MD aware. Will continue monitoring.

## 2022-07-14 NOTE — CONSULTS
INPATIENT CARDIOLOGY CONSULT NOTE         Reason for consultation:  Pause     Referring physician:  Lillie Gonzalez MD     Primary care physician: Mayco Gibbs MD      Dear Lillie Gonzalez MD Thank you for the consult      History of present illness:Arabella is a 59 y. o.year old who  presents with possible seizure-like activity, possible hypoglycemia  Patient currently in ICU being treated for renal failure    Cardiology consulted to evaluate patient for pause noted on telemetry  Telemetry strips reviewed shows a 3.67 second pause    Patient denies any history of presyncope syncope history of congestive heart failure CAD or arrhythmias. She denies any chest pain shortness of breath palpitations      EKG shows normal sinus rhythm nonspecific T changes        Past medical history:    has a past medical history of Arthritis, Asthma, Cerebral artery occlusion with cerebral infarction ([address] COPD (chronic obstructive pulmonary disease) ([address] Diabetes mellitus ([address] Diabetic neuropathy ([address] Fracture, History of blood transfusion, Hyperlipidemia, Hypertension, Muscle weakness (generalized), and Osteoarthritis. Past surgical history:   has a past surgical history that includes Foot surgery (Left, 2004?); knee surgery (Right, 2009?); Cholecystectomy (2002?); Lung surgery (2009?); eye surgery; Humerus fracture surgery (Left, 12/31/2018); and Breast biopsy (Right). Social History:   reports that she has never smoked. She has never used smokeless tobacco. She reports current alcohol use. She reports that she does not use drugs.   Family history:   no family history of CAD, STROKE of DM    No Known Allergies    insulin lispro (HUMALOG) injection vial 0-12 Units, 2 times per day  insulin lispro (HUMALOG) injection vial 10 Units, TID WC  insulin glargine (LANTUS) injection vial 30 Units, Nightly  insulin lispro (HUMALOG) injection vial 0-12 Units, TID WC  glucose chewable tablet 16 g, PRN  dextrose bolus 10% 125 mL, PRN   Or  dextrose bolus 10% 250 mL, PRN  glucagon (rDNA) injection 1 mg, PRN  dextrose 5 % solution, PRN  gabapentin (NEURONTIN) capsule 200 mg, Daily  acetaminophen (TYLENOL) tablet 1,000 mg, 3 times per day  methocarbamol (ROBAXIN) tablet 1,000 mg, 4x Daily  glucose chewable tablet 16 g, PRN  dextrose bolus 10% 125 mL, PRN   Or  dextrose bolus 10% 250 mL, PRN  glucagon (rDNA) injection 1 mg, PRN  dextrose 5 % solution, PRN  [Held by provider] lisinopril (PRINIVIL;ZESTRIL) tablet 40 mg, Daily  atorvastatin (LIPITOR) tablet 80 mg, Nightly  topiramate (TOPAMAX) tablet 50 mg, Nightly  clopidogrel (PLAVIX) tablet 75 mg, Daily  metoprolol succinate (TOPROL XL) extended release tablet 25 mg, Daily  amLODIPine (NORVASC) tablet 5 mg, Daily  busPIRone (BUSPAR) tablet 10 mg, TID  Vitamin D (CHOLECALCIFEROL) tablet 2,000 Units, Daily  donepezil (ARICEPT) tablet 10 mg, Nightly  hydrALAZINE (APRESOLINE) tablet 25 mg, 3 times per day  aspirin chewable tablet 81 mg, Daily  FLUoxetine (PROZAC) capsule 80 mg, Daily  levothyroxine (SYNTHROID) tablet 25 mcg, QAM AC  LORazepam (ATIVAN) injection 1 mg, Q5 Min PRN  LORazepam (ATIVAN) injection 4 mg, PRN  sodium chloride flush 0.9 % injection 10 mL, 2 times per day  sodium chloride flush 0.9 % injection 10 mL, PRN  0.9 % sodium chloride infusion, PRN  ondansetron (ZOFRAN-ODT) disintegrating tablet 4 mg, Q8H PRN   Or  ondansetron (ZOFRAN) injection 4 mg, Q6H PRN  bisacodyl (DULCOLAX) EC tablet 5 mg, Daily PRN  heparin (porcine) injection 5,000 Units, 3 times per day      Current Facility-Administered Medications   Medication Dose Route Frequency Provider Last Rate Last Admin    insulin lispro (HUMALOG) injection vial 0-12 Units  0-12 Units SubCUTAneous 2 times per day Merline Creighton, MD        insulin lispro (HUMALOG) injection vial 10 Units  10 Units SubCUTAneous TID  Merline Creighton, MD   10 Units at 07/14/22 0818    insulin glargine (LANTUS) injection vial 30 Units  30 Units SubCUTAneous Nightly Luis Felipe Preciado MD        insulin lispro (HUMALOG) injection vial 0-12 Units  0-12 Units SubCUTAneous TID  Valorie Severin, MD   8 Units at 07/14/22 0817    glucose chewable tablet 16 g  4 tablet Oral PRN Desi Mcdaniel MD        dextrose bolus 10% 125 mL  125 mL IntraVENous PRN Desi Mcdaniel MD        Or    dextrose bolus 10% 250 mL  250 mL IntraVENous PRN Desi Mcdaniel MD        glucagon (rDNA) injection 1 mg  1 mg IntraMUSCular PRN Desi Mcdaniel MD        dextrose 5 % solution  100 mL/hr IntraVENous PRN Desi Mcdaniel MD        gabapentin (NEURONTIN) capsule 200 mg  200 mg Oral Daily Desi Mcdaniel MD   200 mg at 07/14/22 0814    acetaminophen (TYLENOL) tablet 1,000 mg  1,000 mg Oral 3 times per day Desi Mcdaniel MD   1,000 mg at 07/14/22 0519    methocarbamol (ROBAXIN) tablet 1,000 mg  1,000 mg Oral 4x Daily Rene Obando MD   1,000 mg at 07/14/22 0814    glucose chewable tablet 16 g  4 tablet Oral PRN Conner Huitron MD        dextrose bolus 10% 125 mL  125 mL IntraVENous PRN Jordin APARICIO MD        Or    dextrose bolus 10% 250 mL  250 mL IntraVENous PRN Conner Huitron MD        glucagon (rDNA) injection 1 mg  1 mg IntraMUSCular PRN Conner Huitron MD        dextrose 5 % solution  100 mL/hr IntraVENous PRN Jaden Fay MD        [Held by provider] lisinopril (PRINIVIL;ZESTRIL) tablet 40 mg  40 mg Oral Daily Conner Huitron MD        atorvastatin (LIPITOR) tablet 80 mg  80 mg Oral Nightly Conner Huitron MD   80 mg at 07/13/22 2141    topiramate (TOPAMAX) tablet 50 mg  50 mg Oral Nightly Jordin APARICIO MD   50 mg at 07/13/22 2141    clopidogrel (PLAVIX) tablet 75 mg  75 mg Oral Daily Conner Huitron MD   75 mg at 07/14/22 0814    metoprolol succinate (TOPROL XL) extended release tablet 25 mg  25 mg Oral Daily Conner Huitron MD   25 mg at 07/14/22 0814    amLODIPine (NORVASC) tablet 5 mg  5 mg Oral Daily Jaden Fay MD   5 mg at 07/14/22 0814    busPIRone (BUSPAR) tablet 10 mg  10 mg Oral TID Jacy Ron MD   10 mg at 07/14/22 0814    Vitamin D (CHOLECALCIFEROL) tablet 2,000 Units  2,000 Units Oral Daily Robert APARICIO MD   2,000 Units at 07/14/22 0814    donepezil (ARICEPT) tablet 10 mg  10 mg Oral Nightly Conner Kera Rogers MD   10 mg at 07/13/22 2142    hydrALAZINE (APRESOLINE) tablet 25 mg  25 mg Oral 3 times per day Jacy Ron MD   25 mg at 07/14/22 0519    aspirin chewable tablet 81 mg  81 mg Oral Daily Robert APARICIO MD   81 mg at 07/14/22 0814    FLUoxetine (PROZAC) capsule 80 mg  80 mg Oral Daily Conner Kera Rogers MD   80 mg at 07/14/22 0813    levothyroxine (SYNTHROID) tablet 25 mcg  25 mcg Oral QAM AC Conner Kera Rogers MD   25 mcg at 07/14/22 0521    LORazepam (ATIVAN) injection 1 mg  1 mg IntraVENous Q5 Min PRN Conner Rogers MD        LORazepam (ATIVAN) injection 4 mg  4 mg IntraVENous PRN Connerrosey Rogers MD        sodium chloride flush 0.9 % injection 10 mL  10 mL IntraVENous 2 times per day Jacy Ron MD   10 mL at 07/14/22 0817    sodium chloride flush 0.9 % injection 10 mL  10 mL IntraVENous PRN Conner Rogers MD        0.9 % sodium chloride infusion   IntraVENous PRN Conner Rogers MD        ondansetron (ZOFRAN-ODT) disintegrating tablet 4 mg  4 mg Oral Q8H PRN Conner Rogers MD   4 mg at 07/14/22 0945    Or    ondansetron (ZOFRAN) injection 4 mg  4 mg IntraVENous Q6H PRN Conner Rogers MD   4 mg at 07/14/22 0215    bisacodyl (DULCOLAX) EC tablet 5 mg  5 mg Oral Daily PRN Connerrosey Rogers MD        heparin (porcine) injection 5,000 Units  5,000 Units SubCUTAneous 3 times per day Jacy Ron MD   5,000 Units at 07/14/22 0530         Review of Systems:     · Constitutional: No Fever or Weight Loss   · Eyes: No Decreased Vision  · ENT: No Headaches, Hearing Loss or Vertigo  · Cardiovascular:   no chest pain,  no dyspnea on exertion,  no palpitations or loss of 07/13/22  0941      K 4.4      CO2 15*   PHOS 2.7   BUN 26*   CREATININE 1.3*     Recent Labs     07/13/22  0941   AST 11*   ALT 15   BILITOT 0.3   ALKPHOS 119     No results for input(s): TROPONINI in the last 72 hours. No results found for: BNP  No results found for: INR, PROTIME      All labs, images, EKGs were personally reviewed      Assessment: 59 y. o.year old with PMH of  has a past medical history of Arthritis, Asthma, Cerebral artery occlusion with cerebral infarction ([facility] COPD (chronic obstructive pulmonary disease) [address] [address] Diabetes mellitus ([facility] Diabetic neuropathy ([facility] Fracture, History of blood transfusion, Hyperlipidemia, Hypertension, Muscle weakness (generalized), and Osteoarthritis. Medical Decision Making :     1. Sinus pause 3.6-second, asymptomatic  2. Diarrhea  3. Hypomagnesemia    Please replete magnesium has been low 1.5 and 1.8 for the past 2 days. Keep magnesium more than 2 all times. Echocardiogram rule out structural heart disease  Case discussed with Dr. Donald Martinez, he will evaluate patient for sinus pause, likely extended monitoring, possible pacemaker. Patient is on metoprolol 25 daily, will hold. Increase Norvasc to 10 mg. ACE inhibitor is already on hold due to renal failure    4. Diabetes mellitus, poorly controlled  5. History of stroke: Continue with Plavix/statins  6. Hyperlipidemia: Continue with high intensity statin 80 mg Lipitor  7. ?  Seizure-like activity with metabolic encephalopathy on admission possible hypoglycemia renal failure  8.  Hypothyroidism on Synthroid        Thank you for the consult    Dr. Giancarlo Bradford  7/14/2022 12:17 PM

## 2022-07-14 NOTE — CONSULTS
Endocrinology   Consult Note  7/9/2022  6:35 PM     Primary Care provider: Meli Jimenez MD     Referring physician:  Roanne Councilman, MD     Dear Doctor Bishnu Sommer for the Consult     Pt. Was Admitted for : Altered mental state with hypoglycemia    Reason for Consult: Admitted initially for hypoglycemia subsequently blood glucose on very high. Better control of blood glucose      History Obtained From:  Patient/ EMR       HISTORY OF PRESENT ILLNESS:                The patient is a 59 y.o. female with significant past medical history of diabetes mellitus, CVA, status post TIA, COPD, has had left upper lobe lobectomy of the lung in the 2009. and living at assisted living was found to have what appeared to be seizure-like activity but did not lose any consciousness and noted to be hypoglycemic with blood glucose of 50 mg at [facility] emergency room which was taken before she came here. Please I was  consulted for better control of blood glucose. ROS:   Pt's ROS done in detail. Abnormal ROS are noted in Medical and Surgical History Section below: Other Medical History:        Diagnosis Date    Arthritis     Asthma     Cerebral artery occlusion with cerebral infarction [facility])     per old chart 8/2015- TIA    COPD [facility]chronic obstructive [facility] disease) [facility]     Diabetes mellitus ([facility]     Diabetic neuropathy ([facility] [facility]     per old chart    Fracture     per old chart pt in ER 12/9/2018- after 2 days of arm pain after fall- dx with left humerus fx- for surg 12/31/2018    History of blood transfusion     Hyperlipidemia     Hypertension     Muscle weakness (generalized)     Osteoarthritis     per old chart     Surgical History:        Procedure Laterality Date    BREAST BIOPSY Right     CHOLECYSTECTOMY  2002?  EYE SURGERY      per old chart had right eye cataract ext done 2/2018 and left eye 4/2018    FOOT SURGERY Left 2004?     HUMERUS FRACTURE SURGERY Left 12/31/2018    HUMERUS OPEN REDUCTION INTERNAL FIXATION LEFT PROXIMAL performed by Art Morrison DO at [facility] Right 2009?  LUNG SURGERY  2009?    simone lung lobectomy        Allergies:  Patient has no known allergies. Family History:       Problem Relation Age of Onset    Breast Cancer Maternal Aunt 48    Breast Cancer Maternal Aunt 48     REVIEW OF SYSTEMS:  Review of System Done as noted above     PHYSICAL EXAM:      Vitals:    BP (!) 171/62   Pulse 80   Temp 98 °F (36.7 °C) (Oral)   Resp 22   Ht 5' 4.5\" (1.638 m)   Wt 247 lb (112 kg)   SpO2 96%   BMI 41.74 kg/m²     CONSTITUTIONAL:  awake, alert, cooperative, appears stated age   EYES:  vision intact Fundoscopic Exam not performed   ENT:Normal  NECK:  Supple, No JVD. Thyroid Exam:Normal   LUNGS:  Has Vesicular Breath Sounds,   CARDIOVASCULAR:  Normal apical impulse, regular rate and rhythm, normal S1 and S2, no S3 or S4, and has no  murmur   ABDOMEN:  No scars, normal bowel sounds, soft, non-distended, non-tender, no masses palpated, no hepatolienomegaly  Musculoskeletal: Normal  Extremities: Normal, peripheral pulses normal, , has no edema   NEUROLOGIC:  Awake, alert, oriented to name, place and time. Cranial nerves II-XII are grossly intact. Motor is  intact. Sensory neuropathy.  ,  and gait is abnormal.    DATA:    CBC:   Recent Labs     07/12/22  0807 07/12/22  1225 07/13/22  0941   WBC 7.8 7.5 8.4   HGB 10.1* 10.2* 10.9*    247 243    CMP:  Recent Labs     07/11/22  0745 07/12/22  0807 07/13/22  0941   * 137 136   K 5.2* 4.6 4.4    105 102   CO2 21 18* 15*   BUN 39* 36* 26*   CREATININE 2.1* 1.8* 1.3*   CALCIUM 8.3 7.7* 8.4   PROT  --  4.6* 5.4*   LABALBU  --  3.0* 3.4   BILITOT  --  0.3 0.3   ALKPHOS  --  98 119   AST  --  8* 11*   ALT  --  13 15     Lipids:   Lab Results   Component Value Date/Time    CHOL 176 05/12/2022 06:39 AM    HDL 62 05/12/2022 06:39 AM    TRIG 169 05/12/2022 06:39 AM     Glucose:   Recent Labs 07/13/22  1631 07/13/22  2139 07/14/22  0351   POCGLU 127* 121* 343*     Hemoglobin A1C:   Lab Results   Component Value Date/Time    LABA1C 8.1 07/11/2022 07:45 AM     Free T4:   Lab Results   Component Value Date/Time    T4FREE 1.14 07/05/2022 09:25 AM     Free T3: No results found for: FT3  TSH High Sensitivity:   Lab Results   Component Value Date/Time    TSHHS 4.840 07/05/2022 09:25 AM       US RETROPERITONEAL LIMITED   Final Result   1. No evidence of obstructive uropathy to account for patient's renal   insufficiency. 2. Mild diffuse thinning of the right renal cortex compared to the left.                 Scheduled Medicines   Medications:    insulin lispro  0-12 Units SubCUTAneous 2 times per day    insulin lispro  10 Units SubCUTAneous TID WC    insulin glargine  30 Units SubCUTAneous Nightly    insulin lispro  0-12 Units SubCUTAneous TID WC    gabapentin  200 mg Oral Daily    acetaminophen  1,000 mg Oral 3 times per day    methocarbamol  1,000 mg Oral 4x Daily    [Held by provider] lisinopril  40 mg Oral Daily    atorvastatin  80 mg Oral Nightly    topiramate  50 mg Oral Nightly    clopidogrel  75 mg Oral Daily    metoprolol succinate  25 mg Oral Daily    amLODIPine  5 mg Oral Daily    busPIRone  10 mg Oral TID    Vitamin D  2,000 Units Oral Daily    donepezil  10 mg Oral Nightly    hydrALAZINE  25 mg Oral 3 times per day    aspirin  81 mg Oral Daily    FLUoxetine  80 mg Oral Daily    levothyroxine  25 mcg Oral QAM AC    sodium chloride flush  10 mL IntraVENous 2 times per day    heparin (porcine)  5,000 Units SubCUTAneous 3 times per day      Infusions:    sodium chloride 100 mL/hr at 07/14/22 0405    dextrose      dextrose      sodium chloride           IMPRESSION    Patient Active Problem List   Diagnosis    Proximal humeral fracture    Asthma    Bereavement    Pain of both hip joints    Chest pain    Chronic abdominal pain    Chronic back pain    [facility]    Corns and callosities    Depressive disorder    Type II diabetes mellitus, uncontrolled ([facility] [facility]    Diabetic polyneuropathy (HCC)    HTN (hypertension)    Hyperlipidemia    Lesion of liver    Menopausal symptom    Mixed hyperlipidemia    Onychomycosis    Obesity    Osteoarthrosis    Osteoporosis    Pain in both feet    Sleep apnea    TIA (transient ischemic attack)    Vitamin D deficiency    Wheezing    S/P ORIF (open reduction internal fixation) fracture    Diabetic gastroparesis (HCC)    Gastroesophageal reflux disease    Restless legs    Ketoacidosis, diabetic, type 2, no coma ([facility])    Repeated falls    MODESTO (acute kidney injury) ([facility] [facility]    Seizure-like activity ([facility]    Acute renal failure (ARF) ([facility])         RECOMMENDATIONS:      1. Reviewed POC blood glucose . Labs and X ray results   2. Reviewed Home and Current Medicines   3. Will Start On meal/ Correction bolus Humalog/ Lantus Insulin regime   4. Monitor Blood glucose frequently   5. Modify  the dose of Insulin  as needed        Will follow with you  Again thank you for sharing pt's care with me.      Truly yours,       Honey Oropeza MD

## 2022-07-14 NOTE — PROGRESS NOTES
Hospitalist Progress Note      Name:  Brianna Webber /Age/Sex: 1960  (59 y.o. female)   MRN & CSN:  [id number] & [id number] Admission Date/Time: 2022  6:35 PM   Location:  -A PCP: Mayco Gibbs, [address] Day: 6    Assessment and Plan:       Brianna Webber is a 59 y.o.  female with a PMH of insulin-dependent type 2 DM, HTN, anxiety with depression, class III obesity, recurrent hospitalization who presented to the ED on 2022 with Seizure-like activity ([address] On arrival, patient was found to be hypoglycemic and this was corrected. Diarrhea -stool test ordered for work-up    Nurse reported safety saw sinus pause. Stat EKG ordered which showed regular sinus rhythm. Keep on telemetry monitor for now. cardiology consulted. To f/u with CMP and MG      Hyperglycemia due to poorly controlled diabetes:   Status post insulin drip which was later switched to long-acting Lantus plus moderate sliding scale correction. After detail discussion with the patient. She agreed to have Dr. Cinthya Ornelas on board. Endocrine consulted    Seizure-like episode:. Possibly induced by hypoglycemia . Neurology consulted. As per which  description of events during not meet seizure criteria. Acute toxic metabolic encephalopathy: Multifactorial-hypoglycemia, MODESTO, high-dose Neurontin - resolved     Myoclonic jerks: Persistent. Consulted neurology. Gabapentin dose of 1200 mg twice daily is not acceptable for degree of renal function. Gabapentin dose  decrease to 200 mg once daily     Recurrent MODESTO/ATN with incomplete renal recovery versus CKD 3B. Recent  Cr baseline seems to be between 1.1-1.2. Was 2.8 on arrival, down to 1.8. gentle IV fluids. Nephrology on board    Hypomagnesemia -resolved     DJD: Exacerbated by fall/recurrent falls. Start scheduled extra strength Tylenol.  Robaxin PRN  Decreased dose of Neurontin as this can contribute to falls    PT OT evaluation - Advised for SNF     Heat and cold intolerance: TSH mildly elevated. resolved with low-dose Synthroid     Class III obesity: Lifestyle modification counseling  DJD precludes exercise    Diet ADULT DIET; Regular; 4 carb choices (60 gm/meal)   DVT Prophylaxis [] Lovenox, []  Heparin, [] SCDs, [] Ambulation   GI Prophylaxis [] PPI,  [] H2 Blocker,  [] Carafate,  [] Diet/Tube Feeds   Code Status Full Code   Disposition Patient requires continued admission due to    MDM [] Low, [] Moderate,[]  High  Patient's risk as above due to      History of Present Illness: The patient was seen and examined at the bedside  Patient denies chest pain or shortness of breath  No further seizure activity last 24-hour  Patient interested in acute rehab placement, pending PT OT evaluation    Objective: Intake/Output Summary (Last 24 hours) at 7/14/2022 0937  Last data filed at 7/13/2022 1649  Gross per 24 hour   Intake 120 ml   Output 300 ml   Net -180 ml      Vitals:   Vitals:    07/14/22 0800   BP:    Pulse: 85   Resp:    Temp:    SpO2:      Physical Exam:   GEN Awake.  Alert , not in respiratory distress, not in pain  HEENT: PEERLA, , supple neck,   Chest: air entry equal bilaterally, no wheezing or crepitation  Heart: S1 and S2 heard, no murmur, no gallop or rub, regular rate  Abdomen: soft, ND , Nt, +BS  Extremities: no cyanosis, tenderness or erythema, peripheral pulses audible  Neurology: alert, oriented x3, able to move 4 limbs    Medications:   Medications:    insulin lispro  0-12 Units SubCUTAneous 2 times per day    insulin lispro  10 Units SubCUTAneous TID     insulin glargine  30 Units SubCUTAneous Nightly    insulin lispro  0-12 Units SubCUTAneous TID     gabapentin  200 mg Oral Daily    acetaminophen  1,000 mg Oral 3 times per day    methocarbamol  1,000 mg Oral 4x Daily    [Held by provider] lisinopril  40 mg Oral Daily    atorvastatin  80 mg Oral Nightly    topiramate  50 mg Oral Nightly    clopidogrel  75 mg Oral Daily    metoprolol succinate  25 mg Oral Daily    amLODIPine  5 mg Oral Daily    busPIRone  10 mg Oral TID    Vitamin D  2,000 Units Oral Daily    donepezil  10 mg Oral Nightly    hydrALAZINE  25 mg Oral 3 times per day    aspirin  81 mg Oral Daily    FLUoxetine  80 mg Oral Daily    levothyroxine  25 mcg Oral QAM AC    sodium chloride flush  10 mL IntraVENous 2 times per day    heparin (porcine)  5,000 Units SubCUTAneous 3 times per day      Infusions:    sodium chloride Stopped (07/14/22 0615)    dextrose      dextrose      sodium chloride       PRN Meds: glucose, 4 tablet, PRN  dextrose bolus, 125 mL, PRN   Or  dextrose bolus, 250 mL, PRN  glucagon (rDNA), 1 mg, PRN  dextrose, 100 mL/hr, PRN  glucose, 4 tablet, PRN  dextrose bolus, 125 mL, PRN   Or  dextrose bolus, 250 mL, PRN  glucagon (rDNA), 1 mg, PRN  dextrose, 100 mL/hr, PRN  LORazepam, 1 mg, Q5 Min PRN  LORazepam, 4 mg, PRN  sodium chloride flush, 10 mL, PRN  sodium chloride, , PRN  ondansetron, 4 mg, Q8H PRN   Or  ondansetron, 4 mg, Q6H PRN  bisacodyl, 5 mg, Daily PRN        Electronically signed by Hoa Massey MD on 7/14/2022 at 9:37 AM

## 2022-07-15 LAB
ALBUMIN SERPL-MCNC: 3.2 GM/DL (ref 3.4–5)
ALP BLD-CCNC: 109 IU/L (ref 40–128)
ALT SERPL-CCNC: 13 U/L (ref 10–40)
ANION GAP SERPL CALCULATED.3IONS-SCNC: 11 MMOL/L (ref 4–16)
AST SERPL-CCNC: 12 IU/L (ref 15–37)
BASOPHILS ABSOLUTE: 0 K/CU MM
BASOPHILS RELATIVE PERCENT: 0.3 % (ref 0–1)
BILIRUB SERPL-MCNC: 0.2 MG/DL (ref 0–1)
BUN BLDV-MCNC: 12 MG/DL (ref 6–23)
CALCIUM SERPL-MCNC: 8.7 MG/DL (ref 8.3–10.6)
CHLORIDE BLD-SCNC: 103 MMOL/L (ref 99–110)
CO2: 24 MMOL/L (ref 21–32)
CREAT SERPL-MCNC: 1.2 MG/DL (ref 0.6–1.1)
CRYPTOSPORIDIUM ANTIGEN: NEGATIVE
DIFFERENTIAL TYPE: ABNORMAL
EOSINOPHILS ABSOLUTE: 0.1 K/CU MM
EOSINOPHILS RELATIVE PERCENT: 2.1 % (ref 0–3)
GFR AFRICAN AMERICAN: 55 ML/MIN/1.73M2
GFR NON-AFRICAN AMERICAN: 45 ML/MIN/1.73M2
GIARDIA ANTIGEN STOOL: NEGATIVE
GLUCOSE BLD-MCNC: 139 MG/DL (ref 70–99)
GLUCOSE BLD-MCNC: 141 MG/DL (ref 70–99)
GLUCOSE BLD-MCNC: 218 MG/DL (ref 70–99)
GLUCOSE BLD-MCNC: 235 MG/DL (ref 70–99)
GLUCOSE BLD-MCNC: 244 MG/DL (ref 70–99)
GLUCOSE BLD-MCNC: 277 MG/DL (ref 70–99)
GLUCOSE BLD-MCNC: 281 MG/DL (ref 70–99)
GLUCOSE BLD-MCNC: 67 MG/DL (ref 70–99)
HCT VFR BLD CALC: 34.6 % (ref 37–47)
HEMOGLOBIN: 10.7 GM/DL (ref 12.5–16)
IMMATURE NEUTROPHIL %: 0.2 % (ref 0–0.43)
LACTOFERRIN, QUAL: NEGATIVE
LYMPHOCYTES ABSOLUTE: 1.7 K/CU MM
LYMPHOCYTES RELATIVE PERCENT: 27.3 % (ref 24–44)
MAGNESIUM: 1.5 MG/DL (ref 1.8–2.4)
MAGNESIUM: 1.5 MG/DL (ref 1.8–2.4)
MAGNESIUM: 2.2 MG/DL (ref 1.8–2.4)
MCH RBC QN AUTO: 29 PG (ref 27–31)
MCHC RBC AUTO-ENTMCNC: 30.9 % (ref 32–36)
MCV RBC AUTO: 93.8 FL (ref 78–100)
MONOCYTES ABSOLUTE: 0.4 K/CU MM
MONOCYTES RELATIVE PERCENT: 6.7 % (ref 0–4)
NUCLEATED RBC %: 0 %
PDW BLD-RTO: 12.7 % (ref 11.7–14.9)
PHOSPHORUS: 2.5 MG/DL (ref 2.5–4.9)
PLATELET # BLD: 255 K/CU MM (ref 140–440)
PMV BLD AUTO: 10.3 FL (ref 7.5–11.1)
POTASSIUM SERPL-SCNC: 4.5 MMOL/L (ref 3.5–5.1)
RBC # BLD: 3.69 M/CU MM (ref 4.2–5.4)
REASON FOR REJECTION: NORMAL
REJECTED TEST: NORMAL
SEGMENTED NEUTROPHILS ABSOLUTE COUNT: 3.9 K/CU MM
SEGMENTED NEUTROPHILS RELATIVE PERCENT: 63.4 % (ref 36–66)
SODIUM BLD-SCNC: 138 MMOL/L (ref 135–145)
T4 FREE: 1.1 NG/DL (ref 0.9–1.8)
TOTAL IMMATURE NEUTOROPHIL: 0.01 K/CU MM
TOTAL NUCLEATED RBC: 0 K/CU MM
TOTAL PROTEIN: 5.2 GM/DL (ref 6.4–8.2)
TSH HIGH SENSITIVITY: 4.66 UIU/ML (ref 0.27–4.2)
WBC # BLD: 6.2 K/CU MM (ref 4–10.5)

## 2022-07-15 PROCEDURE — 36415 COLL VENOUS BLD VENIPUNCTURE: CPT

## 2022-07-15 PROCEDURE — 97110 THERAPEUTIC EXERCISES: CPT

## 2022-07-15 PROCEDURE — APPSS60 APP SPLIT SHARED TIME 46-60 MINUTES: Performed by: NURSE PRACTITIONER

## 2022-07-15 PROCEDURE — 99233 SBSQ HOSP IP/OBS HIGH 50: CPT | Performed by: INTERNAL MEDICINE

## 2022-07-15 PROCEDURE — 82962 GLUCOSE BLOOD TEST: CPT

## 2022-07-15 PROCEDURE — 6370000000 HC RX 637 (ALT 250 FOR IP): Performed by: INTERNAL MEDICINE

## 2022-07-15 PROCEDURE — 84439 ASSAY OF FREE THYROXINE: CPT

## 2022-07-15 PROCEDURE — 84443 ASSAY THYROID STIM HORMONE: CPT

## 2022-07-15 PROCEDURE — 2060000000 HC ICU INTERMEDIATE R&B

## 2022-07-15 PROCEDURE — 6360000002 HC RX W HCPCS: Performed by: STUDENT IN AN ORGANIZED HEALTH CARE EDUCATION/TRAINING PROGRAM

## 2022-07-15 PROCEDURE — 6360000002 HC RX W HCPCS: Performed by: INTERNAL MEDICINE

## 2022-07-15 PROCEDURE — 85025 COMPLETE CBC W/AUTO DIFF WBC: CPT

## 2022-07-15 PROCEDURE — 2580000003 HC RX 258: Performed by: INTERNAL MEDICINE

## 2022-07-15 PROCEDURE — 83735 ASSAY OF MAGNESIUM: CPT

## 2022-07-15 PROCEDURE — 80053 COMPREHEN METABOLIC PANEL: CPT

## 2022-07-15 PROCEDURE — 94761 N-INVAS EAR/PLS OXIMETRY MLT: CPT

## 2022-07-15 PROCEDURE — 97530 THERAPEUTIC ACTIVITIES: CPT

## 2022-07-15 PROCEDURE — 2580000003 HC RX 258: Performed by: STUDENT IN AN ORGANIZED HEALTH CARE EDUCATION/TRAINING PROGRAM

## 2022-07-15 PROCEDURE — 84100 ASSAY OF PHOSPHORUS: CPT

## 2022-07-15 RX ORDER — INSULIN LISPRO 100 [IU]/ML
15 INJECTION, SOLUTION INTRAVENOUS; SUBCUTANEOUS
Status: DISCONTINUED | OUTPATIENT
Start: 2022-07-15 | End: 2022-07-18 | Stop reason: HOSPADM

## 2022-07-15 RX ORDER — LEVOTHYROXINE SODIUM 0.03 MG/1
25 TABLET ORAL
Qty: 30 TABLET | Refills: 0 | Status: CANCELLED | OUTPATIENT
Start: 2022-07-16 | End: 2022-08-15

## 2022-07-15 RX ORDER — MAGNESIUM SULFATE 4 G/50ML
4000 INJECTION INTRAVENOUS ONCE
Status: COMPLETED | OUTPATIENT
Start: 2022-07-15 | End: 2022-07-15

## 2022-07-15 RX ORDER — HYDRALAZINE HYDROCHLORIDE 50 MG/1
50 TABLET, FILM COATED ORAL EVERY 8 HOURS SCHEDULED
Qty: 90 TABLET | Refills: 0 | Status: CANCELLED | OUTPATIENT
Start: 2022-07-15 | End: 2022-08-14

## 2022-07-15 RX ORDER — HYDRALAZINE HYDROCHLORIDE 50 MG/1
50 TABLET, FILM COATED ORAL EVERY 8 HOURS SCHEDULED
Status: DISCONTINUED | OUTPATIENT
Start: 2022-07-15 | End: 2022-07-15

## 2022-07-15 RX ORDER — INSULIN GLARGINE 100 [IU]/ML
35 INJECTION, SOLUTION SUBCUTANEOUS NIGHTLY
Status: DISCONTINUED | OUTPATIENT
Start: 2022-07-15 | End: 2022-07-16

## 2022-07-15 RX ORDER — PANTOPRAZOLE SODIUM 40 MG/1
40 TABLET, DELAYED RELEASE ORAL DAILY
Qty: 30 TABLET | Refills: 0 | Status: CANCELLED | OUTPATIENT
Start: 2022-07-15 | End: 2022-08-14

## 2022-07-15 RX ORDER — SODIUM CHLORIDE 9 MG/ML
INJECTION, SOLUTION INTRAVENOUS CONTINUOUS
Status: DISCONTINUED | OUTPATIENT
Start: 2022-07-15 | End: 2022-07-16

## 2022-07-15 RX ORDER — INSULIN GLARGINE 100 [IU]/ML
35 INJECTION, SOLUTION SUBCUTANEOUS NIGHTLY
Qty: 10 ML | Refills: 3 | Status: CANCELLED | OUTPATIENT
Start: 2022-07-15

## 2022-07-15 RX ADMIN — INSULIN GLARGINE 35 UNITS: 100 INJECTION, SOLUTION SUBCUTANEOUS at 21:41

## 2022-07-15 RX ADMIN — METHOCARBAMOL TABLETS 1000 MG: 500 TABLET, COATED ORAL at 09:15

## 2022-07-15 RX ADMIN — HEPARIN SODIUM 5000 UNITS: 5000 INJECTION INTRAVENOUS; SUBCUTANEOUS at 21:40

## 2022-07-15 RX ADMIN — HEPARIN SODIUM 5000 UNITS: 5000 INJECTION INTRAVENOUS; SUBCUTANEOUS at 06:17

## 2022-07-15 RX ADMIN — ACETAMINOPHEN 1000 MG: 500 TABLET ORAL at 06:17

## 2022-07-15 RX ADMIN — INSULIN LISPRO 15 UNITS: 100 INJECTION, SOLUTION INTRAVENOUS; SUBCUTANEOUS at 13:12

## 2022-07-15 RX ADMIN — SODIUM CHLORIDE, PRESERVATIVE FREE 10 ML: 5 INJECTION INTRAVENOUS at 21:40

## 2022-07-15 RX ADMIN — LISINOPRIL 40 MG: 20 TABLET ORAL at 09:14

## 2022-07-15 RX ADMIN — ASPIRIN 81 MG CHEWABLE TABLET 81 MG: 81 TABLET CHEWABLE at 09:14

## 2022-07-15 RX ADMIN — INSULIN LISPRO 6 UNITS: 100 INJECTION, SOLUTION INTRAVENOUS; SUBCUTANEOUS at 08:01

## 2022-07-15 RX ADMIN — DONEPEZIL HYDROCHLORIDE 10 MG: 10 TABLET ORAL at 21:40

## 2022-07-15 RX ADMIN — METHOCARBAMOL TABLETS 1000 MG: 500 TABLET, COATED ORAL at 13:00

## 2022-07-15 RX ADMIN — INSULIN LISPRO 6 UNITS: 100 INJECTION, SOLUTION INTRAVENOUS; SUBCUTANEOUS at 21:41

## 2022-07-15 RX ADMIN — HEPARIN SODIUM 5000 UNITS: 5000 INJECTION INTRAVENOUS; SUBCUTANEOUS at 16:59

## 2022-07-15 RX ADMIN — INSULIN LISPRO 2 UNITS: 100 INJECTION, SOLUTION INTRAVENOUS; SUBCUTANEOUS at 13:12

## 2022-07-15 RX ADMIN — BUSPIRONE HYDROCHLORIDE 10 MG: 5 TABLET ORAL at 21:39

## 2022-07-15 RX ADMIN — FLUOXETINE 80 MG: 20 CAPSULE ORAL at 09:13

## 2022-07-15 RX ADMIN — BUSPIRONE HYDROCHLORIDE 10 MG: 5 TABLET ORAL at 16:58

## 2022-07-15 RX ADMIN — CLOPIDOGREL BISULFATE 75 MG: 75 TABLET ORAL at 09:14

## 2022-07-15 RX ADMIN — AMLODIPINE BESYLATE 10 MG: 10 TABLET ORAL at 09:14

## 2022-07-15 RX ADMIN — SODIUM CHLORIDE: 9 INJECTION, SOLUTION INTRAVENOUS at 16:56

## 2022-07-15 RX ADMIN — MAGNESIUM SULFATE HEPTAHYDRATE 4000 MG: 4 INJECTION, SOLUTION INTRAVENOUS at 13:34

## 2022-07-15 RX ADMIN — ACETAMINOPHEN 1000 MG: 500 TABLET ORAL at 16:57

## 2022-07-15 RX ADMIN — GABAPENTIN 200 MG: 100 CAPSULE ORAL at 09:13

## 2022-07-15 RX ADMIN — METHOCARBAMOL TABLETS 1000 MG: 500 TABLET, COATED ORAL at 16:58

## 2022-07-15 RX ADMIN — SODIUM CHLORIDE, PRESERVATIVE FREE 10 ML: 5 INJECTION INTRAVENOUS at 18:24

## 2022-07-15 RX ADMIN — LEVOTHYROXINE SODIUM 25 MCG: 25 TABLET ORAL at 06:17

## 2022-07-15 RX ADMIN — INSULIN LISPRO 15 UNITS: 100 INJECTION, SOLUTION INTRAVENOUS; SUBCUTANEOUS at 08:06

## 2022-07-15 RX ADMIN — Medication 2000 UNITS: at 09:14

## 2022-07-15 RX ADMIN — INSULIN LISPRO 4 UNITS: 100 INJECTION, SOLUTION INTRAVENOUS; SUBCUTANEOUS at 01:44

## 2022-07-15 RX ADMIN — HYDRALAZINE HYDROCHLORIDE 25 MG: 25 TABLET, FILM COATED ORAL at 06:17

## 2022-07-15 RX ADMIN — BUSPIRONE HYDROCHLORIDE 10 MG: 5 TABLET ORAL at 09:14

## 2022-07-15 ASSESSMENT — PAIN SCALES - GENERAL
PAINLEVEL_OUTOF10: 5
PAINLEVEL_OUTOF10: 1

## 2022-07-15 ASSESSMENT — ENCOUNTER SYMPTOMS: SHORTNESS OF BREATH: 0

## 2022-07-15 ASSESSMENT — PAIN DESCRIPTION - DESCRIPTORS: DESCRIPTORS: ACHING

## 2022-07-15 ASSESSMENT — PAIN - FUNCTIONAL ASSESSMENT
PAIN_FUNCTIONAL_ASSESSMENT: ACTIVITIES ARE NOT PREVENTED
PAIN_FUNCTIONAL_ASSESSMENT: ACTIVITIES ARE NOT PREVENTED

## 2022-07-15 ASSESSMENT — PAIN DESCRIPTION - LOCATION
LOCATION: BACK
LOCATION: BACK

## 2022-07-15 NOTE — PROGRESS NOTES
Oral hydration encouraged     Hypomagnesemia - Supplemented     DJD: Exacerbated by fall/recurrent falls. Start scheduled extra strength Tylenol. Robaxin PRN     Decreased dose of Neurontin as this can contribute to falls     Heat and cold intolerance: TSH mildly elevated. resolved with low-dose Synthroid. Advised to f/u with endocrine     Class III obesity: Lifestyle modification counseling  DJD precludes exercise     PT OT evaluation -Recommended for DC    Patient is medically stable for DC.  working for placement. The patient expressed appropriate understanding of, and agreement with the discharge recommendations, medications, and plan. Diet ADULT DIET; Regular; 4 carb choices (60 gm/meal)  ADULT ORAL NUTRITION SUPPLEMENT; Breakfast, Dinner; Low Calorie/High Protein Oral Supplement   DVT Prophylaxis [] Lovenox, []  Heparin, [] SCDs, [] Ambulation   GI Prophylaxis [] PPI,  [] H2 Blocker,  [] Carafate,  [] Diet/Tube Feeds   Code Status Full Code   Disposition Patient requires continued admission due to    MDM [] Low, [] Moderate,[]  High  Patient's risk as above due to      History of Present Illness: The patient was seen and examined at the bedside  Patient denies chest pain or shortness of breath  No further seizure activity last 24-hour  Patient interested in acute rehab placement, pending PT OT evaluation    Objective: Intake/Output Summary (Last 24 hours) at 7/15/2022 1421  Last data filed at 7/15/2022 0617  Gross per 24 hour   Intake 360 ml   Output --   Net 360 ml      Vitals:   Vitals:    07/15/22 1150   BP:    Pulse:    Resp:    Temp: 97 °F (36.1 °C)   SpO2:      Physical Exam:   GEN Awake.  Alert , not in respiratory distress, not in pain  HEENT: PEERLA, , supple neck,   Chest: air entry equal bilaterally, no wheezing or crepitation  Heart: S1 and S2 heard, no murmur, no gallop or rub, regular rate  Abdomen: soft, ND , Nt, +BS  Extremities: no cyanosis, tenderness or erythema, peripheral pulses audible  Neurology: alert, oriented x3, able to move 4 limbs    Medications:   Medications:    insulin glargine  35 Units SubCUTAneous Nightly    insulin lispro  15 Units SubCUTAneous TID WC    insulin NPH  10 Units SubCUTAneous Once    magnesium sulfate  4,000 mg IntraVENous Once    insulin lispro  0-12 Units SubCUTAneous 2 times per day    amLODIPine  10 mg Oral Daily    insulin lispro  0-12 Units SubCUTAneous TID WC    gabapentin  200 mg Oral Daily    acetaminophen  1,000 mg Oral 3 times per day    methocarbamol  1,000 mg Oral 4x Daily    lisinopril  40 mg Oral Daily    atorvastatin  80 mg Oral Nightly    clopidogrel  75 mg Oral Daily    [Held by provider] metoprolol succinate  25 mg Oral Daily    busPIRone  10 mg Oral TID    Vitamin D  2,000 Units Oral Daily    donepezil  10 mg Oral Nightly    aspirin  81 mg Oral Daily    FLUoxetine  80 mg Oral Daily    levothyroxine  25 mcg Oral QAM AC    sodium chloride flush  10 mL IntraVENous 2 times per day    heparin (porcine)  5,000 Units SubCUTAneous 3 times per day      Infusions:    sodium chloride      dextrose      dextrose      sodium chloride       PRN Meds: glucose, 4 tablet, PRN  dextrose bolus, 125 mL, PRN   Or  dextrose bolus, 250 mL, PRN  glucagon (rDNA), 1 mg, PRN  dextrose, 100 mL/hr, PRN  glucose, 4 tablet, PRN  dextrose bolus, 125 mL, PRN   Or  dextrose bolus, 250 mL, PRN  glucagon (rDNA), 1 mg, PRN  dextrose, 100 mL/hr, PRN  LORazepam, 1 mg, Q5 Min PRN  LORazepam, 4 mg, PRN  sodium chloride flush, 10 mL, PRN  sodium chloride, , PRN  ondansetron, 4 mg, Q8H PRN   Or  ondansetron, 4 mg, Q6H PRN  bisacodyl, 5 mg, Daily PRN      Electronically signed by David Riggs MD on 7/15/2022 at 2:21 PM

## 2022-07-15 NOTE — PLAN OF CARE
Problem: Discharge Planning  Goal: Discharge to home or other facility with appropriate resources  Outcome: Progressing     Problem: Safety - Adult  Goal: Free from fall injury  Outcome: Progressing     Problem: Pain  Goal: Verbalizes/displays adequate comfort level or baseline comfort level  Outcome: Progressing     Problem: Chronic Conditions and Co-morbidities  Goal: Patient's chronic conditions and co-morbidity symptoms are monitored and maintained or improved  Outcome: Progressing     Problem: Skin/Tissue Integrity  Goal: Absence of new skin breakdown  Description: 1. Monitor for areas of redness and/or skin breakdown  2. Assess vascular access sites hourly  3. Every 4-6 hours minimum:  Change oxygen saturation probe site  4. Every 4-6 hours:  If on nasal continuous positive airway pressure, respiratory therapy assess nares and determine need for appliance change or resting period.   Outcome: Progressing

## 2022-07-15 NOTE — CARE COORDINATION
Spoke with patient regarding discharge planning. Patient stated that she now would like to go to [facility] for rehab.  Phoned Miesha at [facility] with referral and had to leave a VM

## 2022-07-15 NOTE — PROGRESS NOTES
Progress Note( Dr. Rojas Senior)  7/15/2022  Subjective:   Admit Date: 7/9/2022  PCP: Zoie Morrow MD        Admitted For :Altered mental state with hypoglycemia    Consulted For: Admitted initially for hypoglycemia subsequently blood glucose on very high. Better control of blood glucose    Interval History: Feels bit better blood glucose are getting better    Denies any chest pains,   Denies SOB . Denies nausea or vomiting. No new bowel or bladder symptoms. Intake/Output Summary (Last 24 hours) at 7/15/2022 1901  Last data filed at 7/15/2022 0617  Gross per 24 hour   Intake 360 ml   Output --   Net 360 ml   Possible neuropathy    DATA    CBC:   Recent Labs     07/13/22  0941 07/14/22  1157 07/15/22  0607   WBC 8.4 6.8 6.2   HGB 10.9* 10.9* 10.7*    299 255    CMP:  Recent Labs     07/13/22  0941 07/15/22  0607    138   K 4.4 4.5    103   CO2 15* 24   BUN 26* 12   CREATININE 1.3* 1.2*   CALCIUM 8.4 8.7   PROT 5.4* 5.2*   LABALBU 3.4 3.2*   BILITOT 0.3 0.2   ALKPHOS 119 109   AST 11* 12*   ALT 15 13     Lipids:   Lab Results   Component Value Date/Time    CHOL 176 05/12/2022 06:39 AM    HDL 62 05/12/2022 06:39 AM    TRIG 169 05/12/2022 06:39 AM     Glucose:  Recent Labs     07/15/22  1246 07/15/22  1259 07/15/22  1741   POCGLU 139* 141* 67*     [id number]:  Lab Results   Component Value Date/Time    LABA1C 8.1 07/11/2022 07:45 AM     High Sensitivity TSH:   Lab Results   Component Value Date/Time    TSHHS 4.660 07/15/2022 06:07 AM     Free T3: No results found for: FT3  Free T4:  Lab Results   Component Value Date/Time    T4FREE 1.10 07/15/2022 06:07 AM       US RETROPERITONEAL LIMITED   Final Result   1. No evidence of obstructive uropathy to account for patient's renal   insufficiency. 2. Mild diffuse thinning of the right renal cortex compared to the left.               Scheduled Medicines   Medications:    insulin glargine  35 Units SubCUTAneous Nightly    insulin lispro  15 Units SubCUTAneous TID WC    insulin NPH  10 Units SubCUTAneous Once    insulin lispro  0-12 Units SubCUTAneous 2 times per day    amLODIPine  10 mg Oral Daily    insulin lispro  0-12 Units SubCUTAneous TID WC    gabapentin  200 mg Oral Daily    acetaminophen  1,000 mg Oral 3 times per day    methocarbamol  1,000 mg Oral 4x Daily    lisinopril  40 mg Oral Daily    atorvastatin  80 mg Oral Nightly    clopidogrel  75 mg Oral Daily    [Held by provider] metoprolol succinate  25 mg Oral Daily    busPIRone  10 mg Oral TID    Vitamin D  2,000 Units Oral Daily    donepezil  10 mg Oral Nightly    aspirin  81 mg Oral Daily    FLUoxetine  80 mg Oral Daily    levothyroxine  25 mcg Oral QAM AC    sodium chloride flush  10 mL IntraVENous 2 times per day    heparin (porcine)  5,000 Units SubCUTAneous 3 times per day      Infusions:    sodium chloride 100 mL/hr at 07/15/22 1656    dextrose      dextrose      sodium chloride           Objective:   Vitals: BP (!) 108/93   Pulse 86   Temp 98.3 °F (36.8 °C)   Resp 22   Ht 5' 4.49\" (1.638 m)   Wt 250 lb 7.1 oz (113.6 kg)   SpO2 98%   BMI 42.34 kg/m²   General appearance: alert and cooperative with exam  Neck: no JVD or bruit  Thyroid : Normal lobes   Lungs: Has Vesicular Breath sounds occasional wheezing and rales with history of COPD  Heart:  regular rate and rhythm  Abdomen: soft, non-tender; bowel sounds normal; no masses,  no organomegaly  Musculoskeletal: Normal  Extremities: extremities normal, , no edema  Neurologic:  Awake, alert, oriented to name, place and time. Cranial nerves II-XII are grossly intact. Motor is  intact. Sensory neuropathy. ,  and gait is normal.    Assessment:     Patient Active Problem List:     Proximal humeral fracture     Asthma     Bereavement     Pain of both hip joints     Chest pain     Chronic abdominal pain     Chronic back pain     Chronic obstructive lung disease (HCC)     Corns and callosities     Depressive disorder     Type II diabetes mellitus, uncontrolled ([facility] [address]     Diabetic polyneuropathy ([facility] [address]     HTN (hypertension)     Hyperlipidemia     Lesion of liver     Menopausal symptom     Mixed hyperlipidemia     Onychomycosis     Obesity     Osteoarthrosis     Osteoporosis     Pain in both feet     Sleep apnea     TIA (transient ischemic attack)     Vitamin D deficiency     Wheezing     S/P ORIF (open reduction internal fixation) fracture     Diabetic gastroparesis (HCC)     Gastroesophageal reflux disease     Restless legs     Ketoacidosis, diabetic, type 2, no coma (HCC)     Repeated falls     MODESTO (acute kidney injury) ([facility] [address]     Seizure[facility]like activity ([facility])     Acute renal failure (ARF) ([facility])     Sinus pause      Plan:     Reviewed POC blood glucose . Labs and X ray results   Reviewed Current Medicines   On meal/ Correction bolus Humalog/ Basal Lantus Insulin regime    Monitor Blood glucose frequently   Modified  the dose of Insulin/ other medicines as needed   Will follow     .      Shabana Prabhakar MD, MD

## 2022-07-15 NOTE — PROGRESS NOTES
Cardiology Progress Note     Today's Plan: stop hydralazine add compression stockings    Admit Date:  7/9/2022    Consult reason/ Seen today for: pause     Subjective and  Overnight Events: Patient currently denies any chest pain or shortness of breath. Orthostatics completed while at bedside which with significant change patient placed back in supine position where BP recovered. Patient did not lose consciousness. History of Presenting Illness:    Chief complain on admission : 59 y. o.year old who is admitted for seizure-like activity and hypoglycemia. Cardiology consulted for evaluation pause noted on telemetry. Patient denies any history of syncope or presyncope. Past medical history:    has a past medical history of Arthritis, Asthma, Cerebral artery occlusion with cerebral infarction ([facility] [address] COPD (chronic obstructive pulmonary disease) ([facility] [address] Diabetes mellitus ([facility] [address] Diabetic neuropathy ([facility] [address] Fracture, History of blood transfusion, Hyperlipidemia, Hypertension, Muscle weakness (generalized), and Osteoarthritis. Past surgical history:   has a past surgical history that includes Foot surgery (Left, 2004?); knee surgery (Right, 2009?); Cholecystectomy (2002?); Lung surgery (2009?); eye surgery; Humerus fracture surgery (Left, 12/31/2018); and Breast biopsy (Right). Social History:   reports that she has never smoked. She has never used smokeless tobacco. She reports current alcohol use. She reports that she does not use drugs. Family history:  family history includes Breast Cancer (age of onset: 48) in her maternal aunt and maternal aunt. No Known Allergies    Review of Systems:  Review of Systems   Respiratory:  Negative for shortness of breath. Cardiovascular:  Negative for chest pain, palpitations and leg swelling. Musculoskeletal: Negative. Skin: Negative. Neurological:  Negative for dizziness and weakness. All other systems reviewed and are negative. BP (!) 156/80   Pulse 78   Temp 97 °F (36.1 °C) (Oral)   Resp 17   Ht 5' 4.49\" (1.638 m)   Wt 250 lb 7.1 oz (113.6 kg)   SpO2 98%   BMI 42.34 kg/m²     Intake/Output Summary (Last 24 hours) at 7/15/2022 1338  Last data filed at 7/15/2022 0617  Gross per 24 hour   Intake 360 ml   Output --   Net 360 ml       Physical Exam:  Physical Exam  Constitutional:       Appearance: She is well-developed. Cardiovascular:      Rate and Rhythm: Normal rate and regular rhythm. Pulses: Intact distal pulses. Dorsalis pedis pulses are 2+ on the right side and 2+ on the left side. Posterior tibial pulses are 2+ on the right side and 2+ on the left side. Heart sounds: Normal heart sounds, S1 normal and S2 normal.   Pulmonary:      Effort: Pulmonary effort is normal.      Breath sounds: Normal breath sounds. Musculoskeletal:         General: Normal range of motion. Skin:     General: Skin is warm and dry. Neurological:      Mental Status: She is alert and oriented to person, place, and time.         Medications:    insulin glargine  35 Units SubCUTAneous Nightly    insulin lispro  15 Units SubCUTAneous TID WC    insulin NPH  10 Units SubCUTAneous Once    magnesium sulfate  4,000 mg IntraVENous Once    insulin lispro  0-12 Units SubCUTAneous 2 times per day    amLODIPine  10 mg Oral Daily    insulin lispro  0-12 Units SubCUTAneous TID WC    gabapentin  200 mg Oral Daily    acetaminophen  1,000 mg Oral 3 times per day    methocarbamol  1,000 mg Oral 4x Daily    lisinopril  40 mg Oral Daily    atorvastatin  80 mg Oral Nightly    clopidogrel  75 mg Oral Daily    [Held by provider] metoprolol succinate  25 mg Oral Daily    busPIRone  10 mg Oral TID    Vitamin D  2,000 Units Oral Daily    donepezil  10 mg Oral Nightly    aspirin  81 mg Oral Daily    FLUoxetine  80 mg Oral Daily    levothyroxine  25 mcg Oral QAM AC    sodium chloride flush  10 mL IntraVENous 2 times per day    heparin (porcine) Clear to auscultation  0 Basilar crackles   Cardiovascular:  S1S2   Abdomen: soft   Extremities:   0 edema  Pulses; palpable      MEDICAL DECISION MAKING :          Sinus pause 3.6 seconds, asymptomatic. EP consulted. Stop beta-blocker. Recommend EP follow-up for event monitor    Dehydration: Secondary to vomiting and diarrhea. Hypomagnesia: Recommended to keep magnesium between 2-2.2. Hyperlipidemia: Continue statins. ?  Seizure-like activity. Questionable secondary to metabolic encephalopathy versus orthostatic hypotension. Significant orthostatic hypotension with supine hypertension. Recommend hydration and compression stockings. We will discontinue hydralazine. DVT prophylaxis if not contraindicated.          Dr. Roxy Condon MD

## 2022-07-15 NOTE — PROGRESS NOTES
Nephrology Progress Note  7/15/2022 9:47 AM        Subjective:   Admit Date: 7/9/2022  PCP: Sharona Tolbert MD    Interval History:  diarrhea slowed down, no new event    Diet:  poor appetite    ROS:   still had 2 loose stool, no shortness of breath or confusion   no fever    Data:     Current meds:    insulin glargine  35 Units SubCUTAneous Nightly    insulin lispro  15 Units SubCUTAneous TID WC    insulin NPH  10 Units SubCUTAneous Once    insulin lispro  0-12 Units SubCUTAneous 2 times per day    amLODIPine  10 mg Oral Daily    insulin lispro  0-12 Units SubCUTAneous TID WC    gabapentin  200 mg Oral Daily    acetaminophen  1,000 mg Oral 3 times per day    methocarbamol  1,000 mg Oral 4x Daily    lisinopril  40 mg Oral Daily    atorvastatin  80 mg Oral Nightly    clopidogrel  75 mg Oral Daily    [Held by provider] metoprolol succinate  25 mg Oral Daily    busPIRone  10 mg Oral TID    Vitamin D  2,000 Units Oral Daily    donepezil  10 mg Oral Nightly    hydrALAZINE  25 mg Oral 3 times per day    aspirin  81 mg Oral Daily    FLUoxetine  80 mg Oral Daily    levothyroxine  25 mcg Oral QAM AC    sodium chloride flush  10 mL IntraVENous 2 times per day    heparin (porcine)  5,000 Units SubCUTAneous 3 times per day      dextrose      dextrose      sodium chloride           I/O last 3 completed shifts:   In: 360 [P.O.:360]  Out: -     CBC:   Recent Labs     07/13/22  0941 07/14/22  1157 07/15/22  0607   WBC 8.4 6.8 6.2   HGB 10.9* 10.9* 10.7*    299 255          Recent Labs     07/13/22  0941 07/15/22  0607    138   K 4.4 4.5    103   CO2 15* 24   BUN 26* 12   CREATININE 1.3* 1.2*   GLUCOSE 480* 244*       Lab Results   Component Value Date    CALCIUM 8.7 07/15/2022    PHOS 2.5 07/15/2022       Objective:     Vitals: BP (!) 156/80   Pulse 78   Temp 98.5 °F (36.9 °C)   Resp 17   Ht 5' 4.5\" (1.638 m)   Wt 250 lb 7.1 oz (113.6 kg)   SpO2 98%   BMI 42.32 kg/m² ,    General appearance:   alert, awake and oriented  HEENT:   no gross conjunctival pallor  Neck:   seemed supple  Lungs:   no gross crackles on auscultation  Heart:   seems regular rate and rhythm with heart rate more than 70  Abdomen:  soft, nontender on palpation  Extremities:   no gross edema      Problem List :         Impression :      acute kidney injury with underlying CKD stage G3a  A3- recovering by creatinine criteria   hypertension, restart her angiotensin  converting enzyme inhibitor we will hold beta-blocker given her recent bradycardia   metabolic acidosis also resolved with renal recovery   ongoing diarrhea has underlying diabetes and hypertension and mood disorder    Recommendation/Plan  :      restart angiotensin  converting enzyme inhibitor same home dose   good glycemic control   p.o. food and fluid   hopefully discharge soon   low-salt, DASH diet   education and counseling      Evelina Loera MD MD

## 2022-07-15 NOTE — PROGRESS NOTES
Perfect serve Dr Jerome Walls regarding patient does not meet criteria for CDIFF per Infectious Disease.

## 2022-07-15 NOTE — PROGRESS NOTES
Comprehensive Nutrition Assessment    Type and Reason for Visit:  Initial (los)    Nutrition Recommendations/Plan:   Correct depleted Magnesium level  Add high protein oral nutrition supplement   Encourage po intake as able  Will monitor po intake, weight trends, poc     Malnutrition Assessment:  Malnutrition Status:  Insufficient data (07/15/22 1245)    Context:  Acute Illness      Nutrition Assessment:    Pt admitted for seizures, acute renal failure, PMH: DM, COPD, CVA, pt currently on 4 carb choice diet consuming less than 75% of meals per documentation, no significant wt loss per chart review, no wounds noted, +diarrhea, will follow at moderate nutrition risk    Nutrition Related Findings:    Mag 1.5, Glucose 244, Hemoglobin A1c 8.1 Wound Type: None       Current Nutrition Intake & Therapies:    Average Meal Intake: 26-50%, 1-25%, 51-75%  Average Supplements Intake: None Ordered  ADULT DIET; Regular; 4 carb choices (60 gm/meal)    Anthropometric Measures:  Height: 5' 4.49\" (163.8 cm)  Ideal Body Weight (IBW): 122 lbs (55 kg)    Current Body Weight: 250 lb 7.1 oz (113.6 kg), 205.3 % IBW.  Weight Source: Bed Scale  Current BMI (kg/m2): 42.3  BMI Categories: Obese Class 3 (BMI 40.0 or greater)    Estimated Daily Nutrient Needs:  Energy Requirements Based On: Formula  Weight Used for Energy Requirements: Current  Energy (kcal/day): 0277-5872 (MSJ with stress factor 1.0-1.2)  Weight Used for Protein Requirements: Ideal  Protein (g/day): 56-67 (1.0-1.2 g/kg)  Method Used for Fluid Requirements: 1 ml/kcal    Nutrition Diagnosis:   Inadequate oral intake related to acute injury/trauma as evidenced by intake 51-75%    Nutrition Interventions:   Food and/or Nutrient Delivery: Continue Current Diet, Start Oral Nutrition Supplement  Nutrition Education/Counseling: No recommendation at this time  Coordination of Nutrition Care: Continue to monitor while inpatient     Goals:     Goals: PO intake 75% or greater, by next RD assessment     Nutrition Monitoring and Evaluation:   Behavioral-Environmental Outcomes: None Identified  Food/Nutrient Intake Outcomes: Food and Nutrient Intake, Supplement Intake  Physical Signs/Symptoms Outcomes: Biochemical Data, Weight, Meal Time Behavior, GI Status, Fluid Status or Edema, Hemodynamic Status    Discharge Planning:     Too soon to determine     Chang Ulrich [address], [address] [place] [address],   Contact: [phone] R nostril foreign body

## 2022-07-15 NOTE — PROGRESS NOTES
Occupational Therapy    Occupational Therapy Treatment Note    Name: Lauren Torres MRN: [id number] :   1960   Date:  7/15/2022   Admission Date: 2022 Room:  -A     Primary Problem: seizure like activity    Restrictions/Precautions: general precautions, fall risk, polyneuropathy    Communication with other providers: RN approved session. Subjective:  Patient states:  Pt agreeable to session. Pain:   Location, Type, Intensity (0/10 to 10/10):  Pt denies pain    Objective:    Observation: Pt supine in a flat bed upon arrival.    Objective Measures:  Pt alert and oriented however slow to respond with what appears like delayed processing. Treatment, including education:  Therapeutic Activity Training:   Therapeutic activity training was instructed today. Cues were given for safety, sequence, UE/LE placement, awareness, and balance. Activities performed today included bed mobility training, sup-sit, sit-stand, SPT. Pt supine in bed upon arrival and agreeable to session. Pt completed supine to sit from a flat bed with Sba with increased time. Pt completed seated edge of bed with SBA with good balance and noted bilateral shaking of legs which patient stated she shaked when not feeling well. However pt did not endorse nausea, dizziness or pain. Pt completed sit to stand  from edge of bed with hand held assist and CGA for balance this date and tolerated static stand approx 1-2 minutes. Pt noted moderate shaking of legs and decreased balance and returned supine with MIN A. Pt supine in bed with all needs met. RN present and aware.     Assessment / Impression:    Patient's tolerance of treatment: fair-  Adverse Reaction: orthostatic BP  Significant change in status and impact:  orthostatic BP  Barriers to improvement: BP and processing    Orthostatic BP   Supine:154/67 (92) HR 87  Sittin/73 (87) HR 93  Standing 80/39 (46)     Goals:  Time frame for goals: 2 weeks  Pt will complete feeding tasks with ind  Pt will complete grooming tasks with mod I standing at sink  Pt will complete toileting tasks with mod I using standard commode  Pt will complete UB dressing tasks with ind  Pt will complete LB dressing tasks with mod I  Pt will complete UB bathing tasks with ind  Pt will complete LB bathing tasks with mod I  Pt will complete therapeutic exercise/activity to increase independence in ADL/IADL function- addressed  Pt will practice functional transfers and mobility with AD for increased safety and independence-addressed      Plan for Next Session:    Continue OT POC and progress functional transfer    Time in:  1133  Time out:  1147  Timed treatment minutes:  14  Total treatment time:  14      Electronically signed by:    PEG Carrizales,   7/15/2022, 1:00 PM

## 2022-07-15 NOTE — PROGRESS NOTES
Physical Therapy  Name: Radha Ardon MRN: [id number] :   1960   Date:  7/15/2022   Admission Date: 2022 Room:  -A   Restrictions/Precautions:        fall risk  Communication with other providers:  Chapito Sanchez RN states she has to assess pt prior to getting out of bed  Subjective:  Patient states:  she wants to stay in bed because our chairs are very uncomfortable  Pain:   Location, Type, Intensity (0/10 to 10/10):  0/10  Objective:    Observation:  pt was resting in the bed  Treatment, including education/measures:  Supine Exercises: Ankle pumps x 10  bridges x 5  Heel slides x 10  Hip abd x 10  Hip IR/ER x 10  Trunk rolls x 10  Therapeutic Exercise:  Therapeutic exercises were instructed today. Cues were given for technique, safety, recruitment, and rationale. Cues were verbal and/or tactile. Safety  Patient left safely in the bed, with call light/phone in reach with alarm applied. Gait belt and mask were used for transfers and gait.   Assessment / Impression:     Patient's tolerance of treatment:  good   Adverse Reaction: none  Significant change in status and impact:  none  Barriers to improvement:  ALLYSSA soria,   Plan for Next Session:    Will cont to work towards pt's goals per her tolerance  Time in:  1120  Time out:  1130  Timed treatment minutes:  10  Total treatment time:  10  Previously filed items:  Social/Functional History  Type of Home: Assisted living  Home Layout: One level  Home Access: Level entry  Bathroom Shower/Tub: Walk-in shower  Bathroom Toilet: Handicap height  Bathroom Equipment: Shower chair, Grab bars in shower, Grab bars around toilet  Bathroom Accessibility: Walker accessible  Home Equipment: Sock aid, Rollator, Wheelchair-electric  Has the patient had two or more falls in the past year or any fall with injury in the past year?: Yes  ADL Assistance: Independent  Homemaking Responsibilities: No  Ambulation Assistance: Independent (typically uses the rollator, will use electric WC occasionally)  Transfer Assistance: Independent  Active : No  Short Term Goals  Time Frame for Short term goals: 2 weeks  Short term goal 1: Pt will perform sit><supine CGA  Short term goal 2: Pt will transfer sit><Stand CGA  Short term goal 3: Pt will transfer between surfaces CGA  Short term goal 4: Pt will ambulate 20ft with RW Daysi  Short term goal 5: Pt will perform standing light dynamic activity with single UE support x 3 minutes CGA     Electronically signed by:     Milagro Sharp PTA  7/15/2022, 11:25 AM

## 2022-07-16 LAB
ALBUMIN SERPL-MCNC: 3.3 GM/DL (ref 3.4–5)
ALP BLD-CCNC: 108 IU/L (ref 40–128)
ALT SERPL-CCNC: 16 U/L (ref 10–40)
ANION GAP SERPL CALCULATED.3IONS-SCNC: 13 MMOL/L (ref 4–16)
AST SERPL-CCNC: 19 IU/L (ref 15–37)
BASOPHILS ABSOLUTE: 0 K/CU MM
BASOPHILS RELATIVE PERCENT: 0.5 % (ref 0–1)
BILIRUB SERPL-MCNC: 0.2 MG/DL (ref 0–1)
BUN BLDV-MCNC: 12 MG/DL (ref 6–23)
CALCIUM SERPL-MCNC: 8.6 MG/DL (ref 8.3–10.6)
CHLORIDE BLD-SCNC: 106 MMOL/L (ref 99–110)
CO2: 21 MMOL/L (ref 21–32)
CREAT SERPL-MCNC: 1 MG/DL (ref 0.6–1.1)
DIFFERENTIAL TYPE: ABNORMAL
EOSINOPHILS ABSOLUTE: 0.1 K/CU MM
EOSINOPHILS RELATIVE PERCENT: 2.6 % (ref 0–3)
GFR AFRICAN AMERICAN: >60 ML/MIN/1.73M2
GFR NON-AFRICAN AMERICAN: 56 ML/MIN/1.73M2
GLUCOSE BLD-MCNC: 106 MG/DL (ref 70–99)
GLUCOSE BLD-MCNC: 141 MG/DL (ref 70–99)
GLUCOSE BLD-MCNC: 142 MG/DL (ref 70–99)
GLUCOSE BLD-MCNC: 195 MG/DL (ref 70–99)
GLUCOSE BLD-MCNC: 76 MG/DL (ref 70–99)
GLUCOSE BLD-MCNC: 78 MG/DL (ref 70–99)
HCT VFR BLD CALC: 37.2 % (ref 37–47)
HEMOGLOBIN: 10.9 GM/DL (ref 12.5–16)
IMMATURE NEUTROPHIL %: 0.5 % (ref 0–0.43)
LYMPHOCYTES ABSOLUTE: 1.3 K/CU MM
LYMPHOCYTES RELATIVE PERCENT: 29.4 % (ref 24–44)
MCH RBC QN AUTO: 29.5 PG (ref 27–31)
MCHC RBC AUTO-ENTMCNC: 29.3 % (ref 32–36)
MCV RBC AUTO: 100.8 FL (ref 78–100)
MONOCYTES ABSOLUTE: 0.3 K/CU MM
MONOCYTES RELATIVE PERCENT: 7.9 % (ref 0–4)
NUCLEATED RBC %: 0 %
PDW BLD-RTO: 12.7 % (ref 11.7–14.9)
PLATELET # BLD: 217 K/CU MM (ref 140–440)
PMV BLD AUTO: 10 FL (ref 7.5–11.1)
POTASSIUM SERPL-SCNC: 4 MMOL/L (ref 3.5–5.1)
RBC # BLD: 3.69 M/CU MM (ref 4.2–5.4)
SEGMENTED NEUTROPHILS ABSOLUTE COUNT: 2.5 K/CU MM
SEGMENTED NEUTROPHILS RELATIVE PERCENT: 59.1 % (ref 36–66)
SODIUM BLD-SCNC: 140 MMOL/L (ref 135–145)
TOTAL IMMATURE NEUTOROPHIL: 0.02 K/CU MM
TOTAL NUCLEATED RBC: 0 K/CU MM
TOTAL PROTEIN: 5.4 GM/DL (ref 6.4–8.2)
WBC # BLD: 4.3 K/CU MM (ref 4–10.5)

## 2022-07-16 PROCEDURE — 82962 GLUCOSE BLOOD TEST: CPT

## 2022-07-16 PROCEDURE — 94761 N-INVAS EAR/PLS OXIMETRY MLT: CPT

## 2022-07-16 PROCEDURE — 87328 CRYPTOSPORIDIUM AG IA: CPT

## 2022-07-16 PROCEDURE — 6360000002 HC RX W HCPCS: Performed by: INTERNAL MEDICINE

## 2022-07-16 PROCEDURE — 6370000000 HC RX 637 (ALT 250 FOR IP): Performed by: INTERNAL MEDICINE

## 2022-07-16 PROCEDURE — 2060000000 HC ICU INTERMEDIATE R&B

## 2022-07-16 PROCEDURE — 80053 COMPREHEN METABOLIC PANEL: CPT

## 2022-07-16 PROCEDURE — 36415 COLL VENOUS BLD VENIPUNCTURE: CPT

## 2022-07-16 PROCEDURE — 83630 LACTOFERRIN FECAL (QUAL): CPT

## 2022-07-16 PROCEDURE — 87329 GIARDIA AG IA: CPT

## 2022-07-16 PROCEDURE — 2580000003 HC RX 258: Performed by: INTERNAL MEDICINE

## 2022-07-16 PROCEDURE — 2580000003 HC RX 258: Performed by: STUDENT IN AN ORGANIZED HEALTH CARE EDUCATION/TRAINING PROGRAM

## 2022-07-16 PROCEDURE — 85025 COMPLETE CBC W/AUTO DIFF WBC: CPT

## 2022-07-16 RX ORDER — LEVOTHYROXINE SODIUM 0.07 MG/1
75 TABLET ORAL
Status: DISCONTINUED | OUTPATIENT
Start: 2022-07-16 | End: 2022-07-18 | Stop reason: HOSPADM

## 2022-07-16 RX ORDER — SODIUM CHLORIDE, SODIUM LACTATE, POTASSIUM CHLORIDE, CALCIUM CHLORIDE 600; 310; 30; 20 MG/100ML; MG/100ML; MG/100ML; MG/100ML
INJECTION, SOLUTION INTRAVENOUS CONTINUOUS
Status: DISCONTINUED | OUTPATIENT
Start: 2022-07-16 | End: 2022-07-17

## 2022-07-16 RX ORDER — INSULIN GLARGINE 100 [IU]/ML
30 INJECTION, SOLUTION SUBCUTANEOUS NIGHTLY
Status: DISCONTINUED | OUTPATIENT
Start: 2022-07-16 | End: 2022-07-17

## 2022-07-16 RX ADMIN — LEVOTHYROXINE SODIUM 25 MCG: 25 TABLET ORAL at 06:19

## 2022-07-16 RX ADMIN — BUSPIRONE HYDROCHLORIDE 10 MG: 5 TABLET ORAL at 21:30

## 2022-07-16 RX ADMIN — SODIUM CHLORIDE, PRESERVATIVE FREE 10 ML: 5 INJECTION INTRAVENOUS at 21:47

## 2022-07-16 RX ADMIN — AMLODIPINE BESYLATE 10 MG: 10 TABLET ORAL at 17:11

## 2022-07-16 RX ADMIN — INSULIN LISPRO 2 UNITS: 100 INJECTION, SOLUTION INTRAVENOUS; SUBCUTANEOUS at 21:42

## 2022-07-16 RX ADMIN — HEPARIN SODIUM 5000 UNITS: 5000 INJECTION INTRAVENOUS; SUBCUTANEOUS at 21:29

## 2022-07-16 RX ADMIN — SODIUM CHLORIDE, PRESERVATIVE FREE 10 ML: 5 INJECTION INTRAVENOUS at 08:43

## 2022-07-16 RX ADMIN — BUSPIRONE HYDROCHLORIDE 10 MG: 5 TABLET ORAL at 10:18

## 2022-07-16 RX ADMIN — SODIUM CHLORIDE, PRESERVATIVE FREE 10 ML: 5 INJECTION INTRAVENOUS at 08:45

## 2022-07-16 RX ADMIN — HEPARIN SODIUM 5000 UNITS: 5000 INJECTION INTRAVENOUS; SUBCUTANEOUS at 06:19

## 2022-07-16 RX ADMIN — METHOCARBAMOL TABLETS 1000 MG: 500 TABLET, COATED ORAL at 10:18

## 2022-07-16 RX ADMIN — SODIUM CHLORIDE, POTASSIUM CHLORIDE, SODIUM LACTATE AND CALCIUM CHLORIDE: 600; 310; 30; 20 INJECTION, SOLUTION INTRAVENOUS at 21:50

## 2022-07-16 RX ADMIN — BUSPIRONE HYDROCHLORIDE 10 MG: 5 TABLET ORAL at 14:30

## 2022-07-16 RX ADMIN — LISINOPRIL 40 MG: 20 TABLET ORAL at 17:11

## 2022-07-16 RX ADMIN — SODIUM CHLORIDE: 9 INJECTION, SOLUTION INTRAVENOUS at 03:52

## 2022-07-16 RX ADMIN — SODIUM CHLORIDE, POTASSIUM CHLORIDE, SODIUM LACTATE AND CALCIUM CHLORIDE: 600; 310; 30; 20 INJECTION, SOLUTION INTRAVENOUS at 14:53

## 2022-07-16 RX ADMIN — INSULIN LISPRO 2 UNITS: 100 INJECTION, SOLUTION INTRAVENOUS; SUBCUTANEOUS at 01:49

## 2022-07-16 RX ADMIN — LEVOTHYROXINE SODIUM 75 MCG: 0.07 TABLET ORAL at 08:38

## 2022-07-16 RX ADMIN — INSULIN GLARGINE 30 UNITS: 100 INJECTION, SOLUTION SUBCUTANEOUS at 21:43

## 2022-07-16 RX ADMIN — HEPARIN SODIUM 5000 UNITS: 5000 INJECTION INTRAVENOUS; SUBCUTANEOUS at 14:31

## 2022-07-16 ASSESSMENT — PAIN DESCRIPTION - LOCATION: LOCATION: BACK

## 2022-07-16 NOTE — PROGRESS NOTES
Nephrology Progress Note  7/16/2022 3:04 PM        Subjective:   Admit Date: 7/9/2022  PCP: Reg Pierce MD    Interval History: Patient seen in early morning, this is a late entry    Diet: Started eating some    ROS: Diarrhea also slowed down  No confusion  Urine output was not recorded  Blood pressure recorded high, afebrile    Data:     Current meds:    insulin glargine  30 Units SubCUTAneous Nightly    levothyroxine  75 mcg Oral QAM AC    insulin lispro  15 Units SubCUTAneous TID WC    insulin lispro  0-12 Units SubCUTAneous 2 times per day    amLODIPine  10 mg Oral Daily    insulin lispro  0-12 Units SubCUTAneous TID WC    gabapentin  200 mg Oral Daily    acetaminophen  1,000 mg Oral 3 times per day    methocarbamol  1,000 mg Oral 4x Daily    lisinopril  40 mg Oral Daily    atorvastatin  80 mg Oral Nightly    clopidogrel  75 mg Oral Daily    [Held by provider] metoprolol succinate  25 mg Oral Daily    busPIRone  10 mg Oral TID    Vitamin D  2,000 Units Oral Daily    donepezil  10 mg Oral Nightly    aspirin  81 mg Oral Daily    FLUoxetine  80 mg Oral Daily    sodium chloride flush  10 mL IntraVENous 2 times per day    heparin (porcine)  5,000 Units SubCUTAneous 3 times per day      lactated ringers 80 mL/hr at 07/16/22 1453    dextrose      dextrose      sodium chloride           I/O last 3 completed shifts:   In: 600 [P.O.:600]  Out: -     CBC:   Recent Labs     07/14/22  1157 07/15/22  0607 07/16/22  0805   WBC 6.8 6.2 4.3   HGB 10.9* 10.7* 10.9*    255 217          Recent Labs     07/15/22  0607 07/16/22  0805    140   K 4.5 4.0    106   CO2 24 21   BUN 12 12   CREATININE 1.2* 1.0   GLUCOSE 244* 76       Lab Results   Component Value Date    CALCIUM 8.6 07/16/2022    PHOS 2.5 07/15/2022       Objective:     Vitals: BP (!) 162/70   Pulse 96   Temp 98.7 °F (37.1 °C) (Oral)   Resp 16   Ht 5' 4.49\" (1.638 m)   Wt 242 lb 8.1 oz (110 kg)   SpO2 97%   BMI 41.00 kg/m² ,    General appearance: Alert, awake and oriented  HEENT: Positive conjunctival pallor  Neck: Supple  Lungs: No gross crackles on auscultation  Heart: Seemed regular rate and rhythm this morning  Abdomen: Soft, nontender on palpation  Extremities: Trace leg edema      Problem List :         Impression :     Acute kidney injury with underlying CKD stage G3 A3-recovering by creatinine criteria-mild low serum bicarbonate could be from hyperchloremic normal saline infusion  Her diarrhea when she was stopped  Underlying diabetes and hypertension    Recommendation/Plan  :     She is eating and drinking has not disclosed salt overload-no need for IV fluid-that will also raise her blood pressure more  Good glycemic control  She is on calcium channel blocker as well as ACE inhibitors-acceptable blood pressure  May discharge from my standpoint  Close outpatient follow-up with me      Magalie Marrufo MD MD

## 2022-07-16 NOTE — PROGRESS NOTES
Hospitalist Progress Note      Name:  Ai Okeefe /Age/Sex: 1960  (59 y.o. female)   MRN & CSN:  [id number] & [id number] Admission Date/Time: 2022  6:35 PM   Location:  -A PCP: Reg Pierce [address] Day: 8    Assessment and Plan:       Ai Okeefe is a 59 y.o.  female with a PMH of insulin-dependent type 2 DM, HTN, anxiety with depression, class III obesity, recurrent hospitalization who presented to the ED on 2022 with Seizure-like activity ([address] On arrival, patient was found to be hypoglycemic and this was corrected. Hypomagnesemia - s/p 4 gm IV yesterday. Still waiting for morning lab. sinus pause. EKG ordered showed regular sinus rhythm. cardiology consulted. Seen by Dr Aditi Conner and Dr Sherri Newman (EPI). The symptom was likely du to vasovagal/ autonomic dysfunction due to prolonged uncontrolled DM. Advised to were compression stocking and remain hydrated. Can f/u with Dr. Sherri Newman in 2 weeks. Hyperglycemia due to poorly controlled diabetes: Non compliance  Status post insulin drip which was later switched to long-acting Lantus plus moderate sliding scale correction. Endocrine consulted. Will discharge on adjusted dose. To F/u with endocrine as outpatient. Seizure-like episode:. Possibly induced by hypoglycemia . Neurology consulted. As per which  description of events during not meet seizure criteria. Acute toxic metabolic encephalopathy: Multifactorial-hypoglycemia, MODESTO, high-dose Neurontin - resolved     Myoclonic jerks: Persistent. Consulted neurology. Gabapentin dose of 1200 mg twice daily is not acceptable for degree of renal function. Gabapentin dose  decrease to 200 mg once daily     Recurrent MODESTO/ATN with incomplete renal recovery versus CKD 3B. Recent  Cr baseline seems to be between 1.1-1.2. Renal function improved with gentle IV fluids. Nephrology consulted. Advised f/u as outpatient     Diarrhea - still complaining. tenderness or erythema, peripheral pulses audible  Neurology: alert, oriented x3, able to move 4 limbs    Medications:   Medications:    insulin glargine  30 Units SubCUTAneous Nightly    levothyroxine  75 mcg Oral QAM AC    insulin lispro  15 Units SubCUTAneous TID WC    insulin lispro  0-12 Units SubCUTAneous 2 times per day    amLODIPine  10 mg Oral Daily    insulin lispro  0-12 Units SubCUTAneous TID WC    gabapentin  200 mg Oral Daily    acetaminophen  1,000 mg Oral 3 times per day    methocarbamol  1,000 mg Oral 4x Daily    lisinopril  40 mg Oral Daily    atorvastatin  80 mg Oral Nightly    clopidogrel  75 mg Oral Daily    [Held by provider] metoprolol succinate  25 mg Oral Daily    busPIRone  10 mg Oral TID    Vitamin D  2,000 Units Oral Daily    donepezil  10 mg Oral Nightly    aspirin  81 mg Oral Daily    FLUoxetine  80 mg Oral Daily    sodium chloride flush  10 mL IntraVENous 2 times per day    heparin (porcine)  5,000 Units SubCUTAneous 3 times per day      Infusions:    dextrose      dextrose      sodium chloride       PRN Meds: glucose, 4 tablet, PRN  dextrose bolus, 125 mL, PRN   Or  dextrose bolus, 250 mL, PRN  glucagon (rDNA), 1 mg, PRN  dextrose, 100 mL/hr, PRN  glucose, 4 tablet, PRN  dextrose bolus, 125 mL, PRN   Or  dextrose bolus, 250 mL, PRN  glucagon (rDNA), 1 mg, PRN  dextrose, 100 mL/hr, PRN  LORazepam, 1 mg, Q5 Min PRN  LORazepam, 4 mg, PRN  sodium chloride flush, 10 mL, PRN  sodium chloride, , PRN  ondansetron, 4 mg, Q8H PRN   Or  ondansetron, 4 mg, Q6H PRN  bisacodyl, 5 mg, Daily PRN      Electronically signed by Risa Bautista MD on 7/16/2022 at 8:59 AM

## 2022-07-16 NOTE — PROGRESS NOTES
To Dr Wellington Butler: Pt is refusing Lactated Ringers infusion .   Stated that Dr Roberto Reed did not want her on fluids

## 2022-07-16 NOTE — PROGRESS NOTES
Progress Note( Dr. Sun Diaz)  7/16/2022  Subjective:   Admit Date: 7/9/2022  PCP: Tavares Thornton MD        Admitted For :Altered mental state with hypoglycemia    Consulted For: Admitted initially for hypoglycemia subsequently blood glucose on very high. Better control of blood glucose    Interval History: Feels bit better blood glucose are getting better    Denies any chest pains,   Denies SOB . Denies nausea or vomiting. No new bowel or bladder symptoms. Intake/Output Summary (Last 24 hours) at 7/16/2022 1017  Last data filed at 7/16/2022 0600  Gross per 24 hour   Intake 240 ml   Output --   Net 240 ml     Possible neuropathy    DATA    CBC:   Recent Labs     07/14/22  1157 07/15/22  0607 07/16/22  0805   WBC 6.8 6.2 4.3   HGB 10.9* 10.7* 10.9*    255 217      CMP:  Recent Labs     07/15/22  0607 07/16/22  0805    140   K 4.5 4.0    106   CO2 24 21   BUN 12 12   CREATININE 1.2* 1.0   CALCIUM 8.7 8.6   PROT 5.2* 5.4*   LABALBU 3.2* 3.3*   BILITOT 0.2 0.2   ALKPHOS 109 108   AST 12* 19   ALT 13 16       Lipids:   Lab Results   Component Value Date/Time    CHOL 176 05/12/2022 06:39 AM    HDL 62 05/12/2022 06:39 AM    TRIG 169 05/12/2022 06:39 AM     Glucose:  Recent Labs     07/15/22  2138 07/16/22  0147 07/16/22  0840   POCGLU 277* 142* 66       [id number]:  Lab Results   Component Value Date/Time    LABA1C 8.1 07/11/2022 07:45 AM     High Sensitivity TSH:   Lab Results   Component Value Date/Time    TSHHS 4.660 07/15/2022 06:07 AM     Free T3: No results found for: FT3  Free T4:  Lab Results   Component Value Date/Time    T4FREE 1.10 07/15/2022 06:07 AM       US RETROPERITONEAL LIMITED   Final Result   1. No evidence of obstructive uropathy to account for patient's renal   insufficiency. 2. Mild diffuse thinning of the right renal cortex compared to the left.               Scheduled Medicines   Medications:    insulin glargine  30 Units SubCUTAneous Nightly    levothyroxine  75 mcg Oral QAM AC    insulin lispro  15 Units SubCUTAneous TID WC    insulin lispro  0-12 Units SubCUTAneous 2 times per day    amLODIPine  10 mg Oral Daily    insulin lispro  0-12 Units SubCUTAneous TID WC    gabapentin  200 mg Oral Daily    acetaminophen  1,000 mg Oral 3 times per day    methocarbamol  1,000 mg Oral 4x Daily    lisinopril  40 mg Oral Daily    atorvastatin  80 mg Oral Nightly    clopidogrel  75 mg Oral Daily    [Held by provider] metoprolol succinate  25 mg Oral Daily    busPIRone  10 mg Oral TID    Vitamin D  2,000 Units Oral Daily    donepezil  10 mg Oral Nightly    aspirin  81 mg Oral Daily    FLUoxetine  80 mg Oral Daily    sodium chloride flush  10 mL IntraVENous 2 times per day    heparin (porcine)  5,000 Units SubCUTAneous 3 times per day      Infusions:    dextrose      dextrose      sodium chloride           Objective:   Vitals: BP (!) 157/77   Pulse 90   Temp 98.2 °F (36.8 °C) (Oral)   Resp 15   Ht 5' 4.49\" (1.638 m)   Wt 242 lb 8.1 oz (110 kg)   SpO2 96%   BMI 41.00 kg/m²   General appearance: alert and cooperative with exam  Neck: no JVD or bruit  Thyroid : Normal lobes   Lungs: Has Vesicular Breath sounds occasional wheezing and rales with history of COPD  Heart:  regular rate and rhythm  Abdomen: soft, non-tender; bowel sounds normal; no masses,  no organomegaly  Musculoskeletal: Normal  Extremities: extremities normal, , no edema  Neurologic:  Awake, alert, oriented to name, place and time. Cranial nerves II-XII are grossly intact. Motor is  intact. Sensory neuropathy. ,  and gait is normal.    Assessment:     Patient Active Problem List:     Proximal humeral fracture     Asthma     Bereavement     Pain of both hip joints     Chest pain     Chronic abdominal pain     Chronic back pain     Chronic obstructive lung disease (HCC)     Corns and callosities     Depressive disorder     Type II diabetes mellitus, uncontrolled ([address]     Diabetic polyneuropathy ([facility] [address]     HTN

## 2022-07-17 LAB
CULTURE: NORMAL
CULTURE: NORMAL
GLUCOSE BLD-MCNC: 113 MG/DL (ref 70–99)
GLUCOSE BLD-MCNC: 125 MG/DL (ref 70–99)
GLUCOSE BLD-MCNC: 236 MG/DL (ref 70–99)
GLUCOSE BLD-MCNC: 62 MG/DL (ref 70–99)
GLUCOSE BLD-MCNC: 76 MG/DL (ref 70–99)
Lab: NORMAL
Lab: NORMAL
SPECIMEN: NORMAL
SPECIMEN: NORMAL

## 2022-07-17 PROCEDURE — 6370000000 HC RX 637 (ALT 250 FOR IP): Performed by: INTERNAL MEDICINE

## 2022-07-17 PROCEDURE — 82962 GLUCOSE BLOOD TEST: CPT

## 2022-07-17 PROCEDURE — 2060000000 HC ICU INTERMEDIATE R&B

## 2022-07-17 PROCEDURE — 6370000000 HC RX 637 (ALT 250 FOR IP): Performed by: STUDENT IN AN ORGANIZED HEALTH CARE EDUCATION/TRAINING PROGRAM

## 2022-07-17 PROCEDURE — 2580000003 HC RX 258: Performed by: STUDENT IN AN ORGANIZED HEALTH CARE EDUCATION/TRAINING PROGRAM

## 2022-07-17 PROCEDURE — 2580000003 HC RX 258: Performed by: INTERNAL MEDICINE

## 2022-07-17 PROCEDURE — 94761 N-INVAS EAR/PLS OXIMETRY MLT: CPT

## 2022-07-17 PROCEDURE — 6360000002 HC RX W HCPCS: Performed by: INTERNAL MEDICINE

## 2022-07-17 RX ORDER — INSULIN GLARGINE 100 [IU]/ML
25 INJECTION, SOLUTION SUBCUTANEOUS NIGHTLY
Status: DISCONTINUED | OUTPATIENT
Start: 2022-07-17 | End: 2022-07-17

## 2022-07-17 RX ORDER — GABAPENTIN 400 MG/1
400 CAPSULE ORAL 2 TIMES DAILY
Status: DISCONTINUED | OUTPATIENT
Start: 2022-07-17 | End: 2022-07-18 | Stop reason: HOSPADM

## 2022-07-17 RX ORDER — CIPROFLOXACIN 500 MG/1
500 TABLET, FILM COATED ORAL EVERY 12 HOURS SCHEDULED
Status: DISCONTINUED | OUTPATIENT
Start: 2022-07-17 | End: 2022-07-18 | Stop reason: HOSPADM

## 2022-07-17 RX ORDER — LOPERAMIDE HYDROCHLORIDE 2 MG/1
2 CAPSULE ORAL 3 TIMES DAILY PRN
Status: DISCONTINUED | OUTPATIENT
Start: 2022-07-17 | End: 2022-07-18 | Stop reason: HOSPADM

## 2022-07-17 RX ORDER — INSULIN GLARGINE 100 [IU]/ML
20 INJECTION, SOLUTION SUBCUTANEOUS NIGHTLY
Status: DISCONTINUED | OUTPATIENT
Start: 2022-07-17 | End: 2022-07-18 | Stop reason: HOSPADM

## 2022-07-17 RX ORDER — INSULIN LISPRO 100 [IU]/ML
0-6 INJECTION, SOLUTION INTRAVENOUS; SUBCUTANEOUS
Status: DISCONTINUED | OUTPATIENT
Start: 2022-07-17 | End: 2022-07-18 | Stop reason: HOSPADM

## 2022-07-17 RX ADMIN — SODIUM CHLORIDE, POTASSIUM CHLORIDE, SODIUM LACTATE AND CALCIUM CHLORIDE: 600; 310; 30; 20 INJECTION, SOLUTION INTRAVENOUS at 10:52

## 2022-07-17 RX ADMIN — GABAPENTIN 400 MG: 400 CAPSULE ORAL at 21:22

## 2022-07-17 RX ADMIN — AMLODIPINE BESYLATE 10 MG: 10 TABLET ORAL at 09:23

## 2022-07-17 RX ADMIN — HEPARIN SODIUM 5000 UNITS: 5000 INJECTION INTRAVENOUS; SUBCUTANEOUS at 15:08

## 2022-07-17 RX ADMIN — BUSPIRONE HYDROCHLORIDE 10 MG: 5 TABLET ORAL at 09:23

## 2022-07-17 RX ADMIN — LEVOTHYROXINE SODIUM 75 MCG: 0.07 TABLET ORAL at 06:15

## 2022-07-17 RX ADMIN — LISINOPRIL 40 MG: 20 TABLET ORAL at 09:23

## 2022-07-17 RX ADMIN — BUSPIRONE HYDROCHLORIDE 10 MG: 5 TABLET ORAL at 21:22

## 2022-07-17 RX ADMIN — DONEPEZIL HYDROCHLORIDE 10 MG: 10 TABLET ORAL at 21:22

## 2022-07-17 RX ADMIN — SODIUM CHLORIDE, PRESERVATIVE FREE 10 ML: 5 INJECTION INTRAVENOUS at 09:22

## 2022-07-17 RX ADMIN — BUSPIRONE HYDROCHLORIDE 10 MG: 5 TABLET ORAL at 15:08

## 2022-07-17 RX ADMIN — HEPARIN SODIUM 5000 UNITS: 5000 INJECTION INTRAVENOUS; SUBCUTANEOUS at 06:15

## 2022-07-17 RX ADMIN — INSULIN LISPRO 2 UNITS: 100 INJECTION, SOLUTION INTRAVENOUS; SUBCUTANEOUS at 21:21

## 2022-07-17 RX ADMIN — HEPARIN SODIUM 5000 UNITS: 5000 INJECTION INTRAVENOUS; SUBCUTANEOUS at 21:22

## 2022-07-17 RX ADMIN — INSULIN GLARGINE 20 UNITS: 100 INJECTION, SOLUTION SUBCUTANEOUS at 21:19

## 2022-07-17 ASSESSMENT — PAIN SCALES - GENERAL
PAINLEVEL_OUTOF10: 0

## 2022-07-17 NOTE — PROGRESS NOTES
Hospitalist Progress Note      Name:  Lizbeth Arreola /Age/Sex: 1960  (59 y.o. female)   MRN & CSN:  [id number] & [id number] Admission Date/Time: 2022  6:35 PM   Location:  -A PCP: Mayra Garcia, [address] Day: 9    Assessment and Plan:       Lizbeth Arreola is a 59 y.o.  female with a PMH of insulin-dependent type 2 DM, HTN, anxiety with depression, class III obesity, recurrent hospitalization who presented to the ED on 2022 with Seizure-like activity ([address] On arrival, patient was found to be hypoglycemic and this was corrected. Uncontrolled HT- BP in 190. We will repeat and treat if still remains elevated. Hypomagnesemia - resolved    sinus pause. EKG ordered showed regular sinus rhythm. cardiology consulted. Seen by Dr Jorge Gardner and Dr Niko Bustos (EPI). The symptom was likely du to vasovagal/ autonomic dysfunction due to prolonged uncontrolled DM. Advised to were compression stocking and remain hydrated. Can f/u with Dr. Niko Bustos in 2 weeks. Hyperglycemia due to poorly controlled diabetes: Non compliance  Status post insulin drip which was later switched to long-acting Lantus plus moderate sliding scale correction. Endocrine consulted. Will discharge on adjusted dose. To F/u with endocrine as outpatient. Seizure-like episode:. Possibly induced by hypoglycemia . Neurology consulted. As per which  description of events during not meet seizure criteria. Acute toxic metabolic encephalopathy: Multifactorial-hypoglycemia, MODESTO, high-dose Neurontin - resolved     Myoclonic jerks: Persistent. Consulted neurology. Gabapentin dose of 1200 mg twice daily is not acceptable for degree of renal function. Gabapentin dose  decrease to 200 mg once daily     Recurrent MODESTO/ATN with incomplete renal recovery versus CKD 3B. Recent  Cr baseline seems to be between 1.1-1.2. Renal function improved with gentle IV fluids. Nephrology consulted.  Advised f/u as outpatient     Diarrhea - improving. Stool test done -ve for infection. Oral hydration encouraged        DJD: Exacerbated by fall/recurrent falls. Start scheduled extra strength Tylenol. Robaxin PRN     Decreased dose of Neurontin as this can contribute to falls     Heat and cold intolerance: TSH mildly elevated. resolved with low-dose Synthroid. Advised to f/u with endocrine     Class III obesity: Lifestyle modification counseling  DJD precludes exercise     PT OT evaluation -Recommended for SNF    Patient is medically stable for DC.  working for placement to SNF     The patient expressed appropriate understanding of, and agreement with the recommendations, medications, and plan. Diet ADULT DIET; Regular; 4 carb choices (60 gm/meal)  ADULT ORAL NUTRITION SUPPLEMENT; Breakfast, Dinner; Low Calorie/High Protein Oral Supplement   DVT Prophylaxis [] Lovenox, []  Heparin, [] SCDs, [] Ambulation   GI Prophylaxis [] PPI,  [] H2 Blocker,  [] Carafate,  [] Diet/Tube Feeds   Code Status Full Code   Disposition Patient requires continued admission due to    MDM [] Low, [] Moderate,[]  High  Patient's risk as above due to      History of Present Illness: The patient was seen and examined at the bedside  Patient denies chest pain or shortness of breath  No further seizure activity last 24-hour  Patient interested in acute rehab placement. Objective: Intake/Output Summary (Last 24 hours) at 7/17/2022 1028  Last data filed at 7/17/2022 0617  Gross per 24 hour   Intake 2827.02 ml   Output --   Net 2827.02 ml      Vitals:   Vitals:    07/17/22 0923   BP: (!) 196/83   Pulse:    Resp:    Temp:    SpO2:      Physical Exam:   GEN Awake.  Alert , not in respiratory distress, not in pain  HEENT: PEERLA, , supple neck,   Chest: air entry equal bilaterally, no wheezing or crepitation  Heart: S1 and S2 heard, no murmur, no gallop or rub, regular rate  Abdomen: soft, ND , Nt, +BS  Extremities: no cyanosis, tenderness or erythema, peripheral pulses audible  Neurology: alert, oriented x3, able to move 4 limbs    Medications:   Medications:    insulin glargine  20 Units SubCUTAneous Nightly    insulin lispro  0-6 Units SubCUTAneous 2 times per day    levothyroxine  75 mcg Oral QAM AC    insulin lispro  15 Units SubCUTAneous TID WC    amLODIPine  10 mg Oral Daily    insulin lispro  0-12 Units SubCUTAneous TID WC    gabapentin  200 mg Oral Daily    acetaminophen  1,000 mg Oral 3 times per day    methocarbamol  1,000 mg Oral 4x Daily    lisinopril  40 mg Oral Daily    atorvastatin  80 mg Oral Nightly    clopidogrel  75 mg Oral Daily    [Held by provider] metoprolol succinate  25 mg Oral Daily    busPIRone  10 mg Oral TID    Vitamin D  2,000 Units Oral Daily    donepezil  10 mg Oral Nightly    aspirin  81 mg Oral Daily    FLUoxetine  80 mg Oral Daily    sodium chloride flush  10 mL IntraVENous 2 times per day    heparin (porcine)  5,000 Units SubCUTAneous 3 times per day      Infusions:    lactated ringers 80 mL/hr at 07/16/22 2150    dextrose      dextrose      sodium chloride       PRN Meds: glucose, 4 tablet, PRN  dextrose bolus, 125 mL, PRN   Or  dextrose bolus, 250 mL, PRN  glucagon (rDNA), 1 mg, PRN  dextrose, 100 mL/hr, PRN  glucose, 4 tablet, PRN  dextrose bolus, 125 mL, PRN   Or  dextrose bolus, 250 mL, PRN  glucagon (rDNA), 1 mg, PRN  dextrose, 100 mL/hr, PRN  LORazepam, 1 mg, Q5 Min PRN  LORazepam, 4 mg, PRN  sodium chloride flush, 10 mL, PRN  sodium chloride, , PRN  ondansetron, 4 mg, Q8H PRN   Or  ondansetron, 4 mg, Q6H PRN  bisacodyl, 5 mg, Daily PRN      Electronically signed by Marilyn Davis MD on 7/17/2022 at 10:28 AM

## 2022-07-17 NOTE — PROGRESS NOTES
thinning of the right renal cortex compared to the left. Scheduled Medicines   Medications:    insulin glargine  25 Units SubCUTAneous Nightly    levothyroxine  75 mcg Oral QAM AC    insulin lispro  15 Units SubCUTAneous TID WC    insulin lispro  0-12 Units SubCUTAneous 2 times per day    amLODIPine  10 mg Oral Daily    insulin lispro  0-12 Units SubCUTAneous TID WC    gabapentin  200 mg Oral Daily    acetaminophen  1,000 mg Oral 3 times per day    methocarbamol  1,000 mg Oral 4x Daily    lisinopril  40 mg Oral Daily    atorvastatin  80 mg Oral Nightly    clopidogrel  75 mg Oral Daily    [Held by provider] metoprolol succinate  25 mg Oral Daily    busPIRone  10 mg Oral TID    Vitamin D  2,000 Units Oral Daily    donepezil  10 mg Oral Nightly    aspirin  81 mg Oral Daily    FLUoxetine  80 mg Oral Daily    sodium chloride flush  10 mL IntraVENous 2 times per day    heparin (porcine)  5,000 Units SubCUTAneous 3 times per day      Infusions:    lactated ringers 80 mL/hr at 07/16/22 2150    dextrose      dextrose      sodium chloride           Objective:   Vitals: BP (!) 196/83   Pulse 87   Temp 98.3 °F (36.8 °C) (Axillary)   Resp 12   Ht 5' 4.49\" (1.638 m)   Wt 242 lb 8.1 oz (110 kg)   SpO2 95%   BMI 41.00 kg/m²   General appearance: alert and cooperative with exam  Neck: no JVD or bruit  Thyroid : Normal lobes   Lungs: Has Vesicular Breath sounds occasional wheezing and rales with history of COPD  Heart:  regular rate and rhythm  Abdomen: soft, non-tender; bowel sounds normal; no masses,  no organomegaly  Musculoskeletal: Normal  Extremities: extremities normal, , no edema  Neurologic:  Awake, alert, oriented to name, place and time. Cranial nerves II-XII are grossly intact. Motor is  intact. Sensory neuropathy. ,  and gait is normal.    Assessment:     Patient Active Problem List:     Proximal humeral fracture     Asthma     Bereavement     Pain of both hip joints     Chest pain     Chronic abdominal pain     Chronic back pain     Chronic obstructive lung disease (HCC)     Corns and callosities     Depressive disorder     Type II diabetes mellitus, uncontrolled ([facility] [address]     Diabetic polyneuropathy (HCC)     HTN (hypertension)     Hyperlipidemia     Lesion of liver     Menopausal symptom     Mixed hyperlipidemia     Onychomycosis     Obesity     Osteoarthrosis     Osteoporosis     Pain in both feet     Sleep apnea     TIA (transient ischemic attack)     Vitamin D deficiency     Wheezing     S/P ORIF (open reduction internal fixation) fracture     Diabetic gastroparesis (HCC)     Gastroesophageal reflux disease     Restless legs     Ketoacidosis, diabetic, type 2, no coma ([facility])     Repeated falls     MODESTO (acute kidney injury) ([facility] [address]     Seizure-like activity ([facility])     Acute renal failure (ARF) ([facility])     Sinus pause      Plan:     Reviewed POC blood glucose . Labs and X ray results   Reviewed Current Medicines   On meal/ Correction bolus Humalog/ Basal Lantus Insulin regime    Monitor Blood glucose frequently   Also increase the Synthroid dose  Modified  the dose of Insulin/ other medicines as needed   Will follow     .      Leon Mayo MD, MD

## 2022-07-17 NOTE — PROGRESS NOTES
Nephrology Progress Note  7/17/2022 11:19 AM        Subjective:   Admit Date: 7/9/2022  PCP: Mary Ramsay MD    Interval History: Complain of feeling of freezing and shivering    Diet: Reported eating better    ROS: Patient reported diarrhea has resolved  No fever was recorded    Data:     Current meds:    insulin glargine  20 Units SubCUTAneous Nightly    insulin lispro  0-6 Units SubCUTAneous 2 times per day    levothyroxine  75 mcg Oral QAM AC    insulin lispro  15 Units SubCUTAneous TID WC    amLODIPine  10 mg Oral Daily    insulin lispro  0-12 Units SubCUTAneous TID WC    gabapentin  200 mg Oral Daily    acetaminophen  1,000 mg Oral 3 times per day    methocarbamol  1,000 mg Oral 4x Daily    lisinopril  40 mg Oral Daily    atorvastatin  80 mg Oral Nightly    clopidogrel  75 mg Oral Daily    [Held by provider] metoprolol succinate  25 mg Oral Daily    busPIRone  10 mg Oral TID    Vitamin D  2,000 Units Oral Daily    donepezil  10 mg Oral Nightly    aspirin  81 mg Oral Daily    FLUoxetine  80 mg Oral Daily    sodium chloride flush  10 mL IntraVENous 2 times per day    heparin (porcine)  5,000 Units SubCUTAneous 3 times per day      lactated ringers 80 mL/hr at 07/17/22 1052    dextrose      dextrose      sodium chloride           I/O last 3 completed shifts: In: 3067 [P.O.:720;  I.V.:2347]  Out: -     CBC:   Recent Labs     07/14/22  1157 07/15/22  0607 07/16/22  0805   WBC 6.8 6.2 4.3   HGB 10.9* 10.7* 10.9*    255 217          Recent Labs     07/15/22  0607 07/16/22  0805    140   K 4.5 4.0    106   CO2 24 21   BUN 12 12   CREATININE 1.2* 1.0   GLUCOSE 244* 76       Lab Results   Component Value Date    CALCIUM 8.6 07/16/2022    PHOS 2.5 07/15/2022       Objective:     Vitals: BP (!) 196/83   Pulse 87   Temp 98.3 °F (36.8 °C) (Axillary)   Resp 12   Ht 5' 4.49\" (1.638 m)   Wt 242 lb 8.1 oz (110 kg)   SpO2 95%   BMI 41.00 kg/m² ,    General appearance: Alert, awake, complaining of feeling freezing  HEENT: No gross conjunctival pallor  Neck: Supple  Lungs: No crackles on auscultation  Heart: Seems regular rate and rhythm  Abdomen: Soft, nontender  Extremities: No gross edema      Problem List :         Impression :     Acute kidney injury with underlying CKD stage G3 A3-stable  Diarrhea also started has underlying diabetes and hypertension and mood disorder  Need manual blood pressure check    Recommendation/Plan  :     Recommend stopping IV fluid  P.o. food and fluid  1 she has no risk for dehydration then add long-acting thiazide  No fever was recorded-unsure why she is shivering-  From kidney standpoint, she can be discharged-close outpatient follow-up with me      Munira Casey MD MD

## 2022-07-18 VITALS
SYSTOLIC BLOOD PRESSURE: 97 MMHG | RESPIRATION RATE: 15 BRPM | HEART RATE: 99 BPM | WEIGHT: 242.51 LBS | TEMPERATURE: 98.3 F | OXYGEN SATURATION: 96 % | HEIGHT: 64 IN | DIASTOLIC BLOOD PRESSURE: 71 MMHG | BODY MASS INDEX: 41.4 KG/M2

## 2022-07-18 PROBLEM — R55 VASOVAGAL SYNCOPE: Status: ACTIVE | Noted: 2022-07-18

## 2022-07-18 PROBLEM — N17.9 ACUTE RENAL FAILURE (ARF) (HCC): Status: RESOLVED | Noted: 2022-07-09 | Resolved: 2022-07-18

## 2022-07-18 PROBLEM — E03.8 SUBCLINICAL HYPOTHYROIDISM: Status: ACTIVE | Noted: 2022-07-18

## 2022-07-18 PROBLEM — R56.9 SEIZURE-LIKE ACTIVITY (HCC): Status: RESOLVED | Noted: 2022-07-09 | Resolved: 2022-07-18

## 2022-07-18 PROBLEM — N17.9 AKI (ACUTE KIDNEY INJURY) (HCC): Status: RESOLVED | Noted: 2022-07-02 | Resolved: 2022-07-18

## 2022-07-18 LAB
CORTISOL - AM: 12.6 UG/DL (ref 6–18.4)
GLUCOSE BLD-MCNC: 102 MG/DL (ref 70–99)
GLUCOSE BLD-MCNC: 219 MG/DL (ref 70–99)
GLUCOSE BLD-MCNC: 232 MG/DL (ref 70–99)

## 2022-07-18 PROCEDURE — 94761 N-INVAS EAR/PLS OXIMETRY MLT: CPT

## 2022-07-18 PROCEDURE — 6370000000 HC RX 637 (ALT 250 FOR IP): Performed by: INTERNAL MEDICINE

## 2022-07-18 PROCEDURE — 82962 GLUCOSE BLOOD TEST: CPT

## 2022-07-18 PROCEDURE — 6360000002 HC RX W HCPCS: Performed by: INTERNAL MEDICINE

## 2022-07-18 PROCEDURE — 82533 TOTAL CORTISOL: CPT

## 2022-07-18 PROCEDURE — 2580000003 HC RX 258: Performed by: INTERNAL MEDICINE

## 2022-07-18 PROCEDURE — 6370000000 HC RX 637 (ALT 250 FOR IP): Performed by: STUDENT IN AN ORGANIZED HEALTH CARE EDUCATION/TRAINING PROGRAM

## 2022-07-18 PROCEDURE — 36415 COLL VENOUS BLD VENIPUNCTURE: CPT

## 2022-07-18 RX ORDER — CIPROFLOXACIN 500 MG/1
500 TABLET, FILM COATED ORAL EVERY 12 HOURS SCHEDULED
Qty: 9 TABLET | Refills: 0
Start: 2022-07-18 | End: 2022-07-23

## 2022-07-18 RX ORDER — GABAPENTIN 400 MG/1
400 CAPSULE ORAL 2 TIMES DAILY
Qty: 60 CAPSULE | Refills: 0
Start: 2022-07-18 | End: 2022-09-29

## 2022-07-18 RX ORDER — INSULIN LISPRO 100 [IU]/ML
15 INJECTION, SOLUTION INTRAVENOUS; SUBCUTANEOUS
Qty: 15 ML | Refills: 0 | Status: ON HOLD
Start: 2022-07-18 | End: 2022-07-30 | Stop reason: SDUPTHER

## 2022-07-18 RX ORDER — LEVOTHYROXINE SODIUM 0.07 MG/1
75 TABLET ORAL
Qty: 30 TABLET | Refills: 0
Start: 2022-07-19

## 2022-07-18 RX ORDER — METHOCARBAMOL 500 MG/1
1000 TABLET, FILM COATED ORAL 4 TIMES DAILY
Qty: 80 TABLET | Refills: 0 | Status: ON HOLD
Start: 2022-07-18 | End: 2022-07-30 | Stop reason: HOSPADM

## 2022-07-18 RX ORDER — INSULIN LISPRO 100 [IU]/ML
0-12 INJECTION, SOLUTION INTRAVENOUS; SUBCUTANEOUS
Qty: 2 EACH | Refills: 0 | Status: ON HOLD
Start: 2022-07-18 | End: 2022-07-30 | Stop reason: HOSPADM

## 2022-07-18 RX ORDER — CHOLECALCIFEROL (VITAMIN D3) 50 MCG
2000 TABLET ORAL DAILY
Qty: 30 TABLET | Refills: 0
Start: 2022-07-18 | End: 2022-09-29

## 2022-07-18 RX ORDER — INSULIN GLARGINE 100 [IU]/ML
20 INJECTION, SOLUTION SUBCUTANEOUS NIGHTLY
Qty: 10 ML | Refills: 3
Start: 2022-07-18

## 2022-07-18 RX ORDER — AMLODIPINE BESYLATE 10 MG/1
10 TABLET ORAL DAILY
Qty: 30 TABLET | Refills: 0 | Status: ON HOLD
Start: 2022-07-18 | End: 2022-10-04

## 2022-07-18 RX ORDER — INSULIN LISPRO 100 [IU]/ML
0-6 INJECTION, SOLUTION INTRAVENOUS; SUBCUTANEOUS NIGHTLY
Qty: 1 EACH | Refills: 0 | Status: ON HOLD
Start: 2022-07-18 | End: 2022-07-30 | Stop reason: HOSPADM

## 2022-07-18 RX ADMIN — SODIUM CHLORIDE, PRESERVATIVE FREE 10 ML: 5 INJECTION INTRAVENOUS at 08:19

## 2022-07-18 RX ADMIN — LISINOPRIL 40 MG: 20 TABLET ORAL at 08:18

## 2022-07-18 RX ADMIN — Medication 2000 UNITS: at 08:18

## 2022-07-18 RX ADMIN — LEVOTHYROXINE SODIUM 75 MCG: 0.07 TABLET ORAL at 08:27

## 2022-07-18 RX ADMIN — HEPARIN SODIUM 5000 UNITS: 5000 INJECTION INTRAVENOUS; SUBCUTANEOUS at 13:07

## 2022-07-18 RX ADMIN — BUSPIRONE HYDROCHLORIDE 10 MG: 5 TABLET ORAL at 08:19

## 2022-07-18 RX ADMIN — METHOCARBAMOL TABLETS 1000 MG: 500 TABLET, COATED ORAL at 10:19

## 2022-07-18 RX ADMIN — AMLODIPINE BESYLATE 10 MG: 10 TABLET ORAL at 08:19

## 2022-07-18 RX ADMIN — CLOPIDOGREL BISULFATE 75 MG: 75 TABLET ORAL at 08:18

## 2022-07-18 RX ADMIN — BUSPIRONE HYDROCHLORIDE 10 MG: 5 TABLET ORAL at 13:07

## 2022-07-18 RX ADMIN — HEPARIN SODIUM 5000 UNITS: 5000 INJECTION INTRAVENOUS; SUBCUTANEOUS at 08:16

## 2022-07-18 RX ADMIN — CIPROFLOXACIN 500 MG: 500 TABLET, FILM COATED ORAL at 08:18

## 2022-07-18 RX ADMIN — INSULIN LISPRO 15 UNITS: 100 INJECTION, SOLUTION INTRAVENOUS; SUBCUTANEOUS at 10:19

## 2022-07-18 RX ADMIN — GABAPENTIN 400 MG: 400 CAPSULE ORAL at 08:19

## 2022-07-18 RX ADMIN — FLUOXETINE 80 MG: 20 CAPSULE ORAL at 08:18

## 2022-07-18 RX ADMIN — INSULIN LISPRO 4 UNITS: 100 INJECTION, SOLUTION INTRAVENOUS; SUBCUTANEOUS at 13:14

## 2022-07-18 RX ADMIN — INSULIN LISPRO 4 UNITS: 100 INJECTION, SOLUTION INTRAVENOUS; SUBCUTANEOUS at 10:19

## 2022-07-18 RX ADMIN — ASPIRIN 81 MG CHEWABLE TABLET 81 MG: 81 TABLET CHEWABLE at 08:19

## 2022-07-18 RX ADMIN — INSULIN LISPRO 15 UNITS: 100 INJECTION, SOLUTION INTRAVENOUS; SUBCUTANEOUS at 13:14

## 2022-07-18 ASSESSMENT — PAIN SCALES - GENERAL: PAINLEVEL_OUTOF10: 0

## 2022-07-18 NOTE — PROGRESS NOTES
Report to [facility] transport. NANO completed. Discharge completed with patient. Jeff has been called several times with no answer and I am unable to give report. Will continue to attempt.

## 2022-07-18 NOTE — CARE COORDINATION
Scheduled [facility] through [facility] on 07/18/2022 at 6:30pm for room 2020.       [facility]

## 2022-07-18 NOTE — PROGRESS NOTES
Nephrology Progress Note  7/18/2022 1:37 PM        Subjective:   Admit Date: 7/9/2022  PCP: Genna Blizzard, MD    Interval History: Patient seen in early morning, this is a late entry    Diet: Little bit better    ROS: She finally feels better, slept well and has no diarrhea  No fever chills or rigor  She does not feel cold or shivering anymore      Data:     Current meds:    insulin glargine  20 Units SubCUTAneous Nightly    insulin lispro  0-6 Units SubCUTAneous 2 times per day    gabapentin  400 mg Oral BID    ciprofloxacin  500 mg Oral 2 times per day    levothyroxine  75 mcg Oral QAM AC    insulin lispro  15 Units SubCUTAneous TID WC    amLODIPine  10 mg Oral Daily    insulin lispro  0-12 Units SubCUTAneous TID WC    acetaminophen  1,000 mg Oral 3 times per day    methocarbamol  1,000 mg Oral 4x Daily    lisinopril  40 mg Oral Daily    atorvastatin  80 mg Oral Nightly    clopidogrel  75 mg Oral Daily    [Held by provider] metoprolol succinate  25 mg Oral Daily    busPIRone  10 mg Oral TID    Vitamin D  2,000 Units Oral Daily    donepezil  10 mg Oral Nightly    aspirin  81 mg Oral Daily    FLUoxetine  80 mg Oral Daily    sodium chloride flush  10 mL IntraVENous 2 times per day    heparin (porcine)  5,000 Units SubCUTAneous 3 times per day      dextrose      dextrose      sodium chloride           I/O last 3 completed shifts:   In: 2827 [P.O.:480; I.V.:2347]  Out: -     CBC:   Recent Labs     07/16/22  0805   WBC 4.3   HGB 10.9*             Recent Labs     07/16/22  0805      K 4.0      CO2 21   BUN 12   CREATININE 1.0   GLUCOSE 76       Lab Results   Component Value Date    CALCIUM 8.6 07/16/2022    PHOS 2.5 07/15/2022       Objective:     Vitals: BP 97/71   Pulse 99   Temp 98.3 °F (36.8 °C) (Oral)   Resp 15   Ht 5' 4.49\" (1.638 m)   Wt 242 lb 8.1 oz (110 kg)   SpO2 96%   BMI 41.00 kg/m² ,    General appearance: Alert, awake and oriented  HEENT: No gross conjunctival pallor  Neck: Supple  Lungs: No crackles on auscultation  Heart: Regular rate and rhythm  Abdomen: Soft, nontender  Extremities: No gross edema      Problem List :         Impression :     Acute kidney injury on top of CKD stage G3 a A3-stable  Diarrhea.-Has diabetes with proteinuria and hypertension  Not very well controlled mood disorder    Recommendation/Plan  :     May discharge from my standpoint  Blood pressure recorded in the epic likely inaccurate,-if possible confirmed with manual blood pressure  Good glycemic control  Low-salt, DASH diet  She will need a CMP, magnesium, phosphorus in 1 to 2 weeks  Follow-up with me in 2 to 3 weeks      Tevin Sanders MD MD

## 2022-07-18 NOTE — CARE COORDINATION
Spoke with Miesha at [facility] . She stated patient is approved for Admission and will need a full PAS/R and she will do LOC at SNF since patient has traditional Medicaid. Miesha stated that she is leaving work early today and will need to know soon if patient is coming today other wise patient will have to admit tomorrow.  PS to physician to provide update

## 2022-07-18 NOTE — CARE COORDINATION
Received VM from Miesha at [facility] requesting return call from [facility]. Returned call to Miesha and had to leave a VM.

## 2022-07-18 NOTE — DISCHARGE SUMMARY
V2.0  Discharge Summary    Name:  Tesfaye Sanchez /Age/Sex: 1960 (42 y.o. female)   Admit Date: 2022  Discharge Date: 22    MRN & CSN:  [id number] & [id number] Encounter Date and Time 22 3:02 PM EDT    Attending:  Franck Salguero, * Discharging Provider: Franck Salguero MD       Hospital Course:     Brief HPI: Tesfaye Sanchez is a 59 y.o.  female with a PMH of insulin-dependent type 2 DM, HTN, anxiety with depression, class III obesity, recurrent hospitalization who presented to the ED on 2022 with Seizure-like activity ([address] On arrival, patient was found to be hypoglycemic and this was corrected    Brief Problem Based Course:     Hyperglycemia due to poorly controlled diabetes: Non compliance. Status post insulin drip which was later switched to long-acting Lantus plus moderate sliding scale correction. Endocrine consulted. Will discharge on adjusted dose. To F/u with endocrine as outpatient. sinus pause. EKG ordered showed regular sinus rhythm. cardiology consulted. Seen by Dr Tim Ferrell and Dr Nelsy Mena (EPI). The symptom was likely du to vasovagal/ autonomic dysfunction due to prolonged uncontrolled DM. Advised to were compression stocking and remain hydrated. Can f/u with Dr. Nelsy Mena in 2 weeks. Seizure-like episode:. Possibly induced by hypoglycemia . Neurology consulted. As per which  description of events during not meet seizure criteria. Acute toxic metabolic encephalopathy: Multifactorial-hypoglycemia, MODESTO, high-dose Neurontin - resolved     Myoclonic jerks: Persistent. Consulted neurology. Gabapentin dose of 1200 mg twice daily. The dose was decreased to 400 mg twice daily after renal adjustment. Recurrent MODESTO/ATN with incomplete renal recovery versus CKD 3B. Recent  Cr baseline seems to be between 1.1-1.2. Renal function improved with gentle IV fluids. Nephrology consulted.  Advised f/u as outpatient     Diarrhea -acute gastroenteritis. stool test done -ve for infection. Oral hydration encouraged. Imodium as needed. Ciprofloxacin for total of 5 days. DJD: Exacerbated by fall/recurrent falls. Start scheduled extra strength Tylenol. Robaxin PRN     Decreased dose of Neurontin as this can contribute to falls     Heat and cold intolerance: TSH mildly elevated. resolved with low-dose Synthroid. Advised to f/u with endocrine     Class III obesity: Lifestyle modification counseling  DJD precludes exercise     PT OT evaluation -Recommended for SNF     Patient is medically stable for DC. The patient expressed appropriate understanding of, and agreement with the discharge recommendations, medications, and plan.      Consults this admission:  IP CONSULT TO NEUROLOGY  IP CONSULT TO NEPHROLOGY  IP CONSULT TO NEPHROLOGY  IP CONSULT TO NEUROLOGY  IP CONSULT TO ENDOCRINOLOGY  IP CONSULT TO CARDIOLOGY    Discharge Diagnosis:   Seizure-like activity [facility])    Discharge Instruction:   Follow up appointments: Endocrinology, nephrology, cardiology  Primary care physician: Phil Knapp MD within 2 weeks  Diet: diabetic diet   Activity: activity as tolerated  Disposition: Discharged to:   []Home, []C, [x]SNF, []Acute Rehab, []Hospice   Condition on discharge: Stable  Labs and Tests to be Followed up as an outpatient by PCP or Specialist:     Discharge Medications:        Medication List        ASK your doctor about these medications      amLODIPine 10 MG tablet  Commonly known as: NORVASC     aspirin 81 MG chewable tablet     atorvastatin 40 MG tablet  Commonly known as: LIPITOR     busPIRone 10 MG tablet  Commonly known as: BUSPAR     Cholecalciferol 50 MCG (2000 UT) Caps     clopidogrel 75 MG tablet  Commonly known as: PLAVIX     donepezil 5 MG tablet  Commonly known as: ARICEPT     FLUoxetine 40 MG capsule  Commonly known as: PROzac     gabapentin 600 MG tablet  Commonly known as: NEURONTIN     hydrALAZINE 25 MG tablet  Commonly known as: APRESOLINE  Take 1 tablet by mouth every 8 hours     insulin glargine 100 UNIT/ML injection vial  Commonly known as: LANTUS  Inject 30 Units into the skin nightly     insulin lispro 100 UNIT/ML injection vial  Commonly known as: HUMALOG  Check blood glucose three times daily before meals and at bedtime- for blood glucose <150- no insulin, 151-200=2, 201-250=4, 251-300=6, 301-350=8, 351-400=10, >400=12 units and call MD     levothyroxine 25 MCG tablet  Commonly known as: SYNTHROID     lidocaine 4 % external patch  Place 1 patch onto the skin daily     lisinopril 40 MG tablet  Commonly known as: PRINIVIL;ZESTRIL  Take 1 tablet by mouth daily     metFORMIN 500 MG tablet  Commonly known as: GLUCOPHAGE  Take 1 tablet by mouth 2 times daily (with meals)     metoprolol succinate 50 MG extended release tablet  Commonly known as: TOPROL XL  Take 1 tablet by mouth daily     * ondansetron 4 MG disintegrating tablet  Commonly known as: Zofran ODT  Take 1 tablet by mouth every 8 hours as needed for Nausea     * ondansetron 4 MG disintegrating tablet  Commonly known as: ZOFRAN-ODT  Take 1 tablet by mouth every 8 hours as needed for Nausea or Vomiting     pantoprazole 40 MG tablet  Commonly known as: PROTONIX  Take 1 tablet by mouth 2 times daily (before meals)     tiZANidine 4 MG tablet  Commonly known as: ZANAFLEX     Topamax 50 MG tablet  Generic drug: topiramate           * This list has 2 medication(s) that are the same as other medications prescribed for you. Read the directions carefully, and ask your doctor or other care provider to review them with you. Objective Findings at Discharge:   BP 97/71   Pulse 99   Temp 98.3 °F (36.8 °C) (Oral)   Resp 15   Ht 5' 4.49\" (1.638 m)   Wt 242 lb 8.1 oz (110 kg)   SpO2 96%   BMI 41.00 kg/m²       Physical Exam:   General: NAD  Eyes: EOMI  ENT: neck supple  Cardiovascular: Regular rate.   Respiratory: Clear to auscultation  Gastrointestinal: Soft, non tender  Genitourinary: no suprapubic tenderness  Musculoskeletal: No edema  Skin: warm, dry  Neuro: Alert. Psych: Mood appropriate. Labs and Imaging   Echocardiogram complete 2D with doppler with color    Result Date: 7/14/2022  Transthoracic Echocardiography Report (TTE)  Demographics   Patient Name       Mango Flor         Date of Study       07/14/2022                     BRANDEE CHERY   Date of Birth      1960         Gender              Female   Age                59 year(s)         Race                   Patient Number     [id number]         Room Number         2020   Visit Number       [id number]   Corporate ID       [id number]   Accession Number   [id number]         Sterling Antonio                                                            MARY   Ordering Physician Wilian Brown MD                 Physician           MD  Procedure Type of Study   TTE procedure:ECHOCARDIOGRAM COMPLETE 2D W DOPPLER W COLOR. Procedure Date Date: 07/14/2022 Start: 01:19 PM Study Location: Portable Technical Quality: Adequate visualization Indications:Abnormal ECG. Patient Status: Routine Height: 64 inches Weight: 247 pounds BSA: 2.14 m2 BMI: 42.4 kg/m2 HR: 84 bpm BP: 156/64 mmHg  Conclusions   Summary  Left ventricular systolic function is normal.  Ejection fraction is visually estimated at 50-55%. Mild left ventricular hypertrophy. No significant valvular disease noted. No evidence of any pericardial effusion. Signature   ------------------------------------------------------------------  Electronically signed by Micah Harmon MD (Interpreting  physician) on 07/14/2022 at 02:50 PM  ------------------------------------------------------------------   Findings   Left Ventricle  Left ventricular systolic function is normal.  Ejection fraction is visually estimated at 50-55%.   Left ventricle size is normal.  No regional wall motion abnormalites. Mild left ventricular hypertrophy. Indeterminate diastolic function; E/A flow reversal is noted. Left Atrium  Essentially normal left atrium. Right Atrium  Essentially normal right atrium. Right Ventricle  Essentially normal right ventricle. Aortic Valve  Structurally normal aortic valve. Mitral Valve  Mitral annular calcification is present. Trace mitral regurgitation. Tricuspid Valve  Trace tricuspid regurgitation is present. Pulmonic Valve  The pulmonic valve was not well visualized. Pericardial Effusion  No evidence of any pericardial effusion. Pleural Effusion  No evidence of pleural effusion. Miscellaneous  IVC is within normal limits. Abdominal aorta was not clearly visualized.   M-Mode/2D Measurements & Calculations   LV Diastolic Dimension:  LV Systolic Dimension:  LA Dimension: 3.3 cmAO Root  4.99 cm                  2.83 cm                 Dimension: 3.7 cmLA Area:  LV FS:43.3 %             LV Volume Diastolic: 70 22.5 cm2  LV PW Diastolic: 5.62 cm ml  LV PW Systolic: 4.72 cm  LV Volume Systolic: 32  Septum Diastolic: 3.63   ml  cm                       LV EDV/LV EDV Index: 70 RV Diastolic Dimension:  Septum Systolic: 6.82 cm AY/40 A6SB ESV/LV ESV   2.94 cm  CO: 6.34 l/min           Index: 32 ml/15 m2  CI: 2.96 l/m*m2          EF Calculated (A4C):    LA/Aorta: 0.89                           54.3 %  LV Area Diastolic: 77.3  EF Calculated (2D):     LA volume/Index: 28 ml  cm2                      74.3 %                  /65J0  LV Area Systolic: 89.6  cm2                      LV Length: 7.5 cm                            LVOT: 2.1 cm  Doppler Measurements & Calculations   MV Peak E-Wave: 126     AV Peak Velocity: 128 cm/s   LVOT Peak Velocity:  cm/s                    AV Peak Gradient: 6.55 mmHg  93.6 cm/s  MV Peak A-Wave: 143     AV Mean Velocity: 101 cm/s   LVOT Mean Velocity:  cm/s                    AV Mean Gradient: 4 mmHg     60.2 cm/s  MV E/A Ratio: 0.88 AV VTI: 28.5 cm              LVOT Peak Gradient: 4  MV Peak Gradient: 6.35  AV Area (Continuity):2.65    mmHgLVOT Mean Gradient:  mmHg                    cm2                          2 mmHg   MV P1/2t: 38 msec       LVOT VTI: 21.8 cm  MVA by PHT:5.79 cm2   MV E' Septal Velocity:  8.22 cm/s  MV E' Lateral Velocity:  10.1 cm/s  MV E/E' septal: 15.33  MV E/E' lateral: 12.48        CBC:   Recent Labs     07/16/22  0805   WBC 4.3   HGB 10.9*        BMP:    Recent Labs     07/16/22 0805      K 4.0      CO2 21   BUN 12   CREATININE 1.0   GLUCOSE 76     Hepatic:   Recent Labs     07/16/22 0805   AST 19   ALT 16   BILITOT 0.2   ALKPHOS 108     Lipids:   Lab Results   Component Value Date/Time    CHOL 176 05/12/2022 06:39 AM    HDL 62 05/12/2022 06:39 AM    TRIG 169 05/12/2022 06:39 AM     Hemoglobin A1C:   Lab Results   Component Value Date/Time    LABA1C 8.1 07/11/2022 07:45 AM     TSH: No results found for: TSH  Troponin:   Lab Results   Component Value Date/Time    TROPONINT <0.010 06/30/2022 12:06 PM    TROPONINT <0.010 03/17/2022 02:32 PM    TROPONINT <0.010 03/11/2022 04:50 PM     Lactic Acid: No results for input(s): LACTA in the last 72 hours. BNP: No results for input(s): PROBNP in the last 72 hours.   UA:  Lab Results   Component Value Date/Time    NITRU POSITIVE 07/11/2022 12:05 PM    COLORU YELLOW 07/11/2022 12:05 PM    WBCUA 34 07/11/2022 12:05 PM    RBCUA 1 07/11/2022 12:05 PM    MUCUS RARE 07/11/2022 12:05 PM    TRICHOMONAS NONE SEEN 07/11/2022 12:05 PM    YEAST MODERATE 07/11/2022 12:05 PM    BACTERIA NEGATIVE 07/11/2022 12:05 PM    CLARITYU SLIGHTLY CLOUDY 07/11/2022 12:05 PM    SPECGRAV 1.010 07/11/2022 12:05 PM    LEUKOCYTESUR NEGATIVE 07/11/2022 12:05 PM    UROBILINOGEN 0.2 07/11/2022 12:05 PM    BILIRUBINUR NEGATIVE 07/11/2022 12:05 PM    BLOODU SMALL 07/11/2022 12:05 PM    KETUA 15 07/11/2022 12:05 PM     Urine Cultures: No results found for: LABURIN  Blood Cultures: No results found for: BC  No results found for: BLOODCULT2  Organism: No results found for: ORG    Time Spent Discharging patient 40 minutes    Electronically signed by Karthik Brunner MD on 7/18/2022 at 3:02 PM

## 2022-07-18 NOTE — DISCHARGE INSTR - COC
Continuity of Care Form    Patient Name: Hoang Quick   :  1960  MRN:  [id number]    Admit date:  2022  Discharge date:  2022    Code Status Order: Full Code   Advance Directives:     Admitting Physician:  Abel Kaminski MD  PCP: Moraima Aguillon MD    Discharging Nurse: [facility] Unit/Room#: 2467/4979-W  Discharging Unit Phone Number: [phone]    Emergency Contact:   Extended Emergency Contact Information  Primary Emergency Contact: Sukhdevbarbaracathy Pantoja [address] Phone: [phone]  Mobile Phone: [phone]  Relation: Parent  Secondary Emergency Contact: [facility] Phone: [phone]  Mobile Phone: [phone]  Relation: Brother/Sister    Past Surgical History:  Past Surgical History:   Procedure Laterality Date    BREAST BIOPSY Right     CHOLECYSTECTOMY  ? EYE SURGERY      per old chart had right eye cataract ext done 2018 and left eye 2018    FOOT SURGERY Left ? HUMERUS FRACTURE SURGERY Left 2018    HUMERUS OPEN REDUCTION INTERNAL FIXATION LEFT PROXIMAL performed by Amaya Ignacio DO at [address] Right ? LUNG SURGERY  ?    simone lung lobectomy        Immunization History: There is no immunization history for the selected administration types on file for this patient.     Active Problems:  Patient Active Problem List   Diagnosis Code    Proximal humeral fracture S42.209A    Asthma J45.909    Bereavement Z63.4    Pain of both hip joints M25.551, M25.552    Chest pain R07.9    Chronic abdominal pain R10.9, G89.29    Chronic back pain M54.9, G89.29    Chronic obstructive lung disease (HCC) J44.9    Corns and callosities L84    Depressive disorder F32.9    Type II diabetes mellitus, uncontrolled (HCC) E11.65    Diabetic polyneuropathy (HCC) E11.42    HTN (hypertension) I10    Hyperlipidemia E78.5    Lesion of liver K76.9    Menopausal symptom N95.1    Mixed hyperlipidemia E78.2 Onychomycosis B35.1    Obesity E66.9    Osteoarthrosis M19.90    Osteoporosis M81.0    Pain in both feet M79.671, M79.672    Sleep apnea G47.30    TIA (transient ischemic attack) G45.9    Vitamin D deficiency E55.9    Wheezing R06.2    S/P ORIF (open reduction internal fixation) fracture Z98.890, Z87.81    Diabetic gastroparesis ([facility]) E11.43, K31.84    Gastroesophageal reflux disease K21.9    Restless legs G25.81    Ketoacidosis, diabetic, type 2, no coma ([facility]) E11.10    Repeated falls R29.6    Subclinical hypothyroidism E03.8    Vasovagal syncope R55       Isolation/Infection:   Isolation            C Diff Contact          Patient Infection Status       Infection Onset Added Last Indicated Last Indicated By Review Planned Expiration Resolved Resolved By    C-diff Rule Out 07/14/22 07/14/22 07/14/22 Clostridium Difficile Toxin/Antigen (Ordered)        Resolved    C-diff Rule Out 07/02/22 07/02/22 07/03/22 Clostridium Difficile Toxin/Antigen (Ordered)   07/03/22 Rule-Out Test Resulted    COVID-19 (Rule Out) 06/30/22 06/30/22 06/30/22 COVID-19, Rapid (Ordered)   06/30/22 Rule-Out Test Resulted    COVID-19 (Rule Out) 03/17/22 03/17/22 03/17/22 COVID-19, Rapid (Ordered)   03/17/22 Rule-Out Test Resulted    COVID-19 (Rule Out) 09/07/20 09/07/20 09/07/20 Covid-19 Ambulatory (Ordered)   09/09/20 Rule-Out Test Resulted            Nurse Assessment:  Last Vital Signs: BP 97/71   Pulse 99   Temp 98.3 °F (36.8 °C) (Oral)   Resp 15   Ht 5' 4.49\" (1.638 m)   Wt 242 lb 8.1 oz (110 kg)   SpO2 96%   BMI 41.00 kg/m²     Last documented pain score (0-10 scale): Pain Level: 0  Last Weight:   Wt Readings from Last 1 Encounters:   07/16/22 242 lb 8.1 oz (110 kg)     Mental Status:  oriented    IV Access:  - None    Nursing Mobility/ADLs:  Walking   Independent  Transfer  Independent  Bathing  Independent  Dressing  Independent  Toileting  Independent  Feeding  Independent  Med Admin  Independent  Med Delivery   none    Wound Care Documentation and Therapy:  Incision 12/31/18 Shoulder Left (Active)   Number of days: 1295        Elimination:  Continence: Bowel: Yes  Bladder: Yes  Urinary Catheter: None   Colostomy/Ileostomy/Ileal Conduit: No       Date of Last BM: 7/18/22    Intake/Output Summary (Last 24 hours) at 7/18/2022 1514  Last data filed at 7/17/2022 1800  Gross per 24 hour   Intake 120 ml   Output --   Net 120 ml     I/O last 3 completed shifts: In: 2827 [P.O.:480; I.V.:2347]  Out: -     Safety Concerns:     None    Impairments/Disabilities:      None    Nutrition Therapy:  Current Nutrition Therapy:   - Oral Diet:  General    Routes of Feeding: Oral  Liquids: No Restrictions  Daily Fluid Restriction: no  Last Modified Barium Swallow with Video (Video Swallowing Test): not done    Treatments at the Time of Hospital Discharge:   Respiratory Treatments: none  Oxygen Therapy:  is not on home oxygen therapy.   Ventilator:    - No ventilator support    Rehab Therapies: Physical Therapy  Weight Bearing Status/Restrictions: No weight bearing restrictions  Other Medical Equipment (for information only, NOT a DME order):  hospital bed  Other Treatments: Medication as scheduled    Patient's personal belongings (please select all that are sent with patient):  None    RN SIGNATURE:  Electronically signed by Luanne Perez RN on 7/18/22 at 3:26 PM EDT    CASE MANAGEMENT/SOCIAL WORK SECTION    Inpatient Status Date: 7/18/22    Readmission Risk Assessment Score:  Readmission Risk              Risk of Unplanned Readmission:  [id number]           Discharging to Facility/ Agency   Name:   Address:  Phone:  Fax:    Dialysis Facility (if applicable)   Name:  Address:  Dialysis Schedule:  Phone:  Fax:    / signature: Electronically signed by Luanne Perez RN on 7/18/22 at 3:26 PM EDT    PHYSICIAN SECTION    Prognosis: Guarded    Condition at Discharge: Stable    Rehab Potential (if transferring to Rehab): Guarded    Recommended Labs or Other Treatments After Discharge: Monitor for Diarrhea. Compression stockings for Vasovagal syndrome    Physician Certification: I certify the above information and transfer of Tiana Huffman  is necessary for the continuing treatment of the diagnosis listed and that she requires [facility] for greater 30 days.      Update Admission H&P: No change in H&P    PHYSICIAN SIGNATURE:  Electronically signed by Geena Pires MD on 7/18/22 at 3:19 PM EDT

## 2022-07-20 ENCOUNTER — HOSPITAL ENCOUNTER (OUTPATIENT)
Age: 62
Setting detail: SPECIMEN
Discharge: HOME | End: 2022-07-20
Payer: MEDICAID

## 2022-07-20 LAB
ALBUMIN SERPL-MCNC: 3.1 GM/DL (ref 3.4–5)
ALP BLD-CCNC: 109 IU/L (ref 40–128)
ALT SERPL-CCNC: 30 U/L (ref 10–40)
ANION GAP SERPL CALCULATED.3IONS-SCNC: 9 MMOL/L (ref 4–16)
AST SERPL-CCNC: 38 IU/L (ref 15–37)
BASOPHILS ABSOLUTE: 0 K/CU MM
BASOPHILS RELATIVE PERCENT: 0.4 % (ref 0–1)
BILIRUB SERPL-MCNC: 0.3 MG/DL (ref 0–1)
BUN BLDV-MCNC: 20 MG/DL (ref 6–23)
CALCIUM SERPL-MCNC: 8.7 MG/DL (ref 8.3–10.6)
CHLORIDE BLD-SCNC: 104 MMOL/L (ref 99–110)
CO2: 25 MMOL/L (ref 21–32)
CREAT SERPL-MCNC: 1.9 MG/DL (ref 0.6–1.1)
DIFFERENTIAL TYPE: ABNORMAL
EOSINOPHILS ABSOLUTE: 0.1 K/CU MM
EOSINOPHILS RELATIVE PERCENT: 2.5 % (ref 0–3)
GFR AFRICAN AMERICAN: 32 ML/MIN/1.73M2
GFR NON-AFRICAN AMERICAN: 27 ML/MIN/1.73M2
GLUCOSE BLD-MCNC: 304 MG/DL (ref 70–99)
HCT VFR BLD CALC: 33.3 % (ref 37–47)
HEMOGLOBIN: 10 GM/DL (ref 12.5–16)
IMMATURE NEUTROPHIL %: 0.6 % (ref 0–0.43)
LYMPHOCYTES ABSOLUTE: 1.8 K/CU MM
LYMPHOCYTES RELATIVE PERCENT: 35.1 % (ref 24–44)
MCH RBC QN AUTO: 29.2 PG (ref 27–31)
MCHC RBC AUTO-ENTMCNC: 30 % (ref 32–36)
MCV RBC AUTO: 97.4 FL (ref 78–100)
MONOCYTES ABSOLUTE: 0.5 K/CU MM
MONOCYTES RELATIVE PERCENT: 10.3 % (ref 0–4)
NUCLEATED RBC %: 0 %
PDW BLD-RTO: 13.2 % (ref 11.7–14.9)
PLATELET # BLD: 225 K/CU MM (ref 140–440)
PMV BLD AUTO: 10 FL (ref 7.5–11.1)
POTASSIUM SERPL-SCNC: 4.2 MMOL/L (ref 3.5–5.1)
RBC # BLD: 3.42 M/CU MM (ref 4.2–5.4)
SEGMENTED NEUTROPHILS ABSOLUTE COUNT: 2.6 K/CU MM
SEGMENTED NEUTROPHILS RELATIVE PERCENT: 51.1 % (ref 36–66)
SODIUM BLD-SCNC: 138 MMOL/L (ref 135–145)
TOTAL IMMATURE NEUTOROPHIL: 0.03 K/CU MM
TOTAL NUCLEATED RBC: 0 K/CU MM
TOTAL PROTEIN: 4.7 GM/DL (ref 6.4–8.2)
WBC # BLD: 5.2 K/CU MM (ref 4–10.5)

## 2022-07-20 PROCEDURE — 85025 COMPLETE CBC W/AUTO DIFF WBC: CPT

## 2022-07-20 PROCEDURE — 36415 COLL VENOUS BLD VENIPUNCTURE: CPT

## 2022-07-20 PROCEDURE — 80053 COMPREHEN METABOLIC PANEL: CPT

## 2022-07-21 ENCOUNTER — HOSPITAL ENCOUNTER (OUTPATIENT)
Age: 62
Setting detail: SPECIMEN
Discharge: HOME | End: 2022-07-21

## 2022-07-27 ENCOUNTER — HOSPITAL ENCOUNTER (OUTPATIENT)
Age: 62
Setting detail: SPECIMEN
Discharge: HOME | DRG: 469 | End: 2022-07-27
Payer: MEDICAID

## 2022-07-27 LAB
AMYLASE: 31 U/L (ref 25–115)
ANION GAP SERPL CALCULATED.3IONS-SCNC: 12 MMOL/L (ref 4–16)
BUN BLDV-MCNC: 21 MG/DL (ref 6–23)
CALCIUM SERPL-MCNC: 9 MG/DL (ref 8.3–10.6)
CHLORIDE BLD-SCNC: 100 MMOL/L (ref 99–110)
CO2: 28 MMOL/L (ref 21–32)
CREAT SERPL-MCNC: 1.6 MG/DL (ref 0.6–1.1)
GFR AFRICAN AMERICAN: 39 ML/MIN/1.73M2
GFR NON-AFRICAN AMERICAN: 33 ML/MIN/1.73M2
GLUCOSE BLD-MCNC: 325 MG/DL (ref 70–99)
HCT VFR BLD CALC: 34.9 % (ref 37–47)
HEMOGLOBIN: 10.8 GM/DL (ref 12.5–16)
LIPASE: 32 IU/L (ref 13–60)
MCH RBC QN AUTO: 29 PG (ref 27–31)
MCHC RBC AUTO-ENTMCNC: 30.9 % (ref 32–36)
MCV RBC AUTO: 93.8 FL (ref 78–100)
PDW BLD-RTO: 13 % (ref 11.7–14.9)
PLATELET # BLD: 261 K/CU MM (ref 140–440)
PMV BLD AUTO: 9.8 FL (ref 7.5–11.1)
POTASSIUM SERPL-SCNC: 5.2 MMOL/L (ref 3.5–5.1)
RBC # BLD: 3.72 M/CU MM (ref 4.2–5.4)
SODIUM BLD-SCNC: 140 MMOL/L (ref 135–145)
WBC # BLD: 5.7 K/CU MM (ref 4–10.5)

## 2022-07-27 PROCEDURE — 82150 ASSAY OF AMYLASE: CPT

## 2022-07-27 PROCEDURE — 36415 COLL VENOUS BLD VENIPUNCTURE: CPT

## 2022-07-27 PROCEDURE — 81001 URINALYSIS AUTO W/SCOPE: CPT

## 2022-07-27 PROCEDURE — 83690 ASSAY OF LIPASE: CPT

## 2022-07-27 PROCEDURE — 85027 COMPLETE CBC AUTOMATED: CPT

## 2022-07-27 PROCEDURE — 80048 BASIC METABOLIC PNL TOTAL CA: CPT

## 2022-07-27 PROCEDURE — 87086 URINE CULTURE/COLONY COUNT: CPT

## 2022-07-28 ENCOUNTER — HOSPITAL ENCOUNTER (INPATIENT)
Age: 62
LOS: 2 days | Discharge: HOME | DRG: 469 | End: 2022-07-30
Attending: INTERNAL MEDICINE | Admitting: INTERNAL MEDICINE
Payer: MEDICAID

## 2022-07-28 PROBLEM — N18.9 ACUTE KIDNEY INJURY SUPERIMPOSED ON CKD (HCC): Status: ACTIVE | Noted: 2022-07-28

## 2022-07-28 PROBLEM — N17.9 ACUTE KIDNEY INJURY SUPERIMPOSED ON CKD (HCC): Status: ACTIVE | Noted: 2022-07-28

## 2022-07-28 LAB
ALBUMIN SERPL-MCNC: 3.4 GM/DL (ref 3.4–5)
ALP BLD-CCNC: 112 IU/L (ref 40–129)
ALT SERPL-CCNC: 21 U/L (ref 10–40)
ANION GAP SERPL CALCULATED.3IONS-SCNC: 8 MMOL/L (ref 4–16)
AST SERPL-CCNC: 24 IU/L (ref 15–37)
BACTERIA: NEGATIVE /HPF
BASOPHILS ABSOLUTE: 0 K/CU MM
BASOPHILS RELATIVE PERCENT: 0.3 % (ref 0–1)
BILIRUB SERPL-MCNC: 0.3 MG/DL (ref 0–1)
BILIRUBIN URINE: ABNORMAL MG/DL
BLOOD, URINE: NEGATIVE
BUN BLDV-MCNC: 30 MG/DL (ref 6–23)
CALCIUM SERPL-MCNC: 8.3 MG/DL (ref 8.3–10.6)
CHLORIDE BLD-SCNC: 102 MMOL/L (ref 99–110)
CLARITY: CLEAR
CO2: 24 MMOL/L (ref 21–32)
COLOR: YELLOW
CREAT SERPL-MCNC: 2 MG/DL (ref 0.6–1.1)
CULTURE: NORMAL
DIFFERENTIAL TYPE: ABNORMAL
EOSINOPHILS ABSOLUTE: 0.1 K/CU MM
EOSINOPHILS RELATIVE PERCENT: 0.8 % (ref 0–3)
GFR AFRICAN AMERICAN: 30 ML/MIN/1.73M2
GFR NON-AFRICAN AMERICAN: 25 ML/MIN/1.73M2
GLUCOSE BLD-MCNC: 259 MG/DL (ref 70–99)
GLUCOSE BLD-MCNC: 289 MG/DL (ref 70–99)
GLUCOSE BLD-MCNC: 315 MG/DL (ref 70–99)
GLUCOSE, URINE: >1000 MG/DL
HCT VFR BLD CALC: 33.8 % (ref 37–47)
HEMOGLOBIN: 10.4 GM/DL (ref 12.5–16)
HYALINE CASTS: 5 /LPF
IMMATURE NEUTROPHIL %: 0.3 % (ref 0–0.43)
KETONES, URINE: ABNORMAL MG/DL
LEUKOCYTE ESTERASE, URINE: NEGATIVE
LYMPHOCYTES ABSOLUTE: 1.5 K/CU MM
LYMPHOCYTES RELATIVE PERCENT: 24.8 % (ref 24–44)
Lab: NORMAL
MCH RBC QN AUTO: 29.1 PG (ref 27–31)
MCHC RBC AUTO-ENTMCNC: 30.8 % (ref 32–36)
MCV RBC AUTO: 94.4 FL (ref 78–100)
MONOCYTES ABSOLUTE: 0.5 K/CU MM
MONOCYTES RELATIVE PERCENT: 8.1 % (ref 0–4)
MUCUS: ABNORMAL HPF
NITRITE URINE, QUANTITATIVE: NEGATIVE
NUCLEATED RBC %: 0 %
PDW BLD-RTO: 12.9 % (ref 11.7–14.9)
PH, URINE: 5.5 (ref 5–8)
PLATELET # BLD: 242 K/CU MM (ref 140–440)
PMV BLD AUTO: 9.9 FL (ref 7.5–11.1)
POTASSIUM SERPL-SCNC: 4.8 MMOL/L (ref 3.5–5.1)
PROTEIN UA: ABNORMAL MG/DL
RBC # BLD: 3.58 M/CU MM (ref 4.2–5.4)
RBC URINE: 1 /HPF (ref 0–6)
SEGMENTED NEUTROPHILS ABSOLUTE COUNT: 3.9 K/CU MM
SEGMENTED NEUTROPHILS RELATIVE PERCENT: 65.7 % (ref 36–66)
SODIUM BLD-SCNC: 134 MMOL/L (ref 135–145)
SPECIFIC GRAVITY UA: 1.02 (ref 1–1.03)
SPECIMEN: NORMAL
SQUAMOUS EPITHELIAL: 1 /HPF
TOTAL IMMATURE NEUTOROPHIL: 0.02 K/CU MM
TOTAL NUCLEATED RBC: 0 K/CU MM
TOTAL PROTEIN: 5 GM/DL (ref 6.4–8.2)
TRICHOMONAS: ABNORMAL /HPF
UROBILINOGEN, URINE: 0.2 MG/DL (ref 0.2–1)
WBC # BLD: 6 K/CU MM (ref 4–10.5)
WBC UA: 45 /HPF (ref 0–5)

## 2022-07-28 PROCEDURE — 1200000000 HC SEMI PRIVATE

## 2022-07-28 PROCEDURE — 6370000000 HC RX 637 (ALT 250 FOR IP): Performed by: NURSE PRACTITIONER

## 2022-07-28 PROCEDURE — 81001 URINALYSIS AUTO W/SCOPE: CPT

## 2022-07-28 PROCEDURE — 82962 GLUCOSE BLOOD TEST: CPT

## 2022-07-28 PROCEDURE — 36415 COLL VENOUS BLD VENIPUNCTURE: CPT

## 2022-07-28 PROCEDURE — 85025 COMPLETE CBC W/AUTO DIFF WBC: CPT

## 2022-07-28 PROCEDURE — 6370000000 HC RX 637 (ALT 250 FOR IP): Performed by: INTERNAL MEDICINE

## 2022-07-28 PROCEDURE — 2580000003 HC RX 258: Performed by: NURSE PRACTITIONER

## 2022-07-28 PROCEDURE — 80053 COMPREHEN METABOLIC PANEL: CPT

## 2022-07-28 RX ORDER — FLUOXETINE HYDROCHLORIDE 20 MG/1
80 CAPSULE ORAL DAILY
Status: DISCONTINUED | OUTPATIENT
Start: 2022-07-28 | End: 2022-07-30 | Stop reason: HOSPADM

## 2022-07-28 RX ORDER — SODIUM CHLORIDE 0.9 % (FLUSH) 0.9 %
5-40 SYRINGE (ML) INJECTION EVERY 12 HOURS SCHEDULED
Status: DISCONTINUED | OUTPATIENT
Start: 2022-07-28 | End: 2022-07-30 | Stop reason: HOSPADM

## 2022-07-28 RX ORDER — DEXTROSE MONOHYDRATE 100 MG/ML
INJECTION, SOLUTION INTRAVENOUS CONTINUOUS PRN
Status: DISCONTINUED | OUTPATIENT
Start: 2022-07-28 | End: 2022-07-30 | Stop reason: HOSPADM

## 2022-07-28 RX ORDER — ACETAMINOPHEN 650 MG/1
650 SUPPOSITORY RECTAL EVERY 6 HOURS PRN
Status: DISCONTINUED | OUTPATIENT
Start: 2022-07-28 | End: 2022-07-30 | Stop reason: HOSPADM

## 2022-07-28 RX ORDER — GABAPENTIN 300 MG/1
300 CAPSULE ORAL 2 TIMES DAILY
Status: DISCONTINUED | OUTPATIENT
Start: 2022-07-29 | End: 2022-07-30

## 2022-07-28 RX ORDER — INSULIN LISPRO 100 [IU]/ML
0-4 INJECTION, SOLUTION INTRAVENOUS; SUBCUTANEOUS
Status: DISCONTINUED | OUTPATIENT
Start: 2022-07-28 | End: 2022-07-30 | Stop reason: HOSPADM

## 2022-07-28 RX ORDER — GABAPENTIN 400 MG/1
400 CAPSULE ORAL 2 TIMES DAILY
Status: DISCONTINUED | OUTPATIENT
Start: 2022-07-29 | End: 2022-07-28

## 2022-07-28 RX ORDER — INSULIN LISPRO 100 [IU]/ML
0-4 INJECTION, SOLUTION INTRAVENOUS; SUBCUTANEOUS NIGHTLY
Status: DISCONTINUED | OUTPATIENT
Start: 2022-07-28 | End: 2022-07-30 | Stop reason: HOSPADM

## 2022-07-28 RX ORDER — ACETAMINOPHEN 325 MG/1
650 TABLET ORAL EVERY 6 HOURS PRN
Status: DISCONTINUED | OUTPATIENT
Start: 2022-07-28 | End: 2022-07-30 | Stop reason: HOSPADM

## 2022-07-28 RX ORDER — SODIUM CHLORIDE 9 MG/ML
INJECTION, SOLUTION INTRAVENOUS CONTINUOUS
Status: DISPENSED | OUTPATIENT
Start: 2022-07-28 | End: 2022-07-29

## 2022-07-28 RX ORDER — SODIUM CHLORIDE 0.9 % (FLUSH) 0.9 %
5-40 SYRINGE (ML) INJECTION PRN
Status: DISCONTINUED | OUTPATIENT
Start: 2022-07-28 | End: 2022-07-30 | Stop reason: HOSPADM

## 2022-07-28 RX ORDER — TOPIRAMATE 25 MG/1
50 TABLET ORAL NIGHTLY
Status: DISCONTINUED | OUTPATIENT
Start: 2022-07-28 | End: 2022-07-30 | Stop reason: HOSPADM

## 2022-07-28 RX ORDER — ASPIRIN 81 MG/1
81 TABLET, CHEWABLE ORAL DAILY
Status: DISCONTINUED | OUTPATIENT
Start: 2022-07-28 | End: 2022-07-30 | Stop reason: HOSPADM

## 2022-07-28 RX ORDER — DONEPEZIL HYDROCHLORIDE 10 MG/1
10 TABLET, FILM COATED ORAL NIGHTLY
Status: DISCONTINUED | OUTPATIENT
Start: 2022-07-28 | End: 2022-07-30 | Stop reason: HOSPADM

## 2022-07-28 RX ORDER — INSULIN GLARGINE 100 [IU]/ML
20 INJECTION, SOLUTION SUBCUTANEOUS NIGHTLY
Status: DISCONTINUED | OUTPATIENT
Start: 2022-07-28 | End: 2022-07-30 | Stop reason: HOSPADM

## 2022-07-28 RX ORDER — ONDANSETRON 4 MG/1
4 TABLET, ORALLY DISINTEGRATING ORAL EVERY 8 HOURS PRN
Status: DISCONTINUED | OUTPATIENT
Start: 2022-07-28 | End: 2022-07-30 | Stop reason: HOSPADM

## 2022-07-28 RX ORDER — BUSPIRONE HYDROCHLORIDE 5 MG/1
10 TABLET ORAL 3 TIMES DAILY
Status: DISCONTINUED | OUTPATIENT
Start: 2022-07-28 | End: 2022-07-30 | Stop reason: HOSPADM

## 2022-07-28 RX ORDER — CLOPIDOGREL BISULFATE 75 MG/1
75 TABLET ORAL DAILY
Status: DISCONTINUED | OUTPATIENT
Start: 2022-07-28 | End: 2022-07-30 | Stop reason: HOSPADM

## 2022-07-28 RX ORDER — BISMUTH SUBSALICYLATE 262 MG/1
524 TABLET, CHEWABLE ORAL
Status: DISCONTINUED | OUTPATIENT
Start: 2022-07-28 | End: 2022-07-30 | Stop reason: HOSPADM

## 2022-07-28 RX ORDER — LEVOTHYROXINE SODIUM 0.07 MG/1
75 TABLET ORAL
Status: DISCONTINUED | OUTPATIENT
Start: 2022-07-29 | End: 2022-07-30 | Stop reason: HOSPADM

## 2022-07-28 RX ORDER — ENOXAPARIN SODIUM 100 MG/ML
40 INJECTION SUBCUTANEOUS DAILY
Status: DISCONTINUED | OUTPATIENT
Start: 2022-07-28 | End: 2022-07-29

## 2022-07-28 RX ORDER — SODIUM CHLORIDE 9 MG/ML
INJECTION, SOLUTION INTRAVENOUS PRN
Status: DISCONTINUED | OUTPATIENT
Start: 2022-07-28 | End: 2022-07-30 | Stop reason: HOSPADM

## 2022-07-28 RX ORDER — ONDANSETRON 2 MG/ML
4 INJECTION INTRAMUSCULAR; INTRAVENOUS EVERY 6 HOURS PRN
Status: DISCONTINUED | OUTPATIENT
Start: 2022-07-28 | End: 2022-07-30 | Stop reason: HOSPADM

## 2022-07-28 RX ORDER — ATORVASTATIN CALCIUM 40 MG/1
80 TABLET, FILM COATED ORAL NIGHTLY
Status: DISCONTINUED | OUTPATIENT
Start: 2022-07-28 | End: 2022-07-30 | Stop reason: HOSPADM

## 2022-07-28 RX ORDER — POLYETHYLENE GLYCOL 3350 17 G/17G
17 POWDER, FOR SOLUTION ORAL DAILY PRN
Status: DISCONTINUED | OUTPATIENT
Start: 2022-07-28 | End: 2022-07-30 | Stop reason: HOSPADM

## 2022-07-28 RX ADMIN — INSULIN GLARGINE 20 UNITS: 100 INJECTION, SOLUTION SUBCUTANEOUS at 21:43

## 2022-07-28 RX ADMIN — ASPIRIN 81 MG 81 MG: 81 TABLET ORAL at 18:53

## 2022-07-28 RX ADMIN — SODIUM CHLORIDE, PRESERVATIVE FREE 10 ML: 5 INJECTION INTRAVENOUS at 21:47

## 2022-07-28 RX ADMIN — ATORVASTATIN CALCIUM 80 MG: 40 TABLET, FILM COATED ORAL at 21:46

## 2022-07-28 RX ADMIN — BUSPIRONE HYDROCHLORIDE 10 MG: 5 TABLET ORAL at 21:45

## 2022-07-28 RX ADMIN — FLUOXETINE 80 MG: 20 CAPSULE ORAL at 18:53

## 2022-07-28 RX ADMIN — SODIUM CHLORIDE: 9 INJECTION, SOLUTION INTRAVENOUS at 19:05

## 2022-07-28 RX ADMIN — BISMUTH SUBSALICYLATE 524 MG: 262 TABLET, CHEWABLE ORAL at 22:21

## 2022-07-28 RX ADMIN — INSULIN LISPRO 2 UNITS: 100 INJECTION, SOLUTION INTRAVENOUS; SUBCUTANEOUS at 19:48

## 2022-07-28 RX ADMIN — CLOPIDOGREL BISULFATE 75 MG: 75 TABLET ORAL at 18:53

## 2022-07-28 RX ADMIN — DONEPEZIL HYDROCHLORIDE 10 MG: 10 TABLET ORAL at 21:47

## 2022-07-28 RX ADMIN — TOPIRAMATE 50 MG: 25 TABLET, FILM COATED ORAL at 21:47

## 2022-07-28 ASSESSMENT — PAIN DESCRIPTION - ORIENTATION: ORIENTATION: RIGHT

## 2022-07-28 ASSESSMENT — PAIN DESCRIPTION - FREQUENCY: FREQUENCY: CONTINUOUS

## 2022-07-28 ASSESSMENT — PAIN DESCRIPTION - PAIN TYPE: TYPE: CHRONIC PAIN

## 2022-07-28 ASSESSMENT — PAIN DESCRIPTION - ONSET: ONSET: ON-GOING

## 2022-07-28 ASSESSMENT — PAIN SCALES - GENERAL
PAINLEVEL_OUTOF10: 0
PAINLEVEL_OUTOF10: 1
PAINLEVEL_OUTOF10: 0

## 2022-07-28 ASSESSMENT — PAIN SCALES - WONG BAKER: WONGBAKER_NUMERICALRESPONSE: 0

## 2022-07-28 ASSESSMENT — PAIN DESCRIPTION - LOCATION: LOCATION: EYE

## 2022-07-28 ASSESSMENT — PAIN - FUNCTIONAL ASSESSMENT: PAIN_FUNCTIONAL_ASSESSMENT: PREVENTS OR INTERFERES SOME ACTIVE ACTIVITIES AND ADLS

## 2022-07-28 ASSESSMENT — PAIN DESCRIPTION - DESCRIPTORS: DESCRIPTORS: PRESSURE

## 2022-07-28 NOTE — PROGRESS NOTES
Arrived on unit via medic on stretcher. Transferred x's 2 assist.  Awake and alert. No c\o pain or discomfort at present. No distress noted.

## 2022-07-28 NOTE — H&P
History and Physical      Name:  Laura Cm /Age/Sex: 1960  (59 y.o. female)   MRN & CSN:  [id number] & [id number] Admission Date/Time: 2022  4:27 PM   Location:  [address] [place] [address] PCP: Yohannes Prince MD       Hospital Day: 1           Assessment and Plan:   # Acute kidney injury on CKD stage III -creatinine ranged 1.3-1.6.,  Creat was 1.67/ post discharge on recent admission. Today BUN 33 creat 2.5 GFR 19 at [facility]. Suspect prerenal and hypotension contributing factors. Patient was intravenously hydrated prior to arrival.  We will continue IV hydration. Potassium CO2 wnl. Repeat BMP now. Hold metformin, ACEI Check PVR r/o urinary retention, UA. AVOID NSAIDS, IV contrast studies, nephrotoxins, keep MAP> 65, renal dose medications for current GFR, RFP in am, Consult Nephrology for eval/recs    # Hypotension with near syncope -suspect due to dehydration. BP improved with IV fluids. Patient denies chest pain. She was evaluated by Cardiology on 7/15/22 during her hospitalization and noted to have orthostatic hypotension. Was recommended the patient wear compression stockings and her hydralazine was discontinued. We will hold her current antihypertensives until BP more stable. Will obtain troponin and EKG, compression stockings. # ? Eye discharge -Pt denies having any discharge, although noted at [facility], Currently no drainage or erythema, will monitor     # Essential hypertension -hold antihypertensives until BP more stable hold ACEI until renal function at baseline.     # Diabetes mellitus type 2 with neuropathy -her blood glucose before meals and at bedtime, manage on basal and sliding scale insulin, ADA diet resume gabapentin at renal dosing    # COPD -not in exacerbation, resume inhalers     # Deconditioning - resume PT/OT when BP stable    Other chronic medical conditions-resume home medications unless contraindicated  # Hx CVA on asa/plavix statin  # Hypothyroidism, acquired -resume levothyroxine appears dose was recently adjusted  # Mixed hyperlipidemia -on statin  # History of sinus pause -3.6 seconds reportedly asymptomatic. It was noted on recent admission. Cardiology recommended to stop her beta-blocker. She is to follow-up with the EP on outpatient basis. We will monitor on telemetry. Follow-up EKG  # Chronic anxiety/depression  # Chronic right eye pain - followed by Neurology  Present on Admission:   Acute kidney injury superimposed on CKD ([address]             Diet ADULT DIET; Regular; 4 carb choices (60 gm/meal); Low Fat/Low Chol/High Fiber/2 gm Na   DVT Prophylaxis [x] Lovenox, []  Heparin, [] SCDs, [] Ambulation  [] Long term AC   GI Prophylaxis [] PPI,  [] H2 Blocker,  [] Carafate,  [] Diet/Tube Feeds   Code Status Full code    Disposition Admit to med surg. Patient plans to return ECF upon discharge         Chief Complaint: No chief complaint on file. History obtained from EHR and    History of Present Illness:   Hyun Davis is a 59 y.o. female  with history of CVA, essential hypertension, diabetes mellitus with neuropathy, hyperlipidemia, depression, asthma, hypothyroidism, chronic right eye pain who [facility] due to MODESTO on CKD. Patient was recently hospitalized 7/9 to 7/18/2022 here at [facility] for seizure-like activity which was felt to be due to hypoglycemia. She was also noted to have sinus pause evaluated by cardiology EP who felt it was likely due to vasal/vagal autonomic dysfunction. Cardiology noted to stop her beta-blocker. She was treated for MODESTO on CKD 3 evaluated by Nephrology which did improve with gentle IV hydration. Neurology evaluated the patient in regards to her myoclonic jerks which was not felt to be seizure activity. Her gabapentin was adjusted for renal dosing. The patient was evaluated by physical therapy and Occupational Therapy and was discharged to [facility] for rehab. See discharge summary for further details.   The patient presented to [address] [place] [address] with complaints of discharge from eyes and her chronic right eye pain. She was found to have worsening renal indices as well as hypotensive blood pressures. Her blood pressure did respond to IV fluids. She was transferred to [facility] for further management. Patient reports today at the nursing home she had dizziness upon standing. She was able to ambulate to the commode and had sudden onset nausea vomiting and felt like she was going to pass out. She denied any chest pain or shortness of breath. She states she had not had her morning blood pressure medications today. She reports a decreased appetite over the past few days. She states she has been trying to stay well-hydrated. Patient denies abdominal pain constipation or dysuria. Patient continues to have chronic right eye pain for which she is seeing neurology. She denies any discharge although noted at [facility]. Patient denies fever or chills. Denies any other acute issues. CT of the orbits was unremarkable. CT of the head showed no acute intracranial findings. Rapid COVID testing negative. Chemistry panel showed sodium 139, potassium 4.3, chloride 103, CO2 25, anion gap 11 glucose of 213 BUN 33 creat 2.5 GFR 19 calcium 9.0. CBC unremarkable. Ten point ROS: reviewed and are negative, unless as noted in above HPI. Objective:   No intake or output data in the 24 hours ending 07/28/22 1801     Vitals:   Vitals:    07/28/22 1657   BP: (!) 156/108   Pulse: 86   Resp: 18   Temp: 98.2 °F (36.8 °C)   TempSrc: Oral   SpO2: 98%       Physical Exam: 07/28/22     GEN -Awake  appearing female, in NAD. Appears given age. EYES -anicteric, conjunctiva pink, no drainage  HENT -Head is normocephalic, atraumatic. MM are moist. No evidence of thrush. NECK -Supple, no apparent thyromegaly or masses. RESP -CTA, no wheezes, rales or rhonchi. Symmetric chest movement   C/V -S1/S2 auscultated. RRR without appreciable M/R/G. No JVD. Cap refill <3 sec. No peripheral edema. GI -Abdomen is soft non distended, non tender to palpation. + BS. No masses or guarding.  -No CVA/ flank tenderness. LYMPH- No petechiae or ecchymoses. MS -No gross joint deformities. SKIN -Normal coloration, warm, dry. NEURO-Cranial nerves appear grossly intact, normal speech, no lateralizing weakness. PSYC-Awake, alert, oriented x 4 . Appropriate affect. Past Medical History:      Past Medical History:   Diagnosis Date    Arthritis     Asthma     Cerebral artery occlusion with cerebral infarction [facility])     per old chart 8/2015- TIA    COPD (chronic obstructive pulmonary disease) (HCC)     Diabetes mellitus ([facility]     Diabetic neuropathy ([facility]     per old chart    Fracture     per old chart pt in ER 12/9/2018- after 2 days of arm pain after fall- dx with left humerus fx- for surg 12/31/2018    History of blood transfusion     Hyperlipidemia     Hypertension     Muscle weakness (generalized)     Osteoarthritis     per old chart       PMH reviewed  Past Surgical  History:    has a past surgical history that includes Foot surgery (Left, 2004?); knee surgery (Right, 2009?); Cholecystectomy (2002?); Lung surgery (2009?); eye surgery; Humerus fracture surgery (Left, 12/31/2018); and Breast biopsy (Right). Surgical history reviewed  Family  History:   family history includes Breast Cancer (age of onset: 48) in her maternal aunt and maternal aunt.       Family history reviewed  Social History:     Social History     Socioeconomic History    Marital status:    Tobacco Use    Smoking status: Never    Smokeless tobacco: Never    Tobacco comments:     12/27/*2018- with phone assessment with caregiver- unsure of social, surgical or family hx for phone assessment   Vaping Use    Vaping Use: Never used   Substance and Sexual Activity    Alcohol use: Yes    Drug use: No    Sexual activity: Not Currently     Social Determinants of Health     Financial Resource Strain: Low Risk     Difficulty of Paying Living Expenses: Not hard at all   Food Insecurity: No Food Insecurity    Worried About [facility] in the Last Year: Never true    Ran Out of Food in the Last Year: Never true   Transportation Needs: No Transportation Needs    Lack of Transportation (Medical): No    Lack of Transportation (Non-Medical): No   Physical Activity: Inactive    Days of Exercise per Week: 0 days    Minutes of Exercise per Session: 0 min   Stress: Stress Concern Present    Feeling of Stress : Very much   Social Connections: Moderately Isolated    Frequency of Communication with Friends and Family: More than three times a week    Frequency of Social Gatherings with Friends and Family: Once a week    Attends [employer] Services: 1 to 4 times per year    Active Member of [address] [place] [address] or Organizations: No    Attends Club or Organization Meetings: Never    Marital Status:    Intimate Partner Violence: Not At Risk    Fear of Current or Ex-Partner: No    Emotionally Abused: No    Physically Abused: No    Sexually Abused: No   Housing Stability: Low Risk     Unable to Pay for Housing in the Last Year: No    Number of [place] in the Last Year: 2    Unstable Housing in the Last Year: No      reports that she has never smoked. She has never used smokeless tobacco.   reports current alcohol use. reports no history of drug use. Social history reviewed  Allergies:   No Known Allergies    Home Medications:     Prior to Admission medications    Medication Sig Start Date End Date Taking? Authorizing Provider   glucose 4 g chewable tablet Take 4 tablets by mouth as needed for Low blood sugar 7/18/22 8/17/22  Dar Jimenez MD   insulin lispro (HUMALOG) 100 UNIT/ML SOLN injection vial Inject 15 Units into the skin in the morning and 15 Units at noon and 15 Units in the evening. Inject with meals.  7/18/22 8/20/22  Dar Jimenez MD   amLODIPine (NORVASC) 10 MG tablet Take 1 tablet by mouth in the morning. 7/18/22 8/17/22  Charmayne Howell, MD   glucagon, rDNA, 1 MG injection Inject 1 mg into the muscle as needed for Low blood sugar (Blood glucose less than 70 mg/dL and patient NOT ALERT or NPO and does not have IV access.) 7/18/22 8/17/22  Charmayne Howell, MD   methocarbamol (ROBAXIN) 500 MG tablet Take 2 tablets by mouth in the morning and 2 tablets at noon and 2 tablets in the evening and 2 tablets before bedtime. Do all this for 10 days. 7/18/22 7/28/22  Charmayne Howell, MD   Vitamin D (CHOLECALCIFEROL) 50 MCG (2000 UT) TABS tablet Take 1 tablet by mouth in the morning. 7/18/22 8/17/22  Charmayne Howell, MD   insulin lispro (HUMALOG) 100 UNIT/ML SOLN injection vial Inject 0-12 Units into the skin 3 times daily (with meals) IF BS (blood sugar) < 139 No Insulin. IF -199 take 2 Units. IF -249 take 4 Units. -299 take 6 Units. IF -349 take 8 Units. -400 Take 10 Units. IF over Over 400 take 12 Units 7/18/22 8/17/22  Charmayne Howell, MD   gabapentin (NEURONTIN) 400 MG capsule Take 1 capsule by mouth in the morning and 1 capsule before bedtime. Do all this for 30 days.  7/18/22 8/17/22  Charmayne Howell, MD   insulin glargine (LANTUS) 100 UNIT/ML injection vial Inject 20 Units into the skin nightly 7/18/22   Charmayne Howell, MD   levothyroxine (SYNTHROID) 75 MCG tablet Take 1 tablet by mouth every morning (before breakfast) 7/19/22   Charmayne Howell, MD   bismuth subsalicylate (PEPTO BISMOL) 262 MG/15ML suspension Take 30 mLs by mouth every 6 hours as needed for Indigestion 7/18/22 8/2/22  Charmayne Howell, MD   insulin lispro (HUMALOG) 100 UNIT/ML SOLN injection vial Inject 0-6 Units into the skin at bedtime 7/18/22 8/17/22  Charmayne Howell, MD   aspirin 81 MG chewable tablet Take 81 mg by mouth daily    Historical Provider, MD   FLUoxetine (PROZAC) 40 MG capsule Take 80 mg by mouth daily Historical Provider, MD   metFORMIN (GLUCOPHAGE) 500 MG tablet Take 1 tablet by mouth 2 times daily (with meals) 3/22/22   Amber Sleeper, DO   hydrALAZINE (APRESOLINE) 25 MG tablet Take 1 tablet by mouth every 8 hours 3/22/22 7/18/22  Amber Sleeper, DO   lisinopril (PRINIVIL;ZESTRIL) 40 MG tablet Take 1 tablet by mouth daily 3/16/22   Hiwot Glynn MD   donepezil (ARICEPT) 5 MG tablet Take 10 mg by mouth nightly  9/13/21   Historical Provider, MD   busPIRone (BUSPAR) 10 MG tablet Take 10 mg by mouth 3 times daily  3/27/21   Historical Provider, MD   lidocaine 4 % external patch Place 1 patch onto the skin daily 9/16/20   Ana Saez MD   metoprolol succinate (TOPROL XL) 50 MG extended release tablet Take 1 tablet by mouth daily  Patient taking differently: Take 25 mg by mouth daily  1/25/19 7/18/22  Steph Rawls MD   pantoprazole (PROTONIX) 40 MG tablet Take 1 tablet by mouth 2 times daily (before meals)  Patient taking differently: Take 40 mg by mouth daily as needed  1/24/19 7/18/22  Steph Rawls MD   topiramate (TOPAMAX) 50 MG tablet Take 50 mg by mouth nightly     Historical Provider, MD   clopidogrel (PLAVIX) 75 MG tablet Take 75 mg by mouth daily    Historical Provider, MD   atorvastatin (LIPITOR) 40 MG tablet Take 80 mg by mouth nightly     Historical Provider, MD         Medications:   Medications:    aspirin  81 mg Oral Daily    atorvastatin  80 mg Oral Nightly    busPIRone  10 mg Oral TID    clopidogrel  75 mg Oral Daily    donepezil  10 mg Oral Nightly    FLUoxetine  80 mg Oral Daily    [START ON 7/29/2022] gabapentin  400 mg Oral BID    insulin glargine  20 Units SubCUTAneous Nightly    [START ON 7/29/2022] levothyroxine  75 mcg Oral QAM AC    topiramate  50 mg Oral Nightly    sodium chloride flush  5-40 mL IntraVENous 2 times per day    enoxaparin  40 mg SubCUTAneous Daily    insulin lispro  0-4 Units SubCUTAneous TID WC    insulin lispro  0-4 Units SubCUTAneous Nightly      Infusions: sodium chloride      sodium chloride      dextrose       PRN Meds: sodium chloride flush, 5-40 mL, PRN  sodium chloride, , PRN  ondansetron, 4 mg, Q8H PRN   Or  ondansetron, 4 mg, Q6H PRN  polyethylene glycol, 17 g, Daily PRN  acetaminophen, 650 mg, Q6H PRN   Or  acetaminophen, 650 mg, Q6H PRN  glucose, 4 tablet, PRN  dextrose bolus, 125 mL, PRN   Or  dextrose bolus, 250 mL, PRN  glucagon (rDNA), 1 mg, PRN  dextrose, , Continuous PRN        Data:     Laboratory this visit:  Reviewed  Recent Labs     07/27/22 0622   WBC 5.7   HGB 10.8*   HCT 34.9*         Recent Labs     07/27/22 0622      K 5.2*      CO2 28   BUN 21   CREATININE 1.6*     No results for input(s): AST, ALT, ALB, BILIDIR, BILITOT, ALKPHOS in the last 72 hours. No results for input(s): INR in the last 72 hours. Radiology this visit:  Reviewed. Echocardiogram complete 2D with doppler with color    Result Date: 7/14/2022  Transthoracic Echocardiography Report (TTE)  Demographics   Patient Name       Mango Flor         Date of Study       07/14/2022                     BRANDEE L   Date of Birth      1960         Gender              Female   Age                59 year(s)         Race                   Patient Number     [id number]         Room Number         2020   Visit Number       [id number]   Corporate ID       [id number]   Accession Number   [id number]         Sterling Antonio                                                            [facility]   Ordering Physician Wilian Brown MD                 Physician           MD  Procedure Type of Study   TTE procedure:ECHOCARDIOGRAM COMPLETE 2D W DOPPLER W COLOR. Procedure Date Date: 07/14/2022 Start: 01:19 PM Study Location: Portable Technical Quality: Adequate visualization Indications:Abnormal ECG.  Patient Status: Routine Height: 64 inches Weight: 247 pounds BSA: 2.14 m2 BMI: 42.4 kg/m2 HR: 84 bpm BP: 156/64 mmHg  Conclusions   Summary  Left ventricular systolic function is normal.  Ejection fraction is visually estimated at 50-55%. Mild left ventricular hypertrophy. No significant valvular disease noted. No evidence of any pericardial effusion. Signature   ------------------------------------------------------------------  Electronically signed by Dom Calvin MD (Interpreting  physician) on 07/14/2022 at 02:50 PM  ------------------------------------------------------------------   Findings   Left Ventricle  Left ventricular systolic function is normal.  Ejection fraction is visually estimated at 50-55%. Left ventricle size is normal.  No regional wall motion abnormalites. Mild left ventricular hypertrophy. Indeterminate diastolic function; E/A flow reversal is noted. Left Atrium  Essentially normal left atrium. Right Atrium  Essentially normal right atrium. Right Ventricle  Essentially normal right ventricle. Aortic Valve  Structurally normal aortic valve. Mitral Valve  Mitral annular calcification is present. Trace mitral regurgitation. Tricuspid Valve  Trace tricuspid regurgitation is present. Pulmonic Valve  The pulmonic valve was not well visualized. Pericardial Effusion  No evidence of any pericardial effusion. Pleural Effusion  No evidence of pleural effusion. Miscellaneous  IVC is within normal limits. Abdominal aorta was not clearly visualized.   M-Mode/2D Measurements & Calculations   LV Diastolic Dimension:  LV Systolic Dimension:  LA Dimension: 3.3 cmAO Root  4.99 cm                  2.83 cm                 Dimension: 3.7 cmLA Area:  LV FS:43.3 %             LV Volume Diastolic: 70 72.3 cm2  LV PW Diastolic: 5.90 cm ml  LV PW Systolic: 6.11 cm  LV Volume Systolic: 32  Septum Diastolic: 5.72   ml  cm                       LV EDV/LV EDV Index: 70 RV Diastolic Dimension:  Septum Systolic: 5.89 cm LE/29 [id number] ESV/LV ESV   2.94 cm  CO: 6.34 l/min Index: 32 ml/15 m2  CI: 2.96 l/m*m2          EF Calculated (A4C):    LA/Aorta: 0.89                           54.3 %  LV Area Diastolic: 74.1  EF Calculated (2D):     LA volume/Index: 28 ml  cm2                      74.3 %                  /77X3  LV Area Systolic: 16.9  cm2                      LV Length: 7.5 cm                            LVOT: 2.1 cm  Doppler Measurements & Calculations   MV Peak E-Wave: 126     AV Peak Velocity: 128 cm/s   LVOT Peak Velocity:  cm/s                    AV Peak Gradient: 6.55 mmHg  93.6 cm/s  MV Peak A-Wave: 143     AV Mean Velocity: 101 cm/s   LVOT Mean Velocity:  cm/s                    AV Mean Gradient: 4 mmHg     60.2 cm/s  MV E/A Ratio: 0.88      AV VTI: 28.5 cm              LVOT Peak Gradient: 4  MV Peak Gradient: 6.35  AV Area (Continuity):2.65    mmHgLVOT Mean Gradient:  mmHg                    cm2                          2 mmHg   MV P1/2t: 38 msec       LVOT VTI: 21.8 cm  MVA by PHT:5.79 cm2   MV E' Septal Velocity:  8.22 cm/s  MV E' Lateral Velocity:  10.1 cm/s  MV E/E' septal: 15.33  MV E/E' lateral: 12.48      CT ABDOMEN PELVIS WO CONTRAST Additional Contrast? None    Result Date: 7/5/2022  EXAMINATION: CT OF THE ABDOMEN AND PELVIS WITHOUT CONTRAST 6/30/2022 12:40 pm TECHNIQUE: CT of the abdomen and pelvis was performed without the administration of intravenous contrast. Multiplanar reformatted images are provided for review. Automated exposure control, iterative reconstruction, and/or weight based adjustment of the mA/kV was utilized to reduce the radiation dose to as low as reasonably achievable. COMPARISON: Prior studies most recent 03/11/2022.  HISTORY: ORDERING SYSTEM PROVIDED HISTORY: fever/chills/cough/nausea/abd pain x 3 days TECHNOLOGIST PROVIDED HISTORY: Reason for exam:->fever/chills/cough/nausea/abd pain x 3 days Additional Contrast?->None Decision Support Exception - unselect if not a suspected or confirmed emergency medical condition->Emergency Medical Condition (MA) Reason for Exam: fever/chills/cough/nausea/abd pain x 3 days FINDINGS: There is degradation of image quality related to patient body habitus. Lower Chest: Linear densities at the left lung base may represent minimal atelectasis and/or scar. Coronary artery calcification is identified. There is minimal pericardial effusion which is increased when compared to the study of 03/11/2022. Organs: Status post cholecystectomy. As visualized on this noncontrast study, liver, spleen, pancreas, adrenal glands, and kidneys are unchanged in appearance. GI/Bowel: Again noted is presence of small hiatal hernia. Small bowel normal in caliber with no findings to suggest presence of obstruction. Normal appearing appendix is identified. There is lipomatous ileocecal valve. Fat deposition within the wall of portion of the ascending colon is again identified. Finding may be on the basis of patient body habitus versus changes of prior infectious/inflammatory process. Left colon is relatively decompressed. No focal fluid collection to suggest presence of abscess. No evidence of intraperitoneal free air. Pelvis: Uterus and adnexal structures are unchanged in appearance. Urinary bladder suboptimally evaluated due to decompressed state. No evidence of free fluid within the pelvis. Peritoneum/Retroperitoneum: No new abdominal/pelvic lymphadenopathy is identified. Mild atherosclerotic changes of the abdominal aorta with no evidence of aneurysm. Incidental note is made of extensive arterial vascular calcification about the abdomen. Bones/Soft Tissues: There are degenerative changes of the spine with no evidence of acute osseous abnormality. Mild edema identified within midline subcutaneous soft tissues of the back. Very tiny umbilical hernia containing fat is identified. Induration of subcutaneous soft tissues along the inferior aspect of patient's panniculus again identified (image 144).  Finding could be related to areas of scarring. Again noted is posttraumatic deformity involving lateral aspects of several right ribs. There is focal area of bony/calcific synostosis between lateral aspects of these ribs (images 15-19). 1. No evidence of acute intra-abdominal abnormality. No evidence of bowel obstruction, intraperitoneal free air, or abscess. 2. Status post cholecystectomy. 3. Coronary artery calcification. Minimal pericardial effusion which appears slightly increased when compared to the study of 03/11/2022 as described above. 4. Small sliding hiatal hernia. XR CHEST PORTABLE    Result Date: 7/4/2022  EXAMINATION: ONE XRAY VIEW OF THE CHEST 7/4/2022 5:12 am COMPARISON: June 30, 2022 HISTORY: ORDERING SYSTEM PROVIDED HISTORY: SOB TECHNOLOGIST PROVIDED HISTORY: Reason for exam:->SOB Reason for Exam: SOB FINDINGS: No lines or tubes. Stable cardiomediastinal silhouette. The lungs are clear without focal consolidation or pleural effusion. No suspicious pulmonary nodules. No pulmonary edema. No pneumothorax. No acute osseous abnormality. No acute cardiopulmonary disease. XR CHEST PORTABLE    Result Date: 6/30/2022  EXAMINATION: ONE XRAY VIEW OF THE CHEST 6/30/2022 11:49 am COMPARISON: 03/17/2022 HISTORY: ORDERING SYSTEM PROVIDED HISTORY: cough/fever TECHNOLOGIST PROVIDED HISTORY: Reason for exam:->cough/fever Reason for Exam: cough/ fever FINDINGS: The lungs are without acute focal process. There is no effusion or pneumothorax. The cardiomediastinal silhouette is stable. The osseous structures are stable. No acute process. US RETROPERITONEAL LIMITED    Result Date: 7/10/2022  EXAMINATION: ULTRASOUND OF THE KIDNEYS 7/10/2022 9:31 am COMPARISON: Correlation is made to CT of the abdomen and pelvis dated June 30, 2022. HISTORY: ORDERING SYSTEM PROVIDED HISTORY: MODESTO TECHNOLOGIST PROVIDED HISTORY: Reason for exam:->MODESTO FINDINGS: Right kidney: 10.1 x 5.7 x 5.5 cm. Renal cortical thickness is 0.9 cm.

## 2022-07-29 PROBLEM — R11.2 NAUSEA AND VOMITING: Status: ACTIVE | Noted: 2022-07-29

## 2022-07-29 PROBLEM — N17.9 ACUTE KIDNEY INJURY (HCC): Status: ACTIVE | Noted: 2022-07-29

## 2022-07-29 LAB
ANION GAP SERPL CALCULATED.3IONS-SCNC: 10 MMOL/L (ref 4–16)
BASOPHILS ABSOLUTE: 0 K/CU MM
BASOPHILS RELATIVE PERCENT: 0.6 % (ref 0–1)
BUN BLDV-MCNC: 23 MG/DL (ref 6–23)
CALCIUM SERPL-MCNC: 8.3 MG/DL (ref 8.3–10.6)
CHLORIDE BLD-SCNC: 106 MMOL/L (ref 99–110)
CHLORIDE URINE RANDOM: 95 MMOL/L (ref 43–210)
CO2: 25 MMOL/L (ref 21–32)
CREAT SERPL-MCNC: 1.6 MG/DL (ref 0.6–1.1)
CREATININE URINE: 77.3 MG/DL
DIFFERENTIAL TYPE: ABNORMAL
EKG ATRIAL RATE: 82 BPM
EKG DIAGNOSIS: NORMAL
EKG P AXIS: 24 DEGREES
EKG P-R INTERVAL: 132 MS
EKG Q-T INTERVAL: 402 MS
EKG QRS DURATION: 94 MS
EKG QTC CALCULATION (BAZETT): 469 MS
EKG R AXIS: 3 DEGREES
EKG T AXIS: 98 DEGREES
EKG VENTRICULAR RATE: 82 BPM
EOSINOPHIL, URINE: NORMAL /HPF
EOSINOPHILS ABSOLUTE: 0.1 K/CU MM
EOSINOPHILS RELATIVE PERCENT: 2.8 % (ref 0–3)
GFR AFRICAN AMERICAN: 39 ML/MIN/1.73M2
GFR NON-AFRICAN AMERICAN: 33 ML/MIN/1.73M2
GLUCOSE BLD-MCNC: 131 MG/DL (ref 70–99)
GLUCOSE BLD-MCNC: 142 MG/DL (ref 70–99)
GLUCOSE BLD-MCNC: 166 MG/DL (ref 70–99)
GLUCOSE BLD-MCNC: 179 MG/DL (ref 70–99)
GLUCOSE BLD-MCNC: 192 MG/DL (ref 70–99)
GLUCOSE BLD-MCNC: 227 MG/DL (ref 70–99)
HCT VFR BLD CALC: 33.4 % (ref 37–47)
HEMOGLOBIN: 10.3 GM/DL (ref 12.5–16)
IMMATURE NEUTROPHIL %: 0.2 % (ref 0–0.43)
LYMPHOCYTES ABSOLUTE: 1.9 K/CU MM
LYMPHOCYTES RELATIVE PERCENT: 37.4 % (ref 24–44)
MCH RBC QN AUTO: 29.2 PG (ref 27–31)
MCHC RBC AUTO-ENTMCNC: 30.8 % (ref 32–36)
MCV RBC AUTO: 94.6 FL (ref 78–100)
MONOCYTES ABSOLUTE: 0.5 K/CU MM
MONOCYTES RELATIVE PERCENT: 10.4 % (ref 0–4)
NUCLEATED RBC %: 0 %
PDW BLD-RTO: 13.1 % (ref 11.7–14.9)
PLATELET # BLD: 234 K/CU MM (ref 140–440)
PMV BLD AUTO: 9.8 FL (ref 7.5–11.1)
POTASSIUM SERPL-SCNC: 4.4 MMOL/L (ref 3.5–5.1)
POTASSIUM, UR: 10.1 MMOL/L (ref 22–119)
RBC # BLD: 3.53 M/CU MM (ref 4.2–5.4)
SEGMENTED NEUTROPHILS ABSOLUTE COUNT: 2.5 K/CU MM
SEGMENTED NEUTROPHILS RELATIVE PERCENT: 48.6 % (ref 36–66)
SODIUM BLD-SCNC: 141 MMOL/L (ref 135–145)
SODIUM URINE: 133 MMOL/L (ref 35–167)
TOTAL IMMATURE NEUTOROPHIL: 0.01 K/CU MM
TOTAL NUCLEATED RBC: 0 K/CU MM
TOTAL RETICULOCYTE COUNT: 0.09 K/CU MM
TROPONIN T: <0.01 NG/ML
TROPONIN T: <0.01 NG/ML
WBC # BLD: 5.1 K/CU MM (ref 4–10.5)

## 2022-07-29 PROCEDURE — 1200000000 HC SEMI PRIVATE

## 2022-07-29 PROCEDURE — 93005 ELECTROCARDIOGRAM TRACING: CPT | Performed by: NURSE PRACTITIONER

## 2022-07-29 PROCEDURE — 82436 ASSAY OF URINE CHLORIDE: CPT

## 2022-07-29 PROCEDURE — 85025 COMPLETE CBC W/AUTO DIFF WBC: CPT

## 2022-07-29 PROCEDURE — 97116 GAIT TRAINING THERAPY: CPT

## 2022-07-29 PROCEDURE — 80048 BASIC METABOLIC PNL TOTAL CA: CPT

## 2022-07-29 PROCEDURE — 84300 ASSAY OF URINE SODIUM: CPT

## 2022-07-29 PROCEDURE — C9113 INJ PANTOPRAZOLE SODIUM, VIA: HCPCS | Performed by: INTERNAL MEDICINE

## 2022-07-29 PROCEDURE — 6370000000 HC RX 637 (ALT 250 FOR IP): Performed by: NURSE PRACTITIONER

## 2022-07-29 PROCEDURE — 87205 SMEAR GRAM STAIN: CPT

## 2022-07-29 PROCEDURE — 6370000000 HC RX 637 (ALT 250 FOR IP): Performed by: INTERNAL MEDICINE

## 2022-07-29 PROCEDURE — 82962 GLUCOSE BLOOD TEST: CPT

## 2022-07-29 PROCEDURE — 6360000002 HC RX W HCPCS: Performed by: INTERNAL MEDICINE

## 2022-07-29 PROCEDURE — 36415 COLL VENOUS BLD VENIPUNCTURE: CPT

## 2022-07-29 PROCEDURE — 97162 PT EVAL MOD COMPLEX 30 MIN: CPT

## 2022-07-29 PROCEDURE — 82570 ASSAY OF URINE CREATININE: CPT

## 2022-07-29 PROCEDURE — 2580000003 HC RX 258: Performed by: NURSE PRACTITIONER

## 2022-07-29 PROCEDURE — 6360000002 HC RX W HCPCS: Performed by: NURSE PRACTITIONER

## 2022-07-29 PROCEDURE — 6360000002 HC RX W HCPCS: Performed by: STUDENT IN AN ORGANIZED HEALTH CARE EDUCATION/TRAINING PROGRAM

## 2022-07-29 PROCEDURE — 84484 ASSAY OF TROPONIN QUANT: CPT

## 2022-07-29 PROCEDURE — 93010 ELECTROCARDIOGRAM REPORT: CPT | Performed by: INTERNAL MEDICINE

## 2022-07-29 PROCEDURE — 84133 ASSAY OF URINE POTASSIUM: CPT

## 2022-07-29 PROCEDURE — 94761 N-INVAS EAR/PLS OXIMETRY MLT: CPT

## 2022-07-29 PROCEDURE — 97165 OT EVAL LOW COMPLEX 30 MIN: CPT

## 2022-07-29 RX ORDER — METOCLOPRAMIDE HYDROCHLORIDE 5 MG/ML
10 INJECTION INTRAMUSCULAR; INTRAVENOUS
Status: DISCONTINUED | OUTPATIENT
Start: 2022-07-29 | End: 2022-07-30 | Stop reason: HOSPADM

## 2022-07-29 RX ORDER — MECLIZINE HYDROCHLORIDE 25 MG/1
12.5 TABLET ORAL 3 TIMES DAILY PRN
Status: DISCONTINUED | OUTPATIENT
Start: 2022-07-29 | End: 2022-07-30 | Stop reason: HOSPADM

## 2022-07-29 RX ORDER — LOPERAMIDE HYDROCHLORIDE 2 MG/1
2 CAPSULE ORAL 3 TIMES DAILY
Status: DISCONTINUED | OUTPATIENT
Start: 2022-07-29 | End: 2022-07-30 | Stop reason: HOSPADM

## 2022-07-29 RX ORDER — HEPARIN SODIUM 5000 [USP'U]/ML
5000 INJECTION, SOLUTION INTRAVENOUS; SUBCUTANEOUS EVERY 8 HOURS SCHEDULED
Status: DISCONTINUED | OUTPATIENT
Start: 2022-07-29 | End: 2022-07-30 | Stop reason: HOSPADM

## 2022-07-29 RX ORDER — PANTOPRAZOLE SODIUM 40 MG/10ML
40 INJECTION, POWDER, LYOPHILIZED, FOR SOLUTION INTRAVENOUS
Status: DISCONTINUED | OUTPATIENT
Start: 2022-07-29 | End: 2022-07-30 | Stop reason: HOSPADM

## 2022-07-29 RX ADMIN — CLOPIDOGREL BISULFATE 75 MG: 75 TABLET ORAL at 09:15

## 2022-07-29 RX ADMIN — INSULIN GLARGINE 20 UNITS: 100 INJECTION, SOLUTION SUBCUTANEOUS at 21:52

## 2022-07-29 RX ADMIN — FLUOXETINE 80 MG: 20 CAPSULE ORAL at 09:14

## 2022-07-29 RX ADMIN — ATORVASTATIN CALCIUM 80 MG: 40 TABLET, FILM COATED ORAL at 21:48

## 2022-07-29 RX ADMIN — BUSPIRONE HYDROCHLORIDE 10 MG: 5 TABLET ORAL at 21:47

## 2022-07-29 RX ADMIN — BISMUTH SUBSALICYLATE 524 MG: 262 TABLET, CHEWABLE ORAL at 21:50

## 2022-07-29 RX ADMIN — BISMUTH SUBSALICYLATE 524 MG: 262 TABLET, CHEWABLE ORAL at 05:18

## 2022-07-29 RX ADMIN — HEPARIN SODIUM 5000 UNITS: 5000 INJECTION INTRAVENOUS; SUBCUTANEOUS at 22:20

## 2022-07-29 RX ADMIN — SODIUM CHLORIDE: 9 INJECTION, SOLUTION INTRAVENOUS at 05:18

## 2022-07-29 RX ADMIN — BISMUTH SUBSALICYLATE 524 MG: 262 TABLET, CHEWABLE ORAL at 12:52

## 2022-07-29 RX ADMIN — BISMUTH SUBSALICYLATE 524 MG: 262 TABLET, CHEWABLE ORAL at 18:30

## 2022-07-29 RX ADMIN — PANTOPRAZOLE SODIUM 40 MG: 40 INJECTION, POWDER, FOR SOLUTION INTRAVENOUS at 21:55

## 2022-07-29 RX ADMIN — LEVOTHYROXINE SODIUM 75 MCG: 0.07 TABLET ORAL at 05:19

## 2022-07-29 RX ADMIN — HEPARIN SODIUM 5000 UNITS: 5000 INJECTION INTRAVENOUS; SUBCUTANEOUS at 10:32

## 2022-07-29 RX ADMIN — GABAPENTIN 300 MG: 300 CAPSULE ORAL at 21:48

## 2022-07-29 RX ADMIN — DONEPEZIL HYDROCHLORIDE 10 MG: 10 TABLET ORAL at 21:47

## 2022-07-29 RX ADMIN — HEPARIN SODIUM 5000 UNITS: 5000 INJECTION INTRAVENOUS; SUBCUTANEOUS at 15:27

## 2022-07-29 RX ADMIN — BUSPIRONE HYDROCHLORIDE 10 MG: 5 TABLET ORAL at 15:27

## 2022-07-29 RX ADMIN — LOPERAMIDE HYDROCHLORIDE 2 MG: 2 CAPSULE ORAL at 21:48

## 2022-07-29 RX ADMIN — GABAPENTIN 300 MG: 300 CAPSULE ORAL at 09:15

## 2022-07-29 RX ADMIN — SODIUM CHLORIDE, PRESERVATIVE FREE 10 ML: 5 INJECTION INTRAVENOUS at 22:20

## 2022-07-29 RX ADMIN — SODIUM CHLORIDE: 9 INJECTION, SOLUTION INTRAVENOUS at 15:58

## 2022-07-29 RX ADMIN — METOCLOPRAMIDE 10 MG: 5 INJECTION, SOLUTION INTRAMUSCULAR; INTRAVENOUS at 21:55

## 2022-07-29 RX ADMIN — ASPIRIN 81 MG 81 MG: 81 TABLET ORAL at 09:14

## 2022-07-29 RX ADMIN — BUSPIRONE HYDROCHLORIDE 10 MG: 5 TABLET ORAL at 05:19

## 2022-07-29 RX ADMIN — ONDANSETRON 4 MG: 2 INJECTION INTRAMUSCULAR; INTRAVENOUS at 15:56

## 2022-07-29 RX ADMIN — TOPIRAMATE 50 MG: 25 TABLET, FILM COATED ORAL at 21:49

## 2022-07-29 ASSESSMENT — PAIN DESCRIPTION - FREQUENCY: FREQUENCY: CONTINUOUS

## 2022-07-29 ASSESSMENT — PAIN - FUNCTIONAL ASSESSMENT: PAIN_FUNCTIONAL_ASSESSMENT: PREVENTS OR INTERFERES SOME ACTIVE ACTIVITIES AND ADLS

## 2022-07-29 ASSESSMENT — PAIN DESCRIPTION - ONSET: ONSET: ON-GOING

## 2022-07-29 ASSESSMENT — PAIN SCALES - WONG BAKER
WONGBAKER_NUMERICALRESPONSE: 0

## 2022-07-29 ASSESSMENT — PAIN SCALES - GENERAL
PAINLEVEL_OUTOF10: 6
PAINLEVEL_OUTOF10: 2
PAINLEVEL_OUTOF10: 4

## 2022-07-29 ASSESSMENT — PAIN DESCRIPTION - LOCATION
LOCATION: ABDOMEN
LOCATION: ABDOMEN

## 2022-07-29 ASSESSMENT — PAIN DESCRIPTION - ORIENTATION: ORIENTATION: RIGHT;LEFT

## 2022-07-29 ASSESSMENT — PAIN DESCRIPTION - DESCRIPTORS: DESCRIPTORS: ACHING;DISCOMFORT

## 2022-07-29 NOTE — PROGRESS NOTES
Occupational Therapy    [facility] ACUTE CARE OCCUPATIONAL THERAPY EVALUATION    Dodie Leon, 1960, 1118/1118-A, 7/29/2022    Discharge Recommendation: Jeremy Davis      History:  [place]:  There were no encounter diagnoses.   Past Medical History:   Diagnosis Date    Arthritis     Asthma     Cerebral artery occlusion with cerebral infarction [facility])     per old chart 8/2015- TIA    COPD (chronic obstructive pulmonary disease) (HCC)     Diabetes mellitus (HCC)     Diabetic neuropathy (HCC)     per old chart    Fracture     per old chart pt in ER 12/9/2018- after 2 days of arm pain after fall- dx with left humerus fx- for surg 12/31/2018    History of blood transfusion     Hyperlipidemia     Hypertension     Muscle weakness (generalized)     Osteoarthritis     per old chart         Subjective:  Patient states: \"I had a dizzy spell yesterday, that's why I'm here\"  Pain:  denies  Communication with other providers: co-eval w/ PT, handoff to RN  Restrictions: General Precautions, Fall Risk, contact precautions    Home Setup/Prior level of function:     Type of Home: Assisted living  Home Layout: One level  Home Access: Level entry  Bathroom Shower/Tub: Walk-in shower  Bathroom Toilet: Handicap height  Bathroom Equipment: Shower chair,Grab bars in shower,Grab bars around toilet  Bathroom Accessibility: Walker accessible  Home Equipment: Sock aid,Rollator,Wheelchair-electric  Has the patient had two or more falls in the past year or any fall with injury in the past year?: Yes  ADL Assistance: Independent  Homemaking Responsibilities: No  Ambulation Assistance: Independent (typically uses the rollator, will use electric WC occasionally)  Transfer Assistance: Independent  Active : No    Examination:  Observation: Supine in bed upon arrival, agreeable to therapy eval.  Vision: WFL  Hearing: WFL  Vitals: Stable vitals throughout session on room air      [address] and functions:  ROM: WFL   Strength: B UE grossly 4+/5 across all major joints   Sensation: WFL  Tone: Normal  Coordination: WFL  Perception: WNL      Cognitive and Psychosocial Functioning:  Overall cognitive status: alert and oriented, WFL  Affect: Normal       Functional Mobility:  Bed mobility:  SBA supine <> sitting EOB  Sitting balance:  SBA static and dynamic    Transfers: SBA STS from EOB and stand to sit   Standing balance:  SBA static and dynamic w/ RW  Functional Mobility: ambulated functional household distance in room using RW w/ SBA  Toilet/Shower Transfers: NT - pt declined        Activities of Daily Living (ADLs):  Feeding: set up A  Grooming: SBA  UB bathing: SBA  LB bathing: SBA  UB dressing: SBA  LB dressing: SBA  Toileting: SBA    *ADL determined per observation of functional mobility, balance, activity tolerance, cognition, or actual ADL performance. AM-PAC 6 click short form for inpatient daily activity:   How much help from another person does the patient currently need. .. Unable  Dep A Lot  Max A A Lot   Mod A A Little  Min A A Little   CGA  SBA None   Mod I  Indep  Sup   1. Putting on and taking off regular lower body clothing? [] 1    [] 2   [] 2   [] 3   [x] 3   [] 4      2. Bathing (including washing, rinsing, drying)? [] 1   [] 2   [] 2 [] 3 [x] 3 [] 4   3. Toileting, which includes using toilet, bedpan, or urinal? [] 1    [] 2   [] 2   [] 3   [x] 3   [] 4     4. Putting on and taking off regular upper body clothing? [] 1   [] 2   [] 2   [] 3   [x] 3    [] 4      5. Taking care of personal grooming such as brushing teeth? [] 1   [] 2    [] 2 [] 3    [x] 3   [] 4      6. Eating meals?    [] 1   [] 2   [] 2   [] 3   [] 3   [x] 4        Raw Score:  19     [24=0% impaired(CH), 23=1-19%(CI), 20-22=20-39%(CJ), 15-19=40-59%(CK), 10-14=60-79%(CL), 7-9=80-99%(CM), 6=100%(CN)]      Educated pt on role of OT, therapy POC and functional goals, progression w/ ADLs and transfers, importance of signed by:      Toni Carl OTR/VIK  [id number]

## 2022-07-29 NOTE — CONSENT
[address] [facility], 1960, 1118/1118-A, 7/29/2022    History  [place]:  There were no encounter diagnoses. Patient  has a past medical history of Arthritis, Asthma, Cerebral artery occlusion with cerebral infarction [facility]), COPD (chronic obstructive pulmonary disease) ([facility] Diabetes mellitus ([facility] Diabetic neuropathy ([facility] Fracture, History of blood transfusion, Hyperlipidemia, Hypertension, Muscle weakness (generalized), and Osteoarthritis. Patient  has a past surgical history that includes Foot surgery (Left, 2004?); knee surgery (Right, 2009?); Cholecystectomy (2002?); Lung surgery (2009?); eye surgery; Humerus fracture surgery (Left, 12/31/2018); and Breast biopsy (Right). Subjective:  Patient states: \"My eye hurts, I get dizzy, and then I drop. \"    Pain:  denies pain.     Communication with other providers:  Handoff to RN, OT  Restrictions: general precautions, fall risk    Home Setup/Prior level of function   Type of Home: Assisted living  Home Layout: One level  Home Access: Level entry  Bathroom Shower/Tub: Walk-in shower  Bathroom Toilet: Handicap height  Bathroom Equipment: Shower chair,Grab bars in shower,Grab bars around toilet  Bathroom Accessibility: Walker accessible  Home Equipment: Sock aid,Rollator,Wheelchair-electric  Has the patient had two or more falls in the past year or any fall with injury in the past year?: Yes  ADL Assistance: Independent  Homemaking Responsibilities: No  Ambulation Assistance: Independent (typically uses the rollator, will use electric WC occasionally)  Transfer Assistance: Independent  Active : No    Examination of body systems (includes body structures/functions, activity/participation limitations):  Observation:  pt supine in bed upon arrival and agreeable to therapy  Vision:  [facility]  Hearing:  [facility]  Cardiopulmonary:  no O2 needs  Cognition: WFL, see OT/SLP note for further

## 2022-07-29 NOTE — PROGRESS NOTES
contraindicated  # Hx CVA on asa/plavix statin  # Hypothyroidism, acquired -resume levothyroxine appears dose was recently adjusted  # Mixed hyperlipidemia -on statin  # History of sinus pause -3.6 seconds reportedly asymptomatic. It was noted on recent admission. Cardiology recommended to stop her beta-blocker. She is to follow-up with the EP on outpatient basis. We will monitor on telemetry. Follow-up EKG  # Chronic anxiety/depression  # Chronic right eye pain - followed by Neurology      Diet ADULT DIET; Regular; 4 carb choices (60 gm/meal); Low Fat/Low Chol/High Fiber/SATHYA  ADULT ORAL NUTRITION SUPPLEMENT; Breakfast, Lunch, Dinner; Diabetic Oral Supplement   DVT Prophylaxis [] Lovenox, [x]  Heparin, [] SCDs, [] Ambulation,  [] Eliquis, [] Xarelto  [] Coumadin   Code Status Full Code   Disposition From: [facility]  Expected Disposition: [facility]  Estimated Date of Discharge: 07/31/22  Patient requires continued admission due to Worsening renal function and GI eval.   Surrogate Decision Maker/ POA Daughter     Subjective:     Chief Complaint: No chief complaint on file. Jerome Franz is a 59 y.o. female who presents with nausea vomiting, chronic diarrhea) presyncope. Patient was seen and evaluated at bedside. Patient was hemodynamically stable no acute distress IV fluids. Objective: Intake/Output Summary (Last 24 hours) at 7/29/2022 1815  Last data filed at 7/29/2022 0211  Gross per 24 hour   Intake --   Output 75 ml   Net -75 ml        Vitals:   Vitals:    07/29/22 1449   BP: (!) 144/58   Pulse: 80   Resp: 14   Temp: 98.6 °F (37 °C)   SpO2: 98%       Physical Exam:   Physical Exam  Vitals reviewed. Constitutional:       Appearance: Normal appearance. She is normal weight. HENT:      Head: Normocephalic.       Nose: Nose normal.      Mouth/Throat:      Mouth: Mucous membranes are moist.   Eyes:      Conjunctiva/sclera: Conjunctivae normal.      Pupils: Pupils are equal, round, and reactive to light. Cardiovascular:      Rate and Rhythm: Normal rate and regular rhythm. Pulses: Normal pulses. Heart sounds: Normal heart sounds. No murmur heard. Pulmonary:      Effort: Pulmonary effort is normal.      Breath sounds: Normal breath sounds. No wheezing, rhonchi or rales. Abdominal:      General: Abdomen is flat. Bowel sounds are normal. There is no distension. Palpations: Abdomen is soft. Tenderness: There is no abdominal tenderness. Musculoskeletal:         General: No deformity. Normal range of motion. Cervical back: Normal range of motion and neck supple. Right lower leg: No edema. Left lower leg: No edema. Skin:     Coloration: Skin is not jaundiced or pale. Neurological:      General: No focal deficit present. Mental Status: She is alert and oriented to person, place, and time. Mental status is at baseline.           Medications:   Medications:    heparin (porcine)  5,000 Units SubCUTAneous 3 times per day    aspirin  81 mg Oral Daily    atorvastatin  80 mg Oral Nightly    busPIRone  10 mg Oral TID    clopidogrel  75 mg Oral Daily    donepezil  10 mg Oral Nightly    FLUoxetine  80 mg Oral Daily    insulin glargine  20 Units SubCUTAneous Nightly    levothyroxine  75 mcg Oral QAM AC    topiramate  50 mg Oral Nightly    sodium chloride flush  5-40 mL IntraVENous 2 times per day    insulin lispro  0-4 Units SubCUTAneous TID WC    insulin lispro  0-4 Units SubCUTAneous Nightly    gabapentin  300 mg Oral BID    bismuth subsalicylate  800 mg Oral 4x Daily AC & HS      Infusions:    sodium chloride      dextrose       PRN Meds: meclizine, 12.5 mg, TID PRN  sodium chloride flush, 5-40 mL, PRN  sodium chloride, , PRN  ondansetron, 4 mg, Q8H PRN   Or  ondansetron, 4 mg, Q6H PRN  polyethylene glycol, 17 g, Daily PRN  acetaminophen, 650 mg, Q6H PRN   Or  acetaminophen, 650 mg, Q6H PRN  glucose, 4 tablet, PRN  dextrose bolus, 125 mL, PRN   Or  dextrose bolus, 250 mL, PRN  glucagon (rDNA), 1 mg, PRN  dextrose, , Continuous PRN      Labs      Recent Results (from the past 24 hour(s))   POCT Glucose    Collection Time: 07/28/22  7:55 PM   Result Value Ref Range    POC Glucose 315 (H) 70 - 99 MG/DL   Comprehensive Metabolic Panel w/ Reflex to MG    Collection Time: 07/28/22  9:42 PM   Result Value Ref Range    Sodium 134 (L) 135 - 145 MMOL/L    Potassium 4.8 3.5 - 5.1 MMOL/L    Chloride 102 99 - 110 mMol/L    CO2 24 21 - 32 MMOL/L    BUN 30 (H) 6 - 23 MG/DL    Creatinine 2.0 (H) 0.6 - 1.1 MG/DL    Glucose 289 (H) 70 - 99 MG/DL    Calcium 8.3 8.3 - 10.6 MG/DL    Albumin 3.4 3.4 - 5.0 GM/DL    Total Protein 5.0 (L) 6.4 - 8.2 GM/DL    Total Bilirubin 0.3 0.0 - 1.0 MG/DL    ALT 21 10 - 40 U/L    AST 24 15 - 37 IU/L    Alkaline Phosphatase 112 40 - 129 IU/L    GFR Non- 25 (L) >60 mL/min/1.73m2    GFR  30 (L) >60 mL/min/1.73m2    Anion Gap 8 4 - 16   CBC with Auto Differential    Collection Time: 07/28/22  9:42 PM   Result Value Ref Range    WBC 6.0 4.0 - 10.5 K/CU MM    RBC 3.58 (L) 4.2 - 5.4 M/CU MM    Hemoglobin 10.4 (L) 12.5 - 16.0 GM/DL    Hematocrit 33.8 (L) 37 - 47 %    MCV 94.4 78 - 100 FL    MCH 29.1 27 - 31 PG    MCHC 30.8 (L) 32.0 - 36.0 %    RDW 12.9 11.7 - 14.9 %    Platelets 109 662 - 980 K/CU MM    MPV 9.9 7.5 - 11.1 FL    Differential Type AUTOMATED DIFFERENTIAL     Segs Relative 65.7 36 - 66 %    Lymphocytes % 24.8 24 - 44 %    Monocytes % 8.1 (H) 0 - 4 %    Eosinophils % 0.8 0 - 3 %    Basophils % 0.3 0 - 1 %    Segs Absolute 3.9 K/CU MM    Lymphocytes Absolute 1.5 K/CU MM    Monocytes Absolute 0.5 K/CU MM    Eosinophils Absolute 0.1 K/CU MM    Basophils Absolute 0.0 K/CU MM    Nucleated RBC % 0.0 %    Total Nucleated RBC 0.0 K/CU MM    Total Immature Neutrophil 0.02 K/CU MM    Immature Neutrophil % 0.3 0 - 0.43 %   POCT Glucose    Collection Time: 07/28/22  9:42 PM   Result Value Ref Range    POC Glucose 259 (H) 70 - 99 MG/DL   POCT Glucose    Collection Time: 07/29/22  1:59 AM   Result Value Ref Range    POC Glucose 192 (H) 70 - 99 MG/DL   Troponin    Collection Time: 07/29/22  2:34 AM   Result Value Ref Range    Troponin T <0.010 <0.01 NG/ML   Basic Metabolic Panel w/ Reflex to MG    Collection Time: 07/29/22  6:00 AM   Result Value Ref Range    Sodium 141 135 - 145 MMOL/L    Potassium 4.4 3.5 - 5.1 MMOL/L    Chloride 106 99 - 110 mMol/L    CO2 25 21 - 32 MMOL/L    Anion Gap 10 4 - 16    BUN 23 6 - 23 MG/DL    Creatinine 1.6 (H) 0.6 - 1.1 MG/DL    Glucose 142 (H) 70 - 99 MG/DL    Calcium 8.3 8.3 - 10.6 MG/DL    GFR Non- 33 (L) >60 mL/min/1.73m2    GFR  39 (L) >60 mL/min/1.73m2   CBC with Auto Differential    Collection Time: 07/29/22  6:00 AM   Result Value Ref Range    WBC 5.1 4.0 - 10.5 K/CU MM    RBC 3.53 (L) 4.2 - 5.4 M/CU MM    Hemoglobin 10.3 (L) 12.5 - 16.0 GM/DL    Hematocrit 33.4 (L) 37 - 47 %    MCV 94.6 78 - 100 FL    MCH 29.2 27 - 31 PG    MCHC 30.8 (L) 32.0 - 36.0 %    RDW 13.1 11.7 - 14.9 %    Platelets 259 492 - 074 K/CU MM    MPV 9.8 7.5 - 11.1 FL    Differential Type AUTOMATED DIFFERENTIAL     Segs Relative 48.6 36 - 66 %    Lymphocytes % 37.4 24 - 44 %    Monocytes % 10.4 (H) 0 - 4 %    Eosinophils % 2.8 0 - 3 %    Basophils % 0.6 0 - 1 %    Segs Absolute 2.5 K/CU MM    Lymphocytes Absolute 1.9 K/CU MM    Monocytes Absolute 0.5 K/CU MM    Eosinophils Absolute 0.1 K/CU MM    Basophils Absolute 0.0 K/CU MM    Nucleated RBC % 0.0 %    Total Nucleated RBC 0.0 K/CU MM    TRC 0.0921 K/CU MM    Total Immature Neutrophil 0.01 K/CU MM    Immature Neutrophil % 0.2 0 - 0.43 %   Troponin    Collection Time: 07/29/22  6:00 AM   Result Value Ref Range    Troponin T <0.010 <0.01 NG/ML   POCT Glucose    Collection Time: 07/29/22  6:33 AM   Result Value Ref Range    POC Glucose 131 (H) 70 - 99 MG/DL   EKG 12 lead    Collection Time: 07/29/22  8:26 AM   Result Value Ref Range    Ventricular Rate 82 BPM Atrial Rate 82 BPM    P-R Interval 132 ms    QRS Duration 94 ms    Q-T Interval 402 ms    QTc Calculation (Bazett) 469 ms    P Axis 24 degrees    R Axis 3 degrees    T Axis 98 degrees    Diagnosis       Normal sinus rhythm  Abnormal QRS-T angle, consider primary T wave abnormality  Abnormal ECG  When compared with ECG of 14-JUL-2022 10:06,  No significant change was found     POCT Glucose    Collection Time: 07/29/22 10:56 AM   Result Value Ref Range    POC Glucose 166 (H) 70 - 99 MG/DL   Electrolytes urine random    Collection Time: 07/29/22 11:23 AM   Result Value Ref Range    Sodium, Ur 133 35 - 167 MMOL/L    Potassium, Ur 10.1 (L) 22 - 119 MMOL/L    Chloride 95 43 - 210 MMOL/L   Creatinine, urine, random    Collection Time: 07/29/22 11:23 AM   Result Value Ref Range    Creatinine, Ur 77.3 MG/DL   POCT Glucose    Collection Time: 07/29/22  4:06 PM   Result Value Ref Range    POC Glucose 179 (H) 70 - 99 MG/DL        Imaging/Diagnostics Last 24 Hours   No results found.     Electronically signed by Edmundo Lowe MD on 7/29/2022 at 6:15 PM

## 2022-07-29 NOTE — CONSULTS
[address] [facility] Gastroenterology  Gastroenterology Consultation    2022  7:31 PM    Patient:    Lizbeth Arreola  : 1960   59 y.o.              MRN: [id number]  Admitted: 2022  4:27 PM ATT: Miky Calvillo MD   [id number]/1048-G  AdmitDx: Acute kidney injury superimposed on CKD ([facility] [N17.9, N18.9]  Acute kidney injury ([facility] [N17.9]  PCP: Mayra Garcia MD    Reason for Consult:   nausea, vomiting and diarrhea    IMPRESSION and RECOMMENDATIONS:    Nausea and Vomiting  Most probably sec to  Gastroparesis sec to uncontrolled DM  May have gastritis  May be PUD too but unlikely    RECOMMEND:    PPI BID  Reglan TID      Diarrhea:    Chronic  Work up in past neg  Probably sec to autonomic neuropathy  Treat symptomatically  IV hydration  High fiber Diet    LOMOTIL/Imodium TID for now      Will FU Monday if still in hospital        Patient Active Problem List   Diagnosis Code    Proximal humeral fracture S42.209A    Asthma J45.909    Bereavement Z63.4    Pain of both hip joints M25.551, M25.552    Chest pain R07.9    Chronic abdominal pain R10.9, G89.29    Chronic back pain M54.9, G89.29    Chronic obstructive lung disease ([facility] J44.9    Corns and callosities L84    Depressive disorder F32.9    Type II diabetes mellitus, uncontrolled ([facility] E11.65    Diabetic polyneuropathy ([facility] E11.42    HTN (hypertension) I10    Hyperlipidemia E78.5    Lesion of liver K76.9    Menopausal symptom N95.1    Mixed hyperlipidemia E78.2    Onychomycosis B35.1    Obesity E66.9    Osteoarthrosis M19.90    Osteoporosis M81.0    Pain in both feet M79.671, M79.672    Sleep apnea G47.30    TIA (transient ischemic attack) G45.9    Vitamin D deficiency E55.9    Wheezing R06.2    S/P ORIF (open reduction internal fixation) fracture Z98.890, Z87.81    Diabetic gastroparesis (HCC) E11.43, K31.84    Gastroesophageal reflux disease K21.9    Restless legs G25.81    Ketoacidosis, diabetic, type 2, no coma Take 1 tablet by mouth in the morning. 7/18/22 8/17/22  Violet Barry MD   insulin lispro (HUMALOG) 100 UNIT/ML SOLN injection vial Inject 0-12 Units into the skin 3 times daily (with meals) IF BS (blood sugar) < 139 No Insulin. IF -199 take 2 Units. IF -249 take 4 Units. -299 take 6 Units. IF -349 take 8 Units. -400 Take 10 Units. IF over Over 400 take 12 Units 7/18/22 8/17/22  Violet Barry MD   gabapentin (NEURONTIN) 400 MG capsule Take 1 capsule by mouth in the morning and 1 capsule before bedtime. Do all this for 30 days.  7/18/22 8/17/22  Violet Barry MD   insulin glargine (LANTUS) 100 UNIT/ML injection vial Inject 20 Units into the skin nightly 7/18/22   Violet Barry MD   levothyroxine (SYNTHROID) 75 MCG tablet Take 1 tablet by mouth every morning (before breakfast) 7/19/22   Violet Barry MD   bismuth subsalicylate (PEPTO BISMOL) 262 MG/15ML suspension Take 30 mLs by mouth every 6 hours as needed for Indigestion 7/18/22 8/2/22  Violet Barry MD   insulin lispro (HUMALOG) 100 UNIT/ML SOLN injection vial Inject 0-6 Units into the skin at bedtime 7/18/22 8/17/22  Violet Barry MD   aspirin 81 MG chewable tablet Take 81 mg by mouth daily    Historical Provider, MD   FLUoxetine (PROZAC) 40 MG capsule Take 80 mg by mouth daily    Historical Provider, MD   metFORMIN (GLUCOPHAGE) 500 MG tablet Take 1 tablet by mouth 2 times daily (with meals) 3/22/22   Dmitriy Felton DO   hydrALAZINE (APRESOLINE) 25 MG tablet Take 1 tablet by mouth every 8 hours 3/22/22 7/18/22  Dmitriy Felton DO   lisinopril (PRINIVIL;ZESTRIL) 40 MG tablet Take 1 tablet by mouth daily 3/16/22   Sayra Zamorano MD   donepezil (ARICEPT) 5 MG tablet Take 10 mg by mouth nightly  9/13/21   Historical Provider, MD   busPIRone (BUSPAR) 10 MG tablet Take 10 mg by mouth 3 times daily  3/27/21   Historical Provider, MD   lidocaine 4 % external patch Relation Age of Onset    Breast Cancer Maternal Aunt 48    Breast Cancer Maternal Aunt 48     No family history of colon cancer, Crohn's disease, or ulcerative colitis. REVIEW OF SYSTEMS:      Negative apart from HPI    PHYSICAL EXAM:      Vitals:    BP (!) 144/58   Pulse 80   Temp 98.6 °F (37 °C) (Oral)   Resp 14   Ht 5' 4.5\" (1.638 m)   Wt 242 lb 3.2 oz (109.9 kg)   SpO2 98%   BMI 40.93 kg/m²     General Appearance:    Alert, cooperative, no distress, appears stated age   HEENT:    Normocephalic, atraumatic, PERRL, Conjunctiva clear, Ears Normal, Normal nares,  gums normal   Neck:   Supple, no JVD, No lymph nodes   Lungs:     Clear to auscultation bilaterally,    Chest Wall:    No tenderness or deformity    Heart:     S1 and S2 normal,   Abdomen:     Soft, non-tender, bowel sounds active all four quadrants,     no masses, no organomegaly, no ascitis   Rectal:    Patient refused   Extremities:  , no cyanosis or edema       Skin:   Skin , no major lesions   Lymph nodes:   No abnormality   Neurologic:   No focal deficits, moving all four extremities            DATA:    ABGs: No results found for: PHART, PO2ART, VUY0UNV  CBC:   Recent Labs     07/27/22 0622 07/28/22 2142 07/29/22  0600   WBC 5.7 6.0 5.1   HGB 10.8* 10.4* 10.3*    242 234     BMP:    Recent Labs     07/27/22 0622 07/28/22 2142 07/29/22  0600    134* 141   K 5.2* 4.8 4.4    102 106   CO2 28 24 25   BUN 21 30* 23   CREATININE 1.6* 2.0* 1.6*   GLUCOSE 325* 289* 142*     Magnesium:   Lab Results   Component Value Date/Time    MG 2.2 07/15/2022 07:02 PM     Hepatic:   Recent Labs     07/28/22 2142   AST 24   ALT 21   BILITOT 0.3   ALKPHOS 112     Recent Labs     07/27/22 0622   LIPASE 32   AMYLASE 31     No results for input(s): PROTIME, INR in the last 72 hours. No results for input(s): PTT in the last 72 hours. Lipids: No results for input(s): CHOL, HDL in the last 72 hours.     Invalid input(s): LDLCALCU  INR: No

## 2022-07-29 NOTE — CARE COORDINATION
Chart reviewed,  noted that pt was discharged to [facility] for Rehab on 07/21. CM called pt for d/c planning. Introduced self and explained reason for call. Pt states that she wants to return to [facility] for [address] CM called [facility] and she states that they will be able to accept pt after she has PT/OT evals. No pre-cert required. D/c plan is to return to [facility] when medically ready. PT/OT needed to return. PT/OT ordered and requested via WB. Rapid Covid needed on day of d/c.  NOTIFY [facility] [phone] WHEN PT IS D/C'D. CALL REPORT [phone] AND FAX THE AVS [phone].   TE

## 2022-07-29 NOTE — CONSULTS
Patient:   Elizabeth Siu    Date:  22  :  1960, 59 y.o. Nephrologist: Shanice Cannon MD  Provider: Becky Caldwell MD    Reason for Consult: Acute kidney injury with underlying CKD stage III    Consult requested by : Dr Ruslan Roldan    Chief Complaint:   Nausea vomiting and weakness    HISTORY OF PRESENT ILLNESS:   Ms. Steffen Sandra , is a 57-year-old female, who presented to the emergency department in [facility], with nausea vomiting. Work-up showed acute kidney injury with creatinine of 2.5, her blood sugar is 213. She was subsequently transferred here for further evaluation and management    When I saw her this morning, she is alert awake and does not have any active nausea and vomiting, her creatinine is coming down    I am familiar with, mainly from her inpatient admission, which is happened twice before. She was recently admitted with myoclonus thought to be from gabapentin therapy and sustained an acute kidney injury, she has had several acute kidney injury, she also sustained an with peak creatinine of 3.9, at some point -she was able to recover and discharge last time . She is to see a nephrologist [facility], but because I see her sister, and she lives here, she decided to switch and see me instead. She had an appointment with me in my office, but she was admitted in the hospital.     Review of [facility] revealed , her creatinine runs between 1.3-1.6 range, also has some proteinuria thought to be from diabetes.            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 No history of eclampsia or preeclampsia, gestational diabetes or hypertension     Habits :   No history of smoking, alcohol or illicit drug abuse     Soc Hx:  Patient was born in [place], her dad was in [place], she ended up living in many different states and cities. She has been living here for 23 years, she was  but  in . Although she was living by herself at home, but she has difficulty managing at home with frequent falls, forcing her to go to assisted living just about 2 months ago, where she is residing now.   She used to work as an  [employer], she retired finally in      [facility]   Significant for diabetes and chronic kidney disease, her sister which is a patient of mine, about to start peritoneal dialysis  Also another sister had colon cancer              REVIEW OF SYSTEMS:     All pertinent ROS neg except   Nausea, vomiting, weakness    Current Facility-Administered Medications   Medication Dose Route Frequency Provider Last Rate Last Admin    heparin (porcine) injection 5,000 Units  5,000 Units SubCUTAneous 3 times per day Melody Lopez MD   5,000 Units at 22 1032    meclizine (ANTIVERT) tablet 12.5 mg  12.5 mg Oral TID PRN Melody Lopez MD        aspirin chewable tablet 81 mg  81 mg Oral Daily Abena Civil, APRN - CNP   81 mg at 22 0914    atorvastatin (LIPITOR) tablet 80 mg  80 mg Oral Nightly Abena Civil, APRN - CNP   80 mg at 22 2146    busPIRone (BUSPAR) tablet 10 mg  10 mg Oral TID [facility], APRN - CNP   10 mg at 22 0519    clopidogrel (PLAVIX) tablet 75 mg  75 mg Oral Daily Abena Civil, APRN - CNP   75 mg at 22 0915    donepezil (ARICEPT) tablet 10 mg  10 mg Oral Nightly Abena Civil, APRN - CNP   10 mg at 22 214    FLUoxetine (PROZAC) capsule 80 mg  80 mg Oral Daily Abena Civil, APRN - CNP   80 mg at 22 1365 insulin glargine (LANTUS) injection vial 20 Units  20 Units SubCUTAneous Nightly [place], APRN - CNP   20 Units at 07/28/22 2143    levothyroxine (SYNTHROID) tablet 75 mcg  75 mcg Oral QAM AC Theresa Hampton, APRN - CNP   75 mcg at 07/29/22 0519    topiramate (TOPAMAX) tablet 50 mg  50 mg Oral Nightly [place], APRN - CNP   50 mg at 07/28/22 2147    sodium chloride flush 0.9 % injection 5-40 mL  5-40 mL IntraVENous 2 times per day Theresa Memo, APRN - CNP   10 mL at 07/28/22 2147    sodium chloride flush 0.9 % injection 5-40 mL  5-40 mL IntraVENous PRN Theresa Memo, APRN - CNP        0.9 % sodium chloride infusion   IntraVENous PRN Theresa Hampton, APRN - CNP        ondansetron (ZOFRAN-ODT) disintegrating tablet 4 mg  4 mg Oral Q8H PRN Theresa Hampton, APRN - CNP        Or    ondansetron (ZOFRAN) injection 4 mg  4 mg IntraVENous Q6H PRN Theresa Memo, APRN - CNP        polyethylene glycol (GLYCOLAX) packet 17 g  17 g Oral Daily PRN Theresa Hampton, APRN - CNP        acetaminophen (TYLENOL) tablet 650 mg  650 mg Oral Q6H PRN [place] Memo, APRN - CNP        Or    acetaminophen (TYLENOL) suppository 650 mg  650 mg Rectal Q6H PRN Theresa Memo, APRN - CNP        0.9 % sodium chloride infusion   IntraVENous Continuous Theresa Hampton, APRN -  mL/hr at 07/29/22 0518 New Bag at 07/29/22 0518    insulin lispro (HUMALOG) injection vial 0-4 Units  0-4 Units SubCUTAneous TID WC Theresa Hampton, APRN - CNP   2 Units at 07/28/22 1948    insulin lispro (HUMALOG) injection vial 0-4 Units  0-4 Units SubCUTAneous Nightly BRIAN Ziegler CNP        glucose chewable tablet 16 g  4 tablet Oral PRN Theresa Hampton, APRN - CNP        dextrose bolus 10% 125 mL  125 mL IntraVENous PRN Theresa Hampton, APRN - CNP        Or    dextrose bolus 10% 250 mL  250 mL IntraVENous PRN Theresa Hampton, APRN - CNP        glucagon (rDNA) injection 1 mg  1 mg SubCUTAneous PRN Theresa Memo, APRN - CNP        dextrose 10 % infusion   IntraVENous Continuous PRN Geri Jacob APRN - CNP        gabapentin (NEURONTIN) capsule 300 mg  300 mg Oral BID Geri Jacob APRN - CNP   300 mg at 07/29/22 0915    bismuth subsalicylate (PEPTO BISMOL) chewable tablet 524 mg  524 mg Oral 4x Daily AC & HS Bear Devine MD   524 mg at 07/29/22 1252       BP (!) 144/58   Pulse 80   Temp 98.6 °F (37 °C) (Oral)   Resp 14   Ht 5' 4.5\" (1.638 m)   Wt 242 lb 3.2 oz (109.9 kg)   SpO2 98%   BMI 40.93 kg/m²     PHYSICAL EXAM:  General appearance: Alert, awake and oriented  HEENT: Positive conjunctival pallor  Neck: Supple  Heart: Regular rate and rhythm  LUNGS: Coarse  Abdomen: Soft, nontender  Extremities: No edema    LABS:  CBC:   Lab Results   Component Value Date/Time    WBC 5.1 07/29/2022 06:00 AM    WBC 6.0 07/28/2022 09:42 PM    WBC 5.7 07/27/2022 06:22 AM    HGB 10.3 07/29/2022 06:00 AM    HGB 10.4 07/28/2022 09:42 PM    HGB 10.8 07/27/2022 06:22 AM     07/29/2022 06:00 AM     07/28/2022 09:42 PM     07/27/2022 06:22 AM     Renal Panel:   Lab Results   Component Value Date/Time     07/29/2022 06:00 AM     07/28/2022 09:42 PM     07/27/2022 06:22 AM    K 4.4 07/29/2022 06:00 AM    K 4.8 07/28/2022 09:42 PM    K 5.2 07/27/2022 06:22 AM     07/29/2022 06:00 AM     07/28/2022 09:42 PM     07/27/2022 06:22 AM    CO2 25 07/29/2022 06:00 AM    CO2 24 07/28/2022 09:42 PM    CO2 28 07/27/2022 06:22 AM    BUN 23 07/29/2022 06:00 AM    BUN 30 07/28/2022 09:42 PM    BUN 21 07/27/2022 06:22 AM    CREATININE 1.6 07/29/2022 06:00 AM    CREATININE 2.0 07/28/2022 09:42 PM    CREATININE 1.6 07/27/2022 06:22 AM    GFRAA 39 07/29/2022 06:00 AM    GFRAA 30 07/28/2022 09:42 PM    GFRAA 39 07/27/2022 06:22 AM    LABGLOM 33 07/29/2022 06:00 AM    LABGLOM 25 07/28/2022 09:42 PM    LABGLOM 33 07/27/2022 06:22 AM    LABALBU 3.4 07/28/2022 09:42 PM    LABALBU 3.1 07/20/2022 06:30 AM    LABALBU 3.3 07/16/2022 08:05 AM         Calcium:    Lab Results   Component Value Date/Time    CALCIUM 8.3 07/29/2022 06:00 AM     Phosphorus:    Lab Results   Component Value Date/Time    PHOS 2.5 07/15/2022 06:07 AM       U/A:    Lab Results   Component Value Date/Time    PROTEINU TRACE 07/27/2022 11:00 AM    NITRU NEGATIVE 07/27/2022 11:00 AM    COLORU YELLOW 07/27/2022 11:00 AM    WBCUA 45 07/27/2022 11:00 AM    RBCUA 1 07/27/2022 11:00 AM    MUCUS RARE 07/27/2022 11:00 AM    TRICHOMONAS NONE SEEN 07/27/2022 11:00 AM    YEAST MODERATE 07/11/2022 12:05 PM    BACTERIA NEGATIVE 07/27/2022 11:00 AM    CLARITYU CLEAR 07/27/2022 11:00 AM    SPECGRAV 1.020 07/27/2022 11:00 AM    UROBILINOGEN 0.2 07/27/2022 11:00 AM    BILIRUBINUR SMALL 07/27/2022 11:00 AM    BLOODU NEGATIVE 07/27/2022 11:00 AM    KETUA TRACE 07/27/2022 11:00 AM           IMPRESSION:  Acute kidney injury with underlying CKD stage IIIa A3-recovering by creatinine criteria-may had relative volume depletion or hemodynamic changes.   Her urine did not have any acute sediment-but had some leukocyturia  Underlying diabetes, atherosclerotic cardiovascular disease, hypertension along with multiple other chronic disease    PLAN:    Reasonable to stop IV fluid after total of 1.5 L  Good blood pressure and blood sugar control  Rule out any occult infection  Follow clinically and biochemically  Avoid any nonessential medication

## 2022-07-29 NOTE — PROGRESS NOTES
Comprehensive Nutrition Assessment    Type and Reason for Visit:  Initial, Positive Nutrition Screen (MST: 5)    Nutrition Recommendations/Plan:   Liberalize diet to no added salt. Begin diabetic oral nutrition supplement TID      Malnutrition Assessment:  Malnutrition Status: At risk for malnutrition (Comment) (07/29/22 1433)    Context:  Acute Illness     Findings of the 6 clinical characteristics of malnutrition:  Energy Intake:  75% or less of estimated energy requirements for 7 or more days  Weight Loss:  No significant weight loss (3-4% weight loss in one month)       Nutrition Assessment:    Pt with report of weight loss and poor oral intake PTA. Pt with significant PMH especially over the last month. Pt currently admission with MODESTO. Also with rule out for C. Diff. Nutrition Related Findings:    Glucose up to 289 Wound Type: None       Current Nutrition Intake & Therapies:    Average Meal Intake: Unable to assess  Average Supplements Intake: None Ordered  ADULT DIET; Regular; 4 carb choices (60 gm/meal); Low Fat/Low Chol/High Fiber/2 gm Na    Anthropometric Measures:  Height: 5' 4.5\" (163.8 cm)  Ideal Body Weight (IBW): 123 lbs (56 kg)    Admission Body Weight: 240 lb 11.9 oz (109.2 kg)  Current Body Weight: 242 lb 4.6 oz (109.9 kg), 197 % IBW.  Weight Source: Bed Scale  Current BMI (kg/m2): 41  Usual Body Weight: 251 lb 8.7 oz (114.1 kg)  % Weight Change (Calculated): -3.7  Weight Adjustment For: No Adjustment                 BMI Categories: Obese Class 3 (BMI 40.0 or greater)    Estimated Daily Nutrient Needs:  Energy Requirements Based On: Formula  Weight Used for Energy Requirements: Current  Energy (kcal/day): 5880-5974  Weight Used for Protein Requirements: Ideal  Protein (g/day): 67-84  Method Used for Fluid Requirements: 1 ml/kcal  Fluid (ml/day): 5281-9562 or per MD    Nutrition Diagnosis:   Inadequate protein-energy intake related to renal dysfunction as evidenced by poor intake prior to admission, weight loss    Nutrition Interventions:   Food and/or Nutrient Delivery: Modify Current Diet, Start Oral Nutrition Supplement  Nutrition Education/Counseling: Education not indicated  Coordination of Nutrition Care: Continue to monitor while inpatient       Goals:  Previous Goal Met: No Progress toward Goal(s)  Goals: PO intake 50% or greater, by next RD assessment       Nutrition Monitoring and Evaluation:   Behavioral-Environmental Outcomes: Beliefs and Attitutes  Food/Nutrient Intake Outcomes: Food and Nutrient Intake, Supplement Intake  Physical Signs/Symptoms Outcomes: Biochemical Data, Diarrhea, GI Status, Weight, Skin    Discharge Planning:    Continue Oral Nutrition Supplement     Daniela Mascorro, [address] [place] [address],   Contact: [phone]

## 2022-07-29 NOTE — PROGRESS NOTES
Patient has an order to collect urine and stool sample, attempted through the night, patient missed hat on the commode, 2 hats placed back and front, patient went # 1 & 2 in the same hat so sample could not be collected. Will pass to oncoming shift to continue to attempt to collect the needed sample.

## 2022-07-29 NOTE — PLAN OF CARE
Problem: Discharge Planning  Goal: Discharge to home or other facility with appropriate resources  Outcome: Progressing  Flowsheets (Taken 7/28/2022 1836 by Hans Jeffers LPN)  Discharge to home or other facility with appropriate resources:   Identify barriers to discharge with patient and caregiver   Arrange for needed discharge resources and transportation as appropriate   Arrange for interpreters to assist at discharge as needed     Problem: Safety - Adult  Goal: Free from fall injury  Outcome: Progressing

## 2022-07-29 NOTE — DISCHARGE INSTR - COC
Continuity of Care Form    Patient Name: Timothy Whitten   :  1960  MRN:  [id number]    Admit date:  2022  Discharge date:  22    Code Status Order: Full Code   Advance Directives:     Admitting Physician:  No admitting provider for patient encounter. PCP: Jasmin Pelletier MD    Discharging Nurse: [id number] [facility] Unit/Room#: 1118/1118-A  Discharging Unit Phone Number: [phone]    Emergency Contact:   Extended Emergency Contact Information  Primary Emergency Contact: Saravanan Parker [address] Phone: [phone]  Mobile Phone: [phone]  Relation: Parent  Secondary Emergency Contact: [facility] Phone: [phone]  Mobile Phone: [phone]  Relation: Brother/Sister    Past Surgical History:  Past Surgical History:   Procedure Laterality Date    BREAST BIOPSY Right     CHOLECYSTECTOMY  ? EYE SURGERY      per old chart had right eye cataract ext done 2018 and left eye 2018    FOOT SURGERY Left ? HUMERUS FRACTURE SURGERY Left 2018    HUMERUS OPEN REDUCTION INTERNAL FIXATION LEFT PROXIMAL performed by Kaelyn Hartmann DO at [address] Right ? LUNG SURGERY  ?    simone lung lobectomy        Immunization History: There is no immunization history for the selected administration types on file for this patient.     Active Problems:  Patient Active Problem List   Diagnosis Code    Proximal humeral fracture S42.209A    Asthma J45.909    Bereavement Z63.4    Pain of both hip joints M25.551, M25.552    Chest pain R07.9    Chronic abdominal pain R10.9, G89.29    Chronic back pain M54.9, G89.29    Chronic obstructive lung disease (HCC) J44.9    Corns and callosities L84    Depressive disorder F32.9    Type II diabetes mellitus, uncontrolled (HCC) E11.65    Diabetic polyneuropathy (HCC) E11.42    HTN (hypertension) I10    Hyperlipidemia E78.5    Lesion of liver K76.9    Menopausal symptom N95.1 Mixed hyperlipidemia E78.2    Onychomycosis B35.1    Obesity E66.9    Osteoarthrosis M19.90    Osteoporosis M81.0    Pain in both feet M79.671, M79.672    Sleep apnea G47.30    TIA (transient ischemic attack) G45.9    Vitamin D deficiency E55.9    Wheezing R06.2    S/P ORIF (open reduction internal fixation) fracture Z98.890, Z87.81    Diabetic gastroparesis ([facility]) E11.43, K31.84    Gastroesophageal reflux disease K21.9    Restless legs G25.81    Ketoacidosis, diabetic, type 2, no coma ([facility]) E11.10    Repeated falls R29.6    Subclinical hypothyroidism E03.8    Vasovagal syncope R55    Acute kidney injury superimposed on CKD ([facility]) N17.9, N18.9    Acute kidney injury ([facility] [address] N17.9       Isolation/Infection:   Isolation            C Diff Contact          Patient Infection Status       Infection Onset Added Last Indicated Last Indicated By Review Planned Expiration Resolved Resolved By    C-diff Rule Out 07/14/22 07/14/22 07/28/22 C difficile Molecular/PCR (Ordered) 08/04/22 08/07/22      Resolved    C-diff Rule Out 07/02/22 07/02/22 07/03/22 Clostridium Difficile Toxin/Antigen (Ordered)   07/03/22 Rule-Out Test Resulted    COVID-19 (Rule Out) 06/30/22 06/30/22 06/30/22 COVID-19, Rapid (Ordered)   06/30/22 Rule-Out Test Resulted    COVID-19 (Rule Out) 03/17/22 03/17/22 03/17/22 COVID-19, Rapid (Ordered)   03/17/22 Rule-Out Test Resulted    COVID-19 (Rule Out) 09/07/20 09/07/20 09/07/20 Covid-19 Ambulatory (Ordered)   09/09/20 Rule-Out Test Resulted            Nurse Assessment:  Last Vital Signs: BP (!) 140/51   Pulse 81   Temp 97.9 °F (36.6 °C) (Oral)   Resp 16   Ht 5' 4.5\" (1.638 m)   Wt 242 lb 3.2 oz (109.9 kg)   SpO2 97%   BMI 40.93 kg/m²     Last documented pain score (0-10 scale): Pain Level: 4  Last Weight:   Wt Readings from Last 1 Encounters:   07/29/22 242 lb 3.2 oz (109.9 kg)     Mental Status:  oriented, alert, coherent, and logical    IV Access:  - None    Nursing Mobility/ADLs:  Walking Assisted  Transfer  Assisted  Bathing  Assisted  Dressing  Assisted  Toileting  Assisted  Feeding  Independent  Med Admin  Assisted  Med Delivery   whole    Wound Care Documentation and Therapy:  Incision 12/31/18 Shoulder Left (Active)   Number of days: 1306        Elimination:  Continence: Bowel: Yes  Bladder: Yes  Urinary Catheter: None   Colostomy/Ileostomy/Ileal Conduit: No       Date of Last BM: 7/29/22    Intake/Output Summary (Last 24 hours) at 7/29/2022 1115  Last data filed at 7/29/2022 0211  Gross per 24 hour   Intake --   Output 75 ml   Net -75 ml     I/O last 3 completed shifts:  In: -   Out: 75 [Urine:75]    Safety Concerns: At Risk for Falls    Impairments/Disabilities:      None    Patient's personal belongings (please select all that are sent with patient):  None    RN SIGNATURE:  Electronically signed by Milan Mccormick LPN on 8/91/35 at 7:01 PM EDT    CASE MANAGEMENT/SOCIAL WORK SECTION    Inpatient Status Date: ***    Readmission Risk Assessment Score:  Readmission Risk              Risk of Unplanned Readmission:  [id number]           Discharging to Facility/ Agency   Name: [facility]  Address: [facility]   Phone: [phone]  Fax: [phone]    Dialysis Facility (if applicable)   Name:  Address:  Dialysis Schedule:  Phone:  Fax:    / signature: {Esignature:[id number]}    PHYSICIAN SECTION  utrition Therapy:  Current Nutrition Therapy:   - Oral Diet:  Carb Control 3 carbs/meal (1500kcals/day)    Routes of Feeding: Oral  Liquids: No Restrictions  Daily Fluid Restriction: no  Last Modified Barium Swallow with Video (Video Swallowing Test): not done    Treatments at the Time of Hospital Discharge:   Respiratory Treatments: None  Oxygen Therapy:  is not on home oxygen therapy.   Ventilator:    - No ventilator support    Rehab Therapies: Physical Therapy and Occupational Therapy  Weight Bearing Status/Restrictions: No weight bearing restrictions  Other Medical Equipment (for information only, NOT a DME order):  walker  Other Treatments:   Prognosis: Fair    Condition at Discharge: Stable    Rehab Potential (if transferring to Rehab): Fair    Recommended Labs or Other Treatments After Discharge: CBC, BMP, mag, phosphorus in 1 week. Follow-up with the specialist as mentioned above. Physician Certification: I certify the above information and transfer of Brianna Webber  is necessary for the continuing treatment of the diagnosis listed and that she requires [facility] for greater 30 days.      Please review discharge summary or updates on H&P    PHYSICIAN SIGNATURE:  Electronically signed by Shelby Franz MD on 7/30/22 at 3:41 PM EDT

## 2022-07-30 VITALS
HEART RATE: 86 BPM | TEMPERATURE: 98.1 F | OXYGEN SATURATION: 96 % | DIASTOLIC BLOOD PRESSURE: 68 MMHG | HEIGHT: 65 IN | WEIGHT: 236.9 LBS | RESPIRATION RATE: 18 BRPM | BODY MASS INDEX: 39.47 KG/M2 | SYSTOLIC BLOOD PRESSURE: 168 MMHG

## 2022-07-30 LAB
ALBUMIN SERPL-MCNC: 3.3 GM/DL (ref 3.4–5)
ALP BLD-CCNC: 106 IU/L (ref 40–128)
ALT SERPL-CCNC: 20 U/L (ref 10–40)
ANION GAP SERPL CALCULATED.3IONS-SCNC: 7 MMOL/L (ref 4–16)
AST SERPL-CCNC: 19 IU/L (ref 15–37)
BASOPHILS ABSOLUTE: 0 K/CU MM
BASOPHILS RELATIVE PERCENT: 0.5 % (ref 0–1)
BILIRUB SERPL-MCNC: 0.3 MG/DL (ref 0–1)
BUN BLDV-MCNC: 12 MG/DL (ref 6–23)
CALCIUM SERPL-MCNC: 8.3 MG/DL (ref 8.3–10.6)
CHLORIDE BLD-SCNC: 108 MMOL/L (ref 99–110)
CO2: 25 MMOL/L (ref 21–32)
CREAT SERPL-MCNC: 1.2 MG/DL (ref 0.6–1.1)
DIFFERENTIAL TYPE: ABNORMAL
EOSINOPHILS ABSOLUTE: 0.2 K/CU MM
EOSINOPHILS RELATIVE PERCENT: 4.1 % (ref 0–3)
FOLATE: 6.9 NG/ML (ref 3.1–17.5)
GFR AFRICAN AMERICAN: 55 ML/MIN/1.73M2
GFR NON-AFRICAN AMERICAN: 45 ML/MIN/1.73M2
GLUCOSE BLD-MCNC: 159 MG/DL (ref 70–99)
GLUCOSE BLD-MCNC: 229 MG/DL (ref 70–99)
GLUCOSE BLD-MCNC: 272 MG/DL (ref 70–99)
GLUCOSE BLD-MCNC: 339 MG/DL (ref 70–99)
HCT VFR BLD CALC: 35.1 % (ref 37–47)
HEMOGLOBIN: 10.4 GM/DL (ref 12.5–16)
IMMATURE NEUTROPHIL %: 0 % (ref 0–0.43)
LYMPHOCYTES ABSOLUTE: 1.5 K/CU MM
LYMPHOCYTES RELATIVE PERCENT: 40.3 % (ref 24–44)
MAGNESIUM: 1.5 MG/DL (ref 1.8–2.4)
MCH RBC QN AUTO: 29 PG (ref 27–31)
MCHC RBC AUTO-ENTMCNC: 29.6 % (ref 32–36)
MCV RBC AUTO: 97.8 FL (ref 78–100)
MONOCYTES ABSOLUTE: 0.3 K/CU MM
MONOCYTES RELATIVE PERCENT: 8.7 % (ref 0–4)
NUCLEATED RBC %: 0 %
PDW BLD-RTO: 13.1 % (ref 11.7–14.9)
PHOSPHORUS: 2.8 MG/DL (ref 2.5–4.9)
PLATELET # BLD: 233 K/CU MM (ref 140–440)
PMV BLD AUTO: 10.1 FL (ref 7.5–11.1)
POTASSIUM SERPL-SCNC: 4.5 MMOL/L (ref 3.5–5.1)
RBC # BLD: 3.59 M/CU MM (ref 4.2–5.4)
REASON FOR REJECTION: NORMAL
REJECTED TEST: NORMAL
SARS-COV-2, NAAT: NOT DETECTED
SEGMENTED NEUTROPHILS ABSOLUTE COUNT: 1.7 K/CU MM
SEGMENTED NEUTROPHILS RELATIVE PERCENT: 46.4 % (ref 36–66)
SODIUM BLD-SCNC: 140 MMOL/L (ref 135–145)
SOURCE: NORMAL
TOTAL IMMATURE NEUTOROPHIL: 0 K/CU MM
TOTAL NUCLEATED RBC: 0 K/CU MM
TOTAL PROTEIN: 5 GM/DL (ref 6.4–8.2)
VITAMIN B-12: 313.3 PG/ML (ref 211–911)
WBC # BLD: 3.7 K/CU MM (ref 4–10.5)

## 2022-07-30 PROCEDURE — 82607 VITAMIN B-12: CPT

## 2022-07-30 PROCEDURE — 6360000002 HC RX W HCPCS: Performed by: INTERNAL MEDICINE

## 2022-07-30 PROCEDURE — 36415 COLL VENOUS BLD VENIPUNCTURE: CPT

## 2022-07-30 PROCEDURE — 6370000000 HC RX 637 (ALT 250 FOR IP): Performed by: STUDENT IN AN ORGANIZED HEALTH CARE EDUCATION/TRAINING PROGRAM

## 2022-07-30 PROCEDURE — 83735 ASSAY OF MAGNESIUM: CPT

## 2022-07-30 PROCEDURE — 6370000000 HC RX 637 (ALT 250 FOR IP): Performed by: INTERNAL MEDICINE

## 2022-07-30 PROCEDURE — 87635 SARS-COV-2 COVID-19 AMP PRB: CPT

## 2022-07-30 PROCEDURE — 82746 ASSAY OF FOLIC ACID SERUM: CPT

## 2022-07-30 PROCEDURE — 97535 SELF CARE MNGMENT TRAINING: CPT

## 2022-07-30 PROCEDURE — 6360000002 HC RX W HCPCS: Performed by: STUDENT IN AN ORGANIZED HEALTH CARE EDUCATION/TRAINING PROGRAM

## 2022-07-30 PROCEDURE — 6370000000 HC RX 637 (ALT 250 FOR IP): Performed by: NURSE PRACTITIONER

## 2022-07-30 PROCEDURE — 87324 CLOSTRIDIUM AG IA: CPT

## 2022-07-30 PROCEDURE — 80053 COMPREHEN METABOLIC PANEL: CPT

## 2022-07-30 PROCEDURE — 85025 COMPLETE CBC W/AUTO DIFF WBC: CPT

## 2022-07-30 PROCEDURE — 6360000002 HC RX W HCPCS: Performed by: NURSE PRACTITIONER

## 2022-07-30 PROCEDURE — 97530 THERAPEUTIC ACTIVITIES: CPT

## 2022-07-30 PROCEDURE — 84100 ASSAY OF PHOSPHORUS: CPT

## 2022-07-30 PROCEDURE — 2580000003 HC RX 258: Performed by: NURSE PRACTITIONER

## 2022-07-30 PROCEDURE — 94761 N-INVAS EAR/PLS OXIMETRY MLT: CPT

## 2022-07-30 PROCEDURE — C9113 INJ PANTOPRAZOLE SODIUM, VIA: HCPCS | Performed by: INTERNAL MEDICINE

## 2022-07-30 PROCEDURE — 82962 GLUCOSE BLOOD TEST: CPT

## 2022-07-30 RX ORDER — GABAPENTIN 100 MG/1
100 CAPSULE ORAL 2 TIMES DAILY
Status: DISCONTINUED | OUTPATIENT
Start: 2022-07-30 | End: 2022-07-30 | Stop reason: HOSPADM

## 2022-07-30 RX ORDER — LOPERAMIDE HYDROCHLORIDE 2 MG/1
2 CAPSULE ORAL 3 TIMES DAILY
Qty: 30 CAPSULE | Refills: 0 | Status: SHIPPED | OUTPATIENT
Start: 2022-07-30 | End: 2022-08-09

## 2022-07-30 RX ORDER — UBIDECARENONE 75 MG
100 CAPSULE ORAL DAILY
Qty: 60 TABLET | Refills: 0 | Status: SHIPPED | OUTPATIENT
Start: 2022-07-30

## 2022-07-30 RX ORDER — INSULIN LISPRO 100 [IU]/ML
10 INJECTION, SOLUTION INTRAVENOUS; SUBCUTANEOUS
Qty: 15 ML | Refills: 0 | Status: ON HOLD | OUTPATIENT
Start: 2022-07-30 | End: 2022-10-04

## 2022-07-30 RX ORDER — PANTOPRAZOLE SODIUM 40 MG/1
40 TABLET, DELAYED RELEASE ORAL
Qty: 60 TABLET | Refills: 0 | Status: SHIPPED | OUTPATIENT
Start: 2022-07-30

## 2022-07-30 RX ORDER — LIDOCAINE 4 G/G
1 PATCH TOPICAL DAILY PRN
Qty: 1 BOX | Refills: 0 | Status: SHIPPED | OUTPATIENT
Start: 2022-07-30 | End: 2022-09-01

## 2022-07-30 RX ORDER — INSULIN LISPRO 100 [IU]/ML
0-4 INJECTION, SOLUTION INTRAVENOUS; SUBCUTANEOUS
Qty: 5 ML | Refills: 0 | Status: ON HOLD | OUTPATIENT
Start: 2022-07-30 | End: 2022-08-22 | Stop reason: HOSPADM

## 2022-07-30 RX ORDER — ASPIRIN 81 MG/1
81 TABLET, CHEWABLE ORAL DAILY
Qty: 30 TABLET | Refills: 0 | Status: SHIPPED | OUTPATIENT
Start: 2022-07-30

## 2022-07-30 RX ORDER — ONDANSETRON 4 MG/1
4 TABLET, ORALLY DISINTEGRATING ORAL EVERY 12 HOURS PRN
Qty: 30 TABLET | Refills: 0 | Status: SHIPPED | OUTPATIENT
Start: 2022-07-30 | End: 2022-09-01

## 2022-07-30 RX ORDER — METOCLOPRAMIDE 5 MG/1
5 TABLET ORAL
Qty: 30 TABLET | Refills: 0 | Status: SHIPPED | OUTPATIENT
Start: 2022-07-30

## 2022-07-30 RX ADMIN — BUSPIRONE HYDROCHLORIDE 10 MG: 5 TABLET ORAL at 07:00

## 2022-07-30 RX ADMIN — HEPARIN SODIUM 5000 UNITS: 5000 INJECTION INTRAVENOUS; SUBCUTANEOUS at 07:00

## 2022-07-30 RX ADMIN — SODIUM CHLORIDE, PRESERVATIVE FREE 10 ML: 5 INJECTION INTRAVENOUS at 00:30

## 2022-07-30 RX ADMIN — INSULIN LISPRO 2 UNITS: 100 INJECTION, SOLUTION INTRAVENOUS; SUBCUTANEOUS at 10:38

## 2022-07-30 RX ADMIN — LEVOTHYROXINE SODIUM 75 MCG: 0.07 TABLET ORAL at 07:00

## 2022-07-30 RX ADMIN — GABAPENTIN 100 MG: 100 CAPSULE ORAL at 10:42

## 2022-07-30 RX ADMIN — BUSPIRONE HYDROCHLORIDE 10 MG: 5 TABLET ORAL at 16:01

## 2022-07-30 RX ADMIN — INSULIN LISPRO 3 UNITS: 100 INJECTION, SOLUTION INTRAVENOUS; SUBCUTANEOUS at 12:59

## 2022-07-30 RX ADMIN — SODIUM CHLORIDE, PRESERVATIVE FREE 10 ML: 5 INJECTION INTRAVENOUS at 16:22

## 2022-07-30 RX ADMIN — PANTOPRAZOLE SODIUM 40 MG: 40 INJECTION, POWDER, FOR SOLUTION INTRAVENOUS at 07:00

## 2022-07-30 RX ADMIN — ASPIRIN 81 MG 81 MG: 81 TABLET ORAL at 10:42

## 2022-07-30 RX ADMIN — BISMUTH SUBSALICYLATE 524 MG: 262 TABLET, CHEWABLE ORAL at 07:03

## 2022-07-30 RX ADMIN — HEPARIN SODIUM 5000 UNITS: 5000 INJECTION INTRAVENOUS; SUBCUTANEOUS at 16:01

## 2022-07-30 RX ADMIN — BISMUTH SUBSALICYLATE 524 MG: 262 TABLET, CHEWABLE ORAL at 10:42

## 2022-07-30 RX ADMIN — CLOPIDOGREL BISULFATE 75 MG: 75 TABLET ORAL at 10:42

## 2022-07-30 RX ADMIN — METOCLOPRAMIDE 10 MG: 5 INJECTION, SOLUTION INTRAMUSCULAR; INTRAVENOUS at 16:21

## 2022-07-30 RX ADMIN — ONDANSETRON 4 MG: 2 INJECTION INTRAMUSCULAR; INTRAVENOUS at 00:29

## 2022-07-30 RX ADMIN — PANTOPRAZOLE SODIUM 40 MG: 40 INJECTION, POWDER, FOR SOLUTION INTRAVENOUS at 16:21

## 2022-07-30 RX ADMIN — LOPERAMIDE HYDROCHLORIDE 2 MG: 2 CAPSULE ORAL at 16:01

## 2022-07-30 RX ADMIN — SODIUM CHLORIDE, PRESERVATIVE FREE 10 ML: 5 INJECTION INTRAVENOUS at 10:45

## 2022-07-30 RX ADMIN — METOCLOPRAMIDE 10 MG: 5 INJECTION, SOLUTION INTRAMUSCULAR; INTRAVENOUS at 10:37

## 2022-07-30 RX ADMIN — METOCLOPRAMIDE 10 MG: 5 INJECTION, SOLUTION INTRAMUSCULAR; INTRAVENOUS at 07:00

## 2022-07-30 RX ADMIN — LOPERAMIDE HYDROCHLORIDE 2 MG: 2 CAPSULE ORAL at 07:00

## 2022-07-30 RX ADMIN — FLUOXETINE 80 MG: 20 CAPSULE ORAL at 10:42

## 2022-07-30 NOTE — PROGRESS NOTES
Occupational Therapy    Occupational Therapy Treatment Note    Name: Mike Gr MRN: [id number] :   1960   Date:  2022   Admission Date: 2022 Room:  [address] [place] [address]       Restrictions/Precautions:          fall risk, general precautions, contact precautions    Communication with other providers: handoff to RN    Subjective:  Patient states:  I'm feeling pretty good. Just got the hot sweats  Pain:   Location, Type, Intensity (0/10 to 10/10):  denies pain, denies dizziness    Objective:    Observation: supine in bed, agreeable. Objective Measures:  Vitals WFL on room air    Treatment, including education:  Self Care Training:   Cues were given for safety, sequence, UE/LE placement, visual cues, and balance. Activities performed today included toileting, hand hygiene. Toileting routine including transfer, hygiene, and clothing mgmt w/ SBA. Washed hands while standing at the sink w/ SBA. Therapeutic Activity Training:   Therapeutic activity training was instructed today. Cues were given for safety, sequence, UE/LE placement, awareness, and balance. Activities performed today included bed mobility training, sup-sit, sit-stand, ambulation functional household distance using RW. Supine to sitting EOB w/ SBA. STS from EOB w/ SBA. Ambulated short then long functional household distance using RW w/ SBA. STS from recliner x10 reps w/ SBA to improve functional strength necessary for ADL routine. Ambulated long functional household distance using RW w/ SBA. STS from recliner x15 reps w/ SBA. Requested to return to supine d/t fatigue. Stand to sit w/ SBA, sit to supine w/ SBA. Repositioned self in bed w/ SBA. Left w/ all needs met, call light within reach.       Assessment / Impression:    Patient's tolerance of treatment: good  Adverse Reaction: none  Significant change in status and impact:  none  Barriers to improvement: none      Plan for Next Session:    Cont w/ OT POC and d/c plan back to SNF. Continue to address safety/independence with ADLs and transfers/mobility.      Time in:  1248  Time out:  1315  Timed treatment minutes:  27  Total treatment time:  27      Electronically signed by:    Preethi Garcia OT,   7/30/2022, 12:53 PM

## 2022-07-30 NOTE — DISCHARGE SUMMARY
V2.0  Discharge Summary    Name:  Sony Newby /Age/Sex: 1960 (53 y.o. female)   Admit Date: 2022  Discharge Date: 22    MRN & CSN:  [id number] & [id number] Encounter Date and Time 22 3:27 PM EDT    Attending:  Theresa De Jesus MD Discharging Provider: Theresa De Jesus MD       Hospital Course:     Brief HPI: Sony Newby is a 59 y.o. female with PMHx of, hyperlipidemia, hypertension, mood disorder, diabetes mellitus type II, hypertension and CKD stage III who presents with Nausea and vomiting    Brief Problem Based Course:     *Nausea/vomiting  -Resolved  Likely secondary to gastroparesis in the setting of longstanding diabetes  Note outpatient with gastroenterology for EGD/GES  Will discharge on Reglan, Protonix and Pepto-Bismol. Extensively discussed extraparametal symptoms with Reglan. Patient expressed understanding and agreed with using Reglan. She reported that she was on Reglan in the past and denied any adverse effects. *Chronic Diarrhea  -Improving  Extensive stool studies during recent hospitalization, mainly benign  IV hydration  GI consulted, started loperamide     *Acute kidney injury on CKD stage III- Improving  Baseline creatinine of 1.0  Likely prerenal.Improved with adequate IV hydration  Maintain good oral hydration  Nephrology cleared the patient for discharge  Discontinue metformin on discharge as well. *Presyncope- Improving  Secondary to dehydration  Concern for autonomic neuropathy in the setting of longstanding diabetes mellitus as well. 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   Amlodipine 10 mg on discharge  Follow-up outpatient with primary care physician for titration of antihypertensive regimen     *Chronic anemia  -High normal MCV  Vitamin B12 of 291 in RDK,[id number]  Will prescribe vitamin B12 supplementation given chronic use of metformin     Other chronic problems include  *COPD -not in exacerbation, resume inhalers   *Hx CVA-continue on asa/plavix statin  *Hypothyroidism -resume levothyroxine appears dose  *Mixed hyperlipidemia -on statin  *History of sinus pause -3.6 seconds reportedly asymptomatic. It was noted on recent admission. Cardiology recommended to stop her beta-blocker. She is to follow-up with the EP on outpatient basis. Will monitor on telemetry. *Chronic anxiety/depression-Buspar and Prozac continued       The patient expressed appropriate understanding of, and agreement with the discharge recommendations, medications, and plan. Consults this admission:  IP CONSULT TO NEPHROLOGY  IP CONSULT TO GI  IP CONSULT TO GI    Discharge Diagnosis:   Nausea and vomiting    Chronic diarrhea  Acute kidney injury on chronic kidney disease stage III      Discharge Instruction:   Follow up appointments: Primary care physician, gastroenterologist, nephrologist  Primary care physician: Meli Jimenez MD within 2 weeks  Diet: diabetic diet   Activity: activity as tolerated  Disposition: Discharged to:   []Home, []C, [x]SNF, []Acute Rehab, []Hospice   Condition on discharge: Stable  Labs and Tests to be Followed up as an outpatient by PCP or Specialist: CBC, BMP, magnesium, phosphorous. B12 in 3 months. Discharge Medications:        Medication List        START taking these medications      loperamide 2 MG capsule  Commonly known as: IMODIUM  Take 1 capsule by mouth 3 times daily for 10 days     metoclopramide 5 MG tablet  Commonly known as: REGLAN  Take 1 tablet by mouth in the morning and 1 tablet at noon and 1 tablet in the evening. Take with meals.      metoprolol tartrate 25 MG tablet  Commonly known as: LOPRESSOR  Take 0.5 tablets by mouth in the morning and 0.5 tablets before bedtime. ondansetron 4 MG disintegrating tablet  Commonly known as: ZOFRAN-ODT  Take 1 tablet by mouth every 12 hours as needed for Nausea or Vomiting     vitamin B-12 100 MCG tablet  Commonly known as: CYANOCOBALAMIN  Take 1 tablet by mouth in the morning. CHANGE how you take these medications      glucagon (rDNA) 1 MG injection  Inject 1 mg into the muscle daily as needed for Low blood sugar (Blood glucose less than 70 mg/dL and patient NOT ALERT or NPO and does not have IV access.)  What changed: when to take this     * insulin lispro 100 UNIT/ML Soln injection vial  Commonly known as: HUMALOG  Inject 10 Units into the skin in the morning and 10 Units at noon and 10 Units in the evening. Inject before meals. What changed:   how much to take  when to take this  Another medication with the same name was removed. Continue taking this medication, and follow the directions you see here. * insulin lispro 100 UNIT/ML Soln injection vial  Commonly known as: HUMALOG  Inject 0-4 Units into the skin 4 times daily (before meals and nightly)  What changed:   how much to take  when to take this  additional instructions  Another medication with the same name was removed. Continue taking this medication, and follow the directions you see here. lidocaine 4 % external patch  Place 1 patch onto the skin daily as needed (Pain)  What changed:   when to take this  reasons to take this     pantoprazole 40 MG tablet  Commonly known as: PROTONIX  Take 1 tablet by mouth in the morning and 1 tablet in the evening. Take before meals. What changed:   when to take this  reasons to take this           * This list has 2 medication(s) that are the same as other medications prescribed for you. Read the directions carefully, and ask your doctor or other care provider to review them with you.                 CONTINUE taking these medications      amLODIPine 10 MG tablet  Commonly known as: NORVASC  Take 1 tablet by mouth in the morning. aspirin 81 MG chewable tablet  Take 1 tablet by mouth in the morning. atorvastatin 40 MG tablet  Commonly known as: LIPITOR     bismuth subsalicylate 819 AP/96LA suspension  Commonly known as: PEPTO BISMOL  Take 30 mLs by mouth every 6 hours as needed for Indigestion     busPIRone 10 MG tablet  Commonly known as: BUSPAR     clopidogrel 75 MG tablet  Commonly known as: PLAVIX     donepezil 5 MG tablet  Commonly known as: ARICEPT     FLUoxetine 40 MG capsule  Commonly known as: PROzac     gabapentin 400 MG capsule  Commonly known as: NEURONTIN  Take 1 capsule by mouth in the morning and 1 capsule before bedtime. Do all this for 30 days. glucose 4 g chewable tablet  Take 4 tablets by mouth as needed for Low blood sugar     insulin glargine 100 UNIT/ML injection vial  Commonly known as: LANTUS  Inject 20 Units into the skin nightly     levothyroxine 75 MCG tablet  Commonly known as: SYNTHROID  Take 1 tablet by mouth every morning (before breakfast)     topiramate 50 MG tablet  Commonly known as: TOPAMAX     vitamin D 50 MCG (2000 UT) Tabs tablet  Commonly known as: CHOLECALCIFEROL  Take 1 tablet by mouth in the morning.             STOP taking these medications      hydrALAZINE 25 MG tablet  Commonly known as: APRESOLINE     lisinopril 40 MG tablet  Commonly known as: PRINIVIL;ZESTRIL     metFORMIN 500 MG tablet  Commonly known as: GLUCOPHAGE     methocarbamol 500 MG tablet  Commonly known as: ROBAXIN     metoprolol succinate 50 MG extended release tablet  Commonly known as: TOPROL XL               Where to Get Your Medications        These medications were sent to [address] [place] [id number]      Phone: [phone]   aspirin 81 MG chewable tablet  glucagon (rDNA) 1 MG injection  glucose 4 g chewable tablet  insulin lispro 100 UNIT/ML Soln injection vial  insulin lispro 100 UNIT/ML Soln injection vial  lidocaine 4 % external patch  loperamide 2 MG capsule  metoclopramide 5 MG tablet  metoprolol tartrate 25 MG tablet  ondansetron 4 MG disintegrating tablet  pantoprazole 40 MG tablet  vitamin B-12 100 MCG tablet        Objective Findings at Discharge:   BP (!) 146/45   Pulse 91   Temp 98 °F (36.7 °C) (Oral)   Resp 13   Ht 5' 4.5\" (1.638 m)   Wt 236 lb 14.4 oz (107.5 kg)   SpO2 96%   BMI 40.04 kg/m²       Physical Exam:   Physical Exam  Constitutional:       Appearance: Normal appearance. She is obese. HENT:      Head: Normocephalic and atraumatic. Nose: Nose normal.      Mouth/Throat:      Mouth: Mucous membranes are moist.      Pharynx: Oropharynx is clear. Eyes:      Extraocular Movements: Extraocular movements intact. Conjunctiva/sclera: Conjunctivae normal.      Pupils: Pupils are equal, round, and reactive to light. Cardiovascular:      Rate and Rhythm: Normal rate and regular rhythm. Pulses: Normal pulses. Heart sounds: Normal heart sounds. Pulmonary:      Effort: Pulmonary effort is normal.      Breath sounds: Normal breath sounds. Abdominal:      General: Bowel sounds are normal.      Palpations: Abdomen is soft. Musculoskeletal:         General: Normal range of motion. Cervical back: Normal range of motion and neck supple. Skin:     General: Skin is warm and dry. Neurological:      General: No focal deficit present. Mental Status: She is alert. Psychiatric:         Mood and Affect: Mood normal.            Labs and Imaging   No results found.      CBC:   Recent Labs     07/28/22 2142 07/29/22  0600 07/30/22  0605   WBC 6.0 5.1 3.7*   HGB 10.4* 10.3* 10.4*    234 233     BMP:    Recent Labs     07/28/22 2142 07/29/22  0600 07/30/22  0605   * 141 140   K 4.8 4.4 4.5    106 108   CO2 24 25 25   BUN 30* 23 12   CREATININE 2.0* 1.6* 1.2*   GLUCOSE 289* 142* 159* Hepatic:   Recent Labs     07/28/22  2142 07/30/22  0605   AST 24 19   ALT 21 20   BILITOT 0.3 0.3   ALKPHOS 112 106     Lipids:   Lab Results   Component Value Date/Time    CHOL 176 05/12/2022 06:39 AM    HDL 62 05/12/2022 06:39 AM    TRIG 169 05/12/2022 06:39 AM     Hemoglobin A1C:   Lab Results   Component Value Date/Time    LABA1C 8.1 07/11/2022 07:45 AM     TSH: No results found for: TSH  Troponin:   Lab Results   Component Value Date/Time    TROPONINT <0.010 07/29/2022 06:00 AM    TROPONINT <0.010 07/29/2022 02:34 AM    TROPONINT <0.010 06/30/2022 12:06 PM     Lactic Acid: No results for input(s): LACTA in the last 72 hours. BNP: No results for input(s): PROBNP in the last 72 hours.   UA:  Lab Results   Component Value Date/Time    NITRU NEGATIVE 07/27/2022 11:00 AM    COLORU YELLOW 07/27/2022 11:00 AM    WBCUA 45 07/27/2022 11:00 AM    RBCUA 1 07/27/2022 11:00 AM    MUCUS RARE 07/27/2022 11:00 AM    TRICHOMONAS NONE SEEN 07/27/2022 11:00 AM    YEAST MODERATE 07/11/2022 12:05 PM    BACTERIA NEGATIVE 07/27/2022 11:00 AM    CLARITYU CLEAR 07/27/2022 11:00 AM    SPECGRAV 1.020 07/27/2022 11:00 AM    LEUKOCYTESUR NEGATIVE 07/27/2022 11:00 AM    UROBILINOGEN 0.2 07/27/2022 11:00 AM    BILIRUBINUR SMALL 07/27/2022 11:00 AM    BLOODU NEGATIVE 07/27/2022 11:00 AM    KETUA TRACE 07/27/2022 11:00 AM     Urine Cultures: No results found for: LABURIN  Blood Cultures: No results found for: BC  No results found for: BLOODCULT2  Organism: No results found for: ORG    Time Spent Discharging patient 35 minutes    Electronically signed by Lori Rose MD on 7/30/2022 at 3:35 PM

## 2022-07-30 NOTE — DISCHARGE INSTR - DIET

## 2022-07-30 NOTE — PROGRESS NOTES
Nephrology Progress Note  7/30/2022 10:37 AM        Subjective:   Admit Date: 7/28/2022  PCP: Mayco Gibbs MD    Interval History: Doing much better this morning    Diet: Eating better    ROS: Nausea and vomiting better  She thinks Reglan is helping  Also she recalled ,it helped in the past  Urine output was not recorded  No fever and acceptable blood pressure for inpatient    Data:     Current meds:    gabapentin  100 mg Oral BID    heparin (porcine)  5,000 Units SubCUTAneous 3 times per day    metoclopramide  10 mg IntraVENous TID AC    pantoprazole  40 mg IntraVENous BID AC    loperamide  2 mg Oral TID    aspirin  81 mg Oral Daily    atorvastatin  80 mg Oral Nightly    busPIRone  10 mg Oral TID    clopidogrel  75 mg Oral Daily    donepezil  10 mg Oral Nightly    FLUoxetine  80 mg Oral Daily    insulin glargine  20 Units SubCUTAneous Nightly    levothyroxine  75 mcg Oral QAM AC    topiramate  50 mg Oral Nightly    sodium chloride flush  5-40 mL IntraVENous 2 times per day    insulin lispro  0-4 Units SubCUTAneous TID WC    insulin lispro  0-4 Units SubCUTAneous Nightly    bismuth subsalicylate  886 mg Oral 4x Daily AC & HS      sodium chloride      dextrose           I/O last 3 completed shifts: In: 3150 [P.O.:240;  I.V.:2910]  Out: 75 [Urine:75]    CBC:   Recent Labs     07/28/22 2142 07/29/22  0600 07/30/22  0605   WBC 6.0 5.1 3.7*   HGB 10.4* 10.3* 10.4*    234 233          Recent Labs     07/28/22 2142 07/29/22  0600 07/30/22  0605   * 141 140   K 4.8 4.4 4.5    106 108   CO2 24 25 25   BUN 30* 23 12   CREATININE 2.0* 1.6* 1.2*   GLUCOSE 289* 142* 159*       Lab Results   Component Value Date    CALCIUM 8.3 07/30/2022    PHOS 2.8 07/30/2022       Objective:     Vitals: BP (!) 146/45   Pulse 84   Temp 98 °F (36.7 °C) (Oral)   Resp 23   Ht 5' 4.5\" (1.638 m)   Wt 236 lb 14.4 oz (107.5 kg)   SpO2 95%   BMI 40.04 kg/m² ,    General appearance: Alert, awake and oriented, without any distress  HEENT: No gross conjunctival pallor or scleral icterus  Neck: Supple  Lungs: No gross crackles on auscultation  Heart: Seemed regular rate and rhythm  Abdomen: Soft, nontender on superficial palpation  Extremities: Trace leg edema      Problem List :         Impression :     Acute kidney injury with underlying CKD stage III 3 a A2- quick resolution of kidney function suggest prerenal mainly.   Diabetes, clinically gastroparesis were suspected  Atherosclerotic cardiovascular disease and hypertension    Recommendation/Plan  :     Okay to discharge from my standpoint  Good glycemic control  Watch for extrapyramidal symptoms  She has high risk for major adverse cardiovascular event-risk reduction as an outpatient  Follow clinically      Nadeen Bauer MD MD

## 2022-08-05 ENCOUNTER — HOSPITAL ENCOUNTER (OUTPATIENT)
Age: 62
Setting detail: SPECIMEN
Discharge: HOME | End: 2022-08-05
Payer: MEDICAID

## 2022-08-05 LAB
ANION GAP SERPL CALCULATED.3IONS-SCNC: 10 MMOL/L (ref 4–16)
BUN BLDV-MCNC: 23 MG/DL (ref 6–23)
CALCIUM SERPL-MCNC: 8.5 MG/DL (ref 8.3–10.6)
CHLORIDE BLD-SCNC: 109 MMOL/L (ref 99–110)
CO2: 25 MMOL/L (ref 21–32)
CREAT SERPL-MCNC: 1.4 MG/DL (ref 0.6–1.1)
GFR AFRICAN AMERICAN: 46 ML/MIN/1.73M2
GFR NON-AFRICAN AMERICAN: 38 ML/MIN/1.73M2
GLUCOSE BLD-MCNC: 110 MG/DL (ref 70–99)
HCT VFR BLD CALC: 33.8 % (ref 37–47)
HEMOGLOBIN: 10.3 GM/DL (ref 12.5–16)
MAGNESIUM: 1.9 MG/DL (ref 1.8–2.4)
MCH RBC QN AUTO: 29.4 PG (ref 27–31)
MCHC RBC AUTO-ENTMCNC: 30.5 % (ref 32–36)
MCV RBC AUTO: 96.6 FL (ref 78–100)
PDW BLD-RTO: 13.3 % (ref 11.7–14.9)
PHOSPHORUS: 3.8 MG/DL (ref 2.5–4.9)
PLATELET # BLD: 221 K/CU MM (ref 140–440)
PMV BLD AUTO: 10.6 FL (ref 7.5–11.1)
POTASSIUM SERPL-SCNC: 4.5 MMOL/L (ref 3.5–5.1)
RBC # BLD: 3.5 M/CU MM (ref 4.2–5.4)
SODIUM BLD-SCNC: 144 MMOL/L (ref 135–145)
WBC # BLD: 5.7 K/CU MM (ref 4–10.5)

## 2022-08-05 PROCEDURE — 85027 COMPLETE CBC AUTOMATED: CPT

## 2022-08-05 PROCEDURE — 80048 BASIC METABOLIC PNL TOTAL CA: CPT

## 2022-08-05 PROCEDURE — 83735 ASSAY OF MAGNESIUM: CPT

## 2022-08-05 PROCEDURE — 36415 COLL VENOUS BLD VENIPUNCTURE: CPT

## 2022-08-05 PROCEDURE — 84100 ASSAY OF PHOSPHORUS: CPT

## 2022-08-21 ENCOUNTER — HOSPITAL ENCOUNTER (OUTPATIENT)
Age: 62
Setting detail: OBSERVATION
Discharge: NURSING, REHAB OR LONG-TERM CARE | End: 2022-08-22
Attending: EMERGENCY MEDICINE | Admitting: INTERNAL MEDICINE
Payer: MEDICAID

## 2022-08-21 ENCOUNTER — APPOINTMENT (OUTPATIENT)
Dept: CT IMAGING | Age: 62
End: 2022-08-21
Payer: MEDICAID

## 2022-08-21 ENCOUNTER — APPOINTMENT (OUTPATIENT)
Dept: GENERAL RADIOLOGY | Age: 62
End: 2022-08-21
Payer: MEDICAID

## 2022-08-21 DIAGNOSIS — R19.7 DIARRHEA, UNSPECIFIED TYPE: Primary | ICD-10-CM

## 2022-08-21 DIAGNOSIS — I10 ESSENTIAL HYPERTENSION: ICD-10-CM

## 2022-08-21 DIAGNOSIS — R11.0 NAUSEA: ICD-10-CM

## 2022-08-21 DIAGNOSIS — R53.1 GENERALIZED WEAKNESS: ICD-10-CM

## 2022-08-21 LAB
ADENOVIRUS DETECTION BY PCR: NOT DETECTED
ALBUMIN SERPL-MCNC: 3.8 GM/DL (ref 3.4–5)
ALP BLD-CCNC: 134 IU/L (ref 40–129)
ALT SERPL-CCNC: 17 U/L (ref 10–40)
ANION GAP SERPL CALCULATED.3IONS-SCNC: 16 MMOL/L (ref 4–16)
AST SERPL-CCNC: 19 IU/L (ref 15–37)
BACTERIA: NEGATIVE /HPF
BASE EXCESS: 7 (ref 0–2.4)
BASOPHILS ABSOLUTE: 0 K/CU MM
BASOPHILS RELATIVE PERCENT: 0.4 % (ref 0–1)
BILIRUB SERPL-MCNC: 0.3 MG/DL (ref 0–1)
BILIRUBIN URINE: NEGATIVE MG/DL
BLOOD, URINE: NEGATIVE
BORDETELLA PARAPERTUSSIS BY PCR: NOT DETECTED
BORDETELLA PERTUSSIS PCR: NOT DETECTED
BUN BLDV-MCNC: 12 MG/DL (ref 6–23)
CALCIUM SERPL-MCNC: 9.4 MG/DL (ref 8.3–10.6)
CHLAMYDOPHILA PNEUMONIA PCR: NOT DETECTED
CHLORIDE BLD-SCNC: 103 MMOL/L (ref 99–110)
CLARITY: CLEAR
CO2: 19 MMOL/L (ref 21–32)
COLOR: YELLOW
CORONAVIRUS 229E PCR: NOT DETECTED
CORONAVIRUS HKU1 PCR: NOT DETECTED
CORONAVIRUS NL63 PCR: NOT DETECTED
CORONAVIRUS OC43 PCR: NOT DETECTED
CREAT SERPL-MCNC: 1 MG/DL (ref 0.6–1.1)
DIFFERENTIAL TYPE: ABNORMAL
EOSINOPHILS ABSOLUTE: 0.1 K/CU MM
EOSINOPHILS RELATIVE PERCENT: 0.9 % (ref 0–3)
GFR AFRICAN AMERICAN: >60 ML/MIN/1.73M2
GFR NON-AFRICAN AMERICAN: 56 ML/MIN/1.73M2
GLUCOSE BLD-MCNC: 215 MG/DL (ref 70–99)
GLUCOSE BLD-MCNC: 292 MG/DL (ref 70–99)
GLUCOSE, URINE: >=1000 MG/DL
HCO3 VENOUS: 20.2 MMOL/L (ref 19–25)
HCT VFR BLD CALC: 38.6 % (ref 37–47)
HEMOGLOBIN: 12.3 GM/DL (ref 12.5–16)
HUMAN METAPNEUMOVIRUS PCR: NOT DETECTED
IMMATURE NEUTROPHIL %: 0.1 % (ref 0–0.43)
INFLUENZA A BY PCR: NOT DETECTED
INFLUENZA A H1 (2009) PCR: NOT DETECTED
INFLUENZA A H1 PANDEMIC PCR: NOT DETECTED
INFLUENZA A H3 PCR: NOT DETECTED
INFLUENZA B BY PCR: NOT DETECTED
KETONES, URINE: 40 MG/DL
LACTATE: 0.9 MMOL/L (ref 0.4–2)
LACTATE: 2.1 MMOL/L (ref 0.4–2)
LEUKOCYTE ESTERASE, URINE: NEGATIVE
LYMPHOCYTES ABSOLUTE: 1.6 K/CU MM
LYMPHOCYTES RELATIVE PERCENT: 23.1 % (ref 24–44)
MAGNESIUM: 1.8 MG/DL (ref 1.8–2.4)
MCH RBC QN AUTO: 29.6 PG (ref 27–31)
MCHC RBC AUTO-ENTMCNC: 31.9 % (ref 32–36)
MCV RBC AUTO: 92.8 FL (ref 78–100)
MONOCYTES ABSOLUTE: 0.5 K/CU MM
MONOCYTES RELATIVE PERCENT: 7.5 % (ref 0–4)
MUCUS: ABNORMAL HPF
MYCOPLASMA PNEUMONIAE PCR: NOT DETECTED
NITRITE URINE, QUANTITATIVE: NEGATIVE
NUCLEATED RBC %: 0 %
O2 SAT, VEN: 87.4 % (ref 50–70)
PARAINFLUENZA 1 PCR: NOT DETECTED
PARAINFLUENZA 2 PCR: NOT DETECTED
PARAINFLUENZA 3 PCR: NOT DETECTED
PARAINFLUENZA 4 PCR: NOT DETECTED
PCO2, VEN: 45 MMHG (ref 38–52)
PDW BLD-RTO: 13.2 % (ref 11.7–14.9)
PH VENOUS: 7.26 (ref 7.32–7.42)
PH, URINE: 5.5 (ref 5–8)
PLATELET # BLD: 333 K/CU MM (ref 140–440)
PMV BLD AUTO: 9.7 FL (ref 7.5–11.1)
PO2, VEN: 59 MMHG (ref 28–48)
POTASSIUM SERPL-SCNC: 4.4 MMOL/L (ref 3.5–5.1)
PROTEIN UA: 100 MG/DL
RBC # BLD: 4.16 M/CU MM (ref 4.2–5.4)
RBC URINE: ABNORMAL /HPF (ref 0–6)
RHINOVIRUS ENTEROVIRUS PCR: NOT DETECTED
RSV PCR: NOT DETECTED
SARS-COV-2, NAAT: NOT DETECTED
SARS-COV-2: NOT DETECTED
SEGMENTED NEUTROPHILS ABSOLUTE COUNT: 4.7 K/CU MM
SEGMENTED NEUTROPHILS RELATIVE PERCENT: 68 % (ref 36–66)
SODIUM BLD-SCNC: 138 MMOL/L (ref 135–145)
SOURCE: NORMAL
SPECIFIC GRAVITY UA: 1.02 (ref 1–1.03)
T4 FREE: 1.42 NG/DL (ref 0.9–1.8)
TOTAL IMMATURE NEUTOROPHIL: 0.01 K/CU MM
TOTAL NUCLEATED RBC: 0 K/CU MM
TOTAL PROTEIN: 6.4 GM/DL (ref 6.4–8.2)
TRICHOMONAS: ABNORMAL /HPF
TSH HIGH SENSITIVITY: 2.6 UIU/ML (ref 0.27–4.2)
UROBILINOGEN, URINE: 0.2 MG/DL (ref 0.2–1)
WBC # BLD: 7 K/CU MM (ref 4–10.5)
WBC UA: <1 /HPF (ref 0–5)

## 2022-08-21 PROCEDURE — 36415 COLL VENOUS BLD VENIPUNCTURE: CPT

## 2022-08-21 PROCEDURE — 6370000000 HC RX 637 (ALT 250 FOR IP): Performed by: INTERNAL MEDICINE

## 2022-08-21 PROCEDURE — 6370000000 HC RX 637 (ALT 250 FOR IP): Performed by: NURSE PRACTITIONER

## 2022-08-21 PROCEDURE — 96361 HYDRATE IV INFUSION ADD-ON: CPT

## 2022-08-21 PROCEDURE — 83605 ASSAY OF LACTIC ACID: CPT

## 2022-08-21 PROCEDURE — 96372 THER/PROPH/DIAG INJ SC/IM: CPT

## 2022-08-21 PROCEDURE — 87635 SARS-COV-2 COVID-19 AMP PRB: CPT

## 2022-08-21 PROCEDURE — 2580000003 HC RX 258: Performed by: NURSE PRACTITIONER

## 2022-08-21 PROCEDURE — 81001 URINALYSIS AUTO W/SCOPE: CPT

## 2022-08-21 PROCEDURE — 87040 BLOOD CULTURE FOR BACTERIA: CPT

## 2022-08-21 PROCEDURE — 83735 ASSAY OF MAGNESIUM: CPT

## 2022-08-21 PROCEDURE — 96374 THER/PROPH/DIAG INJ IV PUSH: CPT

## 2022-08-21 PROCEDURE — 96376 TX/PRO/DX INJ SAME DRUG ADON: CPT

## 2022-08-21 PROCEDURE — G0378 HOSPITAL OBSERVATION PER HR: HCPCS

## 2022-08-21 PROCEDURE — 87086 URINE CULTURE/COLONY COUNT: CPT

## 2022-08-21 PROCEDURE — 85025 COMPLETE CBC W/AUTO DIFF WBC: CPT

## 2022-08-21 PROCEDURE — 51701 INSERT BLADDER CATHETER: CPT

## 2022-08-21 PROCEDURE — 82805 BLOOD GASES W/O2 SATURATION: CPT

## 2022-08-21 PROCEDURE — 6360000002 HC RX W HCPCS

## 2022-08-21 PROCEDURE — 93005 ELECTROCARDIOGRAM TRACING: CPT

## 2022-08-21 PROCEDURE — 6360000002 HC RX W HCPCS: Performed by: NURSE PRACTITIONER

## 2022-08-21 PROCEDURE — 87077 CULTURE AEROBIC IDENTIFY: CPT

## 2022-08-21 PROCEDURE — 2580000003 HC RX 258

## 2022-08-21 PROCEDURE — 0202U NFCT DS 22 TRGT SARS-COV-2: CPT

## 2022-08-21 PROCEDURE — 82962 GLUCOSE BLOOD TEST: CPT

## 2022-08-21 PROCEDURE — 96375 TX/PRO/DX INJ NEW DRUG ADDON: CPT

## 2022-08-21 PROCEDURE — 84443 ASSAY THYROID STIM HORMONE: CPT

## 2022-08-21 PROCEDURE — 87186 SC STD MICRODIL/AGAR DIL: CPT

## 2022-08-21 PROCEDURE — 84439 ASSAY OF FREE THYROXINE: CPT

## 2022-08-21 PROCEDURE — 74176 CT ABD & PELVIS W/O CONTRAST: CPT

## 2022-08-21 PROCEDURE — 80053 COMPREHEN METABOLIC PANEL: CPT

## 2022-08-21 PROCEDURE — 99285 EMERGENCY DEPT VISIT HI MDM: CPT

## 2022-08-21 PROCEDURE — 71045 X-RAY EXAM CHEST 1 VIEW: CPT

## 2022-08-21 PROCEDURE — 94761 N-INVAS EAR/PLS OXIMETRY MLT: CPT

## 2022-08-21 RX ORDER — SODIUM CHLORIDE 0.9 % (FLUSH) 0.9 %
5-40 SYRINGE (ML) INJECTION EVERY 12 HOURS SCHEDULED
Status: DISCONTINUED | OUTPATIENT
Start: 2022-08-21 | End: 2022-08-22 | Stop reason: HOSPADM

## 2022-08-21 RX ORDER — DEXTROSE MONOHYDRATE 100 MG/ML
INJECTION, SOLUTION INTRAVENOUS CONTINUOUS PRN
Status: DISCONTINUED | OUTPATIENT
Start: 2022-08-21 | End: 2022-08-22 | Stop reason: HOSPADM

## 2022-08-21 RX ORDER — 0.9 % SODIUM CHLORIDE 0.9 %
1000 INTRAVENOUS SOLUTION INTRAVENOUS ONCE
Status: COMPLETED | OUTPATIENT
Start: 2022-08-21 | End: 2022-08-21

## 2022-08-21 RX ORDER — LIDOCAINE 4 G/G
1 PATCH TOPICAL DAILY PRN
Status: DISCONTINUED | OUTPATIENT
Start: 2022-08-21 | End: 2022-08-22 | Stop reason: HOSPADM

## 2022-08-21 RX ORDER — INSULIN GLARGINE 100 [IU]/ML
20 INJECTION, SOLUTION SUBCUTANEOUS NIGHTLY
Status: DISCONTINUED | OUTPATIENT
Start: 2022-08-21 | End: 2022-08-22 | Stop reason: HOSPADM

## 2022-08-21 RX ORDER — ONDANSETRON 4 MG/1
4 TABLET, ORALLY DISINTEGRATING ORAL EVERY 8 HOURS PRN
Status: DISCONTINUED | OUTPATIENT
Start: 2022-08-21 | End: 2022-08-22 | Stop reason: HOSPADM

## 2022-08-21 RX ORDER — POLYETHYLENE GLYCOL 3350 17 G/17G
17 POWDER, FOR SOLUTION ORAL DAILY PRN
Status: DISCONTINUED | OUTPATIENT
Start: 2022-08-21 | End: 2022-08-22 | Stop reason: HOSPADM

## 2022-08-21 RX ORDER — FLUOXETINE HYDROCHLORIDE 20 MG/1
80 CAPSULE ORAL DAILY
Status: DISCONTINUED | OUTPATIENT
Start: 2022-08-21 | End: 2022-08-22 | Stop reason: HOSPADM

## 2022-08-21 RX ORDER — INSULIN GLARGINE 100 [IU]/ML
20 INJECTION, SOLUTION SUBCUTANEOUS NIGHTLY
Status: DISCONTINUED | OUTPATIENT
Start: 2022-08-21 | End: 2022-08-21

## 2022-08-21 RX ORDER — ATORVASTATIN CALCIUM 40 MG/1
80 TABLET, FILM COATED ORAL NIGHTLY
Status: DISCONTINUED | OUTPATIENT
Start: 2022-08-21 | End: 2022-08-22 | Stop reason: HOSPADM

## 2022-08-21 RX ORDER — TOPIRAMATE 25 MG/1
50 TABLET ORAL NIGHTLY
Status: DISCONTINUED | OUTPATIENT
Start: 2022-08-21 | End: 2022-08-22 | Stop reason: HOSPADM

## 2022-08-21 RX ORDER — PANTOPRAZOLE SODIUM 40 MG/1
40 TABLET, DELAYED RELEASE ORAL
Status: DISCONTINUED | OUTPATIENT
Start: 2022-08-22 | End: 2022-08-22 | Stop reason: HOSPADM

## 2022-08-21 RX ORDER — ACETAMINOPHEN 325 MG/1
650 TABLET ORAL EVERY 6 HOURS PRN
Status: DISCONTINUED | OUTPATIENT
Start: 2022-08-21 | End: 2022-08-22 | Stop reason: HOSPADM

## 2022-08-21 RX ORDER — LEVOTHYROXINE SODIUM 0.07 MG/1
75 TABLET ORAL
Status: DISCONTINUED | OUTPATIENT
Start: 2022-08-22 | End: 2022-08-22 | Stop reason: HOSPADM

## 2022-08-21 RX ORDER — ASPIRIN 81 MG/1
81 TABLET, CHEWABLE ORAL DAILY
Status: DISCONTINUED | OUTPATIENT
Start: 2022-08-21 | End: 2022-08-22 | Stop reason: HOSPADM

## 2022-08-21 RX ORDER — UBIDECARENONE 75 MG
100 CAPSULE ORAL DAILY
Status: DISCONTINUED | OUTPATIENT
Start: 2022-08-21 | End: 2022-08-22 | Stop reason: HOSPADM

## 2022-08-21 RX ORDER — SODIUM CHLORIDE 0.9 % (FLUSH) 0.9 %
5-40 SYRINGE (ML) INJECTION PRN
Status: DISCONTINUED | OUTPATIENT
Start: 2022-08-21 | End: 2022-08-22 | Stop reason: HOSPADM

## 2022-08-21 RX ORDER — MORPHINE SULFATE 4 MG/ML
4 INJECTION, SOLUTION INTRAMUSCULAR; INTRAVENOUS ONCE
Status: COMPLETED | OUTPATIENT
Start: 2022-08-21 | End: 2022-08-21

## 2022-08-21 RX ORDER — GABAPENTIN 400 MG/1
400 CAPSULE ORAL 2 TIMES DAILY
Status: DISCONTINUED | OUTPATIENT
Start: 2022-08-21 | End: 2022-08-22 | Stop reason: HOSPADM

## 2022-08-21 RX ORDER — DONEPEZIL HYDROCHLORIDE 10 MG/1
10 TABLET, FILM COATED ORAL NIGHTLY
Status: DISCONTINUED | OUTPATIENT
Start: 2022-08-21 | End: 2022-08-22 | Stop reason: HOSPADM

## 2022-08-21 RX ORDER — METOCLOPRAMIDE 5 MG/1
5 TABLET ORAL
Status: DISCONTINUED | OUTPATIENT
Start: 2022-08-21 | End: 2022-08-22 | Stop reason: HOSPADM

## 2022-08-21 RX ORDER — BUSPIRONE HYDROCHLORIDE 5 MG/1
10 TABLET ORAL 3 TIMES DAILY
Status: DISCONTINUED | OUTPATIENT
Start: 2022-08-21 | End: 2022-08-22 | Stop reason: HOSPADM

## 2022-08-21 RX ORDER — INSULIN LISPRO 100 [IU]/ML
0-4 INJECTION, SOLUTION INTRAVENOUS; SUBCUTANEOUS
Status: DISCONTINUED | OUTPATIENT
Start: 2022-08-21 | End: 2022-08-22 | Stop reason: HOSPADM

## 2022-08-21 RX ORDER — ONDANSETRON 2 MG/ML
4 INJECTION INTRAMUSCULAR; INTRAVENOUS EVERY 6 HOURS PRN
Status: DISCONTINUED | OUTPATIENT
Start: 2022-08-21 | End: 2022-08-21 | Stop reason: SDUPTHER

## 2022-08-21 RX ORDER — SODIUM CHLORIDE 9 MG/ML
INJECTION, SOLUTION INTRAVENOUS PRN
Status: DISCONTINUED | OUTPATIENT
Start: 2022-08-21 | End: 2022-08-22 | Stop reason: HOSPADM

## 2022-08-21 RX ORDER — VITAMIN B COMPLEX
2000 TABLET ORAL DAILY
Status: DISCONTINUED | OUTPATIENT
Start: 2022-08-21 | End: 2022-08-22 | Stop reason: HOSPADM

## 2022-08-21 RX ORDER — ENOXAPARIN SODIUM 100 MG/ML
30 INJECTION SUBCUTANEOUS 2 TIMES DAILY
Status: DISCONTINUED | OUTPATIENT
Start: 2022-08-21 | End: 2022-08-22 | Stop reason: HOSPADM

## 2022-08-21 RX ORDER — CLOPIDOGREL BISULFATE 75 MG/1
75 TABLET ORAL DAILY
Status: DISCONTINUED | OUTPATIENT
Start: 2022-08-21 | End: 2022-08-22 | Stop reason: HOSPADM

## 2022-08-21 RX ORDER — ONDANSETRON 2 MG/ML
4 INJECTION INTRAMUSCULAR; INTRAVENOUS EVERY 6 HOURS PRN
Status: DISCONTINUED | OUTPATIENT
Start: 2022-08-21 | End: 2022-08-22 | Stop reason: HOSPADM

## 2022-08-21 RX ORDER — AMLODIPINE BESYLATE 10 MG/1
10 TABLET ORAL DAILY
Status: DISCONTINUED | OUTPATIENT
Start: 2022-08-21 | End: 2022-08-22 | Stop reason: HOSPADM

## 2022-08-21 RX ORDER — SODIUM CHLORIDE 9 MG/ML
INJECTION, SOLUTION INTRAVENOUS CONTINUOUS
Status: DISCONTINUED | OUTPATIENT
Start: 2022-08-21 | End: 2022-08-22 | Stop reason: HOSPADM

## 2022-08-21 RX ORDER — INSULIN LISPRO 100 [IU]/ML
10 INJECTION, SOLUTION INTRAVENOUS; SUBCUTANEOUS
Status: DISCONTINUED | OUTPATIENT
Start: 2022-08-21 | End: 2022-08-22 | Stop reason: HOSPADM

## 2022-08-21 RX ORDER — METOPROLOL TARTRATE 5 MG/5ML
5 INJECTION INTRAVENOUS ONCE
Status: CANCELLED | OUTPATIENT
Start: 2022-08-21 | End: 2022-08-21

## 2022-08-21 RX ORDER — ONDANSETRON 4 MG/1
4 TABLET, ORALLY DISINTEGRATING ORAL EVERY 12 HOURS PRN
Status: DISCONTINUED | OUTPATIENT
Start: 2022-08-21 | End: 2022-08-21 | Stop reason: SDUPTHER

## 2022-08-21 RX ADMIN — METOCLOPRAMIDE 5 MG: 5 TABLET ORAL at 20:50

## 2022-08-21 RX ADMIN — INSULIN LISPRO 2 UNITS: 100 INJECTION, SOLUTION INTRAVENOUS; SUBCUTANEOUS at 20:51

## 2022-08-21 RX ADMIN — AMLODIPINE BESYLATE 10 MG: 10 TABLET ORAL at 20:50

## 2022-08-21 RX ADMIN — ATORVASTATIN CALCIUM 80 MG: 40 TABLET, FILM COATED ORAL at 20:49

## 2022-08-21 RX ADMIN — ENOXAPARIN SODIUM 30 MG: 100 INJECTION SUBCUTANEOUS at 20:49

## 2022-08-21 RX ADMIN — SODIUM CHLORIDE: 9 INJECTION, SOLUTION INTRAVENOUS at 21:05

## 2022-08-21 RX ADMIN — DONEPEZIL HYDROCHLORIDE 10 MG: 10 TABLET ORAL at 20:50

## 2022-08-21 RX ADMIN — ONDANSETRON 4 MG: 2 INJECTION INTRAMUSCULAR; INTRAVENOUS at 13:38

## 2022-08-21 RX ADMIN — ONDANSETRON 4 MG: 2 INJECTION INTRAMUSCULAR; INTRAVENOUS at 19:03

## 2022-08-21 RX ADMIN — GABAPENTIN 400 MG: 400 CAPSULE ORAL at 20:49

## 2022-08-21 RX ADMIN — SODIUM CHLORIDE 1000 ML: 9 INJECTION, SOLUTION INTRAVENOUS at 13:08

## 2022-08-21 RX ADMIN — INSULIN GLARGINE 20 UNITS: 100 INJECTION, SOLUTION SUBCUTANEOUS at 22:27

## 2022-08-21 RX ADMIN — Medication 2000 UNITS: at 20:50

## 2022-08-21 RX ADMIN — MORPHINE SULFATE 4 MG: 4 INJECTION, SOLUTION INTRAMUSCULAR; INTRAVENOUS at 13:38

## 2022-08-21 RX ADMIN — TOPIRAMATE 50 MG: 25 TABLET, FILM COATED ORAL at 20:50

## 2022-08-21 RX ADMIN — SODIUM CHLORIDE, PRESERVATIVE FREE 10 ML: 5 INJECTION INTRAVENOUS at 20:51

## 2022-08-21 RX ADMIN — VITAM B12 100 MCG: 100 TAB at 20:49

## 2022-08-21 RX ADMIN — CLOPIDOGREL BISULFATE 75 MG: 75 TABLET ORAL at 20:49

## 2022-08-21 RX ADMIN — FLUOXETINE 80 MG: 20 CAPSULE ORAL at 20:49

## 2022-08-21 RX ADMIN — BUSPIRONE HYDROCHLORIDE 10 MG: 5 TABLET ORAL at 20:50

## 2022-08-21 RX ADMIN — METOPROLOL TARTRATE 12.5 MG: 25 TABLET, FILM COATED ORAL at 20:50

## 2022-08-21 RX ADMIN — ASPIRIN 81 MG CHEWABLE TABLET 81 MG: 81 TABLET CHEWABLE at 20:49

## 2022-08-21 ASSESSMENT — ENCOUNTER SYMPTOMS
ABDOMINAL DISTENTION: 0
SHORTNESS OF BREATH: 0
CONSTIPATION: 0
BACK PAIN: 0
RHINORRHEA: 1
DIARRHEA: 1
COUGH: 0
ABDOMINAL PAIN: 1
VOMITING: 0
COLOR CHANGE: 0
WHEEZING: 0
VOMITING: 1
NAUSEA: 1
SORE THROAT: 0

## 2022-08-21 ASSESSMENT — PAIN DESCRIPTION - DESCRIPTORS: DESCRIPTORS: ACHING;DISCOMFORT

## 2022-08-21 ASSESSMENT — PAIN SCALES - GENERAL
PAINLEVEL_OUTOF10: 0
PAINLEVEL_OUTOF10: 0
PAINLEVEL_OUTOF10: 6
PAINLEVEL_OUTOF10: 0
PAINLEVEL_OUTOF10: 8
PAINLEVEL_OUTOF10: 8
PAINLEVEL_OUTOF10: 0

## 2022-08-21 ASSESSMENT — PAIN SCALES - WONG BAKER
WONGBAKER_NUMERICALRESPONSE: 0

## 2022-08-21 ASSESSMENT — PAIN DESCRIPTION - ORIENTATION: ORIENTATION: RIGHT;LEFT

## 2022-08-21 ASSESSMENT — PAIN - FUNCTIONAL ASSESSMENT
PAIN_FUNCTIONAL_ASSESSMENT: PREVENTS OR INTERFERES SOME ACTIVE ACTIVITIES AND ADLS
PAIN_FUNCTIONAL_ASSESSMENT: 0-10

## 2022-08-21 ASSESSMENT — PAIN DESCRIPTION - PAIN TYPE: TYPE: CHRONIC PAIN

## 2022-08-21 ASSESSMENT — PAIN DESCRIPTION - FREQUENCY: FREQUENCY: CONTINUOUS

## 2022-08-21 ASSESSMENT — PAIN DESCRIPTION - ONSET: ONSET: ON-GOING

## 2022-08-21 ASSESSMENT — PAIN DESCRIPTION - LOCATION
LOCATION: ABDOMEN
LOCATION: ABDOMEN

## 2022-08-21 NOTE — ED PROVIDER NOTES
I independently examined and evaluated [address] [place] [address] DrMarkus In brief, presents to the emergency department today with nausea and diarrhea that started yesterday. She states abdominal pain as well. States no food poisoning. She states she has not eaten anything for the past 2 days. She states no sick contacts. Patient has increased urinary frequency no dysuria hematuria. Patient has a cold chills shakes and rigors to is concerning her. Patient states history of COPD. Patient here for evaluation. Focused exam revealed some abdominal tenderness to palpation no rigidity or distention, no focal neurological deficits,    ED course: Patient presents emergency department complaint of nausea diarrhea abdominal pain increased urinary frequency. Patient COVID-19 test is negative. Patient normal white count and platelets. Hemoglobin 12.3. Patient normal sodium potassium calcium liver enzymes. Patient glucose of 215. Patient lactic acid 2.1. Patient was ordered morphine Zofran IV fluids. CT scan of the abdomen pelvis shows some mild wall thickening to the urinary bladder which shows mild perivesicular fat stranding concerning for cystitis. I did order a straight cath patient states she is unable to urinate at this time requesting that we do a straight cath. CT also shows some groundglass opacities worsening from prior exam.  Patient denies any cough. Patient does admit to shortness of breath history of COPD. Patient also with a small hiatal hernia and colonic diverticulosis. Patient states the rigors shakes and chills is what is concerning for her. Patient feels like she needs to be admitted for further evaluation treatment and management. EKG normal sinus rhythm, rate of 88, NM interval 120, QRS duration 84, QT is/QTc 406/491, no ST elevation noted. All diagnostic, treatment, and disposition decisions were made by myself in conjunction with the advanced practice provider.     I personally saw the patient and performed a substantive portion of the visit including all aspects of the medical decision making. ICD-10-CM    1. Diarrhea, unspecified type  R19.7       2. Nausea  R11.0       3. Essential hypertension  I10       4. Generalized weakness  R53.1           For all further details of the patient's emergency department visit, please see the advanced practice provider's documentation. Comment: Please note this report has been produced using speech recognition software and may contain errors related to that system including errors in grammar, punctuation, and spelling, as well as words and phrases that may be inappropriate. If there are any questions or concerns please feel free to contact the dictating provider for clarification.         Kristan Elam DO  08/21/22 [id number]

## 2022-08-21 NOTE — ACP (ADVANCE CARE PLANNING)
Patient does not have any ACP documents/Medical Power of . DNR Sheet is Loaded into ACP documents. LSW notes hospital will follow [place]'s Next of Kin hierarchy in the following descending order for priority:    Guardian  Spouse  [de-identified] of adult Children  Parents  [de-identified] of adult Siblings  Nearest Relative not described above    Per [place] Next of Kin hierarchy: Patients' parent will be [address].

## 2022-08-21 NOTE — CONSULTS
Endocrinology   Consult Note  8/21/2022 12:02 PM     Primary Care provider: BRIAN Hennessy NP     Referring physician:  Edmundo Jurado MD     Dear Doctor [facility]// [place]//    Thank You for the Consult     Pt. Was Admitted for : Abdominal pain chills fever and diarrhea    Reason for Consult: Better control of blood glucose      History Obtained From:  Patient/ EMR       HISTORY OF PRESENT ILLNESS:                The patient is a 59 y.o. female with significant past medical history of diabetes mellitus, gastroparesis, COPD, hypertension, GERD, depression, obesity was recently discharged in the hospital only to come back again with similar complaints of diarrhea abdominal pain chills and fever. Has left lower lobe lung resection I was  consulted for better control of blood glucose. ROS:   Pt's ROS done in detail. Abnormal ROS are noted in Medical and Surgical History Section below: Other Medical History:        Diagnosis Date    Arthritis     Asthma     Cerebral artery occlusion with cerebral infarction [facility])     per old chart 8/2015- TIA    COPD (chronic obstructive pulmonary disease) (HCC)     Diabetes mellitus ([facility]     Diabetic neuropathy ([facility]     per old chart    Fracture     per old chart pt in ER 12/9/2018- after 2 days of arm pain after fall- dx with left humerus fx- for surg 12/31/2018    History of blood transfusion     Hyperlipidemia     Hypertension     Muscle weakness (generalized)     Osteoarthritis     per old chart     Surgical History:        Procedure Laterality Date    BREAST BIOPSY Right     CHOLECYSTECTOMY  2002? EYE SURGERY      per old chart had right eye cataract ext done 2/2018 and left eye 4/2018    FOOT SURGERY Left 2004? HUMERUS FRACTURE SURGERY Left 12/31/2018    HUMERUS OPEN REDUCTION INTERNAL FIXATION LEFT PROXIMAL performed by Cristhian Naidu DO at [address]  Right 2009?     LUNG SURGERY  2009?    simone lung lobectomy        Allergies: Patient has no known allergies. Family History:       Problem Relation Age of Onset    Breast Cancer Maternal Aunt 48    Breast Cancer Maternal Aunt 48     REVIEW OF SYSTEMS:  Review of System Done as noted above     PHYSICAL EXAM:      Vitals:    BP (!) 160/68   Pulse 81   Temp 97.6 °F (36.4 °C) (Oral) Comment: Simultaneous filing. User may not have seen previous data. Resp 13   Wt 235 lb (106.6 kg)   SpO2 100%   BMI 39.71 kg/m²     CONSTITUTIONAL:  awake, alert, cooperative, appears stated age   EYES:  vision intact Fundoscopic Exam not performed   ENT:Normal  NECK:  Supple, No JVD. Thyroid Exam:Normal   LUNGS:  Has Vesicular Breath Sounds,   CARDIOVASCULAR:  Normal apical impulse, regular rate and rhythm, normal S1 and S2, no S3 or S4, and has no  murmur   ABDOMEN:  No scars, normal bowel sounds, soft, non-distended, tender, no masses palpated, no hepatolienomegaly  Musculoskeletal: Normal  Extremities: Normal, peripheral pulses normal, , has no edema   NEUROLOGIC:  Awake, alert, oriented to name, place and time. Cranial nerves II-XII are grossly intact. Motor is  intact. Sensory neuropathy. ,  and gait is normal.    DATA:    CBC:   Recent Labs     08/21/22  1209   WBC 7.0   HGB 12.3*       CMP:  Recent Labs     08/21/22  1209      K 4.4      CO2 19*   BUN 12   CREATININE 1.0   CALCIUM 9.4   PROT 6.4   LABALBU 3.8   BILITOT 0.3   ALKPHOS 134*   AST 19   ALT 17     Lipids:   Lab Results   Component Value Date/Time    CHOL 176 05/12/2022 06:39 AM    HDL 62 05/12/2022 06:39 AM    TRIG 169 05/12/2022 06:39 AM     Glucose: No results for input(s): POCGLU in the last 72 hours.   Hemoglobin A1C:   Lab Results   Component Value Date/Time    LABA1C 8.1 07/11/2022 07:45 AM     Free T4:   Lab Results   Component Value Date/Time    T4FREE 1.42 08/21/2022 12:09 PM     Free T3: No results found for: FT3  TSH High Sensitivity:   Lab Results   Component Value Date/Time    TSHHS 2.600 08/21/2022 12:09 PM       CT ABDOMEN PELVIS WO CONTRAST Additional Contrast? None   Final Result   There may be some mild wall thickening to the urinary bladder predominantly   anteriorly along with some mild perivesical fat stranding. Findings are   concerning for nonspecific cystitis. Clinical and laboratory correlation can   be made. Mild ground-glass opacities posteromedially to bilateral lower lobes, right   worse than left, worsened from prior exam.  Findings concerning for   nonspecific infectious or inflammatory process. Recommend follow-up   dedicated CT chest in three months. Small hiatal hernia appears slightly enlarged from prior exam.      Colonic diverticulosis without evidence for diverticulitis. XR CHEST PORTABLE   Final Result      Some vague generalized opacity of the left lower lung field is unchanged   comparison the study of 06/30/2022 suggesting an area of scarring and/or   pericardial fat pad with possible superimposed chest/breast soft tissues. No   definite acute abnormality or acute pulmonary disease is noted.                 Scheduled Medicines   Medications:    amLODIPine  10 mg Oral Daily    aspirin  81 mg Oral Daily    atorvastatin  80 mg Oral Nightly    busPIRone  10 mg Oral TID    clopidogrel  75 mg Oral Daily    donepezil  10 mg Oral Nightly    FLUoxetine  80 mg Oral Daily    gabapentin  400 mg Oral BID    insulin lispro  10 Units SubCUTAneous TID AC    [START ON 8/22/2022] levothyroxine  75 mcg Oral QAM AC    vitamin D  2,000 Units Oral Daily    vitamin B-12  100 mcg Oral Daily    topiramate  50 mg Oral Nightly    [START ON 8/22/2022] pantoprazole  40 mg Oral BID AC    metoprolol tartrate  12.5 mg Oral BID    metoclopramide  5 mg Oral TID WC    sodium chloride flush  5-40 mL IntraVENous 2 times per day    enoxaparin  30 mg SubCUTAneous BID    insulin lispro  0-4 Units SubCUTAneous TID     insulin lispro  0-4 Units SubCUTAneous 2 times per day    insulin glargine  20 Units SubCUTAneous Nightly      Infusions:    sodium chloride      dextrose      sodium chloride           IMPRESSION    Patient Active Problem List   Diagnosis    Proximal humeral fracture    Asthma    Bereavement    Pain of both hip joints    Chest pain    Chronic abdominal pain    Chronic back pain    Chronic obstructive lung disease (HCC)    Corns and callosities    Depressive disorder    Type II diabetes mellitus, uncontrolled ([address]    Diabetic polyneuropathy (HCC)    HTN (hypertension)    Hyperlipidemia    Lesion of liver    Menopausal symptom    Mixed hyperlipidemia    Onychomycosis    Obesity    Osteoarthrosis    Osteoporosis    Pain in both feet    Sleep apnea    TIA (transient ischemic attack)    Vitamin D deficiency    Wheezing    S/P ORIF (open reduction internal fixation) fracture    Diabetic gastroparesis (HCC)    Gastroesophageal reflux disease    Restless legs    Ketoacidosis, diabetic, type 2, no coma ([facility])    Repeated falls    Subclinical hypothyroidism    Vasovagal syncope    Acute kidney injury superimposed on CKD ([address]    Acute kidney injury ([address]    Nausea and vomiting    Acute diarrhea         RECOMMENDATIONS:      Reviewed POC blood glucose . Labs and X ray results   Reviewed Home and Current Medicines   Will Start On meal/ Correction bolus Humalog/ Lantus Insulin regime   Monitor Blood glucose frequently   Modify  the dose of Insulin as needed        Will follow with you  Again thank you for sharing pt's care with me.      Truly yours,       Luis Felipe Preciado MD

## 2022-08-21 NOTE — ED NOTES
Report called to Marlo ALEXANDER at this time, all questions answered.       William Borjas RN  08/21/22 2185

## 2022-08-21 NOTE — ED PROVIDER NOTES
[facility] 4E  EMERGENCY DEPARTMENT ENCOUNTER        Pt Name: Mike Gr  MRN: [id number]  [place] 1960  Date of evaluation: 8/21/2022  Provider: BRIAN Rodriguez CNP  PCP: BRIAN Howard NP  Note Started: 12:33 PM EDT       I have seen and evaluated this patient with my supervising physician Dr. Clay Alcaraz DO. Triage CHIEF COMPLAINT       Chief Complaint   Patient presents with    Abdominal Pain    Emesis     Seen for same recently dx with paresis          HISTORY OF PRESENT ILLNESS   (Location/Symptom, Timing/Onset, Context/Setting, Quality, Duration, Modifying Factors, Severity)  Note limiting factors. Chief Complaint: abdominal pain, N/V/D    Mike Gr is a 59 y.o. female who presents to the ED with complaints of abdominal pain, nausea, vomiting, and diarrhea that began yesterday. Patient reports that she had 5+ episodes of diarrhea in the last 24 hours. Patient denies any vomiting today but reports \"dry heaving \". Patient also reports weakness, fever, chills, cough, shortness of breath, and rhinorrhea. Patient denies any nausea today but reports that she has not taking any of her home meds for fear that she would experience nausea and vomiting. Patient does report generalized body aches at an 8. Nursing Notes were all reviewed and agreed with or any disagreements were addressed in the HPI. REVIEW OF SYSTEMS    (2-9 systems for level 4, 10 or more for level 5)     Review of Systems   Constitutional:  Positive for fatigue. See HPI   HENT:          See HPI   Respiratory:          See HPI   Cardiovascular:  Negative for chest pain and palpitations. Gastrointestinal:  Positive for abdominal pain, diarrhea, nausea and vomiting. Negative for abdominal distention and constipation. Genitourinary:  Negative for difficulty urinating and dysuria. Musculoskeletal:  Positive for myalgias (Generalized body aches). Negative for arthralgias, neck pain and neck stiffness. Skin:  Negative for rash and wound. Neurological:  Negative for dizziness, syncope, weakness, numbness and headaches. PAST MEDICAL HISTORY     Past Medical History:   Diagnosis Date    Arthritis     Asthma     Cerebral artery occlusion with cerebral infarction [facility])     per old chart 8/2015- TIA    COPD (chronic obstructive pulmonary disease) (HCC)     Diabetes mellitus (HCC)     Diabetic neuropathy ([address]     per old chart    Fracture     per old chart pt in ER 12/9/2018- after 2 days of arm pain after fall- dx with left humerus fx- for surg 12/31/2018    History of blood transfusion     Hyperlipidemia     Hypertension     Muscle weakness (generalized)     Osteoarthritis     per old chart       SURGICAL HISTORY     Past Surgical History:   Procedure Laterality Date    BREAST BIOPSY Right     CHOLECYSTECTOMY  2002? EYE SURGERY      per old chart had right eye cataract ext done 2/2018 and left eye 4/2018    FOOT SURGERY Left 2004? HUMERUS FRACTURE SURGERY Left 12/31/2018    HUMERUS OPEN REDUCTION INTERNAL FIXATION LEFT PROXIMAL performed by Rosy Santos DO at [place] [address] Right 2009? LUNG SURGERY  2009?    simone lung lobectomy        CURRENTMEDICATIONS       Discharge Medication List as of 8/22/2022  6:13 PM        CONTINUE these medications which have NOT CHANGED    Details   aspirin 81 MG chewable tablet Take 1 tablet by mouth in the morning., Disp-30 tablet, R-0Normal      insulin lispro (HUMALOG) 100 UNIT/ML SOLN injection vial Inject 10 Units into the skin in the morning and 10 Units at noon and 10 Units in the evening. Inject before meals. , Disp-15 mL, R-0Normal      lidocaine 4 % external patch Place 1 patch onto the skin daily as needed (Pain), TransDERmal, DAILY PRN Starting Sat 7/30/2022, Disp-1 box, R-0, Normal      glucose 4 g chewable tablet Take 4 tablets by mouth as needed for Low blood sugar, Disp-60 tablet, R-3Normal ondansetron (ZOFRAN-ODT) 4 MG disintegrating tablet Take 1 tablet by mouth every 12 hours as needed for Nausea or Vomiting, Disp-30 tablet, R-0Normal      metoclopramide (REGLAN) 5 MG tablet Take 1 tablet by mouth in the morning and 1 tablet at noon and 1 tablet in the evening. Take with meals. , Disp-30 tablet, R-0Normal      pantoprazole (PROTONIX) 40 MG tablet Take 1 tablet by mouth in the morning and 1 tablet in the evening. Take before meals. , Disp-60 tablet, R-0Normal      vitamin B-12 (CYANOCOBALAMIN) 100 MCG tablet Take 1 tablet by mouth in the morning., Disp-60 tablet, R-0Normal      metoprolol tartrate (LOPRESSOR) 25 MG tablet Take 0.5 tablets by mouth in the morning and 0.5 tablets before bedtime. , Disp-60 tablet, R-0Normal      amLODIPine (NORVASC) 10 MG tablet Take 1 tablet by mouth in the morning., Disp-30 tablet, R-0NO PRINT      Vitamin D (CHOLECALCIFEROL) 50 MCG (2000 UT) TABS tablet Take 1 tablet by mouth in the morning., Disp-30 tablet, R-0Labeling may look different. 25 mcg=1000 Units. Please double check dosages. NO PRINT      gabapentin (NEURONTIN) 400 MG capsule Take 1 capsule by mouth in the morning and 1 capsule before bedtime. Do all this for 30 days. , Disp-60 capsule, R-0NO PRINT      insulin glargine (LANTUS) 100 UNIT/ML injection vial Inject 20 Units into the skin nightly, Disp-10 mL, R-3NO PRINT      levothyroxine (SYNTHROID) 75 MCG tablet Take 1 tablet by mouth every morning (before breakfast), Disp-30 tablet, R-0NO PRINT      FLUoxetine (PROZAC) 40 MG capsule Take 80 mg by mouth dailyHistorical Med      donepezil (ARICEPT) 5 MG tablet Take 10 mg by mouth nightly Historical Med      busPIRone (BUSPAR) 10 MG tablet Take 10 mg by mouth 3 times daily Historical Med      topiramate (TOPAMAX) 50 MG tablet Take 50 mg by mouth nightly Historical Med      clopidogrel (PLAVIX) 75 MG tablet Take 75 mg by mouth dailyHistorical Med      atorvastatin (LIPITOR) 40 MG tablet Take 80 mg by mouth nightly Historical Med             ALLERGIES     Patient has no known allergies. FAMILYHISTORY       Family History   Problem Relation Age of Onset    Breast Cancer Maternal Aunt 48    Breast Cancer Maternal Aunt 48        SOCIAL HISTORY       Social History     Socioeconomic History    Marital status:      Spouse name: None    Number of children: None    Years of education: None    Highest education level: None   Tobacco Use    Smoking status: Never    Smokeless tobacco: Never    Tobacco comments:     12/27/*2018- with phone assessment with caregiver- unsure of social, surgical or family hx for phone assessment   Vaping Use    Vaping Use: Never used   Substance and Sexual Activity    Alcohol use: Yes    Drug use: No    Sexual activity: Not Currently     Social Determinants of Health     Financial Resource Strain: Low Risk     Difficulty of Paying Living Expenses: Not hard at all   Food Insecurity: No Food Insecurity    Worried About Running Out of Food in the Last Year: Never true    [address] in the Last Year: Never true   Transportation Needs: No Transportation Needs    Lack of Transportation (Medical): No    Lack of Transportation (Non-Medical): No   Physical Activity: Inactive    Days of Exercise per Week: 0 days    Minutes of Exercise per Session: 0 min   Stress: Stress Concern Present    Feeling of Stress : Very much   Social Connections:  Moderately Isolated    Frequency of Communication with Friends and Family: More than three times a week    Frequency of Social Gatherings with Friends and Family: Once a week    Attends [employer] Services: 1 to 4 times per year    Active Member of [address] [place] [address] or Organizations: No    Attends Club or Organization Meetings: Never    Marital Status:    Intimate Partner Violence: Not At Risk    Fear of Current or Ex-Partner: No    Emotionally Abused: No    Physically Abused: No    Sexually Abused: No   Housing Stability: Low Risk     Unable to Pay for Housing in the Last Year: No    Number of Places Lived in the Last Year: 2    Unstable Housing in the Last Year: No       SCREENINGS    Anup Coma Scale  Eye Opening: Spontaneous  Best Verbal Response: Oriented  Best Motor Response: Obeys commands  Anup Coma Scale Score: 15        PHYSICAL EXAM    (up to 7 for level 4, 8 or more for level 5)     ED Triage Vitals [08/21/22 1202]   BP Temp Temp Source Heart Rate Resp SpO2 Height Weight   (!) 153/100 97.6 °F (36.4 °C) Oral 87 16 99 % -- 235 lb (106.6 kg)       Physical Exam  Constitutional:       General: She is not in acute distress. Appearance: Normal appearance. She is well-developed. She is obese. She is not toxic-appearing or diaphoretic. HENT:      Head: Normocephalic and atraumatic. Right Ear: External ear normal.      Left Ear: External ear normal.   Eyes:      General:         Right eye: No discharge. Left eye: No discharge. Extraocular Movements: Extraocular movements intact. Cardiovascular:      Rate and Rhythm: Normal rate and regular rhythm. Heart sounds: Normal heart sounds. No murmur heard. No friction rub. No gallop. Pulmonary:      Effort: Pulmonary effort is normal. No respiratory distress. Breath sounds: No stridor. No wheezing or rhonchi. Abdominal:      General: Bowel sounds are normal. There is no distension. There are no signs of injury. Palpations: Abdomen is soft. There is no hepatomegaly, splenomegaly or pulsatile mass. Tenderness: There is abdominal tenderness in the epigastric area, suprapubic area and left lower quadrant. There is no guarding. Negative signs include Menard's sign and McBurney's sign. Musculoskeletal:         General: Normal range of motion. Cervical back: Normal range of motion and neck supple. Skin:     General: Skin is warm and dry. Capillary Refill: Capillary refill takes less than 2 seconds. Coloration: Skin is not cyanotic or mottled.       Findings: No erythema or rash. Neurological:      General: No focal deficit present. Mental Status: She is alert and oriented to person, place, and time. Cranial Nerves: No cranial nerve deficit.    Psychiatric:         Mood and Affect: Mood normal.         Behavior: Behavior normal.       DIAGNOSTIC RESULTS   LABS:    Labs Reviewed   CULTURE, URINE - Abnormal; Notable for the following components:       Result Value    Culture CITROBACTER BRAAKII >100,000 CFU/ml (*)     All other components within normal limits    Narrative:     SETUP DATE/TIME:  08/21/2022 1818   CBC WITH AUTO DIFFERENTIAL - Abnormal; Notable for the following components:    RBC 4.16 (*)     Hemoglobin 12.3 (*)     MCHC 31.9 (*)     Segs Relative 68.0 (*)     Lymphocytes % 23.1 (*)     Monocytes % 7.5 (*)     All other components within normal limits   COMPREHENSIVE METABOLIC PANEL W/ REFLEX TO MG FOR LOW K - Abnormal; Notable for the following components:    CO2 19 (*)     Glucose 215 (*)     Alkaline Phosphatase 134 (*)     GFR Non- 56 (*)     All other components within normal limits   URINALYSIS WITH MICROSCOPIC - Abnormal; Notable for the following components:    Ketones, Urine 40 (*)     Protein,  (*)     Mucus, UA RARE (*)     All other components within normal limits   LACTIC ACID - Abnormal; Notable for the following components:    Lactate 2.1 (*)     All other components within normal limits   BLOOD GAS, VENOUS - Abnormal; Notable for the following components:    pH, Crescencio 7.26 (*)     pO2, Crescencio 59 (*)     Base Excess 7 (*)     O2 Sat, Crescencio 87.4 (*)     All other components within normal limits   BASIC METABOLIC PANEL W/ REFLEX TO MG FOR LOW K - Abnormal; Notable for the following components:    Glucose 163 (*)     All other components within normal limits   POCT GLUCOSE - Abnormal; Notable for the following components:    POC Glucose 292 (*)     All other components within normal limits   POCT GLUCOSE - Abnormal; Generalized weakness          DISPOSITION/PLAN   DISPOSITION Admitted 08/21/2022 05:33:39 PM      PATIENT REFERREDTO:  BRIAN Crowe - NP  [address] [facility].   [facility]  [phone]    Follow up      MD Abhishek Barnes [address], [address]  [phone]          DISCHARGE MEDICATIONS:  Discharge Medication List as of 8/22/2022  6:13 PM          DISCONTINUED MEDICATIONS:  Discharge Medication List as of 8/22/2022  6:13 PM        STOP taking these medications       aspirin 81 MG EC tablet Comments:   Reason for Stopping:         glucagon, rDNA, 1 MG injection Comments:   Reason for Stopping:         metFORMIN (GLUCOPHAGE) 500 MG tablet Comments:   Reason for Stopping:         hydrALAZINE (APRESOLINE) 25 MG tablet Comments:   Reason for Stopping:         lisinopril (PRINIVIL;ZESTRIL) 40 MG tablet Comments:   Reason for Stopping:         metoprolol succinate (TOPROL XL) 50 MG extended release tablet Comments:   Reason for Stopping:                      (Please note that portions ofthis note were completed with a voice recognition program.  Efforts were made to edit the dictations but occasionally words are mis-transcribed.)    BRIAN Marcelino CNP (electronically signed)              BRIAN Marcelino CNP  08/23/22 [id number]

## 2022-08-21 NOTE — CARE COORDINATION
CM - Room -19 with Ashley TORRES-CNP-CM observed  more gross movements of her hands , arms , feet and legs. -rolling side to side in the bed expressing pain to the abdomen -Pt is a readmission  risk --was released from a hospital admission on July 30 , 2022 for Gastroenteritis  / Gastroparesis- uncontrolled diabetes , Chronic anemia, HTN and MODESTO. She was sent to [facility] for a rehabilitation --from there she was sent [facility] where she now resides . Per her AVS discharge instructions she was to follow up with Jennifer Ferrari -both hospital consults. Dr Mariel Haskins recommended  that she follow up with an office visit , as was same for Dr Cami Ferrari  -follow up, in 3 days . It does not appear that there was an outpatient follow up from either physicians per chart review. CM reprinted the previous AVS for pt review as Ms Santos Uribe will be reeducated for follow up plans due to similar / same complaint   There are tests that may determine eligibility for admission     Another CM suggestion for assistance would be for [employer] to follow her , especially with the changes in medications and diabetes compliance .      Jose Negrete / Chichi Veras / [facility]

## 2022-08-21 NOTE — ED NOTES
From [facility] for mid abd pain since this morning.       Eva Skinner, BENJAMIN  08/21/22 [id number]

## 2022-08-21 NOTE — H&P
V2.0  History and Physical      Name:  Иван Morin /Age/Sex: 1960  (59 y.o. female)   MRN & CSN:  [id number] & [id number] Encounter Date/Time: 2022 5:19 PM EDT   Location:  ED19/ED-19 PCP: BRIAN Gomes - NP       Hospital Day: 1    Assessment and Plan:   Иван Morin is a 59 y.o. female with a pmh of DMII, COPD, HTN, GERD, Depression, Hypothyroidism, and obesity who presents with abdominal pain with diarrhea    Abdominal pain with Diarrhea  SARS cov-2 negative. Hx of Gastroparesis  -Admit under observation  -Hydration  -Monitor electrolytes and replete  -Resp panel with covid  -GI panel disease  -Continue Reglan  -Case management consult for [facility]    Hyperglycemia in DMI with chronic insulin use  . Last A1c: 8.1 ()  -SSI and Lantus  -Hypoglycemia protocol  -Endo consult    Obesity class 2 with BMI of 39.7  -Educated about lifestyle modifications    Other chronic Issues  -HTN  -HLD  -GERD  -Depression  -Hypothyroidism      Disposition:   Current Living situation: Home  Expected Disposition: Home  Estimated D/C: 22    Diet Advance as tolerated   DVT Prophylaxis [x] Lovenox, []  Heparin, [] SCDs, [] Ambulation,  [] Eliquis, [] Xarelto, [] Coumadin   Code Status Full code   Surrogate Decision Maker/ POA Self     History from:     patient    History of Present Illness:     Chief Complaint: <principal problem not specified>  Иван Morin is a 59 y.o. female with pmh of DMII, COPD, HTN, GERD, Depression, Hypothyroidism, Gastroparesis, and obesity who presents with abdominal pain with diarrhea that started yesterday. PT reports 5 episodes of nonbloody diarrhea. She has hx of gastroparesis and denies recent antibiotic use. Other associated symptoms are fever, chills, fatigue, myalgia, and rhinorrhea. PT denies sick contact with Covid, hematuria, hematemesis, or hx of IBS. PT was going to be discharge,. But didn't feel safe at home.  Would admit under observation for hydration and further evaluation. Review of Systems: Need 10 Elements   Review of Systems   Constitutional:  Positive for chills, fatigue and fever. Negative for unexpected weight change. HENT:  Positive for rhinorrhea. Negative for congestion, sore throat and tinnitus. Eyes:  Negative for visual disturbance. Respiratory:  Negative for cough, shortness of breath and wheezing. Cardiovascular:  Negative for chest pain, palpitations and leg swelling. Gastrointestinal:  Positive for abdominal pain, diarrhea and nausea. Negative for constipation and vomiting. Endocrine: Negative for polydipsia, polyphagia and polyuria. Genitourinary:  Negative for difficulty urinating, dysuria, frequency, hematuria and urgency. Musculoskeletal:  Positive for myalgias. Negative for back pain and joint swelling. Skin:  Negative for color change and rash. Neurological:  Negative for tremors, weakness, numbness and headaches. Psychiatric/Behavioral:  Negative for confusion, hallucinations and suicidal ideas. Objective:   No intake or output data in the 24 hours ending 08/21/22 1719   Vitals:   Vitals:    08/21/22 1330 08/21/22 1430 08/21/22 1500 08/21/22 1533   BP: (!) 160/58 (!) 177/65 (!) 185/62 (!) 160/68   Pulse: 86 76 78 81   Resp: 19 14 14 13   Temp:       TempSrc:       SpO2:  100% 100% 100%   Weight:           Medications Prior to Admission     Prior to Admission medications    Medication Sig Start Date End Date Taking? Authorizing Provider   aspirin 81 MG chewable tablet Take 1 tablet by mouth in the morning. 7/30/22   Jordin Bee MD   insulin lispro (HUMALOG) 100 UNIT/ML SOLN injection vial Inject 10 Units into the skin in the morning and 10 Units at noon and 10 Units in the evening. Inject before meals.  7/30/22   Jordin Bee MD   insulin lispro (HUMALOG) 100 UNIT/ML SOLN injection vial Inject 0-4 Units into the skin 4 times daily (before meals and nightly) (PROZAC) 40 MG capsule Take 80 mg by mouth daily    Historical Provider, MD   metFORMIN (GLUCOPHAGE) 500 MG tablet Take 1 tablet by mouth 2 times daily (with meals) 3/22/22 7/30/22  Jabier Lovelace DO   hydrALAZINE (APRESOLINE) 25 MG tablet Take 1 tablet by mouth every 8 hours 3/22/22 7/18/22  Jabier Lovelace DO   lisinopril (PRINIVIL;ZESTRIL) 40 MG tablet Take 1 tablet by mouth daily 3/16/22 7/30/22  John Araujo MD   donepezil (ARICEPT) 5 MG tablet Take 10 mg by mouth nightly  9/13/21   Historical Provider, MD   busPIRone (BUSPAR) 10 MG tablet Take 10 mg by mouth 3 times daily  3/27/21   Historical Provider, MD   metoprolol succinate (TOPROL XL) 50 MG extended release tablet Take 1 tablet by mouth daily  Patient taking differently: Take 25 mg by mouth daily  1/25/19 7/18/22  Aislinn Zavala MD   topiramate (TOPAMAX) 50 MG tablet Take 50 mg by mouth nightly     Historical Provider, MD   clopidogrel (PLAVIX) 75 MG tablet Take 75 mg by mouth daily    Historical Provider, MD   atorvastatin (LIPITOR) 40 MG tablet Take 80 mg by mouth nightly     Historical Provider, MD       Physical Exam: Need 8 Elements   Physical Exam  Constitutional:       Appearance: She is obese. HENT:      Head: Normocephalic and atraumatic. Nose: Nose normal.   Eyes:      Pupils: Pupils are equal, round, and reactive to light. Cardiovascular:      Rate and Rhythm: Normal rate and regular rhythm. Pulses: Normal pulses. Heart sounds: Normal heart sounds. Pulmonary:      Effort: Pulmonary effort is normal.      Breath sounds: Normal breath sounds. Abdominal:      Palpations: Abdomen is soft. Tenderness: There is abdominal tenderness. There is no guarding or rebound. Musculoskeletal:         General: Normal range of motion. Cervical back: Normal range of motion and neck supple. Skin:     General: Skin is warm and dry. Neurological:      General: No focal deficit present.       Mental Status: She is alert and oriented to person, place, and time. Psychiatric:         Mood and Affect: Mood normal.         Behavior: Behavior normal.            Past Medical History:   PMHx   Past Medical History:   Diagnosis Date    Arthritis     Asthma     Cerebral artery occlusion with cerebral infarction [facility])     per old chart 8/2015- TIA    COPD (chronic obstructive pulmonary disease) ([facility] [address]     Diabetes mellitus ([facility] [address]     Diabetic neuropathy ([facility] [address]     per old chart    Fracture     per old chart pt in ER 12/9/2018- after 2 days of arm pain after fall- dx with left humerus fx- for surg 12/31/2018    History of blood transfusion     Hyperlipidemia     Hypertension     Muscle weakness (generalized)     Osteoarthritis     per old chart     PSHX:  has a past surgical history that includes Foot surgery (Left, 2004?); knee surgery (Right, 2009?); Cholecystectomy (2002?); Lung surgery (2009?); eye surgery; Humerus fracture surgery (Left, 12/31/2018); and Breast biopsy (Right). Allergies: No Known Allergies  Fam HX:family history includes Breast Cancer (age of onset: 48) in her maternal aunt and maternal aunt. Type I diabetes in her siblings.  Colon cancer in her sister  Soc HX:   Social History     Socioeconomic History    Marital status:      Spouse name: None    Number of children: None    Years of education: None    Highest education level: None   Tobacco Use    Smoking status: Never    Smokeless tobacco: Never    Tobacco comments:     12/27/*2018- with phone assessment with caregiver- unsure of social, surgical or family hx for phone assessment   Vaping Use    Vaping Use: Never used   Substance and Sexual Activity    Alcohol use: Yes    Drug use: No    Sexual activity: Not Currently     Social Determinants of Health     Financial Resource Strain: Low Risk     Difficulty of Paying Living Expenses: Not hard at all   Food Insecurity: No Food Insecurity    Worried About [address] in the Last Year: Never true    Denita of Food in the Last Year: Never true   Transportation Needs: No Transportation Needs    Lack of Transportation (Medical): No    Lack of Transportation (Non-Medical): No   Physical Activity: Inactive    Days of Exercise per Week: 0 days    Minutes of Exercise per Session: 0 min   Stress: Stress Concern Present    Feeling of Stress : Very much   Social Connections: Moderately Isolated    Frequency of Communication with Friends and Family: More than three times a week    Frequency of Social Gatherings with Friends and Family: Once a week    Attends Taoism Services: 1 to 4 times per year    Active Member of [address] [place] [address] or Organizations: No    Attends Club or Organization Meetings: Never    Marital Status:    Intimate Partner Violence: Not At Risk    Fear of Current or Ex-Partner: No    Emotionally Abused: No    Physically Abused: No    Sexually Abused: No   Housing Stability: Low Risk     Unable to Pay for Housing in the Last Year: No    Number of Places Lived in the Last Year: 2    Unstable Housing in the Last Year: No       Medications:   Medications:    Infusions:   PRN Meds: ondansetron, 4 mg, Q6H PRN        Labs      CBC:   Recent Labs     08/21/22  1209   WBC 7.0   HGB 12.3*        BMP:    Recent Labs     08/21/22  1209      K 4.4      CO2 19*   BUN 12   CREATININE 1.0   GLUCOSE 215*     Hepatic:   Recent Labs     08/21/22  1209   AST 19   ALT 17   BILITOT 0.3   ALKPHOS 134*     Lipids:   Lab Results   Component Value Date/Time    CHOL 176 05/12/2022 06:39 AM    HDL 62 05/12/2022 06:39 AM    TRIG 169 05/12/2022 06:39 AM     Hemoglobin A1C:   Lab Results   Component Value Date/Time    LABA1C 8.1 07/11/2022 07:45 AM     TSH: No results found for: TSH  Troponin:   Lab Results   Component Value Date/Time    TROPONINT <0.010 07/29/2022 06:00 AM    TROPONINT <0.010 07/29/2022 02:34 AM    TROPONINT <0.010 06/30/2022 12:06 PM     Lactic Acid: No results for input(s): LACTA in the last 72 hours.   BNP: No results for input(s): PROBNP in the last 72 hours. UA:  Lab Results   Component Value Date/Time    NITRU NEGATIVE 08/21/2022 03:58 PM    COLORU YELLOW 08/21/2022 03:58 PM    WBCUA <1 08/21/2022 03:58 PM    RBCUA NONE SEEN 08/21/2022 03:58 PM    MUCUS RARE 08/21/2022 03:58 PM    TRICHOMONAS NONE SEEN 08/21/2022 03:58 PM    YEAST MODERATE 07/11/2022 12:05 PM    BACTERIA NEGATIVE 08/21/2022 03:58 PM    CLARITYU CLEAR 08/21/2022 03:58 PM    SPECGRAV 1.020 08/21/2022 03:58 PM    LEUKOCYTESUR NEGATIVE 08/21/2022 03:58 PM    UROBILINOGEN 0.2 08/21/2022 03:58 PM    BILIRUBINUR NEGATIVE 08/21/2022 03:58 PM    BLOODU NEGATIVE 08/21/2022 03:58 PM    KETUA 40 08/21/2022 03:58 PM     Urine Cultures: No results found for: Tawana Love  Blood Cultures: No results found for: BC  No results found for: BLOODCULT2  Organism: No results found for: ORG    Imaging/Diagnostics Last 24 Hours   CT ABDOMEN PELVIS WO CONTRAST Additional Contrast? None    Result Date: 8/21/2022  EXAMINATION: CT OF THE ABDOMEN AND PELVIS WITHOUT CONTRAST 8/21/2022 2:08 pm TECHNIQUE: CT of the abdomen and pelvis was performed without the administration of intravenous contrast. Multiplanar reformatted images are provided for review. Automated exposure control, iterative reconstruction, and/or weight based adjustment of the mA/kV was utilized to reduce the radiation dose to as low as reasonably achievable. COMPARISON: 06/30/2022. HISTORY: ORDERING SYSTEM PROVIDED HISTORY: abdominal pain TECHNOLOGIST PROVIDED HISTORY: Reason for exam:->abdominal pain Additional Contrast?->None Decision Support Exception - unselect if not a suspected or confirmed emergency medical condition->Emergency Medical Condition (MA) Reason for Exam: abdominal pain/nausea FINDINGS: Lower Chest: Mild ground-glass opacities posteromedially to bilateral lower lobes, right worse than left, worsened from prior exam.  No dense consolidations or pleural effusions. Heart is normal in size.   No pericardial effusion. Small hiatal hernia appears slightly enlarged. Organs: No acute findings to the unenhanced liver, spleen, pancreas, adrenal glands or kidneys. No evidence for urinary tract stone or urinary tract obstruction on either side. Prior cholecystectomy. GI/Bowel: No evidence for bowel obstruction or definite bowel wall thickening to unopacified large or small bowel. Mild scattered colonic diverticulosis without evidence for diverticulitis. No evidence for acute appendicitis. Pelvis: There may be some mild wall thickening to the urinary bladder predominantly anteriorly along with some slight perivesical fat stranding. Unremarkable uterus and adnexa. Peritoneum/Retroperitoneum: No ascites or focal fluid collections. No intraperitoneal free air. No evidence for abdominal aortic aneurysm. No lymphadenopathy. Moderate-to-severe atherosclerosis. Bones/Soft Tissues: No acute or suspicious bone or soft tissue abnormality. There may be some mild wall thickening to the urinary bladder predominantly anteriorly along with some mild perivesical fat stranding. Findings are concerning for nonspecific cystitis. Clinical and laboratory correlation can be made. Mild ground-glass opacities posteromedially to bilateral lower lobes, right worse than left, worsened from prior exam.  Findings concerning for nonspecific infectious or inflammatory process. Recommend follow-up dedicated CT chest in three months. Small hiatal hernia appears slightly enlarged from prior exam. Colonic diverticulosis without evidence for diverticulitis.      XR CHEST PORTABLE    Result Date: 8/21/2022  EXAM: XR Chest, 1 View EXAM DATE/TIME: 8/21/2022 12:44 pm CLINICAL HISTORY: ORDERING SYSTEM PROVIDED  cough  TECHNOLOGIST PROVIDED HISTORY:  Reason for exam:->cough  Reason for Exam: cough TECHNIQUE: Frontal view of the chest. COMPARISON: 07/04/2022 and 06/30/2022 FINDINGS: Lungs:  Some vague generalized opacity of the left lower lung field is unchanged comparison the study of 06/30/2022 suggesting an area of scarring and/or pericardial fat pad with possible superimposed chest/breast soft tissues. No definite acute abnormality or acute pulmonary disease is noted. Pleural space:  No acute findings. No pneumothorax. Heart:  No acute findings. No cardiomegaly. Mediastinum:  No acute findings. Bones/joints:  Prior ORIF of the proximal left humerus. Mild osteoarthritic change of the right shoulder is noted. Some vague generalized opacity of the left lower lung field is unchanged comparison the study of 06/30/2022 suggesting an area of scarring and/or pericardial fat pad with possible superimposed chest/breast soft tissues. No definite acute abnormality or acute pulmonary disease is noted.        Personally reviewed Lab Studies, Imaging, and discussed case with Dr. Shira Alberto     Electronically signed by BRIAN Cee CNP on 8/21/2022 at 5:19 PM

## 2022-08-22 VITALS
DIASTOLIC BLOOD PRESSURE: 84 MMHG | WEIGHT: 233.03 LBS | TEMPERATURE: 98.1 F | BODY MASS INDEX: 39.38 KG/M2 | HEART RATE: 72 BPM | RESPIRATION RATE: 16 BRPM | SYSTOLIC BLOOD PRESSURE: 132 MMHG | OXYGEN SATURATION: 98 %

## 2022-08-22 LAB
ANION GAP SERPL CALCULATED.3IONS-SCNC: 8 MMOL/L (ref 4–16)
BUN BLDV-MCNC: 12 MG/DL (ref 6–23)
CALCIUM SERPL-MCNC: 8.7 MG/DL (ref 8.3–10.6)
CHLORIDE BLD-SCNC: 109 MMOL/L (ref 99–110)
CO2: 25 MMOL/L (ref 21–32)
CREAT SERPL-MCNC: 0.9 MG/DL (ref 0.6–1.1)
EKG ATRIAL RATE: 88 BPM
EKG DIAGNOSIS: NORMAL
EKG P AXIS: 14 DEGREES
EKG P-R INTERVAL: 120 MS
EKG Q-T INTERVAL: 406 MS
EKG QRS DURATION: 84 MS
EKG QTC CALCULATION (BAZETT): 491 MS
EKG R AXIS: 15 DEGREES
EKG T AXIS: 78 DEGREES
EKG VENTRICULAR RATE: 88 BPM
GFR AFRICAN AMERICAN: >60 ML/MIN/1.73M2
GFR NON-AFRICAN AMERICAN: >60 ML/MIN/1.73M2
GLUCOSE BLD-MCNC: 141 MG/DL (ref 70–99)
GLUCOSE BLD-MCNC: 156 MG/DL (ref 70–99)
GLUCOSE BLD-MCNC: 163 MG/DL (ref 70–99)
GLUCOSE BLD-MCNC: 207 MG/DL (ref 70–99)
GLUCOSE BLD-MCNC: 316 MG/DL (ref 70–99)
POTASSIUM SERPL-SCNC: 4 MMOL/L (ref 3.5–5.1)
SODIUM BLD-SCNC: 142 MMOL/L (ref 135–145)

## 2022-08-22 PROCEDURE — 36415 COLL VENOUS BLD VENIPUNCTURE: CPT

## 2022-08-22 PROCEDURE — 82962 GLUCOSE BLOOD TEST: CPT

## 2022-08-22 PROCEDURE — 94761 N-INVAS EAR/PLS OXIMETRY MLT: CPT

## 2022-08-22 PROCEDURE — 80048 BASIC METABOLIC PNL TOTAL CA: CPT

## 2022-08-22 PROCEDURE — G0378 HOSPITAL OBSERVATION PER HR: HCPCS

## 2022-08-22 PROCEDURE — 93010 ELECTROCARDIOGRAM REPORT: CPT | Performed by: INTERNAL MEDICINE

## 2022-08-22 PROCEDURE — 2580000003 HC RX 258: Performed by: NURSE PRACTITIONER

## 2022-08-22 PROCEDURE — 6370000000 HC RX 637 (ALT 250 FOR IP): Performed by: NURSE PRACTITIONER

## 2022-08-22 PROCEDURE — 6370000000 HC RX 637 (ALT 250 FOR IP): Performed by: INTERNAL MEDICINE

## 2022-08-22 PROCEDURE — 6360000002 HC RX W HCPCS: Performed by: NURSE PRACTITIONER

## 2022-08-22 PROCEDURE — 96361 HYDRATE IV INFUSION ADD-ON: CPT

## 2022-08-22 PROCEDURE — 96372 THER/PROPH/DIAG INJ SC/IM: CPT

## 2022-08-22 RX ORDER — ASPIRIN 81 MG/1
1 TABLET ORAL DAILY
Status: ON HOLD | COMMUNITY
Start: 2022-04-11 | End: 2022-08-22 | Stop reason: HOSPADM

## 2022-08-22 RX ADMIN — ENOXAPARIN SODIUM 30 MG: 100 INJECTION SUBCUTANEOUS at 09:04

## 2022-08-22 RX ADMIN — METOCLOPRAMIDE 5 MG: 5 TABLET ORAL at 09:03

## 2022-08-22 RX ADMIN — INSULIN LISPRO 3 UNITS: 100 INJECTION, SOLUTION INTRAVENOUS; SUBCUTANEOUS at 12:20

## 2022-08-22 RX ADMIN — FLUOXETINE 80 MG: 20 CAPSULE ORAL at 09:03

## 2022-08-22 RX ADMIN — Medication 2000 UNITS: at 09:03

## 2022-08-22 RX ADMIN — ASPIRIN 81 MG CHEWABLE TABLET 81 MG: 81 TABLET CHEWABLE at 09:03

## 2022-08-22 RX ADMIN — PANTOPRAZOLE SODIUM 40 MG: 40 TABLET, DELAYED RELEASE ORAL at 09:08

## 2022-08-22 RX ADMIN — AMLODIPINE BESYLATE 10 MG: 10 TABLET ORAL at 09:04

## 2022-08-22 RX ADMIN — SODIUM CHLORIDE, PRESERVATIVE FREE 10 ML: 5 INJECTION INTRAVENOUS at 09:04

## 2022-08-22 RX ADMIN — METOCLOPRAMIDE 5 MG: 5 TABLET ORAL at 12:19

## 2022-08-22 RX ADMIN — INSULIN LISPRO 10 UNITS: 100 INJECTION, SOLUTION INTRAVENOUS; SUBCUTANEOUS at 12:20

## 2022-08-22 RX ADMIN — METOPROLOL TARTRATE 12.5 MG: 25 TABLET, FILM COATED ORAL at 09:04

## 2022-08-22 RX ADMIN — VITAM B12 100 MCG: 100 TAB at 09:08

## 2022-08-22 RX ADMIN — BUSPIRONE HYDROCHLORIDE 10 MG: 5 TABLET ORAL at 09:04

## 2022-08-22 RX ADMIN — GABAPENTIN 400 MG: 400 CAPSULE ORAL at 09:04

## 2022-08-22 RX ADMIN — CLOPIDOGREL BISULFATE 75 MG: 75 TABLET ORAL at 09:04

## 2022-08-22 RX ADMIN — INSULIN LISPRO 1 UNITS: 100 INJECTION, SOLUTION INTRAVENOUS; SUBCUTANEOUS at 02:35

## 2022-08-22 RX ADMIN — BUSPIRONE HYDROCHLORIDE 10 MG: 5 TABLET ORAL at 12:19

## 2022-08-22 ASSESSMENT — PAIN SCALES - WONG BAKER
WONGBAKER_NUMERICALRESPONSE: 0

## 2022-08-22 ASSESSMENT — PAIN SCALES - GENERAL: PAINLEVEL_OUTOF10: 0

## 2022-08-22 NOTE — DISCHARGE SUMMARY
Discharge Summary    Name:  Jax Verma /Age/Sex: 1960  (59 y.o. female)   MRN & CSN:  [id number] & [id number] Admission Date/Time: 2022 12:02 PM   Attending:  Guillermina Nunez MD Discharging Provider: BRIAN Jay CNP     Hospital Course:   Jax Verma is a 59 y.o.  female  who presents with Acute diarrhea        Hospital Course: Patient is a 78-year-old female with past medical history of diabetes type 2, COPD, hypertension, GERD, depression, hypothyroidism, and gastroparesis. She presented with 5 episodes of nonbloody diarrhea and abdominal pain. Other associated symptoms of fever chills fatigue Myalgia and rhinorrhea. Patient had uncomplicated stay in the hospital.  She was given IV hydration. She was also assessed by PT/OT for home care assessment. Patient to be discharged in stable condition to home.     Patient Active Problem List   Diagnosis    Proximal humeral fracture    Asthma    Bereavement    Pain of both hip joints    Chest pain    Chronic abdominal pain    Chronic back pain    Chronic obstructive lung disease (HCC)    Corns and callosities    Depressive disorder    Type II diabetes mellitus, uncontrolled ([address]    Diabetic polyneuropathy (HCC)    HTN (hypertension)    Hyperlipidemia    Lesion of liver    Menopausal symptom    Mixed hyperlipidemia    Onychomycosis    Obesity    Osteoarthrosis    Osteoporosis    Pain in both feet    Sleep apnea    TIA (transient ischemic attack)    Vitamin D deficiency    Wheezing    S/P ORIF (open reduction internal fixation) fracture    Diabetic gastroparesis (HCC)    Gastroesophageal reflux disease    Restless legs    Ketoacidosis, diabetic, type 2, no coma (HCC)    Repeated falls    Subclinical hypothyroidism    Vasovagal syncope    Acute kidney injury superimposed on CKD ([address]    Acute kidney injury ([address]    Nausea and vomiting    Acute diarrhea           The patient expressed appropriate understanding of and agreement with the discharge recommendations, medications, and plan. Consults this admission:  IP CONSULT TO HOSPITALIST  IP CONSULT TO CASE MANAGEMENT  IP CONSULT TO ENDOCRINOLOGY    Discharge Instruction:   Primary care physician: Follow-up with PCP  within 2 weeks    Diet:  regular diet   Activity: activity as tolerated  Disposition: Discharged to:   [x]Home, [x]C, []SNF, []Acute Rehab, []Hospice   Condition on discharge: Stable    Discharge Medications:        Medication List        ASK your doctor about these medications      amLODIPine 10 MG tablet  Commonly known as: NORVASC  Take 1 tablet by mouth in the morning. aspirin 81 MG chewable tablet  Take 1 tablet by mouth in the morning. atorvastatin 40 MG tablet  Commonly known as: LIPITOR     busPIRone 10 MG tablet  Commonly known as: BUSPAR     clopidogrel 75 MG tablet  Commonly known as: PLAVIX     donepezil 5 MG tablet  Commonly known as: ARICEPT     FLUoxetine 40 MG capsule  Commonly known as: PROzac     gabapentin 400 MG capsule  Commonly known as: NEURONTIN  Take 1 capsule by mouth in the morning and 1 capsule before bedtime. Do all this for 30 days. glucagon (rDNA) 1 MG injection  Inject 1 mg into the muscle daily as needed for Low blood sugar (Blood glucose less than 70 mg/dL and patient NOT ALERT or NPO and does not have IV access.)     glucose 4 g chewable tablet  Take 4 tablets by mouth as needed for Low blood sugar     hydrALAZINE 25 MG tablet  Commonly known as: APRESOLINE  Take 1 tablet by mouth every 8 hours     insulin glargine 100 UNIT/ML injection vial  Commonly known as: LANTUS  Inject 20 Units into the skin nightly     * insulin lispro 100 UNIT/ML Soln injection vial  Commonly known as: HUMALOG  Inject 10 Units into the skin in the morning and 10 Units at noon and 10 Units in the evening. Inject before meals.      * insulin lispro 100 UNIT/ML Soln injection vial  Commonly known as: HUMALOG  Inject 0-4 Units into the skin 4 times daily (before meals and nightly)     levothyroxine 75 MCG tablet  Commonly known as: SYNTHROID  Take 1 tablet by mouth every morning (before breakfast)     lidocaine 4 % external patch  Place 1 patch onto the skin daily as needed (Pain)     lisinopril 40 MG tablet  Commonly known as: PRINIVIL;ZESTRIL  Take 1 tablet by mouth daily     metFORMIN 500 MG tablet  Commonly known as: GLUCOPHAGE  Take 1 tablet by mouth 2 times daily (with meals)     metoclopramide 5 MG tablet  Commonly known as: REGLAN  Take 1 tablet by mouth in the morning and 1 tablet at noon and 1 tablet in the evening. Take with meals. metoprolol succinate 50 MG extended release tablet  Commonly known as: TOPROL XL  Take 1 tablet by mouth daily     metoprolol tartrate 25 MG tablet  Commonly known as: LOPRESSOR  Take 0.5 tablets by mouth in the morning and 0.5 tablets before bedtime. ondansetron 4 MG disintegrating tablet  Commonly known as: ZOFRAN-ODT  Take 1 tablet by mouth every 12 hours as needed for Nausea or Vomiting     pantoprazole 40 MG tablet  Commonly known as: PROTONIX  Take 1 tablet by mouth in the morning and 1 tablet in the evening. Take before meals. topiramate 50 MG tablet  Commonly known as: TOPAMAX     vitamin B-12 100 MCG tablet  Commonly known as: CYANOCOBALAMIN  Take 1 tablet by mouth in the morning. vitamin D 50 MCG (2000 UT) Tabs tablet  Commonly known as: CHOLECALCIFEROL  Take 1 tablet by mouth in the morning. * This list has 2 medication(s) that are the same as other medications prescribed for you. Read the directions carefully, and ask your doctor or other care provider to review them with you.                   Objective Findings at Discharge:   Pt was personally examined by me on the day of discharge with the following findings:   Vitals:    08/21/22 1533 08/21/22 2034 08/22/22 0214 08/22/22 0824   BP: (!) 160/68 (!) 162/61 (!) 153/65 (!) 146/67   Pulse: 81 72 70 69   Resp: 13 18 18    Temp: 98.1 °F (36.7 °C) 98.1 °F (36.7 °C)    TempSrc:  Oral Oral    SpO2: 100% 97% 96% 98%   Weight:  233 lb 0.4 oz (105.7 kg)         Physical Exam:  General: Well appearing, and in no distress. Normal body habitus. Eyes: TYLER. Normal conjunctiva. extraocular motors are intact conjunctiva clear, sclerae white, there is no injection no icterus. ENT/Mouth: Normal appearing jaw and neck, no cervical lymphadenopathy or sinus tenderness. Clear oropharynx with moist mucous membrane. Cardiovascular:  normal rate, regular rhythm, normal S1, S2, no murmurs, rubs, clicks or gallops. There is no tenderness to palpation over the chest wall or over ribs. No peripheral edema. Pedal pulses 2+ bilaterally. Respiratory:CTA, no wheezes, rales or rhonchi, symmetric air movement. There is no use of accessory muscles no nasal flaring identified. Gastrointestinal: Soft, nontender, nondistended, no masses or organomegaly. Suprapubic: there is no tenderness to palpation over the external bladder    Genitourinary:  No CVA tenderness. Musculoskletal: No joint swelling, warmth, or tenderness. 5 out of 5 strength in all 4 extremities full flexion extension abduction and adduction supination pronation of all extremities and all digits. No obvious muscle atrophy is noted. No focal muscle deficits are appreciated  Skin: Warm, Dry, Normal coloration and turgor, no rashes, no suspicious skin lesions noted. Neurologic: Normal speech, No focal findings or movement disorder noted. No evidence of incontinence or loss of bowel or bladder. Mental status:  Level of consciousness is normal. Alert, Oriented x3, Coherent, Appropriate Affect, No agitation. Hematologic/Lymphatic: No cervical lymphadenopathy.        Procedures: None    Significant Diagnostic Studies at discharge:   Recent Labs     08/21/22  1209   WBC 7.0   HGB 12.3*   HCT 38.6         Recent Labs     08/21/22  1209 08/22/22  0418    142   K 4.4 4.0    109   CO2 19* 25   BUN 12 12   CREATININE 1.0 0.9       Patient Instructions:     Medication List        ASK your doctor about these medications      amLODIPine 10 MG tablet  Commonly known as: NORVASC  Take 1 tablet by mouth in the morning. aspirin 81 MG chewable tablet  Take 1 tablet by mouth in the morning. atorvastatin 40 MG tablet  Commonly known as: LIPITOR     busPIRone 10 MG tablet  Commonly known as: BUSPAR     clopidogrel 75 MG tablet  Commonly known as: PLAVIX     donepezil 5 MG tablet  Commonly known as: ARICEPT     FLUoxetine 40 MG capsule  Commonly known as: PROzac     gabapentin 400 MG capsule  Commonly known as: NEURONTIN  Take 1 capsule by mouth in the morning and 1 capsule before bedtime. Do all this for 30 days. glucagon (rDNA) 1 MG injection  Inject 1 mg into the muscle daily as needed for Low blood sugar (Blood glucose less than 70 mg/dL and patient NOT ALERT or NPO and does not have IV access.)     glucose 4 g chewable tablet  Take 4 tablets by mouth as needed for Low blood sugar     hydrALAZINE 25 MG tablet  Commonly known as: APRESOLINE  Take 1 tablet by mouth every 8 hours     insulin glargine 100 UNIT/ML injection vial  Commonly known as: LANTUS  Inject 20 Units into the skin nightly     * insulin lispro 100 UNIT/ML Soln injection vial  Commonly known as: HUMALOG  Inject 10 Units into the skin in the morning and 10 Units at noon and 10 Units in the evening. Inject before meals.      * insulin lispro 100 UNIT/ML Soln injection vial  Commonly known as: HUMALOG  Inject 0-4 Units into the skin 4 times daily (before meals and nightly)     levothyroxine 75 MCG tablet  Commonly known as: SYNTHROID  Take 1 tablet by mouth every morning (before breakfast)     lidocaine 4 % external patch  Place 1 patch onto the skin daily as needed (Pain)     lisinopril 40 MG tablet  Commonly known as: PRINIVIL;ZESTRIL  Take 1 tablet by mouth daily     metFORMIN 500 MG tablet  Commonly known as: GLUCOPHAGE  Take 1 tablet by mouth 2 times daily (with meals)     metoclopramide 5 MG tablet  Commonly known as: REGLAN  Take 1 tablet by mouth in the morning and 1 tablet at noon and 1 tablet in the evening. Take with meals. metoprolol succinate 50 MG extended release tablet  Commonly known as: TOPROL XL  Take 1 tablet by mouth daily     metoprolol tartrate 25 MG tablet  Commonly known as: LOPRESSOR  Take 0.5 tablets by mouth in the morning and 0.5 tablets before bedtime. ondansetron 4 MG disintegrating tablet  Commonly known as: ZOFRAN-ODT  Take 1 tablet by mouth every 12 hours as needed for Nausea or Vomiting     pantoprazole 40 MG tablet  Commonly known as: PROTONIX  Take 1 tablet by mouth in the morning and 1 tablet in the evening. Take before meals. topiramate 50 MG tablet  Commonly known as: TOPAMAX     vitamin B-12 100 MCG tablet  Commonly known as: CYANOCOBALAMIN  Take 1 tablet by mouth in the morning. vitamin D 50 MCG (2000 UT) Tabs tablet  Commonly known as: CHOLECALCIFEROL  Take 1 tablet by mouth in the morning. * This list has 2 medication(s) that are the same as other medications prescribed for you. Read the directions carefully, and ask your doctor or other care provider to review them with you. Code Status: Full Code     Consults:   IP CONSULT TO HOSPITALIST  IP CONSULT TO CASE MANAGEMENT  IP CONSULT TO ENDOCRINOLOGY    Diet: Regular diet    Activity: Activity as tolerated    Discharged Condition: Good    Prognosis:     Disposition: Home    Follow-up with   1. PCP within   5-7 Days      Follow up labs:        Discharging provider Signed: The patient was seen and examined on day of discharge and this discharge summary is in conjunction with any daily progress note from day of discharge.   Time spent on discharge in the examination, evaluation, counseling and review of medications and discharge plan: 30 minutes

## 2022-08-22 NOTE — PROGRESS NOTES
Latest Reference Range & Units Most Recent   Base Excess 0 - 2.4  7 (H)  8/21/22 21:44   pH, Crescencio 7.32 - 7.42  7.26 (L)  8/21/22 21:44   pCO2, Crescencio 38 - 52 mmHG 45  8/21/22 21:44   pO2, Crescencio 28 - 48 mmHG 59 (H)  8/21/22 21:44   HCO3, Venous 19 - 25 MMOL/L 20.2  8/21/22 21:44   O2 Sat, Crescencio 50 - 70 % 87.4 (H)  8/21/22 21:44   (H): Data is abnormally high  (L): Data is abnormally low    VBG results made aware to RN.

## 2022-08-22 NOTE — PROGRESS NOTES
Patient reviewed discharge instructions with nurse, verbalized understanding. IV removed with no issues.

## 2022-08-23 LAB
CULTURE: ABNORMAL
CULTURE: ABNORMAL
Lab: ABNORMAL
SPECIMEN: ABNORMAL

## 2022-08-23 NOTE — PROGRESS NOTES
Progress Note( [facility])  8/22/2022  Subjective:   Admit Date: 8/21/2022  PCP: BRIAN Ramsey - NP    Admitted For : Abdominal pain chills fever and diarrhea    Consulted For: Better control of blood glucose    Interval History: Doing better    Denies any chest pains,   Denies SOB . Denies nausea or vomiting. No new bowel or bladder symptoms. No intake or output data in the 24 hours ending 08/22/22 2309    DATA    CBC:   Recent Labs     08/21/22  1209   WBC 7.0   HGB 12.3*       CMP:  Recent Labs     08/21/22  1209 08/22/22  0418    142   K 4.4 4.0    109   CO2 19* 25   BUN 12 12   CREATININE 1.0 0.9   CALCIUM 9.4 8.7   PROT 6.4  --    LABALBU 3.8  --    BILITOT 0.3  --    ALKPHOS 134*  --    AST 19  --    ALT 17  --      Lipids:   Lab Results   Component Value Date/Time    CHOL 176 05/12/2022 06:39 AM    HDL 62 05/12/2022 06:39 AM    TRIG 169 05/12/2022 06:39 AM     Glucose:  Recent Labs     08/22/22  0824 08/22/22  1050 08/22/22  1636   POCGLU 156* 316* 141*     [id number]:  Lab Results   Component Value Date/Time    LABA1C 8.1 07/11/2022 07:45 AM     High Sensitivity TSH:   Lab Results   Component Value Date/Time    TSHHS 2.600 08/21/2022 12:09 PM     Free T3: No results found for: FT3  Free T4:  Lab Results   Component Value Date/Time    T4FREE 1.42 08/21/2022 12:09 PM       CT ABDOMEN PELVIS WO CONTRAST Additional Contrast? None   Final Result   There may be some mild wall thickening to the urinary bladder predominantly   anteriorly along with some mild perivesical fat stranding. Findings are   concerning for nonspecific cystitis. Clinical and laboratory correlation can   be made. Mild ground-glass opacities posteromedially to bilateral lower lobes, right   worse than left, worsened from prior exam.  Findings concerning for   nonspecific infectious or inflammatory process. Recommend follow-up   dedicated CT chest in three months.       Small hiatal hernia appears slightly enlarged from prior exam.      Colonic diverticulosis without evidence for diverticulitis. XR CHEST PORTABLE   Final Result      Some vague generalized opacity of the left lower lung field is unchanged   comparison the study of 06/30/2022 suggesting an area of scarring and/or   pericardial fat pad with possible superimposed chest/breast soft tissues. No   definite acute abnormality or acute pulmonary disease is noted. Scheduled Medicines   Medications:    Infusions:       Objective:   Vitals: /84   Pulse 72   Temp 98.1 °F (36.7 °C) (Oral)   Resp 16   Wt 233 lb 0.4 oz (105.7 kg)   SpO2 98%   BMI 39.38 kg/m²   General appearance: alert and cooperative with exam  Neck: no JVD or bruit  Thyroid : Normal lobes   Lungs: Has Vesicular Breath sounds   Heart:  regular rate and rhythm  Abdomen: soft, non-tender; bowel sounds normal; no masses,  no organomegaly  Musculoskeletal: Normal  Extremities: extremities normal, , no edema  Neurologic:  Awake, alert, oriented to name, place and time. Cranial nerves II-XII are grossly intact. Motor is  intact. Sensory is intact. ,  and gait is normal.    Assessment:     Patient Active Problem List:     Proximal humeral fracture     Asthma     Bereavement     Pain of both hip joints     Chest pain     Chronic abdominal pain     Chronic back pain     Chronic obstructive lung disease (HCC)     Corns and callosities     Depressive disorder     Type II diabetes mellitus, uncontrolled (HCC)     Diabetic polyneuropathy (HCC)     HTN (hypertension)     Hyperlipidemia     Lesion of liver     Menopausal symptom     Mixed hyperlipidemia     Onychomycosis     Obesity     Osteoarthrosis     Osteoporosis     Pain in both feet     Sleep apnea     TIA (transient ischemic attack)     Vitamin D deficiency     Wheezing     S/P ORIF (open reduction internal fixation) fracture     Diabetic gastroparesis (HCC)     Gastroesophageal reflux disease Restless legs     Ketoacidosis, diabetic, type 2, no coma (HCC)     Repeated falls     Subclinical hypothyroidism     Vasovagal syncope     Acute kidney injury superimposed on CKD ([address]     Acute kidney injury ([facility]     Nausea and vomiting     Acute diarrhea      Plan:     Reviewed POC blood glucose . Labs and X ray results   Reviewed Current Medicines   On Correction bolus Humalog/ Basal Lantus Insulin regime   Hypoglycemic drugs   Monitor Blood glucose frequently   Modified  the dose of Insulin/ other medicines as needed   Will follow     .      Javan Rendon MD, MD

## 2022-08-31 ENCOUNTER — NURSE ONLY (OUTPATIENT)
Dept: CARDIOLOGY CLINIC | Age: 62
End: 2022-08-31
Payer: MEDICAID

## 2022-08-31 ENCOUNTER — OFFICE VISIT (OUTPATIENT)
Dept: CARDIOLOGY CLINIC | Age: 62
End: 2022-08-31
Payer: MEDICAID

## 2022-08-31 VITALS
HEIGHT: 64 IN | HEART RATE: 72 BPM | DIASTOLIC BLOOD PRESSURE: 60 MMHG | OXYGEN SATURATION: 99 % | WEIGHT: 235.2 LBS | BODY MASS INDEX: 40.15 KG/M2 | SYSTOLIC BLOOD PRESSURE: 102 MMHG

## 2022-08-31 DIAGNOSIS — I95.1 ORTHOSTATIC HYPOTENSION: ICD-10-CM

## 2022-08-31 DIAGNOSIS — E11.65 UNCONTROLLED TYPE 2 DIABETES MELLITUS WITH HYPERGLYCEMIA (HCC): ICD-10-CM

## 2022-08-31 DIAGNOSIS — N17.9 ACUTE KIDNEY INJURY (HCC): ICD-10-CM

## 2022-08-31 DIAGNOSIS — R55 PRE-SYNCOPE: Primary | ICD-10-CM

## 2022-08-31 DIAGNOSIS — G45.9 TIA (TRANSIENT ISCHEMIC ATTACK): ICD-10-CM

## 2022-08-31 DIAGNOSIS — I10 ESSENTIAL HYPERTENSION: ICD-10-CM

## 2022-08-31 PROCEDURE — 93228 REMOTE 30 DAY ECG REV/REPORT: CPT | Performed by: INTERNAL MEDICINE

## 2022-08-31 PROCEDURE — 99214 OFFICE O/P EST MOD 30 MIN: CPT | Performed by: INTERNAL MEDICINE

## 2022-08-31 PROCEDURE — 3052F HG A1C>EQUAL 8.0%<EQUAL 9.0%: CPT | Performed by: INTERNAL MEDICINE

## 2022-08-31 RX ORDER — ALENDRONATE SODIUM 70 MG/1
TABLET ORAL
COMMUNITY
End: 2022-09-01

## 2022-08-31 NOTE — PATIENT INSTRUCTIONS
Please be informed that if you contact our office outside of normal business hours the physician on call cannot help with any scheduling or rescheduling issues, procedure instruction questions or any type of medication issue. We advise you for any urgent/emergency that you go to the nearest emergency room! PLEASE CALL OUR OFFICE DURING NORMAL BUSINESS HOURS    Monday - Friday   8 am to 5 pm    [place]: [address]: [phone]    [place]:  [phone]      **It is YOUR responsibilty to bring medication bottles and/or updated medication list to [address] [place] [address].  This will allow us to better serve you and all your healthcare needs**

## 2022-08-31 NOTE — PROGRESS NOTES
30 days e-cardio monitor placed.  # [id number]. Instructed patient on how to record the event and to call monitoring center at [phone] if any problems arise. Instructed patient to disconnect the lead wires from the electrodes before bathing or showering and reattach them afterwards. Instructed patient that the electrodes should be changed every 3 days or if they no longer adhere to the skin. Patient to mail package after the monitor has ended. Patient verbalized understanding. Applied by ORIANA Kwan

## 2022-08-31 NOTE — PROGRESS NOTES
Heidi Greer MD                                  CARDIOLOGY  NOTE    Chief Complaint:    Chief Complaint   Patient presents with    Follow-Up from Hospital     No complaints   Does not smoke or drinks  Drinks 2 cups of coffee daily        HPI:     Beena Kuo is a 59y.o. year old female who was recently evaluated in the hospital for possible seizure-like activity possible hypoglycemia. Patient has prior medical history significant for TIA, COPD, diabetes mellitus, hyperlipidemia, hypertension, CKD. Patient lives in assisted living facility. Cardiology was consulted to evaluate patient for pause noted on telemetry which was around  3.67 seconds -at the time patient was noted to be nauseated. She is also being experiencing hot and cold temperature flashes. Patient denies any history of presyncope syncope congestive heart failure CAD or cardiac arrhythmias    Patient was evaluated by EP. Episode of pause was attributed to vasovagal response in the setting of nausea with poorly controlled diabetes mellitus. ECHO 7/14/222     Left ventricular systolic function is normal.   Ejection fraction is visually estimated at 50-55%. Mild left ventricular hypertrophy. No significant valvular disease noted. No evidence of any pericardial effusion. Current Outpatient Medications   Medication Sig Dispense Refill    alendronate (FOSAMAX) 70 MG tablet Take by mouth every 7 days      aspirin 81 MG chewable tablet Take 1 tablet by mouth in the morning. 30 tablet 0    insulin lispro (HUMALOG) 100 UNIT/ML SOLN injection vial Inject 10 Units into the skin in the morning and 10 Units at noon and 10 Units in the evening. Inject before meals.  15 mL 0    lidocaine 4 % external patch Place 1 patch onto the skin daily as needed (Pain) 1 box 0    glucose 4 g chewable tablet Take 4 tablets by mouth as needed for Low blood sugar 60 tablet 3    ondansetron (ZOFRAN-ODT) 4 MG disintegrating tablet Take 1 tablet by mouth every 12 hours as needed for Nausea or Vomiting 30 tablet 0    metoclopramide (REGLAN) 5 MG tablet Take 1 tablet by mouth in the morning and 1 tablet at noon and 1 tablet in the evening. Take with meals. 30 tablet 0    pantoprazole (PROTONIX) 40 MG tablet Take 1 tablet by mouth in the morning and 1 tablet in the evening. Take before meals. 60 tablet 0    vitamin B-12 (CYANOCOBALAMIN) 100 MCG tablet Take 1 tablet by mouth in the morning. 60 tablet 0    amLODIPine (NORVASC) 10 MG tablet Take 1 tablet by mouth in the morning. 30 tablet 0    Vitamin D (CHOLECALCIFEROL) 50 MCG (2000 UT) TABS tablet Take 1 tablet by mouth in the morning. 30 tablet 0    gabapentin (NEURONTIN) 400 MG capsule Take 1 capsule by mouth in the morning and 1 capsule before bedtime. Do all this for 30 days. 60 capsule 0    insulin glargine (LANTUS) 100 UNIT/ML injection vial Inject 20 Units into the skin nightly 10 mL 3    levothyroxine (SYNTHROID) 75 MCG tablet Take 1 tablet by mouth every morning (before breakfast) 30 tablet 0    FLUoxetine (PROZAC) 40 MG capsule Take 80 mg by mouth daily      donepezil (ARICEPT) 5 MG tablet Take 10 mg by mouth nightly       busPIRone (BUSPAR) 10 MG tablet Take 10 mg by mouth 3 times daily       topiramate (TOPAMAX) 50 MG tablet Take 50 mg by mouth nightly       clopidogrel (PLAVIX) 75 MG tablet Take 75 mg by mouth daily      atorvastatin (LIPITOR) 40 MG tablet Take 80 mg by mouth nightly        No current facility-administered medications for this visit. Allergies:     Patient has no known allergies.     Patient History:    Past Medical History:   Diagnosis Date    Arthritis     Asthma     Cerebral artery occlusion with cerebral infarction [facility])     per old chart 8/2015- TIA    COPD (chronic obstructive pulmonary disease) (HCC)     Diabetes mellitus (HCC)     Diabetic neuropathy (HCC)     per old chart    Fracture     per old chart pt in ER 12/9/2018- after 2 days of arm pain after fall- dx with left humerus fx- for surg 12/31/2018    History of blood transfusion     Hyperlipidemia     Hypertension     Muscle weakness (generalized)     Osteoarthritis     per old chart     Past Surgical History:   Procedure Laterality Date    BREAST BIOPSY Right     CHOLECYSTECTOMY  2002? EYE SURGERY      per old chart had right eye cataract ext done 2/2018 and left eye 4/2018    FOOT SURGERY Left 2004? HUMERUS FRACTURE SURGERY Left 12/31/2018    HUMERUS OPEN REDUCTION INTERNAL FIXATION LEFT PROXIMAL performed by Jonathan Dixon,  at [facility] Right 2009? LUNG SURGERY  2009?    simone lung lobectomy      Family History   Problem Relation Age of Onset    Breast Cancer Maternal Aunt 48    Breast Cancer Maternal Aunt 48     Social History     Tobacco Use    Smoking status: Never    Smokeless tobacco: Never    Tobacco comments:     12/27/*2018- with phone assessment with caregiver- unsure of social, surgical or family hx for phone assessment   Substance Use Topics    Alcohol use: Yes        Review of Systems:     Constitutional:  No Fever or Weight Loss   Eyes: No Decreased Vision  ENT: No Headaches, Hearing Loss or Vertigo  Cardiovascular: No Chest Pain,  No Shortness of breath, No Palpitations. No Edema   Respiratory: No cough or wheezing .  No Respiratory distress   Gastrointestinal: No abdominal pain, appetite loss, blood in stools, constipation, diarrhea or heartburn  Genitourinary: No dysuria, trouble voiding, or hematuria  Musculoskeletal:  denies any new  joint aches , or pain   Integumentary: No rash or pruritis  Neurological: No TIA or stroke symptoms  Psychiatric: No anxiety or depression  Endocrine: No malaise, fatigue or temperature intolerance  Hematologic/Lymphatic: No bleeding problems, blood clots or swollen lymph nodes  Allergic/Immunologic: No nasal congestion or hives        Objective:      Physical Exam:    /60 (Site: Right Upper Arm, Position: Sitting, Cuff Size: Large Adult)   Pulse 72   Ht 5' 4\" (1.626 m)   Wt 235 lb 3.2 oz (106.7 kg)   SpO2 99%   BMI 40.37 kg/m²   Wt Readings from Last 3 Encounters:   08/31/22 235 lb 3.2 oz (106.7 kg)   08/21/22 233 lb 0.4 oz (105.7 kg)   07/30/22 236 lb 14.4 oz (107.5 kg)     Body mass index is 40.37 kg/m². Vitals:    08/31/22 1055   BP: 102/60   Pulse: 72   SpO2:         General Appearance and Constitutional: Conversant, Well developed, Well nourished, No acute distress, Non-toxic appearance. HEENT:  Normocephalic, Atraumatic, Bilateral external ears normal, Oropharynx moist, No oral exudates,   Nose normal.   Neck- Normal range of motion, No tenderness, Supple  Eyes:  EOMI, Conjunctiva normal, No discharge. Respiratory:  Normal breath sounds, No respiratory distress, No wheezing, No Rales, No Ronchi. No chest tenderness. Cardiovascular: S1-S2, no added heart sounds, No Mumurs appreciated. No gallops, rubs. No Pedal Edema   GI:  Bowel sounds normal, Soft, No tenderness,  :  No costovertebral angle tenderness   Musculoskeletal:  No gross deformities.  Back- No tenderness  Integument:  Well hydrated, no rash   Lymphatic:  No lymphadenopathy noted   Neurologic:  Alert & oriented x 3, Normal motor function, normal sensory function, no focal deficits noted   Psychiatric:  Speech and behavior appropriate       Medical decision making and Data review:    DATA:    Lab Results   Component Value Date    TROPONINT <0.010 07/29/2022     BNP:    Lab Results   Component Value Date    PROBNP 1,389 (H) 06/30/2022     PT/INR:  No results found for: the grafter  Lab Results   Component Value Date    LABA1C 8.1 (H) 07/11/2022    LABA1C 8.1 (H) 07/03/2022     Lab Results   Component Value Date    CHOL 176 05/12/2022    TRIG 169 (H) 05/12/2022    HDL 62 05/12/2022    LDLCALC 80 05/12/2022     Lab Results   Component Value Date    WBC 7.0 08/21/2022    HGB 12.3 (L) 08/21/2022    HCT 38.6 08/21/2022    MCV 92.8 08/21/2022     08/21/2022     TSH: No results found for: TSH  Lab Results   Component Value Date    AST 19 08/21/2022    ALT 17 08/21/2022    BILIDIR 0.2 07/02/2022    BILITOT 0.3 08/21/2022    ALKPHOS 134 (H) 08/21/2022         All labs, medications and tests reviewed by myself including data and history from outside source , patient and available family . 1. Pre-syncope    2. Orthostatic hypotension    3. Essential hypertension    4. Acute kidney injury ([address]    5. TIA (transient ischemic attack)    6. Uncontrolled type 2 diabetes mellitus with hyperglycemia (HCC)         Impression and Plan:      Recent hospitalization for presyncope in the setting of renal failure and poorly controlled diabetes mellitus      + Orthostatic hypotension at hospital      Attributed to acute renal failure and nausea vomiting  Patient was hydrated. Advise compression stockings and hydralazine was discontinued. Patient remains orthostatic in the office today with systolic blood pressure as high as 100 and as low as 80s    Stop metoprolol which was to be discontinued in hospital however  was continued on discharge  Continue with Norvasc for now, would consider stopping if blood pressure remains low. Sinus pause 3.67 seconds in the setting of nausea, likely vasovagal event. Patient was followed by EP. Beta-blocker was stopped, however continued on discharge. Will obtain 30 day event monitor. (Given history of TIA, also rule out A. Fib)  TS and T4 were normal    Essential hypertension: Blood pressure fairly well controlled. Continue with Norvasc  Hyperlipidemia: Continue with high intensity statin therapy  Diabetes mellitus: On insulin. Management as per primary care physician. Last HbA1c 8.1  History of TIA: Patient is maintained on Plavix  Dementia: Patient is on Aricept        Return in about 3 months (around 11/30/2022). Counseled extensively and medication compliance urged.   We discussed that for the  prevention of ASCVD our  goal is aggressive risk modification. Patient is encouraged to exercise even a brisk walk for 30 minutes  at least 3 to 4 times a week   Various goals were discussed and questions answered. Continue current medications. Appropriate prescriptions are addressed and refills ordered. Questions answered and patient verbalizes understanding. Call for any problems, questions, or concerns.

## 2022-09-17 ENCOUNTER — HOSPITAL ENCOUNTER (OUTPATIENT)
Age: 62
Setting detail: SPECIMEN
Discharge: HOME | End: 2022-09-17
Payer: MEDICARE

## 2022-09-17 PROCEDURE — 87086 URINE CULTURE/COLONY COUNT: CPT

## 2022-09-17 PROCEDURE — 87077 CULTURE AEROBIC IDENTIFY: CPT

## 2022-09-17 PROCEDURE — 81001 URINALYSIS AUTO W/SCOPE: CPT

## 2022-09-17 PROCEDURE — 87186 SC STD MICRODIL/AGAR DIL: CPT

## 2022-09-18 ENCOUNTER — APPOINTMENT (OUTPATIENT)
Dept: CT IMAGING | Age: 62
End: 2022-09-18
Payer: MEDICARE

## 2022-09-18 ENCOUNTER — HOSPITAL ENCOUNTER (EMERGENCY)
Age: 62
Discharge: HOME | End: 2022-09-18
Attending: EMERGENCY MEDICINE
Payer: MEDICARE

## 2022-09-18 VITALS
OXYGEN SATURATION: 97 % | HEIGHT: 64 IN | DIASTOLIC BLOOD PRESSURE: 69 MMHG | TEMPERATURE: 98.2 F | HEART RATE: 81 BPM | BODY MASS INDEX: 40.97 KG/M2 | RESPIRATION RATE: 11 BRPM | SYSTOLIC BLOOD PRESSURE: 174 MMHG | WEIGHT: 240 LBS

## 2022-09-18 DIAGNOSIS — R11.2 NAUSEA AND VOMITING, INTRACTABILITY OF VOMITING NOT SPECIFIED, UNSPECIFIED VOMITING TYPE: ICD-10-CM

## 2022-09-18 DIAGNOSIS — R10.9 ABDOMINAL PAIN, UNSPECIFIED ABDOMINAL LOCATION: ICD-10-CM

## 2022-09-18 DIAGNOSIS — N12 PYELONEPHRITIS: Primary | ICD-10-CM

## 2022-09-18 DIAGNOSIS — R30.0 DYSURIA: ICD-10-CM

## 2022-09-18 LAB
ALBUMIN SERPL-MCNC: 4.2 GM/DL (ref 3.4–5)
ALP BLD-CCNC: 147 IU/L (ref 40–129)
ALT SERPL-CCNC: 40 U/L (ref 10–40)
ANION GAP SERPL CALCULATED.3IONS-SCNC: 13 MMOL/L (ref 4–16)
AST SERPL-CCNC: 20 IU/L (ref 15–37)
BACTERIA: ABNORMAL /HPF
BASOPHILS ABSOLUTE: 0 K/CU MM
BASOPHILS RELATIVE PERCENT: 0.1 % (ref 0–1)
BILIRUB SERPL-MCNC: 0.4 MG/DL (ref 0–1)
BILIRUBIN URINE: NEGATIVE MG/DL
BLOOD, URINE: NEGATIVE
BUN BLDV-MCNC: 24 MG/DL (ref 6–23)
CALCIUM SERPL-MCNC: 9.2 MG/DL (ref 8.3–10.6)
CHLORIDE BLD-SCNC: 102 MMOL/L (ref 99–110)
CLARITY: CLEAR
CO2: 21 MMOL/L (ref 21–32)
COLOR: YELLOW
CREAT SERPL-MCNC: 1.1 MG/DL (ref 0.6–1.1)
DIFFERENTIAL TYPE: ABNORMAL
EOSINOPHILS ABSOLUTE: 0 K/CU MM
EOSINOPHILS RELATIVE PERCENT: 0.5 % (ref 0–3)
GFR AFRICAN AMERICAN: >60 ML/MIN/1.73M2
GFR NON-AFRICAN AMERICAN: 50 ML/MIN/1.73M2
GLUCOSE BLD-MCNC: 293 MG/DL (ref 70–99)
GLUCOSE, URINE: 500 MG/DL
HCT VFR BLD CALC: 38.2 % (ref 37–47)
HEMOGLOBIN: 11.8 GM/DL (ref 12.5–16)
HYALINE CASTS: 2 /LPF
IMMATURE NEUTROPHIL %: 0.1 % (ref 0–0.43)
KETONES, URINE: 40 MG/DL
LACTATE: 1.3 MMOL/L (ref 0.4–2)
LEUKOCYTE ESTERASE, URINE: NEGATIVE
LIPASE: 45 IU/L (ref 13–60)
LYMPHOCYTES ABSOLUTE: 1.7 K/CU MM
LYMPHOCYTES RELATIVE PERCENT: 22 % (ref 24–44)
MCH RBC QN AUTO: 29.6 PG (ref 27–31)
MCHC RBC AUTO-ENTMCNC: 30.9 % (ref 32–36)
MCV RBC AUTO: 95.7 FL (ref 78–100)
MONOCYTES ABSOLUTE: 0.6 K/CU MM
MONOCYTES RELATIVE PERCENT: 7.4 % (ref 0–4)
NITRITE URINE, QUANTITATIVE: POSITIVE
NUCLEATED RBC %: 0 %
PDW BLD-RTO: 13.1 % (ref 11.7–14.9)
PH, URINE: 5.5 (ref 5–8)
PLATELET # BLD: 274 K/CU MM (ref 140–440)
PMV BLD AUTO: 9.6 FL (ref 7.5–11.1)
POTASSIUM SERPL-SCNC: 4.6 MMOL/L (ref 3.5–5.1)
PROTEIN UA: 100 MG/DL
RBC # BLD: 3.99 M/CU MM (ref 4.2–5.4)
RBC URINE: <1 /HPF (ref 0–6)
SEGMENTED NEUTROPHILS ABSOLUTE COUNT: 5.4 K/CU MM
SEGMENTED NEUTROPHILS RELATIVE PERCENT: 69.9 % (ref 36–66)
SODIUM BLD-SCNC: 136 MMOL/L (ref 135–145)
SPECIFIC GRAVITY UA: 1.02 (ref 1–1.03)
TOTAL IMMATURE NEUTOROPHIL: 0.01 K/CU MM
TOTAL NUCLEATED RBC: 0 K/CU MM
TOTAL PROTEIN: 6.5 GM/DL (ref 6.4–8.2)
TRICHOMONAS: ABNORMAL /HPF
UROBILINOGEN, URINE: 0.2 MG/DL (ref 0.2–1)
WBC # BLD: 7.7 K/CU MM (ref 4–10.5)
WBC UA: 1 /HPF (ref 0–5)

## 2022-09-18 PROCEDURE — 96375 TX/PRO/DX INJ NEW DRUG ADDON: CPT

## 2022-09-18 PROCEDURE — 87086 URINE CULTURE/COLONY COUNT: CPT

## 2022-09-18 PROCEDURE — 85025 COMPLETE CBC W/AUTO DIFF WBC: CPT

## 2022-09-18 PROCEDURE — 6370000000 HC RX 637 (ALT 250 FOR IP): Performed by: EMERGENCY MEDICINE

## 2022-09-18 PROCEDURE — 83605 ASSAY OF LACTIC ACID: CPT

## 2022-09-18 PROCEDURE — 6360000002 HC RX W HCPCS: Performed by: EMERGENCY MEDICINE

## 2022-09-18 PROCEDURE — 81001 URINALYSIS AUTO W/SCOPE: CPT

## 2022-09-18 PROCEDURE — 99284 EMERGENCY DEPT VISIT MOD MDM: CPT

## 2022-09-18 PROCEDURE — 74176 CT ABD & PELVIS W/O CONTRAST: CPT

## 2022-09-18 PROCEDURE — 83690 ASSAY OF LIPASE: CPT

## 2022-09-18 PROCEDURE — 96374 THER/PROPH/DIAG INJ IV PUSH: CPT

## 2022-09-18 PROCEDURE — 80053 COMPREHEN METABOLIC PANEL: CPT

## 2022-09-18 RX ORDER — HYDROCODONE BITARTRATE AND ACETAMINOPHEN 5; 325 MG/1; MG/1
1-2 TABLET ORAL EVERY 6 HOURS PRN
Qty: 10 TABLET | Refills: 0 | Status: SHIPPED | OUTPATIENT
Start: 2022-09-18 | End: 2022-09-20

## 2022-09-18 RX ORDER — MORPHINE SULFATE 4 MG/ML
4 INJECTION, SOLUTION INTRAMUSCULAR; INTRAVENOUS EVERY 30 MIN PRN
Status: DISCONTINUED | OUTPATIENT
Start: 2022-09-18 | End: 2022-09-19 | Stop reason: HOSPADM

## 2022-09-18 RX ORDER — CEPHALEXIN 500 MG/1
500 CAPSULE ORAL 2 TIMES DAILY
Qty: 20 CAPSULE | Refills: 0 | Status: SHIPPED | OUTPATIENT
Start: 2022-09-18 | End: 2022-09-28

## 2022-09-18 RX ORDER — ONDANSETRON 4 MG/1
4 TABLET, ORALLY DISINTEGRATING ORAL EVERY 8 HOURS PRN
Qty: 15 TABLET | Refills: 0 | Status: SHIPPED | OUTPATIENT
Start: 2022-09-18

## 2022-09-18 RX ORDER — CEPHALEXIN 250 MG/1
500 CAPSULE ORAL ONCE
Status: COMPLETED | OUTPATIENT
Start: 2022-09-18 | End: 2022-09-18

## 2022-09-18 RX ORDER — NAPROXEN 500 MG/1
500 TABLET ORAL 2 TIMES DAILY
Qty: 60 TABLET | Refills: 0 | Status: ON HOLD | OUTPATIENT
Start: 2022-09-18 | End: 2022-10-04 | Stop reason: SDUPTHER

## 2022-09-18 RX ORDER — ONDANSETRON 2 MG/ML
4 INJECTION INTRAMUSCULAR; INTRAVENOUS EVERY 30 MIN PRN
Status: DISCONTINUED | OUTPATIENT
Start: 2022-09-18 | End: 2022-09-19 | Stop reason: HOSPADM

## 2022-09-18 RX ADMIN — CEPHALEXIN 500 MG: 250 CAPSULE ORAL at 21:41

## 2022-09-18 RX ADMIN — ONDANSETRON 4 MG: 2 INJECTION INTRAMUSCULAR; INTRAVENOUS at 19:15

## 2022-09-18 RX ADMIN — MORPHINE SULFATE 4 MG: 4 INJECTION, SOLUTION INTRAMUSCULAR; INTRAVENOUS at 19:15

## 2022-09-18 ASSESSMENT — ENCOUNTER SYMPTOMS
NAUSEA: 1
CONSTIPATION: 1
EYES NEGATIVE: 1
RESPIRATORY NEGATIVE: 1
VOMITING: 1
DIARRHEA: 1
ABDOMINAL PAIN: 1
BACK PAIN: 1
ALLERGIC/IMMUNOLOGIC NEGATIVE: 1

## 2022-09-18 ASSESSMENT — PAIN SCALES - GENERAL: PAINLEVEL_OUTOF10: 0

## 2022-09-18 NOTE — ED PROVIDER NOTES
[facility]      TRIAGE CHIEF COMPLAINT:   Hematuria (Discolored urine, foul odor)      [place]:  Bassam Fountain is a 59 y.o. female that presents complaint of left-sided flank pain abdominal pain nausea vomiting diarrhea constipation dysuria hematuria. Has concern for UTI as she gets it frequently. She is from assisted living. Denies any fevers or chest pain or shortness of breath no other questions or concerns. Came by EMS. Kati Pedraza REVIEW OF SYSTEMS:  At least 10 systems reviewed and otherwise acutely negative except as in the [address] Review of Systems   Constitutional:  Positive for activity change, appetite change and fatigue. HENT: Negative. Eyes: Negative. Respiratory: Negative. Cardiovascular: Negative. Gastrointestinal:  Positive for abdominal pain, constipation, diarrhea, nausea and vomiting. Endocrine: Negative. Genitourinary:  Positive for dysuria, flank pain, frequency and hematuria. Musculoskeletal:  Positive for back pain. Skin: Negative. Allergic/Immunologic: Negative. Neurological: Negative. Hematological: Negative. Psychiatric/Behavioral: Negative. All other systems reviewed and are negative. Past Medical History:   Diagnosis Date    Arthritis     Asthma     Cerebral artery occlusion with cerebral infarction [facility])     per old chart 8/2015- TIA    COPD (chronic obstructive pulmonary disease) (HCC)     Diabetes mellitus (HCC)     Diabetic neuropathy ([facility]     per old chart    Fracture     per old chart pt in ER 12/9/2018- after 2 days of arm pain after fall- dx with left humerus fx- for surg 12/31/2018    History of blood transfusion     Hyperlipidemia     Hypertension     Muscle weakness (generalized)     Osteoarthritis     per old chart     Past Surgical History:   Procedure Laterality Date    BREAST BIOPSY Right     CHOLECYSTECTOMY  2002?     EYE SURGERY      per old chart had right eye cataract ext done 2/2018 and left eye 4/2018    FOOT SURGERY Left 2004? HUMERUS FRACTURE SURGERY Left 12/31/2018    HUMERUS OPEN REDUCTION INTERNAL FIXATION LEFT PROXIMAL performed by Rosy Santos DO at [address] Right 2009? LUNG SURGERY  2009?    simone lung lobectomy      Family History   Problem Relation Age of Onset    Breast Cancer Maternal Aunt 48    Breast Cancer Maternal Aunt 48     Social History     Socioeconomic History    Marital status:      Spouse name: Not on file    Number of children: Not on file    Years of education: Not on file    Highest education level: Not on file   Occupational History    Not on file   Tobacco Use    Smoking status: Never    Smokeless tobacco: Never    Tobacco comments:     12/27/*2018- with phone assessment with caregiver- unsure of social, surgical or family hx for phone assessment   Vaping Use    Vaping Use: Never used   Substance and Sexual Activity    Alcohol use: Yes    Drug use: No    Sexual activity: Not Currently   Other Topics Concern    Not on file   Social History Narrative    Not on file     Social Determinants of Health     Financial Resource Strain: Low Risk     Difficulty of Paying Living Expenses: Not hard at all   Food Insecurity: No Food Insecurity    Worried About Running Out of Food in the Last Year: Never true    [address] in the Last Year: Never true   Transportation Needs: No Transportation Needs    Lack of Transportation (Medical): No    Lack of Transportation (Non-Medical): No   Physical Activity: Inactive    Days of Exercise per Week: 0 days    Minutes of Exercise per Session: 0 min   Stress: Stress Concern Present    Feeling of Stress : Very much   Social Connections:  Moderately Isolated    Frequency of Communication with Friends and Family: More than three times a week    Frequency of Social Gatherings with Friends and Family: Once a week    Attends Christian Services: 1 to 4 times per year    Active Member of Clubs or Organizations: No    Attends Club or Organization Meetings: Never    Marital Status:    Intimate Partner Violence: Not At Risk    Fear of Current or Ex-Partner: No    Emotionally Abused: No    Physically Abused: No    Sexually Abused: No   Housing Stability: Low Risk     Unable to Pay for Housing in the Last Year: No    Number of [place] in the Last Year: 2    Unstable Housing in the Last Year: No     No current facility-administered medications for this encounter. Current Outpatient Medications   Medication Sig Dispense Refill    cephALEXin (KEFLEX) 500 MG capsule Take 1 capsule by mouth 2 times daily for 10 days 20 capsule 0    naproxen (NAPROSYN) 500 MG tablet Take 1 tablet by mouth 2 times daily 60 tablet 0    HYDROcodone-acetaminophen (NORCO) 5-325 MG per tablet Take 1-2 tablets by mouth every 6 hours as needed for Pain for up to 2 days. 10 tablet 0    ondansetron (ZOFRAN ODT) 4 MG disintegrating tablet Take 1 tablet by mouth every 8 hours as needed for Nausea 15 tablet 0    aspirin 81 MG chewable tablet Take 1 tablet by mouth in the morning. 30 tablet 0    insulin lispro (HUMALOG) 100 UNIT/ML SOLN injection vial Inject 10 Units into the skin in the morning and 10 Units at noon and 10 Units in the evening. Inject before meals. (Patient taking differently: Inject 10 Units into the skin 3 times daily (before meals) And sliding scale dose) 15 mL 0    metoclopramide (REGLAN) 5 MG tablet Take 1 tablet by mouth in the morning and 1 tablet at noon and 1 tablet in the evening. Take with meals. 30 tablet 0    pantoprazole (PROTONIX) 40 MG tablet Take 1 tablet by mouth in the morning and 1 tablet in the evening. Take before meals. 60 tablet 0    vitamin B-12 (CYANOCOBALAMIN) 100 MCG tablet Take 1 tablet by mouth in the morning. 60 tablet 0    amLODIPine (NORVASC) 10 MG tablet Take 1 tablet by mouth in the morning. 30 tablet 0    Vitamin D (CHOLECALCIFEROL) 50 MCG (2000 UT) TABS tablet Take 1 tablet by mouth in the morning.  30 tablet the evening. Take with meals. 30 tablet 0    pantoprazole (PROTONIX) 40 MG tablet Take 1 tablet by mouth in the morning and 1 tablet in the evening. Take before meals. 60 tablet 0    vitamin B-12 (CYANOCOBALAMIN) 100 MCG tablet Take 1 tablet by mouth in the morning. 60 tablet 0    amLODIPine (NORVASC) 10 MG tablet Take 1 tablet by mouth in the morning. 30 tablet 0    Vitamin D (CHOLECALCIFEROL) 50 MCG (2000 UT) TABS tablet Take 1 tablet by mouth in the morning. 30 tablet 0    gabapentin (NEURONTIN) 400 MG capsule Take 1 capsule by mouth in the morning and 1 capsule before bedtime. Do all this for 30 days. 60 capsule 0    insulin glargine (LANTUS) 100 UNIT/ML injection vial Inject 20 Units into the skin nightly (Patient taking differently: Inject 30 Units into the skin nightly) 10 mL 3    levothyroxine (SYNTHROID) 75 MCG tablet Take 1 tablet by mouth every morning (before breakfast) 30 tablet 0    FLUoxetine (PROZAC) 40 MG capsule Take 80 mg by mouth daily      donepezil (ARICEPT) 5 MG tablet Take 10 mg by mouth nightly       busPIRone (BUSPAR) 10 MG tablet Take 10 mg by mouth 3 times daily       topiramate (TOPAMAX) 50 MG tablet Take 50 mg by mouth nightly       clopidogrel (PLAVIX) 75 MG tablet Take 75 mg by mouth daily      atorvastatin (LIPITOR) 40 MG tablet Take 80 mg by mouth nightly          Nursing Notes Reviewed    VITAL SIGNS:  ED Triage Vitals [09/18/22 1639]   Enc Vitals Group      BP (!) 177/84      Heart Rate 93      Resp 16      Temp 98.2 °F (36.8 °C)      Temp Source Oral      SpO2 97 %      Weight 240 lb (108.9 kg)      Height 5' 4\" (1.626 m)      Head Circumference       Peak Flow       Pain Score       Pain Loc       Pain Edu? Excl. in [address]? PHYSICAL EXAM:  Physical Exam  Vitals and nursing note reviewed. Constitutional:       General: She is not in acute distress. Appearance: Normal appearance. She is well-developed, well-groomed and overweight.  She is not ill-appearing, toxic-appearing or diaphoretic. HENT:      Head: Normocephalic and atraumatic. Right Ear: External ear normal.      Left Ear: External ear normal.   Eyes:      General: No scleral icterus. Right eye: No discharge. Left eye: No discharge. Extraocular Movements: Extraocular movements intact. Conjunctiva/sclera: Conjunctivae normal.   Neck:      Vascular: No JVD. Trachea: Phonation normal.   Cardiovascular:      Rate and Rhythm: Normal rate and regular rhythm. Pulses: Normal pulses. Heart sounds: Normal heart sounds. No murmur heard. No gallop. Pulmonary:      Effort: Pulmonary effort is normal. No respiratory distress. Breath sounds: Normal breath sounds. No stridor. No wheezing, rhonchi or rales. Abdominal:      General: Bowel sounds are normal. There is no distension. There are no signs of injury. Palpations: Abdomen is soft. There is no mass or pulsatile mass. Tenderness: There is abdominal tenderness in the suprapubic area, left upper quadrant and left lower quadrant. There is left CVA tenderness. There is no guarding or rebound. Negative signs include Menard's sign, Rovsing's sign and McBurney's sign. Hernia: No hernia is present. Musculoskeletal:         General: Tenderness present. No swelling, deformity or signs of injury. Normal range of motion. Cervical back: Full passive range of motion without pain and normal range of motion. No rigidity. Right lower leg: No edema. Left lower leg: No edema. Skin:     General: Skin is warm. Coloration: Skin is not jaundiced or pale. Findings: No bruising, erythema, lesion or rash. Neurological:      General: No focal deficit present. Mental Status: She is alert and oriented to person, place, and time. GCS: GCS eye subscore is 4. GCS verbal subscore is 5. GCS motor subscore is 6. Cranial Nerves: Cranial nerves are intact.  No cranial nerve deficit, dysarthria or facial asymmetry. Sensory: Sensation is intact. No sensory deficit. Motor: Motor function is intact. No weakness, tremor, atrophy, abnormal muscle tone or seizure activity. Coordination: Coordination normal.   Psychiatric:         Mood and Affect: Mood normal.         Behavior: Behavior normal. Behavior is cooperative. Thought Content:  Thought content normal.         Judgment: Judgment normal.         I have reviewed andinterpreted all of the currently available lab results from this visit (if applicable):    Results for orders placed or performed during the hospital encounter of 09/18/22   Urinalysis   Result Value Ref Range    Color, UA YELLOW YELLOW    Clarity, UA CLEAR CLEAR    Glucose, Urine 500 (A) NEGATIVE MG/DL    Bilirubin Urine NEGATIVE NEGATIVE MG/DL    Ketones, Urine 40 (A) NEGATIVE MG/DL    Specific Gravity, UA 1.025 1.001 - 1.035    Blood, Urine NEGATIVE NEGATIVE    pH, Urine 5.5 5.0 - 8.0    Protein,  (A) NEGATIVE MG/DL    Urobilinogen, Urine 0.2 0.2 - 1.0 MG/DL    Nitrite Urine, Quantitative POSITIVE (A) NEGATIVE    Leukocyte Esterase, Urine NEGATIVE NEGATIVE    RBC, UA <1 0 - 6 /HPF    WBC, UA 1 0 - 5 /HPF    Bacteria, UA MANY (A) NEGATIVE /HPF    Trichomonas, UA NONE SEEN NONE SEEN /HPF    Hyaline Casts, UA 2 /LPF   CBC with Auto Differential   Result Value Ref Range    WBC 7.7 4.0 - 10.5 K/CU MM    RBC 3.99 (L) 4.2 - 5.4 M/CU MM    Hemoglobin 11.8 (L) 12.5 - 16.0 GM/DL    Hematocrit 38.2 37 - 47 %    MCV 95.7 78 - 100 FL    MCH 29.6 27 - 31 PG    MCHC 30.9 (L) 32.0 - 36.0 %    RDW 13.1 11.7 - 14.9 %    Platelets 029 910 - 993 K/CU MM    MPV 9.6 7.5 - 11.1 FL    Differential Type AUTOMATED DIFFERENTIAL     Segs Relative 69.9 (H) 36 - 66 %    Lymphocytes % 22.0 (L) 24 - 44 %    Monocytes % 7.4 (H) 0 - 4 %    Eosinophils % 0.5 0 - 3 %    Basophils % 0.1 0 - 1 %    Segs Absolute 5.4 K/CU MM    Lymphocytes Absolute 1.7 K/CU MM    Monocytes Absolute 0.6 K/CU MM Eosinophils Absolute 0.0 K/CU MM    Basophils Absolute 0.0 K/CU MM    Nucleated RBC % 0.0 %    Total Nucleated RBC 0.0 K/CU MM    Total Immature Neutrophil 0.01 K/CU MM    Immature Neutrophil % 0.1 0 - 0.43 %   Comprehensive Metabolic Panel   Result Value Ref Range    Sodium 136 135 - 145 MMOL/L    Potassium 4.6 3.5 - 5.1 MMOL/L    Chloride 102 99 - 110 mMol/L    CO2 21 21 - 32 MMOL/L    BUN 24 (H) 6 - 23 MG/DL    Creatinine 1.1 0.6 - 1.1 MG/DL    Glucose 293 (H) 70 - 99 MG/DL    Calcium 9.2 8.3 - 10.6 MG/DL    Albumin 4.2 3.4 - 5.0 GM/DL    Total Protein 6.5 6.4 - 8.2 GM/DL    Total Bilirubin 0.4 0.0 - 1.0 MG/DL    ALT 40 10 - 40 U/L    AST 20 15 - 37 IU/L    Alkaline Phosphatase 147 (H) 40 - 129 IU/L    GFR Non- 50 (L) >60 mL/min/1.73m2    GFR African American >60 >60 mL/min/1.73m2    Anion Gap 13 4 - 16   Lipase   Result Value Ref Range    Lipase 45 13 - 60 IU/L   Lactic Acid   Result Value Ref Range    Lactate 1.3 0.4 - 2.0 mMOL/L        Radiographs (if obtained):  [] The following radiograph was interpreted by myself in the absence of a radiologist:  [x] Radiologist's Report Reviewed:    CT Abd/pelv    CT ABDOMEN PELVIS WO CONTRAST Additional Contrast? None    Result Date: 8/21/2022  EXAMINATION: CT OF THE ABDOMEN AND PELVIS WITHOUT CONTRAST 8/21/2022 2:08 pm TECHNIQUE: CT of the abdomen and pelvis was performed without the administration of intravenous contrast. Multiplanar reformatted images are provided for review. Automated exposure control, iterative reconstruction, and/or weight based adjustment of the mA/kV was utilized to reduce the radiation dose to as low as reasonably achievable. COMPARISON: 06/30/2022.  HISTORY: ORDERING SYSTEM PROVIDED HISTORY: abdominal pain TECHNOLOGIST PROVIDED HISTORY: Reason for exam:->abdominal pain Additional Contrast?->None Decision Support Exception - unselect if not a suspected or confirmed emergency medical condition->Emergency Medical Condition (MA) Reason for Exam: abdominal pain/nausea FINDINGS: Lower Chest: Mild ground-glass opacities posteromedially to bilateral lower lobes, right worse than left, worsened from prior exam.  No dense consolidations or pleural effusions. Heart is normal in size. No pericardial effusion. Small hiatal hernia appears slightly enlarged. Organs: No acute findings to the unenhanced liver, spleen, pancreas, adrenal glands or kidneys. No evidence for urinary tract stone or urinary tract obstruction on either side. Prior cholecystectomy. GI/Bowel: No evidence for bowel obstruction or definite bowel wall thickening to unopacified large or small bowel. Mild scattered colonic diverticulosis without evidence for diverticulitis. No evidence for acute appendicitis. Pelvis: There may be some mild wall thickening to the urinary bladder predominantly anteriorly along with some slight perivesical fat stranding. Unremarkable uterus and adnexa. Peritoneum/Retroperitoneum: No ascites or focal fluid collections. No intraperitoneal free air. No evidence for abdominal aortic aneurysm. No lymphadenopathy. Moderate-to-severe atherosclerosis. Bones/Soft Tissues: No acute or suspicious bone or soft tissue abnormality. There may be some mild wall thickening to the urinary bladder predominantly anteriorly along with some mild perivesical fat stranding. Findings are concerning for nonspecific cystitis. Clinical and laboratory correlation can be made. Mild ground-glass opacities posteromedially to bilateral lower lobes, right worse than left, worsened from prior exam.  Findings concerning for nonspecific infectious or inflammatory process. Recommend follow-up dedicated CT chest in three months. Small hiatal hernia appears slightly enlarged from prior exam. Colonic diverticulosis without evidence for diverticulitis.      XR CHEST PORTABLE    Result Date: 8/21/2022  EXAM: XR Chest, 1 View EXAM DATE/TIME: 8/21/2022 12:44 pm CLINICAL HISTORY: ORDERING SYSTEM PROVIDED  cough  TECHNOLOGIST PROVIDED HISTORY:  Reason for exam:->cough  Reason for Exam: cough TECHNIQUE: Frontal view of the chest. COMPARISON: 07/04/2022 and 06/30/2022 FINDINGS: Lungs:  Some vague generalized opacity of the left lower lung field is unchanged comparison the study of 06/30/2022 suggesting an area of scarring and/or pericardial fat pad with possible superimposed chest/breast soft tissues. No definite acute abnormality or acute pulmonary disease is noted. Pleural space:  No acute findings. No pneumothorax. Heart:  No acute findings. No cardiomegaly. Mediastinum:  No acute findings. Bones/joints:  Prior ORIF of the proximal left humerus. Mild osteoarthritic change of the right shoulder is noted. Some vague generalized opacity of the left lower lung field is unchanged comparison the study of 06/30/2022 suggesting an area of scarring and/or pericardial fat pad with possible superimposed chest/breast soft tissues. No definite acute abnormality or acute pulmonary disease is noted. EKG (if obtained): (All EKG's are interpreted by myself in the absence of a cardiologist)    MDM:    Patient has a complaint of left-sided flank pain abdominal pain nausea vomiting diarrhea constipation as well as dysuria hematuria frequency. Patient has a history of UTIs she states this feels similar. She is concerned. She came by EMS she is from assisted living. She denies other questions or concerns on arrival she appears to be in some pain but otherwise no distress vital signs are stable we will check labs, imaging will give her pain nausea medicine. Will get urinalysis and culture. Patient rechecked and doing well. Imaging is negative for acute problem. Labs are negative sent for urinalysis shows UTI. Given flank pain consistent with pyelonephritis. She is given antibiotics to go home with. She is at assisted living.   Discharged she appears nontoxic nonseptic awaiting ride back to

## 2022-09-18 NOTE — CARE COORDINATION
Pt noted for possible readmission. Pt was recently admitted 8/21-8/22 with acute diarrhea. Pt is a resident at [facility]. Get therapy once a week there. Pt has PCP, insurance and transportation through [employer] [facility] [place]. Pt is independent in ADL's. [facility]. Pt returns today with hematuria. Pt was treated for a kidney infection and d/c back to [facility]. CM scheduled ride with [facility] [employer]. RN called [facility] because pt does not have walker. RN states they will meet pt at cab with walker to help pt inside.

## 2022-09-18 NOTE — ED NOTES
Patient unable to use the restroom at this time. I told patient we are waiting on her urine sample to get it taken to the lab. Patient supposed to press call light when she can use the restroom. Will check back on patient shortly.      Inez Gonzalez RN  09/18/22 [id number]

## 2022-09-19 LAB
BACTERIA: NEGATIVE /HPF
BILIRUBIN URINE: NEGATIVE MG/DL
BLOOD, URINE: NEGATIVE
CLARITY: ABNORMAL
COLOR: YELLOW
GLUCOSE, URINE: 500 MG/DL
KETONES, URINE: NEGATIVE MG/DL
LEUKOCYTE ESTERASE, URINE: ABNORMAL
MUCUS: ABNORMAL HPF
NITRITE URINE, QUANTITATIVE: NEGATIVE
PH, URINE: 6 (ref 5–8)
PROTEIN UA: ABNORMAL MG/DL
RBC URINE: ABNORMAL /HPF (ref 0–6)
SPECIFIC GRAVITY UA: 1.02 (ref 1–1.03)
SQUAMOUS EPITHELIAL: <1 /HPF
TRICHOMONAS: ABNORMAL /HPF
UROBILINOGEN, URINE: 0.2 MG/DL (ref 0.2–1)
WBC UA: 3 /HPF (ref 0–5)

## 2022-09-19 NOTE — CARE COORDINATION
Patient unable to get into [employer] [employer] due to patient getting very sick to stomach and not being able to get out of wheelchair. Patient stated that she \"could not do this . .. in going by car. Servando Brown \" VERITO reported to [address] [place] [address] and LT[facility], [facility] Rns that patient was unable to be transported by [employer]. CM called [facility] and explained that patient was very sick to stomach and weak and CM attempted with patient, but patient unable to transport without stretcher at this time. [facility] reported that they will be at ED and will  patient in [address] [place] [address] within 10 to 15 minutes and will take patient to [facility]. VERITO Laguna went and shared information with nurses so they can place patient back in her bed.

## 2022-09-19 NOTE — ED NOTES
Report given to Ras Casper, care transferred.      Aminah Weaver RN  09/18/22 2112       Aminah Weaver RN  09/18/22 2112

## 2022-09-20 LAB
CULTURE: ABNORMAL
CULTURE: ABNORMAL
Lab: ABNORMAL
SPECIMEN: ABNORMAL

## 2022-09-29 ENCOUNTER — APPOINTMENT (OUTPATIENT)
Dept: GENERAL RADIOLOGY | Age: 62
DRG: 493 | End: 2022-09-29
Payer: MEDICARE

## 2022-09-29 ENCOUNTER — HOSPITAL ENCOUNTER (INPATIENT)
Age: 62
LOS: 5 days | Discharge: NURSING, REHAB OR LONG-TERM CARE | DRG: 493 | End: 2022-10-04
Attending: STUDENT IN AN ORGANIZED HEALTH CARE EDUCATION/TRAINING PROGRAM | Admitting: STUDENT IN AN ORGANIZED HEALTH CARE EDUCATION/TRAINING PROGRAM
Payer: MEDICARE

## 2022-09-29 ENCOUNTER — APPOINTMENT (OUTPATIENT)
Dept: CT IMAGING | Age: 62
DRG: 493 | End: 2022-09-29
Payer: MEDICARE

## 2022-09-29 DIAGNOSIS — N17.9 AKI (ACUTE KIDNEY INJURY) (HCC): ICD-10-CM

## 2022-09-29 DIAGNOSIS — S42.352A COMMINUTED LEFT HUMERAL FRACTURE, CLOSED, INITIAL ENCOUNTER: ICD-10-CM

## 2022-09-29 DIAGNOSIS — S42.492A OTHER CLOSED DISPLACED FRACTURE OF DISTAL END OF LEFT HUMERUS, INITIAL ENCOUNTER: Primary | ICD-10-CM

## 2022-09-29 LAB
ALBUMIN SERPL-MCNC: 3.7 GM/DL (ref 3.4–5)
ALP BLD-CCNC: 102 IU/L (ref 40–129)
ALT SERPL-CCNC: 16 U/L (ref 10–40)
ANION GAP SERPL CALCULATED.3IONS-SCNC: 11 MMOL/L (ref 4–16)
AST SERPL-CCNC: 13 IU/L (ref 15–37)
BASOPHILS ABSOLUTE: 0 K/CU MM
BASOPHILS RELATIVE PERCENT: 0.3 % (ref 0–1)
BILIRUB SERPL-MCNC: 0.2 MG/DL (ref 0–1)
BUN BLDV-MCNC: 25 MG/DL (ref 6–23)
CALCIUM SERPL-MCNC: 9 MG/DL (ref 8.3–10.6)
CHLORIDE BLD-SCNC: 104 MMOL/L (ref 99–110)
CO2: 22 MMOL/L (ref 21–32)
CREAT SERPL-MCNC: 2.3 MG/DL (ref 0.6–1.1)
DIFFERENTIAL TYPE: ABNORMAL
EOSINOPHILS ABSOLUTE: 0.1 K/CU MM
EOSINOPHILS RELATIVE PERCENT: 1.1 % (ref 0–3)
GFR AFRICAN AMERICAN: 26 ML/MIN/1.73M2
GFR NON-AFRICAN AMERICAN: 21 ML/MIN/1.73M2
GLUCOSE BLD-MCNC: 145 MG/DL (ref 70–99)
GLUCOSE BLD-MCNC: 258 MG/DL (ref 70–99)
HCT VFR BLD CALC: 37.1 % (ref 37–47)
HEMOGLOBIN: 11.5 GM/DL (ref 12.5–16)
IMMATURE NEUTROPHIL %: 0.3 % (ref 0–0.43)
LYMPHOCYTES ABSOLUTE: 2.9 K/CU MM
LYMPHOCYTES RELATIVE PERCENT: 27.7 % (ref 24–44)
MCH RBC QN AUTO: 29.8 PG (ref 27–31)
MCHC RBC AUTO-ENTMCNC: 31 % (ref 32–36)
MCV RBC AUTO: 96.1 FL (ref 78–100)
MONOCYTES ABSOLUTE: 0.9 K/CU MM
MONOCYTES RELATIVE PERCENT: 8.5 % (ref 0–4)
NUCLEATED RBC %: 0 %
PDW BLD-RTO: 13.1 % (ref 11.7–14.9)
PLATELET # BLD: 270 K/CU MM (ref 140–440)
PMV BLD AUTO: 9.8 FL (ref 7.5–11.1)
POTASSIUM SERPL-SCNC: 4.3 MMOL/L (ref 3.5–5.1)
RBC # BLD: 3.86 M/CU MM (ref 4.2–5.4)
SEGMENTED NEUTROPHILS ABSOLUTE COUNT: 6.4 K/CU MM
SEGMENTED NEUTROPHILS RELATIVE PERCENT: 62.1 % (ref 36–66)
SODIUM BLD-SCNC: 137 MMOL/L (ref 135–145)
TOTAL IMMATURE NEUTOROPHIL: 0.03 K/CU MM
TOTAL NUCLEATED RBC: 0 K/CU MM
TOTAL PROTEIN: 5.9 GM/DL (ref 6.4–8.2)
WBC # BLD: 10.3 K/CU MM (ref 4–10.5)

## 2022-09-29 PROCEDURE — 6360000002 HC RX W HCPCS: Performed by: PHYSICIAN ASSISTANT

## 2022-09-29 PROCEDURE — 99285 EMERGENCY DEPT VISIT HI MDM: CPT

## 2022-09-29 PROCEDURE — 70450 CT HEAD/BRAIN W/O DYE: CPT

## 2022-09-29 PROCEDURE — 96374 THER/PROPH/DIAG INJ IV PUSH: CPT

## 2022-09-29 PROCEDURE — 2580000003 HC RX 258: Performed by: STUDENT IN AN ORGANIZED HEALTH CARE EDUCATION/TRAINING PROGRAM

## 2022-09-29 PROCEDURE — 73060 X-RAY EXAM OF HUMERUS: CPT

## 2022-09-29 PROCEDURE — 82962 GLUCOSE BLOOD TEST: CPT

## 2022-09-29 PROCEDURE — 6370000000 HC RX 637 (ALT 250 FOR IP): Performed by: STUDENT IN AN ORGANIZED HEALTH CARE EDUCATION/TRAINING PROGRAM

## 2022-09-29 PROCEDURE — 1200000000 HC SEMI PRIVATE

## 2022-09-29 PROCEDURE — 85025 COMPLETE CBC W/AUTO DIFF WBC: CPT

## 2022-09-29 PROCEDURE — 73080 X-RAY EXAM OF ELBOW: CPT

## 2022-09-29 PROCEDURE — 80053 COMPREHEN METABOLIC PANEL: CPT

## 2022-09-29 PROCEDURE — 29105 APPLICATION LONG ARM SPLINT: CPT

## 2022-09-29 PROCEDURE — 6360000002 HC RX W HCPCS: Performed by: STUDENT IN AN ORGANIZED HEALTH CARE EDUCATION/TRAINING PROGRAM

## 2022-09-29 RX ORDER — INSULIN GLARGINE 100 [IU]/ML
15 INJECTION, SOLUTION SUBCUTANEOUS NIGHTLY
Status: DISCONTINUED | OUTPATIENT
Start: 2022-09-29 | End: 2022-10-04 | Stop reason: HOSPADM

## 2022-09-29 RX ORDER — SENNA AND DOCUSATE SODIUM 50; 8.6 MG/1; MG/1
2 TABLET, FILM COATED ORAL 2 TIMES DAILY
Status: DISCONTINUED | OUTPATIENT
Start: 2022-09-29 | End: 2022-10-04 | Stop reason: HOSPADM

## 2022-09-29 RX ORDER — ONDANSETRON 2 MG/ML
4 INJECTION INTRAMUSCULAR; INTRAVENOUS EVERY 6 HOURS PRN
Status: DISCONTINUED | OUTPATIENT
Start: 2022-09-29 | End: 2022-09-30 | Stop reason: SDUPTHER

## 2022-09-29 RX ORDER — INSULIN LISPRO 100 [IU]/ML
0-4 INJECTION, SOLUTION INTRAVENOUS; SUBCUTANEOUS
Status: DISCONTINUED | OUTPATIENT
Start: 2022-09-30 | End: 2022-10-01

## 2022-09-29 RX ORDER — OXYCODONE HYDROCHLORIDE 5 MG/1
10 TABLET ORAL EVERY 6 HOURS PRN
Status: DISCONTINUED | OUTPATIENT
Start: 2022-09-29 | End: 2022-09-30 | Stop reason: SDUPTHER

## 2022-09-29 RX ORDER — MAGNESIUM HYDROXIDE/ALUMINUM HYDROXICE/SIMETHICONE 120; 1200; 1200 MG/30ML; MG/30ML; MG/30ML
30 SUSPENSION ORAL EVERY 6 HOURS PRN
Status: DISCONTINUED | OUTPATIENT
Start: 2022-09-29 | End: 2022-10-04 | Stop reason: HOSPADM

## 2022-09-29 RX ORDER — ASPIRIN 81 MG/1
81 TABLET, CHEWABLE ORAL DAILY
Status: DISCONTINUED | OUTPATIENT
Start: 2022-09-30 | End: 2022-10-04 | Stop reason: HOSPADM

## 2022-09-29 RX ORDER — MORPHINE SULFATE 2 MG/ML
2 INJECTION, SOLUTION INTRAMUSCULAR; INTRAVENOUS EVERY 4 HOURS PRN
Status: DISCONTINUED | OUTPATIENT
Start: 2022-09-30 | End: 2022-09-29

## 2022-09-29 RX ORDER — GABAPENTIN 100 MG/1
200 CAPSULE ORAL 2 TIMES DAILY
Status: DISCONTINUED | OUTPATIENT
Start: 2022-09-29 | End: 2022-10-04 | Stop reason: HOSPADM

## 2022-09-29 RX ORDER — SODIUM CHLORIDE 9 MG/ML
INJECTION, SOLUTION INTRAVENOUS PRN
Status: DISCONTINUED | OUTPATIENT
Start: 2022-09-29 | End: 2022-09-30

## 2022-09-29 RX ORDER — DONEPEZIL HYDROCHLORIDE 10 MG/1
10 TABLET, FILM COATED ORAL NIGHTLY
Status: DISCONTINUED | OUTPATIENT
Start: 2022-09-29 | End: 2022-10-04 | Stop reason: HOSPADM

## 2022-09-29 RX ORDER — AMLODIPINE BESYLATE 10 MG/1
10 TABLET ORAL DAILY
Status: DISCONTINUED | OUTPATIENT
Start: 2022-09-30 | End: 2022-10-04 | Stop reason: HOSPADM

## 2022-09-29 RX ORDER — 0.9 % SODIUM CHLORIDE 0.9 %
1000 INTRAVENOUS SOLUTION INTRAVENOUS ONCE
Status: DISCONTINUED | OUTPATIENT
Start: 2022-09-29 | End: 2022-09-29

## 2022-09-29 RX ORDER — ACETAMINOPHEN 325 MG/1
650 TABLET ORAL EVERY 6 HOURS PRN
Status: DISCONTINUED | OUTPATIENT
Start: 2022-09-29 | End: 2022-10-04 | Stop reason: HOSPADM

## 2022-09-29 RX ORDER — TOPIRAMATE 25 MG/1
50 TABLET ORAL NIGHTLY
Status: DISCONTINUED | OUTPATIENT
Start: 2022-09-29 | End: 2022-10-04 | Stop reason: HOSPADM

## 2022-09-29 RX ORDER — PANTOPRAZOLE SODIUM 40 MG/1
40 TABLET, DELAYED RELEASE ORAL
Status: DISCONTINUED | OUTPATIENT
Start: 2022-09-30 | End: 2022-10-01

## 2022-09-29 RX ORDER — SODIUM CHLORIDE 0.9 % (FLUSH) 0.9 %
5-40 SYRINGE (ML) INJECTION EVERY 12 HOURS SCHEDULED
Status: DISCONTINUED | OUTPATIENT
Start: 2022-09-29 | End: 2022-09-30 | Stop reason: SDUPTHER

## 2022-09-29 RX ORDER — HYDROCODONE BITARTRATE AND ACETAMINOPHEN 7.5; 325 MG/1; MG/1
1 TABLET ORAL EVERY 6 HOURS PRN
Status: DISCONTINUED | OUTPATIENT
Start: 2022-09-29 | End: 2022-10-04 | Stop reason: HOSPADM

## 2022-09-29 RX ORDER — LEVOTHYROXINE SODIUM 0.07 MG/1
75 TABLET ORAL
Status: DISCONTINUED | OUTPATIENT
Start: 2022-09-30 | End: 2022-10-04 | Stop reason: HOSPADM

## 2022-09-29 RX ORDER — SODIUM CHLORIDE 0.9 % (FLUSH) 0.9 %
5-40 SYRINGE (ML) INJECTION PRN
Status: DISCONTINUED | OUTPATIENT
Start: 2022-09-29 | End: 2022-09-30 | Stop reason: SDUPTHER

## 2022-09-29 RX ORDER — ACETAMINOPHEN 650 MG/1
650 SUPPOSITORY RECTAL EVERY 6 HOURS PRN
Status: DISCONTINUED | OUTPATIENT
Start: 2022-09-29 | End: 2022-10-04 | Stop reason: HOSPADM

## 2022-09-29 RX ORDER — ATORVASTATIN CALCIUM 40 MG/1
80 TABLET, FILM COATED ORAL NIGHTLY
Status: DISCONTINUED | OUTPATIENT
Start: 2022-09-29 | End: 2022-10-04 | Stop reason: HOSPADM

## 2022-09-29 RX ORDER — ACETAMINOPHEN 325 MG/1
650 TABLET ORAL EVERY 6 HOURS
Status: DISCONTINUED | OUTPATIENT
Start: 2022-09-29 | End: 2022-10-04 | Stop reason: HOSPADM

## 2022-09-29 RX ORDER — ONDANSETRON 4 MG/1
4 TABLET, ORALLY DISINTEGRATING ORAL EVERY 8 HOURS PRN
Status: DISCONTINUED | OUTPATIENT
Start: 2022-09-29 | End: 2022-09-30 | Stop reason: SDUPTHER

## 2022-09-29 RX ORDER — MORPHINE SULFATE 4 MG/ML
4 INJECTION, SOLUTION INTRAMUSCULAR; INTRAVENOUS EVERY 30 MIN PRN
Status: DISCONTINUED | OUTPATIENT
Start: 2022-09-29 | End: 2022-09-29 | Stop reason: ALTCHOICE

## 2022-09-29 RX ORDER — DEXTROSE MONOHYDRATE 100 MG/ML
INJECTION, SOLUTION INTRAVENOUS CONTINUOUS PRN
Status: DISCONTINUED | OUTPATIENT
Start: 2022-09-29 | End: 2022-10-04 | Stop reason: HOSPADM

## 2022-09-29 RX ORDER — POLYETHYLENE GLYCOL 3350 17 G/17G
17 POWDER, FOR SOLUTION ORAL 2 TIMES DAILY
Status: DISCONTINUED | OUTPATIENT
Start: 2022-09-29 | End: 2022-10-04 | Stop reason: HOSPADM

## 2022-09-29 RX ORDER — INSULIN LISPRO 100 [IU]/ML
0-4 INJECTION, SOLUTION INTRAVENOUS; SUBCUTANEOUS NIGHTLY
Status: DISCONTINUED | OUTPATIENT
Start: 2022-09-29 | End: 2022-10-01

## 2022-09-29 RX ORDER — INSULIN LISPRO 100 [IU]/ML
5 INJECTION, SOLUTION INTRAVENOUS; SUBCUTANEOUS
Status: DISCONTINUED | OUTPATIENT
Start: 2022-09-30 | End: 2022-10-04 | Stop reason: HOSPADM

## 2022-09-29 RX ORDER — FLUOXETINE HYDROCHLORIDE 20 MG/1
80 CAPSULE ORAL DAILY
Status: DISCONTINUED | OUTPATIENT
Start: 2022-09-30 | End: 2022-10-04 | Stop reason: HOSPADM

## 2022-09-29 RX ORDER — POLYETHYLENE GLYCOL 3350 17 G/17G
17 POWDER, FOR SOLUTION ORAL DAILY PRN
Status: CANCELLED | OUTPATIENT
Start: 2022-09-29

## 2022-09-29 RX ORDER — BUSPIRONE HYDROCHLORIDE 5 MG/1
10 TABLET ORAL 3 TIMES DAILY
Status: DISCONTINUED | OUTPATIENT
Start: 2022-09-29 | End: 2022-10-04 | Stop reason: HOSPADM

## 2022-09-29 RX ORDER — SODIUM CHLORIDE, SODIUM LACTATE, POTASSIUM CHLORIDE, CALCIUM CHLORIDE 600; 310; 30; 20 MG/100ML; MG/100ML; MG/100ML; MG/100ML
INJECTION, SOLUTION INTRAVENOUS CONTINUOUS
Status: DISPENSED | OUTPATIENT
Start: 2022-09-29 | End: 2022-09-30

## 2022-09-29 RX ADMIN — BUSPIRONE HYDROCHLORIDE 10 MG: 5 TABLET ORAL at 23:49

## 2022-09-29 RX ADMIN — TOPIRAMATE 50 MG: 25 TABLET, FILM COATED ORAL at 23:49

## 2022-09-29 RX ADMIN — GABAPENTIN 200 MG: 100 CAPSULE ORAL at 23:48

## 2022-09-29 RX ADMIN — ONDANSETRON 4 MG: 2 INJECTION INTRAMUSCULAR; INTRAVENOUS at 23:36

## 2022-09-29 RX ADMIN — DONEPEZIL HYDROCHLORIDE 10 MG: 10 TABLET ORAL at 23:49

## 2022-09-29 RX ADMIN — SENNOSIDES AND DOCUSATE SODIUM 2 TABLET: 50; 8.6 TABLET ORAL at 23:49

## 2022-09-29 RX ADMIN — SODIUM CHLORIDE, PRESERVATIVE FREE 10 ML: 5 INJECTION INTRAVENOUS at 23:51

## 2022-09-29 RX ADMIN — HYDROCODONE BITARTRATE AND ACETAMINOPHEN 1 TABLET: 7.5; 325 TABLET ORAL at 23:52

## 2022-09-29 RX ADMIN — ACETAMINOPHEN 650 MG: 325 TABLET ORAL at 23:51

## 2022-09-29 RX ADMIN — MORPHINE SULFATE 4 MG: 4 INJECTION, SOLUTION INTRAMUSCULAR; INTRAVENOUS at 19:46

## 2022-09-29 ASSESSMENT — PAIN DESCRIPTION - PAIN TYPE
TYPE: ACUTE PAIN
TYPE: ACUTE PAIN

## 2022-09-29 ASSESSMENT — PAIN DESCRIPTION - DESCRIPTORS
DESCRIPTORS: ACHING
DESCRIPTORS: ACHING;THROBBING

## 2022-09-29 ASSESSMENT — PAIN SCALES - GENERAL
PAINLEVEL_OUTOF10: 9
PAINLEVEL_OUTOF10: 5
PAINLEVEL_OUTOF10: 4
PAINLEVEL_OUTOF10: 6
PAINLEVEL_OUTOF10: 7
PAINLEVEL_OUTOF10: 1
PAINLEVEL_OUTOF10: 5

## 2022-09-29 ASSESSMENT — PAIN DESCRIPTION - LOCATION
LOCATION: ARM

## 2022-09-29 ASSESSMENT — PAIN DESCRIPTION - ORIENTATION
ORIENTATION: LEFT

## 2022-09-29 ASSESSMENT — PAIN DESCRIPTION - FREQUENCY
FREQUENCY: CONTINUOUS
FREQUENCY: CONTINUOUS

## 2022-09-29 ASSESSMENT — PAIN - FUNCTIONAL ASSESSMENT
PAIN_FUNCTIONAL_ASSESSMENT: 0-10
PAIN_FUNCTIONAL_ASSESSMENT: PREVENTS OR INTERFERES SOME ACTIVE ACTIVITIES AND ADLS
PAIN_FUNCTIONAL_ASSESSMENT: PREVENTS OR INTERFERES SOME ACTIVE ACTIVITIES AND ADLS

## 2022-09-29 ASSESSMENT — PAIN SCALES - WONG BAKER: WONGBAKER_NUMERICALRESPONSE: 0

## 2022-09-29 ASSESSMENT — PAIN DESCRIPTION - ONSET
ONSET: ON-GOING
ONSET: ON-GOING

## 2022-09-29 NOTE — ED TRIAGE NOTES
Patient states that legs give out and she fell, tried to catch herself with her left arm and now has an obvious deformity.

## 2022-09-29 NOTE — ED PROVIDER NOTES
Patient calling stating she will be a JERMAINE. Patient will be faxing over her records. JP is 8/29. Patient states she has an appt with her old OBGYN on 6/9 and will go to the ACL for her GTT.    Triage Chief Complaint:   Fall and Arm Injury (Left arm deformity, 100mcg of fentanyl on board from EMS)    [place]:  Today in the ED I had the pleasure of caring for Moreno Mazariegos who is a 59 y.o. female that presents in the left upper extremity pain. Context is patient was walking. Her legs gave out causing her to fall against a wall. Hitting her head on the wall. And falling down on outstretched arm injuring her left arm. She denies any dizziness lightheadedness. Denies headache changes in vision. Denies any generalized weakness. Pain is 8/10. She was provided with 100 mics of fentanyl in route via EMS. Currently in a sling. She states his lungs are normal.  Does not hurt much. She denies any associated paresthesias. ROS:  REVIEW OF SYSTEMS    At least 10 systems reviewed      All other review of systems are negative  See HPI and nursing notes for additional information       Past Medical History:   Diagnosis Date    Arthritis     Asthma     Cerebral artery occlusion with cerebral infarction [facility])     per old chart 8/2015- TIA    COPD (chronic obstructive pulmonary disease) ([facility] [address]     Diabetes mellitus ([facility] [address]     Diabetic neuropathy ([facility] [address]     per old chart    Fracture     per old chart pt in ER 12/9/2018- after 2 days of arm pain after fall- dx with left humerus fx- for surg 12/31/2018    History of blood transfusion     Hyperlipidemia     Hypertension     Muscle weakness (generalized)     Osteoarthritis     per old chart     Past Surgical History:   Procedure Laterality Date    BREAST BIOPSY Right     CHOLECYSTECTOMY  2002? EYE SURGERY      per old chart had right eye cataract ext done 2/2018 and left eye 4/2018    FOOT SURGERY Left 2004? HUMERUS FRACTURE SURGERY Left 12/31/2018    HUMERUS OPEN REDUCTION INTERNAL FIXATION LEFT PROXIMAL performed by Flakito Shipley DO at [address] Right 2009?     LUNG SURGERY  2009?    simone lung lobectomy      Family History   Problem Relation Age of Onset    Breast Cancer Maternal Aunt 48    Breast Cancer Maternal Aunt 48     Social History     Socioeconomic History    Marital status:      Spouse name: Not on file    Number of children: Not on file    Years of education: Not on file    Highest education level: Not on file   Occupational History    Not on file   Tobacco Use    Smoking status: Never    Smokeless tobacco: Never    Tobacco comments:     12/27/*2018- with phone assessment with caregiver- unsure of social, surgical or family hx for phone assessment   Vaping Use    Vaping Use: Never used   Substance and Sexual Activity    Alcohol use: Yes    Drug use: No    Sexual activity: Not Currently   Other Topics Concern    Not on file   Social History Narrative    Not on file     Social Determinants of Health     Financial Resource Strain: Low Risk     Difficulty of Paying Living Expenses: Not hard at all   Food Insecurity: No Food Insecurity    Worried About Running Out of Food in the Last Year: Never true    [address] in the Last Year: Never true   Transportation Needs: No Transportation Needs    Lack of Transportation (Medical): No    Lack of Transportation (Non-Medical): No   Physical Activity: Inactive    Days of Exercise per Week: 0 days    Minutes of Exercise per Session: 0 min   Stress: Stress Concern Present    Feeling of Stress : Very much   Social Connections:  Moderately Isolated    Frequency of Communication with Friends and Family: More than three times a week    Frequency of Social Gatherings with Friends and Family: Once a week    Attends Yazdanism Services: 1 to 4 times per year    Active Member of [address] [place] [address] or Organizations: No    Attends Club or Organization Meetings: Never    Marital Status:    Intimate Partner Violence: Not At Risk    Fear of Current or Ex-Partner: No    Emotionally Abused: No    Physically Abused: No    Sexually Abused: No   Housing Stability: Low Risk     Unable to Pay for Housing in the Last Year: No    Number of Places Lived in the Last Year: 2    Unstable Housing in the Last Year: No     Current Facility-Administered Medications   Medication Dose Route Frequency Provider Last Rate Last Admin    morphine sulfate (PF) injection 4 mg  4 mg IntraVENous Q30 Min MALKAN Lolly Espana PA-C   4 mg at 09/29/22 1946     Current Outpatient Medications   Medication Sig Dispense Refill    naproxen (NAPROSYN) 500 MG tablet Take 1 tablet by mouth 2 times daily 60 tablet 0    ondansetron (ZOFRAN ODT) 4 MG disintegrating tablet Take 1 tablet by mouth every 8 hours as needed for Nausea 15 tablet 0    aspirin 81 MG chewable tablet Take 1 tablet by mouth in the morning. 30 tablet 0    insulin lispro (HUMALOG) 100 UNIT/ML SOLN injection vial Inject 10 Units into the skin in the morning and 10 Units at noon and 10 Units in the evening. Inject before meals. (Patient taking differently: Inject 10 Units into the skin 3 times daily (before meals) And sliding scale dose) 15 mL 0    metoclopramide (REGLAN) 5 MG tablet Take 1 tablet by mouth in the morning and 1 tablet at noon and 1 tablet in the evening. Take with meals. 30 tablet 0    pantoprazole (PROTONIX) 40 MG tablet Take 1 tablet by mouth in the morning and 1 tablet in the evening. Take before meals. 60 tablet 0    vitamin B-12 (CYANOCOBALAMIN) 100 MCG tablet Take 1 tablet by mouth in the morning. 60 tablet 0    amLODIPine (NORVASC) 10 MG tablet Take 1 tablet by mouth in the morning. 30 tablet 0    Vitamin D (CHOLECALCIFEROL) 50 MCG (2000 UT) TABS tablet Take 1 tablet by mouth in the morning. 30 tablet 0    gabapentin (NEURONTIN) 400 MG capsule Take 1 capsule by mouth in the morning and 1 capsule before bedtime. Do all this for 30 days.  60 capsule 0    insulin glargine (LANTUS) 100 UNIT/ML injection vial Inject 20 Units into the skin nightly (Patient taking differently: Inject 30 Units into the skin nightly) 10 mL 3    levothyroxine (SYNTHROID) 75 MCG tablet Take 1 tablet by mouth every morning (before breakfast) 30 tablet 0    FLUoxetine (PROZAC) 40 MG capsule Take 80 mg by mouth daily      donepezil (ARICEPT) 5 MG tablet Take 10 mg by mouth nightly       busPIRone (BUSPAR) 10 MG tablet Take 10 mg by mouth 3 times daily       topiramate (TOPAMAX) 50 MG tablet Take 50 mg by mouth nightly       clopidogrel (PLAVIX) 75 MG tablet Take 75 mg by mouth daily      atorvastatin (LIPITOR) 40 MG tablet Take 80 mg by mouth nightly        No Known Allergies    Nursing Notes Reviewed    Physical Exam:  ED Triage Vitals [09/29/22 1755]   Enc Vitals Group      /60      Heart Rate 64      Resp 20      Temp 97.5 °F (36.4 °C)      Temp Source Oral      SpO2 95 %      Weight 240 lb (108.9 kg)      Height       Head Circumference       Peak Flow       Pain Score       Pain Loc       Pain Edu? Excl. in [address]? General :Patient is awake alert oriented person place and time no acute distress nontoxic appearing  HEENT: Pupils are equally round and reactive to light extraocular motors are intact conjunctivae clear sclerae white there is no injection no icterus. Nose without any rhinorrhea or epistaxis. Oral mucosa is moist no exudate buccal mucosa shows no ulcerations. Uvula is midline    Neck: Neck is supple full range of motion trachea midline thyroid nonpalpable  Cardiac: Heart regular rate rhythm no murmurs rubs clicks or gallops  Lungs: Lungs are clear to auscultation there is no wheezing rhonchi or rales. There is no use of accessory muscles no nasal flaring identified. Chest wall: There is no tenderness to palpation over the chest wall or over ribs  Abdomen: Abdomen is soft nontender nondistended.  There is no firm or pulsatile masses no rebound rigidity or guarding negative Menard's negative McBurney, no peritoneal signs  Suprapubic:  there is no tenderness to palpation over the external bladder   Musculoskeletal: 5 out of 5 strength in all 4 extremities full flexion extension abduction and adduction supination pronation of all extremities and all digits. No obvious muscle atrophy is noted. No focal muscle deficits are appreciated  Dermatology: Skin is warm and dry there is no obvious abscesses lacerations or lesions noted  Psych: Mentation is grossly normal cognition is grossly normal. Affect is appropriate  Neuro: Motor intact sensory intact cranial nerves II through XII are intact level of consciousness is normal cerebellar function is normal reflexes are grossly normal. No evidence of incontinence or loss of bowel or bladder no saddle anesthesia noted Lymphatic: There is no submandibular or cervical adenopathy appreciated.         I have reviewed and interpreted all of the currently available lab results from this visit (if applicable):  Results for orders placed or performed during the hospital encounter of 09/29/22   CBC with Auto Differential   Result Value Ref Range    WBC 10.3 4.0 - 10.5 K/CU MM    RBC 3.86 (L) 4.2 - 5.4 M/CU MM    Hemoglobin 11.5 (L) 12.5 - 16.0 GM/DL    Hematocrit 37.1 37 - 47 %    MCV 96.1 78 - 100 FL    MCH 29.8 27 - 31 PG    MCHC 31.0 (L) 32.0 - 36.0 %    RDW 13.1 11.7 - 14.9 %    Platelets 238 883 - 839 K/CU MM    MPV 9.8 7.5 - 11.1 FL    Differential Type AUTOMATED DIFFERENTIAL     Segs Relative 62.1 36 - 66 %    Lymphocytes % 27.7 24 - 44 %    Monocytes % 8.5 (H) 0 - 4 %    Eosinophils % 1.1 0 - 3 %    Basophils % 0.3 0 - 1 %    Segs Absolute 6.4 K/CU MM    Lymphocytes Absolute 2.9 K/CU MM    Monocytes Absolute 0.9 K/CU MM    Eosinophils Absolute 0.1 K/CU MM    Basophils Absolute 0.0 K/CU MM    Nucleated RBC % 0.0 %    Total Nucleated RBC 0.0 K/CU MM    Total Immature Neutrophil 0.03 K/CU MM    Immature Neutrophil % 0.3 0 - 0.43 %   Comprehensive Metabolic Panel   Result Value Ref Range    Sodium 137 135 - 145 MMOL/L    Potassium 4.3 3.5 - 5.1 MMOL/L    Chloride 104 99 - 110 mMol/L    CO2 22 21 - 32 MMOL/L    BUN 25 (H) 6 - 23 MG/DL    Creatinine 2.3 (H) 0.6 - 1.1 MG/DL    Glucose 258 (H) 70 - 99 MG/DL    Calcium 9.0 8.3 - 10.6 MG/DL    Albumin 3.7 3.4 - 5.0 GM/DL    Total Protein 5.9 (L) 6.4 - 8.2 GM/DL    Total Bilirubin 0.2 0.0 - 1.0 MG/DL    ALT 16 10 - 40 U/L    AST 13 (L) 15 - 37 IU/L    Alkaline Phosphatase 102 40 - 129 IU/L    GFR Non- 21 (L) >60 mL/min/1.73m2    GFR  26 (L) >60 mL/min/1.73m2    Anion Gap 11 4 - 16      Radiographs (if obtained):  [] The following radiograph was interpreted by myself in the absence of a radiologist:   [] Radiologist's Report Reviewed:  XR ELBOW LEFT (MIN 3 VIEWS)   Final Result   Severely limited, nearly nondiagnostic images of the right elbow due to   overlying objects and patient positioning. Repeat radiographs could be   obtained after the removal of these objects. Redemonstrated displaced, oblique fracture of the distal humeral diaphysis. There is a questionable additional vertical fracture involving the distal   humeral fracture component. CT HEAD WO CONTRAST   Final Result   1. No acute intracranial abnormality. 2. Small bubbly air-fluid level present in the right sphenoid sinus. Finding   raises the possibility of acute sinusitis in the appropriate clinical setting. XR HUMERUS LEFT (MIN 2 VIEWS)   Final Result   1. Oblique, displaced fracture of the distal humeral shaft. 2.  Sequela of proximal humerus fracture repair without evidence for hardware   complication. EKG (if obtained):   Please See Note of attending physician for EKG interpretation. Chart review shows recent radiograph(s):  CT ABDOMEN PELVIS WO CONTRAST Additional Contrast? None    Result Date: 9/18/2022  EXAMINATION: CT OF THE ABDOMEN AND PELVIS WITHOUT CONTRAST 9/18/2022 6:33 pm TECHNIQUE: CT of the abdomen and pelvis was performed without the administration of intravenous contrast. Multiplanar reformatted images are provided for review.  Automated exposure control, iterative reconstruction, and/or weight based adjustment of the mA/kV was utilized to reduce the radiation dose to as low as reasonably achievable. COMPARISON: 08/21/2022 HISTORY: ORDERING SYSTEM PROVIDED HISTORY: L flank/abdomina pain/n/v TECHNOLOGIST PROVIDED HISTORY: Reason for exam:->L flank/abdomina pain/n/v Additional Contrast?->None Decision Support Exception - unselect if not a suspected or confirmed emergency medical condition->Emergency Medical Condition (MA) Reason for Exam: L flank/abdomina pain/n/v FINDINGS: Lower Chest: Mild dependent subsegmental atelectasis. Organs: Normal liver. Gallbladder is surgically absent. No evidence of biliary ductal dilatation. Spleen is normal in size. Normal pancreas. No evidence of ductal dilatation. Normal adrenal glands. Small left parapelvic cyst, otherwise normal kidneys. No renal calculi or hydronephrosis. GI/Bowel: Unchanged small paraesophageal hernia. Normal stomach and bowel. Appendix is normal.  Submucosal fat deposition throughout the cecum and proximal right colon suggestive of chronic inflammation. Scattered colonic diverticulosis. Pelvis: Normal bladder. Normal uterus. Peritoneum/Retroperitoneum:No free fluid, free air, organized fluid collection or lymphadenopathy. Moderate calcific atherosclerosis. Soft tissues: Small fat containing umbilical hernia. . Bones: No acute osseous abnormality. Unchanged enchondromas in the proximal right femur. .     No acute abnormality of the abdomen or pelvis.  Unchanged small paraesophageal hernia     XR HUMERUS LEFT (MIN 2 VIEWS)    Result Date: 9/29/2022  EXAMINATION: TWO XRAY VIEWS OF THE LEFT HUMERUS 9/29/2022 6:04 pm COMPARISON: 02/07/2019 HISTORY: ORDERING SYSTEM PROVIDED HISTORY: fall TECHNOLOGIST PROVIDED HISTORY: Reason for exam:->fall Reason for exam:->injury Reason for Exam: LEFT HUMERUS PAIN Additional signs and symptoms: FALL Relevant Medical/Surgical History: NA FINDINGS: Plate screw fixation of the proximal humerus, without hardware complication. An oblique displaced fracture of the distal humeral shaft is noted. No evidence for shoulder or elbow malalignment, within the limitations of positioning and external artifact. 1.  Oblique, displaced fracture of the distal humeral shaft. 2.  Sequela of proximal humerus fracture repair without evidence for hardware complication. MDM:     Interventions given this visit:   Orders Placed This Encounter   Medications    morphine sulfate (PF) injection 4 mg    DISCONTD: 0.9 % sodium chloride bolus       Neurovascular intact on initial repeat examinations. With good pulses patient presents today to the emergency department. After with a distal humerus fracture secondary to mechanical fall. Analgesics are well achieved. X-ray does reveal fracture. Possible fracture within the fracture fragment. I did speak to on-call orthopedist Dr. Apple Chinchilla who did advise long-arm posterior splint. And admission. For inpatient evaluation. Patient's metabolic panel does reveal MODESTO. With creatinine nearly double of what it typically is. IV fluids are initiated here to help flush the kidneys. Help that. And patient will be admitted to the hospitalist.  For ongoing medical management. With orthopedic evaluation in the morning. Patient did hit her head and wall when she fell. Negative CT scan of the head. No focal no neurological deficits on examination     I independently managed patient today in the ED    /60   Pulse 64   Temp 97.5 °F (36.4 °C) (Oral)   Resp 20   Wt 240 lb (108.9 kg)   SpO2 95%   BMI 41.20 kg/m²       Clinical Impression:  1. Other closed displaced fracture of distal end of left humerus, initial encounter    2. MODESTO (acute kidney injury) ([facility] [address]        Disposition referral (if applicable):  No follow-up provider specified.   Disposition medications (if applicable):  New Prescriptions    No medications on file         Comment: Please note this report has been produced using speech recognition software and may contain errors related to that system including errors in grammar, punctuation, and spelling, as well as words and phrases that may be inappropriate. If there are any questions or concerns please feel free to contact the dictating provider for clarification.       Subhash Bhatt, [address] [place] [address]  09/29/22 [id number]       Subhash Bhatt PA-C  09/29/22 [id number]

## 2022-09-30 ENCOUNTER — ANESTHESIA EVENT (OUTPATIENT)
Dept: OPERATING ROOM | Age: 62
DRG: 493 | End: 2022-09-30
Payer: MEDICARE

## 2022-09-30 ENCOUNTER — ANESTHESIA (OUTPATIENT)
Dept: OPERATING ROOM | Age: 62
DRG: 493 | End: 2022-09-30
Payer: MEDICARE

## 2022-09-30 ENCOUNTER — APPOINTMENT (OUTPATIENT)
Dept: GENERAL RADIOLOGY | Age: 62
DRG: 493 | End: 2022-09-30
Payer: MEDICARE

## 2022-09-30 LAB
ANION GAP SERPL CALCULATED.3IONS-SCNC: 9 MMOL/L (ref 4–16)
BASOPHILS ABSOLUTE: 0 K/CU MM
BASOPHILS RELATIVE PERCENT: 0.3 % (ref 0–1)
BUN BLDV-MCNC: 27 MG/DL (ref 6–23)
CALCIUM SERPL-MCNC: 8.8 MG/DL (ref 8.3–10.6)
CHLORIDE BLD-SCNC: 103 MMOL/L (ref 99–110)
CO2: 22 MMOL/L (ref 21–32)
CREAT SERPL-MCNC: 2 MG/DL (ref 0.6–1.1)
DIFFERENTIAL TYPE: ABNORMAL
EOSINOPHILS ABSOLUTE: 0.1 K/CU MM
EOSINOPHILS RELATIVE PERCENT: 1.1 % (ref 0–3)
GFR AFRICAN AMERICAN: 30 ML/MIN/1.73M2
GFR NON-AFRICAN AMERICAN: 25 ML/MIN/1.73M2
GLUCOSE BLD-MCNC: 240 MG/DL (ref 70–99)
GLUCOSE BLD-MCNC: 281 MG/DL (ref 70–99)
GLUCOSE BLD-MCNC: 290 MG/DL (ref 70–99)
GLUCOSE BLD-MCNC: 295 MG/DL (ref 70–99)
GLUCOSE BLD-MCNC: 341 MG/DL (ref 70–99)
GLUCOSE BLD-MCNC: 346 MG/DL (ref 70–99)
HCT VFR BLD CALC: 35.3 % (ref 37–47)
HEMOGLOBIN: 10.7 GM/DL (ref 12.5–16)
IMMATURE NEUTROPHIL %: 0.2 % (ref 0–0.43)
INR BLD: 0.8 INDEX
LYMPHOCYTES ABSOLUTE: 2.6 K/CU MM
LYMPHOCYTES RELATIVE PERCENT: 27.2 % (ref 24–44)
MCH RBC QN AUTO: 29.3 PG (ref 27–31)
MCHC RBC AUTO-ENTMCNC: 30.3 % (ref 32–36)
MCV RBC AUTO: 96.7 FL (ref 78–100)
MONOCYTES ABSOLUTE: 0.9 K/CU MM
MONOCYTES RELATIVE PERCENT: 9.1 % (ref 0–4)
NUCLEATED RBC %: 0 %
PDW BLD-RTO: 13 % (ref 11.7–14.9)
PLATELET # BLD: 238 K/CU MM (ref 140–440)
PMV BLD AUTO: 9.9 FL (ref 7.5–11.1)
POTASSIUM SERPL-SCNC: 4.6 MMOL/L (ref 3.5–5.1)
PROTHROMBIN TIME: 10.3 SECONDS (ref 11.7–14.5)
RBC # BLD: 3.65 M/CU MM (ref 4.2–5.4)
SEGMENTED NEUTROPHILS ABSOLUTE COUNT: 5.8 K/CU MM
SEGMENTED NEUTROPHILS RELATIVE PERCENT: 62.1 % (ref 36–66)
SODIUM BLD-SCNC: 134 MMOL/L (ref 135–145)
TOTAL CK: 92 IU/L (ref 26–140)
TOTAL IMMATURE NEUTOROPHIL: 0.02 K/CU MM
TOTAL NUCLEATED RBC: 0 K/CU MM
WBC # BLD: 9.4 K/CU MM (ref 4–10.5)

## 2022-09-30 PROCEDURE — 2500000003 HC RX 250 WO HCPCS

## 2022-09-30 PROCEDURE — 3700000001 HC ADD 15 MINUTES (ANESTHESIA): Performed by: ORTHOPAEDIC SURGERY

## 2022-09-30 PROCEDURE — 82962 GLUCOSE BLOOD TEST: CPT

## 2022-09-30 PROCEDURE — 6360000002 HC RX W HCPCS: Performed by: STUDENT IN AN ORGANIZED HEALTH CARE EDUCATION/TRAINING PROGRAM

## 2022-09-30 PROCEDURE — 7100000001 HC PACU RECOVERY - ADDTL 15 MIN: Performed by: ORTHOPAEDIC SURGERY

## 2022-09-30 PROCEDURE — 24545 OPTX HUM FX WO NTRCNDYLR XTN: CPT | Performed by: ORTHOPAEDIC SURGERY

## 2022-09-30 PROCEDURE — 85025 COMPLETE CBC W/AUTO DIFF WBC: CPT

## 2022-09-30 PROCEDURE — 7100000000 HC PACU RECOVERY - FIRST 15 MIN: Performed by: ORTHOPAEDIC SURGERY

## 2022-09-30 PROCEDURE — 6370000000 HC RX 637 (ALT 250 FOR IP): Performed by: STUDENT IN AN ORGANIZED HEALTH CARE EDUCATION/TRAINING PROGRAM

## 2022-09-30 PROCEDURE — 0PSG04Z REPOSITION LEFT HUMERAL SHAFT WITH INTERNAL FIXATION DEVICE, OPEN APPROACH: ICD-10-PCS | Performed by: ORTHOPAEDIC SURGERY

## 2022-09-30 PROCEDURE — 76000 FLUOROSCOPY <1 HR PHYS/QHP: CPT

## 2022-09-30 PROCEDURE — 2580000003 HC RX 258: Performed by: ORTHOPAEDIC SURGERY

## 2022-09-30 PROCEDURE — 24545 OPTX HUM FX WO NTRCNDYLR XTN: CPT | Performed by: PHYSICIAN ASSISTANT

## 2022-09-30 PROCEDURE — 2709999900 HC NON-CHARGEABLE SUPPLY: Performed by: ORTHOPAEDIC SURGERY

## 2022-09-30 PROCEDURE — 85610 PROTHROMBIN TIME: CPT

## 2022-09-30 PROCEDURE — 6370000000 HC RX 637 (ALT 250 FOR IP): Performed by: ORTHOPAEDIC SURGERY

## 2022-09-30 PROCEDURE — 01N40ZZ RELEASE ULNAR NERVE, OPEN APPROACH: ICD-10-PCS | Performed by: ORTHOPAEDIC SURGERY

## 2022-09-30 PROCEDURE — 94761 N-INVAS EAR/PLS OXIMETRY MLT: CPT

## 2022-09-30 PROCEDURE — 6360000002 HC RX W HCPCS: Performed by: ORTHOPAEDIC SURGERY

## 2022-09-30 PROCEDURE — 2500000003 HC RX 250 WO HCPCS: Performed by: ORTHOPAEDIC SURGERY

## 2022-09-30 PROCEDURE — 36415 COLL VENOUS BLD VENIPUNCTURE: CPT

## 2022-09-30 PROCEDURE — 3700000000 HC ANESTHESIA ATTENDED CARE: Performed by: ORTHOPAEDIC SURGERY

## 2022-09-30 PROCEDURE — 2580000003 HC RX 258: Performed by: STUDENT IN AN ORGANIZED HEALTH CARE EDUCATION/TRAINING PROGRAM

## 2022-09-30 PROCEDURE — 1200000000 HC SEMI PRIVATE

## 2022-09-30 PROCEDURE — 82550 ASSAY OF CK (CPK): CPT

## 2022-09-30 PROCEDURE — 80048 BASIC METABOLIC PNL TOTAL CA: CPT

## 2022-09-30 PROCEDURE — 99232 SBSQ HOSP IP/OBS MODERATE 35: CPT | Performed by: ORTHOPAEDIC SURGERY

## 2022-09-30 PROCEDURE — 6360000002 HC RX W HCPCS

## 2022-09-30 PROCEDURE — C1713 ANCHOR/SCREW BN/BN,TIS/BN: HCPCS | Performed by: ORTHOPAEDIC SURGERY

## 2022-09-30 PROCEDURE — 2780000010 HC IMPLANT OTHER: Performed by: ORTHOPAEDIC SURGERY

## 2022-09-30 PROCEDURE — 3600000004 HC SURGERY LEVEL 4 BASE: Performed by: ORTHOPAEDIC SURGERY

## 2022-09-30 PROCEDURE — 3600000014 HC SURGERY LEVEL 4 ADDTL 15MIN: Performed by: ORTHOPAEDIC SURGERY

## 2022-09-30 DEVICE — CORTICAL SCREW 3.5, T15, SELF-TAPP. L 40: Type: IMPLANTABLE DEVICE | Site: ARM | Status: FUNCTIONAL

## 2022-09-30 DEVICE — LOQTEQ® CORTICAL SCREW 3.5, T15, SELF-TAPPING, L 24
Type: IMPLANTABLE DEVICE | Site: ARM | Status: FUNCTIONAL
Brand: LOQTEQ®

## 2022-09-30 DEVICE — LOQTEQ® CORTICAL SCREW 3.5, T15, SELF-TAPPING, L 20
Type: IMPLANTABLE DEVICE | Site: ARM | Status: FUNCTIONAL
Brand: LOQTEQ®

## 2022-09-30 DEVICE — LOQTEQ® CORTICAL SCREW 2.7, SMALL HEAD T8, SELF-TAPP. L 14
Type: IMPLANTABLE DEVICE | Site: ARM | Status: FUNCTIONAL
Brand: LOQTEQ®

## 2022-09-30 DEVICE — CORTICAL SCREW 3.5, T15, SELF-TAPP. L 22: Type: IMPLANTABLE DEVICE | Site: ARM | Status: FUNCTIONAL

## 2022-09-30 DEVICE — CORTICAL SCREW 3.5, T15, SELF-TAPP. L 14: Type: IMPLANTABLE DEVICE | Site: ARM | Status: FUNCTIONAL

## 2022-09-30 DEVICE — LOQTEQ® CORTICAL SCREW 2.7, SMALL HEAD T8, SELF-TAPP. L 18
Type: IMPLANTABLE DEVICE | Site: ARM | Status: FUNCTIONAL
Brand: LOQTEQ®

## 2022-09-30 DEVICE — CORTICAL SCREW 3.5, T15, SELF-TAPP. L 26: Type: IMPLANTABLE DEVICE | Site: ARM | Status: FUNCTIONAL

## 2022-09-30 DEVICE — CORTICAL SCREW 3.5, T15, SELF-TAPP. L 24: Type: IMPLANTABLE DEVICE | Site: ARM | Status: FUNCTIONAL

## 2022-09-30 DEVICE — LOQTEQ® CORTICAL SCREW 2.7, SMALL HEAD T8, SELF-TAPP. L 16
Type: IMPLANTABLE DEVICE | Site: ARM | Status: FUNCTIONAL
Brand: LOQTEQ®

## 2022-09-30 RX ORDER — SODIUM CHLORIDE 0.9 % (FLUSH) 0.9 %
5-40 SYRINGE (ML) INJECTION EVERY 12 HOURS SCHEDULED
Status: DISCONTINUED | OUTPATIENT
Start: 2022-09-30 | End: 2022-10-01

## 2022-09-30 RX ORDER — ROCURONIUM BROMIDE 10 MG/ML
INJECTION, SOLUTION INTRAVENOUS PRN
Status: DISCONTINUED | OUTPATIENT
Start: 2022-09-30 | End: 2022-09-30 | Stop reason: SDUPTHER

## 2022-09-30 RX ORDER — SODIUM CHLORIDE 0.9 % (FLUSH) 0.9 %
5-40 SYRINGE (ML) INJECTION PRN
Status: DISCONTINUED | OUTPATIENT
Start: 2022-09-30 | End: 2022-10-01

## 2022-09-30 RX ORDER — ONDANSETRON 2 MG/ML
4 INJECTION INTRAMUSCULAR; INTRAVENOUS EVERY 6 HOURS PRN
Status: DISCONTINUED | OUTPATIENT
Start: 2022-09-30 | End: 2022-10-04 | Stop reason: HOSPADM

## 2022-09-30 RX ORDER — SODIUM CHLORIDE, SODIUM LACTATE, POTASSIUM CHLORIDE, CALCIUM CHLORIDE 600; 310; 30; 20 MG/100ML; MG/100ML; MG/100ML; MG/100ML
INJECTION, SOLUTION INTRAVENOUS CONTINUOUS
Status: DISCONTINUED | OUTPATIENT
Start: 2022-09-30 | End: 2022-09-30 | Stop reason: HOSPADM

## 2022-09-30 RX ORDER — TRANEXAMIC ACID 100 MG/ML
INJECTION, SOLUTION INTRAVENOUS
Status: COMPLETED | OUTPATIENT
Start: 2022-09-30 | End: 2022-09-30

## 2022-09-30 RX ORDER — SODIUM CHLORIDE 9 MG/ML
INJECTION, SOLUTION INTRAVENOUS PRN
Status: DISCONTINUED | OUTPATIENT
Start: 2022-09-30 | End: 2022-09-30 | Stop reason: HOSPADM

## 2022-09-30 RX ORDER — SUCCINYLCHOLINE/SOD CL,ISO/PF 100 MG/5ML
SYRINGE (ML) INTRAVENOUS PRN
Status: DISCONTINUED | OUTPATIENT
Start: 2022-09-30 | End: 2022-09-30 | Stop reason: SDUPTHER

## 2022-09-30 RX ORDER — LIDOCAINE HYDROCHLORIDE 20 MG/ML
INJECTION, SOLUTION INTRAVENOUS PRN
Status: DISCONTINUED | OUTPATIENT
Start: 2022-09-30 | End: 2022-09-30 | Stop reason: SDUPTHER

## 2022-09-30 RX ORDER — SODIUM CHLORIDE 9 MG/ML
INJECTION, SOLUTION INTRAVENOUS PRN
Status: DISCONTINUED | OUTPATIENT
Start: 2022-09-30 | End: 2022-10-01

## 2022-09-30 RX ORDER — SODIUM CHLORIDE 0.9 % (FLUSH) 0.9 %
5-40 SYRINGE (ML) INJECTION EVERY 12 HOURS SCHEDULED
Status: DISCONTINUED | OUTPATIENT
Start: 2022-09-30 | End: 2022-09-30 | Stop reason: HOSPADM

## 2022-09-30 RX ORDER — PROPOFOL 10 MG/ML
INJECTION, EMULSION INTRAVENOUS PRN
Status: DISCONTINUED | OUTPATIENT
Start: 2022-09-30 | End: 2022-09-30 | Stop reason: SDUPTHER

## 2022-09-30 RX ORDER — ACETAMINOPHEN 500 MG
1000 TABLET ORAL ONCE
Status: COMPLETED | OUTPATIENT
Start: 2022-09-30 | End: 2022-09-30

## 2022-09-30 RX ORDER — FENTANYL CITRATE 50 UG/ML
INJECTION, SOLUTION INTRAMUSCULAR; INTRAVENOUS PRN
Status: DISCONTINUED | OUTPATIENT
Start: 2022-09-30 | End: 2022-09-30 | Stop reason: SDUPTHER

## 2022-09-30 RX ORDER — GLYCOPYRROLATE 0.2 MG/ML
INJECTION INTRAMUSCULAR; INTRAVENOUS PRN
Status: DISCONTINUED | OUTPATIENT
Start: 2022-09-30 | End: 2022-09-30 | Stop reason: SDUPTHER

## 2022-09-30 RX ORDER — OXYCODONE HYDROCHLORIDE 10 MG/1
10 TABLET ORAL EVERY 4 HOURS PRN
Status: DISCONTINUED | OUTPATIENT
Start: 2022-09-30 | End: 2022-10-04 | Stop reason: HOSPADM

## 2022-09-30 RX ORDER — ONDANSETRON 4 MG/1
4 TABLET, ORALLY DISINTEGRATING ORAL EVERY 8 HOURS PRN
Status: DISCONTINUED | OUTPATIENT
Start: 2022-09-30 | End: 2022-10-04 | Stop reason: HOSPADM

## 2022-09-30 RX ORDER — TRANEXAMIC ACID 10 MG/ML
1000 INJECTION, SOLUTION INTRAVENOUS
Status: DISCONTINUED | OUTPATIENT
Start: 2022-09-30 | End: 2022-09-30 | Stop reason: HOSPADM

## 2022-09-30 RX ORDER — SODIUM CHLORIDE 0.9 % (FLUSH) 0.9 %
5-40 SYRINGE (ML) INJECTION PRN
Status: DISCONTINUED | OUTPATIENT
Start: 2022-09-30 | End: 2022-09-30 | Stop reason: HOSPADM

## 2022-09-30 RX ORDER — ONDANSETRON 2 MG/ML
INJECTION INTRAMUSCULAR; INTRAVENOUS PRN
Status: DISCONTINUED | OUTPATIENT
Start: 2022-09-30 | End: 2022-09-30 | Stop reason: SDUPTHER

## 2022-09-30 RX ORDER — SODIUM CHLORIDE, SODIUM LACTATE, POTASSIUM CHLORIDE, CALCIUM CHLORIDE 600; 310; 30; 20 MG/100ML; MG/100ML; MG/100ML; MG/100ML
INJECTION, SOLUTION INTRAVENOUS CONTINUOUS
Status: DISCONTINUED | OUTPATIENT
Start: 2022-09-30 | End: 2022-10-01

## 2022-09-30 RX ORDER — OXYCODONE HYDROCHLORIDE 5 MG/1
5 TABLET ORAL EVERY 4 HOURS PRN
Status: DISCONTINUED | OUTPATIENT
Start: 2022-09-30 | End: 2022-10-04 | Stop reason: HOSPADM

## 2022-09-30 RX ADMIN — FENTANYL CITRATE 50 MCG: 50 INJECTION, SOLUTION INTRAMUSCULAR; INTRAVENOUS at 12:59

## 2022-09-30 RX ADMIN — GLYCOPYRROLATE 0.2 MG: 1 INJECTION INTRAMUSCULAR; INTRAVENOUS at 13:31

## 2022-09-30 RX ADMIN — SODIUM CHLORIDE, POTASSIUM CHLORIDE, SODIUM LACTATE AND CALCIUM CHLORIDE: 600; 310; 30; 20 INJECTION, SOLUTION INTRAVENOUS at 12:09

## 2022-09-30 RX ADMIN — INSULIN LISPRO 5 UNITS: 100 INJECTION, SOLUTION INTRAVENOUS; SUBCUTANEOUS at 17:13

## 2022-09-30 RX ADMIN — LEVOTHYROXINE SODIUM 75 MCG: 0.07 TABLET ORAL at 05:18

## 2022-09-30 RX ADMIN — PHENYLEPHRINE HYDROCHLORIDE 50 MCG: 10 INJECTION INTRAVENOUS at 13:46

## 2022-09-30 RX ADMIN — ONDANSETRON 4 MG: 2 INJECTION INTRAMUSCULAR; INTRAVENOUS at 12:36

## 2022-09-30 RX ADMIN — PHENYLEPHRINE HYDROCHLORIDE 25 MCG: 10 INJECTION INTRAVENOUS at 14:12

## 2022-09-30 RX ADMIN — PANTOPRAZOLE SODIUM 40 MG: 40 TABLET, DELAYED RELEASE ORAL at 05:18

## 2022-09-30 RX ADMIN — ONDANSETRON 4 MG: 2 INJECTION INTRAMUSCULAR; INTRAVENOUS at 22:07

## 2022-09-30 RX ADMIN — HYDROMORPHONE HYDROCHLORIDE 0.25 MG: 1 INJECTION, SOLUTION INTRAMUSCULAR; INTRAVENOUS; SUBCUTANEOUS at 14:47

## 2022-09-30 RX ADMIN — PHENYLEPHRINE HYDROCHLORIDE 50 MCG: 10 INJECTION INTRAVENOUS at 13:12

## 2022-09-30 RX ADMIN — PANTOPRAZOLE SODIUM 40 MG: 40 TABLET, DELAYED RELEASE ORAL at 17:12

## 2022-09-30 RX ADMIN — INSULIN GLARGINE 15 UNITS: 100 INJECTION, SOLUTION SUBCUTANEOUS at 22:16

## 2022-09-30 RX ADMIN — ACETAMINOPHEN 1000 MG: 500 TABLET ORAL at 12:26

## 2022-09-30 RX ADMIN — CEFAZOLIN 2000 MG: 2 INJECTION, POWDER, FOR SOLUTION INTRAMUSCULAR; INTRAVENOUS at 22:14

## 2022-09-30 RX ADMIN — FENTANYL CITRATE 50 MCG: 50 INJECTION, SOLUTION INTRAMUSCULAR; INTRAVENOUS at 13:04

## 2022-09-30 RX ADMIN — ROCURONIUM BROMIDE 50 MG: 10 INJECTION INTRAVENOUS at 13:07

## 2022-09-30 RX ADMIN — PHENYLEPHRINE HYDROCHLORIDE 50 MCG: 10 INJECTION INTRAVENOUS at 13:24

## 2022-09-30 RX ADMIN — SODIUM CHLORIDE, POTASSIUM CHLORIDE, SODIUM LACTATE AND CALCIUM CHLORIDE: 600; 310; 30; 20 INJECTION, SOLUTION INTRAVENOUS at 14:53

## 2022-09-30 RX ADMIN — ACETAMINOPHEN 650 MG: 325 TABLET ORAL at 17:12

## 2022-09-30 RX ADMIN — PHENYLEPHRINE HYDROCHLORIDE 50 MCG: 10 INJECTION INTRAVENOUS at 13:09

## 2022-09-30 RX ADMIN — HYDROMORPHONE HYDROCHLORIDE 0.5 MG: 1 INJECTION, SOLUTION INTRAMUSCULAR; INTRAVENOUS; SUBCUTANEOUS at 14:05

## 2022-09-30 RX ADMIN — INSULIN LISPRO 2 UNITS: 100 INJECTION, SOLUTION INTRAVENOUS; SUBCUTANEOUS at 08:06

## 2022-09-30 RX ADMIN — Medication 100 MG: at 12:59

## 2022-09-30 RX ADMIN — SUGAMMADEX 200 MG: 100 INJECTION, SOLUTION INTRAVENOUS at 14:41

## 2022-09-30 RX ADMIN — PROPOFOL 70 MG: 10 INJECTION, EMULSION INTRAVENOUS at 12:59

## 2022-09-30 RX ADMIN — SODIUM CHLORIDE, POTASSIUM CHLORIDE, SODIUM LACTATE AND CALCIUM CHLORIDE: 600; 310; 30; 20 INJECTION, SOLUTION INTRAVENOUS at 00:02

## 2022-09-30 RX ADMIN — HYDROMORPHONE HYDROCHLORIDE 0.25 MG: 1 INJECTION, SOLUTION INTRAMUSCULAR; INTRAVENOUS; SUBCUTANEOUS at 15:27

## 2022-09-30 RX ADMIN — SODIUM CHLORIDE, POTASSIUM CHLORIDE, SODIUM LACTATE AND CALCIUM CHLORIDE: 600; 310; 30; 20 INJECTION, SOLUTION INTRAVENOUS at 16:14

## 2022-09-30 RX ADMIN — LIDOCAINE HYDROCHLORIDE 50 MG: 20 INJECTION, SOLUTION INTRAVENOUS at 12:59

## 2022-09-30 RX ADMIN — INSULIN LISPRO 4 UNITS: 100 INJECTION, SOLUTION INTRAVENOUS; SUBCUTANEOUS at 22:17

## 2022-09-30 RX ADMIN — ONDANSETRON 4 MG: 2 INJECTION INTRAMUSCULAR; INTRAVENOUS at 14:36

## 2022-09-30 RX ADMIN — ONDANSETRON 4 MG: 2 INJECTION INTRAMUSCULAR; INTRAVENOUS at 05:25

## 2022-09-30 RX ADMIN — CEFAZOLIN 2000 MG: 2 INJECTION, POWDER, FOR SOLUTION INTRAMUSCULAR; INTRAVENOUS at 12:38

## 2022-09-30 RX ADMIN — SODIUM CHLORIDE, POTASSIUM CHLORIDE, SODIUM LACTATE AND CALCIUM CHLORIDE: 600; 310; 30; 20 INJECTION, SOLUTION INTRAVENOUS at 22:15

## 2022-09-30 RX ADMIN — INSULIN LISPRO 3 UNITS: 100 INJECTION, SOLUTION INTRAVENOUS; SUBCUTANEOUS at 17:13

## 2022-09-30 RX ADMIN — ACETAMINOPHEN 650 MG: 325 TABLET ORAL at 05:17

## 2022-09-30 RX ADMIN — ATORVASTATIN CALCIUM 80 MG: 40 TABLET, FILM COATED ORAL at 00:00

## 2022-09-30 ASSESSMENT — PAIN DESCRIPTION - LOCATION
LOCATION: SHOULDER
LOCATION: ARM

## 2022-09-30 ASSESSMENT — ENCOUNTER SYMPTOMS
EYE PAIN: 0
SINUS PRESSURE: 0
SHORTNESS OF BREATH: 0
SINUS PAIN: 0
EYE DISCHARGE: 0
BLOOD IN STOOL: 0
VOICE CHANGE: 0
BACK PAIN: 0
ABDOMINAL PAIN: 0
VOMITING: 0
COUGH: 0
COLOR CHANGE: 0
DIARRHEA: 0
EYE REDNESS: 0
NAUSEA: 0
CHEST TIGHTNESS: 0

## 2022-09-30 ASSESSMENT — PAIN DESCRIPTION - ORIENTATION
ORIENTATION: LEFT
ORIENTATION: LEFT

## 2022-09-30 ASSESSMENT — PAIN SCALES - GENERAL
PAINLEVEL_OUTOF10: 0
PAINLEVEL_OUTOF10: 0
PAINLEVEL_OUTOF10: 4
PAINLEVEL_OUTOF10: 3

## 2022-09-30 ASSESSMENT — PAIN DESCRIPTION - DESCRIPTORS
DESCRIPTORS: ACHING
DESCRIPTORS: ACHING;THROBBING
DESCRIPTORS: ACHING;THROBBING

## 2022-09-30 ASSESSMENT — PAIN SCALES - WONG BAKER: WONGBAKER_NUMERICALRESPONSE: 0

## 2022-09-30 ASSESSMENT — PAIN - FUNCTIONAL ASSESSMENT: PAIN_FUNCTIONAL_ASSESSMENT: 0-10

## 2022-09-30 NOTE — CONSULTS
Consult to Orthopedic Surgery  Consult performed by: Emerson Carmichael DO  Consult ordered by: Annita Vences PA-C  Reason for consult: Left distal humerus fracture  Assessment/Recommendations:     Left distal humeral shaft fracture    I discussed with her today her x-ray findings. I explained to her that she does have a fracture in her left distal humerus. I did discuss conservative versus surgical options with her. After explaining both options, she opted to proceed with surgical treatment. I discussed with her today performing open reduction and internal fixation of her left distal humerus fracture. I explained risks, benefits, possible complications of the procedure and answered all questions for the patient. I explained postoperative rehabilitation protocol and expectations with the patient today. The patient understands and consents to the procedure. Continue bedrest.  Remain nonweightbearing/no lifting pushing or pulling on the injured extremity  Ice and elevate as needed. Pain medication as needed. Patient will be kept n.p.o. for planned surgical treatment this morning          Alli Pradhan is a 57-year-old female who was walking at home when she tripped and fell landing directly onto her left arm. She had immediate pain as well as deformity to the left elbow. She was brought to the ED, x-rays were obtained and she was diagnosed with a left distal humerus fracture. She was subsequently admitted for treatment of her injury. I was then consulted for evaluation treatment of her left elbow injury. She is currently complaining of deep, aching and throbbing pain globally in the left arm. She denies any prior injury to the left elbow, denies numbness or tingling left arm and denies any fever or chills. Review of Systems   Constitutional:  Negative for activity change, chills and fever. HENT:  Negative for congestion and drooling. Eyes:  Negative for redness.    Respiratory: Negative for shortness of breath. Cardiovascular:  Negative for chest pain. Gastrointestinal:  Negative for abdominal pain. Endocrine: Negative for cold intolerance and heat intolerance. Musculoskeletal:  Positive for arthralgias, joint swelling and myalgias. Negative for gait problem. Skin:  Negative for color change, pallor, rash and wound. Neurological:  Negative for weakness and numbness. Psychiatric/Behavioral:  Negative for confusion. Past Medical History:   Diagnosis Date    Arthritis     Asthma     Cerebral artery occlusion with cerebral infarction [facility])     per old chart 8/2015- TIA    COPD (chronic obstructive pulmonary disease) (HCC)     Diabetes mellitus (HCC)     Diabetic neuropathy (HCC)     per old chart    Fracture     per old chart pt in ER 12/9/2018- after 2 days of arm pain after fall- dx with left humerus fx- for surg 12/31/2018    History of blood transfusion     Hyperlipidemia     Hypertension     Muscle weakness (generalized)     Osteoarthritis     per old chart     No current facility-administered medications on file prior to encounter. Current Outpatient Medications on File Prior to Encounter   Medication Sig Dispense Refill    naproxen (NAPROSYN) 500 MG tablet Take 1 tablet by mouth 2 times daily 60 tablet 0    ondansetron (ZOFRAN ODT) 4 MG disintegrating tablet Take 1 tablet by mouth every 8 hours as needed for Nausea (Patient not taking: Reported on 9/29/2022) 15 tablet 0    aspirin 81 MG chewable tablet Take 1 tablet by mouth in the morning. 30 tablet 0    insulin lispro (HUMALOG) 100 UNIT/ML SOLN injection vial Inject 10 Units into the skin in the morning and 10 Units at noon and 10 Units in the evening. Inject before meals.  (Patient taking differently: Inject 10 Units into the skin 3 times daily (before meals) And sliding scale dose) 15 mL 0    metoclopramide (REGLAN) 5 MG tablet Take 1 tablet by mouth in the morning and 1 tablet at noon and 1 tablet in the evening. Take with meals. 30 tablet 0    pantoprazole (PROTONIX) 40 MG tablet Take 1 tablet by mouth in the morning and 1 tablet in the evening. Take before meals. 60 tablet 0    vitamin B-12 (CYANOCOBALAMIN) 100 MCG tablet Take 1 tablet by mouth in the morning. 60 tablet 0    amLODIPine (NORVASC) 10 MG tablet Take 1 tablet by mouth in the morning. 30 tablet 0    Vitamin D (CHOLECALCIFEROL) 50 MCG (2000 UT) TABS tablet Take 1 tablet by mouth in the morning. 30 tablet 0    gabapentin (NEURONTIN) 400 MG capsule Take 1 capsule by mouth in the morning and 1 capsule before bedtime. Do all this for 30 days. 60 capsule 0    insulin glargine (LANTUS) 100 UNIT/ML injection vial Inject 20 Units into the skin nightly (Patient taking differently: Inject 30 Units into the skin nightly) 10 mL 3    levothyroxine (SYNTHROID) 75 MCG tablet Take 1 tablet by mouth every morning (before breakfast) 30 tablet 0    FLUoxetine (PROZAC) 40 MG capsule Take 80 mg by mouth daily      [DISCONTINUED] metFORMIN (GLUCOPHAGE) 500 MG tablet Take 1 tablet by mouth 2 times daily (with meals) 60 tablet 3    [DISCONTINUED] hydrALAZINE (APRESOLINE) 25 MG tablet Take 1 tablet by mouth every 8 hours 90 tablet 3    [DISCONTINUED] lisinopril (PRINIVIL;ZESTRIL) 40 MG tablet Take 1 tablet by mouth daily 30 tablet 3    donepezil (ARICEPT) 5 MG tablet Take 10 mg by mouth nightly       busPIRone (BUSPAR) 10 MG tablet Take 10 mg by mouth 3 times daily       [DISCONTINUED] metoprolol succinate (TOPROL XL) 50 MG extended release tablet Take 1 tablet by mouth daily (Patient taking differently: Take 25 mg by mouth daily ) 30 tablet 0    topiramate (TOPAMAX) 50 MG tablet Take 50 mg by mouth nightly       clopidogrel (PLAVIX) 75 MG tablet Take 75 mg by mouth daily      atorvastatin (LIPITOR) 40 MG tablet Take 80 mg by mouth nightly          No Known Allergies  Past Surgical History:   Procedure Laterality Date    BREAST BIOPSY Right     CHOLECYSTECTOMY  2002?     EYE SURGERY      per old chart had right eye cataract ext done 2/2018 and left eye 4/2018    FOOT SURGERY Left 2004? HUMERUS FRACTURE SURGERY Left 12/31/2018    HUMERUS OPEN REDUCTION INTERNAL FIXATION LEFT PROXIMAL performed by Constantine Gabriel, DO at [address] Right 2009? LUNG SURGERY  2009?    simone lung lobectomy      Past Surgical History:   Procedure Laterality Date    BREAST BIOPSY Right     CHOLECYSTECTOMY  2002? EYE SURGERY      per old chart had right eye cataract ext done 2/2018 and left eye 4/2018    FOOT SURGERY Left 2004? HUMERUS FRACTURE SURGERY Left 12/31/2018    HUMERUS OPEN REDUCTION INTERNAL FIXATION LEFT PROXIMAL performed by Constantine Gabriel, DO at [address] Right 2009? LUNG SURGERY  2009?    simone lung lobectomy      Social History     Tobacco Use    Smoking status: Never    Smokeless tobacco: Never    Tobacco comments:     12/27/*2018- with phone assessment with caregiver- unsure of social, surgical or family hx for phone assessment   Vaping Use    Vaping Use: Never used   Substance Use Topics    Alcohol use: Yes    Drug use: No     Family History   Problem Relation Age of Onset    Breast Cancer Maternal Aunt 48    Breast Cancer Maternal Aunt 50       Right Hand Exam   Right hand exam is normal.    Tenderness   The patient is experiencing no tenderness. Range of Motion   The patient has normal right wrist ROM. Muscle Strength   The patient has normal right wrist strength. Other   Erythema: absent  Sensation: normal  Pulse: present      Left Hand Exam     Tenderness   The patient is experiencing no tenderness. Range of Motion   The patient has normal left wrist ROM. Muscle Strength   The patient has normal left wrist strength. Other   Erythema: absent  Sensation: normal  Pulse: present      Right Elbow Exam   Right elbow exam is normal.    Tenderness   The patient is experiencing no tenderness.      Range of Motion   The patient has normal right elbow ROM. Muscle Strength   The patient has normal right elbow strength. Other   Erythema: absent  Sensation: normal  Pulse: present      Left Elbow Exam     Tenderness   The patient is experiencing tenderness in the lateral epicondyle, medial epicondyle and olecranon fossa. Other   Erythema: absent  Sensation: normal  Pulse: present    Comments:  Left elbow-skin intact, moderate swelling, mild ecchymosis. Full range of motion not assessed due to recent injury. Intact sensation motor function to all nerve distributions of the extremity. +2 radial pulse  Compartment soft. BP (!) 137/59   Pulse 75   Temp 98.3 °F (36.8 °C) (Oral)   Resp 18   Ht 5' 4\" (1.626 m)   Wt 240 lb (108.9 kg)   SpO2 97%   BMI 41.20 kg/m²     XR HUMERUS LEFT (MIN 2 VIEWS)    Result Date: 9/29/2022  EXAMINATION: TWO XRAY VIEWS OF THE LEFT HUMERUS 9/29/2022 6:04 pm COMPARISON: 02/07/2019 HISTORY: ORDERING SYSTEM PROVIDED HISTORY: fall TECHNOLOGIST PROVIDED HISTORY: Reason for exam:->fall Reason for exam:->injury Reason for Exam: LEFT HUMERUS PAIN Additional signs and symptoms: FALL Relevant Medical/Surgical History: NA FINDINGS: Plate screw fixation of the proximal humerus, without hardware complication. An oblique displaced fracture of the distal humeral shaft is noted. No evidence for shoulder or elbow malalignment, within the limitations of positioning and external artifact. 1.  Oblique, displaced fracture of the distal humeral shaft. 2.  Sequela of proximal humerus fracture repair without evidence for hardware complication.      XR ELBOW LEFT (MIN 3 VIEWS)    Result Date: 9/29/2022  EXAMINATION: THREE XRAY VIEWS OF THE LEFT ELBOW 9/29/2022 7:58 pm COMPARISON: Same day left humerus radiographs HISTORY: ORDERING SYSTEM PROVIDED HISTORY: pain, trauma TECHNOLOGIST PROVIDED HISTORY: Reason for exam:->pain, trauma Reason for Exam: left elbow pain Additional signs and symptoms: fall Relevant Medical/Surgical History: na FINDINGS: Nearly nondiagnostic images due to overlying objects and patient positioning. Redemonstrated oblique, displaced fracture of the distal humeral shaft. There is a questionable additional vertical fracture involving the distal humeral fracture component. The elbow appears to be in normal alignment. No definite fracture involving visualized portions of the radius or ulna. Soft tissue swelling is seen about the distal humerus. Severely limited, nearly nondiagnostic images of the right elbow due to overlying objects and patient positioning. Repeat radiographs could be obtained after the removal of these objects. Redemonstrated displaced, oblique fracture of the distal humeral diaphysis. There is a questionable additional vertical fracture involving the distal humeral fracture component.

## 2022-09-30 NOTE — ANESTHESIA POSTPROCEDURE EVALUATION
Department of Anesthesiology  Postprocedure Note    Patient: Megan Pena  MRN: [id number]  YOB: 1960  Date of evaluation: 9/30/2022      Procedure Summary     Date: 09/30/22 Room / Location: [address]    Anesthesia Start: 8084 Anesthesia Stop: 7276    Procedure: HUMERUS OPEN REDUCTION INTERNAL FIXATION (Left: Arm Upper) Diagnosis:       Fracture      (Fracture [T14. 8XXA])    Surgeons: Keegan Garcia DO Responsible Provider: Rosamaria Keen MD    Anesthesia Type: General ASA Status: 3 - Emergent          Anesthesia Type: General    Sathish Phase I:      Sathish Phase II:        Anesthesia Post Evaluation    Patient location during evaluation: PACU  Patient participation: complete - patient participated  Level of consciousness: awake and alert  Pain score: 0  Airway patency: patent  Nausea & Vomiting: no nausea and no vomiting  Complications: no  Cardiovascular status: hemodynamically stable  Respiratory status: face mask and spontaneous ventilation  Hydration status: stable

## 2022-09-30 NOTE — OP NOTE
DATE OF PROCEDURE:   9/30/2022     PREOPERATIVE DIAGNOSIS:   Left supracondylar distal humerus fracture without intra-articular extension      POSTOPERATIVE DIAGNOSIS:   Left supracondylar distal humerus fracture without intra-articular extension. PROCEDURES:  1. Open reduction internal fixation of left supracondylar humerus fracture without intra-articular extension using AAP 5 hole left posterior lateral distal humeral locking plate. 2.  Ulnar nerve decompression  3. Fluoroscopic guidance interpretation. ATTENDING SURGEON:  Chris Goodwin DO     FIRST ASSISTANT: MEGAN Bermudez    ANESTHESIA:  General.     ESTIMATED BLOOD LOSS:   1000 mL. FLUIDS: 50 mL of crystalloids. TOTAL TOURNIQUET TIME: 88 minutes. INDICATION FOR PROCEDURE:  The patient is an 59-year-old female who sustained a fall landing directly onto her left arm. She was initially seen in the ED, x-rays were obtained and she was diagnosed with a left supracondylar humerus fracture. She was subsequently admitted to the hospital for treatment of her injury. Evaluation of her x-rays revealed a displaced supracondylar distal humeral shaft fracture. Given the fracture pattern I recommend surgical treatment. I explained  the risks, benefits, and possible complications of procedure to the  patient and after answering all of her questions she consented to  undergo the above procedure. DESCRIPTION OF PROCEDURE:  The patient was seen and evaluated in the  preoperative holding area where the left upper extremity was signed in  her presence. At this point, care of the patient was turned over to  anesthesia team who transported her back to the operative suite. General anesthesia was applied and once adequate anesthesia was obtained she was placed in the prone position on the operating table. The left upper extremity was prepped and  draped in the usual sterile fashion. Preoperative antibiotics were  administered.   At this point, a time-out was performed and all in  attendance were in agreement. I marked out the planned surgical incision overlying the posterior aspect  of the left arm to perform a standard posterior approach to the distal humerus and humeral shaft. I then carried sharp dissection down to the level of the triceps fascia. I then elevated the fascia off the lateral column exposing the posterior lateral aspect of the distal humerus and the lateral epicondyle. I then elevated the long head of the triceps off of the medial and lateral heads of the triceps. I then placed two Gelpi retractors for  further visualization. I then used a Hwang elevator to elevate the deep head of the triceps off of the humeral shaft distally exposing the fracture site. I then continued sharp dissection along the medial aspect of the humeral shaft and continued sharp dissection down to the level of the medial epicondyle. At this point I was able to visualize the entire fracture site and found that this was a short oblique fracture through the supracondylar aspect of the distal humeral shaft however there was also a vertical fracture extending at this point  the medial and lateral columns into separate fragments however this remained extra-articular. I then turned my attention to dissection of the ulnar nerve. I began proximally and exposed the ulnar nerve as it approached the medial intermuscular septum. I then continued dissection along the course of the ulnar nerve distally and had my assistant extend the elbow. I then dissected the ulnar nerve as it passed through the 2 heads of the FCU muscle belly and freed up the nerve all the way to the first motor branch. At this point I then turned my attention to fixation of the lateral column. I continued sharp dissection down off of the posterior aspect of the humeral shaft as well as off of the lateral column of the distal humerus posteriorly and laterally.   Once I complete visualization, I then placed a large bone reduction clamp to reduce the large lateral column fracture fragment in line with the proximal humeral shaft fragment. I then drilled and placed a lag screw from lateral to medial direction for temporary and provisional fixation. Once this was in place, I then selected an [facility] 5 hole posterior lateral locking plate and centered this over the lateral column and the humeral shaft. I then confirmed position of the implant under fluoroscopy in the AP plane. I then drilled and placed 4 locking screws into the capitellum as well as 2 additional locking screws into the metaphysis of the distal humeral fragment. I then drilled and placed 2 cortical screws and 1 locking screw into the humeral shaft to complete fixation. With this fixation in place, I confirmed near anatomic alignment as well as position of the implants under fluoroscopy in the AP and lateral planes. Once this was complete I then turned attention to fixation of the medial column split. I continued elevation of the triceps muscle off of the medial column. I then placed a Hohmann retractor through the medial aspect of the triceps over the lateral aspect of the humeral shaft to retract the triceps laterally as well as the ulnar nerve which was protected. I then used a large bone reduction clamp to reduce the medial column fragment in line with the lateral construct. I then drilled and placed a total of 3 lag screws from medial to lateral direction in standard fashion compressing the fracture line. The bone reduction clamp was removed and the fracture reduction was maintained. With all fixation plates I confirm near anatomic alignment as well as position of all hardware under fluoroscopy in AP and lateral planes. I ran the elbow through full range of motion and found to be no instability in any of the fracture lines.     I then closed the anconeus dissection to the triceps using 0 Vicryl suture in figure-of-eight fashion. I then closed subcutaneous tissue using 2-0 Vicryl suture,  followed by skin closure with staples. Adequate  hemostasis was maintained at all times. I then applied a sterile soft  dressing to the left arm, followed by a simple sling. The patient was  then awakened from anesthesia and transported to PACU in stable  condition. She appeared to have tolerated the procedure well. PROGNOSIS:  At this point, she will be readmitted back to the hospital for postoperative pain control, rehabilitation medical monitoring. She will receive antibiotic prophylaxis as well as DVT prophylaxis during hospitalization. She is to  maintain her sling as needed for comfort, but is to have no lifting,  pushing, or pulling with the left arm, but she can begin range of  motion with the left elbow and left shoulder. I will see her back in  the office in 3 weeks for staple removal and continue to monitor her progress in the outpatient setting for resolution of symptoms and radiographic evidence of healing. Brionna Bolaños was present for the entirety of the procedure and vital for the performance of the procedure. Brionna Bolaños assisted with positioning, prepping, draping of the patient before the procedure and instrument manipulation, extremity repositioning and camera operation during the procedure as well as wound closure, dressing application and brace application after the procedure. Please note that no intern, resident, or other hospital staff was available to assist during the surgery.     James Guajardo,

## 2022-09-30 NOTE — H&P
History and Physical      Name:  Makenna Rangel /Age/Sex: 1960  (59 y.o. female)   MRN & CSN:  [id number] & [id number] Encounter Date/Time: 2022 10:31 PM EDT   Location:  ED25/ED-25 PCP: Jeannette Joseph, [address] [facility] Day: 1    Assessment and Plan:     Patient is a 57-year-old female who presented with left arm pain after fall. # Left distal humeral fracture  - Presented after falling while ambulating with walker. - XR showed oblique, displace fracture of distal humeral shaft. - In the ED, patient placed in long-arm posterior splint. NVI distally. - Ortho consulted, follow-up. NPO pending evaluation.   - Supportive care with IVF and pain control. Started on scheduled Tylenol and bowel regimen.  - Consider outpatient DXA. # MODESTO  - Cr 2.3 on admission, baseline ~ 1.0.  - UA and CK pending.  - Held nephrotoxic medications. Avoid Morphine.  - Will challenge with IVF. Strict I/O's. # Hx of TIA  - Held Plavix for possible orthopedic intervention.  - Continue ASA and statin. # T2DM  - Last A1c 8.1% in 2022.  - Held Metformin.  - Decreased home Insulin Lantus to 15 u qhs and Lispro 5 u TIDWM with LCSI. # Essential hypertension  - Continue Norvasc. Checklist:  Advanced directive: full  Antibiotics: none  Catheter: none  DVT ppx: held for possible orthopedic intervention  Sugar: BG goal of 140-180 while inpatient    Disposition: patient requires continued admission due to left humeral fracture. MDM: moderate. History of Present Illness:     Chief Complaint: fall and left arm pain    Patient is a 57-year-old female with a PMHx of TIA, HTN, HLD, T2DM and class III obesity who presented to the ED with left arm pain after falling while walking with walker. She fall backwards extending her arm and developing sudden pain. Denied any presyncope, syncope, CP or LOC. Denied any other complaints.     In the ED, XR showed oblique, displace fracture of distal humeral shaft. CTH negative for acute changes. Patient was placed in long-arm posterior splint. Ortho made aware of case. ROS:     Ten point ROS reviewed negative, unless as noted above. Objective:     No intake or output data in the 24 hours ending 09/29/22 2231     Vitals:   Vitals:    09/29/22 1755 09/29/22 2000   BP: 137/60 (!) 163/73   Pulse: 64 61   Resp: 20 12   Temp: 97.5 °F (36.4 °C)    TempSrc: Oral    SpO2: 95% 98%   Weight: 240 lb (108.9 kg)      BMI: Body mass index is 41.2 kg/m². General: Awake. WDWN. AAOx3. Obese. HEENT: PERRLA. Vision grossly intact. Hearing grossly intact. Oropharynx clear. Neck: Supple. No JVD. CV: RRR. NL S1/S2. No murmurs. No peripheral edema. Pulm: NL effort on RA. CTAB. CW NTTP. GI: +BS x4. Soft. NT/ND. : No CVA or suprapubic tenderness. No Ramos catheter. Skin: Intact. Normal coloration, warm, dry. MSK: No gross joint deformities. Full ROM. Arm movement, sensation and CR normal distally. Neuro: CNs grossly intact. Normal speech. No focal deficit. Psych: Good judgement and reason. Past History: PMHx:   Past Medical History:   Diagnosis Date    Arthritis     Asthma     Cerebral artery occlusion with cerebral infarction [facility])     per old chart 8/2015- TIA    COPD (chronic obstructive pulmonary disease) (HCC)     Diabetes mellitus ([address]     Diabetic neuropathy ([address]     per old chart    Fracture     per old chart pt in ER 12/9/2018- after 2 days of arm pain after fall- dx with left humerus fx- for surg 12/31/2018    History of blood transfusion     Hyperlipidemia     Hypertension     Muscle weakness (generalized)     Osteoarthritis     per old chart       PSHx:   Past Surgical History:   Procedure Laterality Date    BREAST BIOPSY Right     CHOLECYSTECTOMY  2002? EYE SURGERY      per old chart had right eye cataract ext done 2/2018 and left eye 4/2018    FOOT SURGERY Left 2004?     HUMERUS FRACTURE SURGERY Left 12/31/2018    HUMERUS OPEN REDUCTION INTERNAL FIXATION LEFT PROXIMAL performed by Roz Cleaning DO at [facility] Right 2009? LUNG SURGERY  2009?    simone lung lobectomy        Allergies: No Known Allergies    FHx: family history includes Breast Cancer (age of onset: 48) in her maternal aunt and maternal aunt. SHx:   Social History     Socioeconomic History    Marital status:    Tobacco Use    Smoking status: Never    Smokeless tobacco: Never    Tobacco comments:     12/27/*2018- with phone assessment with caregiver- unsure of social, surgical or family hx for phone assessment   Vaping Use    Vaping Use: Never used   Substance and Sexual Activity    Alcohol use: Yes    Drug use: No    Sexual activity: Not Currently     Social Determinants of Health     Financial Resource Strain: Low Risk     Difficulty of Paying Living Expenses: Not hard at all   Food Insecurity: No Food Insecurity    Worried About [address] [employer] in the Last Year: Never true    [facility] [employer] in the Last Year: Never true   Transportation Needs: No Transportation Needs    Lack of Transportation (Medical): No    Lack of Transportation (Non-Medical): No   Physical Activity: Inactive    Days of Exercise per Week: 0 days    Minutes of Exercise per Session: 0 min   Stress: Stress Concern Present    Feeling of Stress : Very much   Social Connections:  Moderately Isolated    Frequency of Communication with Friends and Family: More than three times a week    Frequency of Social Gatherings with Friends and Family: Once a week    Attends Jew Services: 1 to 4 times per year    Active Member of [address] [place] [address] or Organizations: No    Attends Club or Organization Meetings: Never    Marital Status:    Intimate Partner Violence: Not At Risk    Fear of Current or Ex-Partner: No    Emotionally Abused: No    Physically Abused: No    Sexually Abused: No   Housing Stability: Low Risk     Unable to Pay for Housing in the Last Year: No    [facility]: 2    Unstable Housing in the Last Year: No       Medications Prior to Admission     Prior to Admission medications    Medication Sig Start Date End Date Taking? Authorizing Provider   naproxen (NAPROSYN) 500 MG tablet Take 1 tablet by mouth 2 times daily 9/18/22   Donnell Andino, DO   ondansetron (ZOFRAN ODT) 4 MG disintegrating tablet Take 1 tablet by mouth every 8 hours as needed for Nausea 9/18/22   Donnell Andino, DO   aspirin 81 MG chewable tablet Take 1 tablet by mouth in the morning. 7/30/22   Florinda Geronimo MD   insulin lispro (HUMALOG) 100 UNIT/ML SOLN injection vial Inject 10 Units into the skin in the morning and 10 Units at noon and 10 Units in the evening. Inject before meals. Patient taking differently: Inject 10 Units into the skin 3 times daily (before meals) And sliding scale dose 7/30/22   Florinda Geronimo MD   metoclopramide (REGLAN) 5 MG tablet Take 1 tablet by mouth in the morning and 1 tablet at noon and 1 tablet in the evening. Take with meals. 7/30/22   Florinda Geronimo MD   pantoprazole (PROTONIX) 40 MG tablet Take 1 tablet by mouth in the morning and 1 tablet in the evening. Take before meals. 7/30/22   Florinda Geronimo MD   vitamin B-12 (CYANOCOBALAMIN) 100 MCG tablet Take 1 tablet by mouth in the morning. 7/30/22   Florinda Geronimo MD   amLODIPine (NORVASC) 10 MG tablet Take 1 tablet by mouth in the morning. 7/18/22 9/1/22  Johny Allen MD   Vitamin D (CHOLECALCIFEROL) 50 MCG (2000 UT) TABS tablet Take 1 tablet by mouth in the morning. 7/18/22 9/1/22  Johny Allen MD   gabapentin (NEURONTIN) 400 MG capsule Take 1 capsule by mouth in the morning and 1 capsule before bedtime. Do all this for 30 days.  7/18/22 9/1/22  Johny Allen MD   insulin glargine (LANTUS) 100 UNIT/ML injection vial Inject 20 Units into the skin nightly  Patient taking differently: Inject 30 Units into the skin nightly 7/18/22   Johny Allen MD   levothyroxine (SYNTHROID) 75 MCG tablet Take 1 tablet by mouth every morning (before breakfast) 7/19/22   Alfonso Singh MD   FLUoxetine (PROZAC) 40 MG capsule Take 80 mg by mouth daily    Historical Provider, MD   metFORMIN (GLUCOPHAGE) 500 MG tablet Take 1 tablet by mouth 2 times daily (with meals) 3/22/22 7/30/22  Ashli Dural, DO   hydrALAZINE (APRESOLINE) 25 MG tablet Take 1 tablet by mouth every 8 hours 3/22/22 7/18/22  Ashli Dural, DO   lisinopril (PRINIVIL;ZESTRIL) 40 MG tablet Take 1 tablet by mouth daily 3/16/22 7/30/22  Rosy Flores MD   donepezil (ARICEPT) 5 MG tablet Take 10 mg by mouth nightly  9/13/21   Historical Provider, MD   busPIRone (BUSPAR) 10 MG tablet Take 10 mg by mouth 3 times daily  3/27/21   Historical Provider, MD   metoprolol succinate (TOPROL XL) 50 MG extended release tablet Take 1 tablet by mouth daily  Patient taking differently: Take 25 mg by mouth daily  1/25/19 7/18/22  Harrietta Cogan, MD   topiramate (TOPAMAX) 50 MG tablet Take 50 mg by mouth nightly     Historical Provider, MD   clopidogrel (PLAVIX) 75 MG tablet Take 75 mg by mouth daily    Historical Provider, MD   atorvastatin (LIPITOR) 40 MG tablet Take 80 mg by mouth nightly     Historical Provider, MD       Data:     CBC:   Recent Labs     09/29/22  1803   WBC 10.3   HGB 11.5*      MCV 96.1   RDW 13.1   LYMPHOPCT 27.7   MONOPCT 8.5*   BASOPCT 0.3   MONOSABS 0.9   LYMPHSABS 2.9   EOSABS 0.1   BASOSABS 0.0     CMP:    Recent Labs     09/29/22  1803      K 4.3      CO2 22   BUN 25*   CREATININE 2.3*   GFRAA 26*   GLUCOSE 258*   LABALBU 3.7   CALCIUM 9.0   BILITOT 0.2   ALKPHOS 102   AST 13*   ALT 16     Lipids:   Lab Results   Component Value Date/Time    CHOL 176 05/12/2022 06:39 AM    HDL 62 05/12/2022 06:39 AM    TRIG 169 05/12/2022 06:39 AM     Hemoglobin A1C:   Lab Results   Component Value Date/Time    LABA1C 8.1 07/11/2022 07:45 AM     TSH: No results found for: TSH  Troponin:   Lab Results Component Value Date/Time    TROPONINT <0.010 07/29/2022 06:00 AM    TROPONINT <0.010 07/29/2022 02:34 AM    TROPONINT <0.010 06/30/2022 12:06 PM     BNP: No results for input(s): PROBNP in the last 72 hours. Lactic Acid: No results for input(s): LACTA in the last 72 hours. UA:  Lab Results   Component Value Date/Time    NITRU POSITIVE 09/18/2022 08:43 PM    COLORU YELLOW 09/18/2022 08:43 PM    WBCUA 1 09/18/2022 08:43 PM    RBCUA <1 09/18/2022 08:43 PM    MUCUS RARE 09/17/2022 03:00 PM    TRICHOMONAS NONE SEEN 09/18/2022 08:43 PM    YEAST MODERATE 07/11/2022 12:05 PM    BACTERIA MANY 09/18/2022 08:43 PM    CLARITYU CLEAR 09/18/2022 08:43 PM    SPECGRAV 1.025 09/18/2022 08:43 PM    LEUKOCYTESUR NEGATIVE 09/18/2022 08:43 PM    UROBILINOGEN 0.2 09/18/2022 08:43 PM    BILIRUBINUR NEGATIVE 09/18/2022 08:43 PM    BLOODU NEGATIVE 09/18/2022 08:43 PM    KETUA 40 09/18/2022 08:43 PM     Urine Cultures: No results found for: LABURIN  Blood Cultures: No results found for: BC  No results found for: BLOODCULT2  Organism: No results found for: [facility]    Radiology results:  XR ELBOW LEFT (MIN 3 VIEWS)   Final Result   Severely limited, nearly nondiagnostic images of the right elbow due to   overlying objects and patient positioning. Repeat radiographs could be   obtained after the removal of these objects. Redemonstrated displaced, oblique fracture of the distal humeral diaphysis. There is a questionable additional vertical fracture involving the distal   humeral fracture component. CT HEAD WO CONTRAST   Final Result   1. No acute intracranial abnormality. 2. Small bubbly air-fluid level present in the right sphenoid sinus. Finding   raises the possibility of acute sinusitis in the appropriate clinical setting. XR HUMERUS LEFT (MIN 2 VIEWS)   Final Result   1. Oblique, displaced fracture of the distal humeral shaft.       2.  Sequela of proximal humerus fracture repair without evidence for hardware complication.              Medications:     Medications:    acetaminophen  650 mg Oral Q6H        Infusions:       PRN Meds:   morphine, 4 mg, Q30 Min PRN  HYDROcodone-acetaminophen, 1 tablet, Q6H PRN  [START ON 9/30/2022] morphine, 2 mg, Q4H PRN        Fausto Corrales MD  09/29/22 10:31 PM

## 2022-09-30 NOTE — PROGRESS NOTES
1520 Patient arrived to PACU from OR. Monitors applied and alarms on. No drainage  from site. Ice applied. 1523 Patient's blood sugar 295. Dr. Silvestre Saez notified. 1536 Patient turned and repositioned in bed. 1540 Patient tolerating ice chips. 1546 Report called to [facility] RN on floor. 1553 Patient transferred out of PACU to floor.

## 2022-09-30 NOTE — CARE COORDINATION
Chart reviewed. Pt admitted with L humeral fracture. Pt is a resident of [facility]. Pt has been to [facility] in the recent past for short term rehab. Pt is expected to go for surgical repair of fracture today. Cm attempted to pt contact pt by her hospital bed phone and cell phone with no answer. Cm will attempt to reach out pt to again later today.

## 2022-09-30 NOTE — PLAN OF CARE
Problem: Discharge Planning  Goal: Discharge to home or other facility with appropriate resources  Outcome: Progressing  Flowsheets (Taken 9/30/2022 0836)  Discharge to home or other facility with appropriate resources:   Identify barriers to discharge with patient and caregiver   Arrange for needed discharge resources and transportation as appropriate   Identify discharge learning needs (meds, wound care, etc)   Refer to discharge planning if patient needs post-hospital services based on physician order or complex needs related to functional status, cognitive ability or social support system     Problem: Pain  Goal: Verbalizes/displays adequate comfort level or baseline comfort level  Outcome: Progressing     Problem: Safety - Adult  Goal: Free from fall injury  Outcome: Progressing  Flowsheets (Taken 9/30/2022 0834)  Free From Fall Injury:   Instruct family/caregiver on patient safety   Based on caregiver fall risk screen, instruct family/caregiver to ask for assistance with transferring infant if caregiver noted to have fall risk factors     Problem: ABCDS Injury Assessment  Goal: Absence of physical injury  Outcome: Progressing  Flowsheets (Taken 9/30/2022 0834)  Absence of Physical Injury: Implement safety measures based on patient assessment     Problem: Chronic Conditions and Co-morbidities  Goal: Patient's chronic conditions and co-morbidity symptoms are monitored and maintained or improved  Outcome: Progressing

## 2022-09-30 NOTE — PROGRESS NOTES
V2.0  [facility] Hospitalist Progress Note      Name:  Modesto Enrique /Age/Sex: 1960  (59 y.o. female)   MRN & CSN:  [id number] & [id number] Encounter Date/Time: 2022 7:03 AM EDT    Location:  [address] [place] [address] PCP: BRIAN Wolfe NP       Hospital Day: 2    Assessment and Plan:   Modesto Enrique is a 59 y.o. female with pmh of.,  Type 2 diabetes, hypertension who presents with Comminuted left humeral fracture, closed, initial encounter      Plan:  Mechanical fall leading to left distal humeral fracture: X-ray showed oblique displaced fracture of the distal humeral shaft. Splint in place. MVI distally. Ortho consulted. N.p.o. for now. Supportive care. Appropriate pain regimen. MODESTO likely in the setting of above: We will get CK level. Baseline creatinine is 1. Creatinine on admission was 2.3. Avoid nephrotoxic agents. IV fluids  History of TIA, Plavix on hold. Continue aspirin and statin. Pending orthopedic evaluation  Insulin-dependent diabetes mellitus type 2: Last hemoglobin A1c was 8.1 in 2022, metformin on hold on sliding scale insulin and Lantus  Benign essential hypertension    Diet Diet NPO Exceptions are: Ice Chips, Sips of Water with Meds   DVT Prophylaxis [x] Lovenox, []  Heparin, [] SCDs, [] Ambulation,  [] Eliquis, [] Xarelto  [] Coumadin   Code Status Full Code   Disposition From: Home  Expected Disposition: Rehab  Estimated Date of Discharge: 2-3 days  Patient requires continued admission due to Fracture, neding ortho   Surrogate Decision Maker/ POA      Subjective:     Chief Complaint: Fall and Arm Injury (Left arm deformity, 100mcg of fentanyl on board from EMS)     Patient was seen at the bedside. Tolerated pain regimen. No acute events overnight. Splint in place. Ortho will be seeing the patient again further recommendation. Denies any presyncope, syncope, chest pain, shortness of breat.           Review of Systems:    Review of Systems Constitutional:  Negative for appetite change, chills, fever and unexpected weight change. HENT:  Negative for ear pain, hearing loss, sinus pressure, sinus pain and voice change. Eyes:  Negative for pain, discharge and visual disturbance. Respiratory:  Negative for cough, chest tightness and shortness of breath. Cardiovascular:  Negative for chest pain, palpitations and leg swelling. Gastrointestinal:  Negative for abdominal pain, blood in stool, diarrhea, nausea and vomiting. Genitourinary:  Negative for dysuria and frequency. Musculoskeletal:  Positive for arthralgias, joint swelling and myalgias. Negative for back pain. Left arm pain   Neurological:  Negative for dizziness, weakness, light-headedness and headaches. Psychiatric/Behavioral:  Negative for sleep disturbance. Objective: Intake/Output Summary (Last 24 hours) at 9/30/2022 0703  Last data filed at 9/30/2022 0417  Gross per 24 hour   Intake 0 ml   Output 0 ml   Net 0 ml        Vitals:   Vitals:    09/30/22 0417   BP: 137/66   Pulse: 75   Resp: 19   Temp: 98.3 °F (36.8 °C)   SpO2: 97%       Physical Exam:   Physical Exam  Vitals reviewed. Constitutional:       Appearance: Normal appearance. She is obese. HENT:      Head: Normocephalic. Nose: Nose normal.      Mouth/Throat:      Mouth: Mucous membranes are moist.   Eyes:      Conjunctiva/sclera: Conjunctivae normal.      Pupils: Pupils are equal, round, and reactive to light. Cardiovascular:      Rate and Rhythm: Normal rate and regular rhythm. Pulses: Normal pulses. Heart sounds: Normal heart sounds. No murmur heard. Pulmonary:      Effort: Pulmonary effort is normal.      Breath sounds: Normal breath sounds. No wheezing, rhonchi or rales. Abdominal:      General: Abdomen is flat. Bowel sounds are normal. There is no distension. Palpations: Abdomen is soft. Tenderness: There is no abdominal tenderness.    Musculoskeletal: General: Swelling, tenderness, deformity and signs of injury present. Normal range of motion. Cervical back: Normal range of motion and neck supple. Right lower leg: No edema. Left lower leg: No edema. Comments: Left arm brace in place   Skin:     Coloration: Skin is not jaundiced or pale. Neurological:      General: No focal deficit present. Mental Status: She is alert and oriented to person, place, and time. Mental status is at baseline.       Medications:   Medications:    amLODIPine  10 mg Oral Daily    aspirin  81 mg Oral Daily    atorvastatin  80 mg Oral Nightly    busPIRone  10 mg Oral TID    donepezil  10 mg Oral Nightly    FLUoxetine  80 mg Oral Daily    gabapentin  200 mg Oral BID    levothyroxine  75 mcg Oral QAM AC    pantoprazole  40 mg Oral BID AC    topiramate  50 mg Oral Nightly    sodium chloride flush  5-40 mL IntraVENous 2 times per day    acetaminophen  650 mg Oral Q6H    insulin glargine  15 Units SubCUTAneous Nightly    insulin lispro  5 Units SubCUTAneous TID WC    insulin lispro  0-4 Units SubCUTAneous TID WC    insulin lispro  0-4 Units SubCUTAneous Nightly    polyethylene glycol  17 g Oral BID    sennosides-docusate sodium  2 tablet Oral BID      Infusions:    lactated ringers 100 mL/hr at 09/30/22 0002    sodium chloride      dextrose       PRN Meds: sodium chloride flush, 5-40 mL, PRN  sodium chloride, , PRN  ondansetron, 4 mg, Q8H PRN   Or  ondansetron, 4 mg, Q6H PRN  aluminum & magnesium hydroxide-simethicone, 30 mL, Q6H PRN  acetaminophen, 650 mg, Q6H PRN   Or  acetaminophen, 650 mg, Q6H PRN  HYDROcodone-acetaminophen, 1 tablet, Q6H PRN  glucose, 4 tablet, PRN  dextrose bolus, 125 mL, PRN   Or  dextrose bolus, 250 mL, PRN  glucagon (rDNA), 1 mg, PRN  dextrose, , Continuous PRN  oxyCODONE, 10 mg, Q6H PRN        Labs      Recent Results (from the past 24 hour(s))   CBC with Auto Differential    Collection Time: 09/29/22  6:03 PM   Result Value Ref Range    WBC 10.3 4.0 - 10.5 K/CU MM    RBC 3.86 (L) 4.2 - 5.4 M/CU MM    Hemoglobin 11.5 (L) 12.5 - 16.0 GM/DL    Hematocrit 37.1 37 - 47 %    MCV 96.1 78 - 100 FL    MCH 29.8 27 - 31 PG    MCHC 31.0 (L) 32.0 - 36.0 %    RDW 13.1 11.7 - 14.9 %    Platelets 301 605 - 989 K/CU MM    MPV 9.8 7.5 - 11.1 FL    Differential Type AUTOMATED DIFFERENTIAL     Segs Relative 62.1 36 - 66 %    Lymphocytes % 27.7 24 - 44 %    Monocytes % 8.5 (H) 0 - 4 %    Eosinophils % 1.1 0 - 3 %    Basophils % 0.3 0 - 1 %    Segs Absolute 6.4 K/CU MM    Lymphocytes Absolute 2.9 K/CU MM    Monocytes Absolute 0.9 K/CU MM    Eosinophils Absolute 0.1 K/CU MM    Basophils Absolute 0.0 K/CU MM    Nucleated RBC % 0.0 %    Total Nucleated RBC 0.0 K/CU MM    Total Immature Neutrophil 0.03 K/CU MM    Immature Neutrophil % 0.3 0 - 0.43 %   Comprehensive Metabolic Panel    Collection Time: 09/29/22  6:03 PM   Result Value Ref Range    Sodium 137 135 - 145 MMOL/L    Potassium 4.3 3.5 - 5.1 MMOL/L    Chloride 104 99 - 110 mMol/L    CO2 22 21 - 32 MMOL/L    BUN 25 (H) 6 - 23 MG/DL    Creatinine 2.3 (H) 0.6 - 1.1 MG/DL    Glucose 258 (H) 70 - 99 MG/DL    Calcium 9.0 8.3 - 10.6 MG/DL    Albumin 3.7 3.4 - 5.0 GM/DL    Total Protein 5.9 (L) 6.4 - 8.2 GM/DL    Total Bilirubin 0.2 0.0 - 1.0 MG/DL    ALT 16 10 - 40 U/L    AST 13 (L) 15 - 37 IU/L    Alkaline Phosphatase 102 40 - 129 IU/L    GFR Non- 21 (L) >60 mL/min/1.73m2    GFR  26 (L) >60 mL/min/1.73m2    Anion Gap 11 4 - 16   POCT Glucose    Collection Time: 09/29/22 11:02 PM   Result Value Ref Range    POC Glucose 145 (H) 70 - 99 MG/DL   CK    Collection Time: 09/30/22  3:59 AM   Result Value Ref Range    Total CK 92 26 - 140 IU/L   Basic Metabolic Panel w/ Reflex to MG    Collection Time: 09/30/22  3:59 AM   Result Value Ref Range    Sodium 134 (L) 135 - 145 MMOL/L    Potassium 4.6 3.5 - 5.1 MMOL/L    Chloride 103 99 - 110 mMol/L    CO2 22 21 - 32 MMOL/L    Anion Gap 9 4 - 16    BUN 27 (H) 6 - 23 MG/DL    Creatinine 2.0 (H) 0.6 - 1.1 MG/DL    Glucose 290 (H) 70 - 99 MG/DL    Calcium 8.8 8.3 - 10.6 MG/DL    GFR Non- 25 (L) >60 mL/min/1.73m2    GFR  30 (L) >60 mL/min/1.73m2   CBC with Auto Differential    Collection Time: 09/30/22  3:59 AM   Result Value Ref Range    WBC 9.4 4.0 - 10.5 K/CU MM    RBC 3.65 (L) 4.2 - 5.4 M/CU MM    Hemoglobin 10.7 (L) 12.5 - 16.0 GM/DL    Hematocrit 35.3 (L) 37 - 47 %    MCV 96.7 78 - 100 FL    MCH 29.3 27 - 31 PG    MCHC 30.3 (L) 32.0 - 36.0 %    RDW 13.0 11.7 - 14.9 %    Platelets 348 488 - 769 K/CU MM    MPV 9.9 7.5 - 11.1 FL    Differential Type AUTOMATED DIFFERENTIAL     Segs Relative 62.1 36 - 66 %    Lymphocytes % 27.2 24 - 44 %    Monocytes % 9.1 (H) 0 - 4 %    Eosinophils % 1.1 0 - 3 %    Basophils % 0.3 0 - 1 %    Segs Absolute 5.8 K/CU MM    Lymphocytes Absolute 2.6 K/CU MM    Monocytes Absolute 0.9 K/CU MM    Eosinophils Absolute 0.1 K/CU MM    Basophils Absolute 0.0 K/CU MM    Nucleated RBC % 0.0 %    Total Nucleated RBC 0.0 K/CU MM    Total Immature Neutrophil 0.02 K/CU MM    Immature Neutrophil % 0.2 0 - 0.43 %   Protime-INR    Collection Time: 09/30/22  3:59 AM   Result Value Ref Range    Protime 10.3 (L) 11.7 - 14.5 SECONDS    INR 0.80 INDEX   POCT Glucose    Collection Time: 09/30/22  6:33 AM   Result Value Ref Range    POC Glucose 281 (H) 70 - 99 MG/DL        Imaging/Diagnostics Last 24 Hours   XR HUMERUS LEFT (MIN 2 VIEWS)    Result Date: 9/29/2022  EXAMINATION: TWO XRAY VIEWS OF THE LEFT HUMERUS 9/29/2022 6:04 pm COMPARISON: 02/07/2019 HISTORY: ORDERING SYSTEM PROVIDED HISTORY: fall TECHNOLOGIST PROVIDED HISTORY: Reason for exam:->fall Reason for exam:->injury Reason for Exam: LEFT HUMERUS PAIN Additional signs and symptoms: FALL Relevant Medical/Surgical History: NA FINDINGS: Plate screw fixation of the proximal humerus, without hardware complication. An oblique displaced fracture of the distal humeral shaft is noted. No evidence for shoulder or elbow malalignment, within the limitations of positioning and external artifact. 1.  Oblique, displaced fracture of the distal humeral shaft. 2.  Sequela of proximal humerus fracture repair without evidence for hardware complication. XR ELBOW LEFT (MIN 3 VIEWS)    Result Date: 9/29/2022  EXAMINATION: THREE XRAY VIEWS OF THE LEFT ELBOW 9/29/2022 7:58 pm COMPARISON: Same day left humerus radiographs HISTORY: ORDERING SYSTEM PROVIDED HISTORY: pain, trauma TECHNOLOGIST PROVIDED HISTORY: Reason for exam:->pain, trauma Reason for Exam: left elbow pain Additional signs and symptoms: fall Relevant Medical/Surgical History: na FINDINGS: Nearly nondiagnostic images due to overlying objects and patient positioning. Redemonstrated oblique, displaced fracture of the distal humeral shaft. There is a questionable additional vertical fracture involving the distal humeral fracture component. The elbow appears to be in normal alignment. No definite fracture involving visualized portions of the radius or ulna. Soft tissue swelling is seen about the distal humerus. Severely limited, nearly nondiagnostic images of the right elbow due to overlying objects and patient positioning. Repeat radiographs could be obtained after the removal of these objects. Redemonstrated displaced, oblique fracture of the distal humeral diaphysis. There is a questionable additional vertical fracture involving the distal humeral fracture component. CT HEAD WO CONTRAST    Result Date: 9/29/2022  EXAMINATION: CT OF THE HEAD WITHOUT CONTRAST  9/29/2022 7:55 pm TECHNIQUE: CT of the head was performed without the administration of intravenous contrast. Automated exposure control, iterative reconstruction, and/or weight based adjustment of the mA/kV was utilized to reduce the radiation dose to as low as reasonably achievable.  COMPARISON: 03/17/2022 HISTORY: ORDERING SYSTEM PROVIDED HISTORY: trauma TECHNOLOGIST PROVIDED

## 2022-09-30 NOTE — PROGRESS NOTES
Skin assessment completed with [facility] RN. and Abbi ALEXANDER. Skin was WNL and completely intact.

## 2022-09-30 NOTE — BRIEF OP NOTE
Brief Postoperative Note      Patient: Ene Cooney  YOB: 1960  MRN: [id number]    Date of Procedure: 9/30/2022    Pre-Op Diagnosis: Fracture [T14. 8XXA]    Post-Op Diagnosis: Same       Procedure(s):  HUMERUS OPEN REDUCTION INTERNAL FIXATION    Surgeon(s):  Caity Celis DO    Assistant:  Physician Assistant: Nahomy Rawls PA-C    Anesthesia: General    Estimated Blood Loss (mL): less than 50     Complications: None    Specimens:   * No specimens in log *    Implants:  Implant Name Type Inv. Item Serial No.  Lot No. LRB No. Used Action   LOQTEQ DISTAL DORSOLAT HUMERUS PLATE LEFT, 5 HOLES, L 128    [employer]PMM  Left 1 Implanted   SCREW BONE L14MM OD2. 7MM TI DEBORA SM HD T8 ST LOQTEQ - [id number]  SCREW BONE L14MM OD2. 7MM TI DEBORA SM HD T8 ST LOQTEQ  [employer]WD  Left 1 Implanted   SCREW BONE L16MM OD2. 7MM TI DEBORA SM HD T8 ST LOQTEQ - [id number]  SCREW BONE L16MM OD2. 7MM TI DEBORA SM HD T8 ST LOQTEQ  [employer]WD  Left 2 Implanted   SCREW BONE L18MM OD2. 7MM TI DEBORA SM HD T8 ST LOQ[employer]Q - [id number]  SCREW BONE L18MM OD2. 7MM TI DEBORA SM HD T8 ST LOQTEQ  [employer]WD  Left 2 Implanted   SCREW BNE CORTICAL 3.5X14 MM LOQTEQ - [id number]  SCREW BNE CORTICAL 3.5X14 MM LOQTEQ  [employer]WD  Left 1 Implanted   SCREW BONE L22MM DIA3. 5MM TI DEBORA TIB ST FULL THRD - [id number]  SCREW BONE L22MM DIA3. 5MM TI DEBORA TIB ST FULL THRD  [employer]WD  Left 3 Implanted   SCREW BONE L24MM DIA3. 5MM TI DEBORA TIB ST FULL THRD - [id number]  SCREW BONE L24MM DIA3. 5MM TI DEBORA TIB ST FULL THRD  [employer]WD  Left 1 Implanted   SCREW BONE L26MM DIA3.5MM DEBORA T15 FULL THRD N [address] ST - [id number]  SCREW BONE L26MM DIA3.5MM DEBORA T15 FULL THRD N [address]ALIYAH ST  [employer]WD  Left 1 Implanted   SCREW BONE L40MM DIA3. 5MM TI DEBORA TIB ST FULL THRD - [id number]  SCREW BONE L40MM DIA3. 5MM TI DEBORA TIB ST FULL THRD  [employer]WD  Left 1 Implanted   SCREW BONE L20MM OD3.5MM TI DEBORA T15 ST LOQTEQ - [id number]  SCREW BONE L20MM OD3.5MM TI DEBORA T15 ST LOQTEQ  [employer]WD  Left 1 Implanted   SCREW BONE L24MM OD3.5MM TI DEBORA T15 ST LOQTEQ - [id number]  SCREW BONE L24MM OD3.5MM TI DEBORA T15 ST LOQTEQ  [employer]WD  Left 1 Implanted   loqteq cortical screw l 24    [employer]-PM  Left 1 Implanted         Drains: * No LDAs found *    Findings: Left distal humerus fracture    Electronically signed by Brii Forbes DO on 9/30/2022 at 4:11 PM

## 2022-09-30 NOTE — ANESTHESIA PRE PROCEDURE
Department of Anesthesiology  Preprocedure Note       Name:  Ana Maria Membreno   Age:  59 y.o.  :  1960                                          MRN:  [id number]         Date:  2022      Surgeon: Xu Horner):  Yazmin East DO    Procedure: Procedure(s):  HUMERUS OPEN REDUCTION INTERNAL FIXATION    Medications prior to admission:   Prior to Admission medications    Medication Sig Start Date End Date Taking? Authorizing Provider   naproxen (NAPROSYN) 500 MG tablet Take 1 tablet by mouth 2 times daily 22   Ivana Rudd DO   ondansetron (ZOFRAN ODT) 4 MG disintegrating tablet Take 1 tablet by mouth every 8 hours as needed for Nausea  Patient not taking: Reported on 2022   Ivana Rudd DO   aspirin 81 MG chewable tablet Take 1 tablet by mouth in the morning. 22   Katelin Diallo MD   insulin lispro (HUMALOG) 100 UNIT/ML SOLN injection vial Inject 10 Units into the skin in the morning and 10 Units at noon and 10 Units in the evening. Inject before meals. Patient taking differently: Inject 10 Units into the skin 3 times daily (before meals) And sliding scale dose 22   Katelin Diallo MD   metoclopramide (REGLAN) 5 MG tablet Take 1 tablet by mouth in the morning and 1 tablet at noon and 1 tablet in the evening. Take with meals. 22   Katelin Diallo MD   pantoprazole (PROTONIX) 40 MG tablet Take 1 tablet by mouth in the morning and 1 tablet in the evening. Take before meals. 22   Katelin Diallo MD   vitamin B-12 (CYANOCOBALAMIN) 100 MCG tablet Take 1 tablet by mouth in the morning. 22   Katelin Diallo MD   amLODIPine (NORVASC) 10 MG tablet Take 1 tablet by mouth in the morning. 22  Apryl Alford MD   Vitamin D (CHOLECALCIFEROL) 50 MCG (2000 UT) TABS tablet Take 1 tablet by mouth in the morning.  22  Apryl Alford MD   gabapentin (NEURONTIN) 400 MG capsule Take 1 capsule by mouth in the morning and 1 capsule before bedtime. Do all this for 30 days.  7/18/22 9/29/22  Alin Brady MD   insulin glargine (LANTUS) 100 UNIT/ML injection vial Inject 20 Units into the skin nightly  Patient taking differently: Inject 30 Units into the skin nightly 7/18/22   Alin Brady MD   levothyroxine (SYNTHROID) 75 MCG tablet Take 1 tablet by mouth every morning (before breakfast) 7/19/22   Alin Brady MD   FLUoxetine (PROZAC) 40 MG capsule Take 80 mg by mouth daily    Historical Provider, MD   metFORMIN (GLUCOPHAGE) 500 MG tablet Take 1 tablet by mouth 2 times daily (with meals) 3/22/22 7/30/22  Christine Plaza DO   hydrALAZINE (APRESOLINE) 25 MG tablet Take 1 tablet by mouth every 8 hours 3/22/22 7/18/22  Christine Plaza DO   lisinopril (PRINIVIL;ZESTRIL) 40 MG tablet Take 1 tablet by mouth daily 3/16/22 7/30/22  Navarro Bello MD   donepezil (ARICEPT) 5 MG tablet Take 10 mg by mouth nightly  9/13/21   Historical Provider, MD   busPIRone (BUSPAR) 10 MG tablet Take 10 mg by mouth 3 times daily  3/27/21   Historical Provider, MD   metoprolol succinate (TOPROL XL) 50 MG extended release tablet Take 1 tablet by mouth daily  Patient taking differently: Take 25 mg by mouth daily  1/25/19 7/18/22  Robbie Jones MD   topiramate (TOPAMAX) 50 MG tablet Take 50 mg by mouth nightly     Historical Provider, MD   clopidogrel (PLAVIX) 75 MG tablet Take 75 mg by mouth daily    Historical Provider, MD   atorvastatin (LIPITOR) 40 MG tablet Take 80 mg by mouth nightly     Historical Provider, MD       Current medications:    Current Facility-Administered Medications   Medication Dose Route Frequency Provider Last Rate Last Admin    amLODIPine (NORVASC) tablet 10 mg  10 mg Oral Daily Francesco Enlgish MD        aspirin chewable tablet 81 mg  81 mg Oral Daily Francesco English MD        atorvastatin (LIPITOR) tablet 80 mg  80 mg Oral Nightly Francesco English MD   80 mg at 09/30/22 0000    busPIRone (BUSPAR) tablet 10 mg  10 mg Oral TID Maurilio Jin MD   10 mg at 09/29/22 2349    donepezil (ARICEPT) tablet 10 mg  10 mg Oral Nightly Maurilio Jin MD   10 mg at 09/29/22 2349    FLUoxetine (PROZAC) capsule 80 mg  80 mg Oral Daily Maurilio Jin MD        gabapentin (NEURONTIN) capsule 200 mg  200 mg Oral BID Maurilio Jin MD   200 mg at 09/29/22 2348    levothyroxine (SYNTHROID) tablet 75 mcg  75 mcg Oral QAM AC Maurilio Jin MD   75 mcg at 09/30/22 0518    pantoprazole (PROTONIX) tablet 40 mg  40 mg Oral BID AC Maurilio Jin MD   40 mg at 09/30/22 0518    topiramate (TOPAMAX) tablet 50 mg  50 mg Oral Nightly Maurilio Jin MD   50 mg at 09/29/22 2349    lactated ringers infusion   IntraVENous Continuous Maurilio Jin  mL/hr at 09/30/22 0002 New Bag at 09/30/22 0002    sodium chloride flush 0.9 % injection 5-40 mL  5-40 mL IntraVENous 2 times per day Maurilio Jin MD   10 mL at 09/29/22 2351    sodium chloride flush 0.9 % injection 5-40 mL  5-40 mL IntraVENous PRN Maurilio Jin MD        0.9 % sodium chloride infusion   IntraVENous PRN Maurilio Jin MD        ondansetron (ZOFRAN-ODT) disintegrating tablet 4 mg  4 mg Oral Q8H PRN Maurilio Jin MD        Or    ondansetron (ZOFRAN) injection 4 mg  4 mg IntraVENous Q6H PRN Maurilio Jin MD   4 mg at 09/30/22 0525    aluminum & magnesium hydroxide-simethicone (MAALOX) 200-200-20 MG/5ML suspension 30 mL  30 mL Oral Q6H PRN Maurilio Jin MD        acetaminophen (TYLENOL) tablet 650 mg  650 mg Oral Q6H PRN Maurilio Jin MD        Or    acetaminophen (TYLENOL) suppository 650 mg  650 mg Rectal Q6H PRN Maurilio Jin MD        acetaminophen (TYLENOL) tablet 650 mg  650 mg Oral Q6H Maurilio Jin MD   650 mg at 09/30/22 0517    HYDROcodone-acetaminophen (Josafat Chasten) 7.5-325 MG per tablet 1 tablet  1 tablet Oral Q6H PRN Maurilio Jin MD   1 tablet at 09/29/22 7628    glucose chewable tablet 16 g  4 tablet Oral PRN Cipriano Byrd MD        dextrose bolus 10% 125 mL  125 mL IntraVENous PRN Cipriano Byrd MD        Or    dextrose bolus 10% 250 mL  250 mL IntraVENous PRN Cipriano Byrd MD        glucagon (rDNA) injection 1 mg  1 mg SubCUTAneous PRN Cipriano Byrd MD        dextrose 10 % infusion   IntraVENous Continuous PRN Cipriano Byrd MD        insulin glargine (LANTUS) injection vial 15 Units  15 Units SubCUTAneous Nightly Cipriano Byrd MD        insulin lispro (HUMALOG) injection vial 5 Units  5 Units SubCUTAneous TID  Cipriano Byrd MD        insulin lispro (HUMALOG) injection vial 0-4 Units  0-4 Units SubCUTAneous TID  Cipriano Byrd MD        insulin lispro (HUMALOG) injection vial 0-4 Units  0-4 Units SubCUTAneous Nightly Cipriano Byrd MD        polyethylene glycol (GLYCOLAX) packet 17 g  17 g Oral BID Cipriano Byrd MD        sennosides-docusate sodium (SENOKOT-S) 8.6-50 MG tablet 2 tablet  2 tablet Oral BID Cipriano Byrd MD   2 tablet at 09/29/22 2349    oxyCODONE (ROXICODONE) immediate release tablet 10 mg  10 mg Oral Q6H PRN Cipriano Byrd MD           Allergies:  No Known Allergies    Problem List:    Patient Active Problem List   Diagnosis Code    Proximal humeral fracture S42.209A    Asthma J45.909    Bereavement Z63.4    Pain of both hip joints M25.551, M25.552    Chest pain R07.9    Chronic abdominal pain R10.9, G89.29    Chronic back pain M54.9, G89.29    Chronic obstructive lung disease ([facility] [address] J44.9    Corns and callosities L84    Depressive disorder F32.9    Type II diabetes mellitus, uncontrolled ([address] E11.65    Diabetic polyneuropathy ([address] E11.42    HTN (hypertension) I10    Hyperlipidemia E78.5    Lesion of liver K76.9    Menopausal symptom N95.1    Mixed hyperlipidemia E78.2    Onychomycosis B35.1    Obesity E66.9    Osteoarthrosis M19.90    Osteoporosis M81.0    Pain in both feet M79.671, M79.672    Sleep apnea G47.30    TIA (transient ischemic attack) G45.9    Vitamin D deficiency E55.9    Wheezing R06.2    S/P ORIF (open reduction internal fixation) fracture Z98.890, Z87.81    Diabetic gastroparesis ([facility]) E11.43, K31.84    Gastroesophageal reflux disease K21.9    Restless legs G25.81    Ketoacidosis, diabetic, type 2, no coma ([facility]) E11.10    Repeated falls R29.6    Subclinical hypothyroidism E03.8    Vasovagal syncope R55    Acute kidney injury superimposed on CKD ([facility]) N17.9, N18.9    Acute kidney injury ([address] N17.9    Nausea and vomiting R11.2    Acute diarrhea R19.7    Comminuted left humeral fracture, closed, initial encounter S42.352A       Past Medical History:        Diagnosis Date    Arthritis     Asthma     Cerebral artery occlusion with cerebral infarction [facility])     per old chart 8/2015- TIA    COPD (chronic obstructive pulmonary disease) [facility] [address]     Diabetes mellitus [facility] [address]     Diabetic neuropathy ([facility] [address]     per old chart    Fracture     per old chart pt in ER 12/9/2018- after 2 days of arm pain after fall- dx with left humerus fx- for surg 12/31/2018    History of blood transfusion     Hyperlipidemia     Hypertension     Muscle weakness (generalized)     Osteoarthritis     per old chart       Past Surgical History:        Procedure Laterality Date    BREAST BIOPSY Right     CHOLECYSTECTOMY  2002?  EYE SURGERY      per old chart had right eye cataract ext done 2/2018 and left eye 4/2018    FOOT SURGERY Left 2004?  HUMERUS FRACTURE SURGERY Left 12/31/2018    HUMERUS OPEN REDUCTION INTERNAL FIXATION LEFT PROXIMAL performed by Loi Hunter DO at [facility] Right 2009?     LUNG SURGERY  2009?    simone lung lobectomy        Social History:    Social History     Tobacco Use    Smoking status: Never    Smokeless tobacco: Never    Tobacco comments:     12/27/*2018- with phone assessment with caregiver- unsure of social, surgical or family hx for phone assessment Substance Use Topics    Alcohol use: Yes                                Counseling given: Not Answered  Tobacco comments: 12/27/*2018- with phone assessment with caregiver- unsure of social, surgical or family hx for phone assessment      Vital Signs (Current):   Vitals:    09/29/22 2302 09/30/22 0022 09/30/22 0052 09/30/22 0417   BP: (!) 166/62   137/66   Pulse: 72   75   Resp: 20 20 20 19   Temp: 97.8 °F (36.6 °C)   98.3 °F (36.8 °C)   TempSrc: Oral   Oral   SpO2: 99%   97%   Weight: 240 lb (108.9 kg)      Height: 5' 4\" (1.626 m)                                                 BP Readings from Last 3 Encounters:   09/30/22 137/66   09/18/22 (!) 174/69   09/01/22 110/73       NPO Status:                                                                                 BMI:   Wt Readings from Last 3 Encounters:   09/29/22 240 lb (108.9 kg)   09/18/22 240 lb (108.9 kg)   09/01/22 239 lb 6.4 oz (108.6 kg)     Body mass index is 41.2 kg/m².     CBC:   Lab Results   Component Value Date/Time    WBC 9.4 09/30/2022 03:59 AM    RBC 3.65 09/30/2022 03:59 AM    HGB 10.7 09/30/2022 03:59 AM    HCT 35.3 09/30/2022 03:59 AM    MCV 96.7 09/30/2022 03:59 AM    RDW 13.0 09/30/2022 03:59 AM     09/30/2022 03:59 AM       CMP:   Lab Results   Component Value Date/Time     09/30/2022 03:59 AM    K 4.6 09/30/2022 03:59 AM     09/30/2022 03:59 AM    CO2 22 09/30/2022 03:59 AM    BUN 27 09/30/2022 03:59 AM    CREATININE 2.0 09/30/2022 03:59 AM    GFRAA 30 09/30/2022 03:59 AM    LABGLOM 25 09/30/2022 03:59 AM    GLUCOSE 290 09/30/2022 03:59 AM    PROT 5.9 09/29/2022 06:03 PM    CALCIUM 8.8 09/30/2022 03:59 AM    BILITOT 0.2 09/29/2022 06:03 PM    ALKPHOS 102 09/29/2022 06:03 PM    AST 13 09/29/2022 06:03 PM    ALT 16 09/29/2022 06:03 PM       POC Tests:   Recent Labs     09/30/22  0633   POCGLU 281*       Coags:   Lab Results   Component Value Date/Time    PROTIME 10.3 09/30/2022 03:59 AM    INR 0.80 09/30/2022 03:59 AM HCG (If Applicable): No results found for: PREGTESTUR, PREGSERUM, HCG, HCGQUANT     ABGs: No results found for: PHART, PO2ART, SZC7JHN, SMZ4IMM, BEART, T7CHSITI     Type & Screen (If Applicable):  No results found for: LABABO, LABRH    Drug/Infectious Status (If Applicable):  No results found for: HIV, HEPCAB    COVID-19 Screening (If Applicable):   Lab Results   Component Value Date/Time    COVID19 NOT DETECTED 08/21/2022 05:31 PM           Anesthesia Evaluation  Patient summary reviewed and Nursing notes reviewed no history of anesthetic complications:   Airway: Mallampati: I  TM distance: >3 FB   Neck ROM: full  Mouth opening: > = 3 FB   Dental: normal exam         Pulmonary:   (+) COPD:  sleep apnea:  asthma:                            Cardiovascular:  Exercise tolerance: poor (<4 METS),   (+) hypertension:, hyperlipidemia         Beta Blocker:  Not on Beta Blocker         Neuro/Psych:   (+) CVA:, neuromuscular disease (diabetic neuropathy):, TIA, depression/anxiety             GI/Hepatic/Renal:   (+) GERD:,           Endo/Other:    (+) DiabetesType II DM, poorly controlled, using insulin, hypothyroidism, blood dyscrasia: anticoagulation therapy and anemia:., .                 Abdominal:             Vascular: negative vascular ROS. Other Findings:           Anesthesia Plan      general     ASA 3 - emergent       Induction: intravenous. MIPS: Postoperative opioids intended and Prophylactic antiemetics administered. Anesthetic plan and risks discussed with patient. Plan discussed with CRNA. Pre Anesthesia Evaluation complete. Anesthesia plan, risks, benefits, alternatives, and personal involved discussed with patient. Patients and/or legal guardian verbalized an understanding  and agreed to proceed.   Jose Armando Hanna DO  9/30/2022

## 2022-09-30 NOTE — BRIEF OP NOTE
Brief Postoperative Note      Patient: Veronica Lake  YOB: 1960  MRN: [id number]    Date of Procedure: 9/30/2022    Pre-Op Diagnosis: Fracture [T14. 8XXA]    Post-Op Diagnosis: Same       Procedure(s):  HUMERUS OPEN REDUCTION INTERNAL FIXATION    Surgeon(s):  Quoc Rosario DO    Assistant:  Physician Assistant: Eliazar Machuca PA-C    Anesthesia: General    Estimated Blood Loss (mL): less than 50     Complications: None    Specimens:   * No specimens in log *    Implants:  Implant Name Type Inv. Item Serial No.  Lot No. LRB No. Used Action   LOQTEQ DISTAL DORSOLAT HUMERUS PLATE LEFT, 5 HOLES, L 128    [employer]  Left 1 Implanted   SCREW BONE L14MM OD2. 7MM TI DEBORA SM HD T8 ST LOQTEQ - [id number]  SCREW BONE L14MM OD2. 7MM TI DEBORA SM HD T8 ST LOQTEQ  [employer]WD  Left 1 Implanted   SCREW BONE L16MM OD2. 7MM TI DEBORA SM HD T8 ST LOQTEQ - [id number]  SCREW BONE L16MM OD2. 7MM TI DEBORA SM HD T8 ST LOQTEQ  [employer]WD  Left 2 Implanted   SCREW BONE L18MM OD2. 7MM TI DEBORA SM HD T8 ST LOQ[employer]Q - [id number]  SCREW BONE L18MM OD2. 7MM TI DEBORA SM HD T8 ST LOQTEQ  [employer]WD  Left 2 Implanted   SCREW BNE CORTICAL 3.5X14 MM LOQTEQ - [id number]  SCREW BNE CORTICAL 3.5X14 MM LOQTEQ  [employer]WD  Left 1 Implanted   SCREW BONE L22MM DIA3. 5MM TI DEBORA TIB ST FULL THRD - [id number]  SCREW BONE L22MM DIA3. 5MM TI DEBORA TIB ST FULL THRD  [employer]  Left 3 Implanted   SCREW BONE L24MM DIA3. 5MM TI DEBORA TIB ST FULL THRD - [id number]  SCREW BONE L24MM DIA3. 5MM TI DEBORA TIB ST FULL THRD  [employer]  Left 1 Implanted   SCREW BONE L26MM DIA3.5MM DEBORA T15 FULL THRD N [address] - [id number]  SCREW BONE L26MM DIA3.5MM DEBORA T15 FULL THRD N [address]  [employer]  Left 1 Implanted   SCREW BONE L40MM DIA3. 5MM TI DEBORA TIB ST FULL THRD - [id number]  SCREW BONE L40MM DIA3. 5MM TI DEBORA TIB ST FULL THRD  [employer]  Left 1 Implanted   SCREW BONE L20MM OD3.5MM TI DEBORA T15 ST LOQTEQ - [id number]  SCREW BONE L20MM OD3.5MM TI DEBORA T15 ST LOQTEQ  [employer]  Left 1 Implanted   SCREW BONE L24MM OD3.5MM TI DEBORA T15 ST LOQTEQ - [id number]  SCREW BONE L24MM OD3.5MM TI DEBORA T15 ST LOQTEQ  [employer]WD  Left 1 Implanted   loqteq cortical screw l 24    [employer]PMM  Left 1 Implanted         Drains: * No LDAs found *    Findings: L humerus fx    Electronically signed by Padmini Brown DO on 9/30/2022 at 2:55 PM

## 2022-10-01 ENCOUNTER — APPOINTMENT (OUTPATIENT)
Dept: GENERAL RADIOLOGY | Age: 62
DRG: 493 | End: 2022-10-01
Payer: MEDICARE

## 2022-10-01 ENCOUNTER — APPOINTMENT (OUTPATIENT)
Dept: ULTRASOUND IMAGING | Age: 62
DRG: 493 | End: 2022-10-01
Payer: MEDICARE

## 2022-10-01 LAB
ALBUMIN SERPL-MCNC: 3.2 GM/DL (ref 3.4–5)
ALP BLD-CCNC: 85 IU/L (ref 40–129)
ALT SERPL-CCNC: 5 U/L (ref 10–40)
ANION GAP SERPL CALCULATED.3IONS-SCNC: 14 MMOL/L (ref 4–16)
APTT: 25 SECONDS (ref 25.1–37.1)
AST SERPL-CCNC: 19 IU/L (ref 15–37)
BACTERIA: NEGATIVE /HPF
BASOPHILS ABSOLUTE: 0 K/CU MM
BASOPHILS RELATIVE PERCENT: 0.1 % (ref 0–1)
BETA-HYDROXYBUTYRATE: >20.8 MG/DL (ref 0–3)
BILIRUB SERPL-MCNC: 0.3 MG/DL (ref 0–1)
BILIRUBIN DIRECT: 0.2 MG/DL (ref 0–0.3)
BILIRUBIN URINE: NEGATIVE MG/DL
BILIRUBIN, INDIRECT: 0.1 MG/DL (ref 0–0.7)
BLOOD, URINE: ABNORMAL
BUN BLDV-MCNC: 28 MG/DL (ref 6–23)
CALCIUM SERPL-MCNC: 8.4 MG/DL (ref 8.3–10.6)
CHLORIDE BLD-SCNC: 103 MMOL/L (ref 99–110)
CLARITY: ABNORMAL
CO2: 20 MMOL/L (ref 21–32)
COLOR: YELLOW
CREAT SERPL-MCNC: 1.7 MG/DL (ref 0.6–1.1)
DIFFERENTIAL TYPE: ABNORMAL
EOSINOPHILS ABSOLUTE: 0 K/CU MM
EOSINOPHILS RELATIVE PERCENT: 0 % (ref 0–3)
GFR AFRICAN AMERICAN: 37 ML/MIN/1.73M2
GFR NON-AFRICAN AMERICAN: 30 ML/MIN/1.73M2
GLUCOSE BLD-MCNC: 173 MG/DL (ref 70–99)
GLUCOSE BLD-MCNC: 268 MG/DL (ref 70–99)
GLUCOSE BLD-MCNC: 274 MG/DL (ref 70–99)
GLUCOSE BLD-MCNC: 292 MG/DL (ref 70–99)
GLUCOSE BLD-MCNC: 336 MG/DL (ref 70–99)
GLUCOSE BLD-MCNC: 369 MG/DL (ref 70–99)
GLUCOSE BLD-MCNC: 379 MG/DL (ref 70–99)
GLUCOSE, URINE: >=1000 MG/DL
HCT VFR BLD CALC: 30.8 % (ref 37–47)
HEMOGLOBIN: 9.4 GM/DL (ref 12.5–16)
IMMATURE NEUTROPHIL %: 0.4 % (ref 0–0.43)
INR BLD: 0.86 INDEX
KETONES, URINE: 15 MG/DL
LEUKOCYTE ESTERASE, URINE: ABNORMAL
LIPASE: 17 IU/L (ref 13–60)
LYMPHOCYTES ABSOLUTE: 1.2 K/CU MM
LYMPHOCYTES RELATIVE PERCENT: 14.4 % (ref 24–44)
MAGNESIUM: 1.6 MG/DL (ref 1.8–2.4)
MCH RBC QN AUTO: 29.4 PG (ref 27–31)
MCHC RBC AUTO-ENTMCNC: 30.5 % (ref 32–36)
MCV RBC AUTO: 96.3 FL (ref 78–100)
MONOCYTES ABSOLUTE: 1 K/CU MM
MONOCYTES RELATIVE PERCENT: 12.2 % (ref 0–4)
MUCUS: ABNORMAL HPF
NITRITE URINE, QUANTITATIVE: NEGATIVE
NUCLEATED RBC %: 0 %
PDW BLD-RTO: 13.2 % (ref 11.7–14.9)
PH, URINE: 5.5 (ref 5–8)
PHOSPHORUS: 3.5 MG/DL (ref 2.5–4.9)
PLATELET # BLD: 215 K/CU MM (ref 140–440)
PMV BLD AUTO: 10.6 FL (ref 7.5–11.1)
POTASSIUM SERPL-SCNC: 5 MMOL/L (ref 3.5–5.1)
PROTEIN UA: ABNORMAL MG/DL
PROTHROMBIN TIME: 11.1 SECONDS (ref 11.7–14.5)
RBC # BLD: 3.2 M/CU MM (ref 4.2–5.4)
RBC URINE: 11 /HPF (ref 0–6)
SEGMENTED NEUTROPHILS ABSOLUTE COUNT: 6 K/CU MM
SEGMENTED NEUTROPHILS RELATIVE PERCENT: 72.9 % (ref 36–66)
SODIUM BLD-SCNC: 137 MMOL/L (ref 135–145)
SPECIFIC GRAVITY UA: 1.02 (ref 1–1.03)
TOTAL IMMATURE NEUTOROPHIL: 0.03 K/CU MM
TOTAL NUCLEATED RBC: 0 K/CU MM
TOTAL PROTEIN: 4.8 GM/DL (ref 6.4–8.2)
TRICHOMONAS: ABNORMAL /HPF
TROPONIN T: <0.01 NG/ML
UROBILINOGEN, URINE: 0.2 MG/DL (ref 0.2–1)
WBC # BLD: 8.2 K/CU MM (ref 4–10.5)
WBC CLUMP: ABNORMAL /HPF
WBC UA: 135 /HPF (ref 0–5)
YEAST: ABNORMAL /HPF

## 2022-10-01 PROCEDURE — 81001 URINALYSIS AUTO W/SCOPE: CPT

## 2022-10-01 PROCEDURE — 2580000003 HC RX 258: Performed by: INTERNAL MEDICINE

## 2022-10-01 PROCEDURE — 6370000000 HC RX 637 (ALT 250 FOR IP): Performed by: ORTHOPAEDIC SURGERY

## 2022-10-01 PROCEDURE — 85730 THROMBOPLASTIN TIME PARTIAL: CPT

## 2022-10-01 PROCEDURE — 51701 INSERT BLADDER CATHETER: CPT

## 2022-10-01 PROCEDURE — 6360000002 HC RX W HCPCS: Performed by: ORTHOPAEDIC SURGERY

## 2022-10-01 PROCEDURE — 51702 INSERT TEMP BLADDER CATH: CPT

## 2022-10-01 PROCEDURE — 6360000002 HC RX W HCPCS: Performed by: INTERNAL MEDICINE

## 2022-10-01 PROCEDURE — 85610 PROTHROMBIN TIME: CPT

## 2022-10-01 PROCEDURE — 84484 ASSAY OF TROPONIN QUANT: CPT

## 2022-10-01 PROCEDURE — 83735 ASSAY OF MAGNESIUM: CPT

## 2022-10-01 PROCEDURE — 85025 COMPLETE CBC W/AUTO DIFF WBC: CPT

## 2022-10-01 PROCEDURE — 82010 KETONE BODYS QUAN: CPT

## 2022-10-01 PROCEDURE — 81003 URINALYSIS AUTO W/O SCOPE: CPT

## 2022-10-01 PROCEDURE — 36415 COLL VENOUS BLD VENIPUNCTURE: CPT

## 2022-10-01 PROCEDURE — 6360000002 HC RX W HCPCS: Performed by: STUDENT IN AN ORGANIZED HEALTH CARE EDUCATION/TRAINING PROGRAM

## 2022-10-01 PROCEDURE — C9113 INJ PANTOPRAZOLE SODIUM, VIA: HCPCS | Performed by: INTERNAL MEDICINE

## 2022-10-01 PROCEDURE — 80048 BASIC METABOLIC PNL TOTAL CA: CPT

## 2022-10-01 PROCEDURE — 94761 N-INVAS EAR/PLS OXIMETRY MLT: CPT

## 2022-10-01 PROCEDURE — 82962 GLUCOSE BLOOD TEST: CPT

## 2022-10-01 PROCEDURE — 2140000000 HC CCU INTERMEDIATE R&B

## 2022-10-01 PROCEDURE — 6370000000 HC RX 637 (ALT 250 FOR IP): Performed by: INTERNAL MEDICINE

## 2022-10-01 PROCEDURE — 80076 HEPATIC FUNCTION PANEL: CPT

## 2022-10-01 PROCEDURE — 84100 ASSAY OF PHOSPHORUS: CPT

## 2022-10-01 PROCEDURE — 82803 BLOOD GASES ANY COMBINATION: CPT

## 2022-10-01 PROCEDURE — 83605 ASSAY OF LACTIC ACID: CPT

## 2022-10-01 PROCEDURE — 76775 US EXAM ABDO BACK WALL LIM: CPT

## 2022-10-01 PROCEDURE — 93005 ELECTROCARDIOGRAM TRACING: CPT | Performed by: INTERNAL MEDICINE

## 2022-10-01 PROCEDURE — 83690 ASSAY OF LIPASE: CPT

## 2022-10-01 PROCEDURE — 74018 RADEX ABDOMEN 1 VIEW: CPT

## 2022-10-01 PROCEDURE — 51798 US URINE CAPACITY MEASURE: CPT

## 2022-10-01 PROCEDURE — 2580000003 HC RX 258: Performed by: ORTHOPAEDIC SURGERY

## 2022-10-01 RX ORDER — INSULIN LISPRO 100 [IU]/ML
0-4 INJECTION, SOLUTION INTRAVENOUS; SUBCUTANEOUS NIGHTLY
Status: DISCONTINUED | OUTPATIENT
Start: 2022-10-01 | End: 2022-10-01

## 2022-10-01 RX ORDER — HEPARIN SODIUM 5000 [USP'U]/ML
5000 INJECTION, SOLUTION INTRAVENOUS; SUBCUTANEOUS EVERY 8 HOURS SCHEDULED
Status: DISCONTINUED | OUTPATIENT
Start: 2022-10-01 | End: 2022-10-04 | Stop reason: HOSPADM

## 2022-10-01 RX ORDER — POTASSIUM CHLORIDE 7.45 MG/ML
10 INJECTION INTRAVENOUS PRN
Status: DISCONTINUED | OUTPATIENT
Start: 2022-10-01 | End: 2022-10-01

## 2022-10-01 RX ORDER — INSULIN LISPRO 100 [IU]/ML
0-4 INJECTION, SOLUTION INTRAVENOUS; SUBCUTANEOUS NIGHTLY
Status: DISCONTINUED | OUTPATIENT
Start: 2022-10-01 | End: 2022-10-04 | Stop reason: HOSPADM

## 2022-10-01 RX ORDER — INSULIN LISPRO 100 [IU]/ML
0-8 INJECTION, SOLUTION INTRAVENOUS; SUBCUTANEOUS
Status: DISCONTINUED | OUTPATIENT
Start: 2022-10-01 | End: 2022-10-01

## 2022-10-01 RX ORDER — SODIUM CHLORIDE 450 MG/100ML
INJECTION, SOLUTION INTRAVENOUS CONTINUOUS
Status: DISCONTINUED | OUTPATIENT
Start: 2022-10-01 | End: 2022-10-01

## 2022-10-01 RX ORDER — HYDRALAZINE HYDROCHLORIDE 20 MG/ML
10 INJECTION INTRAMUSCULAR; INTRAVENOUS EVERY 6 HOURS PRN
Status: DISCONTINUED | OUTPATIENT
Start: 2022-10-01 | End: 2022-10-04 | Stop reason: HOSPADM

## 2022-10-01 RX ORDER — INSULIN LISPRO 100 [IU]/ML
0-16 INJECTION, SOLUTION INTRAVENOUS; SUBCUTANEOUS
Status: DISCONTINUED | OUTPATIENT
Start: 2022-10-01 | End: 2022-10-04 | Stop reason: HOSPADM

## 2022-10-01 RX ORDER — PANTOPRAZOLE SODIUM 40 MG/10ML
40 INJECTION, POWDER, LYOPHILIZED, FOR SOLUTION INTRAVENOUS 2 TIMES DAILY
Status: DISCONTINUED | OUTPATIENT
Start: 2022-10-01 | End: 2022-10-04 | Stop reason: HOSPADM

## 2022-10-01 RX ORDER — PROMETHAZINE HYDROCHLORIDE 25 MG/ML
12.5 INJECTION, SOLUTION INTRAMUSCULAR; INTRAVENOUS EVERY 6 HOURS PRN
Status: DISCONTINUED | OUTPATIENT
Start: 2022-10-01 | End: 2022-10-04 | Stop reason: HOSPADM

## 2022-10-01 RX ORDER — MAGNESIUM SULFATE 1 G/100ML
1000 INJECTION INTRAVENOUS PRN
Status: DISCONTINUED | OUTPATIENT
Start: 2022-10-01 | End: 2022-10-01

## 2022-10-01 RX ORDER — DEXTROSE AND SODIUM CHLORIDE 5; .45 G/100ML; G/100ML
INJECTION, SOLUTION INTRAVENOUS CONTINUOUS PRN
Status: DISCONTINUED | OUTPATIENT
Start: 2022-10-01 | End: 2022-10-01

## 2022-10-01 RX ORDER — 0.9 % SODIUM CHLORIDE 0.9 %
15 INTRAVENOUS SOLUTION INTRAVENOUS ONCE
Status: DISCONTINUED | OUTPATIENT
Start: 2022-10-01 | End: 2022-10-01

## 2022-10-01 RX ORDER — HYDRALAZINE HYDROCHLORIDE 25 MG/1
25 TABLET, FILM COATED ORAL EVERY 8 HOURS SCHEDULED
Status: DISCONTINUED | OUTPATIENT
Start: 2022-10-01 | End: 2022-10-01

## 2022-10-01 RX ORDER — SODIUM CHLORIDE, SODIUM LACTATE, POTASSIUM CHLORIDE, CALCIUM CHLORIDE 600; 310; 30; 20 MG/100ML; MG/100ML; MG/100ML; MG/100ML
1000 INJECTION, SOLUTION INTRAVENOUS CONTINUOUS
Status: DISCONTINUED | OUTPATIENT
Start: 2022-10-01 | End: 2022-10-02

## 2022-10-01 RX ADMIN — ONDANSETRON 4 MG: 2 INJECTION INTRAMUSCULAR; INTRAVENOUS at 07:50

## 2022-10-01 RX ADMIN — INSULIN LISPRO 5 UNITS: 100 INJECTION, SOLUTION INTRAVENOUS; SUBCUTANEOUS at 10:56

## 2022-10-01 RX ADMIN — INSULIN LISPRO 8 UNITS: 100 INJECTION, SOLUTION INTRAVENOUS; SUBCUTANEOUS at 16:49

## 2022-10-01 RX ADMIN — TOPIRAMATE 50 MG: 25 TABLET, FILM COATED ORAL at 20:18

## 2022-10-01 RX ADMIN — ONDANSETRON 4 MG: 4 TABLET, ORALLY DISINTEGRATING ORAL at 23:20

## 2022-10-01 RX ADMIN — HEPARIN SODIUM 5000 UNITS: 5000 INJECTION INTRAVENOUS; SUBCUTANEOUS at 20:19

## 2022-10-01 RX ADMIN — POLYETHYLENE GLYCOL (3350) 17 G: 17 POWDER, FOR SOLUTION ORAL at 20:18

## 2022-10-01 RX ADMIN — CEFAZOLIN 2000 MG: 2 INJECTION, POWDER, FOR SOLUTION INTRAMUSCULAR; INTRAVENOUS at 05:10

## 2022-10-01 RX ADMIN — HEPARIN SODIUM 5000 UNITS: 5000 INJECTION INTRAVENOUS; SUBCUTANEOUS at 13:09

## 2022-10-01 RX ADMIN — SODIUM CHLORIDE, POTASSIUM CHLORIDE, SODIUM LACTATE AND CALCIUM CHLORIDE 1000 ML: 600; 310; 30; 20 INJECTION, SOLUTION INTRAVENOUS at 14:55

## 2022-10-01 RX ADMIN — PROMETHAZINE HYDROCHLORIDE 12.5 MG: 25 INJECTION INTRAMUSCULAR; INTRAVENOUS at 15:01

## 2022-10-01 RX ADMIN — ACETAMINOPHEN 650 MG: 325 TABLET ORAL at 16:41

## 2022-10-01 RX ADMIN — GABAPENTIN 200 MG: 100 CAPSULE ORAL at 20:19

## 2022-10-01 RX ADMIN — HYDROCODONE BITARTRATE AND ACETAMINOPHEN 1 TABLET: 7.5; 325 TABLET ORAL at 15:01

## 2022-10-01 RX ADMIN — HYDRALAZINE HYDROCHLORIDE 10 MG: 20 INJECTION INTRAMUSCULAR; INTRAVENOUS at 04:19

## 2022-10-01 RX ADMIN — PROMETHAZINE HYDROCHLORIDE 12.5 MG: 25 INJECTION INTRAMUSCULAR; INTRAVENOUS at 05:02

## 2022-10-01 RX ADMIN — BUSPIRONE HYDROCHLORIDE 10 MG: 5 TABLET ORAL at 20:19

## 2022-10-01 RX ADMIN — PANTOPRAZOLE SODIUM 40 MG: 40 INJECTION, POWDER, FOR SOLUTION INTRAVENOUS at 12:24

## 2022-10-01 RX ADMIN — INSULIN GLARGINE 15 UNITS: 100 INJECTION, SOLUTION SUBCUTANEOUS at 20:20

## 2022-10-01 RX ADMIN — SODIUM CHLORIDE, POTASSIUM CHLORIDE, SODIUM LACTATE AND CALCIUM CHLORIDE 1000 ML: 600; 310; 30; 20 INJECTION, SOLUTION INTRAVENOUS at 20:35

## 2022-10-01 RX ADMIN — SENNOSIDES AND DOCUSATE SODIUM 2 TABLET: 50; 8.6 TABLET ORAL at 20:19

## 2022-10-01 RX ADMIN — PANTOPRAZOLE SODIUM 40 MG: 40 INJECTION, POWDER, FOR SOLUTION INTRAVENOUS at 20:19

## 2022-10-01 ASSESSMENT — PAIN DESCRIPTION - PAIN TYPE
TYPE: ACUTE PAIN;SURGICAL PAIN
TYPE: ACUTE PAIN

## 2022-10-01 ASSESSMENT — PAIN DESCRIPTION - DESCRIPTORS
DESCRIPTORS: ACHING
DESCRIPTORS: ACHING;THROBBING

## 2022-10-01 ASSESSMENT — PAIN SCALES - GENERAL
PAINLEVEL_OUTOF10: 1
PAINLEVEL_OUTOF10: 0
PAINLEVEL_OUTOF10: 4
PAINLEVEL_OUTOF10: 1
PAINLEVEL_OUTOF10: 4
PAINLEVEL_OUTOF10: 5
PAINLEVEL_OUTOF10: 1
PAINLEVEL_OUTOF10: 5
PAINLEVEL_OUTOF10: 0

## 2022-10-01 ASSESSMENT — ENCOUNTER SYMPTOMS
CHEST TIGHTNESS: 0
NAUSEA: 1
SHORTNESS OF BREATH: 0
STRIDOR: 0
DIARRHEA: 0
CHOKING: 0
BLOOD IN STOOL: 0
WHEEZING: 0
ABDOMINAL DISTENTION: 0
BACK PAIN: 0
VOMITING: 1
COUGH: 0
CONSTIPATION: 0
ABDOMINAL PAIN: 1
EYE PAIN: 0

## 2022-10-01 ASSESSMENT — PAIN SCALES - WONG BAKER
WONGBAKER_NUMERICALRESPONSE: 4
WONGBAKER_NUMERICALRESPONSE: 4
WONGBAKER_NUMERICALRESPONSE: 0

## 2022-10-01 ASSESSMENT — PAIN DESCRIPTION - FREQUENCY
FREQUENCY: CONTINUOUS
FREQUENCY: CONTINUOUS

## 2022-10-01 ASSESSMENT — PAIN DESCRIPTION - ONSET
ONSET: ON-GOING
ONSET: ON-GOING

## 2022-10-01 ASSESSMENT — PAIN - FUNCTIONAL ASSESSMENT
PAIN_FUNCTIONAL_ASSESSMENT: PREVENTS OR INTERFERES SOME ACTIVE ACTIVITIES AND ADLS

## 2022-10-01 ASSESSMENT — PAIN DESCRIPTION - ORIENTATION
ORIENTATION: UPPER
ORIENTATION: LEFT

## 2022-10-01 ASSESSMENT — PAIN DESCRIPTION - LOCATION
LOCATION: SHOULDER
LOCATION: ABDOMEN;CHEST
LOCATION: SHOULDER
LOCATION: SHOULDER

## 2022-10-01 NOTE — SIGNIFICANT EVENT
RRT called for elevated BP to 180's/110's. Patient had been vomiting. BG was elevated this morning so insulin was held. Patient did not eat her dinner last night and did not eat her breakfast this morning however her BG was 369 at the time of the rapid. Patient also complained of chest pain. EKG was without acute ischemic changes. BP decreased to 170's/50s on repeat. Labs ordered including beta hydroxybutyrate and UA. Patient taken over by Dr. Epifanio Drake.

## 2022-10-01 NOTE — PLAN OF CARE
Problem: Discharge Planning  Goal: Discharge to home or other facility with appropriate resources  Outcome: Progressing  Flowsheets (Taken 10/1/2022 1242)  Discharge to home or other facility with appropriate resources:   Identify barriers to discharge with patient and caregiver   Arrange for needed discharge resources and transportation as appropriate   Identify discharge learning needs (meds, wound care, etc)   Arrange for interpreters to assist at discharge as needed   Refer to discharge planning if patient needs post-hospital services based on physician order or complex needs related to functional status, cognitive ability or social support system     Problem: Pain  Goal: Verbalizes/displays adequate comfort level or baseline comfort level  Outcome: Progressing     Problem: Safety - Adult  Goal: Free from fall injury  Outcome: Progressing     Problem: ABCDS Injury Assessment  Goal: Absence of physical injury  Outcome: Progressing     Problem: Chronic Conditions and Co-morbidities  Goal: Patient's chronic conditions and co-morbidity symptoms are monitored and maintained or improved  Outcome: Progressing  Flowsheets (Taken 10/1/2022 1242)  Care Plan - Patient's Chronic Conditions and Co-Morbidity Symptoms are Monitored and Maintained or Improved:   Monitor and assess patient's chronic conditions and comorbid symptoms for stability, deterioration, or improvement   Collaborate with multidisciplinary team to address chronic and comorbid conditions and prevent exacerbation or deterioration   Update acute care plan with appropriate goals if chronic or comorbid symptoms are exacerbated and prevent overall improvement and discharge

## 2022-10-01 NOTE — PROGRESS NOTES
Kaitlin Chamorro is a 59 y.o. female patient. 1. Other closed displaced fracture of distal end of left humerus, initial encounter    2.  MODESTO (acute kidney injury) [facility])      Past Medical History:   Diagnosis Date    Arthritis     Asthma     Cerebral artery occlusion with cerebral infarction [facility])     per old chart 8/2015- TIA    COPD (chronic obstructive pulmonary disease) (HCC)     Diabetes mellitus (HCC)     Diabetic neuropathy (HCC)     per old chart    Fracture     per old chart pt in ER 12/9/2018- after 2 days of arm pain after fall- dx with left humerus fx- for surg 12/31/2018    History of blood transfusion     Hyperlipidemia     Hypertension     Muscle weakness (generalized)     Osteoarthritis     per old chart     Past Surgical History Pertinent Negatives:   Procedure Date Noted    HYSTERECTOMY (CERVIX STATUS UNKNOWN) 07/13/2021    OVARY REMOVAL 07/13/2021     Scheduled Meds:   insulin lispro  0-8 Units SubCUTAneous TID WC    insulin lispro  0-4 Units SubCUTAneous Nightly    sodium chloride flush  5-40 mL IntraVENous 2 times per day    amLODIPine  10 mg Oral Daily    aspirin  81 mg Oral Daily    atorvastatin  80 mg Oral Nightly    busPIRone  10 mg Oral TID    donepezil  10 mg Oral Nightly    FLUoxetine  80 mg Oral Daily    gabapentin  200 mg Oral BID    levothyroxine  75 mcg Oral QAM AC    pantoprazole  40 mg Oral BID AC    topiramate  50 mg Oral Nightly    acetaminophen  650 mg Oral Q6H    insulin glargine  15 Units SubCUTAneous Nightly    insulin lispro  5 Units SubCUTAneous TID WC    polyethylene glycol  17 g Oral BID    sennosides-docusate sodium  2 tablet Oral BID     Continuous Infusions:   lactated ringers 125 mL/hr at 09/30/22 2215    sodium chloride      dextrose       PRN Meds:hydrALAZINE, promethazine, sodium chloride flush, sodium chloride, oxyCODONE **OR** oxyCODONE, ondansetron **OR** ondansetron, aluminum & magnesium hydroxide-simethicone, acetaminophen **OR** acetaminophen, HYDROcodone-acetaminophen, glucose, dextrose bolus **OR** dextrose bolus, glucagon (rDNA), dextrose    No Known Allergies  Principal Problem:    Comminuted left humeral fracture, closed, initial encounter  Resolved Problems:    * No resolved hospital problems. *    Blood pressure (!) 181/46, pulse (!) 102, temperature 98.5 °F (36.9 °C), temperature source Oral, resp. rate 16, height 5' 4\" (1.626 m), weight 240 lb (108.9 kg), SpO2 97 %, not currently breastfeeding. Subjective  Patient seen and examined, resting in bed comfortably, pain controlled, no new complaints. Feeling better today, continued pain in the left elbow. Objective  LUE - Dressing clean, dry, intact, no calf TTP, compartments soft, neurovascularly intact distally. Radial nerve function and ulnar nerve function intact. Assessment & Plan  POD #1 ORIF left distal humerus fracture, Doing well postoperatively    Continue no lifting, pushing, pulling with left arm. Continue range of motion exercises as tolerated with the left arm. Pain control. DVT prophylaxis. Continue to ice and elevate. Continue PT/OT. Discharge planning.     James 97, DO  10/1/2022

## 2022-10-01 NOTE — PLAN OF CARE
Concern for DKA. Noted Betahydroxybutyrate of >20.8, patient with nausea and vomiting and sugars climbing up to 369,. Lipase -ve. X-ray abdomen normal. Called lab to get BMP done STAT      ADDENDUM:     Hold off on ICU transfer - BMP back with possibly starvation ketosis and less likely DKA.    Orders adjusted    Electronically signed by Shellie Brenner MD on 10/1/2022 at 1:23 PM

## 2022-10-01 NOTE — PROGRESS NOTES
In-Patient Progress Note    Patient:  Sherrill Pelayo 59 y.o. female MRN: [id number]     Date of Service: 10/1/2022    Hospital Day: 3      Chief complaint: had concerns including Fall and Arm Injury (Left arm deformity, 100mcg of fentanyl on board from EMS). Subjective   Patient seen and examined in the morning. Patient states she has some nausea and has vomited a few times since overnight. Also states she has mild epigastric pain - 2/10 and has been present since last night. Overnight event noted - had to be straight cathed due to urinary retention. RRT called earlier in the AM today after my initial assessment - Dr. Saida Garcia at bedside - patient apparently dropped her HR to ?30s with BP elevated. Sugars also elevated and with vomiting. STAT labs sent. Lipase added on. Patient to be transferred to intermediate. See ROS    I have reviewed all pertinent PMHx, PSHx, FamHx, SocialHx, medications, and allergies and updated history as appropriate. I have reviewed images as appropriate. Assessment and [address] Will, a 59 y.o. female, with a history of TIA, HTN, HLD, T2DM and class III obesity  was admitted on 9/29/2022 with complaints of had concerns including Fall and Arm Injury (Left arm deformity, 100mcg of fentanyl on board from EMS). Assessment  Lt. Distal Fracture 2/2 mechanical fall s/p ORIF, ulnar nerve decompression (9/30/2022)  Ortho on board   No pain Lt. Arm per patient. Nausea and vomiting with epigastric pain 2/2 ?gastritis with ? ?carlos grove tear vs ?DKA vs uremia 2/2 #3 vs ?Pancreatitis   N/V - pt. States it been present since before admission   Vomitus in the AM with black/coffee color   Epigastric tenderness   On oral PPI BID    MODESTO Stage II 2/2 ? prerenal vs 2/2 #4  Baseline Cr 1  Presented with 2.3, trending down to 2.0  Labs pending for today   On LR 125cc/hr    Acute Urinary retention   Had to be straight cathted   RRT today - stewart placed with good urine output. IDDM with neuropathy  On lantus 15U HS, Humalog 5U TID and LDSS  Sugars climbing up  On gabapentin    HTN Urgency with H/O HTN  SBP elevated up to 194 today, repeat 158/59  Amlodipine had not been given yet     H/O TIA  On aspirin and statin     H/O Depression  On fluoxetine       Plan  F/U Ortho recs   Urology consult   F/U STAT labs from RRT  F/U Lipase and beta-hydroxybuturate   Obtain urine lytes, UA  F/U X-ray KUB  Consider scanning abdomen if N/V/abdominal pain continues   Obtain retroperitoneal US to r/o renal obstruction   PPI to IV BID  Increase sliding scale to MDSS   Consider GI consult if continues with vomiting   Continue fluids for now      # Peptic ulcer prophylaxis: Pantoprazole BID  # DVT Prophylaxis: Heparin TID  #CODE STATUS: Full      Expected Disposition: TBD  Estimated discharge date: TBD. Requires continuous admission due to RRT today, nausea and vomiting. Review of System     Review of Systems   Constitutional:  Negative for chills, fatigue, fever and unexpected weight change. Eyes:  Negative for pain and visual disturbance. Respiratory:  Negative for cough, choking, chest tightness, shortness of breath, wheezing and stridor. Cardiovascular:  Negative for chest pain, palpitations and leg swelling. Gastrointestinal:  Positive for abdominal pain, nausea and vomiting. Negative for abdominal distention, blood in stool, constipation and diarrhea. Genitourinary:  Negative for decreased urine volume, dysuria, frequency, hematuria and urgency. Musculoskeletal:  Negative for arthralgias, back pain and myalgias. Skin:  Negative for pallor and rash. Neurological:  Negative for dizziness, tremors, syncope, speech difficulty, weakness, light-headedness, numbness and headaches. Psychiatric/Behavioral:  Negative for agitation.       I have reviewed all pertinent PMHx, PSHx, FamHx, SocialHx, medications, and allergies and updated history as appropriate. Physical Exam   VITAL SIGNS:  BP (!) 158/59   Pulse 90   Temp 98.5 °F (36.9 °C) (Oral)   Resp 11   Ht 5' 4\" (1.626 m)   Wt 240 lb (108.9 kg)   SpO2 96%   BMI 41.20 kg/m²   Tmax over 24 hours:  Temp (24hrs), Av.9 °F (36.6 °C), Min:96.8 °F (36 °C), Max:98.6 °F (37 °C)      Patient Vitals for the past 6 hrs:   BP Temp Temp src Pulse Resp SpO2   10/01/22 1117 (!) 158/59 -- -- 90 11 96 %   10/01/22 1105 (!) 179/56 -- -- 97 11 --   10/01/22 1104 -- -- -- (!) 103 10 --   10/01/22 1103 -- -- -- (!) 113 14 --   10/01/22 1102 -- -- -- (!) 128 12 --   10/01/22 1100 (!) 194/181 -- -- 81 14 --   10/01/22 1059 -- -- -- 58 15 --   10/01/22 1021 (!) 181/46 98.5 °F (36.9 °C) Oral (!) 102 16 97 %   10/01/22 0756 (!) 197/156 -- -- (!) 102 13 97 %   10/01/22 0745 125/82 -- -- -- -- --         Intake/Output Summary (Last 24 hours) at 10/1/2022 1135  Last data filed at 10/1/2022 1117  Gross per 24 hour   Intake 1075 ml   Output 1330 ml   Net -255 ml     Wt Readings from Last 2 Encounters:   22 240 lb (108.9 kg)   22 240 lb (108.9 kg)     Body mass index is 41.2 kg/m². Physical Exam  Vitals and nursing note reviewed. Constitutional:       General: She is not in acute distress. Appearance: She is ill-appearing. She is not toxic-appearing or diaphoretic. HENT:      Head: Normocephalic and atraumatic. Right Ear: External ear normal.      Left Ear: External ear normal.      Nose: Nose normal.      Mouth/Throat:      Pharynx: Oropharynx is clear. No oropharyngeal exudate or posterior oropharyngeal erythema. Eyes:      General: No scleral icterus. Right eye: No discharge. Left eye: No discharge. Conjunctiva/sclera: Conjunctivae normal.   Cardiovascular:      Rate and Rhythm: Normal rate and regular rhythm. Pulses: Normal pulses. Heart sounds: Normal heart sounds. No murmur heard. No friction rub. No gallop.    Pulmonary:      Effort: Pulmonary effort is normal. No respiratory distress. Breath sounds: Normal breath sounds. No stridor. No wheezing, rhonchi or rales. Abdominal:      General: Abdomen is protuberant. Bowel sounds are normal. There is no distension. Palpations: Abdomen is soft. There is no mass. Tenderness: There is abdominal tenderness in the epigastric area. There is no guarding or rebound. Hernia: No hernia is present. Musculoskeletal:      Right lower leg: No edema. Left lower leg: No edema. Comments: Lt. Arm in sling   Skin:     General: Skin is warm. Capillary Refill: Capillary refill takes less than 2 seconds. Neurological:      Mental Status: She is alert and oriented to person, place, and time.    Psychiatric:         Mood and Affect: Mood normal.         Current Medications      insulin lispro  0-8 Units SubCUTAneous TID WC    insulin lispro  0-4 Units SubCUTAneous Nightly    pantoprazole  40 mg IntraVENous BID    sodium chloride flush  5-40 mL IntraVENous 2 times per day    amLODIPine  10 mg Oral Daily    aspirin  81 mg Oral Daily    atorvastatin  80 mg Oral Nightly    busPIRone  10 mg Oral TID    donepezil  10 mg Oral Nightly    FLUoxetine  80 mg Oral Daily    gabapentin  200 mg Oral BID    levothyroxine  75 mcg Oral QAM AC    topiramate  50 mg Oral Nightly    acetaminophen  650 mg Oral Q6H    insulin glargine  15 Units SubCUTAneous Nightly    insulin lispro  5 Units SubCUTAneous TID WC    polyethylene glycol  17 g Oral BID    sennosides-docusate sodium  2 tablet Oral BID         Labs and Imaging Studies   Laboratory findings:  XR HUMERUS LEFT (MIN 2 VIEWS)    Result Date: 9/29/2022  EXAMINATION: TWO XRAY VIEWS OF THE LEFT HUMERUS 9/29/2022 6:04 pm COMPARISON: 02/07/2019 HISTORY: ORDERING SYSTEM PROVIDED HISTORY: fall TECHNOLOGIST PROVIDED HISTORY: Reason for exam:->fall Reason for exam:->injury Reason for Exam: LEFT HUMERUS PAIN Additional signs and symptoms: FALL Relevant Medical/Surgical History: NA FINDINGS: Plate screw fixation of the proximal humerus, without hardware complication. An oblique displaced fracture of the distal humeral shaft is noted. No evidence for shoulder or elbow malalignment, within the limitations of positioning and external artifact. 1.  Oblique, displaced fracture of the distal humeral shaft. 2.  Sequela of proximal humerus fracture repair without evidence for hardware complication. XR ELBOW LEFT (MIN 3 VIEWS)    Result Date: 9/29/2022  EXAMINATION: THREE XRAY VIEWS OF THE LEFT ELBOW 9/29/2022 7:58 pm COMPARISON: Same day left humerus radiographs HISTORY: ORDERING SYSTEM PROVIDED HISTORY: pain, trauma TECHNOLOGIST PROVIDED HISTORY: Reason for exam:->pain, trauma Reason for Exam: left elbow pain Additional signs and symptoms: fall Relevant Medical/Surgical History: na FINDINGS: Nearly nondiagnostic images due to overlying objects and patient positioning. Redemonstrated oblique, displaced fracture of the distal humeral shaft. There is a questionable additional vertical fracture involving the distal humeral fracture component. The elbow appears to be in normal alignment. No definite fracture involving visualized portions of the radius or ulna. Soft tissue swelling is seen about the distal humerus. Severely limited, nearly nondiagnostic images of the right elbow due to overlying objects and patient positioning. Repeat radiographs could be obtained after the removal of these objects. Redemonstrated displaced, oblique fracture of the distal humeral diaphysis. There is a questionable additional vertical fracture involving the distal humeral fracture component.      CT HEAD WO CONTRAST    Result Date: 9/29/2022  EXAMINATION: CT OF THE HEAD WITHOUT CONTRAST  9/29/2022 7:55 pm TECHNIQUE: CT of the head was performed without the administration of intravenous contrast. Automated exposure control, iterative reconstruction, and/or weight based adjustment of the mA/kV was utilized to reduce the radiation dose to as low as reasonably achievable. COMPARISON: 03/17/2022 HISTORY: ORDERING SYSTEM PROVIDED HISTORY: trauma TECHNOLOGIST PROVIDED HISTORY: Has a \"code stroke\" or \"stroke alert\" been called? ->No Reason for exam:->trauma Decision Support Exception - unselect if not a suspected or confirmed emergency medical condition->Emergency Medical Condition (MA) Reason for Exam: trauma FINDINGS: BRAIN/VENTRICLES: There is no acute intracranial hemorrhage, mass effect or midline shift. No abnormal extra-axial fluid collection. The gray-white differentiation is maintained without evidence of an acute infarct. There is no evidence of hydrocephalus. ORBITS: The visualized portion of the orbits demonstrate no acute abnormality. Bilateral lens replacement. SINUSES: Small bubbly air-fluid level present in the right sphenoid sinus. The mastoid air cells appear clear. SOFT TISSUES/SKULL:  No acute abnormality of the visualized skull or soft tissues. 1. No acute intracranial abnormality. 2. Small bubbly air-fluid level present in the right sphenoid sinus. Finding raises the possibility of acute sinusitis in the appropriate clinical setting. FL LESS THAN 1 HOUR    Result Date: 9/30/2022  Radiology exam is complete. No Radiologist dictation. Please follow up with ordering provider.        Recent Results (from the past 24 hour(s))   POCT Glucose    Collection Time: 09/30/22 12:25 PM   Result Value Ref Range    POC Glucose 240 (H) 70 - 99 MG/DL   POCT Glucose    Collection Time: 09/30/22  3:25 PM   Result Value Ref Range    POC Glucose 295 (H) 70 - 99 MG/DL   POCT Glucose    Collection Time: 09/30/22  4:12 PM   Result Value Ref Range    POC Glucose 346 (H) 70 - 99 MG/DL   POCT Glucose    Collection Time: 09/30/22  7:50 PM   Result Value Ref Range    POC Glucose 341 (H) 70 - 99 MG/DL   POCT Glucose    Collection Time: 10/01/22  4:51 AM   Result Value Ref Range    POC Glucose 274 (H) 70 - 99 MG/DL   POCT Glucose    Collection Time: 10/01/22  6:37 AM   Result Value Ref Range    POC Glucose 292 (H) 70 - 99 MG/DL   POCT Glucose    Collection Time: 10/01/22 11:07 AM   Result Value Ref Range    POC Glucose 369 (H) 70 - 99 MG/DL   EKG 12 Lead    Collection Time: 10/01/22 11:14 AM   Result Value Ref Range    Ventricular Rate 94 BPM    Atrial Rate 94 BPM    P-R Interval 128 ms    QRS Duration 90 ms    Q-T Interval 390 ms    QTc Calculation (Bazett) 487 ms    P Axis 26 degrees    R Axis -13 degrees    T Axis 113 degrees    Diagnosis       Normal sinus rhythm  T wave abnormality, consider lateral ischemia  Abnormal ECG  When compared with ECG of 21-AUG-2022 13:07,  No significant change was found             Electronically signed by Cande العراقي MD on 10/1/2022 at 11:35 AM

## 2022-10-01 NOTE — CONSULTS
[facility] [address]   Consult Note  [facility] 1 2 3 4 5    Date: 10/1/2022   Patient: Linda Santana   : 1960   DOA: 2022   MRN: [id number]   ROOM#: [id number]     Reason for Consult:  Urinary retention  Requesting Physician:  Dr. Charlene Jimenez  Collaborating Urologist on Call at time of admission: Santino    CHIEF COMPLAINT:  nausea and emesis    History Obtained From:  patient, electronic medical record    HISTORY OF PRESENT ILLNESS:                The patient is a 59 y.o. female with significant past medical history of TIA, HTN, HLD, T2DM and class III obesity  who presented with  with Left arm injury after a fall. Pt noting nausea and emesis pot op ORIF/ulnar nerve decompression. Pt unable to void post op and had to be cathed. PT states she has had this problem before but was having increased urgency and frequency prior to admission. Past Medical History:        Diagnosis Date    Arthritis     Asthma     Cerebral artery occlusion with cerebral infarction [facility])     per old chart 2015- TIA    COPD (chronic obstructive pulmonary disease) (HCC)     Diabetes mellitus (HCC)     Diabetic neuropathy ([facility] [address]     per old chart    Fracture     per old chart pt in ER 2018- after 2 days of arm pain after fall- dx with left humerus fx- for surg 2018    History of blood transfusion     Hyperlipidemia     Hypertension     Muscle weakness (generalized)     Osteoarthritis     per old chart     Past Surgical History:        Procedure Laterality Date    BREAST BIOPSY Right     CHOLECYSTECTOMY  ? EYE SURGERY      per old chart had right eye cataract ext done 2018 and left eye 2018    FOOT SURGERY Left ? HUMERUS FRACTURE SURGERY Left 2018    HUMERUS OPEN REDUCTION INTERNAL FIXATION LEFT PROXIMAL performed by Laine Garcia DO at [address] Right ?     LUNG SURGERY  ?    simone lung lobectomy      Current Medications:   Current Facility-Administered Medications: hydrALAZINE (APRESOLINE) injection 10 mg, 10 mg, IntraVENous, Q6H PRN  promethazine (PHENERGAN) injection 12.5 mg, 12.5 mg, IntraMUSCular, Q6H PRN  pantoprazole (PROTONIX) injection 40 mg, 40 mg, IntraVENous, BID  heparin (porcine) injection 5,000 Units, 5,000 Units, SubCUTAneous, 3 times per day  lactated ringers infusion 1,000 mL, 1,000 mL, IntraVENous, Continuous  insulin lispro (HUMALOG) injection vial 0-16 Units, 0-16 Units, SubCUTAneous, TID WC  insulin lispro (HUMALOG) injection vial 0-4 Units, 0-4 Units, SubCUTAneous, Nightly  oxyCODONE (ROXICODONE) immediate release tablet 5 mg, 5 mg, Oral, Q4H PRN **OR** oxyCODONE HCl (OXY-IR) immediate release tablet 10 mg, 10 mg, Oral, Q4H PRN  ondansetron (ZOFRAN-ODT) disintegrating tablet 4 mg, 4 mg, Oral, Q8H PRN **OR** ondansetron (ZOFRAN) injection 4 mg, 4 mg, IntraVENous, Q6H PRN  amLODIPine (NORVASC) tablet 10 mg, 10 mg, Oral, Daily  aspirin chewable tablet 81 mg, 81 mg, Oral, Daily  atorvastatin (LIPITOR) tablet 80 mg, 80 mg, Oral, Nightly  busPIRone (BUSPAR) tablet 10 mg, 10 mg, Oral, TID  donepezil (ARICEPT) tablet 10 mg, 10 mg, Oral, Nightly  FLUoxetine (PROZAC) capsule 80 mg, 80 mg, Oral, Daily  gabapentin (NEURONTIN) capsule 200 mg, 200 mg, Oral, BID  levothyroxine (SYNTHROID) tablet 75 mcg, 75 mcg, Oral, QAM AC  topiramate (TOPAMAX) tablet 50 mg, 50 mg, Oral, Nightly  aluminum & magnesium hydroxide-simethicone (MAALOX) 200-200-20 MG/5ML suspension 30 mL, 30 mL, Oral, Q6H PRN  acetaminophen (TYLENOL) tablet 650 mg, 650 mg, Oral, Q6H PRN **OR** acetaminophen (TYLENOL) suppository 650 mg, 650 mg, Rectal, Q6H PRN  acetaminophen (TYLENOL) tablet 650 mg, 650 mg, Oral, Q6H  HYDROcodone-acetaminophen (NORCO) 7.5-325 MG per tablet 1 tablet, 1 tablet, Oral, Q6H PRN  glucose chewable tablet 16 g, 4 tablet, Oral, PRN  glucagon (rDNA) injection 1 mg, 1 mg, SubCUTAneous, PRN  dextrose 10 % infusion, , IntraVENous, Continuous PRN  insulin glargine (LANTUS) injection vial 15 Units, 15 Units, SubCUTAneous, Nightly  insulin lispro (HUMALOG) injection vial 5 Units, 5 Units, SubCUTAneous, TID WC  polyethylene glycol (GLYCOLAX) packet 17 g, 17 g, Oral, BID  sennosides-docusate sodium (SENOKOT-S) 8.6-50 MG tablet 2 tablet, 2 tablet, Oral, BID    Allergies:  Patient has no known allergies. Social History:   TOBACCO:   reports that she has never smoked. She has never used smokeless tobacco.  ETOH:   reports current alcohol use. DRUGS:   reports no history of drug use. Family History:       Problem Relation Age of Onset    Breast Cancer Maternal Aunt 48    Breast Cancer Maternal Aunt 48       REVIEW OF SYSTEMS:     CONSTITUTIONAL:  negative  RESPIRATORY:  negative  CARDIOVASCULAR:  negative  GASTROINTESTINAL:  positive for nausea and vomiting  GENITOURINARY:  negative    PHYSICAL EXAM:      VITALS:  BP (!) 163/56   Pulse 92   Temp 98.8 °F (37.1 °C) (Axillary)   Resp 17   Ht 5' 4\" (1.626 m)   Wt 240 lb (108.9 kg)   SpO2 98%   BMI 41.20 kg/m²      TEMPERATURE:  Current - Temp: 98.8 °F (37.1 °C);  Max - Temp  Av.1 °F (36.7 °C)  Min: 96.9 °F (36.1 °C)  Max: 98.8 °F (37.1 °C)  24HR BLOOD PRESSURE RANGE:  Systolic (79SLX), ZFE:233 , Min:118 , YGY:487   ; Diastolic (38PMR), ADA:37, Min:46, Max:181      BP (!) 163/56   Pulse 92   Temp 98.8 °F (37.1 °C) (Axillary)   Resp 17   Ht 5' 4\" (1.626 m)   Wt 240 lb (108.9 kg)   SpO2 98%   BMI 41.20 kg/m²   General appearance: alert, appears stated age, and cooperative  Abdomen: soft, non-tender; bowel sounds normal; no masses,  no organomegaly    DATA:    WBC:    Lab Results   Component Value Date/Time    WBC 8.2 10/01/2022 11:22 AM     Hemoglobin/Hematocrit:    Lab Results   Component Value Date/Time    HGB 9.4 10/01/2022 11:22 AM    HCT 30.8 10/01/2022 11:22 AM     BMP:    Lab Results   Component Value Date/Time     10/01/2022 11:22 AM    K 5.0 10/01/2022 11:22 AM     10/01/2022 11:22 AM    CO2 20 10/01/2022 11:22 AM    BUN 28 10/01/2022 11:22 AM    LABALBU 3.7 09/29/2022 06:03 PM    CREATININE 1.7 10/01/2022 11:22 AM    CALCIUM 8.4 10/01/2022 11:22 AM    GFRAA 37 10/01/2022 11:22 AM    LABGLOM 30 10/01/2022 11:22 AM     PT/INR:    Lab Results   Component Value Date/Time    PROTIME 11.1 10/01/2022 11:22 AM    INR 0.86 10/01/2022 11:22 AM         Urine Culture: pending    Imaging:  CT ABDOMEN PELVIS WO CONTRAST Additional Contrast? None    Result Date: 9/18/2022  EXAMINATION: CT OF THE ABDOMEN AND PELVIS WITHOUT CONTRAST 9/18/2022 6:33 pm TECHNIQUE: CT of the abdomen and pelvis was performed without the administration of intravenous contrast. Multiplanar reformatted images are provided for review. Automated exposure control, iterative reconstruction, and/or weight based adjustment of the mA/kV was utilized to reduce the radiation dose to as low as reasonably achievable. COMPARISON: 08/21/2022 HISTORY: ORDERING SYSTEM PROVIDED HISTORY: L flank/abdomina pain/n/v TECHNOLOGIST PROVIDED HISTORY: Reason for exam:->L flank/abdomina pain/n/v Additional Contrast?->None Decision Support Exception - unselect if not a suspected or confirmed emergency medical condition->Emergency Medical Condition (MA) Reason for Exam: L flank/abdomina pain/n/v FINDINGS: Lower Chest: Mild dependent subsegmental atelectasis. Organs: Normal liver. Gallbladder is surgically absent. No evidence of biliary ductal dilatation. Spleen is normal in size. Normal pancreas. No evidence of ductal dilatation. Normal adrenal glands. Small left parapelvic cyst, otherwise normal kidneys. No renal calculi or hydronephrosis. GI/Bowel: Unchanged small paraesophageal hernia. Normal stomach and bowel. Appendix is normal.  Submucosal fat deposition throughout the cecum and proximal right colon suggestive of chronic inflammation. Scattered colonic diverticulosis. Pelvis: Normal bladder. Normal uterus.  Peritoneum/Retroperitoneum:No free fluid, free air, organized fluid collection or lymphadenopathy. Moderate calcific atherosclerosis. Soft tissues: Small fat containing umbilical hernia. . Bones: No acute osseous abnormality. Unchanged enchondromas in the proximal right femur. .     No acute abnormality of the abdomen or pelvis. Unchanged small paraesophageal hernia     XR HUMERUS LEFT (MIN 2 VIEWS)    Result Date: 9/29/2022  EXAMINATION: TWO XRAY VIEWS OF THE LEFT HUMERUS 9/29/2022 6:04 pm COMPARISON: 02/07/2019 HISTORY: ORDERING SYSTEM PROVIDED HISTORY: fall TECHNOLOGIST PROVIDED HISTORY: Reason for exam:->fall Reason for exam:->injury Reason for Exam: LEFT HUMERUS PAIN Additional signs and symptoms: FALL Relevant Medical/Surgical History: NA FINDINGS: Plate screw fixation of the proximal humerus, without hardware complication. An oblique displaced fracture of the distal humeral shaft is noted. No evidence for shoulder or elbow malalignment, within the limitations of positioning and external artifact. 1.  Oblique, displaced fracture of the distal humeral shaft. 2.  Sequela of proximal humerus fracture repair without evidence for hardware complication. XR ELBOW LEFT (MIN 3 VIEWS)    Result Date: 9/29/2022  EXAMINATION: THREE XRAY VIEWS OF THE LEFT ELBOW 9/29/2022 7:58 pm COMPARISON: Same day left humerus radiographs HISTORY: ORDERING SYSTEM PROVIDED HISTORY: pain, trauma TECHNOLOGIST PROVIDED HISTORY: Reason for exam:->pain, trauma Reason for Exam: left elbow pain Additional signs and symptoms: fall Relevant Medical/Surgical History: na FINDINGS: Nearly nondiagnostic images due to overlying objects and patient positioning. Redemonstrated oblique, displaced fracture of the distal humeral shaft. There is a questionable additional vertical fracture involving the distal humeral fracture component. The elbow appears to be in normal alignment. No definite fracture involving visualized portions of the radius or ulna.   Soft tissue swelling is seen about the distal humerus. Severely limited, nearly nondiagnostic images of the right elbow due to overlying objects and patient positioning. Repeat radiographs could be obtained after the removal of these objects. Redemonstrated displaced, oblique fracture of the distal humeral diaphysis. There is a questionable additional vertical fracture involving the distal humeral fracture component. XR ABDOMEN (KUB) (SINGLE AP VIEW)    Result Date: 10/1/2022  EXAMINATION: ONE SUPINE XRAY VIEW(S) OF THE ABDOMEN 10/1/2022 11:32 am COMPARISON: None HISTORY: ORDERING SYSTEM PROVIDED HISTORY: vomiting, had surgery TECHNOLOGIST PROVIDED HISTORY: Reason for exam:->vomiting, had surgery Reason for Exam: vomiting, had surgery FINDINGS: Cholecystectomy clips. Partially included overlying heart monitor leads. Urethral catheter in place. Exclusion of the diaphragm from the fields of view. Patchy bowel gas in the small bowel, colon, and rectum with no findings of obstruction. Minimal stool. Patchy atherosclerotic calcifications in the left upper quadrant and pelvis. Venous phleboliths along the left pelvic sidewall. No acute fracture. Nonobstructive bowel gas pattern. CT HEAD WO CONTRAST    Result Date: 9/29/2022  EXAMINATION: CT OF THE HEAD WITHOUT CONTRAST  9/29/2022 7:55 pm TECHNIQUE: CT of the head was performed without the administration of intravenous contrast. Automated exposure control, iterative reconstruction, and/or weight based adjustment of the mA/kV was utilized to reduce the radiation dose to as low as reasonably achievable. COMPARISON: 03/17/2022 HISTORY: ORDERING SYSTEM PROVIDED HISTORY: trauma TECHNOLOGIST PROVIDED HISTORY: Has a \"code stroke\" or \"stroke alert\" been called? ->No Reason for exam:->trauma Decision Support Exception - unselect if not a suspected or confirmed emergency medical condition->Emergency Medical Condition (MA) Reason for Exam: trauma FINDINGS: BRAIN/VENTRICLES: There is no acute intracranial hemorrhage, mass effect or midline shift. No abnormal extra-axial fluid collection. The gray-white differentiation is maintained without evidence of an acute infarct. There is no evidence of hydrocephalus. ORBITS: The visualized portion of the orbits demonstrate no acute abnormality. Bilateral lens replacement. SINUSES: Small bubbly air-fluid level present in the right sphenoid sinus. The mastoid air cells appear clear. SOFT TISSUES/SKULL:  No acute abnormality of the visualized skull or soft tissues. 1. No acute intracranial abnormality. 2. Small bubbly air-fluid level present in the right sphenoid sinus. Finding raises the possibility of acute sinusitis in the appropriate clinical setting. FL LESS THAN 1 HOUR    Result Date: 9/30/2022  Radiology exam is complete. No Radiologist dictation. Please follow up with ordering provider. Assessment & Plan:      Janet Reilly is a 59y.o. year old female admitted 9/29/2022 for L arm injury: Pod#1 ORIF/ulnar nerve decompression    1) : UR likely due to DM, recent surgery, and immobility. Urine clear. Check culture. Voiding trial in 2 days if more mobile. Recommend outpatient evaluation for voiding dysfunction. Patient seen and examined, chart reviewed.      Electronically signed by Pete España MD on 10/1/2022 at 3:23 PM

## 2022-10-01 NOTE — PROGRESS NOTES
Paged hospitalist on urinary retention of >999ml. Bladder scan and straight cath performed. Patient cath returned 730ml and was indicated to rescan patient at 8am an with further urinary retention to place stewart cath. Patient bp will be rechecked in hour to assure elevated bp was due to urinary retention and not due to bp. Hydralazine ivpb ordered in event of elevated bp post cath.

## 2022-10-02 LAB
ANION GAP SERPL CALCULATED.3IONS-SCNC: 9 MMOL/L (ref 4–16)
BASOPHILS ABSOLUTE: 0 K/CU MM
BASOPHILS RELATIVE PERCENT: 0.5 % (ref 0–1)
BILIRUBIN URINE: NEGATIVE
BLOOD, URINE: NORMAL
BUN BLDV-MCNC: 19 MG/DL (ref 6–23)
CALCIUM SERPL-MCNC: 8.5 MG/DL (ref 8.3–10.6)
CHLORIDE BLD-SCNC: 106 MMOL/L (ref 99–110)
CHLORIDE URINE RANDOM: 98 MMOL/L (ref 43–210)
CLARITY: NORMAL
CO2: 24 MMOL/L (ref 21–32)
COLOR: YELLOW
CREAT SERPL-MCNC: 1.1 MG/DL (ref 0.6–1.1)
CREATININE URINE: 84.2 MG/DL
DIFFERENTIAL TYPE: ABNORMAL
EKG ATRIAL RATE: 94 BPM
EKG DIAGNOSIS: NORMAL
EKG P AXIS: 26 DEGREES
EKG P-R INTERVAL: 128 MS
EKG Q-T INTERVAL: 390 MS
EKG QRS DURATION: 90 MS
EKG QTC CALCULATION (BAZETT): 487 MS
EKG R AXIS: -13 DEGREES
EKG T AXIS: 113 DEGREES
EKG VENTRICULAR RATE: 94 BPM
EOSINOPHILS ABSOLUTE: 0.1 K/CU MM
EOSINOPHILS RELATIVE PERCENT: 2 % (ref 0–3)
GFR AFRICAN AMERICAN: >60 ML/MIN/1.73M2
GFR NON-AFRICAN AMERICAN: 50 ML/MIN/1.73M2
GLUCOSE BLD-MCNC: 120 MG/DL (ref 70–99)
GLUCOSE BLD-MCNC: 155 MG/DL (ref 70–99)
GLUCOSE BLD-MCNC: 170 MG/DL (ref 70–99)
GLUCOSE BLD-MCNC: 196 MG/DL (ref 70–99)
GLUCOSE BLD-MCNC: 205 MG/DL (ref 70–99)
GLUCOSE, URINE: 250 MG/DL
HCT VFR BLD CALC: 27.1 % (ref 37–47)
HEMOGLOBIN: 8.3 GM/DL (ref 12.5–16)
IMMATURE NEUTROPHIL %: 0.2 % (ref 0–0.43)
KETONES, URINE: 15 MG/DL
LACTATE: 0.8 MMOL/L (ref 0.4–2)
LEUKOCYTE ESTERASE, URINE: NORMAL
LYMPHOCYTES ABSOLUTE: 1.8 K/CU MM
LYMPHOCYTES RELATIVE PERCENT: 28.4 % (ref 24–44)
MAGNESIUM: 1.6 MG/DL (ref 1.8–2.4)
MCH RBC QN AUTO: 30 PG (ref 27–31)
MCHC RBC AUTO-ENTMCNC: 30.6 % (ref 32–36)
MCV RBC AUTO: 97.8 FL (ref 78–100)
MONOCYTES ABSOLUTE: 0.8 K/CU MM
MONOCYTES RELATIVE PERCENT: 13 % (ref 0–4)
NITRITE URINE, QUANTITATIVE: NEGATIVE
NUCLEATED RBC %: 0 %
PDW BLD-RTO: 13.2 % (ref 11.7–14.9)
PH, URINE: 6
PHOSPHORUS: 1.9 MG/DL (ref 2.5–4.9)
PLATELET # BLD: 177 K/CU MM (ref 140–440)
PMV BLD AUTO: 9.9 FL (ref 7.5–11.1)
POTASSIUM SERPL-SCNC: 4.6 MMOL/L (ref 3.5–5.1)
POTASSIUM, UR: 24 MMOL/L (ref 22–119)
PROTEIN UA: NORMAL MG/DL
RBC # BLD: 2.77 M/CU MM (ref 4.2–5.4)
SEGMENTED NEUTROPHILS ABSOLUTE COUNT: 3.6 K/CU MM
SEGMENTED NEUTROPHILS RELATIVE PERCENT: 55.9 % (ref 36–66)
SODIUM BLD-SCNC: 139 MMOL/L (ref 135–145)
SODIUM URINE: 105 MMOL/L (ref 35–167)
SPECIFIC GRAVITY UA: 1.02
TOTAL IMMATURE NEUTOROPHIL: 0.01 K/CU MM
TOTAL NUCLEATED RBC: 0 K/CU MM
UROBILINOGEN, URINE: 0.2 MG/DL
WBC # BLD: 6.5 K/CU MM (ref 4–10.5)

## 2022-10-02 PROCEDURE — 6370000000 HC RX 637 (ALT 250 FOR IP): Performed by: ORTHOPAEDIC SURGERY

## 2022-10-02 PROCEDURE — 6370000000 HC RX 637 (ALT 250 FOR IP): Performed by: INTERNAL MEDICINE

## 2022-10-02 PROCEDURE — 84133 ASSAY OF URINE POTASSIUM: CPT

## 2022-10-02 PROCEDURE — C9113 INJ PANTOPRAZOLE SODIUM, VIA: HCPCS | Performed by: INTERNAL MEDICINE

## 2022-10-02 PROCEDURE — 2580000003 HC RX 258: Performed by: INTERNAL MEDICINE

## 2022-10-02 PROCEDURE — 6360000002 HC RX W HCPCS: Performed by: INTERNAL MEDICINE

## 2022-10-02 PROCEDURE — 81003 URINALYSIS AUTO W/O SCOPE: CPT

## 2022-10-02 PROCEDURE — 2500000003 HC RX 250 WO HCPCS: Performed by: INTERNAL MEDICINE

## 2022-10-02 PROCEDURE — 85025 COMPLETE CBC W/AUTO DIFF WBC: CPT

## 2022-10-02 PROCEDURE — 6360000002 HC RX W HCPCS: Performed by: STUDENT IN AN ORGANIZED HEALTH CARE EDUCATION/TRAINING PROGRAM

## 2022-10-02 PROCEDURE — 82962 GLUCOSE BLOOD TEST: CPT

## 2022-10-02 PROCEDURE — 83735 ASSAY OF MAGNESIUM: CPT

## 2022-10-02 PROCEDURE — 82436 ASSAY OF URINE CHLORIDE: CPT

## 2022-10-02 PROCEDURE — 80048 BASIC METABOLIC PNL TOTAL CA: CPT

## 2022-10-02 PROCEDURE — 51702 INSERT TEMP BLADDER CATH: CPT

## 2022-10-02 PROCEDURE — 2140000000 HC CCU INTERMEDIATE R&B

## 2022-10-02 PROCEDURE — 82570 ASSAY OF URINE CREATININE: CPT

## 2022-10-02 PROCEDURE — 93010 ELECTROCARDIOGRAM REPORT: CPT | Performed by: INTERNAL MEDICINE

## 2022-10-02 PROCEDURE — 94761 N-INVAS EAR/PLS OXIMETRY MLT: CPT

## 2022-10-02 PROCEDURE — 84300 ASSAY OF URINE SODIUM: CPT

## 2022-10-02 PROCEDURE — 36415 COLL VENOUS BLD VENIPUNCTURE: CPT

## 2022-10-02 PROCEDURE — 84100 ASSAY OF PHOSPHORUS: CPT

## 2022-10-02 RX ORDER — NITROFURANTOIN 25; 75 MG/1; MG/1
100 CAPSULE ORAL EVERY 12 HOURS SCHEDULED
Status: DISCONTINUED | OUTPATIENT
Start: 2022-10-02 | End: 2022-10-04 | Stop reason: HOSPADM

## 2022-10-02 RX ORDER — MAGNESIUM SULFATE HEPTAHYDRATE 40 MG/ML
2000 INJECTION, SOLUTION INTRAVENOUS ONCE
Status: COMPLETED | OUTPATIENT
Start: 2022-10-02 | End: 2022-10-02

## 2022-10-02 RX ORDER — SODIUM CHLORIDE, SODIUM LACTATE, POTASSIUM CHLORIDE, CALCIUM CHLORIDE 600; 310; 30; 20 MG/100ML; MG/100ML; MG/100ML; MG/100ML
1000 INJECTION, SOLUTION INTRAVENOUS CONTINUOUS
Status: DISPENSED | OUTPATIENT
Start: 2022-10-02 | End: 2022-10-02

## 2022-10-02 RX ADMIN — GABAPENTIN 200 MG: 100 CAPSULE ORAL at 20:55

## 2022-10-02 RX ADMIN — SODIUM CHLORIDE, POTASSIUM CHLORIDE, SODIUM LACTATE AND CALCIUM CHLORIDE 1000 ML: 600; 310; 30; 20 INJECTION, SOLUTION INTRAVENOUS at 19:07

## 2022-10-02 RX ADMIN — BUSPIRONE HYDROCHLORIDE 10 MG: 5 TABLET ORAL at 14:27

## 2022-10-02 RX ADMIN — BUSPIRONE HYDROCHLORIDE 10 MG: 5 TABLET ORAL at 09:37

## 2022-10-02 RX ADMIN — PANTOPRAZOLE SODIUM 40 MG: 40 INJECTION, POWDER, FOR SOLUTION INTRAVENOUS at 20:56

## 2022-10-02 RX ADMIN — INSULIN GLARGINE 15 UNITS: 100 INJECTION, SOLUTION SUBCUTANEOUS at 20:56

## 2022-10-02 RX ADMIN — GABAPENTIN 200 MG: 100 CAPSULE ORAL at 09:36

## 2022-10-02 RX ADMIN — HEPARIN SODIUM 5000 UNITS: 5000 INJECTION INTRAVENOUS; SUBCUTANEOUS at 14:27

## 2022-10-02 RX ADMIN — SODIUM CHLORIDE, POTASSIUM CHLORIDE, SODIUM LACTATE AND CALCIUM CHLORIDE 1000 ML: 600; 310; 30; 20 INJECTION, SOLUTION INTRAVENOUS at 11:36

## 2022-10-02 RX ADMIN — NITROFURANTOIN (MONOHYDRATE/MACROCRYSTALS) 100 MG: 75; 25 CAPSULE ORAL at 12:15

## 2022-10-02 RX ADMIN — HYDROCODONE BITARTRATE AND ACETAMINOPHEN 1 TABLET: 7.5; 325 TABLET ORAL at 07:00

## 2022-10-02 RX ADMIN — HEPARIN SODIUM 5000 UNITS: 5000 INJECTION INTRAVENOUS; SUBCUTANEOUS at 06:21

## 2022-10-02 RX ADMIN — MAGNESIUM SULFATE 2000 MG: 2 INJECTION INTRAVENOUS at 12:21

## 2022-10-02 RX ADMIN — FLUOXETINE HYDROCHLORIDE 80 MG: 20 CAPSULE ORAL at 09:35

## 2022-10-02 RX ADMIN — HYDRALAZINE HYDROCHLORIDE 10 MG: 20 INJECTION INTRAMUSCULAR; INTRAVENOUS at 07:00

## 2022-10-02 RX ADMIN — LEVOTHYROXINE SODIUM 75 MCG: 0.07 TABLET ORAL at 06:46

## 2022-10-02 RX ADMIN — ACETAMINOPHEN 650 MG: 325 TABLET ORAL at 17:05

## 2022-10-02 RX ADMIN — AMLODIPINE BESYLATE 10 MG: 10 TABLET ORAL at 09:37

## 2022-10-02 RX ADMIN — HEPARIN SODIUM 5000 UNITS: 5000 INJECTION INTRAVENOUS; SUBCUTANEOUS at 20:56

## 2022-10-02 RX ADMIN — BUSPIRONE HYDROCHLORIDE 10 MG: 5 TABLET ORAL at 20:55

## 2022-10-02 RX ADMIN — PANTOPRAZOLE SODIUM 40 MG: 40 INJECTION, POWDER, FOR SOLUTION INTRAVENOUS at 09:36

## 2022-10-02 RX ADMIN — INSULIN LISPRO 4 UNITS: 100 INJECTION, SOLUTION INTRAVENOUS; SUBCUTANEOUS at 12:16

## 2022-10-02 RX ADMIN — ASPIRIN 81 MG CHEWABLE TABLET 81 MG: 81 TABLET CHEWABLE at 09:36

## 2022-10-02 RX ADMIN — TOPIRAMATE 50 MG: 25 TABLET, FILM COATED ORAL at 20:55

## 2022-10-02 RX ADMIN — ACETAMINOPHEN 650 MG: 325 TABLET ORAL at 09:37

## 2022-10-02 RX ADMIN — ACETAMINOPHEN 650 MG: 325 TABLET ORAL at 20:54

## 2022-10-02 RX ADMIN — SODIUM CHLORIDE, POTASSIUM CHLORIDE, SODIUM LACTATE AND CALCIUM CHLORIDE 1000 ML: 600; 310; 30; 20 INJECTION, SOLUTION INTRAVENOUS at 04:46

## 2022-10-02 RX ADMIN — ATORVASTATIN CALCIUM 80 MG: 40 TABLET, FILM COATED ORAL at 20:54

## 2022-10-02 RX ADMIN — DONEPEZIL HYDROCHLORIDE 10 MG: 10 TABLET ORAL at 20:55

## 2022-10-02 RX ADMIN — SODIUM PHOSPHATE, MONOBASIC, MONOHYDRATE 10 MMOL: 276; 142 INJECTION, SOLUTION INTRAVENOUS at 14:33

## 2022-10-02 RX ADMIN — INSULIN LISPRO 5 UNITS: 100 INJECTION, SOLUTION INTRAVENOUS; SUBCUTANEOUS at 12:17

## 2022-10-02 ASSESSMENT — PAIN DESCRIPTION - PAIN TYPE
TYPE: ACUTE PAIN;SURGICAL PAIN
TYPE: ACUTE PAIN;SURGICAL PAIN

## 2022-10-02 ASSESSMENT — PAIN - FUNCTIONAL ASSESSMENT
PAIN_FUNCTIONAL_ASSESSMENT: PREVENTS OR INTERFERES SOME ACTIVE ACTIVITIES AND ADLS
PAIN_FUNCTIONAL_ASSESSMENT: PREVENTS OR INTERFERES SOME ACTIVE ACTIVITIES AND ADLS

## 2022-10-02 ASSESSMENT — PAIN DESCRIPTION - FREQUENCY
FREQUENCY: CONTINUOUS
FREQUENCY: CONTINUOUS

## 2022-10-02 ASSESSMENT — ENCOUNTER SYMPTOMS
WHEEZING: 0
COUGH: 0
CONSTIPATION: 0
ABDOMINAL DISTENTION: 0
VOMITING: 0
EYE PAIN: 0
NAUSEA: 0
BLOOD IN STOOL: 0
STRIDOR: 0
CHEST TIGHTNESS: 0
DIARRHEA: 0
CHOKING: 0
BACK PAIN: 0
SHORTNESS OF BREATH: 0

## 2022-10-02 ASSESSMENT — PAIN SCALES - GENERAL
PAINLEVEL_OUTOF10: 5
PAINLEVEL_OUTOF10: 1
PAINLEVEL_OUTOF10: 5
PAINLEVEL_OUTOF10: 5
PAINLEVEL_OUTOF10: 0
PAINLEVEL_OUTOF10: 3
PAINLEVEL_OUTOF10: 0

## 2022-10-02 ASSESSMENT — PAIN DESCRIPTION - ONSET
ONSET: ON-GOING
ONSET: ON-GOING

## 2022-10-02 ASSESSMENT — PAIN SCALES - WONG BAKER
WONGBAKER_NUMERICALRESPONSE: 0
WONGBAKER_NUMERICALRESPONSE: 0
WONGBAKER_NUMERICALRESPONSE: 2

## 2022-10-02 ASSESSMENT — PAIN DESCRIPTION - ORIENTATION
ORIENTATION: LEFT;UPPER;POSTERIOR
ORIENTATION: LEFT
ORIENTATION: LEFT

## 2022-10-02 ASSESSMENT — PAIN DESCRIPTION - DESCRIPTORS
DESCRIPTORS: ACHING
DESCRIPTORS: ACHING
DESCRIPTORS: TENDER;SORE

## 2022-10-02 ASSESSMENT — PAIN DESCRIPTION - LOCATION
LOCATION: HAND
LOCATION: ARM
LOCATION: ARM;SHOULDER

## 2022-10-02 NOTE — CONSULTS
Consult completed. Patient has patent PIV in right AC, IV magnesium is currently infusing. Patient also had sodium phosphate 10 mmol IV ordered, these medications are not compatible to infuse through same line. Patient declined additional PIV access. Dr. Nahomy Connolly notified, sodium phosphate infusion rescheduled to be given after magnesium infusion is completed. Primary RN Andree notified. Please consult IV/PICC team for questions, concerns, or patient's needs change.

## 2022-10-02 NOTE — PROGRESS NOTES
In-Patient Progress Note    Patient:  Radha Angel 59 y.o. female MRN: [id number]     Date of Service: 10/2/2022    Hospital Day: 4      Chief complaint: had concerns including Fall and Arm Injury (Left arm deformity, 100mcg of fentanyl on board from EMS). Subjective   Patient seen and examined in the morning. Patient states he has no appetite and has not been eating well. Denies nausea and vomiting. See ROS    I have reviewed all pertinent PMHx, PSHx, FamHx, SocialHx, medications, and allergies and updated history as appropriate. I have reviewed images as appropriate. Assessment and [address] Freddie Will, a 59 y.o. female, with a history of TIA, HTN, HLD, T2DM and class III obesity  was admitted on 9/29/2022 with complaints of had concerns including Fall and Arm Injury (Left arm deformity, 100mcg of fentanyl on board from EMS). Assessment  Lt. Distal Fracture 2/2 mechanical fall s/p ORIF, ulnar nerve decompression (9/30/2022)  Ortho on board   No pain Lt. Arm per patient. Nausea and vomiting with epigastric pain 2/2 ?gastritis vs uremia 2/2 #3 vs   N/V - improved   On oral PPI BID      MODESTO Stage II 2/2 ? prerenal vs 2/2 #4  Baseline Cr 1  Presented with 2.3, trending down to 1.1   On LR 125cc/hr     Acute Urinary retention   Had to be straight cathted   Ramos catheter   UA shows , small leucocyte esterase     IDDM with neuropathy  On lantus 15U HS, Humalog 5U TID and HDSS  Sugars climbing up  On gabapentin    HTN  SBP elevated up to 194 today, repeat 158/59  Amlodipine had not been given yet     H/O TIA  On aspirin and statin       H/O Depression  On fluoxetine     9. Starvation ketoacidosis                a. Improved                B. Continue IVF.      10.    Electrolyte abnormality               A. Magnesium 1.6 mg/dl and Phos 1.9                 Plan  Replete electrolytes   Continue IVF for 12 more hours   Encouraged PO intake   F/U Ortho recs    Continue PPI Continue HDSS   Continue fluids for now      # Peptic ulcer prophylaxis: Pantoprazole BID  # DVT Prophylaxis: Heparin TID  #CODE STATUS: Full      Expected Disposition: TBD  Estimated discharge date: TBD. Requires continuous admission due to electrolyte abn and dehydration       Review of System     Review of Systems   Constitutional:  Negative for chills, fatigue, fever and unexpected weight change. Eyes:  Negative for pain and visual disturbance. Respiratory:  Negative for cough, choking, chest tightness, shortness of breath, wheezing and stridor. Cardiovascular:  Negative for chest pain, palpitations and leg swelling. Gastrointestinal:  Negative for abdominal distention, blood in stool, constipation, diarrhea, nausea and vomiting. Genitourinary:  Negative for decreased urine volume, dysuria, frequency, hematuria and urgency. Musculoskeletal:  Negative for arthralgias, back pain and myalgias. Skin:  Negative for pallor and rash. Neurological:  Negative for dizziness, tremors, syncope, speech difficulty, weakness, light-headedness, numbness and headaches. Psychiatric/Behavioral:  Negative for agitation. I have reviewed all pertinent PMHx, PSHx, FamHx, SocialHx, medications, and allergies and updated history as appropriate.     Physical Exam   VITAL SIGNS:  BP (!) 165/59   Pulse 91   Temp 97.7 °F (36.5 °C) (Oral)   Resp 14   Ht 5' 4\" (1.626 m)   Wt 244 lb 11.4 oz (111 kg)   SpO2 97%   BMI 42.00 kg/m²   Tmax over 24 hours:  Temp (24hrs), Av.4 °F (36.9 °C), Min:97.7 °F (36.5 °C), Max:98.8 °F (37.1 °C)      Patient Vitals for the past 6 hrs:   BP Temp Temp src Pulse Resp SpO2 Weight   10/02/22 0930 (!) 165/59 97.7 °F (36.5 °C) Oral 91 14 97 % --   10/02/22 0700 (!) 172/75 -- -- -- -- -- --   10/02/22 0600 -- -- -- -- -- -- 244 lb 11.4 oz (111 kg)           Intake/Output Summary (Last 24 hours) at 10/2/2022 1153  Last data filed at 10/2/2022 0915  Gross per 24 hour   Intake 2506.78 ml Output 1220 ml   Net 1286.78 ml       Wt Readings from Last 2 Encounters:   10/02/22 244 lb 11.4 oz (111 kg)   09/18/22 240 lb (108.9 kg)     Body mass index is 42 kg/m². Physical Exam  Vitals and nursing note reviewed. Constitutional:       General: She is not in acute distress. Appearance: She is ill-appearing. She is not toxic-appearing or diaphoretic. HENT:      Head: Normocephalic and atraumatic. Right Ear: External ear normal.      Left Ear: External ear normal.      Nose: Nose normal.      Mouth/Throat:      Pharynx: Oropharynx is clear. No oropharyngeal exudate or posterior oropharyngeal erythema. Eyes:      General: No scleral icterus. Right eye: No discharge. Left eye: No discharge. Conjunctiva/sclera: Conjunctivae normal.   Cardiovascular:      Rate and Rhythm: Normal rate and regular rhythm. Pulses: Normal pulses. Heart sounds: Normal heart sounds. No murmur heard. No friction rub. No gallop. Pulmonary:      Effort: Pulmonary effort is normal. No respiratory distress. Breath sounds: Normal breath sounds. No stridor. No wheezing, rhonchi or rales. Abdominal:      General: Abdomen is protuberant. Bowel sounds are normal. There is no distension. Palpations: Abdomen is soft. There is no mass. Tenderness: There is no abdominal tenderness. There is no guarding or rebound. Hernia: No hernia is present. Musculoskeletal:      Right lower leg: No edema. Left lower leg: No edema. Comments: Left elbow area clean, minimal discharge seen    Skin:     General: Skin is warm. Capillary Refill: Capillary refill takes less than 2 seconds. Neurological:      Mental Status: She is alert and oriented to person, place, and time.    Psychiatric:         Mood and Affect: Mood normal.      Comments: Flat affect         Current Medications      magnesium sulfate  2,000 mg IntraVENous Once    sodium phosphate IVPB  10 mmol IntraVENous Once    nitrofurantoin (macrocrystal-monohydrate)  100 mg Oral 2 times per day    pantoprazole  40 mg IntraVENous BID    heparin (porcine)  5,000 Units SubCUTAneous 3 times per day    insulin lispro  0-16 Units SubCUTAneous TID WC    insulin lispro  0-4 Units SubCUTAneous Nightly    amLODIPine  10 mg Oral Daily    aspirin  81 mg Oral Daily    atorvastatin  80 mg Oral Nightly    busPIRone  10 mg Oral TID    donepezil  10 mg Oral Nightly    FLUoxetine  80 mg Oral Daily    gabapentin  200 mg Oral BID    levothyroxine  75 mcg Oral QAM AC    topiramate  50 mg Oral Nightly    acetaminophen  650 mg Oral Q6H    insulin glargine  15 Units SubCUTAneous Nightly    insulin lispro  5 Units SubCUTAneous TID WC    polyethylene glycol  17 g Oral BID    sennosides-docusate sodium  2 tablet Oral BID         Labs and Imaging Studies   Laboratory findings:  XR HUMERUS LEFT (MIN 2 VIEWS)    Result Date: 9/29/2022  EXAMINATION: TWO XRAY VIEWS OF THE LEFT HUMERUS 9/29/2022 6:04 pm COMPARISON: 02/07/2019 HISTORY: ORDERING SYSTEM PROVIDED HISTORY: fall TECHNOLOGIST PROVIDED HISTORY: Reason for exam:->fall Reason for exam:->injury Reason for Exam: LEFT HUMERUS PAIN Additional signs and symptoms: FALL Relevant Medical/Surgical History: NA FINDINGS: Plate screw fixation of the proximal humerus, without hardware complication. An oblique displaced fracture of the distal humeral shaft is noted. No evidence for shoulder or elbow malalignment, within the limitations of positioning and external artifact. 1.  Oblique, displaced fracture of the distal humeral shaft. 2.  Sequela of proximal humerus fracture repair without evidence for hardware complication.      XR ELBOW LEFT (MIN 3 VIEWS)    Result Date: 9/29/2022  EXAMINATION: THREE XRAY VIEWS OF THE LEFT ELBOW 9/29/2022 7:58 pm COMPARISON: Same day left humerus radiographs HISTORY: ORDERING SYSTEM PROVIDED HISTORY: pain, trauma TECHNOLOGIST PROVIDED HISTORY: Reason for exam:->pain, trauma Reason for Exam: left elbow pain Additional signs and symptoms: fall Relevant Medical/Surgical History: na FINDINGS: Nearly nondiagnostic images due to overlying objects and patient positioning. Redemonstrated oblique, displaced fracture of the distal humeral shaft. There is a questionable additional vertical fracture involving the distal humeral fracture component. The elbow appears to be in normal alignment. No definite fracture involving visualized portions of the radius or ulna. Soft tissue swelling is seen about the distal humerus. Severely limited, nearly nondiagnostic images of the right elbow due to overlying objects and patient positioning. Repeat radiographs could be obtained after the removal of these objects. Redemonstrated displaced, oblique fracture of the distal humeral diaphysis. There is a questionable additional vertical fracture involving the distal humeral fracture component. CT HEAD WO CONTRAST    Result Date: 9/29/2022  EXAMINATION: CT OF THE HEAD WITHOUT CONTRAST  9/29/2022 7:55 pm TECHNIQUE: CT of the head was performed without the administration of intravenous contrast. Automated exposure control, iterative reconstruction, and/or weight based adjustment of the mA/kV was utilized to reduce the radiation dose to as low as reasonably achievable. COMPARISON: 03/17/2022 HISTORY: ORDERING SYSTEM PROVIDED HISTORY: trauma TECHNOLOGIST PROVIDED HISTORY: Has a \"code stroke\" or \"stroke alert\" been called? ->No Reason for exam:->trauma Decision Support Exception - unselect if not a suspected or confirmed emergency medical condition->Emergency Medical Condition (MA) Reason for Exam: trauma FINDINGS: BRAIN/VENTRICLES: There is no acute intracranial hemorrhage, mass effect or midline shift. No abnormal extra-axial fluid collection. The gray-white differentiation is maintained without evidence of an acute infarct. There is no evidence of hydrocephalus.  ORBITS: The visualized portion of the orbits demonstrate no acute abnormality. Bilateral lens replacement. SINUSES: Small bubbly air-fluid level present in the right sphenoid sinus. The mastoid air cells appear clear. SOFT TISSUES/SKULL:  No acute abnormality of the visualized skull or soft tissues. 1. No acute intracranial abnormality. 2. Small bubbly air-fluid level present in the right sphenoid sinus. Finding raises the possibility of acute sinusitis in the appropriate clinical setting. FL LESS THAN 1 HOUR    Result Date: 9/30/2022  Radiology exam is complete. No Radiologist dictation. Please follow up with ordering provider.        Recent Results (from the past 24 hour(s))   POCT Glucose    Collection Time: 10/01/22  4:45 PM   Result Value Ref Range    POC Glucose 268 (H) 70 - 99 MG/DL   POCT Glucose    Collection Time: 10/01/22  8:17 PM   Result Value Ref Range    POC Glucose 173 (H) 70 - 99 MG/DL   Hepatic Function Panel    Collection Time: 10/01/22 11:03 PM   Result Value Ref Range    Albumin 3.2 (L) 3.4 - 5.0 GM/DL    Total Bilirubin 0.3 0.0 - 1.0 MG/DL    Bilirubin, Direct 0.2 0.0 - 0.3 MG/DL    Bilirubin, Indirect 0.1 0 - 0.7 MG/DL    Alkaline Phosphatase 85 40 - 129 IU/L    AST 19 15 - 37 IU/L    ALT 5 (L) 10 - 40 U/L    Total Protein 4.8 (L) 6.4 - 8.2 GM/DL   Lactic Acid    Collection Time: 10/01/22 11:03 PM   Result Value Ref Range    Lactate 0.8 0.4 - 2.0 mMOL/L   Basic Metabolic Panel    Collection Time: 10/02/22  7:10 AM   Result Value Ref Range    Sodium 139 135 - 145 MMOL/L    Potassium 4.6 3.5 - 5.1 MMOL/L    Chloride 106 99 - 110 mMol/L    CO2 24 21 - 32 MMOL/L    Anion Gap 9 4 - 16    BUN 19 6 - 23 MG/DL    Creatinine 1.1 0.6 - 1.1 MG/DL    Glucose 170 (H) 70 - 99 MG/DL    Calcium 8.5 8.3 - 10.6 MG/DL    GFR Non-African American 50 (L) >60 mL/min/1.73m2    GFR African American >60 >60 mL/min/1.73m2   CBC with Auto Differential    Collection Time: 10/02/22  7:10 AM   Result Value Ref Range    WBC 6.5 4.0 - 10.5 K/CU MM    RBC 2.77 (L) 4.2 - 5.4 M/CU MM    Hemoglobin 8.3 (L) 12.5 - 16.0 GM/DL    Hematocrit 27.1 (L) 37 - 47 %    MCV 97.8 78 - 100 FL    MCH 30.0 27 - 31 PG    MCHC 30.6 (L) 32.0 - 36.0 %    RDW 13.2 11.7 - 14.9 %    Platelets 934 521 - 442 K/CU MM    MPV 9.9 7.5 - 11.1 FL    Differential Type AUTOMATED DIFFERENTIAL     Segs Relative 55.9 36 - 66 %    Lymphocytes % 28.4 24 - 44 %    Monocytes % 13.0 (H) 0 - 4 %    Eosinophils % 2.0 0 - 3 %    Basophils % 0.5 0 - 1 %    Segs Absolute 3.6 K/CU MM    Lymphocytes Absolute 1.8 K/CU MM    Monocytes Absolute 0.8 K/CU MM    Eosinophils Absolute 0.1 K/CU MM    Basophils Absolute 0.0 K/CU MM    Nucleated RBC % 0.0 %    Total Nucleated RBC 0.0 K/CU MM    Total Immature Neutrophil 0.01 K/CU MM    Immature Neutrophil % 0.2 0 - 0.43 %   Magnesium    Collection Time: 10/02/22  7:10 AM   Result Value Ref Range    Magnesium 1.6 (L) 1.8 - 2.4 mg/dl   Phosphorus    Collection Time: 10/02/22  7:10 AM   Result Value Ref Range    Phosphorus 1.9 (L) 2.5 - 4.9 MG/DL   POCT Glucose    Collection Time: 10/02/22  8:55 AM   Result Value Ref Range    POC Glucose 196 (H) 70 - 99 MG/DL           Electronically signed by Carmel Taylor MD on 10/2/2022 at 11:53 AM

## 2022-10-02 NOTE — PROGRESS NOTES
Tamara Young is a 59 y.o. female patient. 1. Other closed displaced fracture of distal end of left humerus, initial encounter    2.  MODESTO (acute kidney injury) [facility])      Past Medical History:   Diagnosis Date    Arthritis     Asthma     Cerebral artery occlusion with cerebral infarction [facility])     per old chart 8/2015- TIA    COPD (chronic obstructive pulmonary disease) (HCC)     Diabetes mellitus (HCC)     Diabetic neuropathy (HCC)     per old chart    Fracture     per old chart pt in ER 12/9/2018- after 2 days of arm pain after fall- dx with left humerus fx- for surg 12/31/2018    History of blood transfusion     Hyperlipidemia     Hypertension     Muscle weakness (generalized)     Osteoarthritis     per old chart     Past Surgical History Pertinent Negatives:   Procedure Date Noted    HYSTERECTOMY (CERVIX STATUS UNKNOWN) 07/13/2021    OVARY REMOVAL 07/13/2021     Scheduled Meds:   pantoprazole  40 mg IntraVENous BID    heparin (porcine)  5,000 Units SubCUTAneous 3 times per day    insulin lispro  0-16 Units SubCUTAneous TID WC    insulin lispro  0-4 Units SubCUTAneous Nightly    amLODIPine  10 mg Oral Daily    aspirin  81 mg Oral Daily    atorvastatin  80 mg Oral Nightly    busPIRone  10 mg Oral TID    donepezil  10 mg Oral Nightly    FLUoxetine  80 mg Oral Daily    gabapentin  200 mg Oral BID    levothyroxine  75 mcg Oral QAM AC    topiramate  50 mg Oral Nightly    acetaminophen  650 mg Oral Q6H    insulin glargine  15 Units SubCUTAneous Nightly    insulin lispro  5 Units SubCUTAneous TID WC    polyethylene glycol  17 g Oral BID    sennosides-docusate sodium  2 tablet Oral BID     Continuous Infusions:   lactated ringers 1,000 mL (10/02/22 0446)    dextrose       PRN Meds:hydrALAZINE, promethazine, oxyCODONE **OR** oxyCODONE, ondansetron **OR** ondansetron, aluminum & magnesium hydroxide-simethicone, acetaminophen **OR** acetaminophen, HYDROcodone-acetaminophen, glucose, glucagon (rDNA), dextrose    No Known Allergies  Principal Problem:    Comminuted left humeral fracture, closed, initial encounter  Resolved Problems:    * No resolved hospital problems. *    Blood pressure (!) 172/75, pulse 77, temperature 98.4 °F (36.9 °C), temperature source Oral, resp. rate 19, height 5' 4\" (1.626 m), weight 244 lb 11.4 oz (111 kg), SpO2 94 %, not currently breastfeeding. Subjective  Patient seen and examined, resting in bed comfortably, pain controlled, no new complaints. Nausea and vomiting resolved this morning, transferred to [facility] due to blood pressure and medical issues. Objective:  Vital signs (most recent): Blood pressure (!) 172/75, pulse 77, temperature 98.4 °F (36.9 °C), temperature source Oral, resp. rate 19, height 5' 4\" (1.626 m), weight 244 lb 11.4 oz (111 kg), SpO2 94 %, not currently breastfeeding. LUE - Dressing removed, incision clean, mild bloody drainage , intact, no calf TTP, compartments soft, neurovascularly intact distally. Radial nerve function and ulnar nerve function intact. Assessment & Plan  POD #2 ORIF left distal humerus fracture, Doing well postoperatively    Continue no lifting, pushing, pulling with left arm. Continue range of motion exercises as tolerated with the left arm. Pain control. DVT prophylaxis. Continue to ice and elevate. Continue PT/OT. Discharge planning. Ortho stable for discharge, follow-up in office in 2 weeks.     James Guajardo,   10/2/2022

## 2022-10-03 LAB
ANION GAP SERPL CALCULATED.3IONS-SCNC: 7 MMOL/L (ref 4–16)
BASOPHILS ABSOLUTE: 0 K/CU MM
BASOPHILS RELATIVE PERCENT: 0.3 % (ref 0–1)
BUN BLDV-MCNC: 13 MG/DL (ref 6–23)
CALCIUM SERPL-MCNC: 8.6 MG/DL (ref 8.3–10.6)
CHLORIDE BLD-SCNC: 105 MMOL/L (ref 99–110)
CO2: 25 MMOL/L (ref 21–32)
CREAT SERPL-MCNC: 0.8 MG/DL (ref 0.6–1.1)
DIFFERENTIAL TYPE: ABNORMAL
EOSINOPHILS ABSOLUTE: 0.2 K/CU MM
EOSINOPHILS RELATIVE PERCENT: 2.3 % (ref 0–3)
GFR AFRICAN AMERICAN: >60 ML/MIN/1.73M2
GFR NON-AFRICAN AMERICAN: >60 ML/MIN/1.73M2
GLUCOSE BLD-MCNC: 166 MG/DL (ref 70–99)
GLUCOSE BLD-MCNC: 176 MG/DL (ref 70–99)
GLUCOSE BLD-MCNC: 189 MG/DL (ref 70–99)
GLUCOSE BLD-MCNC: 240 MG/DL (ref 70–99)
HCT VFR BLD CALC: 26.7 % (ref 37–47)
HEMOGLOBIN: 8.2 GM/DL (ref 12.5–16)
IMMATURE NEUTROPHIL %: 0.3 % (ref 0–0.43)
LYMPHOCYTES ABSOLUTE: 1.9 K/CU MM
LYMPHOCYTES RELATIVE PERCENT: 29.5 % (ref 24–44)
MAGNESIUM: 1.8 MG/DL (ref 1.8–2.4)
MCH RBC QN AUTO: 29.8 PG (ref 27–31)
MCHC RBC AUTO-ENTMCNC: 30.7 % (ref 32–36)
MCV RBC AUTO: 97.1 FL (ref 78–100)
MONOCYTES ABSOLUTE: 0.6 K/CU MM
MONOCYTES RELATIVE PERCENT: 8.9 % (ref 0–4)
NUCLEATED RBC %: 0 %
PDW BLD-RTO: 13 % (ref 11.7–14.9)
PHOSPHORUS: 2.5 MG/DL (ref 2.5–4.9)
PLATELET # BLD: 199 K/CU MM (ref 140–440)
PMV BLD AUTO: 10 FL (ref 7.5–11.1)
POTASSIUM SERPL-SCNC: 4.6 MMOL/L (ref 3.5–5.1)
RBC # BLD: 2.75 M/CU MM (ref 4.2–5.4)
SEGMENTED NEUTROPHILS ABSOLUTE COUNT: 3.8 K/CU MM
SEGMENTED NEUTROPHILS RELATIVE PERCENT: 58.7 % (ref 36–66)
SODIUM BLD-SCNC: 137 MMOL/L (ref 135–145)
TOTAL IMMATURE NEUTOROPHIL: 0.02 K/CU MM
TOTAL NUCLEATED RBC: 0 K/CU MM
WBC # BLD: 6.4 K/CU MM (ref 4–10.5)

## 2022-10-03 PROCEDURE — C9113 INJ PANTOPRAZOLE SODIUM, VIA: HCPCS | Performed by: INTERNAL MEDICINE

## 2022-10-03 PROCEDURE — 80048 BASIC METABOLIC PNL TOTAL CA: CPT

## 2022-10-03 PROCEDURE — 83735 ASSAY OF MAGNESIUM: CPT

## 2022-10-03 PROCEDURE — 97116 GAIT TRAINING THERAPY: CPT

## 2022-10-03 PROCEDURE — 6370000000 HC RX 637 (ALT 250 FOR IP): Performed by: ORTHOPAEDIC SURGERY

## 2022-10-03 PROCEDURE — 82962 GLUCOSE BLOOD TEST: CPT

## 2022-10-03 PROCEDURE — 6370000000 HC RX 637 (ALT 250 FOR IP): Performed by: INTERNAL MEDICINE

## 2022-10-03 PROCEDURE — 6360000002 HC RX W HCPCS: Performed by: INTERNAL MEDICINE

## 2022-10-03 PROCEDURE — 97162 PT EVAL MOD COMPLEX 30 MIN: CPT

## 2022-10-03 PROCEDURE — 84100 ASSAY OF PHOSPHORUS: CPT

## 2022-10-03 PROCEDURE — 2140000000 HC CCU INTERMEDIATE R&B

## 2022-10-03 PROCEDURE — 6360000002 HC RX W HCPCS: Performed by: STUDENT IN AN ORGANIZED HEALTH CARE EDUCATION/TRAINING PROGRAM

## 2022-10-03 PROCEDURE — 85025 COMPLETE CBC W/AUTO DIFF WBC: CPT

## 2022-10-03 PROCEDURE — 94761 N-INVAS EAR/PLS OXIMETRY MLT: CPT

## 2022-10-03 PROCEDURE — 97535 SELF CARE MNGMENT TRAINING: CPT

## 2022-10-03 PROCEDURE — 97166 OT EVAL MOD COMPLEX 45 MIN: CPT

## 2022-10-03 PROCEDURE — 36415 COLL VENOUS BLD VENIPUNCTURE: CPT

## 2022-10-03 RX ADMIN — AMLODIPINE BESYLATE 10 MG: 10 TABLET ORAL at 09:01

## 2022-10-03 RX ADMIN — SENNOSIDES AND DOCUSATE SODIUM 2 TABLET: 50; 8.6 TABLET ORAL at 21:28

## 2022-10-03 RX ADMIN — BUSPIRONE HYDROCHLORIDE 10 MG: 5 TABLET ORAL at 14:55

## 2022-10-03 RX ADMIN — TOPIRAMATE 50 MG: 25 TABLET, FILM COATED ORAL at 21:31

## 2022-10-03 RX ADMIN — DONEPEZIL HYDROCHLORIDE 10 MG: 10 TABLET ORAL at 21:34

## 2022-10-03 RX ADMIN — ACETAMINOPHEN 650 MG: 325 TABLET ORAL at 21:28

## 2022-10-03 RX ADMIN — ATORVASTATIN CALCIUM 80 MG: 40 TABLET, FILM COATED ORAL at 21:31

## 2022-10-03 RX ADMIN — NITROFURANTOIN (MONOHYDRATE/MACROCRYSTALS) 100 MG: 75; 25 CAPSULE ORAL at 09:01

## 2022-10-03 RX ADMIN — ACETAMINOPHEN 650 MG: 325 TABLET ORAL at 16:24

## 2022-10-03 RX ADMIN — PROMETHAZINE HYDROCHLORIDE 12.5 MG: 25 INJECTION INTRAMUSCULAR; INTRAVENOUS at 21:37

## 2022-10-03 RX ADMIN — GABAPENTIN 200 MG: 100 CAPSULE ORAL at 09:01

## 2022-10-03 RX ADMIN — BUSPIRONE HYDROCHLORIDE 10 MG: 5 TABLET ORAL at 09:00

## 2022-10-03 RX ADMIN — NITROFURANTOIN (MONOHYDRATE/MACROCRYSTALS) 100 MG: 75; 25 CAPSULE ORAL at 21:31

## 2022-10-03 RX ADMIN — BUSPIRONE HYDROCHLORIDE 10 MG: 5 TABLET ORAL at 21:28

## 2022-10-03 RX ADMIN — INSULIN LISPRO 5 UNITS: 100 INJECTION, SOLUTION INTRAVENOUS; SUBCUTANEOUS at 08:18

## 2022-10-03 RX ADMIN — PANTOPRAZOLE SODIUM 40 MG: 40 INJECTION, POWDER, FOR SOLUTION INTRAVENOUS at 23:50

## 2022-10-03 RX ADMIN — INSULIN LISPRO 4 UNITS: 100 INJECTION, SOLUTION INTRAVENOUS; SUBCUTANEOUS at 16:53

## 2022-10-03 RX ADMIN — ACETAMINOPHEN 650 MG: 325 TABLET ORAL at 09:00

## 2022-10-03 RX ADMIN — GABAPENTIN 200 MG: 100 CAPSULE ORAL at 21:29

## 2022-10-03 RX ADMIN — INSULIN GLARGINE 15 UNITS: 100 INJECTION, SOLUTION SUBCUTANEOUS at 21:31

## 2022-10-03 RX ADMIN — INSULIN LISPRO 5 UNITS: 100 INJECTION, SOLUTION INTRAVENOUS; SUBCUTANEOUS at 16:53

## 2022-10-03 RX ADMIN — LEVOTHYROXINE SODIUM 75 MCG: 0.07 TABLET ORAL at 05:03

## 2022-10-03 RX ADMIN — HEPARIN SODIUM 5000 UNITS: 5000 INJECTION INTRAVENOUS; SUBCUTANEOUS at 21:31

## 2022-10-03 RX ADMIN — HEPARIN SODIUM 5000 UNITS: 5000 INJECTION INTRAVENOUS; SUBCUTANEOUS at 05:03

## 2022-10-03 RX ADMIN — FLUOXETINE HYDROCHLORIDE 80 MG: 20 CAPSULE ORAL at 09:09

## 2022-10-03 RX ADMIN — ACETAMINOPHEN 650 MG: 325 TABLET ORAL at 05:02

## 2022-10-03 RX ADMIN — HEPARIN SODIUM 5000 UNITS: 5000 INJECTION INTRAVENOUS; SUBCUTANEOUS at 14:55

## 2022-10-03 RX ADMIN — PANTOPRAZOLE SODIUM 40 MG: 40 INJECTION, POWDER, FOR SOLUTION INTRAVENOUS at 09:01

## 2022-10-03 RX ADMIN — ASPIRIN 81 MG CHEWABLE TABLET 81 MG: 81 TABLET CHEWABLE at 09:01

## 2022-10-03 RX ADMIN — SENNOSIDES AND DOCUSATE SODIUM 2 TABLET: 50; 8.6 TABLET ORAL at 09:01

## 2022-10-03 ASSESSMENT — PAIN DESCRIPTION - ORIENTATION
ORIENTATION: LEFT

## 2022-10-03 ASSESSMENT — PAIN DESCRIPTION - ONSET
ONSET: GRADUAL

## 2022-10-03 ASSESSMENT — PAIN SCALES - GENERAL
PAINLEVEL_OUTOF10: 0
PAINLEVEL_OUTOF10: 0
PAINLEVEL_OUTOF10: 4
PAINLEVEL_OUTOF10: 3
PAINLEVEL_OUTOF10: 1
PAINLEVEL_OUTOF10: 0
PAINLEVEL_OUTOF10: 5
PAINLEVEL_OUTOF10: 3
PAINLEVEL_OUTOF10: 0

## 2022-10-03 ASSESSMENT — ENCOUNTER SYMPTOMS
CHEST TIGHTNESS: 0
NAUSEA: 0
EYE PAIN: 0
VOMITING: 0
SHORTNESS OF BREATH: 0
BLOOD IN STOOL: 0
BACK PAIN: 0
STRIDOR: 0
WHEEZING: 0
CONSTIPATION: 0
DIARRHEA: 0
ABDOMINAL DISTENTION: 0
CHOKING: 0
COUGH: 0

## 2022-10-03 ASSESSMENT — PAIN DESCRIPTION - DESCRIPTORS
DESCRIPTORS: ACHING;THROBBING
DESCRIPTORS: ACHING;DISCOMFORT;THROBBING
DESCRIPTORS: ACHING;THROBBING
DESCRIPTORS: ACHING
DESCRIPTORS: ACHING;THROBBING
DESCRIPTORS: DISCOMFORT;SORE

## 2022-10-03 ASSESSMENT — PAIN DESCRIPTION - LOCATION
LOCATION: ARM

## 2022-10-03 ASSESSMENT — PAIN DESCRIPTION - PAIN TYPE
TYPE: SURGICAL PAIN;ACUTE PAIN
TYPE: SURGICAL PAIN;ACUTE PAIN
TYPE: ACUTE PAIN;SURGICAL PAIN

## 2022-10-03 ASSESSMENT — PAIN - FUNCTIONAL ASSESSMENT
PAIN_FUNCTIONAL_ASSESSMENT: PREVENTS OR INTERFERES SOME ACTIVE ACTIVITIES AND ADLS
PAIN_FUNCTIONAL_ASSESSMENT: ACTIVITIES ARE NOT PREVENTED
PAIN_FUNCTIONAL_ASSESSMENT: PREVENTS OR INTERFERES SOME ACTIVE ACTIVITIES AND ADLS

## 2022-10-03 ASSESSMENT — PAIN DESCRIPTION - FREQUENCY
FREQUENCY: INTERMITTENT

## 2022-10-03 ASSESSMENT — PAIN SCALES - WONG BAKER
WONGBAKER_NUMERICALRESPONSE: 0
WONGBAKER_NUMERICALRESPONSE: 0

## 2022-10-03 NOTE — PROGRESS NOTES
Physical Therapy  [address] [facility], 1960, [id number], 10/3/2022    History  [place]:  The primary encounter diagnosis was Other closed displaced fracture of distal end of left humerus, initial encounter. A diagnosis of MODESTO (acute kidney injury) ([address] was also pertinent to this visit. Patient  has a past medical history of Arthritis, Asthma, Cerebral artery occlusion with cerebral infarction ([address] CKD stage G1/A2, GFR > 90 and albumin creatinine ratio  mg/g, COPD (chronic obstructive pulmonary disease) ([address] Diabetes mellitus ([address] Diabetic neuropathy ([address] Fracture, History of blood transfusion, Hyperlipidemia, Hypertension, Muscle weakness (generalized), and Osteoarthritis. Patient  has a past surgical history that includes Foot surgery (Left, 2004?); knee surgery (Right, 2009?); Cholecystectomy (2002?); Lung surgery (2009?); eye surgery; Humerus fracture surgery (Left, 12/31/2018); and Breast biopsy (Right). Subjective:  Patient states:  \"I'm nervous\"   Pain:  denies   Communication with other providers:   co-eval with Lenka YOUNGER   Restrictions: No lifting, pushing, or pulling with LUE. Sling for comfort. Okay to start ROM left elbow and shoulder.      Home Setup/Prior level of function  Social/Functional History  Type of Home: Assisted living  Home Layout: One level  Home Access: Level entry  Bathroom Shower/Tub: Walk-in shower  Bathroom Toilet: Handicap height  Bathroom Equipment: Shower chair,Grab bars in shower,Grab bars around toilet  Bathroom Accessibility: Walker accessible  Home Equipment: Sock aid,Rollator,Wheelchair-electric  Has the patient had two or more falls in the past year or any fall with injury in the past year?: Yes  ADL Assistance: Independent  Homemaking Responsibilities: No  Ambulation Assistance: Independent (typically uses the rollator, will use electric WC occasionally)  Transfer Assistance: Independent  Active : No  Additional comments: above information taken from a prior evaluation and confirmed with pt today. Examination of body systems (includes body structures/functions, activity/participation limitations):  Observation:  Sitting in recliner upon arrival. Cooperative with therapy. Fearful and nervous of falling needing reassurance often. Vision:  WFL  Hearing:  WFL  Cardiopulmonary:  slightly lightheaded with standing, subsided in sitting. Unable to tolerate second stand for BP. Musculoskeletal  ROM R/L:  [facility] BLEs  Strength R/L:  BLEs grossly 4+/5   Neuro:  numbness to bilat feet and hands with hx of neuropathy. Inc left UE tremor. Mobility/treatment:   Rolling L/R:  NT   Supine to sit:  NT, sitting in recliner upon arrival   Transfers:   Sit to stand: Warden Gibbs from recliner needing inc fwd weight shift and lift assist. 2 attempts needed to complete   Stand to sit: Daysi for controlling sit to recliner. VCs for sequencing RUE back to chair for support   Sitting balance: SBA from edge of recliner, no UE support   Standing balance: CGA to Daysi static at mayra walker, less than 30 seconds prior to initiating mobility. Gait: ~6ft with mayra walker CGA to Daysi for steadying. Dec harjinder with short bilat step length. Limited distance with fatigue, fear, and small amount of lightheadedness. Educated pt on POC, role of PT, DME use, discharge. Cues provided for sequencing to inc safety and indep with mobility. [facility] 6 Clicks Inpatient Mobility:  AM-PAC Inpatient Mobility Raw Score : 13    Safety: patient left in chair, call light within reach,  gait belt used. Assessment:  Pt is a 59year old female admitted after a fall sustaining a left distal humerus fx. She is s/p ORIF on 9/30. Recommend subacute rehab once medically stable. At baseline she is David with gross mobility and ADLs. She performed below her baseline this date with dec strength, balance, and activity tolerance.  She claudia benefit from continued therapy to address her current deficits, dec potential fall risk, and restore function. Complexity: Moderate  Prognosis: Good, no significant barriers to participation at this time.    Plan: 5-7 times per week  Discharge Recommendations: Subacute/Skilled Nursing Facility  Equipment: continue to assess at next level of care     Goals:  Short Term Goals  Time Frame for Short Term Goals: 2 weeks  Short Term Goal 1: Pt will perform sit><supine Daysi  Short Term Goal 2: Pt will transfer CGA  Short Term Goal 3: Pt will ambulate 50ft with LRAD CGA  Short Term Goal 4: Pt will perform standing light dynamic activity x 3 minutes, no UE support CGA       Treatment plan:  Bed mobility, transfers, balance, gait, TA, TX, WC     Recommendations for NURSING mobility: stand step pivot with mayra walker     Time:   Time in: 1317  Time out: 1333  Timed treatment minutes: 8  Total time: 16 minutes     Electronically signed by:    Surjit Rollins [id number]  10/3/2022, 2:23 PM

## 2022-10-03 NOTE — PROGRESS NOTES
[address] [place], 1960, [id number], 10/3/2022    History  [place]:  The primary encounter diagnosis was Other closed displaced fracture of distal end of left humerus, initial encounter. A diagnosis of MODESTO (acute kidney injury) ([facility] [address] was also pertinent to this visit. Patient  has a past medical history of Arthritis, Asthma, Cerebral artery occlusion with cerebral infarction ([address] CKD stage G1/A2, GFR > 90 and albumin creatinine ratio  mg/g, COPD (chronic obstructive pulmonary disease) ([address] Diabetes mellitus ([address] Diabetic neuropathy ([address] Fracture, History of blood transfusion, Hyperlipidemia, Hypertension, Muscle weakness (generalized), and Osteoarthritis. Patient  has a past surgical history that includes Foot surgery (Left, 2004?); knee surgery (Right, 2009?); Cholecystectomy (2002?); Lung surgery (2009?); eye surgery; Humerus fracture surgery (Left, 12/31/2018); and Breast biopsy (Right).     Subjective:  Patient states: \"I'm scared to walk\"   Pain:  Minimal pain in R elbow, surgical.    Communication with other providers:  Handoff to RN, co-eval with PT  Restrictions: NWB on RUE, General Precautions, Fall Risk    Home Setup/Prior level of function   Social/Functional History  Type of Home: Assisted living  Home Layout: One level  Home Access: Level entry  Bathroom Shower/Tub: Walk-in shower  Bathroom Toilet: Handicap height  Bathroom Equipment: Shower chair,Grab bars in shower,Grab bars around toilet  Bathroom Accessibility: Walker accessible  Home Equipment: Sock aid,Rollator,Wheelchair-electric  Has the patient had two or more falls in the past year or any fall with injury in the past year?: Yes  ADL Assistance: Independent  Homemaking Responsibilities: No  Ambulation Assistance: Independent (typically uses the rollator, will use electric WC occasionally)  Transfer Assistance: Independent  Active : No       Examination of body systems (includes body structures/functions, activity/participation limitations):  Observation:  Sitting upright in chair upon arrival, agreeable to therapy   Vision:  WFL  [facility]  Cardiopulmonary:  No 02 needs      Body Systems and functions:  ROM R/L:  RUE: WFL. LUE: DNT due to recent surgery and pain w/ movement    Strength R/L:  RUE: 5/5, LUE: DNT due to pain in RUE;  strength: 4/5 reduced due to edema in R hand   Sensation: Numbness in R ulnar side of hand  Tone: Normal  Coordination: Decreased gross/fine motor on RUE  Perception: WNL    Activities of Daily Living (ADLs):  Feeding: Setup  Grooming: Setup/CGA (washing hands/face w/ warm washcloth)  UB bathing: modA  LB bathing: maxA  UB dressing: modA  LB dressing: maxA  Toileting: maxA    Cognitive and Psychosocial Functioning:  Overall cognitive status: WNL  Affect: Normal        Mobility:  Supine to sit:  DNT  Transfers: modA STS from reclining chair up to hemiwalker  Sitting balance:  Supervision. Standing balance:  Daysi w/ hemiwalker. Functional Mobility Daysi w/ hemiwalker (See PT notes for gait details)  Toilet/Shower Transfers: DNT             AM-PAC Daily Activity Inpatient   How much help for putting on and taking off regular lower body clothing?: Total  How much help for Bathing?: Total  How much help for Toileting?: Total  How much help for putting on and taking off regular upper body clothing?: A Lot  How much help for taking care of personal grooming?: A Little  How much help for eating meals?: A Little  AM-PAC Inpatient Daily Activity Raw Score: 11  AM-PAC Inpatient ADL T-Scale Score : 29.04  ADL Inpatient CMS 0-100% Score: 70.42  ADL Inpatient CMS G-Code Modifier : CL    Treatment:  Self Care Training:   Cues were given for safety, sequence, UE/LE placement, visual cues, and balance.   Grooming     Safety: patient left in chair with chair alarm, call light within reach, RN notified, gait belt used.    Assessment:  Pt is a 58 yo female admitted from home for L humeral fracture. Pt at baseline is Independent for ADLs needs assistance for high level IADLs and is Independent for functional transfers/mobility w/ AD. Pt currently presents w/ deficits in ADL and high level IADL independence, functional activity tolerance, dynamic sitting and standing balance and tolerance and functional transfers and LUE strength/ROM. Pt would benefit from continued acute care OT services w/ discharge to SNF  Complexity: Moderate  Prognosis: Good, no significant barriers to participation at this time. Occupational Therapy Plan  Times Per Week: 3x+  Times Per Day:  Once a day  Current Treatment Recommendations: Strengthening, ROM, Balance training, Functional mobility training, Endurance training, Patient/Caregiver education & training, Self-Care / ADL, Home management training, Safety education & training, Pain management, Equipment evaluation, education, & procurement, Positioning     Equipment: defer    Goals:  Pt goal: go home  Time Frame for STGs: discharge  Goal 1: Pt will perform UE ADLs David  Goal 2: Pt will perform LE ADLs David  Goal 3: Pt will perform toileting David  Goal 4: Pt will perform functional transfer w/ AD David  Goal 5: Pt will perform functional mobility w/ AD David  Goal 6: Pt will perform therex/theract in order to increase functional activity tolerance and dynamic standing balance    Treatment plan:  Pt will perform functional task in stand reaching in all 3 planes in order to increase dynamic standing balance and functional activity tolerance    Recommendations for NURSING activity: Up to chair for all 3 meals and up to [facility] for all toileting needs    Time:   Time in: 1317  Time out: 1332  Timed treatment minutes: 10 minutes  Total time: 15 minutes    Electronically signed by:    Christina SNYDER/VIK [id number]  1:57 PM,10/3/2022

## 2022-10-03 NOTE — PROGRESS NOTES
[facility]   Progress Note  [facility] 0 1 2      Date: 10/3/2022   Patient: Troy Candelaria   : 1960   DOA: 2022   MRN: [id number]   ROOM#: [id number]     Admit Date: 2022     Collaborating Urologist on Call at time of admission: Dr. Devante Garcia  CC: Fall and arm injury  Reason for Consult:  Urinary retention    Subjective:     Pain: mild, no nausea and no vomiting,   Bowel Movement/Flatus:   Yes  Voiding:  Indwelling catheter with clear yellow urine     Pt resting in bedside recliner, states she is feeling OK today.     Objective:    Vitals:    BP (!) 186/75   Pulse 81   Temp 97.7 °F (36.5 °C) (Oral)   Resp 12   Ht 5' 4\" (1.626 m)   Wt 244 lb 11.4 oz (111 kg)   SpO2 97%   BMI 42.00 kg/m²    Temp  Av.1 °F (36.7 °C)  Min: 97.7 °F (36.5 °C)  Max: 98.6 °F (37 °C)     Intake/Output Summary (Last 24 hours) at 10/3/2022 0917  Last data filed at 10/3/2022 0820  Gross per 24 hour   Intake 1660.52 ml   Output 2300 ml   Net -639.48 ml       Physical Exam:   General appearance: alert, appears stated age, cooperative, no distress, and moderately obese  Head: Normocephalic, without obvious abnormality, atraumatic  Abdomen:  Soft, non-tender, non-distended  : Indwelling catheter with clear yellow urine    Labs:   WBC:    Lab Results   Component Value Date/Time    WBC 6.4 10/03/2022 02:44 AM      Hemoglobin/Hematocrit:    Lab Results   Component Value Date/Time    HGB 8.2 10/03/2022 02:44 AM    HCT 26.7 10/03/2022 02:44 AM      BMP:   Lab Results   Component Value Date/Time     10/03/2022 02:44 AM    K 4.6 10/03/2022 02:44 AM     10/03/2022 02:44 AM    CO2 25 10/03/2022 02:44 AM    BUN 13 10/03/2022 02:44 AM    LABALBU 3.2 10/01/2022 11:03 PM    CREATININE 0.8 10/03/2022 02:44 AM    CALCIUM 8.6 10/03/2022 02:44 AM    GFRAA >60 10/03/2022 02:44 AM    LABGLOM >60 10/03/2022 02:44 AM      PT/INR:    Lab Results   Component Value Date/Time    PROTIME 11.1 10/01/2022 11:22 AM    INR 0.86 10/01/2022 11:22 AM      PTT:    Lab Results   Component Value Date/Time    APTT 25.0 10/01/2022 11:22 AM     Imaging:   CT ABDOMEN PELVIS WO CONTRAST Additional Contrast? None    Result Date: 9/18/2022  EXAMINATION: CT OF THE ABDOMEN AND PELVIS WITHOUT CONTRAST 9/18/2022 6:33 pm TECHNIQUE: CT of the abdomen and pelvis was performed without the administration of intravenous contrast. Multiplanar reformatted images are provided for review. Automated exposure control, iterative reconstruction, and/or weight based adjustment of the mA/kV was utilized to reduce the radiation dose to as low as reasonably achievable. COMPARISON: 08/21/2022 HISTORY: ORDERING SYSTEM PROVIDED HISTORY: L flank/abdomina pain/n/v TECHNOLOGIST PROVIDED HISTORY: Reason for exam:->L flank/abdomina pain/n/v Additional Contrast?->None Decision Support Exception - unselect if not a suspected or confirmed emergency medical condition->Emergency Medical Condition (MA) Reason for Exam: L flank/abdomina pain/n/v FINDINGS: Lower Chest: Mild dependent subsegmental atelectasis. Organs: Normal liver. Gallbladder is surgically absent. No evidence of biliary ductal dilatation. Spleen is normal in size. Normal pancreas. No evidence of ductal dilatation. Normal adrenal glands. Small left parapelvic cyst, otherwise normal kidneys. No renal calculi or hydronephrosis. GI/Bowel: Unchanged small paraesophageal hernia. Normal stomach and bowel. Appendix is normal.  Submucosal fat deposition throughout the cecum and proximal right colon suggestive of chronic inflammation. Scattered colonic diverticulosis. Pelvis: Normal bladder. Normal uterus. Peritoneum/Retroperitoneum:No free fluid, free air, organized fluid collection or lymphadenopathy. Moderate calcific atherosclerosis. Soft tissues: Small fat containing umbilical hernia. . Bones: No acute osseous abnormality. Unchanged enchondromas in the proximal right femur. .     No acute abnormality of the abdomen or pelvis. Unchanged small paraesophageal hernia     XR HUMERUS LEFT (MIN 2 VIEWS)    Result Date: 9/29/2022  EXAMINATION: TWO XRAY VIEWS OF THE LEFT HUMERUS 9/29/2022 6:04 pm COMPARISON: 02/07/2019 HISTORY: ORDERING SYSTEM PROVIDED HISTORY: fall TECHNOLOGIST PROVIDED HISTORY: Reason for exam:->fall Reason for exam:->injury Reason for Exam: LEFT HUMERUS PAIN Additional signs and symptoms: FALL Relevant Medical/Surgical History: NA FINDINGS: Plate screw fixation of the proximal humerus, without hardware complication. An oblique displaced fracture of the distal humeral shaft is noted. No evidence for shoulder or elbow malalignment, within the limitations of positioning and external artifact. 1.  Oblique, displaced fracture of the distal humeral shaft. 2.  Sequela of proximal humerus fracture repair without evidence for hardware complication. XR ELBOW LEFT (MIN 3 VIEWS)    Result Date: 9/29/2022  EXAMINATION: THREE XRAY VIEWS OF THE LEFT ELBOW 9/29/2022 7:58 pm COMPARISON: Same day left humerus radiographs HISTORY: ORDERING SYSTEM PROVIDED HISTORY: pain, trauma TECHNOLOGIST PROVIDED HISTORY: Reason for exam:->pain, trauma Reason for Exam: left elbow pain Additional signs and symptoms: fall Relevant Medical/Surgical History: na FINDINGS: Nearly nondiagnostic images due to overlying objects and patient positioning. Redemonstrated oblique, displaced fracture of the distal humeral shaft. There is a questionable additional vertical fracture involving the distal humeral fracture component. The elbow appears to be in normal alignment. No definite fracture involving visualized portions of the radius or ulna. Soft tissue swelling is seen about the distal humerus. Severely limited, nearly nondiagnostic images of the right elbow due to overlying objects and patient positioning. Repeat radiographs could be obtained after the removal of these objects.  Redemonstrated displaced, oblique fracture of the distal humeral diaphysis. There is a questionable additional vertical fracture involving the distal humeral fracture component. XR ABDOMEN (KUB) (SINGLE AP VIEW)    Result Date: 10/1/2022  EXAMINATION: ONE SUPINE XRAY VIEW(S) OF THE ABDOMEN 10/1/2022 11:32 am COMPARISON: None HISTORY: ORDERING SYSTEM PROVIDED HISTORY: vomiting, had surgery TECHNOLOGIST PROVIDED HISTORY: Reason for exam:->vomiting, had surgery Reason for Exam: vomiting, had surgery FINDINGS: Cholecystectomy clips. Partially included overlying heart monitor leads. Urethral catheter in place. Exclusion of the diaphragm from the fields of view. Patchy bowel gas in the small bowel, colon, and rectum with no findings of obstruction. Minimal stool. Patchy atherosclerotic calcifications in the left upper quadrant and pelvis. Venous phleboliths along the left pelvic sidewall. No acute fracture. Nonobstructive bowel gas pattern. CT HEAD WO CONTRAST    Result Date: 9/29/2022  EXAMINATION: CT OF THE HEAD WITHOUT CONTRAST  9/29/2022 7:55 pm TECHNIQUE: CT of the head was performed without the administration of intravenous contrast. Automated exposure control, iterative reconstruction, and/or weight based adjustment of the mA/kV was utilized to reduce the radiation dose to as low as reasonably achievable. COMPARISON: 03/17/2022 HISTORY: ORDERING SYSTEM PROVIDED HISTORY: trauma TECHNOLOGIST PROVIDED HISTORY: Has a \"code stroke\" or \"stroke alert\" been called? ->No Reason for exam:->trauma Decision Support Exception - unselect if not a suspected or confirmed emergency medical condition->Emergency Medical Condition (MA) Reason for Exam: trauma FINDINGS: BRAIN/VENTRICLES: There is no acute intracranial hemorrhage, mass effect or midline shift. No abnormal extra-axial fluid collection. The gray-white differentiation is maintained without evidence of an acute infarct. There is no evidence of hydrocephalus.  ORBITS: The visualized portion of the orbits demonstrate no acute abnormality. Bilateral lens replacement. SINUSES: Small bubbly air-fluid level present in the right sphenoid sinus. The mastoid air cells appear clear. SOFT TISSUES/SKULL:  No acute abnormality of the visualized skull or soft tissues. 1. No acute intracranial abnormality. 2. Small bubbly air-fluid level present in the right sphenoid sinus. Finding raises the possibility of acute sinusitis in the appropriate clinical setting. FL LESS THAN 1 HOUR    Result Date: 9/30/2022  Radiology exam is complete. No Radiologist dictation. Please follow up with ordering provider. US RETROPERITONEAL LIMITED    Result Date: 10/1/2022  EXAMINATION: ULTRASOUND OF THE KIDNEYS 10/1/2022 6:01 pm COMPARISON: CT on 09/18/2022 HISTORY: Acute kidney injury with urinary retention. FINDINGS: Evaluation is suboptimal secondary to body habitus and patient's clinical condition. The right kidney measures 11.0 cm in length and the left kidney measures 11.8 cm in length. Kidneys demonstrate normal cortical echogenicity. No hydronephrosis or intrarenal stones. No focal lesions. Unremarkable renal ultrasound. Assessment & Plan:      Moreno Mazariegos is a 59 y.o. female admitted 9/29/2022 for L arm injury: POD #3 ORIF/ulnar nerve decompression     1) Urinary Retention: likely due to DM, recent surgery, and immobility. Urine clear, UA w mod leuks and urine not sent for cx - on Macrobid. Plan for stewart removal/voiding trial today. Recommend outpatient evaluation for voiding dysfunction. Will follow. Patient seen and examined, chart reviewed.      Electronically signed by Emerald Carreon PA-C on 10/3/2022 at 9:17 AM

## 2022-10-03 NOTE — CARE COORDINATION
CM in to see Pt to initiate discharge planning. Pt from [facility] assisted living. Pt has DME to include cane, wheelchair, walker, and electric scooter. Pt agreeable with therapy recommendation of SNF. Choice of [facility]. Referral given to [facility]. Pt will not require a precert when approved. Pt has insurance, pcp, and can afford medications. Pt denies any needs at this time.      CM following

## 2022-10-03 NOTE — PROGRESS NOTES
In-Patient Progress Note    Patient:  Nichole Kothari 59 y.o. female MRN: [id number]     Date of Service: 10/3/2022    Hospital Day: 5      Chief complaint: had concerns including Fall and Arm Injury (Left arm deformity, 100mcg of fentanyl on board from EMS). Subjective   Patient seen and examined in the morning. Has no complaints this morning. Patient is feeling better overall. Has been tolerating liquid diet, however still has no appetite. See ROS    I have reviewed all pertinent PMHx, PSHx, FamHx, SocialHx, medications, and allergies and updated history as appropriate. I have reviewed images as appropriate. Assessment and [address] Freddie Will, a 59 y.o. female, with a history of TIA, HTN, HLD, T2DM and class III obesity  was admitted on 9/29/2022 with complaints of had concerns including Fall and Arm Injury (Left arm deformity, 100mcg of fentanyl on board from EMS). Assessment  Lt. Distal Fracture 2/2 mechanical fall s/p ORIF, ulnar nerve decompression (9/30/2022)  Ortho on board   No pain Lt. Arm per patient. Nausea and vomiting with epigastric pain 2/2 ?gastritis vs uremia 2/2 #3 vs   N/V - resolved   On oral PPI BID      MODESTO Stage II 2/2 ? prerenal vs 2/2 #4  Baseline Cr 1, peaked upto 2.3 mg/dl, improved to 0.8 mg/dl   Off IVF     Acute Urinary retention   Had to be straight cathted   Ramos catheter   UA shows , small leucocyte esterase   Started on macrobid     IDDM with neuropathy  On lantus 15U HS, Humalog 5U TID and HDSS  Sugars climbing up  On gabapentin    HTN  On amlodipine 10 mg daily     H/O TIA  On aspirin and statin       H/O Depression  On fluoxetine     9. Starvation ketoacidosis                a. Improved                B. Continue IVF.      10.    Electrolyte abnormality               A. Magnesium 1.6 mg/dl and Phos 1.9                 Plan    Change to regular diet   F/U Ortho recs    Continue PPI   Continue HDSS   PT/OT and discharge planning. Patient requesting SNF. # Peptic ulcer prophylaxis: Pantoprazole BID  # DVT Prophylaxis: Heparin TID  #CODE STATUS: Full      Expected Disposition: TBD  Estimated discharge date: TBD. Requires continuous admission due to PT/Ot assessment . Medically stable       Review of System     Review of Systems   Constitutional:  Negative for chills, fatigue, fever and unexpected weight change. Eyes:  Negative for pain and visual disturbance. Respiratory:  Negative for cough, choking, chest tightness, shortness of breath, wheezing and stridor. Cardiovascular:  Negative for chest pain, palpitations and leg swelling. Gastrointestinal:  Negative for abdominal distention, blood in stool, constipation, diarrhea, nausea and vomiting. Genitourinary:  Negative for decreased urine volume, dysuria, frequency, hematuria and urgency. Musculoskeletal:  Negative for arthralgias, back pain and myalgias. Skin:  Negative for pallor and rash. Neurological:  Negative for dizziness, tremors, syncope, speech difficulty, weakness, light-headedness, numbness and headaches. Psychiatric/Behavioral:  Negative for agitation. I have reviewed all pertinent PMHx, PSHx, FamHx, SocialHx, medications, and allergies and updated history as appropriate.     Physical Exam   VITAL SIGNS:  BP (!) 186/75   Pulse 81   Temp 97.7 °F (36.5 °C) (Oral)   Resp 12   Ht 5' 4\" (1.626 m)   Wt 244 lb 11.4 oz (111 kg)   SpO2 97%   BMI 42.00 kg/m²   Tmax over 24 hours:  Temp (24hrs), Av.1 °F (36.7 °C), Min:97.7 °F (36.5 °C), Max:98.6 °F (37 °C)      Patient Vitals for the past 6 hrs:   BP Temp Temp src Pulse Resp SpO2   10/03/22 0814 (!) 186/75 97.7 °F (36.5 °C) Oral 81 12 97 %           Intake/Output Summary (Last 24 hours) at 10/3/2022 1142  Last data filed at 10/3/2022 0820  Gross per 24 hour   Intake 1660.52 ml   Output 2300 ml   Net -639.48 ml       Wt Readings from Last 2 Encounters:   10/02/22 244 lb 11.4 oz (111 kg) 09/18/22 240 lb (108.9 kg)     Body mass index is 42 kg/m². Physical Exam  Vitals and nursing note reviewed. Constitutional:       General: She is not in acute distress. Appearance: She is ill-appearing. She is not toxic-appearing or diaphoretic. HENT:      Head: Normocephalic and atraumatic. Right Ear: External ear normal.      Left Ear: External ear normal.      Nose: Nose normal.      Mouth/Throat:      Pharynx: Oropharynx is clear. No oropharyngeal exudate or posterior oropharyngeal erythema. Eyes:      General: No scleral icterus. Right eye: No discharge. Left eye: No discharge. Conjunctiva/sclera: Conjunctivae normal.   Cardiovascular:      Rate and Rhythm: Normal rate and regular rhythm. Pulses: Normal pulses. Heart sounds: Normal heart sounds. No murmur heard. No friction rub. No gallop. Pulmonary:      Effort: Pulmonary effort is normal. No respiratory distress. Breath sounds: Normal breath sounds. No stridor. No wheezing, rhonchi or rales. Abdominal:      General: Abdomen is protuberant. Bowel sounds are normal. There is no distension. Palpations: Abdomen is soft. There is no mass. Tenderness: There is no abdominal tenderness. There is no guarding or rebound. Hernia: No hernia is present. Musculoskeletal:      Right lower leg: No edema. Left lower leg: No edema. Comments: Left elbow area clean, minimal discharge seen    Skin:     General: Skin is warm. Capillary Refill: Capillary refill takes less than 2 seconds. Neurological:      Mental Status: She is alert and oriented to person, place, and time.    Psychiatric:         Mood and Affect: Mood normal.      Comments: Flat affect         Current Medications      nitrofurantoin (macrocrystal-monohydrate)  100 mg Oral 2 times per day    pantoprazole  40 mg IntraVENous BID    heparin (porcine)  5,000 Units SubCUTAneous 3 times per day    insulin lispro  0-16 Units SubCUTAneous TID WC    insulin lispro  0-4 Units SubCUTAneous Nightly    amLODIPine  10 mg Oral Daily    aspirin  81 mg Oral Daily    atorvastatin  80 mg Oral Nightly    busPIRone  10 mg Oral TID    donepezil  10 mg Oral Nightly    FLUoxetine  80 mg Oral Daily    gabapentin  200 mg Oral BID    levothyroxine  75 mcg Oral QAM AC    topiramate  50 mg Oral Nightly    acetaminophen  650 mg Oral Q6H    insulin glargine  15 Units SubCUTAneous Nightly    insulin lispro  5 Units SubCUTAneous TID WC    polyethylene glycol  17 g Oral BID    sennosides-docusate sodium  2 tablet Oral BID         Labs and Imaging Studies   Laboratory findings:  XR HUMERUS LEFT (MIN 2 VIEWS)    Result Date: 9/29/2022  EXAMINATION: TWO XRAY VIEWS OF THE LEFT HUMERUS 9/29/2022 6:04 pm COMPARISON: 02/07/2019 HISTORY: ORDERING SYSTEM PROVIDED HISTORY: fall TECHNOLOGIST PROVIDED HISTORY: Reason for exam:->fall Reason for exam:->injury Reason for Exam: LEFT HUMERUS PAIN Additional signs and symptoms: FALL Relevant Medical/Surgical History: NA FINDINGS: Plate screw fixation of the proximal humerus, without hardware complication. An oblique displaced fracture of the distal humeral shaft is noted. No evidence for shoulder or elbow malalignment, within the limitations of positioning and external artifact. 1.  Oblique, displaced fracture of the distal humeral shaft. 2.  Sequela of proximal humerus fracture repair without evidence for hardware complication. XR ELBOW LEFT (MIN 3 VIEWS)    Result Date: 9/29/2022  EXAMINATION: THREE XRAY VIEWS OF THE LEFT ELBOW 9/29/2022 7:58 pm COMPARISON: Same day left humerus radiographs HISTORY: ORDERING SYSTEM PROVIDED HISTORY: pain, trauma TECHNOLOGIST PROVIDED HISTORY: Reason for exam:->pain, trauma Reason for Exam: left elbow pain Additional signs and symptoms: fall Relevant Medical/Surgical History: na FINDINGS: Nearly nondiagnostic images due to overlying objects and patient positioning.  Redemonstrated oblique, displaced fracture of the distal humeral shaft. There is a questionable additional vertical fracture involving the distal humeral fracture component. The elbow appears to be in normal alignment. No definite fracture involving visualized portions of the radius or ulna. Soft tissue swelling is seen about the distal humerus. Severely limited, nearly nondiagnostic images of the right elbow due to overlying objects and patient positioning. Repeat radiographs could be obtained after the removal of these objects. Redemonstrated displaced, oblique fracture of the distal humeral diaphysis. There is a questionable additional vertical fracture involving the distal humeral fracture component. CT HEAD WO CONTRAST    Result Date: 9/29/2022  EXAMINATION: CT OF THE HEAD WITHOUT CONTRAST  9/29/2022 7:55 pm TECHNIQUE: CT of the head was performed without the administration of intravenous contrast. Automated exposure control, iterative reconstruction, and/or weight based adjustment of the mA/kV was utilized to reduce the radiation dose to as low as reasonably achievable. COMPARISON: 03/17/2022 HISTORY: ORDERING SYSTEM PROVIDED HISTORY: trauma TECHNOLOGIST PROVIDED HISTORY: Has a \"code stroke\" or \"stroke alert\" been called? ->No Reason for exam:->trauma Decision Support Exception - unselect if not a suspected or confirmed emergency medical condition->Emergency Medical Condition (MA) Reason for Exam: trauma FINDINGS: BRAIN/VENTRICLES: There is no acute intracranial hemorrhage, mass effect or midline shift. No abnormal extra-axial fluid collection. The gray-white differentiation is maintained without evidence of an acute infarct. There is no evidence of hydrocephalus. ORBITS: The visualized portion of the orbits demonstrate no acute abnormality. Bilateral lens replacement. SINUSES: Small bubbly air-fluid level present in the right sphenoid sinus. The mastoid air cells appear clear.  SOFT TISSUES/SKULL:  No acute abnormality of the visualized skull or soft tissues. 1. No acute intracranial abnormality. 2. Small bubbly air-fluid level present in the right sphenoid sinus. Finding raises the possibility of acute sinusitis in the appropriate clinical setting. FL LESS THAN 1 HOUR    Result Date: 9/30/2022  Radiology exam is complete. No Radiologist dictation. Please follow up with ordering provider.        Recent Results (from the past 24 hour(s))   POCT Glucose    Collection Time: 10/02/22 12:12 PM   Result Value Ref Range    POC Glucose 205 (H) 70 - 99 MG/DL   POCT Glucose    Collection Time: 10/02/22  5:03 PM   Result Value Ref Range    POC Glucose 120 (H) 70 - 99 MG/DL   POCT Glucose    Collection Time: 10/02/22  8:37 PM   Result Value Ref Range    POC Glucose 155 (H) 70 - 99 MG/DL   Basic Metabolic Panel    Collection Time: 10/03/22  2:44 AM   Result Value Ref Range    Sodium 137 135 - 145 MMOL/L    Potassium 4.6 3.5 - 5.1 MMOL/L    Chloride 105 99 - 110 mMol/L    CO2 25 21 - 32 MMOL/L    Anion Gap 7 4 - 16    BUN 13 6 - 23 MG/DL    Creatinine 0.8 0.6 - 1.1 MG/DL    Glucose 176 (H) 70 - 99 MG/DL    Calcium 8.6 8.3 - 10.6 MG/DL    GFR Non-African American >60 >60 mL/min/1.73m2    GFR African American >60 >60 mL/min/1.73m2   CBC with Auto Differential    Collection Time: 10/03/22  2:44 AM   Result Value Ref Range    WBC 6.4 4.0 - 10.5 K/CU MM    RBC 2.75 (L) 4.2 - 5.4 M/CU MM    Hemoglobin 8.2 (L) 12.5 - 16.0 GM/DL    Hematocrit 26.7 (L) 37 - 47 %    MCV 97.1 78 - 100 FL    MCH 29.8 27 - 31 PG    MCHC 30.7 (L) 32.0 - 36.0 %    RDW 13.0 11.7 - 14.9 %    Platelets 068 467 - 118 K/CU MM    MPV 10.0 7.5 - 11.1 FL    Differential Type AUTOMATED DIFFERENTIAL     Segs Relative 58.7 36 - 66 %    Lymphocytes % 29.5 24 - 44 %    Monocytes % 8.9 (H) 0 - 4 %    Eosinophils % 2.3 0 - 3 %    Basophils % 0.3 0 - 1 %    Segs Absolute 3.8 K/CU MM    Lymphocytes Absolute 1.9 K/CU MM    Monocytes Absolute 0.6 K/CU MM    Eosinophils Absolute 0.2 K/CU MM    Basophils Absolute 0.0 K/CU MM    Nucleated RBC % 0.0 %    Total Nucleated RBC 0.0 K/CU MM    Total Immature Neutrophil 0.02 K/CU MM    Immature Neutrophil % 0.3 0 - 0.43 %   Magnesium    Collection Time: 10/03/22  2:44 AM   Result Value Ref Range    Magnesium 1.8 1.8 - 2.4 mg/dl   Phosphorus    Collection Time: 10/03/22  2:44 AM   Result Value Ref Range    Phosphorus 2.5 2.5 - 4.9 MG/DL   POCT Glucose    Collection Time: 10/03/22  8:09 AM   Result Value Ref Range    POC Glucose 189 (H) 70 - 99 MG/DL           Electronically signed by Sara Plaza MD on 10/3/2022 at 11:42 AM

## 2022-10-04 VITALS
HEIGHT: 64 IN | OXYGEN SATURATION: 97 % | HEART RATE: 94 BPM | RESPIRATION RATE: 14 BRPM | SYSTOLIC BLOOD PRESSURE: 112 MMHG | DIASTOLIC BLOOD PRESSURE: 63 MMHG | TEMPERATURE: 97 F | WEIGHT: 239.86 LBS | BODY MASS INDEX: 40.95 KG/M2

## 2022-10-04 LAB
ANION GAP SERPL CALCULATED.3IONS-SCNC: 11 MMOL/L (ref 4–16)
BASOPHILS ABSOLUTE: 0 K/CU MM
BASOPHILS RELATIVE PERCENT: 0.3 % (ref 0–1)
BUN BLDV-MCNC: 11 MG/DL (ref 6–23)
CALCIUM SERPL-MCNC: 8.9 MG/DL (ref 8.3–10.6)
CHLORIDE BLD-SCNC: 100 MMOL/L (ref 99–110)
CO2: 22 MMOL/L (ref 21–32)
CREAT SERPL-MCNC: 0.9 MG/DL (ref 0.6–1.1)
DIFFERENTIAL TYPE: ABNORMAL
EOSINOPHILS ABSOLUTE: 0.2 K/CU MM
EOSINOPHILS RELATIVE PERCENT: 2.8 % (ref 0–3)
GFR AFRICAN AMERICAN: >60 ML/MIN/1.73M2
GFR NON-AFRICAN AMERICAN: >60 ML/MIN/1.73M2
GLUCOSE BLD-MCNC: 208 MG/DL (ref 70–99)
GLUCOSE BLD-MCNC: 220 MG/DL (ref 70–99)
GLUCOSE BLD-MCNC: 291 MG/DL (ref 70–99)
HCT VFR BLD CALC: 28.7 % (ref 37–47)
HEMOGLOBIN: 9 GM/DL (ref 12.5–16)
IMMATURE NEUTROPHIL %: 0.3 % (ref 0–0.43)
LYMPHOCYTES ABSOLUTE: 1.5 K/CU MM
LYMPHOCYTES RELATIVE PERCENT: 24.2 % (ref 24–44)
MAGNESIUM: 1.6 MG/DL (ref 1.8–2.4)
MCH RBC QN AUTO: 29.9 PG (ref 27–31)
MCHC RBC AUTO-ENTMCNC: 31.4 % (ref 32–36)
MCV RBC AUTO: 95.3 FL (ref 78–100)
MONOCYTES ABSOLUTE: 0.5 K/CU MM
MONOCYTES RELATIVE PERCENT: 7.9 % (ref 0–4)
NUCLEATED RBC %: 0 %
PDW BLD-RTO: 12.8 % (ref 11.7–14.9)
PHOSPHORUS: 2.1 MG/DL (ref 2.5–4.9)
PLATELET # BLD: 222 K/CU MM (ref 140–440)
PMV BLD AUTO: 10 FL (ref 7.5–11.1)
POTASSIUM SERPL-SCNC: 4.3 MMOL/L (ref 3.5–5.1)
RBC # BLD: 3.01 M/CU MM (ref 4.2–5.4)
SARS-COV-2: NOT DETECTED
SEGMENTED NEUTROPHILS ABSOLUTE COUNT: 4.1 K/CU MM
SEGMENTED NEUTROPHILS RELATIVE PERCENT: 64.5 % (ref 36–66)
SODIUM BLD-SCNC: 133 MMOL/L (ref 135–145)
TOTAL IMMATURE NEUTOROPHIL: 0.02 K/CU MM
TOTAL NUCLEATED RBC: 0 K/CU MM
WBC # BLD: 6.3 K/CU MM (ref 4–10.5)

## 2022-10-04 PROCEDURE — U0003 INFECTIOUS AGENT DETECTION BY NUCLEIC ACID (DNA OR RNA); SEVERE ACUTE RESPIRATORY SYNDROME CORONAVIRUS 2 (SARS-COV-2) (CORONAVIRUS DISEASE [COVID-19]), AMPLIFIED PROBE TECHNIQUE, MAKING USE OF HIGH THROUGHPUT TECHNOLOGIES AS DESCRIBED BY CMS-2020-01-R: HCPCS

## 2022-10-04 PROCEDURE — U0005 INFEC AGEN DETEC AMPLI PROBE: HCPCS

## 2022-10-04 PROCEDURE — 6370000000 HC RX 637 (ALT 250 FOR IP): Performed by: ORTHOPAEDIC SURGERY

## 2022-10-04 PROCEDURE — 36415 COLL VENOUS BLD VENIPUNCTURE: CPT

## 2022-10-04 PROCEDURE — 83735 ASSAY OF MAGNESIUM: CPT

## 2022-10-04 PROCEDURE — C9113 INJ PANTOPRAZOLE SODIUM, VIA: HCPCS | Performed by: INTERNAL MEDICINE

## 2022-10-04 PROCEDURE — 84100 ASSAY OF PHOSPHORUS: CPT

## 2022-10-04 PROCEDURE — 6360000002 HC RX W HCPCS: Performed by: INTERNAL MEDICINE

## 2022-10-04 PROCEDURE — 51798 US URINE CAPACITY MEASURE: CPT

## 2022-10-04 PROCEDURE — 6370000000 HC RX 637 (ALT 250 FOR IP): Performed by: INTERNAL MEDICINE

## 2022-10-04 PROCEDURE — 82962 GLUCOSE BLOOD TEST: CPT

## 2022-10-04 PROCEDURE — 94761 N-INVAS EAR/PLS OXIMETRY MLT: CPT

## 2022-10-04 PROCEDURE — 6360000002 HC RX W HCPCS: Performed by: STUDENT IN AN ORGANIZED HEALTH CARE EDUCATION/TRAINING PROGRAM

## 2022-10-04 PROCEDURE — 85025 COMPLETE CBC W/AUTO DIFF WBC: CPT

## 2022-10-04 PROCEDURE — 80048 BASIC METABOLIC PNL TOTAL CA: CPT

## 2022-10-04 RX ORDER — MULTIVIT-MIN/IRON/FOLIC ACID/K 18-600-40
CAPSULE ORAL
COMMUNITY

## 2022-10-04 RX ORDER — HYDROCODONE BITARTRATE AND ACETAMINOPHEN 5; 325 MG/1; MG/1
1 TABLET ORAL EVERY 6 HOURS PRN
Status: ON HOLD | COMMUNITY
End: 2022-10-04

## 2022-10-04 RX ORDER — INSULIN ASPART 100 [IU]/ML
5 INJECTION, SOLUTION INTRAVENOUS; SUBCUTANEOUS
Qty: 5 ADJUSTABLE DOSE PRE-FILLED PEN SYRINGE | Refills: 3
Start: 2022-10-04

## 2022-10-04 RX ORDER — NITROFURANTOIN 25; 75 MG/1; MG/1
100 CAPSULE ORAL EVERY 12 HOURS SCHEDULED
Qty: 2 CAPSULE | Refills: 0
Start: 2022-10-04 | End: 2022-10-05

## 2022-10-04 RX ORDER — NAPROXEN 500 MG/1
500 TABLET ORAL 2 TIMES DAILY PRN
Qty: 60 TABLET | Refills: 0
Start: 2022-10-04

## 2022-10-04 RX ORDER — AMLODIPINE BESYLATE 5 MG/1
5 TABLET ORAL DAILY
COMMUNITY

## 2022-10-04 RX ORDER — INSULIN ASPART 100 [IU]/ML
INJECTION, SOLUTION INTRAVENOUS; SUBCUTANEOUS
Status: ON HOLD | COMMUNITY
Start: 2022-09-13 | End: 2022-10-04 | Stop reason: SDUPTHER

## 2022-10-04 RX ORDER — BACLOFEN 10 MG/1
10 TABLET ORAL 2 TIMES DAILY
Status: ON HOLD | COMMUNITY
End: 2022-10-04 | Stop reason: HOSPADM

## 2022-10-04 RX ORDER — HYDROCODONE BITARTRATE AND ACETAMINOPHEN 7.5; 325 MG/1; MG/1
1 TABLET ORAL EVERY 6 HOURS PRN
Qty: 10 TABLET | Refills: 0 | Status: SHIPPED | OUTPATIENT
Start: 2022-10-04 | End: 2022-10-11

## 2022-10-04 RX ORDER — POLYETHYLENE GLYCOL 3350 17 G/17G
17 POWDER, FOR SOLUTION ORAL 2 TIMES DAILY
Qty: 527 G | Refills: 1
Start: 2022-10-04 | End: 2022-11-03

## 2022-10-04 RX ORDER — TIZANIDINE 4 MG/1
8 TABLET ORAL 2 TIMES DAILY
Status: ON HOLD | COMMUNITY
End: 2022-10-04 | Stop reason: HOSPADM

## 2022-10-04 RX ADMIN — INSULIN LISPRO 8 UNITS: 100 INJECTION, SOLUTION INTRAVENOUS; SUBCUTANEOUS at 08:00

## 2022-10-04 RX ADMIN — NITROFURANTOIN (MONOHYDRATE/MACROCRYSTALS) 100 MG: 75; 25 CAPSULE ORAL at 08:16

## 2022-10-04 RX ADMIN — INSULIN LISPRO 5 UNITS: 100 INJECTION, SOLUTION INTRAVENOUS; SUBCUTANEOUS at 08:01

## 2022-10-04 RX ADMIN — PANTOPRAZOLE SODIUM 40 MG: 40 INJECTION, POWDER, FOR SOLUTION INTRAVENOUS at 08:08

## 2022-10-04 RX ADMIN — ASPIRIN 81 MG CHEWABLE TABLET 81 MG: 81 TABLET CHEWABLE at 08:15

## 2022-10-04 RX ADMIN — PROMETHAZINE HYDROCHLORIDE 12.5 MG: 25 INJECTION INTRAMUSCULAR; INTRAVENOUS at 12:35

## 2022-10-04 RX ADMIN — ACETAMINOPHEN 650 MG: 325 TABLET ORAL at 08:10

## 2022-10-04 RX ADMIN — BUSPIRONE HYDROCHLORIDE 10 MG: 5 TABLET ORAL at 08:15

## 2022-10-04 RX ADMIN — AMLODIPINE BESYLATE 10 MG: 10 TABLET ORAL at 08:16

## 2022-10-04 RX ADMIN — HYDRALAZINE HYDROCHLORIDE 10 MG: 20 INJECTION INTRAMUSCULAR; INTRAVENOUS at 06:28

## 2022-10-04 RX ADMIN — BUSPIRONE HYDROCHLORIDE 10 MG: 5 TABLET ORAL at 13:55

## 2022-10-04 RX ADMIN — GABAPENTIN 200 MG: 100 CAPSULE ORAL at 08:10

## 2022-10-04 RX ADMIN — INSULIN LISPRO 5 UNITS: 100 INJECTION, SOLUTION INTRAVENOUS; SUBCUTANEOUS at 12:37

## 2022-10-04 RX ADMIN — INSULIN LISPRO 4 UNITS: 100 INJECTION, SOLUTION INTRAVENOUS; SUBCUTANEOUS at 12:37

## 2022-10-04 ASSESSMENT — PAIN SCALES - GENERAL
PAINLEVEL_OUTOF10: 1
PAINLEVEL_OUTOF10: 5
PAINLEVEL_OUTOF10: 5
PAINLEVEL_OUTOF10: 1

## 2022-10-04 ASSESSMENT — PAIN DESCRIPTION - ONSET
ONSET: GRADUAL

## 2022-10-04 ASSESSMENT — PAIN DESCRIPTION - FREQUENCY
FREQUENCY: INTERMITTENT

## 2022-10-04 ASSESSMENT — PAIN DESCRIPTION - PAIN TYPE
TYPE: ACUTE PAIN
TYPE: ACUTE PAIN;SURGICAL PAIN

## 2022-10-04 ASSESSMENT — PAIN DESCRIPTION - DESCRIPTORS
DESCRIPTORS: ACHING;THROBBING

## 2022-10-04 ASSESSMENT — PAIN - FUNCTIONAL ASSESSMENT
PAIN_FUNCTIONAL_ASSESSMENT: PREVENTS OR INTERFERES SOME ACTIVE ACTIVITIES AND ADLS

## 2022-10-04 ASSESSMENT — PAIN DESCRIPTION - LOCATION
LOCATION: GENERALIZED
LOCATION: HEAD;FOOT
LOCATION: GENERALIZED
LOCATION: GENERALIZED

## 2022-10-04 ASSESSMENT — PAIN DESCRIPTION - ORIENTATION
ORIENTATION: OTHER (COMMENT)

## 2022-10-04 NOTE — PROGRESS NOTES
[facility] [address]   Progress Note  [facility] 0 1 2      Date: 10/4/2022   Patient: Ana Maria Membreno   : 1960   DOA: 2022   MRN: [id number]   ROOM#: [id number]     Admit Date: 2022     Collaborating Urologist on Call at time of admission: Dr. Raina Ron  CC: Fall and arm injury  Reason for Consult:  Urinary retention    Subjective:     Pain: mild, no nausea and no vomiting,   Bowel Movement/Flatus:   Yes  Voiding: easily    Pt resting in bed, states she is feeling well today. Reports voiding well without difficulty and feeling like she is emptying well.     Objective:    Vitals:    BP (!) 166/71   Pulse 92   Temp 97.1 °F (36.2 °C) (Oral)   Resp 14   Ht 5' 4\" (1.626 m)   Wt 239 lb 13.8 oz (108.8 kg)   SpO2 97%   BMI 41.17 kg/m²    Temp  Av.6 °F (36.4 °C)  Min: 97.1 °F (36.2 °C)  Max: 98.1 °F (36.7 °C)     Intake/Output Summary (Last 24 hours) at 10/4/2022 0944  Last data filed at 10/3/2022 1546  Gross per 24 hour   Intake 200 ml   Output 1000 ml   Net -800 ml         Physical Exam:   General appearance: alert, appears stated age, cooperative, no distress, and moderately obese  Head: Normocephalic, without obvious abnormality, atraumatic  Abdomen:  Soft, non-tender, non-distended    Labs:   WBC:    Lab Results   Component Value Date/Time    WBC 6.3 10/04/2022 05:38 AM      Hemoglobin/Hematocrit:    Lab Results   Component Value Date/Time    HGB 9.0 10/04/2022 05:38 AM    HCT 28.7 10/04/2022 05:38 AM      BMP:   Lab Results   Component Value Date/Time     10/04/2022 05:38 AM    K 4.3 10/04/2022 05:38 AM     10/04/2022 05:38 AM    CO2 22 10/04/2022 05:38 AM    BUN 11 10/04/2022 05:38 AM    LABALBU 3.2 10/01/2022 11:03 PM    CREATININE 0.9 10/04/2022 05:38 AM    CALCIUM 8.9 10/04/2022 05:38 AM    GFRAA >60 10/04/2022 05:38 AM    LABGLOM >60 10/04/2022 05:38 AM      PT/INR:    Lab Results   Component Value Date/Time    PROTIME 11.1 10/01/2022 11:22 AM    INR pelvis. Unchanged small paraesophageal hernia     XR HUMERUS LEFT (MIN 2 VIEWS)    Result Date: 9/29/2022  EXAMINATION: TWO XRAY VIEWS OF THE LEFT HUMERUS 9/29/2022 6:04 pm COMPARISON: 02/07/2019 HISTORY: ORDERING SYSTEM PROVIDED HISTORY: fall TECHNOLOGIST PROVIDED HISTORY: Reason for exam:->fall Reason for exam:->injury Reason for Exam: LEFT HUMERUS PAIN Additional signs and symptoms: FALL Relevant Medical/Surgical History: NA FINDINGS: Plate screw fixation of the proximal humerus, without hardware complication. An oblique displaced fracture of the distal humeral shaft is noted. No evidence for shoulder or elbow malalignment, within the limitations of positioning and external artifact. 1.  Oblique, displaced fracture of the distal humeral shaft. 2.  Sequela of proximal humerus fracture repair without evidence for hardware complication. XR ELBOW LEFT (MIN 3 VIEWS)    Result Date: 9/29/2022  EXAMINATION: THREE XRAY VIEWS OF THE LEFT ELBOW 9/29/2022 7:58 pm COMPARISON: Same day left humerus radiographs HISTORY: ORDERING SYSTEM PROVIDED HISTORY: pain, trauma TECHNOLOGIST PROVIDED HISTORY: Reason for exam:->pain, trauma Reason for Exam: left elbow pain Additional signs and symptoms: fall Relevant Medical/Surgical History: na FINDINGS: Nearly nondiagnostic images due to overlying objects and patient positioning. Redemonstrated oblique, displaced fracture of the distal humeral shaft. There is a questionable additional vertical fracture involving the distal humeral fracture component. The elbow appears to be in normal alignment. No definite fracture involving visualized portions of the radius or ulna. Soft tissue swelling is seen about the distal humerus. Severely limited, nearly nondiagnostic images of the right elbow due to overlying objects and patient positioning. Repeat radiographs could be obtained after the removal of these objects.  Redemonstrated displaced, oblique fracture of the distal humeral acute abnormality. Bilateral lens replacement. SINUSES: Small bubbly air-fluid level present in the right sphenoid sinus. The mastoid air cells appear clear. SOFT TISSUES/SKULL:  No acute abnormality of the visualized skull or soft tissues. 1. No acute intracranial abnormality. 2. Small bubbly air-fluid level present in the right sphenoid sinus. Finding raises the possibility of acute sinusitis in the appropriate clinical setting. FL LESS THAN 1 HOUR    Result Date: 9/30/2022  Radiology exam is complete. No Radiologist dictation. Please follow up with ordering provider. US RETROPERITONEAL LIMITED    Result Date: 10/1/2022  EXAMINATION: ULTRASOUND OF THE KIDNEYS 10/1/2022 6:01 pm COMPARISON: CT on 09/18/2022 HISTORY: Acute kidney injury with urinary retention. FINDINGS: Evaluation is suboptimal secondary to body habitus and patient's clinical condition. The right kidney measures 11.0 cm in length and the left kidney measures 11.8 cm in length. Kidneys demonstrate normal cortical echogenicity. No hydronephrosis or intrarenal stones. No focal lesions. Unremarkable renal ultrasound. Assessment & Plan:      Tamara Young is a 59 y.o. female admitted 9/29/2022 for L arm injury: POD #3 ORIF/ulnar nerve decompression     1) Urinary Retention: likely due to DM, recent surgery, and immobility. Urine clear, UA w mod leuks and urine not sent for cx - on Macrobid. Ramos removed yesterday and pt voiding well. PVR ordered. Recommend outpatient evaluation for voiding dysfunction. Pt stable from a  standpoint. Will sign off, please call with any questions. Pt to follow up with Dr. Volodymyr Aldrich in 3-4wks for reevaluation. Patient seen and examined, chart reviewed.      Electronically signed by Kai Escalante PA-C on 10/4/2022 at 9:44 AM

## 2022-10-04 NOTE — DISCHARGE SUMMARY
V2.0  Discharge Summary    Name:  Veronica Lake /Age/Sex: 1960 (17 y.o. female)   Admit Date: 2022  Discharge Date: 10/4/22    MRN & CSN:  [id number] & [id number] Encounter Date and Time 10/4/22 2:34 PM EDT    Attending:  Andra Perera MD Discharging Provider: Mumtaz Ovalles MD       Hospital Course:     Brief HPI: Veronica Lake is a 59 y.o. female who presented with a fall. In the ED she was found to have a L humerus fx, and was admitted. She is from [facility]. Brief Problem Based Course:      She had repair of the left humerus fracture while here on . She has been here for 5 days now. She will now be discharged to [facility]. Problem list     Left supracondylar distal humerus fracture  -S/P open reduction internal fixation of left supracondylar humerus fracture without intra-articular extension using AAP 5 hole left posterior lateral distal humeral locking plate and ulnar nerve decompression  2022 by Dr. Jazz Ruffin  -Follow up in 2 weeks      MODESTO  -Creatinine was 2.3 upon arrival and is 0.9 today. She was on Naprosyn 500 mg twice daily as outpatient. I took it off her medication list for now. Stay off of it if at all possible. Nausea and vomiting with epigastric pain  -Now much improved  -She is already on Reglan 5 mg 3 times a day as outpatient. I continue that for now, and follow-up with her regular PCP. -She is on aspirin and Plavix and Naprosyn. Consider stopping aspirin. Naprosyn stopped at this time due to MODESTO. Acute urinary retention  -In the setting of diabetes and recent surgery and immobility  -Ramos catheter was placed while here, and was removed yesterday and is voiding well. Appreciate urology consult. Urology recommends outpatient follow-up in 3 to 4 weeks.  -She has 1 more day of Ashley Au started as UA shows 135 white cells.      History of TIA  -as per chart she has had a TIA and she is on Plavix and Lipitor. She reports that she follows up with neurology. She does have inpatient neurology notes in the chart from March 2022. -Aspirin is listed as well as a home medication. Consider stopping this     Depression  -On high-dose Prozac. Also on BuSpar. Starvation ketoacidosis  -Resolved     Hypomagnesemia and hypophosphatemia  -Would recheck levels as outpatient     Diabetes meds at this type II, with neuropathy  -dx about 4 yrs ago  -On Lantus. We lowered her dose a little bit now as she is recovering from surgery. Seen for NovoLog. She is on metformin as well. Hypertension  -Continue Norvasc     She reports that she had sleep apnea but the machine did not work out. No sleep studies on file. 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 Social history  -She reports that she moved into [facility] assisted living on May 1 because of falls and memory problems. She reports that she has been living in [facility] for 6 years. She stated that prior to that her PCP was Niyah Villa in the [facility]. Medication changes this admission  -Discontinue baclofen due to falls-apparently this was prescribed by neurology  -Discontinue tizanidine due to falls-apparently this was prescribed by neurology  -We will lower her gabapentin dose from 1200 mg twice daily to the dose listed be low because of the falls  -Discontinue Naprosyn if at all possible    The patient expressed appropriate understanding of, and agreement with the discharge recommendations, medications, and plan.      Consults this admission:  IP CONSULT TO ORTHOPEDIC SURGERY  IP CONSULT TO HOSPITALIST  IP CONSULT TO UROLOGY  IP CONSULT TO IV TEAM    Discharge Diagnosis:   Comminuted left humeral fracture, closed, initial encounter      Discharge Instruction:   Follow up appointments: Orthopedics  Primary care physician: BRIAN Bruno NP within 2 weeks  Diet: diabetic diet   Activity: activity as tolerated with nonweightbearing status left upper extremity  Disposition: Discharged to:   []Home, []HHC, [x]SNF, []Acute Rehab, []Hospice   Condition on discharge: Stable    Labs and Tests to be Followed up as an outpatient by PCP or Specialist: Check magnesium and phosphate levels     Discharge Medications:        Medication List        START taking these medications      HYDROcodone-acetaminophen 7.5-325 MG per tablet  Commonly known as: NORCO  Take 1 tablet by mouth every 6 hours as needed for Pain for up to 7 days. nitrofurantoin (macrocrystal-monohydrate) 100 MG capsule  Commonly known as: MACROBID  Take 1 capsule by mouth every 12 hours for 2 doses     ondansetron 4 MG disintegrating tablet  Commonly known as: Zofran ODT  Take 1 tablet by mouth every 8 hours as needed for Nausea     polyethylene glycol 17 g packet  Commonly known as: GLYCOLAX  Take 17 g by mouth 2 times daily            CHANGE how you take these medications      amLODIPine 5 MG tablet  Commonly known as: NORVASC  What changed: Another medication with the same name was removed. Continue taking this medication, and follow the directions you see here. insulin glargine 100 UNIT/ML injection vial  Commonly known as: LANTUS  Inject 20 Units into the skin nightly  What changed: how much to take     metFORMIN 500 MG tablet  Commonly known as: GLUCOPHAGE  What changed: Another medication with the same name was removed.  Continue taking this medication, and follow the directions you see here.     naproxen 500 MG tablet  Commonly known as: Naprosyn  Take 1 tablet by mouth 2 times daily as needed for Pain  What changed:   when to take this  reasons to take this     NovoLOG FlexPen 100 UNIT/ML injection pen  Generic drug: insulin aspart  Inject 5 Units into the skin 3 times daily (before meals)  What changed:   how much to take  how to take this  when to take this  additional instructions            CONTINUE taking these medications      aspirin 81 MG chewable tablet  Take 1 tablet by mouth in the morning. atorvastatin 80 MG tablet  Commonly known as: LIPITOR     busPIRone 10 MG tablet  Commonly known as: BUSPAR     clopidogrel 75 MG tablet  Commonly known as: PLAVIX     donepezil 10 MG tablet  Commonly known as: ARICEPT     FLUoxetine 40 MG capsule  Commonly known as: PROzac     gabapentin 400 MG capsule  Commonly known as: NEURONTIN  Take 1 capsule by mouth in the morning and 1 capsule before bedtime. Do all this for 30 days. levothyroxine 75 MCG tablet  Commonly known as: SYNTHROID  Take 1 tablet by mouth every morning (before breakfast)     metoclopramide 5 MG tablet  Commonly known as: REGLAN  Take 1 tablet by mouth in the morning and 1 tablet at noon and 1 tablet in the evening. Take with meals. pantoprazole 40 MG tablet  Commonly known as: PROTONIX  Take 1 tablet by mouth in the morning and 1 tablet in the evening. Take before meals. topiramate 50 MG tablet  Commonly known as: TOPAMAX     vitamin B-12 100 MCG tablet  Commonly known as: CYANOCOBALAMIN  Take 1 tablet by mouth in the morning. * vitamin D 50 MCG (2000 UT) Caps capsule     * vitamin D 50 MCG (2000 UT) Tabs tablet  Commonly known as: CHOLECALCIFEROL  Take 1 tablet by mouth in the morning. * This list has 2 medication(s) that are the same as other medications prescribed for you. Read the directions carefully, and ask your doctor or other care provider to review them with you.                 STOP taking these medications      baclofen 10 MG tablet  Commonly known as: LIORESAL     tiZANidine 4 MG tablet  Commonly known as: Bernie Parrish               Where to Get Your Medications        You can get these medications from any pharmacy    Bring a paper prescription for each of these medications  HYDROcodone-acetaminophen 7.5-325 MG per tablet       Information about where to get these medications is not yet available    Ask your nurse or doctor about these medications  naproxen 500 MG tablet  nitrofurantoin (macrocrystal-monohydrate) 100 MG capsule  NovoLOG FlexPen 100 UNIT/ML injection pen  polyethylene glycol 17 g packet        Objective Findings at Discharge:   /63   Pulse 94   Temp 97 °F (36.1 °C) (Axillary)   Resp 14   Ht 5' 4\" (1.626 m)   Wt 239 lb 13.8 oz (108.8 kg)   SpO2 97%   BMI 41.17 kg/m²       Physical Exam:   General: NAD  Eyes: EOMI        Labs and Imaging   XR HUMERUS LEFT (MIN 2 VIEWS)    Result Date: 9/29/2022  EXAMINATION: TWO XRAY VIEWS OF THE LEFT HUMERUS 9/29/2022 6:04 pm COMPARISON: 02/07/2019 HISTORY: ORDERING SYSTEM PROVIDED HISTORY: fall TECHNOLOGIST PROVIDED HISTORY: Reason for exam:->fall Reason for exam:->injury Reason for Exam: LEFT HUMERUS PAIN Additional signs and symptoms: FALL Relevant Medical/Surgical History: NA FINDINGS: Plate screw fixation of the proximal humerus, without hardware complication. An oblique displaced fracture of the distal humeral shaft is noted. No evidence for shoulder or elbow malalignment, within the limitations of positioning and external artifact. 1.  Oblique, displaced fracture of the distal humeral shaft. 2.  Sequela of proximal humerus fracture repair without evidence for hardware complication. XR ELBOW LEFT (MIN 3 VIEWS)    Result Date: 9/29/2022  EXAMINATION: THREE XRAY VIEWS OF THE LEFT ELBOW 9/29/2022 7:58 pm COMPARISON: Same day left humerus radiographs HISTORY: ORDERING SYSTEM PROVIDED HISTORY: pain, trauma TECHNOLOGIST PROVIDED HISTORY: Reason for exam:->pain, trauma Reason for Exam: left elbow pain Additional signs and symptoms: fall Relevant Medical/Surgical History: na FINDINGS: Nearly nondiagnostic images due to overlying objects and patient positioning. Redemonstrated oblique, displaced fracture of the distal humeral shaft. There is a questionable additional vertical fracture involving the distal humeral fracture component.   The elbow appears to be in normal alignment. No definite fracture involving visualized portions of the radius or ulna. Soft tissue swelling is seen about the distal humerus. Severely limited, nearly nondiagnostic images of the right elbow due to overlying objects and patient positioning. Repeat radiographs could be obtained after the removal of these objects. Redemonstrated displaced, oblique fracture of the distal humeral diaphysis. There is a questionable additional vertical fracture involving the distal humeral fracture component. XR ABDOMEN (KUB) (SINGLE AP VIEW)    Result Date: 10/1/2022  EXAMINATION: ONE SUPINE XRAY VIEW(S) OF THE ABDOMEN 10/1/2022 11:32 am COMPARISON: None HISTORY: ORDERING SYSTEM PROVIDED HISTORY: vomiting, had surgery TECHNOLOGIST PROVIDED HISTORY: Reason for exam:->vomiting, had surgery Reason for Exam: vomiting, had surgery FINDINGS: Cholecystectomy clips. Partially included overlying heart monitor leads. Urethral catheter in place. Exclusion of the diaphragm from the fields of view. Patchy bowel gas in the small bowel, colon, and rectum with no findings of obstruction. Minimal stool. Patchy atherosclerotic calcifications in the left upper quadrant and pelvis. Venous phleboliths along the left pelvic sidewall. No acute fracture. Nonobstructive bowel gas pattern. CT HEAD WO CONTRAST    Result Date: 9/29/2022  EXAMINATION: CT OF THE HEAD WITHOUT CONTRAST  9/29/2022 7:55 pm TECHNIQUE: CT of the head was performed without the administration of intravenous contrast. Automated exposure control, iterative reconstruction, and/or weight based adjustment of the mA/kV was utilized to reduce the radiation dose to as low as reasonably achievable. COMPARISON: 03/17/2022 HISTORY: ORDERING SYSTEM PROVIDED HISTORY: trauma TECHNOLOGIST PROVIDED HISTORY: Has a \"code stroke\" or \"stroke alert\" been called? ->No Reason for exam:->trauma Decision Support Exception - unselect if not a suspected or confirmed emergency medical condition->Emergency Medical Condition (MA) Reason for Exam: trauma FINDINGS: BRAIN/VENTRICLES: There is no acute intracranial hemorrhage, mass effect or midline shift. No abnormal extra-axial fluid collection. The gray-white differentiation is maintained without evidence of an acute infarct. There is no evidence of hydrocephalus. ORBITS: The visualized portion of the orbits demonstrate no acute abnormality. Bilateral lens replacement. SINUSES: Small bubbly air-fluid level present in the right sphenoid sinus. The mastoid air cells appear clear. SOFT TISSUES/SKULL:  No acute abnormality of the visualized skull or soft tissues. 1. No acute intracranial abnormality. 2. Small bubbly air-fluid level present in the right sphenoid sinus. Finding raises the possibility of acute sinusitis in the appropriate clinical setting. FL LESS THAN 1 HOUR    Result Date: 9/30/2022  Radiology exam is complete. No Radiologist dictation. Please follow up with ordering provider. US RETROPERITONEAL LIMITED    Result Date: 10/1/2022  EXAMINATION: ULTRASOUND OF THE KIDNEYS 10/1/2022 6:01 pm COMPARISON: CT on 09/18/2022 HISTORY: Acute kidney injury with urinary retention. FINDINGS: Evaluation is suboptimal secondary to body habitus and patient's clinical condition. The right kidney measures 11.0 cm in length and the left kidney measures 11.8 cm in length. Kidneys demonstrate normal cortical echogenicity. No hydronephrosis or intrarenal stones. No focal lesions. Unremarkable renal ultrasound.        CBC:   Recent Labs     10/02/22  0710 10/03/22  0244 10/04/22  0538   WBC 6.5 6.4 6.3   HGB 8.3* 8.2* 9.0*    199 222     BMP:    Recent Labs     10/02/22  0710 10/03/22  0244 10/04/22  0538    137 133*   K 4.6 4.6 4.3    105 100   CO2 24 25 22   BUN 19 13 11   CREATININE 1.1 0.8 0.9   GLUCOSE 170* 176* 220*     Hepatic:   Recent Labs     10/01/22  2303   AST 19   ALT 5*   BILITOT 0. 3   ALKPHOS 85     Lipids:   Lab Results   Component Value Date/Time    CHOL 176 05/12/2022 06:39 AM    HDL 62 05/12/2022 06:39 AM    TRIG 169 05/12/2022 06:39 AM     Hemoglobin A1C:   Lab Results   Component Value Date/Time    LABA1C 8.1 07/11/2022 07:45 AM     TSH: No results found for: TSH  Troponin:   Lab Results   Component Value Date/Time    TROPONINT <0.010 10/01/2022 11:22 AM    TROPONINT <0.010 07/29/2022 06:00 AM    TROPONINT <0.010 07/29/2022 02:34 AM     Lactic Acid: No results for input(s): LACTA in the last 72 hours. BNP: No results for input(s): PROBNP in the last 72 hours.   UA:  Lab Results   Component Value Date/Time    NITRU NEGATIVE 10/02/2022 11:22 AM    COLORU YELLOW 10/02/2022 11:22 AM    WBCUA 135 10/01/2022 11:25 AM    RBCUA 11 10/01/2022 11:25 AM    MUCUS RARE 10/01/2022 11:25 AM    TRICHOMONAS NONE SEEN 10/01/2022 11:25 AM    YEAST OCCASIONAL 10/01/2022 11:25 AM    BACTERIA NEGATIVE 10/01/2022 11:25 AM    CLARITYU SLIGHTLY CLOUDY 10/02/2022 11:22 AM    SPECGRAV 1.020 10/02/2022 11:22 AM    LEUKOCYTESUR MODERATE 10/02/2022 11:22 AM    UROBILINOGEN 0.2 10/02/2022 11:22 AM    BILIRUBINUR NEGATIVE 10/02/2022 11:22 AM    BLOODU TRACE 10/02/2022 11:22 AM    KETUA 15 10/02/2022 11:22 AM     Time Spent Discharging patient 35 minutes    Electronically signed by Yamile Chow MD on 10/4/2022 at 2:34 PM

## 2022-10-04 NOTE — CARE COORDINATION
CM contacted by [facility], they can accept Pt today if medically ready. PS to Dr. Niyah Cardoza to update. Rapid covid requested. 1:37 PM   Pt on discharge. Cm set stretcher transportation with [facility] for 1500    [facility] updated.   Pt BENJAMIN French updated    HENS complete

## 2022-10-04 NOTE — PROGRESS NOTES
Physical Therapy  Att.  This am but pt reports she has not slept all night and request therapist ry back in pm. Will try back as schedule allows

## 2022-10-04 NOTE — DISCHARGE INSTR - COC
Continuity of Care Form    Patient Name: Nael Almaraz   :  1960  MRN:  [id number]    Admit date:  2022  Discharge date:  10/4/2022    Code Status Order: Full Code   Advance Directives:   [facility] Documentation       Date/Time Healthcare Directive Type of Healthcare Directive Copy in [address] Agent's Name Healthcare Agent's Phone Number    22 1200 No, patient does not have an advance directive for healthcare treatment -- -- -- -- --    22 0826 Yes, patient has an advance directive for healthcare treatment Living will Yes, copy in chart -- -- --            Admitting Physician:  Bita Chamorro MD  PCP: BRIAN Marroquin NP    Discharging Nurse: Geni Rodney, [facility] Unit/Room#: 3545/2724-I  Discharging Unit Phone Number: [phone]    Emergency Contact:   Extended Emergency Contact Information  Primary Emergency Contact: [address] [place] [address] Phone: [phone]  Mobile Phone: [phone]  Relation: Parent  Secondary Emergency Contact: [facility] Phone: [phone]  Mobile Phone: [phone]  Relation: Brother/Sister    Past Surgical History:  Past Surgical History:   Procedure Laterality Date    BREAST BIOPSY Right     CHOLECYSTECTOMY  ? EYE SURGERY      per old chart had right eye cataract ext done 2018 and left eye 2018    FOOT SURGERY Left ? HUMERUS FRACTURE SURGERY Left 2018    HUMERUS OPEN REDUCTION INTERNAL FIXATION LEFT PROXIMAL performed by Agatha Welch DO at [address] Right ?     LUNG SURGERY  ?    simone lung lobectomy        Immunization History:   Immunization History   Administered Date(s) Administered    COVID-19, MODERNA BLUE border, Primary or Immunocompromised, (age 12y+), IM, 100 mcg/0.5mL 2021, 2021, 2021    COVID-19, PFIZER PURPLE top, DILUTE for use, (age 15 y+), 30mcg/0.3mL 03/09/2021, 03/13/2021, 03/13/2021, 04/12/2021, 12/14/2021    Influenza Virus Vaccine 10/16/2020    Influenza, FLUARIX, FLULAVAL, [place] (age 10 mo+) AND AFLURIA, (age 1 y+), PF, 0.5mL 10/16/2020    Pneumococcal Polysaccharide ([id number]) 12/28/2007    Pneumococcal Vaccine 09/01/2011    Tdap (Boostrix, Adacel) 03/05/2010, 04/07/2017       Active Problems:  Patient Active Problem List   Diagnosis Code    Proximal humeral fracture S42.209A    Asthma J45.909    Bereavement Z63.4    Pain of both hip joints M25.551, M25.552    Chest pain R07.9    Chronic abdominal pain R10.9, G89.29    Chronic back pain M54.9, G89.29    Chronic obstructive lung disease (HCC) J44.9    Corns and callosities L84    Depressive disorder F32. A    Type II diabetes mellitus, uncontrolled [id number]    Diabetic polyneuropathy ([facility]) E11.42    HTN (hypertension) I10    Hyperlipidemia E78.5    Lesion of liver K76.9    Menopausal symptom N95.1    Mixed hyperlipidemia E78.2    Onychomycosis B35.1    Obesity E66.9    Osteoarthrosis M19.90    Osteoporosis M81.0    Pain in both feet M79.671, M79.672    Sleep apnea G47.30    TIA (transient ischemic attack) G45.9    Vitamin D deficiency E55.9    Wheezing R06.2    S/P ORIF (open reduction internal fixation) fracture Z98.890, Z87.81    Diabetic gastroparesis ([facility]) E11.43, K31.84    Gastroesophageal reflux disease K21.9    Restless legs G25.81    Ketoacidosis, diabetic, type 2, no coma ([facility]) E11.10    Repeated falls R29.6    Subclinical hypothyroidism E03.8    Vasovagal syncope R55    Acute kidney injury superimposed on CKD ([facility]) N17.9, N18.9    Acute kidney injury ([address] N17.9    Nausea and vomiting R11.2    Acute diarrhea R19.7    Comminuted left humeral fracture, closed, initial encounter S42.352A       Isolation/Infection:   Isolation            No Isolation          Patient Infection Status       Infection Onset Added Last Indicated Last Indicated By Review Planned Expiration Resolved Resolved By    None active    Resolved    COVID-19 (Rule Out) 22 Respiratory Panel, Molecular, with COVID-19 (Restricted: peds pts or suitable admitted adults) (Ordered)   22 Rule-Out Test Resulted    COVID-19 (Rule Out) 22 COVID-19, Rapid (Ordered)   22 Rule-Out Test Resulted    C-diff Rule Out 22 Clostridium Difficile Toxin/Antigen (Ordered)   22     C-diff Rule Out 22 Clostridium Difficile Toxin/Antigen (Ordered)   22 Rule-Out Test Resulted    COVID-19 (Rule Out) 22 COVID-19, Rapid (Ordered)   22 Rule-Out Test Resulted    COVID-19 (Rule Out) 22 COVID-19, Rapid (Ordered)   22 Rule-Out Test Resulted    COVID-19 (Rule Out) 20 Covid-19 Ambulatory (Ordered)   20 Rule-Out Test Resulted            Nurse Assessment:  Last Vital Signs: /63   Pulse 94   Temp 97 °F (36.1 °C) (Axillary)   Resp 14   Ht 5' 4\" (1.626 m)   Wt 239 lb 13.8 oz (108.8 kg)   SpO2 97%   BMI 41.17 kg/m²     Last documented pain score (0-10 scale): Pain Level: 1  Last Weight:   Wt Readings from Last 1 Encounters:   10/04/22 239 lb 13.8 oz (108.8 kg)     Mental Status:  {IP PT MENTAL STATUS:66713}    IV Access:  {[facility] IV ACCESS:[id number]}    Nursing Mobility/ADLs:  Walking   Assisted  Transfer  Assisted  Bathing  Assisted  Dressing  Assisted  Toileting  Assisted  Feeding  Independent  Med Admin  Independent  Med Delivery   whole    Wound Care Documentation and Therapy:  Incision 22 Brachial Left;Posterior;Medial (Active)   Dressing Status Clean; Intact;Dry 10/04/22 0916   Incision Cleansed Not Cleansed 10/02/22 1700   Dressing/Treatment Open to air 10/04/22 0916   Closure Staples 10/04/22 0916   Margins Approximated 10/04/22 0916   Incision Assessment Erythema 10/04/22 0916   Drainage Amount Scant 10/04/22 0916   Drainage Description Sanguinous 10/04/22 0916   Odor None 10/04/22 0916   Nahomy-incision Assessment Intact 10/02/22 1700   Number of days: 3       Incision 12/31/18 Shoulder Left (Active)   Dressing Status Clean;Dry; Intact 10/02/22 0930   Incision Cleansed Not Cleansed 10/02/22 0930   Dressing/Treatment Open to air 10/02/22 0930   Closure Staples 10/02/22 0930   Drainage Amount None 10/02/22 0930   Odor None 10/02/22 0930   Number of days: 1373        Elimination:  Continence: Bowel: Yes  Bladder: Yes  Urinary Catheter: None   Colostomy/Ileostomy/Ileal Conduit: No       Date of Last BM: 10/4/2022    Intake/Output Summary (Last 24 hours) at 10/4/2022 1148  Last data filed at 10/3/2022 1546  Gross per 24 hour   Intake 200 ml   Output 1000 ml   Net -800 ml     I/O last 3 completed shifts: In: 1860.5 [P.O.:200; I.V.:1614.7; IV Piggyback:45.9]  Out: 2650 [Urine:2650]    Safety Concerns:     None    Impairments/Disabilities:      Left humerous fracture      Patient's personal belongings (please select all that are sent with patient):  None    RN SIGNATURE:  Electronically signed by Sol Trotter RN on 10/4/22 at 2:43 PM EDT    PHYSICIAN SECTION    Nutrition Therapy:  Current Nutrition Therapy:   - Oral Diet:  Carb Control 4 carbs/meal (1800kcals/day)  No added salt    Routes of Feeding: Oral  Liquids: Thin Liquids  Daily Fluid Restriction: no  Last Modified Barium Swallow with Video (Video Swallowing Test): not done    Treatments at the Time of Hospital Discharge:   Respiratory Treatments: see MAR for any treatments  Oxygen Therapy:  is not on home oxygen therapy.   Ventilator:    - No ventilator support    Rehab Therapies: Physical Therapy and Occupational Therapy  Weight Bearing Status/Restrictions: No weight bearing restrictions Left arm  Other Medical Equipment (for information only, NOT a DME order):  per therapy  Other Treatments: Check blood sugar QID    Check VS daily  Hold Norvasc if SBP is less than 110    Check orthostatic VS twice a week for 2 weeks    Check   Prognosis: Good    Condition at Discharge: Stable    Rehab Potential (if transferring to Rehab): Good    Recommended Labs or Other Treatments After Discharge: CBC and BMP and Mag and Phosphate levels in 1 week      Continue no lifting, pushing, pulling with left arm. Continue range of motion exercises as tolerated with the left arm. Continue to ice prn to amr  Continue PT/OT. Discharge planning. Follow-up in office in 2 weeks. Physician Certification: I certify the above information and transfer of Sunnie Scheuermann  is necessary for the continuing treatment of the diagnosis listed and that she requires {Admit to Appropriate Level of Care:50824} for less 30 days.      Update Admission H&P: No change in H&P    PHYSICIAN SIGNATURE:  Electronically signed by Lita Sawyer MD on 10/4/22 at 2:51 PM EDT

## 2022-10-06 ENCOUNTER — HOSPITAL ENCOUNTER (OUTPATIENT)
Age: 62
Setting detail: SPECIMEN
Discharge: HOME | End: 2022-10-06
Payer: MEDICARE

## 2022-10-06 LAB
ALBUMIN SERPL-MCNC: 3 GM/DL (ref 3.4–5)
ALP BLD-CCNC: 90 IU/L (ref 40–128)
ALT SERPL-CCNC: <5 U/L (ref 10–40)
ANION GAP SERPL CALCULATED.3IONS-SCNC: 11 MMOL/L (ref 4–16)
AST SERPL-CCNC: 11 IU/L (ref 15–37)
BASOPHILS ABSOLUTE: 0 K/CU MM
BASOPHILS RELATIVE PERCENT: 0.6 % (ref 0–1)
BILIRUB SERPL-MCNC: 0.5 MG/DL (ref 0–1)
BUN BLDV-MCNC: 19 MG/DL (ref 6–23)
CALCIUM SERPL-MCNC: 8.9 MG/DL (ref 8.3–10.6)
CHLORIDE BLD-SCNC: 104 MMOL/L (ref 99–110)
CO2: 24 MMOL/L (ref 21–32)
CREAT SERPL-MCNC: 1.3 MG/DL (ref 0.6–1.1)
DIFFERENTIAL TYPE: ABNORMAL
EOSINOPHILS ABSOLUTE: 0.3 K/CU MM
EOSINOPHILS RELATIVE PERCENT: 3.6 % (ref 0–3)
GFR AFRICAN AMERICAN: 50 ML/MIN/1.73M2
GFR NON-AFRICAN AMERICAN: 41 ML/MIN/1.73M2
GLUCOSE BLD-MCNC: 162 MG/DL (ref 70–99)
HCT VFR BLD CALC: 27.6 % (ref 37–47)
HEMOGLOBIN: 8.4 GM/DL (ref 12.5–16)
IMMATURE NEUTROPHIL %: 0.4 % (ref 0–0.43)
LYMPHOCYTES ABSOLUTE: 2.2 K/CU MM
LYMPHOCYTES RELATIVE PERCENT: 31.2 % (ref 24–44)
MCH RBC QN AUTO: 29.8 PG (ref 27–31)
MCHC RBC AUTO-ENTMCNC: 30.4 % (ref 32–36)
MCV RBC AUTO: 97.9 FL (ref 78–100)
MONOCYTES ABSOLUTE: 0.6 K/CU MM
MONOCYTES RELATIVE PERCENT: 9 % (ref 0–4)
NUCLEATED RBC %: 0 %
PDW BLD-RTO: 13.1 % (ref 11.7–14.9)
PLATELET # BLD: 252 K/CU MM (ref 140–440)
PMV BLD AUTO: 10 FL (ref 7.5–11.1)
POTASSIUM SERPL-SCNC: 4.1 MMOL/L (ref 3.5–5.1)
RBC # BLD: 2.82 M/CU MM (ref 4.2–5.4)
SEGMENTED NEUTROPHILS ABSOLUTE COUNT: 3.8 K/CU MM
SEGMENTED NEUTROPHILS RELATIVE PERCENT: 55.2 % (ref 36–66)
SODIUM BLD-SCNC: 139 MMOL/L (ref 135–145)
TOTAL IMMATURE NEUTOROPHIL: 0.03 K/CU MM
TOTAL NUCLEATED RBC: 0 K/CU MM
TOTAL PROTEIN: 4.8 GM/DL (ref 6.4–8.2)
WBC # BLD: 6.9 K/CU MM (ref 4–10.5)

## 2022-10-06 PROCEDURE — 36415 COLL VENOUS BLD VENIPUNCTURE: CPT

## 2022-10-06 PROCEDURE — 85025 COMPLETE CBC W/AUTO DIFF WBC: CPT

## 2022-10-06 PROCEDURE — 80053 COMPREHEN METABOLIC PANEL: CPT

## 2022-10-26 ENCOUNTER — HOSPITAL ENCOUNTER (OUTPATIENT)
Age: 62
Setting detail: SPECIMEN
Discharge: HOME | End: 2022-10-26

## 2022-10-26 LAB
ALBUMIN SERPL-MCNC: 3.4 GM/DL (ref 3.4–5)
ALP BLD-CCNC: 148 IU/L (ref 40–128)
ALT SERPL-CCNC: 8 U/L (ref 10–40)
ANION GAP SERPL CALCULATED.3IONS-SCNC: 19 MMOL/L (ref 4–16)
AST SERPL-CCNC: 9 IU/L (ref 15–37)
BASOPHILS ABSOLUTE: 0 K/CU MM
BASOPHILS RELATIVE PERCENT: 0.1 % (ref 0–1)
BILIRUB SERPL-MCNC: 0.2 MG/DL (ref 0–1)
BUN BLDV-MCNC: 37 MG/DL (ref 6–23)
CALCIUM SERPL-MCNC: 9.1 MG/DL (ref 8.3–10.6)
CHLORIDE BLD-SCNC: 97 MMOL/L (ref 99–110)
CO2: 20 MMOL/L (ref 21–32)
CREAT SERPL-MCNC: 1.7 MG/DL (ref 0.6–1.1)
DIFFERENTIAL TYPE: ABNORMAL
EOSINOPHILS ABSOLUTE: 0 K/CU MM
EOSINOPHILS RELATIVE PERCENT: 0 % (ref 0–3)
GFR SERPL CREATININE-BSD FRML MDRD: 33 ML/MIN/1.73M2
GLUCOSE BLD-MCNC: 425 MG/DL (ref 70–99)
HCT VFR BLD CALC: 34.8 % (ref 37–47)
HEMOGLOBIN: 10.6 GM/DL (ref 12.5–16)
IMMATURE NEUTROPHIL %: 0.4 % (ref 0–0.43)
LYMPHOCYTES ABSOLUTE: 1 K/CU MM
LYMPHOCYTES RELATIVE PERCENT: 14.3 % (ref 24–44)
MCH RBC QN AUTO: 29.5 PG (ref 27–31)
MCHC RBC AUTO-ENTMCNC: 30.5 % (ref 32–36)
MCV RBC AUTO: 96.9 FL (ref 78–100)
MONOCYTES ABSOLUTE: 0.4 K/CU MM
MONOCYTES RELATIVE PERCENT: 5 % (ref 0–4)
NUCLEATED RBC %: 0 %
PDW BLD-RTO: 13.1 % (ref 11.7–14.9)
PLATELET # BLD: 238 K/CU MM (ref 140–440)
PMV BLD AUTO: 11.1 FL (ref 7.5–11.1)
POTASSIUM SERPL-SCNC: 4.1 MMOL/L (ref 3.5–5.1)
PROCALCITONIN: 0.11
RBC # BLD: 3.59 M/CU MM (ref 4.2–5.4)
SEGMENTED NEUTROPHILS ABSOLUTE COUNT: 5.7 K/CU MM
SEGMENTED NEUTROPHILS RELATIVE PERCENT: 80.2 % (ref 36–66)
SODIUM BLD-SCNC: 136 MMOL/L (ref 135–145)
TOTAL IMMATURE NEUTOROPHIL: 0.03 K/CU MM
TOTAL NUCLEATED RBC: 0 K/CU MM
TOTAL PROTEIN: 5.5 GM/DL (ref 6.4–8.2)
WBC # BLD: 7.1 K/CU MM (ref 4–10.5)

## 2022-10-26 PROCEDURE — 85025 COMPLETE CBC W/AUTO DIFF WBC: CPT

## 2022-10-26 PROCEDURE — 84145 PROCALCITONIN (PCT): CPT

## 2022-10-26 PROCEDURE — 36415 COLL VENOUS BLD VENIPUNCTURE: CPT

## 2022-10-26 PROCEDURE — 80053 COMPREHEN METABOLIC PANEL: CPT

## 2022-10-28 ENCOUNTER — HOSPITAL ENCOUNTER (OUTPATIENT)
Age: 62
Setting detail: SPECIMEN
Discharge: HOME | End: 2022-10-28

## 2022-10-28 LAB
ANION GAP SERPL CALCULATED.3IONS-SCNC: 29 MMOL/L (ref 4–16)
BUN BLDV-MCNC: 52 MG/DL (ref 6–23)
CALCIUM SERPL-MCNC: 8.9 MG/DL (ref 8.3–10.6)
CHLORIDE BLD-SCNC: 87 MMOL/L (ref 99–110)
CO2: 12 MMOL/L (ref 21–32)
CREAT SERPL-MCNC: 2.3 MG/DL (ref 0.6–1.1)
GFR SERPL CREATININE-BSD FRML MDRD: 23 ML/MIN/1.73M2
GLUCOSE BLD-MCNC: 778 MG/DL (ref 70–99)
POTASSIUM SERPL-SCNC: 5.7 MMOL/L (ref 3.5–5.1)
SODIUM BLD-SCNC: 128 MMOL/L (ref 135–145)

## 2022-10-28 PROCEDURE — 80048 BASIC METABOLIC PNL TOTAL CA: CPT

## 2022-10-28 PROCEDURE — 36415 COLL VENOUS BLD VENIPUNCTURE: CPT

## 2022-10-31 ENCOUNTER — HOSPITAL ENCOUNTER (OUTPATIENT)
Age: 62
Setting detail: SPECIMEN
Discharge: HOME | End: 2022-10-31

## 2022-10-31 LAB
ANION GAP SERPL CALCULATED.3IONS-SCNC: 11 MMOL/L (ref 4–16)
BUN BLDV-MCNC: 37 MG/DL (ref 6–23)
CALCIUM SERPL-MCNC: 8.8 MG/DL (ref 8.3–10.6)
CHLORIDE BLD-SCNC: 103 MMOL/L (ref 99–110)
CO2: 26 MMOL/L (ref 21–32)
CREAT SERPL-MCNC: 1.6 MG/DL (ref 0.6–1.1)
GFR SERPL CREATININE-BSD FRML MDRD: 36 ML/MIN/1.73M2
GLUCOSE BLD-MCNC: 288 MG/DL (ref 70–99)
POTASSIUM SERPL-SCNC: 3.7 MMOL/L (ref 3.5–5.1)
SODIUM BLD-SCNC: 140 MMOL/L (ref 135–145)

## 2022-10-31 PROCEDURE — 80048 BASIC METABOLIC PNL TOTAL CA: CPT

## 2022-10-31 PROCEDURE — 36415 COLL VENOUS BLD VENIPUNCTURE: CPT

## 2022-11-03 ENCOUNTER — OFFICE VISIT (OUTPATIENT)
Dept: ORTHOPEDIC SURGERY | Age: 62
End: 2022-11-03

## 2022-11-03 VITALS
HEART RATE: 74 BPM | RESPIRATION RATE: 16 BRPM | SYSTOLIC BLOOD PRESSURE: 124 MMHG | OXYGEN SATURATION: 97 % | WEIGHT: 235 LBS | HEIGHT: 64 IN | BODY MASS INDEX: 40.12 KG/M2 | DIASTOLIC BLOOD PRESSURE: 78 MMHG

## 2022-11-03 DIAGNOSIS — Z98.890 S/P ORIF (OPEN REDUCTION INTERNAL FIXATION) FRACTURE: Primary | ICD-10-CM

## 2022-11-03 DIAGNOSIS — Z87.81 S/P ORIF (OPEN REDUCTION INTERNAL FIXATION) FRACTURE: Primary | ICD-10-CM

## 2022-11-03 PROCEDURE — 99024 POSTOP FOLLOW-UP VISIT: CPT | Performed by: PHYSICIAN ASSISTANT

## 2022-11-03 NOTE — PROGRESS NOTES
5 weeks post op  From [facility]    Date of surgery:   September 30th   Surgeon: Dr. Hanh Pérez    History:  Ms. Gisell Milner is here in follow up regarding her left humerus ORIF. She is doing rather well. She is having 0/10 pain. She denies chest pain, SOB, calf pain,fever,wound drainage, tenderness in the calf, and ankle swelling. No other issues. Staples were removed in the [address] [place] [address] home by Shaina. Physical:  Vitals:    11/03/22 1130   BP: 124/78   Site: Right Upper Arm   Position: Sitting   Cuff Size: Medium Adult   Pulse: 74   Resp: 16   SpO2: 97%   Weight: 235 lb (106.6 kg)   Height: 5' 4\" (1.626 m)       Left arm incision is well-healed  Left elbow range of motion 20 degrees short of full extension and 130 degrees of flexion. No significant and with range of motion. Ulnar nerve, radial nerve, median nerve intact grossly on exam.    Imaging studies:  3 views of the left elbow taken and reviewed in the office today postoperative changes of a distal humerus fracture after ORIF with plate and screw fixation with good reduction of the fracture and fracture callus formation being seen on today's x-rays. No evidence of hardware failure. The official read and interpretation of these x-rays will be done by the the [facility]       Impression: Status post above, doing well       Plan:   Patient Instructions   May begin lifting, pushing, pulling no more than 5 pounds. Do not use the left arm to push your whole body weight up. Continue range of motion  Ice and elevate as needed  Tylenol or Motrin for pain  Follow up in 6 weeks with Dr. Roel Wilson      We are committed to providing you the best care possible. If you receive a survey after visiting one of our offices, please take time to share your experience concerning your physician office visit. These surveys are confidential and no health information about you is shared.      We are eager to improve for you and we are counting on your feedback to help make that happen.

## 2022-11-03 NOTE — PATIENT INSTRUCTIONS
May begin lifting, pushing, pulling no more than 5 pounds. Do not use the left arm to push your whole body weight up. Continue range of motion  Ice and elevate as needed  Tylenol or Motrin for pain  Follow up in 6 weeks with Dr. Blanche Marshall      We are committed to providing you the best care possible. If you receive a survey after visiting one of our offices, please take time to share your experience concerning your physician office visit. These surveys are confidential and no health information about you is shared. We are eager to improve for you and we are counting on your feedback to help make that happen.

## 2022-11-10 ENCOUNTER — HOSPITAL ENCOUNTER (OUTPATIENT)
Age: 62
Setting detail: SPECIMEN
Discharge: HOME | End: 2022-11-10

## 2022-11-10 LAB
ALBUMIN SERPL-MCNC: 2.8 GM/DL (ref 3.4–5)
ALP BLD-CCNC: 141 IU/L (ref 40–128)
ALT SERPL-CCNC: 12 U/L (ref 10–40)
ANION GAP SERPL CALCULATED.3IONS-SCNC: 13 MMOL/L (ref 4–16)
AST SERPL-CCNC: 11 IU/L (ref 15–37)
BILIRUB SERPL-MCNC: 0.3 MG/DL (ref 0–1)
BUN BLDV-MCNC: 18 MG/DL (ref 6–23)
CALCIUM SERPL-MCNC: 7.8 MG/DL (ref 8.3–10.6)
CHLORIDE BLD-SCNC: 96 MMOL/L (ref 99–110)
CO2: 21 MMOL/L (ref 21–32)
CREAT SERPL-MCNC: 1.4 MG/DL (ref 0.6–1.1)
GFR SERPL CREATININE-BSD FRML MDRD: 42 ML/MIN/1.73M2
GLUCOSE BLD-MCNC: 645 MG/DL (ref 70–99)
HCT VFR BLD CALC: 29.4 % (ref 37–47)
HEMOGLOBIN: 8.8 GM/DL (ref 12.5–16)
MCH RBC QN AUTO: 29.5 PG (ref 27–31)
MCHC RBC AUTO-ENTMCNC: 29.9 % (ref 32–36)
MCV RBC AUTO: 98.7 FL (ref 78–100)
PDW BLD-RTO: 13.2 % (ref 11.7–14.9)
PLATELET # BLD: 190 K/CU MM (ref 140–440)
PMV BLD AUTO: 10.5 FL (ref 7.5–11.1)
POTASSIUM SERPL-SCNC: 3.7 MMOL/L (ref 3.5–5.1)
PROCALCITONIN: 0.07
RBC # BLD: 2.98 M/CU MM (ref 4.2–5.4)
SODIUM BLD-SCNC: 130 MMOL/L (ref 135–145)
TOTAL PROTEIN: 4.4 GM/DL (ref 6.4–8.2)
WBC # BLD: 5.8 K/CU MM (ref 4–10.5)

## 2022-11-10 PROCEDURE — 85027 COMPLETE CBC AUTOMATED: CPT

## 2022-11-10 PROCEDURE — 84145 PROCALCITONIN (PCT): CPT

## 2022-11-10 PROCEDURE — 36415 COLL VENOUS BLD VENIPUNCTURE: CPT

## 2022-11-10 PROCEDURE — 80053 COMPREHEN METABOLIC PANEL: CPT

## 2022-12-01 ENCOUNTER — HOSPITAL ENCOUNTER (OUTPATIENT)
Age: 62
Setting detail: SPECIMEN
Discharge: HOME | End: 2022-12-01
Payer: MEDICARE

## 2022-12-01 LAB
ALBUMIN SERPL-MCNC: 3.2 GM/DL (ref 3.4–5)
ALP BLD-CCNC: 111 IU/L (ref 40–128)
ALT SERPL-CCNC: 13 U/L (ref 10–40)
ANION GAP SERPL CALCULATED.3IONS-SCNC: 13 MMOL/L (ref 4–16)
AST SERPL-CCNC: 12 IU/L (ref 15–37)
BILIRUB SERPL-MCNC: 0.2 MG/DL (ref 0–1)
BUN BLDV-MCNC: 23 MG/DL (ref 6–23)
CALCIUM SERPL-MCNC: 8.7 MG/DL (ref 8.3–10.6)
CHLORIDE BLD-SCNC: 102 MMOL/L (ref 99–110)
CHOLESTEROL: 203 MG/DL
CO2: 20 MMOL/L (ref 21–32)
CREAT SERPL-MCNC: 1.2 MG/DL (ref 0.6–1.1)
ESTIMATED AVERAGE GLUCOSE: 183 MG/DL
GFR SERPL CREATININE-BSD FRML MDRD: 51 ML/MIN/1.73M2
GLUCOSE BLD-MCNC: 358 MG/DL (ref 70–99)
HBA1C MFR BLD: 8 % (ref 4.2–6.3)
HCT VFR BLD CALC: 31.1 % (ref 37–47)
HDLC SERPL-MCNC: 63 MG/DL
HEMOGLOBIN: 9.6 GM/DL (ref 12.5–16)
LDL CHOLESTEROL CALCULATED: 112 MG/DL
MCH RBC QN AUTO: 29.9 PG (ref 27–31)
MCHC RBC AUTO-ENTMCNC: 30.9 % (ref 32–36)
MCV RBC AUTO: 96.9 FL (ref 78–100)
PDW BLD-RTO: 13 % (ref 11.7–14.9)
PLATELET # BLD: 243 K/CU MM (ref 140–440)
PMV BLD AUTO: 10.5 FL (ref 7.5–11.1)
POTASSIUM SERPL-SCNC: 4.5 MMOL/L (ref 3.5–5.1)
RBC # BLD: 3.21 M/CU MM (ref 4.2–5.4)
SODIUM BLD-SCNC: 135 MMOL/L (ref 135–145)
TOTAL PROTEIN: 5.3 GM/DL (ref 6.4–8.2)
TRIGL SERPL-MCNC: 140 MG/DL
TSH HIGH SENSITIVITY: 1.48 UIU/ML (ref 0.27–4.2)
WBC # BLD: 6.2 K/CU MM (ref 4–10.5)

## 2022-12-01 PROCEDURE — 85027 COMPLETE CBC AUTOMATED: CPT

## 2022-12-01 PROCEDURE — 84443 ASSAY THYROID STIM HORMONE: CPT

## 2022-12-01 PROCEDURE — 80053 COMPREHEN METABOLIC PANEL: CPT

## 2022-12-01 PROCEDURE — 36415 COLL VENOUS BLD VENIPUNCTURE: CPT

## 2022-12-01 PROCEDURE — 83036 HEMOGLOBIN GLYCOSYLATED A1C: CPT

## 2022-12-01 PROCEDURE — 80061 LIPID PANEL: CPT

## 2023-01-04 ENCOUNTER — OFFICE VISIT (OUTPATIENT)
Dept: ORTHOPEDIC SURGERY | Age: 63
End: 2023-01-04

## 2023-01-04 VITALS
BODY MASS INDEX: 40.39 KG/M2 | WEIGHT: 236.6 LBS | HEART RATE: 75 BPM | OXYGEN SATURATION: 98 % | RESPIRATION RATE: 16 BRPM | HEIGHT: 64 IN

## 2023-01-04 DIAGNOSIS — Z87.81 S/P ORIF (OPEN REDUCTION INTERNAL FIXATION) FRACTURE: Primary | ICD-10-CM

## 2023-01-04 DIAGNOSIS — Z98.890 S/P ORIF (OPEN REDUCTION INTERNAL FIXATION) FRACTURE: Primary | ICD-10-CM

## 2023-01-04 PROCEDURE — 99024 POSTOP FOLLOW-UP VISIT: CPT | Performed by: PHYSICIAN ASSISTANT

## 2023-01-04 NOTE — PROGRESS NOTES
Date of surgery:   September 30th  Surgeon: Dr. Ceballos Westbrook    History:  Ms. Jaspreet Hand is here in follow up regarding her left distal humerus ORIF  She is doing rather well. She is having 0/10 pain. She denies chest pain, SOB, calf pain,fever,wound drainage, tenderness in the calf, and ankle swelling. No other issues. She states that she is actually feeling fine with the left arm and could raise the arm up all the way if she did not have a left shoulder problem. She has no pain in the elbow whatsoever. She wants to know if she can start using her walker again.         Physical:   Vitals:    01/04/23 1402   Pulse: 75   Resp: 16   SpO2: 98%   Weight: 236 lb 9.6 oz (107.3 kg)   Height: 5' 4\" (1.626 m)     Left elbow range of motion:  0-150 degrees  No pain with range of motion    Impression: Status post above, doing well       Plan:   Patient Instructions   Weightbearing and activities as tolerated  May take Ibuprofen or Motrin as needed  Rest, ice, and elevate as needed  Work on ROM and strengthening exercises as discussed  Follow up as needed

## 2023-01-12 ENCOUNTER — HOSPITAL ENCOUNTER (OUTPATIENT)
Age: 63
Setting detail: SPECIMEN
Discharge: HOME | End: 2023-01-12
Payer: COMMERCIAL

## 2023-01-12 LAB
ESTIMATED AVERAGE GLUCOSE: 177 MG/DL
HBA1C MFR BLD: 7.8 % (ref 4.2–6.3)

## 2023-01-12 PROCEDURE — 83036 HEMOGLOBIN GLYCOSYLATED A1C: CPT

## 2023-05-09 ENCOUNTER — HOSPITAL ENCOUNTER (INPATIENT)
Age: 63
LOS: 4 days | Discharge: HOME HEALTH | DRG: 683 | End: 2023-05-13
Attending: INTERNAL MEDICINE | Admitting: INTERNAL MEDICINE
Payer: MEDICARE

## 2023-05-09 ENCOUNTER — APPOINTMENT (OUTPATIENT)
Dept: GENERAL RADIOLOGY | Age: 63
DRG: 683 | End: 2023-05-09
Payer: MEDICARE

## 2023-05-09 DIAGNOSIS — R19.7 NAUSEA VOMITING AND DIARRHEA: ICD-10-CM

## 2023-05-09 DIAGNOSIS — R11.2 NAUSEA VOMITING AND DIARRHEA: ICD-10-CM

## 2023-05-09 DIAGNOSIS — R42 LIGHTHEADEDNESS: ICD-10-CM

## 2023-05-09 DIAGNOSIS — N39.0 URINARY TRACT INFECTION WITHOUT HEMATURIA, SITE UNSPECIFIED: ICD-10-CM

## 2023-05-09 DIAGNOSIS — N17.9 ACUTE KIDNEY INJURY (HCC): ICD-10-CM

## 2023-05-09 DIAGNOSIS — N17.9 AKI (ACUTE KIDNEY INJURY) (HCC): Primary | ICD-10-CM

## 2023-05-09 LAB
ALBUMIN SERPL-MCNC: 4.1 GM/DL (ref 3.4–5)
ALP BLD-CCNC: 172 IU/L (ref 40–129)
ALT SERPL-CCNC: 42 U/L (ref 10–40)
ANION GAP SERPL CALCULATED.3IONS-SCNC: 11 MMOL/L (ref 4–16)
AST SERPL-CCNC: 40 IU/L (ref 15–37)
BACTERIA: ABNORMAL /HPF
BASOPHILS ABSOLUTE: 0 K/CU MM
BASOPHILS RELATIVE PERCENT: 0.4 % (ref 0–1)
BILIRUB SERPL-MCNC: 0.3 MG/DL (ref 0–1)
BILIRUBIN URINE: ABNORMAL MG/DL
BLOOD, URINE: NEGATIVE
BUN SERPL-MCNC: 29 MG/DL (ref 6–23)
CALCIUM SERPL-MCNC: 9.6 MG/DL (ref 8.3–10.6)
CHLORIDE BLD-SCNC: 97 MMOL/L (ref 99–110)
CLARITY: ABNORMAL
CO2: 26 MMOL/L (ref 21–32)
COLOR: YELLOW
CREAT SERPL-MCNC: 2.3 MG/DL (ref 0.6–1.1)
DIFFERENTIAL TYPE: ABNORMAL
EOSINOPHILS ABSOLUTE: 0.1 K/CU MM
EOSINOPHILS RELATIVE PERCENT: 1.3 % (ref 0–3)
GFR SERPL CREATININE-BSD FRML MDRD: 23 ML/MIN/1.73M2
GLUCOSE SERPL-MCNC: 193 MG/DL (ref 70–99)
GLUCOSE, URINE: 100 MG/DL
HCT VFR BLD CALC: 42 % (ref 37–47)
HEMOGLOBIN: 13.1 GM/DL (ref 12.5–16)
HYALINE CASTS: 10 /LPF
IMMATURE NEUTROPHIL %: 0.3 % (ref 0–0.43)
KETONES, URINE: ABNORMAL MG/DL
LACTATE: 1.8 MMOL/L (ref 0.5–1.9)
LEUKOCYTE ESTERASE, URINE: ABNORMAL
LIPASE: 29 IU/L (ref 13–60)
LYMPHOCYTES ABSOLUTE: 2.6 K/CU MM
LYMPHOCYTES RELATIVE PERCENT: 32.9 % (ref 24–44)
MAGNESIUM: 1.9 MG/DL (ref 1.8–2.4)
MCH RBC QN AUTO: 28.2 PG (ref 27–31)
MCHC RBC AUTO-ENTMCNC: 31.2 % (ref 32–36)
MCV RBC AUTO: 90.3 FL (ref 78–100)
MONOCYTES ABSOLUTE: 0.6 K/CU MM
MONOCYTES RELATIVE PERCENT: 8 % (ref 0–4)
MUCUS: ABNORMAL HPF
NITRITE URINE, QUANTITATIVE: NEGATIVE
NUCLEATED RBC %: 0 %
PDW BLD-RTO: 12.9 % (ref 11.7–14.9)
PH, URINE: 5 (ref 5–8)
PLATELET # BLD: 321 K/CU MM (ref 140–440)
PMV BLD AUTO: 9.4 FL (ref 7.5–11.1)
POTASSIUM SERPL-SCNC: 4.6 MMOL/L (ref 3.5–5.1)
PROTEIN UA: >300 MG/DL
RBC # BLD: 4.65 M/CU MM (ref 4.2–5.4)
RBC URINE: 5 /HPF (ref 0–6)
SEGMENTED NEUTROPHILS ABSOLUTE COUNT: 4.4 K/CU MM
SEGMENTED NEUTROPHILS RELATIVE PERCENT: 57.1 % (ref 36–66)
SODIUM BLD-SCNC: 134 MMOL/L (ref 135–145)
SPECIFIC GRAVITY UA: >1.03 (ref 1–1.03)
SQUAMOUS EPITHELIAL: <1 /HPF
TOTAL IMMATURE NEUTOROPHIL: 0.02 K/CU MM
TOTAL NUCLEATED RBC: 0 K/CU MM
TOTAL PROTEIN: 6.4 GM/DL (ref 6.4–8.2)
TRICHOMONAS: ABNORMAL /HPF
TROPONIN T: 0.05 NG/ML
UROBILINOGEN, URINE: 0.2 MG/DL (ref 0.2–1)
WBC # BLD: 7.8 K/CU MM (ref 4–10.5)
WBC UA: 41 /HPF (ref 0–5)

## 2023-05-09 PROCEDURE — 1200000000 HC SEMI PRIVATE

## 2023-05-09 PROCEDURE — 2580000003 HC RX 258: Performed by: PHYSICIAN ASSISTANT

## 2023-05-09 PROCEDURE — 93005 ELECTROCARDIOGRAM TRACING: CPT | Performed by: PHYSICIAN ASSISTANT

## 2023-05-09 PROCEDURE — 96360 HYDRATION IV INFUSION INIT: CPT

## 2023-05-09 PROCEDURE — 83735 ASSAY OF MAGNESIUM: CPT

## 2023-05-09 PROCEDURE — 80053 COMPREHEN METABOLIC PANEL: CPT

## 2023-05-09 PROCEDURE — 71045 X-RAY EXAM CHEST 1 VIEW: CPT

## 2023-05-09 PROCEDURE — 85025 COMPLETE CBC W/AUTO DIFF WBC: CPT

## 2023-05-09 PROCEDURE — 81001 URINALYSIS AUTO W/SCOPE: CPT

## 2023-05-09 PROCEDURE — 84484 ASSAY OF TROPONIN QUANT: CPT

## 2023-05-09 PROCEDURE — 83605 ASSAY OF LACTIC ACID: CPT

## 2023-05-09 PROCEDURE — 83690 ASSAY OF LIPASE: CPT

## 2023-05-09 PROCEDURE — 99285 EMERGENCY DEPT VISIT HI MDM: CPT

## 2023-05-09 RX ORDER — 0.9 % SODIUM CHLORIDE 0.9 %
1000 INTRAVENOUS SOLUTION INTRAVENOUS ONCE
Status: COMPLETED | OUTPATIENT
Start: 2023-05-09 | End: 2023-05-09

## 2023-05-09 RX ADMIN — SODIUM CHLORIDE 1000 ML: 9 INJECTION, SOLUTION INTRAVENOUS at 20:20

## 2023-05-09 ASSESSMENT — LIFESTYLE VARIABLES
HOW MANY STANDARD DRINKS CONTAINING ALCOHOL DO YOU HAVE ON A TYPICAL DAY: PATIENT DOES NOT DRINK
HOW OFTEN DO YOU HAVE A DRINK CONTAINING ALCOHOL: NEVER

## 2023-05-09 NOTE — ED PROVIDER NOTES
(acute kidney injury) ([facility] [address]    2. Urinary tract infection without hematuria, site unspecified    3. Lightheadedness    4.  Nausea vomiting and diarrhea          DISPOSITION Admitted 05/09/2023 11:30:29 PM      [address], [address] [place], [place]  05/10/23 0246

## 2023-05-09 NOTE — ED TRIAGE NOTES
Patient to [facility] bed 2 via EMS with c/o nausea and emesis after eating dinner. Patient states this has happened before. She also c/o pain in her lower back and shoulder blades. Resps even and unlabored.

## 2023-05-10 ENCOUNTER — APPOINTMENT (OUTPATIENT)
Dept: NUCLEAR MEDICINE | Age: 63
DRG: 683 | End: 2023-05-10
Payer: MEDICARE

## 2023-05-10 PROBLEM — R77.8 TROPONIN I ABOVE REFERENCE RANGE: Status: ACTIVE | Noted: 2023-05-10

## 2023-05-10 PROBLEM — R79.89 TROPONIN I ABOVE REFERENCE RANGE: Status: ACTIVE | Noted: 2023-05-10

## 2023-05-10 LAB
ALBUMIN SERPL-MCNC: 3.3 GM/DL (ref 3.4–5)
ALP BLD-CCNC: 141 IU/L (ref 40–129)
ALT SERPL-CCNC: 31 U/L (ref 10–40)
ANION GAP SERPL CALCULATED.3IONS-SCNC: 9 MMOL/L (ref 4–16)
AST SERPL-CCNC: 22 IU/L (ref 15–37)
BILIRUB SERPL-MCNC: 0.2 MG/DL (ref 0–1)
BILIRUBIN DIRECT: 0.2 MG/DL (ref 0–0.3)
BILIRUBIN, INDIRECT: 0 MG/DL (ref 0–0.7)
BUN SERPL-MCNC: 34 MG/DL (ref 6–23)
CALCIUM SERPL-MCNC: 8.6 MG/DL (ref 8.3–10.6)
CHLORIDE BLD-SCNC: 103 MMOL/L (ref 99–110)
CO2: 25 MMOL/L (ref 21–32)
CREAT SERPL-MCNC: 2.3 MG/DL (ref 0.6–1.1)
EKG ATRIAL RATE: 79 BPM
EKG DIAGNOSIS: NORMAL
EKG P AXIS: 29 DEGREES
EKG P-R INTERVAL: 122 MS
EKG Q-T INTERVAL: 432 MS
EKG QRS DURATION: 86 MS
EKG QTC CALCULATION (BAZETT): 495 MS
EKG R AXIS: 17 DEGREES
EKG T AXIS: 127 DEGREES
EKG VENTRICULAR RATE: 79 BPM
GFR SERPL CREATININE-BSD FRML MDRD: 23 ML/MIN/1.73M2
GLUCOSE BLD-MCNC: 116 MG/DL (ref 70–99)
GLUCOSE BLD-MCNC: 195 MG/DL (ref 70–99)
GLUCOSE BLD-MCNC: 202 MG/DL (ref 70–99)
GLUCOSE BLD-MCNC: 266 MG/DL (ref 70–99)
GLUCOSE BLD-MCNC: 269 MG/DL (ref 70–99)
GLUCOSE BLD-MCNC: 310 MG/DL (ref 70–99)
GLUCOSE SERPL-MCNC: 268 MG/DL (ref 70–99)
LV EF: 50 %
LV EF: 53 %
LVEF MODALITY: NORMAL
LVEF MODALITY: NORMAL
POTASSIUM SERPL-SCNC: 4.8 MMOL/L (ref 3.5–5.1)
SODIUM BLD-SCNC: 137 MMOL/L (ref 135–145)
TOTAL PROTEIN: 5.3 GM/DL (ref 6.4–8.2)
TROPONIN T: 0.03 NG/ML

## 2023-05-10 PROCEDURE — 36415 COLL VENOUS BLD VENIPUNCTURE: CPT

## 2023-05-10 PROCEDURE — 99223 1ST HOSP IP/OBS HIGH 75: CPT | Performed by: INTERNAL MEDICINE

## 2023-05-10 PROCEDURE — 93306 TTE W/DOPPLER COMPLETE: CPT

## 2023-05-10 PROCEDURE — 6370000000 HC RX 637 (ALT 250 FOR IP): Performed by: STUDENT IN AN ORGANIZED HEALTH CARE EDUCATION/TRAINING PROGRAM

## 2023-05-10 PROCEDURE — 6370000000 HC RX 637 (ALT 250 FOR IP): Performed by: INTERNAL MEDICINE

## 2023-05-10 PROCEDURE — A9500 TC99M SESTAMIBI: HCPCS | Performed by: INTERNAL MEDICINE

## 2023-05-10 PROCEDURE — 6360000002 HC RX W HCPCS: Performed by: PHYSICIAN ASSISTANT

## 2023-05-10 PROCEDURE — 2580000003 HC RX 258: Performed by: PHYSICIAN ASSISTANT

## 2023-05-10 PROCEDURE — 80053 COMPREHEN METABOLIC PANEL: CPT

## 2023-05-10 PROCEDURE — 6360000002 HC RX W HCPCS: Performed by: INTERNAL MEDICINE

## 2023-05-10 PROCEDURE — 87086 URINE CULTURE/COLONY COUNT: CPT

## 2023-05-10 PROCEDURE — 2580000003 HC RX 258: Performed by: INTERNAL MEDICINE

## 2023-05-10 PROCEDURE — 82248 BILIRUBIN DIRECT: CPT

## 2023-05-10 PROCEDURE — 6360000002 HC RX W HCPCS: Performed by: NURSE PRACTITIONER

## 2023-05-10 PROCEDURE — 94761 N-INVAS EAR/PLS OXIMETRY MLT: CPT

## 2023-05-10 PROCEDURE — 93017 CV STRESS TEST TRACING ONLY: CPT

## 2023-05-10 PROCEDURE — 80048 BASIC METABOLIC PNL TOTAL CA: CPT

## 2023-05-10 PROCEDURE — 82962 GLUCOSE BLOOD TEST: CPT

## 2023-05-10 PROCEDURE — 1200000000 HC SEMI PRIVATE

## 2023-05-10 PROCEDURE — 51798 US URINE CAPACITY MEASURE: CPT

## 2023-05-10 PROCEDURE — 78452 HT MUSCLE IMAGE SPECT MULT: CPT

## 2023-05-10 PROCEDURE — 80076 HEPATIC FUNCTION PANEL: CPT

## 2023-05-10 PROCEDURE — 84484 ASSAY OF TROPONIN QUANT: CPT

## 2023-05-10 PROCEDURE — 93010 ELECTROCARDIOGRAM REPORT: CPT | Performed by: INTERNAL MEDICINE

## 2023-05-10 PROCEDURE — 3430000000 HC RX DIAGNOSTIC RADIOPHARMACEUTICAL: Performed by: INTERNAL MEDICINE

## 2023-05-10 RX ORDER — HEPARIN SODIUM 5000 [USP'U]/ML
5000 INJECTION, SOLUTION INTRAVENOUS; SUBCUTANEOUS EVERY 8 HOURS SCHEDULED
Status: DISCONTINUED | OUTPATIENT
Start: 2023-05-10 | End: 2023-05-13 | Stop reason: HOSPADM

## 2023-05-10 RX ORDER — POLYETHYLENE GLYCOL 3350 17 G/17G
17 POWDER, FOR SOLUTION ORAL DAILY PRN
Status: DISCONTINUED | OUTPATIENT
Start: 2023-05-10 | End: 2023-05-13 | Stop reason: HOSPADM

## 2023-05-10 RX ORDER — ONDANSETRON 2 MG/ML
4 INJECTION INTRAMUSCULAR; INTRAVENOUS EVERY 6 HOURS PRN
Status: DISCONTINUED | OUTPATIENT
Start: 2023-05-10 | End: 2023-05-13 | Stop reason: HOSPADM

## 2023-05-10 RX ORDER — INSULIN LISPRO 100 [IU]/ML
0-4 INJECTION, SOLUTION INTRAVENOUS; SUBCUTANEOUS EVERY EVENING
Status: DISCONTINUED | OUTPATIENT
Start: 2023-05-10 | End: 2023-05-12

## 2023-05-10 RX ORDER — SODIUM CHLORIDE 0.9 % (FLUSH) 0.9 %
5-40 SYRINGE (ML) INJECTION PRN
Status: DISCONTINUED | OUTPATIENT
Start: 2023-05-10 | End: 2023-05-13 | Stop reason: HOSPADM

## 2023-05-10 RX ORDER — DONEPEZIL HYDROCHLORIDE 10 MG/1
20 TABLET, FILM COATED ORAL EVERY EVENING
Status: DISCONTINUED | OUTPATIENT
Start: 2023-05-10 | End: 2023-05-13 | Stop reason: HOSPADM

## 2023-05-10 RX ORDER — GLUCAGON 1 MG/ML
1 KIT INJECTION PRN
Status: DISCONTINUED | OUTPATIENT
Start: 2023-05-10 | End: 2023-05-13 | Stop reason: HOSPADM

## 2023-05-10 RX ORDER — INSULIN GLARGINE 100 [IU]/ML
10 INJECTION, SOLUTION SUBCUTANEOUS EVERY EVENING
Status: DISCONTINUED | OUTPATIENT
Start: 2023-05-10 | End: 2023-05-11

## 2023-05-10 RX ORDER — VITAMIN B COMPLEX
2000 TABLET ORAL DAILY
Status: DISCONTINUED | OUTPATIENT
Start: 2023-05-10 | End: 2023-05-13 | Stop reason: HOSPADM

## 2023-05-10 RX ORDER — METOCLOPRAMIDE 5 MG/1
5 TABLET ORAL
Status: DISCONTINUED | OUTPATIENT
Start: 2023-05-10 | End: 2023-05-13 | Stop reason: HOSPADM

## 2023-05-10 RX ORDER — HYDRALAZINE HYDROCHLORIDE 25 MG/1
25 TABLET, FILM COATED ORAL EVERY 8 HOURS SCHEDULED
Status: DISCONTINUED | OUTPATIENT
Start: 2023-05-10 | End: 2023-05-13

## 2023-05-10 RX ORDER — CARVEDILOL 6.25 MG/1
6.25 TABLET ORAL 2 TIMES DAILY WITH MEALS
Status: DISCONTINUED | OUTPATIENT
Start: 2023-05-10 | End: 2023-05-10

## 2023-05-10 RX ORDER — ASPIRIN 81 MG/1
81 TABLET, CHEWABLE ORAL DAILY
Status: DISCONTINUED | OUTPATIENT
Start: 2023-05-10 | End: 2023-05-11

## 2023-05-10 RX ORDER — TETRAKIS(2-METHOXYISOBUTYLISOCYANIDE)COPPER(I) TETRAFLUOROBORATE 1 MG/ML
10 INJECTION, POWDER, LYOPHILIZED, FOR SOLUTION INTRAVENOUS
Status: COMPLETED | OUTPATIENT
Start: 2023-05-10 | End: 2023-05-10

## 2023-05-10 RX ORDER — ACETAMINOPHEN 325 MG/1
650 TABLET ORAL EVERY 6 HOURS PRN
Status: DISCONTINUED | OUTPATIENT
Start: 2023-05-10 | End: 2023-05-13 | Stop reason: HOSPADM

## 2023-05-10 RX ORDER — HYDRALAZINE HYDROCHLORIDE 20 MG/ML
5 INJECTION INTRAMUSCULAR; INTRAVENOUS ONCE
Status: COMPLETED | OUTPATIENT
Start: 2023-05-10 | End: 2023-05-10

## 2023-05-10 RX ORDER — DEXTROSE MONOHYDRATE 100 MG/ML
INJECTION, SOLUTION INTRAVENOUS CONTINUOUS PRN
Status: DISCONTINUED | OUTPATIENT
Start: 2023-05-10 | End: 2023-05-13 | Stop reason: HOSPADM

## 2023-05-10 RX ORDER — CLOPIDOGREL BISULFATE 75 MG/1
75 TABLET ORAL DAILY
Status: DISCONTINUED | OUTPATIENT
Start: 2023-05-10 | End: 2023-05-13 | Stop reason: HOSPADM

## 2023-05-10 RX ORDER — AMLODIPINE BESYLATE 10 MG/1
10 TABLET ORAL DAILY
Status: DISCONTINUED | OUTPATIENT
Start: 2023-05-11 | End: 2023-05-13 | Stop reason: HOSPADM

## 2023-05-10 RX ORDER — SODIUM CHLORIDE 0.9 % (FLUSH) 0.9 %
5-40 SYRINGE (ML) INJECTION EVERY 12 HOURS SCHEDULED
Status: DISCONTINUED | OUTPATIENT
Start: 2023-05-10 | End: 2023-05-13 | Stop reason: HOSPADM

## 2023-05-10 RX ORDER — INSULIN LISPRO 100 [IU]/ML
0-8 INJECTION, SOLUTION INTRAVENOUS; SUBCUTANEOUS
Status: DISCONTINUED | OUTPATIENT
Start: 2023-05-10 | End: 2023-05-12

## 2023-05-10 RX ORDER — AMLODIPINE BESYLATE 5 MG/1
5 TABLET ORAL DAILY
Status: DISCONTINUED | OUTPATIENT
Start: 2023-05-10 | End: 2023-05-10

## 2023-05-10 RX ORDER — FLUOXETINE HYDROCHLORIDE 20 MG/1
80 CAPSULE ORAL DAILY
Status: DISCONTINUED | OUTPATIENT
Start: 2023-05-10 | End: 2023-05-13 | Stop reason: HOSPADM

## 2023-05-10 RX ORDER — SODIUM CHLORIDE 9 MG/ML
INJECTION, SOLUTION INTRAVENOUS CONTINUOUS
Status: DISCONTINUED | OUTPATIENT
Start: 2023-05-10 | End: 2023-05-11

## 2023-05-10 RX ORDER — BUSPIRONE HYDROCHLORIDE 5 MG/1
10 TABLET ORAL 3 TIMES DAILY
Status: DISCONTINUED | OUTPATIENT
Start: 2023-05-10 | End: 2023-05-13 | Stop reason: HOSPADM

## 2023-05-10 RX ORDER — TOPIRAMATE 25 MG/1
50 TABLET ORAL EVERY EVENING
Status: DISCONTINUED | OUTPATIENT
Start: 2023-05-10 | End: 2023-05-13 | Stop reason: HOSPADM

## 2023-05-10 RX ORDER — PANTOPRAZOLE SODIUM 40 MG/1
40 TABLET, DELAYED RELEASE ORAL
Status: DISCONTINUED | OUTPATIENT
Start: 2023-05-10 | End: 2023-05-13 | Stop reason: HOSPADM

## 2023-05-10 RX ORDER — ONDANSETRON 4 MG/1
4 TABLET, ORALLY DISINTEGRATING ORAL EVERY 8 HOURS PRN
Status: DISCONTINUED | OUTPATIENT
Start: 2023-05-10 | End: 2023-05-13 | Stop reason: HOSPADM

## 2023-05-10 RX ORDER — ACETAMINOPHEN 650 MG/1
650 SUPPOSITORY RECTAL EVERY 6 HOURS PRN
Status: DISCONTINUED | OUTPATIENT
Start: 2023-05-10 | End: 2023-05-13 | Stop reason: HOSPADM

## 2023-05-10 RX ORDER — TETRAKIS(2-METHOXYISOBUTYLISOCYANIDE)COPPER(I) TETRAFLUOROBORATE 1 MG/ML
30 INJECTION, POWDER, LYOPHILIZED, FOR SOLUTION INTRAVENOUS
Status: COMPLETED | OUTPATIENT
Start: 2023-05-10 | End: 2023-05-10

## 2023-05-10 RX ORDER — SODIUM CHLORIDE 9 MG/ML
INJECTION, SOLUTION INTRAVENOUS PRN
Status: DISCONTINUED | OUTPATIENT
Start: 2023-05-10 | End: 2023-05-13 | Stop reason: HOSPADM

## 2023-05-10 RX ADMIN — KIT FOR THE PREPARATION OF TECHNETIUM TC99M SESTAMIBI 10 MILLICURIE: 1 INJECTION, POWDER, LYOPHILIZED, FOR SOLUTION PARENTERAL at 09:45

## 2023-05-10 RX ADMIN — INSULIN GLARGINE 10 UNITS: 100 INJECTION, SOLUTION SUBCUTANEOUS at 22:27

## 2023-05-10 RX ADMIN — HYDRALAZINE HYDROCHLORIDE 25 MG: 25 TABLET, FILM COATED ORAL at 13:54

## 2023-05-10 RX ADMIN — HEPARIN SODIUM 5000 UNITS: 5000 INJECTION INTRAVENOUS; SUBCUTANEOUS at 13:54

## 2023-05-10 RX ADMIN — SODIUM CHLORIDE: 9 INJECTION, SOLUTION INTRAVENOUS at 03:08

## 2023-05-10 RX ADMIN — BUSPIRONE HYDROCHLORIDE 10 MG: 5 TABLET ORAL at 13:54

## 2023-05-10 RX ADMIN — SODIUM CHLORIDE: 9 INJECTION, SOLUTION INTRAVENOUS at 13:53

## 2023-05-10 RX ADMIN — PANTOPRAZOLE SODIUM 40 MG: 40 TABLET, DELAYED RELEASE ORAL at 05:43

## 2023-05-10 RX ADMIN — REGADENOSON 0.4 MG: 0.08 INJECTION, SOLUTION INTRAVENOUS at 11:13

## 2023-05-10 RX ADMIN — TOPIRAMATE 50 MG: 25 TABLET, FILM COATED ORAL at 03:04

## 2023-05-10 RX ADMIN — Medication 2000 UNITS: at 13:54

## 2023-05-10 RX ADMIN — SODIUM CHLORIDE, PRESERVATIVE FREE 5 ML: 5 INJECTION INTRAVENOUS at 09:00

## 2023-05-10 RX ADMIN — CLOPIDOGREL BISULFATE 75 MG: 75 TABLET ORAL at 13:54

## 2023-05-10 RX ADMIN — INSULIN GLARGINE 10 UNITS: 100 INJECTION, SOLUTION SUBCUTANEOUS at 03:04

## 2023-05-10 RX ADMIN — INSULIN LISPRO 2 UNITS: 100 INJECTION, SOLUTION INTRAVENOUS; SUBCUTANEOUS at 08:43

## 2023-05-10 RX ADMIN — KIT FOR THE PREPARATION OF TECHNETIUM TC99M SESTAMIBI 30 MILLICURIE: 1 INJECTION, POWDER, LYOPHILIZED, FOR SOLUTION PARENTERAL at 11:20

## 2023-05-10 RX ADMIN — HEPARIN SODIUM 5000 UNITS: 5000 INJECTION INTRAVENOUS; SUBCUTANEOUS at 22:31

## 2023-05-10 RX ADMIN — INSULIN LISPRO 5 UNITS: 100 INJECTION, SOLUTION INTRAVENOUS; SUBCUTANEOUS at 17:24

## 2023-05-10 RX ADMIN — INSULIN LISPRO 4 UNITS: 100 INJECTION, SOLUTION INTRAVENOUS; SUBCUTANEOUS at 03:04

## 2023-05-10 RX ADMIN — ASPIRIN 81 MG CHEWABLE TABLET 81 MG: 81 TABLET CHEWABLE at 13:54

## 2023-05-10 RX ADMIN — CEFTRIAXONE SODIUM 1000 MG: 1 INJECTION, POWDER, FOR SOLUTION INTRAMUSCULAR; INTRAVENOUS at 00:14

## 2023-05-10 RX ADMIN — FLUOXETINE 80 MG: 20 CAPSULE ORAL at 13:54

## 2023-05-10 RX ADMIN — HYDRALAZINE HYDROCHLORIDE 25 MG: 25 TABLET, FILM COATED ORAL at 22:27

## 2023-05-10 RX ADMIN — HYDRALAZINE HYDROCHLORIDE 5 MG: 20 INJECTION INTRAMUSCULAR; INTRAVENOUS at 03:58

## 2023-05-10 RX ADMIN — METOCLOPRAMIDE 5 MG: 5 TABLET ORAL at 05:43

## 2023-05-10 RX ADMIN — BUSPIRONE HYDROCHLORIDE 10 MG: 5 TABLET ORAL at 22:27

## 2023-05-10 RX ADMIN — HEPARIN SODIUM 5000 UNITS: 5000 INJECTION INTRAVENOUS; SUBCUTANEOUS at 05:43

## 2023-05-10 NOTE — CARE COORDINATION
Case Management Assessment  Initial Evaluation    Date/Time of Evaluation: 5/10/2023 11:53 AM  Assessment Completed by: Edie Pollock RN    If patient is discharged prior to next notation, then this note serves as note for discharge by case management. Patient Name: Anel Bender                   YOB: 1960  Diagnosis: Lightheadedness [R42]  MODESTO (acute kidney injury) ([facility] [address] [N17.9]  Nausea vomiting and diarrhea [R11.2, R19.7]  Urinary tract infection without hematuria, site unspecified [N39.0]                   Date / Time: 5/9/2023  6:48 PM    Patient Admission Status: Inpatient   Readmission Risk (Low < 19, Mod (19-27), High > 27): Readmission Risk Score: 19.5    Current PCP: BRIAN Zabala NP  PCP verified by CM? Yes    Chart Reviewed: Yes      History Provided by: Medical Record  Patient Orientation: Alert and Oriented    Patient Cognition: Alert    Hospitalization in the last 30 days (Readmission):  No    If yes, Readmission Assessment in CM Navigator will be completed. Advance Directives:      Code Status: Full Code   Patient's Primary Decision Maker is: Legal Next of Kin    Primary Decision Maker: [employer] - Parent - [phone]    Secondary Decision Maker: Kelin Gordon - Brother/Sister - [phone]    Discharge Planning:    Patient lives with: Family Members Type of Home: Assisted living  Primary Care Giver: Self  Patient Support Systems include: Family Members   Current Financial resources: Medicare  Current community resources: None  Current services prior to admission: Jeremy Davis            Current DME:              Type of Home Care services:  None (pt from [facility]    ADLS  Prior functional level: Independent in ADLs/IADLs  Current functional level:  Independent in ADLs/IADLs    PT AM-PAC:   /24  OT AM-PAC:   /24    Family can provide assistance at DC: No  Would you like Case Management to discuss the

## 2023-05-10 NOTE — H&P
History and Physical      Name:  Meghan Piper /Age/Sex: 1960  (57 y.o. female)   MRN & CSN:  [id number] & [id number] Encounter Date/Time: 2023 11:30 PM EDT   Location:  ED18/ED-18 PCP: [place], [place] [address] [facility] Day: 1    Assessment and Plan:     #. MODESTO on CKD  -BUN/creatinine-29/2.3, was 23/1.2-2022  -Continue IV fluids and repeat BMP. -Strict input/output.  -Patient had multiple admissions in the past for MODESTO    #. Detectable troponin  -Patient denying any chest pain  -EKG with ? ST-T wave changes lead 1, aVL. -Monitor with serial troponins, repeat EKG  -Consult cardiology    #. Abnormal LFTs  -Could be secondary to volume depletion  -Continue IV fluids and repeat CMP    #.  Nausea, vomiting, diarrhea  -Suspect secondary to gastroenteritis/gastroparesis  -Check C. Difficile    #. Dizziness secondary to above  -Orthostatic vitals ordered.  -Lying 189/61 HR 77.   -Sitting 161/77 HR 81.   -Standing 150/72 HR 90.    #.  UTI  -UA-trace leukocyte esterase, nitrate negative, WBC 41, moderate bacteria  -Urine culture ordered in ER  -Continue ceftriaxone. #.  H/o Repeated falls  -Currently at [place] assisted living [facility]  -Ambulates with a walker    #. Hypertension  -Continue Norvasc    #. Hypothyroidism-on levothyroxine    #. Diabetes mellitus type 2, on long-term insulin  -History of DKA  -Patient is on metformin for 100 mg twice daily, NovoLog 5 units 3 times daily AC, Lantus 20 units nightly.  -Continue Lantus 15 units nightly, insulin sliding scale with hypoglycemia protocol. #.  Diabetic neuropathy-on gabapentin-resume when MODESTO resolves    #. Diabetic gastroparesis-on metoclopramide    #. History of sinus pause-during admission 2022-evaluated by cardiology-thought to be secondary to vasovagal event.  -Beta-blocker was discontinued at that time. #.  COPD, not on home oxygen    #. Obstructive sleep apnea    #. Chronic back pain    #.

## 2023-05-10 NOTE — ED NOTES
ED TO INPATIENT SBAR HANDOFF    Patient Name: Disha Antonio   :  1960  57 y.o. Preferred Name  [facility]  Family/Caregiver Present no   Restraints no   C-SSRS: Risk of Suicide: No Risk  Sitter no   Sepsis Risk Score Sepsis Risk Score: 2.22    Situation  Chief Complaint   Patient presents with    Nausea     Emesis after dinner     Brief Description of Patient's Condition: Patient came in for nausea and emesis after eating dinner. Mental Status: oriented, alert, coherent, logical, thought processes intact, and able to concentrate and follow conversation  Arrived from: assisted living     Imaging:   XR CHEST PORTABLE   Final Result   No acute process.            Abnormal labs:   Abnormal Labs Reviewed   CBC WITH AUTO DIFFERENTIAL - Abnormal; Notable for the following components:       Result Value    MCHC 31.2 (*)     Monocytes % 8.0 (*)     All other components within normal limits   COMPREHENSIVE METABOLIC PANEL - Abnormal; Notable for the following components:    Sodium 134 (*)     Chloride 97 (*)     BUN 29 (*)     Creatinine 2.3 (*)     Est, Glom Filt Rate 23 (*)     Glucose 193 (*)     ALT 42 (*)     AST 40 (*)     Alkaline Phosphatase 172 (*)     All other components within normal limits   TROPONIN - Abnormal; Notable for the following components:    Troponin T 0.054 (*)     All other components within normal limits   URINALYSIS - Abnormal; Notable for the following components:    Clarity, UA SLIGHTLY CLOUDY (*)     Glucose, Urine 100 (*)     Bilirubin Urine SMALL NUMBER OR AMOUNT OBSERVED (*)     Ketones, Urine TRACE (*)     Protein, UA >300 (*)     Leukocyte Esterase, Urine TRACE (*)     All other components within normal limits   MICROSCOPIC URINALYSIS - Abnormal; Notable for the following components:    WBC, UA 41 (*)     Bacteria, UA MODERATE (*)     Mucus, UA OCCASIONAL (*)     All other components within normal limits       Background  History:   Past Medical History:   Diagnosis Date

## 2023-05-11 LAB
CREATININE URINE: 102.9 MG/DL (ref 28–217)
CULTURE: NORMAL
ESTIMATED AVERAGE GLUCOSE: 189 MG/DL
GLUCOSE BLD-MCNC: 188 MG/DL (ref 70–99)
GLUCOSE BLD-MCNC: 205 MG/DL (ref 70–99)
GLUCOSE BLD-MCNC: 278 MG/DL (ref 70–99)
GLUCOSE BLD-MCNC: 304 MG/DL (ref 70–99)
HBA1C MFR BLD: 8.2 % (ref 4.2–6.3)
Lab: NORMAL
PROT/CREAT RATIO, UR: 3.5
SODIUM URINE: 69 MMOL/L (ref 35–167)
SPECIMEN: NORMAL
URINE TOTAL PROTEIN: 358.6 MG/DL

## 2023-05-11 PROCEDURE — 6370000000 HC RX 637 (ALT 250 FOR IP): Performed by: STUDENT IN AN ORGANIZED HEALTH CARE EDUCATION/TRAINING PROGRAM

## 2023-05-11 PROCEDURE — 94761 N-INVAS EAR/PLS OXIMETRY MLT: CPT

## 2023-05-11 PROCEDURE — 36415 COLL VENOUS BLD VENIPUNCTURE: CPT

## 2023-05-11 PROCEDURE — 6370000000 HC RX 637 (ALT 250 FOR IP): Performed by: INTERNAL MEDICINE

## 2023-05-11 PROCEDURE — 99233 SBSQ HOSP IP/OBS HIGH 50: CPT | Performed by: INTERNAL MEDICINE

## 2023-05-11 PROCEDURE — 6360000002 HC RX W HCPCS: Performed by: INTERNAL MEDICINE

## 2023-05-11 PROCEDURE — 2580000003 HC RX 258: Performed by: INTERNAL MEDICINE

## 2023-05-11 PROCEDURE — 83036 HEMOGLOBIN GLYCOSYLATED A1C: CPT

## 2023-05-11 PROCEDURE — APPSS30 APP SPLIT SHARED TIME 16-30 MINUTES

## 2023-05-11 PROCEDURE — 81001 URINALYSIS AUTO W/SCOPE: CPT

## 2023-05-11 PROCEDURE — 84300 ASSAY OF URINE SODIUM: CPT

## 2023-05-11 PROCEDURE — 82962 GLUCOSE BLOOD TEST: CPT

## 2023-05-11 PROCEDURE — 82570 ASSAY OF URINE CREATININE: CPT

## 2023-05-11 PROCEDURE — 80048 BASIC METABOLIC PNL TOTAL CA: CPT

## 2023-05-11 PROCEDURE — 84156 ASSAY OF PROTEIN URINE: CPT

## 2023-05-11 PROCEDURE — 1200000000 HC SEMI PRIVATE

## 2023-05-11 RX ORDER — LEVOTHYROXINE SODIUM 0.07 MG/1
75 TABLET ORAL
Status: DISCONTINUED | OUTPATIENT
Start: 2023-05-12 | End: 2023-05-13 | Stop reason: HOSPADM

## 2023-05-11 RX ORDER — INSULIN GLARGINE 100 [IU]/ML
15 INJECTION, SOLUTION SUBCUTANEOUS EVERY EVENING
Status: DISCONTINUED | OUTPATIENT
Start: 2023-05-11 | End: 2023-05-12

## 2023-05-11 RX ORDER — LABETALOL HYDROCHLORIDE 5 MG/ML
10 INJECTION, SOLUTION INTRAVENOUS EVERY 6 HOURS PRN
Status: DISCONTINUED | OUTPATIENT
Start: 2023-05-11 | End: 2023-05-13 | Stop reason: HOSPADM

## 2023-05-11 RX ORDER — ISOSORBIDE MONONITRATE 30 MG/1
30 TABLET, EXTENDED RELEASE ORAL DAILY
Status: DISCONTINUED | OUTPATIENT
Start: 2023-05-11 | End: 2023-05-12

## 2023-05-11 RX ORDER — CARVEDILOL 6.25 MG/1
6.25 TABLET ORAL 2 TIMES DAILY WITH MEALS
Status: DISCONTINUED | OUTPATIENT
Start: 2023-05-11 | End: 2023-05-12

## 2023-05-11 RX ADMIN — INSULIN GLARGINE 15 UNITS: 100 INJECTION, SOLUTION SUBCUTANEOUS at 21:24

## 2023-05-11 RX ADMIN — ASPIRIN 81 MG CHEWABLE TABLET 81 MG: 81 TABLET CHEWABLE at 09:17

## 2023-05-11 RX ADMIN — HEPARIN SODIUM 5000 UNITS: 5000 INJECTION INTRAVENOUS; SUBCUTANEOUS at 05:21

## 2023-05-11 RX ADMIN — ONDANSETRON 4 MG: 4 TABLET, ORALLY DISINTEGRATING ORAL at 05:22

## 2023-05-11 RX ADMIN — HYDRALAZINE HYDROCHLORIDE 25 MG: 25 TABLET, FILM COATED ORAL at 21:24

## 2023-05-11 RX ADMIN — HEPARIN SODIUM 5000 UNITS: 5000 INJECTION INTRAVENOUS; SUBCUTANEOUS at 21:25

## 2023-05-11 RX ADMIN — ISOSORBIDE MONONITRATE 30 MG: 30 TABLET, EXTENDED RELEASE ORAL at 10:54

## 2023-05-11 RX ADMIN — HYDRALAZINE HYDROCHLORIDE 25 MG: 25 TABLET, FILM COATED ORAL at 13:18

## 2023-05-11 RX ADMIN — INSULIN LISPRO 4 UNITS: 100 INJECTION, SOLUTION INTRAVENOUS; SUBCUTANEOUS at 21:24

## 2023-05-11 RX ADMIN — PANTOPRAZOLE SODIUM 40 MG: 40 TABLET, DELAYED RELEASE ORAL at 16:48

## 2023-05-11 RX ADMIN — FLUOXETINE 80 MG: 20 CAPSULE ORAL at 09:17

## 2023-05-11 RX ADMIN — HEPARIN SODIUM 5000 UNITS: 5000 INJECTION INTRAVENOUS; SUBCUTANEOUS at 13:18

## 2023-05-11 RX ADMIN — CARVEDILOL 6.25 MG: 6.25 TABLET, FILM COATED ORAL at 16:48

## 2023-05-11 RX ADMIN — SODIUM CHLORIDE: 9 INJECTION, SOLUTION INTRAVENOUS at 01:08

## 2023-05-11 RX ADMIN — BUSPIRONE HYDROCHLORIDE 10 MG: 5 TABLET ORAL at 21:24

## 2023-05-11 RX ADMIN — BUSPIRONE HYDROCHLORIDE 10 MG: 5 TABLET ORAL at 13:18

## 2023-05-11 RX ADMIN — ONDANSETRON 4 MG: 2 INJECTION INTRAMUSCULAR; INTRAVENOUS at 09:36

## 2023-05-11 RX ADMIN — SODIUM CHLORIDE, PRESERVATIVE FREE 10 ML: 5 INJECTION INTRAVENOUS at 21:25

## 2023-05-11 RX ADMIN — CLOPIDOGREL BISULFATE 75 MG: 75 TABLET ORAL at 09:17

## 2023-05-11 RX ADMIN — BUSPIRONE HYDROCHLORIDE 10 MG: 5 TABLET ORAL at 09:18

## 2023-05-11 RX ADMIN — AMLODIPINE BESYLATE 10 MG: 10 TABLET ORAL at 09:18

## 2023-05-11 RX ADMIN — INSULIN LISPRO 4 UNITS: 100 INJECTION, SOLUTION INTRAVENOUS; SUBCUTANEOUS at 11:58

## 2023-05-11 NOTE — CONSULTS
epic            IMPRESSION:  Acute kidney injury with underlying chronic kidney disease stage G3 A3-although volume depletion could have done it with concomitant nonsteroidal anti-inflammatory medication if she was taking it-has naproxen listed as home medication, I am little concerned about significant proteinuria--especially with low albumin of 3.3-and puffy face and eyelid and shortness of breath, along with underlying history of diabetes-raising suspicion for nephrotic syndrome-we will rule out any other intrinsic or  obstructive process. Also given her recurrent acute kidney injury with minimal stress-I suspect she has very limited kidney reserve function, and unable to cope with any stress, at least from kidney standpoint. Without her other organ is doing the same is quite possible-especially heart.   Gastrointestinal symptoms-with underlying multiple chronic disease including diabetes-unlikely bone disease i.e. osteoporosis-with recent fracture-and of course high risk for coronary artery disease-    PLAN:    I will start with redoing again UA and urinary indicis-24 hours urine protein-proBNP level tomorrow morning-as she has risk for pulmonary edema-redo renal function tomorrow morning-if creatinine is still up then renal ultrasound-also meanwhile include glycemic and blood pressure control-creatinine phosphokinase level tomorrow morning-work-up for GI symptoms-follow clinically and biochemically
Additional signs and symptoms: na Relevant Medical/Surgical History: diabetes, COPD, hypertension FINDINGS: The lungs are without acute focal process. There is no effusion or pneumothorax. The cardiomediastinal silhouette is stable. The osseous structures are stable. No acute process. All labs, medications and tests reviewed by myself including data  from outside source , patient and available family . Continue all other medications of all above medical condition listed as is. Impression:  Principal Problem:    MODESTO (acute kidney injury) ([facility] [address]  Resolved Problems:    * No resolved hospital problems. *      Assessment: 57 y. o.year old with PMH of  has a past medical history of Arthritis, Asthma, Cerebral artery occlusion with cerebral infarction ([address] CKD stage G1/A2, GFR > 90 and albumin creatinine ratio  mg/g, COPD (chronic obstructive pulmonary disease) ([address] Diabetes mellitus ([address] Diabetic neuropathy ([address] Fracture, History of blood transfusion, Hyperlipidemia, Hypertension, Muscle weakness (generalized), and Osteoarthritis. Plan and Recommendations:    Elevated Troponin : in setting of renal failure ,No further work up at this time add aspirin 81 mg , can consider stress test for risk stratification due to multiple risk factors    Get echo   Renal failure GI work-up as per primary team  HTN: Elevated continue present medications . Ideally should be on ACE or ARB due to history of diabetes but due to renal failure we will hold off on adding it right now increaseAmlodipine to 10 mg daily but once renal function improves or stabilizes switch to losartan 100 mg daily  DVT prophylaxis if no contraindication  6. Dyslipidemia: continue statins           Thank you  much for consult and giving us the opportunity in contributing in the care of this patient. Please feel free to call me for any questions.        Jamaica Menezes MD, 5/10/2023 12:39 PM

## 2023-05-12 LAB
ALBUMIN SERPL-MCNC: 3.4 GM/DL (ref 3.4–5)
ALP BLD-CCNC: 126 IU/L (ref 40–128)
ALT SERPL-CCNC: 19 U/L (ref 10–40)
ANION GAP SERPL CALCULATED.3IONS-SCNC: 14 MMOL/L (ref 4–16)
AST SERPL-CCNC: 12 IU/L (ref 15–37)
BACTERIA: NEGATIVE /HPF
BASOPHILS ABSOLUTE: 0 K/CU MM
BASOPHILS RELATIVE PERCENT: 0.4 % (ref 0–1)
BILIRUB SERPL-MCNC: 0.5 MG/DL (ref 0–1)
BILIRUBIN URINE: ABNORMAL MG/DL
BLOOD, URINE: ABNORMAL
BUN SERPL-MCNC: 27 MG/DL (ref 6–23)
CALCIUM SERPL-MCNC: 8.6 MG/DL (ref 8.3–10.6)
CHLORIDE BLD-SCNC: 103 MMOL/L (ref 99–110)
CLARITY: CLEAR
CO2: 19 MMOL/L (ref 21–32)
COLOR: YELLOW
CREAT SERPL-MCNC: 2.1 MG/DL (ref 0.6–1.1)
DIFFERENTIAL TYPE: ABNORMAL
EOSINOPHILS ABSOLUTE: 0.1 K/CU MM
EOSINOPHILS RELATIVE PERCENT: 1.9 % (ref 0–3)
GFR SERPL CREATININE-BSD FRML MDRD: 26 ML/MIN/1.73M2
GLUCOSE BLD-MCNC: 238 MG/DL (ref 70–99)
GLUCOSE BLD-MCNC: 258 MG/DL (ref 70–99)
GLUCOSE BLD-MCNC: 312 MG/DL (ref 70–99)
GLUCOSE BLD-MCNC: 318 MG/DL (ref 70–99)
GLUCOSE SERPL-MCNC: 250 MG/DL (ref 70–99)
GLUCOSE, URINE: >1000 MG/DL
HCT VFR BLD CALC: 36.4 % (ref 37–47)
HEMOGLOBIN: 10.6 GM/DL (ref 12.5–16)
HYALINE CASTS: 2 /LPF
IMMATURE NEUTROPHIL %: 0.2 % (ref 0–0.43)
KETONES, URINE: 40 MG/DL
LEUKOCYTE ESTERASE, URINE: ABNORMAL
LYMPHOCYTES ABSOLUTE: 1.8 K/CU MM
LYMPHOCYTES RELATIVE PERCENT: 33.3 % (ref 24–44)
MAGNESIUM: 1.6 MG/DL (ref 1.8–2.4)
MCH RBC QN AUTO: 28 PG (ref 27–31)
MCHC RBC AUTO-ENTMCNC: 29.1 % (ref 32–36)
MCV RBC AUTO: 96 FL (ref 78–100)
MONOCYTES ABSOLUTE: 0.6 K/CU MM
MONOCYTES RELATIVE PERCENT: 10.3 % (ref 0–4)
MUCUS: ABNORMAL HPF
NITRITE URINE, QUANTITATIVE: NEGATIVE
NUCLEATED RBC %: 0 %
PDW BLD-RTO: 12.7 % (ref 11.7–14.9)
PH, URINE: 5 (ref 5–8)
PHOSPHORUS: 4 MG/DL (ref 2.5–4.9)
PLATELET # BLD: 235 K/CU MM (ref 140–440)
PMV BLD AUTO: 9.3 FL (ref 7.5–11.1)
POTASSIUM SERPL-SCNC: 4.9 MMOL/L (ref 3.5–5.1)
PRO-BNP: 1317 PG/ML
PROTEIN UA: >300 MG/DL
RBC # BLD: 3.79 M/CU MM (ref 4.2–5.4)
RBC URINE: 2 /HPF (ref 0–6)
SEGMENTED NEUTROPHILS ABSOLUTE COUNT: 2.9 K/CU MM
SEGMENTED NEUTROPHILS RELATIVE PERCENT: 53.9 % (ref 36–66)
SODIUM BLD-SCNC: 136 MMOL/L (ref 135–145)
SPECIFIC GRAVITY UA: 1.02 (ref 1–1.03)
SQUAMOUS EPITHELIAL: 1 /HPF
TOTAL IMMATURE NEUTOROPHIL: 0.01 K/CU MM
TOTAL NUCLEATED RBC: 0 K/CU MM
TOTAL PROTEIN: 5.4 GM/DL (ref 6.4–8.2)
TRICHOMONAS: ABNORMAL /HPF
UROBILINOGEN, URINE: 0.2 MG/DL (ref 0.2–1)
WBC # BLD: 5.4 K/CU MM (ref 4–10.5)
WBC CLUMP: ABNORMAL /HPF
WBC UA: 273 /HPF (ref 0–5)

## 2023-05-12 PROCEDURE — 82962 GLUCOSE BLOOD TEST: CPT

## 2023-05-12 PROCEDURE — 83880 ASSAY OF NATRIURETIC PEPTIDE: CPT

## 2023-05-12 PROCEDURE — 99233 SBSQ HOSP IP/OBS HIGH 50: CPT | Performed by: INTERNAL MEDICINE

## 2023-05-12 PROCEDURE — 6360000002 HC RX W HCPCS: Performed by: STUDENT IN AN ORGANIZED HEALTH CARE EDUCATION/TRAINING PROGRAM

## 2023-05-12 PROCEDURE — 83735 ASSAY OF MAGNESIUM: CPT

## 2023-05-12 PROCEDURE — 6360000002 HC RX W HCPCS: Performed by: INTERNAL MEDICINE

## 2023-05-12 PROCEDURE — 6370000000 HC RX 637 (ALT 250 FOR IP): Performed by: INTERNAL MEDICINE

## 2023-05-12 PROCEDURE — 1200000000 HC SEMI PRIVATE

## 2023-05-12 PROCEDURE — 36415 COLL VENOUS BLD VENIPUNCTURE: CPT

## 2023-05-12 PROCEDURE — 85025 COMPLETE CBC W/AUTO DIFF WBC: CPT

## 2023-05-12 PROCEDURE — 94761 N-INVAS EAR/PLS OXIMETRY MLT: CPT

## 2023-05-12 PROCEDURE — 6370000000 HC RX 637 (ALT 250 FOR IP): Performed by: STUDENT IN AN ORGANIZED HEALTH CARE EDUCATION/TRAINING PROGRAM

## 2023-05-12 PROCEDURE — 84156 ASSAY OF PROTEIN URINE: CPT

## 2023-05-12 PROCEDURE — 80053 COMPREHEN METABOLIC PANEL: CPT

## 2023-05-12 PROCEDURE — 84100 ASSAY OF PHOSPHORUS: CPT

## 2023-05-12 PROCEDURE — 84166 PROTEIN E-PHORESIS/URINE/CSF: CPT

## 2023-05-12 PROCEDURE — 81050 URINALYSIS VOLUME MEASURE: CPT

## 2023-05-12 PROCEDURE — 2580000003 HC RX 258: Performed by: INTERNAL MEDICINE

## 2023-05-12 RX ORDER — INSULIN GLARGINE 100 [IU]/ML
20 INJECTION, SOLUTION SUBCUTANEOUS EVERY EVENING
Status: DISCONTINUED | OUTPATIENT
Start: 2023-05-12 | End: 2023-05-13 | Stop reason: HOSPADM

## 2023-05-12 RX ORDER — INSULIN LISPRO 100 [IU]/ML
0-16 INJECTION, SOLUTION INTRAVENOUS; SUBCUTANEOUS
Status: DISCONTINUED | OUTPATIENT
Start: 2023-05-12 | End: 2023-05-13 | Stop reason: HOSPADM

## 2023-05-12 RX ORDER — MAGNESIUM SULFATE IN WATER 40 MG/ML
2000 INJECTION, SOLUTION INTRAVENOUS ONCE
Status: COMPLETED | OUTPATIENT
Start: 2023-05-12 | End: 2023-05-13

## 2023-05-12 RX ORDER — INSULIN LISPRO 100 [IU]/ML
0-4 INJECTION, SOLUTION INTRAVENOUS; SUBCUTANEOUS NIGHTLY
Status: DISCONTINUED | OUTPATIENT
Start: 2023-05-12 | End: 2023-05-13 | Stop reason: HOSPADM

## 2023-05-12 RX ORDER — CARVEDILOL 6.25 MG/1
12.5 TABLET ORAL 2 TIMES DAILY WITH MEALS
Status: DISCONTINUED | OUTPATIENT
Start: 2023-05-12 | End: 2023-05-13 | Stop reason: HOSPADM

## 2023-05-12 RX ADMIN — INSULIN GLARGINE 20 UNITS: 100 INJECTION, SOLUTION SUBCUTANEOUS at 22:01

## 2023-05-12 RX ADMIN — AMLODIPINE BESYLATE 10 MG: 10 TABLET ORAL at 11:25

## 2023-05-12 RX ADMIN — ISOSORBIDE MONONITRATE 30 MG: 30 TABLET, EXTENDED RELEASE ORAL at 11:26

## 2023-05-12 RX ADMIN — CARVEDILOL 12.5 MG: 6.25 TABLET, FILM COATED ORAL at 16:05

## 2023-05-12 RX ADMIN — CARVEDILOL 6.25 MG: 6.25 TABLET, FILM COATED ORAL at 11:25

## 2023-05-12 RX ADMIN — SODIUM CHLORIDE, PRESERVATIVE FREE 10 ML: 5 INJECTION INTRAVENOUS at 11:27

## 2023-05-12 RX ADMIN — HYDRALAZINE HYDROCHLORIDE 25 MG: 25 TABLET, FILM COATED ORAL at 06:03

## 2023-05-12 RX ADMIN — BUSPIRONE HYDROCHLORIDE 10 MG: 5 TABLET ORAL at 16:05

## 2023-05-12 RX ADMIN — INSULIN LISPRO 12 UNITS: 100 INJECTION, SOLUTION INTRAVENOUS; SUBCUTANEOUS at 18:15

## 2023-05-12 RX ADMIN — BUSPIRONE HYDROCHLORIDE 10 MG: 5 TABLET ORAL at 11:25

## 2023-05-12 RX ADMIN — LEVOTHYROXINE SODIUM 75 MCG: 0.07 TABLET ORAL at 06:02

## 2023-05-12 RX ADMIN — PANTOPRAZOLE SODIUM 40 MG: 40 TABLET, DELAYED RELEASE ORAL at 16:05

## 2023-05-12 RX ADMIN — CLOPIDOGREL BISULFATE 75 MG: 75 TABLET ORAL at 11:25

## 2023-05-12 RX ADMIN — HYDRALAZINE HYDROCHLORIDE 25 MG: 25 TABLET, FILM COATED ORAL at 16:05

## 2023-05-12 RX ADMIN — BUSPIRONE HYDROCHLORIDE 10 MG: 5 TABLET ORAL at 21:59

## 2023-05-12 RX ADMIN — HEPARIN SODIUM 5000 UNITS: 5000 INJECTION INTRAVENOUS; SUBCUTANEOUS at 22:00

## 2023-05-12 RX ADMIN — HEPARIN SODIUM 5000 UNITS: 5000 INJECTION INTRAVENOUS; SUBCUTANEOUS at 06:02

## 2023-05-12 RX ADMIN — PANTOPRAZOLE SODIUM 40 MG: 40 TABLET, DELAYED RELEASE ORAL at 06:03

## 2023-05-12 RX ADMIN — MAGNESIUM SULFATE HEPTAHYDRATE 2000 MG: 40 INJECTION, SOLUTION INTRAVENOUS at 16:56

## 2023-05-12 RX ADMIN — HYDRALAZINE HYDROCHLORIDE 25 MG: 25 TABLET, FILM COATED ORAL at 21:59

## 2023-05-12 RX ADMIN — FLUOXETINE 80 MG: 20 CAPSULE ORAL at 11:25

## 2023-05-12 RX ADMIN — HEPARIN SODIUM 5000 UNITS: 5000 INJECTION INTRAVENOUS; SUBCUTANEOUS at 16:05

## 2023-05-12 RX ADMIN — SODIUM CHLORIDE, PRESERVATIVE FREE 10 ML: 5 INJECTION INTRAVENOUS at 22:02

## 2023-05-13 VITALS
TEMPERATURE: 98.1 F | DIASTOLIC BLOOD PRESSURE: 57 MMHG | OXYGEN SATURATION: 99 % | SYSTOLIC BLOOD PRESSURE: 131 MMHG | WEIGHT: 236.2 LBS | BODY MASS INDEX: 40.33 KG/M2 | RESPIRATION RATE: 16 BRPM | HEIGHT: 64 IN | HEART RATE: 73 BPM

## 2023-05-13 LAB
ALBUMIN SERPL-MCNC: 3.5 GM/DL (ref 3.4–5)
ALP BLD-CCNC: 128 IU/L (ref 40–128)
ALT SERPL-CCNC: 16 U/L (ref 10–40)
ANION GAP SERPL CALCULATED.3IONS-SCNC: 16 MMOL/L (ref 4–16)
AST SERPL-CCNC: 13 IU/L (ref 15–37)
BASOPHILS ABSOLUTE: 0 K/CU MM
BASOPHILS RELATIVE PERCENT: 0.6 % (ref 0–1)
BILIRUB SERPL-MCNC: 0.4 MG/DL (ref 0–1)
BUN SERPL-MCNC: 32 MG/DL (ref 6–23)
CALCIUM SERPL-MCNC: 8.7 MG/DL (ref 8.3–10.6)
CHLORIDE BLD-SCNC: 101 MMOL/L (ref 99–110)
CO2: 17 MMOL/L (ref 21–32)
CREAT SERPL-MCNC: 2 MG/DL (ref 0.6–1.1)
DIFFERENTIAL TYPE: ABNORMAL
EOSINOPHILS ABSOLUTE: 0.1 K/CU MM
EOSINOPHILS RELATIVE PERCENT: 2.8 % (ref 0–3)
GFR SERPL CREATININE-BSD FRML MDRD: 27 ML/MIN/1.73M2
GLUCOSE BLD-MCNC: 214 MG/DL (ref 70–99)
GLUCOSE BLD-MCNC: 256 MG/DL (ref 70–99)
GLUCOSE SERPL-MCNC: 237 MG/DL (ref 70–99)
HBV SURFACE AG SERPL QL IA: NON REACTIVE
HCT VFR BLD CALC: 36.2 % (ref 37–47)
HCV AB SERPL QL IA: ABNORMAL
HEMOGLOBIN: 10.6 GM/DL (ref 12.5–16)
IMMATURE NEUTROPHIL %: 0.2 % (ref 0–0.43)
LYMPHOCYTES ABSOLUTE: 1.6 K/CU MM
LYMPHOCYTES RELATIVE PERCENT: 32.1 % (ref 24–44)
Lab: 24 HRS
MCH RBC QN AUTO: 28.3 PG (ref 27–31)
MCHC RBC AUTO-ENTMCNC: 29.3 % (ref 32–36)
MCV RBC AUTO: 96.5 FL (ref 78–100)
MONOCYTES ABSOLUTE: 0.4 K/CU MM
MONOCYTES RELATIVE PERCENT: 8.4 % (ref 0–4)
NUCLEATED RBC %: 0 %
PDW BLD-RTO: 12.6 % (ref 11.7–14.9)
PLATELET # BLD: 233 K/CU MM (ref 140–440)
PMV BLD AUTO: 9.4 FL (ref 7.5–11.1)
POTASSIUM SERPL-SCNC: 4.6 MMOL/L (ref 3.5–5.1)
RBC # BLD: 3.75 M/CU MM (ref 4.2–5.4)
SEGMENTED NEUTROPHILS ABSOLUTE COUNT: 2.8 K/CU MM
SEGMENTED NEUTROPHILS RELATIVE PERCENT: 55.9 % (ref 36–66)
SODIUM BLD-SCNC: 134 MMOL/L (ref 135–145)
TOTAL IMMATURE NEUTOROPHIL: 0.01 K/CU MM
TOTAL NUCLEATED RBC: 0 K/CU MM
TOTAL PROTEIN: 5.6 GM/DL (ref 6.4–8.2)
TOTAL PROTEIN: 5.6 GM/DL (ref 6.4–8.2)
URINE TOTAL PROTEIN: 286.2 MG/DL
VOLUME, (UVOL): 700 MLS
WBC # BLD: 5 K/CU MM (ref 4–10.5)

## 2023-05-13 PROCEDURE — 86320 SERUM IMMUNOELECTROPHORESIS: CPT

## 2023-05-13 PROCEDURE — 84165 PROTEIN E-PHORESIS SERUM: CPT

## 2023-05-13 PROCEDURE — 82962 GLUCOSE BLOOD TEST: CPT

## 2023-05-13 PROCEDURE — 36415 COLL VENOUS BLD VENIPUNCTURE: CPT

## 2023-05-13 PROCEDURE — 6360000002 HC RX W HCPCS: Performed by: INTERNAL MEDICINE

## 2023-05-13 PROCEDURE — 86038 ANTINUCLEAR ANTIBODIES: CPT

## 2023-05-13 PROCEDURE — 85025 COMPLETE CBC W/AUTO DIFF WBC: CPT

## 2023-05-13 PROCEDURE — 87522 HEPATITIS C REVRS TRNSCRPJ: CPT

## 2023-05-13 PROCEDURE — 6370000000 HC RX 637 (ALT 250 FOR IP): Performed by: INTERNAL MEDICINE

## 2023-05-13 PROCEDURE — 2580000003 HC RX 258: Performed by: INTERNAL MEDICINE

## 2023-05-13 PROCEDURE — 80053 COMPREHEN METABOLIC PANEL: CPT

## 2023-05-13 PROCEDURE — 83883 ASSAY NEPHELOMETRY NOT SPEC: CPT

## 2023-05-13 PROCEDURE — 86255 FLUORESCENT ANTIBODY SCREEN: CPT

## 2023-05-13 PROCEDURE — 94761 N-INVAS EAR/PLS OXIMETRY MLT: CPT

## 2023-05-13 PROCEDURE — 83516 IMMUNOASSAY NONANTIBODY: CPT

## 2023-05-13 PROCEDURE — 87340 HEPATITIS B SURFACE AG IA: CPT

## 2023-05-13 PROCEDURE — 6370000000 HC RX 637 (ALT 250 FOR IP): Performed by: STUDENT IN AN ORGANIZED HEALTH CARE EDUCATION/TRAINING PROGRAM

## 2023-05-13 PROCEDURE — 84155 ASSAY OF PROTEIN SERUM: CPT

## 2023-05-13 RX ORDER — CARVEDILOL 12.5 MG/1
12.5 TABLET ORAL 2 TIMES DAILY WITH MEALS
Qty: 60 TABLET | Refills: 3 | Status: SHIPPED | OUTPATIENT
Start: 2023-05-13

## 2023-05-13 RX ORDER — POLYETHYLENE GLYCOL 3350 17 G/17G
17 POWDER, FOR SOLUTION ORAL DAILY
Status: DISCONTINUED | OUTPATIENT
Start: 2023-05-13 | End: 2023-05-13 | Stop reason: HOSPADM

## 2023-05-13 RX ORDER — HYDRALAZINE HYDROCHLORIDE 25 MG/1
25 TABLET, FILM COATED ORAL EVERY 8 HOURS SCHEDULED
Qty: 90 TABLET | Refills: 0 | Status: SHIPPED | OUTPATIENT
Start: 2023-05-13 | End: 2023-06-12

## 2023-05-13 RX ORDER — AMLODIPINE BESYLATE 10 MG/1
10 TABLET ORAL DAILY
Qty: 30 TABLET | Refills: 3 | Status: SHIPPED | OUTPATIENT
Start: 2023-05-14 | End: 2023-05-13 | Stop reason: SDUPTHER

## 2023-05-13 RX ORDER — CARVEDILOL 12.5 MG/1
12.5 TABLET ORAL 2 TIMES DAILY WITH MEALS
Qty: 60 TABLET | Refills: 3 | Status: SHIPPED | OUTPATIENT
Start: 2023-05-13 | End: 2023-05-13 | Stop reason: SDUPTHER

## 2023-05-13 RX ORDER — HYDRALAZINE HYDROCHLORIDE 50 MG/1
50 TABLET, FILM COATED ORAL EVERY 8 HOURS SCHEDULED
Status: DISCONTINUED | OUTPATIENT
Start: 2023-05-13 | End: 2023-05-13 | Stop reason: HOSPADM

## 2023-05-13 RX ORDER — SENNA PLUS 8.6 MG/1
1 TABLET ORAL NIGHTLY
Status: DISCONTINUED | OUTPATIENT
Start: 2023-05-13 | End: 2023-05-13 | Stop reason: HOSPADM

## 2023-05-13 RX ORDER — AMLODIPINE BESYLATE 10 MG/1
10 TABLET ORAL DAILY
Qty: 30 TABLET | Refills: 3 | Status: SHIPPED | OUTPATIENT
Start: 2023-05-14

## 2023-05-13 RX ORDER — HYDRALAZINE HYDROCHLORIDE 50 MG/1
25 TABLET, FILM COATED ORAL EVERY 8 HOURS SCHEDULED
Qty: 90 TABLET | Refills: 3 | Status: SHIPPED | OUTPATIENT
Start: 2023-05-13 | End: 2023-05-13 | Stop reason: SDUPTHER

## 2023-05-13 RX ORDER — HYDRALAZINE HYDROCHLORIDE 50 MG/1
50 TABLET, FILM COATED ORAL EVERY 8 HOURS SCHEDULED
Qty: 90 TABLET | Refills: 3 | Status: SHIPPED | OUTPATIENT
Start: 2023-05-13 | End: 2023-05-13 | Stop reason: SDUPTHER

## 2023-05-13 RX ADMIN — ONDANSETRON 4 MG: 2 INJECTION INTRAMUSCULAR; INTRAVENOUS at 16:38

## 2023-05-13 RX ADMIN — CARVEDILOL 12.5 MG: 6.25 TABLET, FILM COATED ORAL at 18:39

## 2023-05-13 RX ADMIN — POLYETHYLENE GLYCOL 3350 17 G: 17 POWDER, FOR SOLUTION ORAL at 12:45

## 2023-05-13 RX ADMIN — INSULIN LISPRO 4 UNITS: 100 INJECTION, SOLUTION INTRAVENOUS; SUBCUTANEOUS at 12:45

## 2023-05-13 RX ADMIN — BUSPIRONE HYDROCHLORIDE 10 MG: 5 TABLET ORAL at 14:35

## 2023-05-13 RX ADMIN — SENNOSIDES 8.6 MG: 8.6 TABLET, FILM COATED ORAL at 12:45

## 2023-05-13 RX ADMIN — LEVOTHYROXINE SODIUM 75 MCG: 0.07 TABLET ORAL at 08:43

## 2023-05-13 RX ADMIN — BUSPIRONE HYDROCHLORIDE 10 MG: 5 TABLET ORAL at 08:43

## 2023-05-13 RX ADMIN — EMPAGLIFLOZIN 10 MG: 10 TABLET, FILM COATED ORAL at 08:43

## 2023-05-13 RX ADMIN — PANTOPRAZOLE SODIUM 40 MG: 40 TABLET, DELAYED RELEASE ORAL at 10:41

## 2023-05-13 RX ADMIN — SODIUM CHLORIDE, PRESERVATIVE FREE 10 ML: 5 INJECTION INTRAVENOUS at 08:43

## 2023-05-13 RX ADMIN — CLOPIDOGREL BISULFATE 75 MG: 75 TABLET ORAL at 08:43

## 2023-05-13 RX ADMIN — HYDRALAZINE HYDROCHLORIDE 50 MG: 50 TABLET, FILM COATED ORAL at 14:36

## 2023-05-13 RX ADMIN — FLUOXETINE 80 MG: 20 CAPSULE ORAL at 08:43

## 2023-05-13 RX ADMIN — HEPARIN SODIUM 5000 UNITS: 5000 INJECTION INTRAVENOUS; SUBCUTANEOUS at 14:35

## 2023-05-13 RX ADMIN — CARVEDILOL 12.5 MG: 6.25 TABLET, FILM COATED ORAL at 08:43

## 2023-05-13 RX ADMIN — AMLODIPINE BESYLATE 10 MG: 10 TABLET ORAL at 08:43

## 2023-05-13 RX ADMIN — INSULIN LISPRO 8 UNITS: 100 INJECTION, SOLUTION INTRAVENOUS; SUBCUTANEOUS at 08:42

## 2023-05-13 NOTE — FLOWSHEET NOTE
Pt has d/c order. When ambulating to bathroom to get dressed, pt stated she felt dizzy and nauseous. (More than normal) Hospitalist notified and orthostatic Bps were ordered. Results are below.              05/13/23 1730   Vital Signs   Blood Pressure Lying 142/48   Pulse Lying 68 PER MINUTE   Blood Pressure Sitting 141/56   Pulse Sitting 77 PER MINUTE   Blood Pressure Standing 145/56   Pulse Standing 78 PER MINUTE   Level of Consciousness 0

## 2023-05-13 NOTE — CARE COORDINATION
Spoke to Dr Henna Aguillon. Patient refusing PT/OT. She is from [facility]; can return with [facility] (PT/OT) if needed. Gloria Whitmore RN     [id number] Patient has no preference of [facility] provider per previous notes. Order in place; will fax to [address]  Gloria Whitmore RN

## 2023-05-13 NOTE — PROGRESS NOTES
05/11/23 1025   Encounter Summary   Encounter Overview/Reason  Attempted Encounter   Service Provided For: Patient not available   Referral/Consult From: [address] [facility] Parent; Family members   Last Encounter  05/11/23  (patient was not available at the time of visit)   Complexity of Encounter Low   Begin Time 1025   End Time  1030   Total Time Calculated 5 min   Assessment/Intervention/Outcome   Assessment Unable to assess   Plan and Referrals   Plan/Referrals Continue Support (comment)  (as needed)
05/11/23 1025   Encounter Summary   Encounter Overview/Reason  Attempted Encounter   Service Provided For: Patient not available   Referral/Consult From: [facility] Parent; Family members   Last Encounter  05/11/23  (patient was not available at the time of visit)   Complexity of Encounter Low   Begin Time 1025   End Time  1030   Total Time Calculated 5 min   Assessment/Intervention/Outcome   Assessment Unable to assess   Plan and Referrals   Plan/Referrals Continue Support (comment)  (as needed)
[address] [facility], 1960, 1115/[id number], 5/12/2023    Attempted PT/OT evals again this date, pt supine in bed. Pt adamantly refuses EOB/OOB this date d/t continued nausea, back pain, headache, fatigue. Pt edu on need for OOB to prevent deconditioning, promote skin health, promote cardio/respiratory health. Pt cont to refuse- has refused therapy evals repeatedly. Will re-attempt as able and appropriate.     Tenisha Coughlin, ARENR/L  5/12/2023, 2:24 PM
4 Eyes Skin Assessment     NAME:  Hamlet Carrillo  YOB: 1960  MEDICAL RECORD NUMBER:  [id number]    The patient is being assessed for  Admission    I agree that at least one RN has performed a thorough Head to Toe Skin Assessment on the patient. ALL assessment sites listed below have been assessed. Areas assessed by both nurses:    Head, Face, Ears, Shoulders, Back, Chest, Arms, Elbows, Hands, Sacrum. Buttock, Coccyx, Ischium, and Legs. Feet and Heels        Does the Patient have a Wound?  No noted wound(s)       Trent Prevention initiated by RN: No  Wound Care Orders initiated by RN: No    Pressure Injury (Stage 3,4, Unstageable, DTI, NWPT, and Complex wounds) if present, place Wound referral order by RN under : No    New Ostomies, if present place, Ostomy referral order under : No     Nurse 1 eSignature: Electronically signed by Enedelia Martin RN on 5/10/23 at 4:53 AM EDT    **SHARE this note so that the co-signing nurse can place an eSignature**    Nurse 2 eSignature: Electronically signed by Olga Lauren LPN on 5/10/85 at 4:98 AM EDT
Attempted to call [facility] where pt resides to go over list of home medications as pt is unable to recall exactly which meds she takes. No answer at this time. Left voicemail and callback number.
Cardiology Progress Note     Admit Date:  5/9/2023    Consult reason/ Seen today for :       Subjective and  Overnight Events : She is very short of breath on even minimal exertion walking to the washroom and back she is exhausted. Stress test shows some ischemia Echo shows LV function is normal but she has elevated creatinine 2.3      Chief complain on admission : 57 y. o.year old who is admitted for  Chief Complaint   Patient presents with    Nausea     Emesis after dinner      Assessment / Plan:  Elevated Troponin : With abnormal stress test in setting of renal failure ,  Ideally would like to proceed with cardiac catheterization once renal function is optimized  Trivial pericardial effusion probably secondary to renal failure? Not hemodynamically important  Start carvedilol she is high risk to have bradycardia due to sleep apnea okay to tolerate carvedilol while on telemetry  Renal failure GI work-up as per primary team  Elevated blood pressure with some orthostasis  HTN: Elevated continue present medications . Ideally should be on ACE or ARB due to history of diabetes but due to renal failure we will hold off on adding it right now increaseAmlodipine to 10 mg daily but once renal function improves or stabilizes switch to losartan 100 mg daily  DVT prophylaxis if no contraindication  6. Dyslipidemia: continue statins        Past medical history:    has a past medical history of Arthritis, Asthma, Cerebral artery occlusion with cerebral infarction ([address] CKD stage G1/A2, GFR > 90 and albumin creatinine ratio  mg/g, COPD (chronic obstructive pulmonary disease) ([facility] [address] Diabetes mellitus ([facility] [address] Diabetic neuropathy ([facility] [address] Fracture, History of blood transfusion, Hyperlipidemia, Hypertension, Muscle weakness (generalized), and Osteoarthritis.   Past surgical history:   has a past surgical history that includes Foot surgery (Left,
Cardiology Progress Note     Admit Date:  5/9/2023    Consult reason/ Seen today for :       Subjective and  Overnight Events : She is very short of breath on even minimal exertion walking to the washroom and back she is exhausted. Stress test shows some ischemia Echo shows LV function is normal but she has elevated creatinine 2.3      Chief complain on admission : 57 y. o.year old who is admitted for  Chief Complaint   Patient presents with    Nausea     Emesis after dinner      Assessment / Plan:  Elevated Troponin : With abnormal stress test in setting of renal failure ,  Ideally would like to proceed with cardiac catheterization once renal function is optimized as per nephrology would like to hold off we will plan outpatient cardiac cath is no wall motion abnormality EF is normal  Trivial pericardial effusion probably secondary to renal failure? Not hemodynamically important  Start carvedilol she is high risk to have bradycardia due to sleep apnea okay to tolerate carvedilol while on telemetry  Renal failure GI work-up as per primary team  Elevated blood pressure with some orthostasis  HTN: Elevated continue present medications . Ideally should be on ACE or ARB due to history of diabetes but due to renal failure we will hold off on adding it right now increaseAmlodipine to 10 mg daily but once renal function improves or stabilizes switch to losartan 100 mg daily  Will defer ACE or ARB decision to renal and nephrology  DVT prophylaxis if no contraindication  6.    Dyslipidemia: continue statins   Follow up  in office we will see as needed basis call with questions discussed with primary team    Past medical history:    has a past medical history of Arthritis, Asthma, Cerebral artery occlusion with cerebral infarction ([facility] [address] CKD stage G1/A2, GFR > 90 and albumin creatinine ratio  mg/g, COPD (chronic obstructive pulmonary disease)
Cardiology Progress Note    Subjective/Overnight Events:  Patient complains of lack of energy and overall lousy feeling. He also complaining of \"heavy breathing\" as well as chest tightness and diaphoresis. She has not complained of any chest pain at this time. Spoke with patient for possible need for left heart catheterization due to abnormal stress test, explained procedure in detail and she voiced understanding of it. However informed her that prior to heart catheterization we would like to optimize her renal function prior in order to avoid any further damage during the catheterization. Patient to be seen by nephrology today    Assessment/Plan:  Elevated troponin with chest discomfort  -Troponin trend. EKG showing lateral T wave inversions  -Echo showing preserved ejection fraction without any significant wall motion abnormalities. Grade 1 diastolic dysfunction noted mild/trace VHD  -Stress test yesterday concerning for apical ischemia  -Patient would benefit from left heart catheterization however will need to optimize renal function first  -Continue Plavix  Acute kidney injury on CKD  -Creatinine of 2.3. Nephrology consulted  Hypertension  -Significantly elevated today prior to antihypertensives  -Continue Norvasc 10 mg daily, hydralazine 25 mg 3 times daily, And Imdur 30  Orthostatic hypotension  -Advise compression stockings, hydration. Insulin-dependent type 2 diabetes mellitus  History of TIA      Echo: 5/10/2023   Left ventricular systolic function is normal.   Ejection fraction is visually estimated at 50-55%. No regional wall motion abnormalites. Grade I diastolic dysfunction. There is a small (trivial) pericardial effusion. Trace tricuspid regurgitation; RVSP: 27 mmHg. Trace mitral regurgitation. Moderately dilated left atrium.       Raina Mittal PA-C 05/11/23 12:55 PM
Nephrology Progress Note  5/12/2023 7:55 AM        Subjective:   Admit Date: 5/9/2023  PCP: BRIAN Cunningham NP    Interval History: Still not feeling very well    Diet: Poor oral intake    ROS: Shortness of breath is better but still has low back pain, feeling hot and cold, abdominal pain, and poor appetite  Urine output was not recorded  Variable blood pressure in the epic, no fever    Data:     Current meds:    isosorbide mononitrate  30 mg Oral Daily    levothyroxine  75 mcg Oral QAM AC    insulin glargine  15 Units SubCUTAneous QPM    carvedilol  6.25 mg Oral BID WC    sodium chloride flush  5-40 mL IntraVENous 2 times per day    heparin (porcine)  5,000 Units SubCUTAneous 3 times per day    insulin lispro  0-8 Units SubCUTAneous TID WC    insulin lispro  0-4 Units SubCUTAneous QPM    busPIRone  10 mg Oral TID    Vitamin D  2,000 Units Oral Daily    clopidogrel  75 mg Oral Daily    donepezil  20 mg Oral QPM    FLUoxetine  80 mg Oral Daily    metoclopramide  5 mg Oral TID AC    pantoprazole  40 mg Oral BID AC    topiramate  50 mg Oral QPM    amLODIPine  10 mg Oral Daily    hydrALAZINE  25 mg Oral 3 times per day      sodium chloride      dextrose           I/O last 3 completed shifts:   In: 400 [P.O.:400]  Out: -     CBC:   Recent Labs     05/09/23  1900 05/12/23  0422   WBC 7.8 5.4   HGB 13.1 10.6*    235          Recent Labs     05/09/23  1900 05/10/23  0505 05/12/23  0422   * 137 136   K 4.6 4.8 4.9   CL 97* 103 103   CO2 26 25 19*   BUN 29* 34* 27*   CREATININE 2.3* 2.3* 2.1*   GLUCOSE 193* 268* 250*       Lab Results   Component Value Date    CALCIUM 8.6 05/12/2023    PHOS 4.0 05/12/2023       Objective:     Vitals: BP (!) 160/67   Pulse 80   Temp 98.7 °F (37.1 °C) (Oral)   Resp 10   Ht 5' 4\" (1.626 m)   Wt 236 lb 3.2 oz (107.1 kg)   SpO2 97%   BMI 40.54 kg/m² ,    General appearance: Alert, awake and oriented she was in bed lying flat without any orthopnea PND  HEENT:
Nephrology Progress Note  5/13/2023 6:41 AM  Subjective: Interval History: Octavia Royal is a 57 y.o. female with weak resting bed no distress        Data:   Scheduled Meds:   insulin glargine  20 Units SubCUTAneous QPM    insulin lispro  0-16 Units SubCUTAneous TID WC    insulin lispro  0-4 Units SubCUTAneous Nightly    carvedilol  12.5 mg Oral BID WC    levothyroxine  75 mcg Oral QAM AC    sodium chloride flush  5-40 mL IntraVENous 2 times per day    heparin (porcine)  5,000 Units SubCUTAneous 3 times per day    busPIRone  10 mg Oral TID    Vitamin D  2,000 Units Oral Daily    clopidogrel  75 mg Oral Daily    donepezil  20 mg Oral QPM    FLUoxetine  80 mg Oral Daily    metoclopramide  5 mg Oral TID AC    pantoprazole  40 mg Oral BID AC    topiramate  50 mg Oral QPM    amLODIPine  10 mg Oral Daily    hydrALAZINE  25 mg Oral 3 times per day     Continuous Infusions:   sodium chloride      dextrose           CBC   Recent Labs     05/12/23 0422 05/13/23  0459   WBC 5.4 5.0   HGB 10.6* 10.6*   HCT 36.4* 36.2*    233      BMP   Recent Labs     05/12/23 0422 05/13/23  0459    134*   K 4.9 4.6    101   CO2 19* 17*   PHOS 4.0  --    BUN 27* 32*   CREATININE 2.1* 2.0*     Hepatic:   Recent Labs     05/12/23 0422 05/13/23  0459   AST 12* 13*   ALT 19 16   BILITOT 0.5 0.4   ALKPHOS 126 128     Troponin: No results for input(s): TROPONINI in the last 72 hours. BNP: No results for input(s): BNP in the last 72 hours. Lipids: No results for input(s): CHOL, HDL in the last 72 hours. Invalid input(s): LDLCALCU  ABGs: No results found for: PHART, PO2ART, SFO9PVU  INR: No results for input(s): INR in the last 72 hours.   Renal Labs  Albumin:    Lab Results   Component Value Date/Time    LABALBU 3.5 05/13/2023 04:59 AM     Calcium:    Lab Results   Component Value Date/Time    CALCIUM 8.7 05/13/2023 04:59 AM     Phosphorus:    Lab Results   Component Value Date/Time    PHOS 4.0 05/12/2023 04:22
Occupational Therapy    Attempted OT/PT evaluations, pt declined d/t \"I just got comfortable enough to sleep. Not today. I've been moving around enough on my own anyway. \" Pt requested d/c therapy orders, has consistently refused therapy daily 5/10-5/13 with no plans to participate in the future. Re-order if needed.     Riccardo Jj, OTR
Orthostatic BP's are as follows: Lying 189/61 HR 77. Sitting 161/77 HR 81. Standing 150/72 HR 90. Hospitalist notified.
Orthostatic BP's are: laying 177/67 HR 86. Sitting 164/86 HR 91.  Standing 154/70 HR 98
Physical Therapy  Attempt Note    PT eval attempt deferred ; pt refusing 2/2 nausea, lightheadedness, diaphoresis ; she wants to await further direction of medical plan. Reinforced need for inc activity, assist in DC planning, OOB for prevention of OH ; cont to refuse at this time. Will cont to follow.     Samm Mendoza PT, DPt
Physical Therapy  Attempt Note    PT/OT evals attempted x 2 on this date. Pt requesting reattempt on first try 2/2 nausea, on return pt again requesting deferral to tomorrow 2/2 nausea following stress test. Will cont to follow and assist as pt disposition agreeableness allows.     Maeve Rodriguez PT, DPT
Pt became very nauseated this morning. She refused her Reglan, Hydralazine, and pantoprazole. Zofran 4mg administered dissolvable tablet given as requested for nausea.
Stress test not indicative of ischemia.
V2.0  [facility] Hospitalist Progress Note      Name:  Alfonso Ngo /Age/Sex: 1960  (57 y.o. female)   MRN & CSN:  [id number] & [id number] Encounter Date/Time: 2023 1:16 PM EDT    [facility] Bill Acosta 15 Day: 4      Subjective:     Chief Complaint: Nausea (Emesis after dinner)     Pt feels better today. States her breathing has improved. Had headache earlier this AM. Endorses dizziness. Encouraged oral intake. Pt has been refusing PT/OT. Discussed with RN. Assessment and Plan:   MODESTO on CKD. Cr 2.3 on admission; was  1.2 2022. Likely pre-renal 2/2 dehydration, home diuretics and GI symptoms. Need to rule other etiology. Possible pt having worsening CKD. Orthostats+ on admission. Adjust BP meds as below. Received IVF, hold off giving more, monitor output       Abnormal stress test. Elevated troponin but trended down. No chest pain but has SOB gotten worse today especially on exertion, EKG with ? ST-T wave changes lead 1, aVL, stress test mild to moderate apical ischemia, ECHO EF 18-42%, grade I diastolic. .moderately dilated LA  -cardiology following; plan for cath outpatient; Continue ASA, plavix     Mild transaminitis - resolved. 2/2 volume depletion. Nausea, vomiting, diarrhea  Suspect secondary to gastroenteritis/gastroparesis. F/u  C. Diff, check GI PCR pathogen       Dizziness likely 2/2 volume depletion, orthostatic hypotension and possibly autonomic dysfunction from diabetes. Pt has orthostats +, but also BP is high, will continue hydralazine, amlodipine, d/c Imdur, started on Coreg by cardiology; dose increased today to 12.5 mg BID; monitor on tele       Bacteruria - UTI ruled out  UA-trace leukocyte esterase, nitrate negative, WBC 41, moderate bacteria. Pt has no acute symptoms, just chronic urinary frequency. Rocephin d/yi. H/o Repeated falls  Currently at [facility] living [facility].  Ambulates with a
V2.0  [facility] Hospitalist Progress Note      Name:  Javier Galvin /Age/Sex: 1960  (57 y.o. female)   MRN & CSN:  [id number] & [id number] Encounter Date/Time: 2023 1:16 PM EDT    [facility] Cott[facility] Day: 3      Subjective:     Chief Complaint: Nausea (Emesis after dinner)     Pt feeling anxious today. States she has SOB. Endorses nausea as well. Chris Burrows has improved. Denies chest pain. No cough. Cardiology planning for cath awaiting nephro clearance and renal function panel. Assessment and Plan:   MODESTO on CKD. Cr 2.3 on admission; was  1.2 2022. Likely pre-renal 2/2 dehydration, home diuretics and GI symptoms. Orthostats+ on admission. Adjust BP meds as below. Continue IVF, monitor output       Abnormal stress test. Elevated troponin but trended down. No chest pain but has SOB gotten worse today especially on exertion, EKG with ? ST-T wave changes lead 1, aVL, stress test mild to moderate apical ischemia, ECHO EF 88-50%, grade I diastolic. .moderately dilated LA  -cardiology following; plan for cath awaiting nephro clearance and improvement of renal function pnale. Continue ASA, plavix     Mild transaminitis - resolved. 2/2 volume depletion. Nausea, vomiting, diarrhea  Suspect secondary to gastroenteritis/gastroparesis. F/u  C. Diff, check GI PCR pathogen       Dizziness likely 2/2 volume depletion, orthostatic hypotension and possibly autonomic dysfunction from diabetes. Pt has orthostats +, but also BP is high, will continue hydralazine, amlodipine, add Imdur, will defer to cardiology regarding BB given recent trisha pause prior admission       Bacteruria - UTI ruled out  UA-trace leukocyte esterase, nitrate negative, WBC 41, moderate bacteria. Pt has no acute symptoms, just chronic urinary frequency. Will d/c rocephin. H/o Repeated falls  Currently at [facility] assisted living facility.  Ambulates with a
silhouette is stable. The osseous structures are stable. No acute process. NM MYOCARDIAL SPECT REST EXERCISE OR RX    Result Date: 5/10/2023  Radiology exam is complete. No Radiologist dictation. Please follow up with ordering provider.        Electronically signed by Marilyn Crane MD on 5/10/2023 at 1:27 PM

## 2023-05-13 NOTE — DISCHARGE INSTRUCTIONS
- please schedule an appointment to see your PCP  - please schedule an appointment to see nephrologist, cardiologist, endocrinologist, and GI doctor  - please go to lab in one week for blood work  - please take medications as prescribed  - please monitor your blood pressure and blood glucose closely

## 2023-05-15 LAB
KAPPA LC FREE SER-MCNC: 26.42 MG/L (ref 3.3–19.4)
KAPPA LC FREE/LAMBDA FREE SER NEPH: 1.14 {RATIO} (ref 0.26–1.65)
LAMBDA LC FREE SERPL-MCNC: 23.08 MG/L (ref 5.71–26.3)
NUCLEAR IGG SER QL IA: NORMAL

## 2023-05-16 LAB
ALBUMIN SERPL ELPH-MCNC: 3 GM/DL (ref 3.2–5.6)
ALPHA-1-GLOBULIN: 0.2 GM/DL (ref 0.1–0.4)
ALPHA-2-GLOBULIN: 1 GM/DL (ref 0.4–1.2)
BETA GLOBULIN: 0.9 GM/DL (ref 0.5–1.3)
GAMMA GLOBULIN: 0.5 GM/DL (ref 0.5–1.6)
Lab: NORMAL
MYELOPEROXIDASE AB SER-ACNC: 0 AU/ML (ref 0–19)
PROTEINASE3 AB SER-ACNC: 0 AU/ML (ref 0–19)
TEST NAME: NORMAL
TOTAL PROTEIN: 5.6 GM/DL (ref 6.4–8.2)

## 2023-05-17 LAB
HCV RNA SERPL NAA+PROBE-ACNC: NOT DETECTED IU/ML
HCV RNA SERPL NAA+PROBE-LOG IU: NOT DETECTED LOG IU/ML
HCV RNA SERPL QL NAA+PROBE: NOT DETECTED

## 2023-05-20 NOTE — DISCHARGE SUMMARY
Discharge Summary    Name:  Donis Quiles /Age/Sex: 1960  (57 y.o. female)   MRN & CSN:  [id number] & [id number] Admission Date/Time: 2023  6:48 PM   Attending:  Lamar att. providers found Discharging Physician: Lcu Thornton MD     Hospital Course:   Donis Quiles is a 57 y.o.  female  who presents with MODESTO (acute kidney injury) ([facility] [address]    HPI  Donis Quiles is a 57 y.o. female with hypertension, diabetes mellitus type 2, diabetic neuropathy, diabetic gastroparesis, depression, anxiety, insomnia, hypothyroidism, chronic back pain, currently at assisted living facility was brought to ED for nausea, vomiting, diarrhea. Patient states that she has not been feeling good for the last few days. Had poor appetite. Patient was able to eat her dinner today. But right after eating dinner patient complained of nausea, vomiting x2, had an episode of diarrhea. Patient reported that she was able to go to the bathroom, and after that she felt dizzy, disoriented. Patient also stated that her blood pressure was low 90/54. It was reported that patient turned pale and hence the squad was called. Patient initially reported that she felt weak in her legs and could not feel her legs. But currently all her symptoms are resolved. Patient denied any chest pain, shortness of breath. Patient denied any abdominal pain, denied any urinary complaints. Vitals on presentation-/82, HR 65, RR 18, temp 98, saturating 98% on room air. Labs significant for sodium 134, chloride 97, BUN 29, creatinine 2.3, lactic acid 1.8, random glucose 193, troponin 0.054, , ALT 42, AST 40, CBC within normal range. UA suggestive of infection. Chest x-ray-no acute process. Patient received NS bolus, ceftriaxone in ED. \"       The following problems have been addressed during this hospitalization. MODESTO on CKD. Cr 2.3 on admission; was  1.2 2022.  Likely pre-renal 2/2 dehydration, home diuretics and GI

## 2023-05-22 LAB
ALBUMIN SERPL ELPH-MCNC: 3 GM/DL (ref 3.2–5.6)
ALPHA-1-GLOBULIN: 0.2 GM/DL (ref 0.1–0.4)
ALPHA-2-GLOBULIN: 1 GM/DL (ref 0.4–1.2)
BETA GLOBULIN: 0.9 GM/DL (ref 0.5–1.3)
GAMMA GLOBULIN: 0.5 GM/DL (ref 0.5–1.6)
SPEP INTERPRETATION: ABNORMAL
SPEP INTERPRETATION: NORMAL
TOTAL PROTEIN: 5.6 GM/DL (ref 6.4–8.2)

## 2023-05-31 ENCOUNTER — OFFICE VISIT (OUTPATIENT)
Dept: CARDIOLOGY CLINIC | Age: 63
End: 2023-05-31
Payer: MEDICARE

## 2023-05-31 VITALS
SYSTOLIC BLOOD PRESSURE: 92 MMHG | HEART RATE: 81 BPM | WEIGHT: 223 LBS | DIASTOLIC BLOOD PRESSURE: 42 MMHG | TEMPERATURE: 92 F | HEIGHT: 64 IN | BODY MASS INDEX: 38.07 KG/M2

## 2023-05-31 DIAGNOSIS — I95.1 ORTHOSTATIC HYPOTENSION: ICD-10-CM

## 2023-05-31 DIAGNOSIS — R55 VASOVAGAL SYNCOPE: ICD-10-CM

## 2023-05-31 DIAGNOSIS — I10 ESSENTIAL HYPERTENSION: ICD-10-CM

## 2023-05-31 DIAGNOSIS — R94.39 ABNORMAL STRESS TEST: Primary | ICD-10-CM

## 2023-05-31 PROCEDURE — G8427 DOCREV CUR MEDS BY ELIG CLIN: HCPCS | Performed by: INTERNAL MEDICINE

## 2023-05-31 PROCEDURE — 3017F COLORECTAL CA SCREEN DOC REV: CPT | Performed by: INTERNAL MEDICINE

## 2023-05-31 PROCEDURE — 3074F SYST BP LT 130 MM HG: CPT | Performed by: INTERNAL MEDICINE

## 2023-05-31 PROCEDURE — 3078F DIAST BP <80 MM HG: CPT | Performed by: INTERNAL MEDICINE

## 2023-05-31 PROCEDURE — 99214 OFFICE O/P EST MOD 30 MIN: CPT | Performed by: INTERNAL MEDICINE

## 2023-05-31 PROCEDURE — 1111F DSCHRG MED/CURRENT MED MERGE: CPT | Performed by: INTERNAL MEDICINE

## 2023-05-31 PROCEDURE — 1036F TOBACCO NON-USER: CPT | Performed by: INTERNAL MEDICINE

## 2023-05-31 PROCEDURE — G8417 CALC BMI ABV UP PARAM F/U: HCPCS | Performed by: INTERNAL MEDICINE

## 2023-05-31 RX ORDER — ATORVASTATIN CALCIUM 20 MG/1
20 TABLET, FILM COATED ORAL DAILY
Qty: 90 TABLET | Refills: 1 | Status: SHIPPED | OUTPATIENT
Start: 2023-05-31 | End: 2023-05-31

## 2023-05-31 RX ORDER — ASPIRIN 81 MG/1
81 TABLET ORAL DAILY
Qty: 90 TABLET | Refills: 1 | Status: SHIPPED | OUTPATIENT
Start: 2023-05-31 | End: 2023-05-31

## 2023-05-31 RX ORDER — MIDODRINE HYDROCHLORIDE 2.5 MG/1
2.5 TABLET ORAL 3 TIMES DAILY
Qty: 90 TABLET | Refills: 3 | Status: SHIPPED | OUTPATIENT
Start: 2023-05-31 | End: 2023-05-31

## 2023-05-31 NOTE — PROGRESS NOTES
Normocephalic, Atraumatic, Bilateral external ears normal, Oropharynx moist, No oral exudates,   Nose normal.   Neck- Normal range of motion, No tenderness, Supple  Eyes:  EOMI, Conjunctiva normal, No discharge. Respiratory:  Normal breath sounds, No respiratory distress, No wheezing, No Rales, No Ronchi. No chest tenderness. Cardiovascular: S1-S2, no added heart sounds, No Mumurs appreciated. No gallops, rubs. No Pedal Edema   GI:  Bowel sounds normal, Soft, No tenderness,  :  No costovertebral angle tenderness   Musculoskeletal:  No gross deformities. Back- No tenderness  Integument:  Well hydrated, no rash   Lymphatic:  No lymphadenopathy noted   Neurologic:  Alert & oriented x 3, Normal motor function, normal sensory function, no focal deficits noted   Psychiatric:  Speech and behavior appropriate       Medical decision making and Data review:    DATA:    Lab Results   Component Value Date    TROPONINT 0.025 (H) 05/10/2023     BNP:    Lab Results   Component Value Date    PROBNP 1,317 (H) 05/12/2023     PT/INR:  No results found for: [employer]  Lab Results   Component Value Date    LABA1C 8.2 (H) 05/11/2023    LABA1C 7.8 (H) 01/12/2023     Lab Results   Component Value Date    CHOL 203 (H) 12/01/2022    TRIG 140 12/01/2022    HDL 63 12/01/2022    LDLCALC 112 (H) 12/01/2022     Lab Results   Component Value Date    WBC 5.0 05/13/2023    HGB 10.6 (L) 05/13/2023    HCT 36.2 (L) 05/13/2023    MCV 96.5 05/13/2023     05/13/2023     TSH: No results found for: TSH  Lab Results   Component Value Date    AST 13 (L) 05/13/2023    ALT 16 05/13/2023    BILIDIR 0.2 05/10/2023    BILITOT 0.4 05/13/2023    ALKPHOS 128 05/13/2023         All labs, medications and tests reviewed by myself including data and history from outside source , patient and available family . 1. Abnormal stress test    2. Essential hypertension    3. Orthostatic hypotension    4.  Vasovagal syncope           Impression and Plan:      Recent

## 2023-07-18 ENCOUNTER — OFFICE VISIT (OUTPATIENT)
Dept: CARDIOLOGY CLINIC | Age: 63
End: 2023-07-18
Payer: MEDICARE

## 2023-07-18 ENCOUNTER — TELEPHONE (OUTPATIENT)
Dept: CARDIOLOGY CLINIC | Age: 63
End: 2023-07-18

## 2023-07-18 VITALS
OXYGEN SATURATION: 98 % | SYSTOLIC BLOOD PRESSURE: 100 MMHG | BODY MASS INDEX: 38.07 KG/M2 | HEIGHT: 64 IN | DIASTOLIC BLOOD PRESSURE: 62 MMHG | HEART RATE: 71 BPM | WEIGHT: 223 LBS

## 2023-07-18 DIAGNOSIS — Z01.810 PRE-OPERATIVE CARDIOVASCULAR EXAMINATION: Primary | ICD-10-CM

## 2023-07-18 DIAGNOSIS — I10 ESSENTIAL HYPERTENSION: ICD-10-CM

## 2023-07-18 DIAGNOSIS — E78.5 DYSLIPIDEMIA: ICD-10-CM

## 2023-07-18 DIAGNOSIS — R94.39 ABNORMAL STRESS TEST: Primary | ICD-10-CM

## 2023-07-18 DIAGNOSIS — R06.02 SHORTNESS OF BREATH: ICD-10-CM

## 2023-07-18 PROCEDURE — 3074F SYST BP LT 130 MM HG: CPT | Performed by: INTERNAL MEDICINE

## 2023-07-18 PROCEDURE — 3017F COLORECTAL CA SCREEN DOC REV: CPT | Performed by: INTERNAL MEDICINE

## 2023-07-18 PROCEDURE — 3078F DIAST BP <80 MM HG: CPT | Performed by: INTERNAL MEDICINE

## 2023-07-18 PROCEDURE — G8427 DOCREV CUR MEDS BY ELIG CLIN: HCPCS | Performed by: INTERNAL MEDICINE

## 2023-07-18 PROCEDURE — 1036F TOBACCO NON-USER: CPT | Performed by: INTERNAL MEDICINE

## 2023-07-18 PROCEDURE — 99214 OFFICE O/P EST MOD 30 MIN: CPT | Performed by: INTERNAL MEDICINE

## 2023-07-18 PROCEDURE — G8417 CALC BMI ABV UP PARAM F/U: HCPCS | Performed by: INTERNAL MEDICINE

## 2023-07-18 NOTE — PATIENT INSTRUCTIONS
Please be informed that if you contact our office outside of normal business hours the physician on call cannot help with any scheduling or rescheduling issues, procedure instruction questions or any type of medication issue. We advise you for any urgent/emergency that you go to the nearest emergency room! PLEASE CALL OUR OFFICE DURING NORMAL BUSINESS HOURS    Monday - Friday   8 am to 5 pm    [place]: [address]: [phone]    [place]:  [facility] Laboratory Locations - No appointment necessary. Sites open Monday to Friday. Call your preferred location for test preparation, business   hours and other information you need. [employer] accepts [employer]. [address] [place] [address]. 27 . Marianela Dudley. [place], [address] [facility]  Phone: [phone]     [facility]  Thank you for allowing us to care for you today! We want to ensure we can follow your treatment plan and we strive to give you the best outcomes and experience possible. If you ever have a life threatening emergency and call 911 - for an ambulance (EMS)   Our providers can only care for you at:   [facility] or [facility]. Even if you have someone take you or you drive yourself we can only care for you in a [facility]. Our providers are not setup at the other healthcare locations!

## 2023-07-18 NOTE — PROGRESS NOTES
Jolanta Ojeda MD                                  CARDIOLOGY  NOTE    Chief Complaint:    Chief Complaint   Patient presents with    Follow-up     Pt states SOB , dizziness , pt states no other cardiac sx         + Dizziness  + Chest Pressure / Heaviness      Abnormal Stress test 5/10/2023        Anterior apical wall Mild to moderate ischemia Normal EF 50 % with normal   ventricular contractility. Abnormal stress test           Prior HPI:     Sara Guajardo is a 57y.o. year old female who was recently evaluated in the hospital for possible seizure-like activity possible hypoglycemia. Patient has prior medical history significant for TIA, COPD, diabetes mellitus, hyperlipidemia, hypertension, CKD. Patient lives in assisted living facility. Cardiology was consulted to evaluate patient for pause noted on telemetry which was around  3.67 seconds -at the time patient was noted to be nauseated. She is also being experiencing hot and cold temperature flashes. Patient denies any history of presyncope syncope congestive heart failure CAD or cardiac arrhythmias    Patient was evaluated by EP. Episode of pause was attributed to vasovagal response in the setting of nausea with poorly controlled diabetes mellitus. ECHO 7/14/222     Left ventricular systolic function is normal.   Ejection fraction is visually estimated at 50-55%. Mild left ventricular hypertrophy. No significant valvular disease noted. No evidence of any pericardial effusion.         Current Outpatient Medications   Medication Sig Dispense Refill    empagliflozin (JARDIANCE) 10 MG tablet Take 1 tablet by mouth daily 90 tablet 1    Finerenone 10 MG TABS Take 10 mg by mouth daily 30 tablet 5    insulin glargine (BASAGLAR KWIKPEN) 100 UNIT/ML injection pen Inject 20 Units into the skin nightly      carvedilol (COREG) 12.5 MG tablet Take 1 tablet by mouth 2 times daily (with meals) 60 tablet 3    NOVOLOG FLEXPEN 100 UNIT/ML injection pen Inject

## 2023-07-19 ENCOUNTER — TELEPHONE (OUTPATIENT)
Dept: CARDIOLOGY CLINIC | Age: 63
End: 2023-07-19

## 2023-07-19 NOTE — TELEPHONE ENCOUNTER
Patient given instructions over telephone on [address] for Dx: Yani Zoniaks. Procedure is scheduled for 7/24/23 @ 12pm, w/arrival @ 10am, @ [facility]. Pre-admission orders are in [facility] for labwork and/or CXR, which are due 7/20/23 @ [facility]. Patient advised to review instructions given. Patient was notified that procedure date or time could be changed due to an emergency. Patient voiced understanding.

## 2023-07-20 ENCOUNTER — HOSPITAL ENCOUNTER (OUTPATIENT)
Age: 63
Discharge: HOME | End: 2023-07-20
Payer: MEDICARE

## 2023-07-20 DIAGNOSIS — Z01.810 PRE-OPERATIVE CARDIOVASCULAR EXAMINATION: ICD-10-CM

## 2023-07-20 DIAGNOSIS — N18.31 CKD STAGE G3A/A3, GFR 45-59 AND ALBUMIN CREATININE RATIO >300 MG/G (HCC): ICD-10-CM

## 2023-07-20 DIAGNOSIS — N17.9 AKI (ACUTE KIDNEY INJURY) (HCC): ICD-10-CM

## 2023-07-20 LAB
ABO/RH: NORMAL
ALBUMIN SERPL-MCNC: 3.9 GM/DL (ref 3.4–5)
ALP BLD-CCNC: 102 IU/L (ref 40–128)
ALT SERPL-CCNC: 23 U/L (ref 10–40)
ANION GAP SERPL CALCULATED.3IONS-SCNC: 11 MMOL/L (ref 4–16)
ANTIBODY SCREEN: NEGATIVE
AST SERPL-CCNC: 22 IU/L (ref 15–37)
BILIRUB SERPL-MCNC: 0.3 MG/DL (ref 0–1)
BUN SERPL-MCNC: 37 MG/DL (ref 6–23)
CALCIUM SERPL-MCNC: 9.1 MG/DL (ref 8.3–10.6)
CHLORIDE BLD-SCNC: 104 MMOL/L (ref 99–110)
CO2: 21 MMOL/L (ref 21–32)
COMMENT: NORMAL
CREAT SERPL-MCNC: 1.8 MG/DL (ref 0.6–1.1)
GFR SERPL CREATININE-BSD FRML MDRD: 31 ML/MIN/1.73M2
GLUCOSE SERPL-MCNC: 187 MG/DL (ref 70–99)
HCT VFR BLD CALC: 39.5 % (ref 37–47)
HEMOGLOBIN: 11.9 GM/DL (ref 12.5–16)
MCH RBC QN AUTO: 28.4 PG (ref 27–31)
MCHC RBC AUTO-ENTMCNC: 30.1 % (ref 32–36)
MCV RBC AUTO: 94.3 FL (ref 78–100)
PDW BLD-RTO: 13.3 % (ref 11.7–14.9)
PLATELET # BLD: 294 K/CU MM (ref 140–440)
PMV BLD AUTO: 10.2 FL (ref 7.5–11.1)
POTASSIUM SERPL-SCNC: 5.9 MMOL/L (ref 3.5–5.1)
RBC # BLD: 4.19 M/CU MM (ref 4.2–5.4)
SODIUM BLD-SCNC: 136 MMOL/L (ref 135–145)
TOTAL PROTEIN: 6 GM/DL (ref 6.4–8.2)
WBC # BLD: 6.1 K/CU MM (ref 4–10.5)

## 2023-07-20 PROCEDURE — 80053 COMPREHEN METABOLIC PANEL: CPT

## 2023-07-20 PROCEDURE — 86850 RBC ANTIBODY SCREEN: CPT

## 2023-07-20 PROCEDURE — 36415 COLL VENOUS BLD VENIPUNCTURE: CPT

## 2023-07-20 PROCEDURE — 80048 BASIC METABOLIC PNL TOTAL CA: CPT

## 2023-07-20 PROCEDURE — 86901 BLOOD TYPING SEROLOGIC RH(D): CPT

## 2023-07-20 PROCEDURE — 85027 COMPLETE CBC AUTOMATED: CPT

## 2023-07-20 PROCEDURE — 86900 BLOOD TYPING SEROLOGIC ABO: CPT

## 2023-07-24 ENCOUNTER — HOSPITAL ENCOUNTER (OUTPATIENT)
Dept: CARDIAC CATH/INVASIVE PROCEDURES | Age: 63
Discharge: HOME | End: 2023-07-24
Attending: INTERNAL MEDICINE | Admitting: INTERNAL MEDICINE
Payer: MEDICARE

## 2023-07-24 VITALS
WEIGHT: 223 LBS | DIASTOLIC BLOOD PRESSURE: 62 MMHG | RESPIRATION RATE: 15 BRPM | HEART RATE: 75 BPM | HEIGHT: 64 IN | TEMPERATURE: 97.1 F | BODY MASS INDEX: 38.07 KG/M2 | OXYGEN SATURATION: 93 % | SYSTOLIC BLOOD PRESSURE: 135 MMHG

## 2023-07-24 PROBLEM — R94.39 ABNORMAL STRESS TEST: Status: ACTIVE | Noted: 2023-07-24

## 2023-07-24 LAB
ALBUMIN SERPL-MCNC: 3.9 GM/DL (ref 3.4–5)
ALP BLD-CCNC: 118 IU/L (ref 40–129)
ALT SERPL-CCNC: 30 U/L (ref 10–40)
ANION GAP SERPL CALCULATED.3IONS-SCNC: 12 MMOL/L (ref 4–16)
AST SERPL-CCNC: 25 IU/L (ref 15–37)
BILIRUB SERPL-MCNC: 0.4 MG/DL (ref 0–1)
BUN SERPL-MCNC: 31 MG/DL (ref 6–23)
CALCIUM SERPL-MCNC: 9 MG/DL (ref 8.3–10.6)
CHLORIDE BLD-SCNC: 101 MMOL/L (ref 99–110)
CO2: 19 MMOL/L (ref 21–32)
CREAT SERPL-MCNC: 1.7 MG/DL (ref 0.6–1.1)
GFR SERPL CREATININE-BSD FRML MDRD: 33 ML/MIN/1.73M2
GLUCOSE BLD-MCNC: 297 MG/DL (ref 70–99)
GLUCOSE BLD-MCNC: 367 MG/DL (ref 70–99)
GLUCOSE SERPL-MCNC: 370 MG/DL (ref 70–99)
POTASSIUM SERPL-SCNC: 5.7 MMOL/L (ref 3.5–5.1)
SODIUM BLD-SCNC: 132 MMOL/L (ref 135–145)
TOTAL PROTEIN: 6.1 GM/DL (ref 6.4–8.2)

## 2023-07-24 PROCEDURE — 2709999900 HC NON-CHARGEABLE SUPPLY

## 2023-07-24 PROCEDURE — 2580000003 HC RX 258: Performed by: INTERNAL MEDICINE

## 2023-07-24 PROCEDURE — 93458 L HRT ARTERY/VENTRICLE ANGIO: CPT | Performed by: INTERNAL MEDICINE

## 2023-07-24 PROCEDURE — 6360000004 HC RX CONTRAST MEDICATION

## 2023-07-24 PROCEDURE — 93458 L HRT ARTERY/VENTRICLE ANGIO: CPT

## 2023-07-24 PROCEDURE — 80053 COMPREHEN METABOLIC PANEL: CPT

## 2023-07-24 PROCEDURE — C1769 GUIDE WIRE: HCPCS

## 2023-07-24 PROCEDURE — 82962 GLUCOSE BLOOD TEST: CPT

## 2023-07-24 PROCEDURE — C1894 INTRO/SHEATH, NON-LASER: HCPCS

## 2023-07-24 PROCEDURE — 6360000002 HC RX W HCPCS

## 2023-07-24 PROCEDURE — 2500000003 HC RX 250 WO HCPCS

## 2023-07-24 PROCEDURE — 6370000000 HC RX 637 (ALT 250 FOR IP): Performed by: INTERNAL MEDICINE

## 2023-07-24 RX ORDER — DIPHENHYDRAMINE HCL 25 MG
25 TABLET ORAL ONCE
Status: COMPLETED | OUTPATIENT
Start: 2023-07-24 | End: 2023-07-24

## 2023-07-24 RX ORDER — DIAZEPAM 5 MG/1
5 TABLET ORAL ONCE
Status: COMPLETED | OUTPATIENT
Start: 2023-07-24 | End: 2023-07-24

## 2023-07-24 RX ORDER — SODIUM CHLORIDE 9 MG/ML
INJECTION, SOLUTION INTRAVENOUS CONTINUOUS
Status: DISCONTINUED | OUTPATIENT
Start: 2023-07-24 | End: 2023-07-24 | Stop reason: HOSPADM

## 2023-07-24 RX ORDER — 0.9 % SODIUM CHLORIDE 0.9 %
500 INTRAVENOUS SOLUTION INTRAVENOUS ONCE
Status: DISCONTINUED | OUTPATIENT
Start: 2023-07-24 | End: 2023-07-24 | Stop reason: HOSPADM

## 2023-07-24 RX ORDER — SODIUM CHLORIDE 9 MG/ML
INJECTION, SOLUTION INTRAVENOUS PRN
Status: DISCONTINUED | OUTPATIENT
Start: 2023-07-24 | End: 2023-07-24 | Stop reason: HOSPADM

## 2023-07-24 RX ORDER — SODIUM CHLORIDE 0.9 % (FLUSH) 0.9 %
5-40 SYRINGE (ML) INJECTION EVERY 12 HOURS SCHEDULED
Status: DISCONTINUED | OUTPATIENT
Start: 2023-07-24 | End: 2023-07-24 | Stop reason: HOSPADM

## 2023-07-24 RX ORDER — ACETAMINOPHEN 325 MG/1
650 TABLET ORAL EVERY 4 HOURS PRN
Status: DISCONTINUED | OUTPATIENT
Start: 2023-07-24 | End: 2023-07-24 | Stop reason: HOSPADM

## 2023-07-24 RX ORDER — ATORVASTATIN CALCIUM 40 MG/1
40 TABLET, FILM COATED ORAL DAILY
Qty: 90 TABLET | Refills: 1 | Status: SHIPPED | OUTPATIENT
Start: 2023-07-24

## 2023-07-24 RX ORDER — HYDRALAZINE HYDROCHLORIDE 20 MG/ML
10 INJECTION INTRAMUSCULAR; INTRAVENOUS EVERY 10 MIN PRN
Status: DISCONTINUED | OUTPATIENT
Start: 2023-07-24 | End: 2023-07-24 | Stop reason: HOSPADM

## 2023-07-24 RX ORDER — SODIUM CHLORIDE 0.9 % (FLUSH) 0.9 %
5-40 SYRINGE (ML) INJECTION PRN
Status: DISCONTINUED | OUTPATIENT
Start: 2023-07-24 | End: 2023-07-24 | Stop reason: HOSPADM

## 2023-07-24 RX ADMIN — DIPHENHYDRAMINE HYDROCHLORIDE 25 MG: 25 TABLET ORAL at 10:16

## 2023-07-24 RX ADMIN — SODIUM CHLORIDE: 9 INJECTION, SOLUTION INTRAVENOUS at 10:15

## 2023-07-24 RX ADMIN — DIAZEPAM 5 MG: 5 TABLET ORAL at 10:15

## 2023-07-24 RX ADMIN — INSULIN HUMAN 15 UNITS: 100 INJECTION, SOLUTION PARENTERAL at 11:04

## 2023-07-24 NOTE — PLAN OF CARE
Received from cath lab. Monitor and alarms on. Call Light in reach. Blood sugar reassessed and is 297. Patient denies wanting anything to eat or drink during recovery phase.  [address] [place], [place] 7/24/2023

## 2023-07-24 NOTE — DISCHARGE INSTRUCTIONS
Discharge Home Instuctions  Name:Arabella Grey  :1960    Your instructions:    Shower only for the first 5 days, NO TUB BATHS. Wash procedure site with mild soap, rinse and pat dry. Do not rub over the procedure site for two (2) days. Remove dressing in 2 days. Site observations-Observe the area for bleeding or hematoma (lump under the skin caused by bleeding.)      A. Painless bruising is normal.  B. If a firmness/swelling seems to be increasing or there is bright red bleeding from site       (hold pressure over procedure site) and  CALL 911 IMMEDIATELY. What to do after you leave the hospital:    Recommended activity: no lifting, Driving, or Strenuous exercise for 3 days. DO NOT lift more than 10lbs. For your procedure you may have been given a sedative to help you relax. This drug will make you sleepy. It is usually given in a vein (by IV). Don't do anything for 24 hours that requires attention to detail. It takes time for the medicine effects to completely wear off. Do not sign any legally binding contracts or documents over the next 24 hours. For your safety, you should not drive or operate any machinery that could be dangerous until the medicine wears off and you can think clearly and react easily. Follow-up with physician in 7-10 days. Take your medication as ordered.       If you have any questions, you may call [phone] or your physicians office

## 2023-07-24 NOTE — PLAN OF CARE
All discharge instructions explained to the patient at this time. No bleeding or hematoma noted along site. Patient walked to the bathroom without difficulty and IV removed per discharge orders. Patient denies any additional needs and all vitals stable for discharge home.   [address] [place], [place] 7/24/2023

## 2023-07-24 NOTE — PLAN OF CARE
Patient wheeled down to private vehicle via wheelchair with her personal walker and all belongings as well.  [address] [place], [place] 7/24/2023

## 2023-07-24 NOTE — H&P
Jolanta Ojeda MD                                  CARDIOLOGY  NOTE    Chief Complaint:        + Dizziness  + Chest Pressure / Heaviness      Abnormal Stress test 5/10/2023        Anterior apical wall Mild to moderate ischemia Normal EF 50 % with normal   ventricular contractility. Abnormal stress test           Prior HPI:     Sara Guajardo is a 57y.o. year old female who was recently evaluated in the hospital for possible seizure-like activity possible hypoglycemia. Patient has prior medical history significant for TIA, COPD, diabetes mellitus, hyperlipidemia, hypertension, CKD. Patient lives in assisted living facility. Cardiology was consulted to evaluate patient for pause noted on telemetry which was around  3.67 seconds -at the time patient was noted to be nauseated. She is also being experiencing hot and cold temperature flashes. Patient denies any history of presyncope syncope congestive heart failure CAD or cardiac arrhythmias    Patient was evaluated by EP. Episode of pause was attributed to vasovagal response in the setting of nausea with poorly controlled diabetes mellitus. ECHO 7/14/222     Left ventricular systolic function is normal.   Ejection fraction is visually estimated at 50-55%. Mild left ventricular hypertrophy. No significant valvular disease noted. No evidence of any pericardial effusion. Current Facility-Administered Medications   Medication Dose Route Frequency Provider Last Rate Last Admin    0.9 % sodium chloride infusion   IntraVENous Continuous Jolanta Ojeda MD 75 mL/hr at 07/24/23 1015 New Bag at 07/24/23 1015       Allergies:     Patient has no known allergies.     Patient History:    Past Medical History:   Diagnosis Date    Arthritis     Asthma     Cerebral artery occlusion with cerebral infarction [facility])     per old chart 8/2015- TIA    CKD stage G1/A2, GFR > 90 and albumin creatinine ratio  mg/g 09/01/2022    Sees Dr. Ruby Park    COPD (chronic

## 2023-07-24 NOTE — PLAN OF CARE
Spoke with Dr. Justin Uribe in regard to critical value of 5.9 potassium. Received new orders.  [address] [place], [place] 7/24/2023

## 2023-08-17 LAB
ALBUMIN/GLOBULIN RATIO: 2.1 RATIO (ref 0.8–2.6)
ALBUMIN: 4.1 G/DL (ref 3.5–5.2)
ALP BLD-CCNC: 105 U/L (ref 23–144)
ALT SERPL-CCNC: 20 U/L (ref 0–60)
AST SERPL-CCNC: 19 U/L (ref 0–55)
BILIRUB SERPL-MCNC: 0.2 MG/DL (ref 0–1.2)
BUN BLDV-MCNC: 29 MG/DL (ref 3–29)
BUN/CREAT BLD: 19 (ref 7–25)
CALCIUM SERPL-MCNC: 9.5 MG/DL (ref 8.5–10.5)
CHLORIDE BLD-SCNC: 107 MEQ/L (ref 96–110)
CO2: 22 MEQ/L (ref 19–32)
CREAT SERPL-MCNC: 1.5 MG/DL (ref 0.5–1.2)
GLOBULIN: 2 G/DL (ref 1.9–3.6)
GLOMERULAR FILTRATION RATE: 39 MLS/MIN/1.73M2
GLUCOSE BLD-MCNC: 160 MG/DL (ref 70–99)
POTASSIUM SERPL-SCNC: 5.4 MEQ/L (ref 3.4–5.3)
SODIUM BLD-SCNC: 140 MEQ/L (ref 135–148)
STATUS: ABNORMAL
TOTAL PROTEIN: 6.1 G/DL (ref 6–8.3)

## 2023-08-22 ENCOUNTER — OFFICE VISIT (OUTPATIENT)
Dept: CARDIOLOGY CLINIC | Age: 63
End: 2023-08-22
Payer: MEDICARE

## 2023-08-22 VITALS
WEIGHT: 226 LBS | HEART RATE: 72 BPM | DIASTOLIC BLOOD PRESSURE: 60 MMHG | HEIGHT: 64 IN | BODY MASS INDEX: 38.58 KG/M2 | SYSTOLIC BLOOD PRESSURE: 126 MMHG

## 2023-08-22 DIAGNOSIS — R06.02 SHORTNESS OF BREATH: ICD-10-CM

## 2023-08-22 DIAGNOSIS — I10 ESSENTIAL HYPERTENSION: ICD-10-CM

## 2023-08-22 DIAGNOSIS — E78.5 DYSLIPIDEMIA: ICD-10-CM

## 2023-08-22 DIAGNOSIS — R55 VASOVAGAL SYNCOPE: ICD-10-CM

## 2023-08-22 DIAGNOSIS — I25.10 CAD IN NATIVE ARTERY: Primary | ICD-10-CM

## 2023-08-22 PROCEDURE — G8427 DOCREV CUR MEDS BY ELIG CLIN: HCPCS | Performed by: INTERNAL MEDICINE

## 2023-08-22 PROCEDURE — 3074F SYST BP LT 130 MM HG: CPT | Performed by: INTERNAL MEDICINE

## 2023-08-22 PROCEDURE — 3078F DIAST BP <80 MM HG: CPT | Performed by: INTERNAL MEDICINE

## 2023-08-22 PROCEDURE — 1036F TOBACCO NON-USER: CPT | Performed by: INTERNAL MEDICINE

## 2023-08-22 PROCEDURE — 99214 OFFICE O/P EST MOD 30 MIN: CPT | Performed by: INTERNAL MEDICINE

## 2023-08-22 PROCEDURE — 3017F COLORECTAL CA SCREEN DOC REV: CPT | Performed by: INTERNAL MEDICINE

## 2023-08-22 PROCEDURE — G8417 CALC BMI ABV UP PARAM F/U: HCPCS | Performed by: INTERNAL MEDICINE

## 2023-08-22 RX ORDER — ASPIRIN 81 MG/1
81 TABLET ORAL DAILY
COMMUNITY

## 2023-08-22 RX ORDER — ATORVASTATIN CALCIUM 80 MG/1
80 TABLET, FILM COATED ORAL DAILY
COMMUNITY

## 2023-08-22 RX ORDER — MIDODRINE HYDROCHLORIDE 2.5 MG/1
2.5 TABLET ORAL 3 TIMES DAILY
COMMUNITY

## 2023-08-22 NOTE — PATIENT INSTRUCTIONS
We are committed to providing you the best care possible. If you receive a survey after visiting one of our offices, please take time to share your experience concerning your physician office visit. These surveys are confidential and no health information about you is shared. We are eager to improve for you and we are counting on your feedback to help make that happen. Thank you for allowing us to care for you today! We want to ensure we can follow your treatment plan and we strive to give you the best outcomes and experience possible. If you ever have a life threatening emergency and call 911 - for an ambulance (EMS)   Our providers can only care for you at:   [facility] or [facility]. Even if you have someone take you or you drive yourself we can only care for you in a [facility] facility. Our providers are not setup at the other healthcare locations! **It is YOUR responsibilty to bring medication bottles and/or updated medication list to [facility]. This will allow us to better serve you and all your healthcare needs**  Please be informed that if you contact our office outside of normal business hours the physician on call cannot help with any scheduling or rescheduling issues, procedure instruction questions or any type of medication issue. We advise you for any urgent/emergency that you go to the nearest emergency room!     PLEASE CALL OUR OFFICE DURING NORMAL BUSINESS HOURS    Monday - Friday   8 am to 5 pm    [place]: [address] [place]: [phone]    [place]:  [phone]

## 2023-08-22 NOTE — PROGRESS NOTES
CAD.  Medical management      Continue with Coreg 12.5 mg p.o. twice daily  Continue with Plavix 25 mg daily  Continue with aspirin 81 mg daily  Continue with high intensity statins Lipitor 80 daily    Previously on Norvasc Lisinopril and Metoprolol        Hx of Sinus pause 3.67 seconds in the setting of nausea, likely vasovagal event. Patient was followed by EP. 30 Day Event Monitor: Showed Non sustatined VTACH     TSH and T4 were normal    Essential hypertension: Blood pressure fairly well controlled. Hypotensive today     Hyperlipidemia: off Lipitor - Pt is above 40 + Insulin dependent DM = start Lipitor 20 mg daily     Diabetes mellitus: On insulin. Management as per primary care physician. Last HbA1c 8.2    History of TIA: Patient is maintained on Plavix    Dementia: Patient is on Aricept        Return in about 6 months (around 2/22/2024). Counseled extensively and medication compliance urged. We discussed that for the  prevention of ASCVD our  goal is aggressive risk modification. Patient is encouraged to exercise even a brisk walk for 30 minutes  at least 3 to 4 times a week   Various goals were discussed and questions answered. Continue current medications. Appropriate prescriptions are addressed and refills ordered. Questions answered and patient verbalizes understanding. Call for any problems, questions, or concerns.

## 2023-10-02 ENCOUNTER — HOSPITAL ENCOUNTER (OUTPATIENT)
Age: 63
Setting detail: SPECIMEN
Discharge: HOME | End: 2023-10-02
Payer: MEDICARE

## 2023-10-02 LAB
ALBUMIN SERPL-MCNC: 3.6 GM/DL (ref 3.4–5)
ALP BLD-CCNC: 107 IU/L (ref 40–128)
ALT SERPL-CCNC: 17 U/L (ref 10–40)
ANION GAP SERPL CALCULATED.3IONS-SCNC: 17 MMOL/L (ref 4–16)
AST SERPL-CCNC: 14 IU/L (ref 15–37)
BILIRUB SERPL-MCNC: 0.2 MG/DL (ref 0–1)
BUN SERPL-MCNC: 37 MG/DL (ref 6–23)
CALCIUM SERPL-MCNC: 8.8 MG/DL (ref 8.3–10.6)
CHLORIDE BLD-SCNC: 102 MMOL/L (ref 99–110)
CO2: 16 MMOL/L (ref 21–32)
CREAT SERPL-MCNC: 1.9 MG/DL (ref 0.6–1.1)
GFR SERPL CREATININE-BSD FRML MDRD: 29 ML/MIN/1.73M2
GLUCOSE SERPL-MCNC: 369 MG/DL (ref 70–99)
POTASSIUM SERPL-SCNC: 5.8 MMOL/L (ref 3.5–5.1)
SODIUM BLD-SCNC: 135 MMOL/L (ref 135–145)
TOTAL PROTEIN: 5.6 GM/DL (ref 6.4–8.2)

## 2023-10-02 PROCEDURE — 80053 COMPREHEN METABOLIC PANEL: CPT

## 2023-10-02 PROCEDURE — 36415 COLL VENOUS BLD VENIPUNCTURE: CPT

## 2023-11-01 ENCOUNTER — HOSPITAL ENCOUNTER (OUTPATIENT)
Age: 63
Setting detail: SPECIMEN
Discharge: HOME | End: 2023-11-01
Payer: MEDICARE

## 2023-11-01 PROCEDURE — 87077 CULTURE AEROBIC IDENTIFY: CPT

## 2023-11-01 PROCEDURE — 87086 URINE CULTURE/COLONY COUNT: CPT

## 2023-11-01 PROCEDURE — 87186 SC STD MICRODIL/AGAR DIL: CPT

## 2023-11-01 PROCEDURE — 81001 URINALYSIS AUTO W/SCOPE: CPT

## 2023-11-02 LAB
BACTERIA: NEGATIVE /HPF
BILIRUBIN URINE: NEGATIVE MG/DL
BLOOD, URINE: ABNORMAL
CLARITY: CLEAR
COLOR: YELLOW
GLUCOSE, URINE: >1000 MG/DL
KETONES, URINE: NEGATIVE MG/DL
LEUKOCYTE ESTERASE, URINE: NEGATIVE
MUCUS: ABNORMAL HPF
NITRITE URINE, QUANTITATIVE: NEGATIVE
PH, URINE: 5 (ref 5–8)
PROTEIN UA: 30 MG/DL
RBC URINE: 389 /HPF (ref 0–6)
SPECIFIC GRAVITY UA: 1.01 (ref 1–1.03)
TRICHOMONAS: ABNORMAL /HPF
UROBILINOGEN, URINE: 0.2 MG/DL (ref 0.2–1)
WBC UA: <1 /HPF (ref 0–5)

## 2023-11-03 LAB
CULTURE: ABNORMAL
CULTURE: ABNORMAL
Lab: ABNORMAL
SPECIMEN: ABNORMAL

## 2023-11-15 ENCOUNTER — HOSPITAL ENCOUNTER (OUTPATIENT)
Age: 63
Setting detail: SPECIMEN
Discharge: HOME | End: 2023-11-15
Payer: MEDICARE

## 2023-11-15 LAB
ALBUMIN SERPL-MCNC: 4.3 GM/DL (ref 3.4–5)
ALP BLD-CCNC: 102 IU/L (ref 40–129)
ALT SERPL-CCNC: 15 U/L (ref 10–40)
ANION GAP SERPL CALCULATED.3IONS-SCNC: 12 MMOL/L (ref 4–16)
AST SERPL-CCNC: 17 IU/L (ref 15–37)
B-OH-BUTYR SERPL-MCNC: 2.1 MG/DL (ref 0–3)
BILIRUB SERPL-MCNC: 0.2 MG/DL (ref 0–1)
BUN SERPL-MCNC: 35 MG/DL (ref 6–23)
CALCIUM SERPL-MCNC: 9.4 MG/DL (ref 8.3–10.6)
CHLORIDE BLD-SCNC: 102 MMOL/L (ref 99–110)
CO2: 28 MMOL/L (ref 21–32)
CREAT SERPL-MCNC: 1.7 MG/DL (ref 0.6–1.1)
GFR SERPL CREATININE-BSD FRML MDRD: 33 ML/MIN/1.73M2
GLUCOSE SERPL-MCNC: 154 MG/DL (ref 70–99)
POTASSIUM SERPL-SCNC: 5 MMOL/L (ref 3.5–5.1)
SODIUM BLD-SCNC: 142 MMOL/L (ref 135–145)
TOTAL PROTEIN: 6.7 GM/DL (ref 6.4–8.2)

## 2023-11-15 PROCEDURE — 82010 KETONE BODYS QUAN: CPT

## 2023-11-15 PROCEDURE — 80053 COMPREHEN METABOLIC PANEL: CPT

## 2023-11-15 PROCEDURE — 36415 COLL VENOUS BLD VENIPUNCTURE: CPT

## 2024-01-31 ENCOUNTER — HOSPITAL ENCOUNTER (OUTPATIENT)
Age: 64
Setting detail: SPECIMEN
Discharge: HOME | End: 2024-01-31
Payer: MEDICARE

## 2024-01-31 LAB
ALBUMIN SERPL-MCNC: 3.7 GM/DL (ref 3.4–5)
ALP BLD-CCNC: 97 IU/L (ref 40–128)
ALT SERPL-CCNC: 14 U/L (ref 10–40)
ANION GAP SERPL CALCULATED.3IONS-SCNC: 9 MMOL/L (ref 7–16)
AST SERPL-CCNC: 13 IU/L (ref 15–37)
BILIRUB SERPL-MCNC: 0.2 MG/DL (ref 0–1)
BUN SERPL-MCNC: 33 MG/DL (ref 6–23)
CALCIUM SERPL-MCNC: 8.5 MG/DL (ref 8.3–10.6)
CHLORIDE BLD-SCNC: 106 MMOL/L (ref 99–110)
CO2: 24 MMOL/L (ref 21–32)
CREAT SERPL-MCNC: 1.6 MG/DL (ref 0.6–1.1)
ESTIMATED AVERAGE GLUCOSE: 194 MG/DL
GFR SERPL CREATININE-BSD FRML MDRD: 36 ML/MIN/1.73M2
GLUCOSE SERPL-MCNC: 100 MG/DL (ref 70–99)
HBA1C MFR BLD: 8.4 % (ref 4.2–6.3)
HCT VFR BLD CALC: 41.9 % (ref 37–47)
HEMOGLOBIN: 12.9 GM/DL (ref 12.5–16)
MCH RBC QN AUTO: 29.3 PG (ref 27–31)
MCHC RBC AUTO-ENTMCNC: 30.8 % (ref 32–36)
MCV RBC AUTO: 95 FL (ref 78–100)
PDW BLD-RTO: 13.1 % (ref 11.7–14.9)
PLATELET # BLD: 257 K/CU MM (ref 140–440)
PMV BLD AUTO: 9.7 FL (ref 7.5–11.1)
POTASSIUM SERPL-SCNC: 4.8 MMOL/L (ref 3.5–5.1)
RBC # BLD: 4.41 M/CU MM (ref 4.2–5.4)
SODIUM BLD-SCNC: 139 MMOL/L (ref 135–145)
TOTAL PROTEIN: 5.4 GM/DL (ref 6.4–8.2)
TSH SERPL DL<=0.005 MIU/L-ACNC: 2.12 UIU/ML (ref 0.27–4.2)
WBC # BLD: 5.3 K/CU MM (ref 4–10.5)

## 2024-01-31 PROCEDURE — 85027 COMPLETE CBC AUTOMATED: CPT

## 2024-01-31 PROCEDURE — 80053 COMPREHEN METABOLIC PANEL: CPT

## 2024-01-31 PROCEDURE — 36415 COLL VENOUS BLD VENIPUNCTURE: CPT

## 2024-01-31 PROCEDURE — 83036 HEMOGLOBIN GLYCOSYLATED A1C: CPT

## 2024-01-31 PROCEDURE — 84443 ASSAY THYROID STIM HORMONE: CPT

## 2024-02-21 ENCOUNTER — HOSPITAL ENCOUNTER (OUTPATIENT)
Age: 64
Setting detail: SPECIMEN
Discharge: HOME | End: 2024-02-21
Payer: MEDICARE

## 2024-02-21 LAB
ALBUMIN SERPL-MCNC: 4.2 GM/DL (ref 3.4–5)
ALP BLD-CCNC: 123 IU/L (ref 40–129)
ALT SERPL-CCNC: 21 U/L (ref 10–40)
ANION GAP SERPL CALCULATED.3IONS-SCNC: 14 MMOL/L (ref 7–16)
AST SERPL-CCNC: 15 IU/L (ref 15–37)
BILIRUB SERPL-MCNC: 0.4 MG/DL (ref 0–1)
BUN SERPL-MCNC: 38 MG/DL (ref 6–23)
CALCIUM SERPL-MCNC: 9.1 MG/DL (ref 8.3–10.6)
CHLORIDE BLD-SCNC: 100 MMOL/L (ref 99–110)
CO2: 23 MMOL/L (ref 21–32)
CREAT SERPL-MCNC: 1.9 MG/DL (ref 0.6–1.1)
ESTIMATED AVERAGE GLUCOSE: 200 MG/DL
GFR SERPL CREATININE-BSD FRML MDRD: 29 ML/MIN/1.73M2
GLUCOSE SERPL-MCNC: 357 MG/DL (ref 70–99)
HBA1C MFR BLD: 8.6 % (ref 4.2–6.3)
HCT VFR BLD CALC: 47.6 % (ref 37–47)
HEMOGLOBIN: 14.2 GM/DL (ref 12.5–16)
MCH RBC QN AUTO: 28.2 PG (ref 27–31)
MCHC RBC AUTO-ENTMCNC: 29.8 % (ref 32–36)
MCV RBC AUTO: 94.6 FL (ref 78–100)
PDW BLD-RTO: 13.1 % (ref 11.7–14.9)
PLATELET # BLD: 285 K/CU MM (ref 140–440)
PMV BLD AUTO: 10.3 FL (ref 7.5–11.1)
POTASSIUM SERPL-SCNC: 5.8 MMOL/L (ref 3.5–5.1)
RBC # BLD: 5.03 M/CU MM (ref 4.2–5.4)
SODIUM BLD-SCNC: 137 MMOL/L (ref 135–145)
TOTAL PROTEIN: 6.4 GM/DL (ref 6.4–8.2)
WBC # BLD: 6.3 K/CU MM (ref 4–10.5)

## 2024-02-21 PROCEDURE — 80053 COMPREHEN METABOLIC PANEL: CPT

## 2024-02-21 PROCEDURE — 83036 HEMOGLOBIN GLYCOSYLATED A1C: CPT

## 2024-02-21 PROCEDURE — 85027 COMPLETE CBC AUTOMATED: CPT

## 2024-02-21 PROCEDURE — 36415 COLL VENOUS BLD VENIPUNCTURE: CPT

## 2024-03-06 ENCOUNTER — HOSPITAL ENCOUNTER (OUTPATIENT)
Age: 64
Setting detail: SPECIMEN
Discharge: HOME | End: 2024-03-06
Payer: MEDICARE

## 2024-03-06 LAB
ALBUMIN SERPL-MCNC: 3.7 GM/DL (ref 3.4–5)
ALP BLD-CCNC: 128 IU/L (ref 40–128)
ALT SERPL-CCNC: 21 U/L (ref 10–40)
ANION GAP SERPL CALCULATED.3IONS-SCNC: 13 MMOL/L (ref 7–16)
APTT: 27.3 SECONDS (ref 25.1–37.1)
AST SERPL-CCNC: 22 IU/L (ref 15–37)
BASOPHILS ABSOLUTE: 0 K/CU MM
BASOPHILS RELATIVE PERCENT: 0.5 % (ref 0–1)
BILIRUB SERPL-MCNC: 0.4 MG/DL (ref 0–1)
BUN SERPL-MCNC: 41 MG/DL (ref 6–23)
CALCIUM SERPL-MCNC: 8.5 MG/DL (ref 8.3–10.6)
CHLORIDE BLD-SCNC: 101 MMOL/L (ref 99–110)
CO2: 22 MMOL/L (ref 21–32)
CREAT SERPL-MCNC: 1.7 MG/DL (ref 0.6–1.1)
DIFFERENTIAL TYPE: ABNORMAL
EOSINOPHILS ABSOLUTE: 0.2 K/CU MM
EOSINOPHILS RELATIVE PERCENT: 3.4 % (ref 0–3)
GFR SERPL CREATININE-BSD FRML MDRD: 33 ML/MIN/1.73M2
GLUCOSE SERPL-MCNC: 432 MG/DL (ref 70–99)
HCT VFR BLD CALC: 44.5 % (ref 37–47)
HCV AB SERPL QL IA: ABNORMAL
HEMOGLOBIN: 13.1 GM/DL (ref 12.5–16)
IMMATURE NEUTROPHIL %: 0.3 % (ref 0–0.43)
INR BLD: 0.9 INDEX
LYMPHOCYTES ABSOLUTE: 1.6 K/CU MM
LYMPHOCYTES RELATIVE PERCENT: 25.6 % (ref 24–44)
MCH RBC QN AUTO: 28.2 PG (ref 27–31)
MCHC RBC AUTO-ENTMCNC: 29.4 % (ref 32–36)
MCV RBC AUTO: 95.7 FL (ref 78–100)
MONOCYTES ABSOLUTE: 0.5 K/CU MM
MONOCYTES RELATIVE PERCENT: 8.5 % (ref 0–4)
NUCLEATED RBC %: 0 %
PDW BLD-RTO: 13.2 % (ref 11.7–14.9)
PLATELET # BLD: 243 K/CU MM (ref 140–440)
PMV BLD AUTO: 10.3 FL (ref 7.5–11.1)
POTASSIUM SERPL-SCNC: 5.4 MMOL/L (ref 3.5–5.1)
PROTHROMBIN TIME: 12.3 SECONDS (ref 11.7–14.5)
RBC # BLD: 4.65 M/CU MM (ref 4.2–5.4)
SEGMENTED NEUTROPHILS ABSOLUTE COUNT: 3.8 K/CU MM
SEGMENTED NEUTROPHILS RELATIVE PERCENT: 61.7 % (ref 36–66)
SODIUM BLD-SCNC: 136 MMOL/L (ref 135–145)
TOTAL IMMATURE NEUTOROPHIL: 0.02 K/CU MM
TOTAL NUCLEATED RBC: 0 K/CU MM
TOTAL PROTEIN: 5.8 GM/DL (ref 6.4–8.2)
WBC # BLD: 6.1 K/CU MM (ref 4–10.5)

## 2024-03-06 PROCEDURE — 85730 THROMBOPLASTIN TIME PARTIAL: CPT

## 2024-03-06 PROCEDURE — 85610 PROTHROMBIN TIME: CPT

## 2024-03-06 PROCEDURE — 87522 HEPATITIS C REVRS TRNSCRPJ: CPT

## 2024-03-06 PROCEDURE — 36415 COLL VENOUS BLD VENIPUNCTURE: CPT

## 2024-03-06 PROCEDURE — 86803 HEPATITIS C AB TEST: CPT

## 2024-03-06 PROCEDURE — 85025 COMPLETE CBC W/AUTO DIFF WBC: CPT

## 2024-03-06 PROCEDURE — 80053 COMPREHEN METABOLIC PANEL: CPT

## 2024-03-13 ENCOUNTER — HOSPITAL ENCOUNTER (OUTPATIENT)
Age: 64
Setting detail: SPECIMEN
Discharge: HOME | End: 2024-03-13
Payer: MEDICARE

## 2024-03-13 LAB
ALBUMIN SERPL-MCNC: 3.8 GM/DL (ref 3.4–5)
ALP BLD-CCNC: 116 IU/L (ref 40–128)
ALT SERPL-CCNC: 31 U/L (ref 10–40)
ANION GAP SERPL CALCULATED.3IONS-SCNC: 16 MMOL/L (ref 7–16)
AST SERPL-CCNC: 28 IU/L (ref 15–37)
BILIRUB SERPL-MCNC: 0.3 MG/DL (ref 0–1)
BUN SERPL-MCNC: 48 MG/DL (ref 6–23)
CALCIUM SERPL-MCNC: 8.9 MG/DL (ref 8.3–10.6)
CHLORIDE BLD-SCNC: 106 MMOL/L (ref 99–110)
CO2: 20 MMOL/L (ref 21–32)
CREAT SERPL-MCNC: 1.9 MG/DL (ref 0.6–1.1)
GFR SERPL CREATININE-BSD FRML MDRD: 29 ML/MIN/1.73M2
GLUCOSE SERPL-MCNC: 173 MG/DL (ref 70–99)
POTASSIUM SERPL-SCNC: 4.5 MMOL/L (ref 3.5–5.1)
SODIUM BLD-SCNC: 142 MMOL/L (ref 135–145)
TOTAL PROTEIN: 5.8 GM/DL (ref 6.4–8.2)

## 2024-03-13 PROCEDURE — 36415 COLL VENOUS BLD VENIPUNCTURE: CPT

## 2024-03-13 PROCEDURE — 80053 COMPREHEN METABOLIC PANEL: CPT

## 2024-03-19 ENCOUNTER — OFFICE VISIT (OUTPATIENT)
Dept: CARDIOLOGY CLINIC | Age: 64
End: 2024-03-19
Payer: MEDICARE

## 2024-03-19 VITALS
WEIGHT: 226 LBS | HEART RATE: 71 BPM | BODY MASS INDEX: 38.58 KG/M2 | DIASTOLIC BLOOD PRESSURE: 62 MMHG | SYSTOLIC BLOOD PRESSURE: 118 MMHG | HEIGHT: 64 IN

## 2024-03-19 DIAGNOSIS — I10 ESSENTIAL HYPERTENSION: Primary | ICD-10-CM

## 2024-03-19 DIAGNOSIS — R55 VASOVAGAL SYNCOPE: ICD-10-CM

## 2024-03-19 DIAGNOSIS — E78.5 DYSLIPIDEMIA: ICD-10-CM

## 2024-03-19 DIAGNOSIS — I25.10 CAD IN NATIVE ARTERY: ICD-10-CM

## 2024-03-19 PROCEDURE — 93000 ELECTROCARDIOGRAM COMPLETE: CPT | Performed by: INTERNAL MEDICINE

## 2024-03-19 PROCEDURE — G8427 DOCREV CUR MEDS BY ELIG CLIN: HCPCS | Performed by: INTERNAL MEDICINE

## 2024-03-19 PROCEDURE — 3074F SYST BP LT 130 MM HG: CPT | Performed by: INTERNAL MEDICINE

## 2024-03-19 PROCEDURE — 99214 OFFICE O/P EST MOD 30 MIN: CPT | Performed by: INTERNAL MEDICINE

## 2024-03-19 PROCEDURE — 1123F ACP DISCUSS/DSCN MKR DOCD: CPT | Performed by: INTERNAL MEDICINE

## 2024-03-19 PROCEDURE — G8400 PT W/DXA NO RESULTS DOC: HCPCS | Performed by: INTERNAL MEDICINE

## 2024-03-19 PROCEDURE — 3078F DIAST BP <80 MM HG: CPT | Performed by: INTERNAL MEDICINE

## 2024-03-19 PROCEDURE — 1036F TOBACCO NON-USER: CPT | Performed by: INTERNAL MEDICINE

## 2024-03-19 PROCEDURE — 1090F PRES/ABSN URINE INCON ASSESS: CPT | Performed by: INTERNAL MEDICINE

## 2024-03-19 PROCEDURE — 3017F COLORECTAL CA SCREEN DOC REV: CPT | Performed by: INTERNAL MEDICINE

## 2024-03-19 PROCEDURE — G8417 CALC BMI ABV UP PARAM F/U: HCPCS | Performed by: INTERNAL MEDICINE

## 2024-03-19 PROCEDURE — G8484 FLU IMMUNIZE NO ADMIN: HCPCS | Performed by: INTERNAL MEDICINE

## 2024-03-19 RX ORDER — ATORVASTATIN CALCIUM 80 MG/1
80 TABLET, FILM COATED ORAL DAILY
COMMUNITY

## 2024-03-19 RX ORDER — VENLAFAXINE 75 MG/1
75 TABLET ORAL 3 TIMES DAILY
COMMUNITY

## 2024-03-19 RX ORDER — PRAZOSIN HYDROCHLORIDE 1 MG/1
1 CAPSULE ORAL NIGHTLY
COMMUNITY

## 2024-03-19 RX ORDER — FINERENONE 10 MG/1
TABLET, FILM COATED ORAL
COMMUNITY

## 2024-03-19 NOTE — PROGRESS NOTES
occlusion with cerebral infarction (HCC)     per old chart 8/2015- TIA    CKD stage G1/A2, GFR > 90 and albumin creatinine ratio  mg/g 09/01/2022    Sees Dr. Schulte    COPD (chronic obstructive pulmonary disease) (HCC)     Diabetes mellitus (HCC)     Diabetic neuropathy (HCC)     per old chart    Fracture     per old chart pt in ER 12/9/2018- after 2 days of arm pain after fall- dx with left humerus fx- for surg 12/31/2018    History of blood transfusion     Hyperlipidemia     Hypertension     Muscle weakness (generalized)     Osteoarthritis     per old chart     Past Surgical History:   Procedure Laterality Date    BREAST BIOPSY Right     CHOLECYSTECTOMY  2002?    EYE SURGERY      per old chart had right eye cataract ext done 2/2018 and left eye 4/2018    FOOT SURGERY Left 2004?    HUMERUS FRACTURE SURGERY Left 12/31/2018    HUMERUS OPEN REDUCTION INTERNAL FIXATION LEFT PROXIMAL performed by Valentín Hanson DO at [facility]    HUMERUS FRACTURE SURGERY Left 9/30/2022    HUMERUS OPEN REDUCTION INTERNAL FIXATION performed by Valentín Hanson DO at [facility] OR    KNEE SURGERY Right 2009?    LUNG SURGERY  2009?    simone lung lobectomy      Family History   Problem Relation Age of Onset    Breast Cancer Maternal Aunt 50    Breast Cancer Maternal Aunt 50     Social History     Tobacco Use    Smoking status: Never    Smokeless tobacco: Never    Tobacco comments:     12/27/*2018- with phone assessment with caregiver- unsure of social, surgical or family hx for phone assessment   Substance Use Topics    Alcohol use: Not Currently                 Objective:      Physical Exam:    /62 (Site: Left Upper Arm, Position: Sitting, Cuff Size: Large Adult)   Pulse 71   Ht 1.626 m (5' 4\")   Wt 102.5 kg (226 lb)   BMI 38.79 kg/m²   Wt Readings from Last 3 Encounters:   03/19/24 102.5 kg (226 lb)   11/28/23 104.3 kg (230 lb)   10/10/23 106.3 kg (234 lb 6.4 oz)     Body mass index is 38.79 kg/m².  Vitals:    03/19/24 1041   BP:

## 2024-03-31 NOTE — PLAN OF CARE
Admitted/transferred from: ED  Time of arrival on unit 5am  2 RN full  skin assessment completed by  Mitali MILLS & Margoth AMADOR  Skin assessment finding: issues found abdominal incision with steri-strips, scabbed old TIMOTHY site  Interventions/actions: none     Will continue to monitor.     Problem: Safety - Adult  Goal: Free from fall injury  Outcome: Progressing

## 2024-05-01 ENCOUNTER — HOSPITAL ENCOUNTER (OUTPATIENT)
Age: 64
Setting detail: SPECIMEN
Discharge: HOME | End: 2024-05-01
Payer: MEDICARE

## 2024-05-01 LAB
ESTIMATED AVERAGE GLUCOSE: 206 MG/DL
HBA1C MFR BLD: 8.8 % (ref 4.2–6.3)
TSH SERPL DL<=0.005 MIU/L-ACNC: 2.09 UIU/ML (ref 0.27–4.2)

## 2024-05-01 PROCEDURE — 36415 COLL VENOUS BLD VENIPUNCTURE: CPT

## 2024-05-01 PROCEDURE — 83036 HEMOGLOBIN GLYCOSYLATED A1C: CPT

## 2024-05-01 PROCEDURE — 84443 ASSAY THYROID STIM HORMONE: CPT

## 2024-08-21 ENCOUNTER — HOSPITAL ENCOUNTER (OUTPATIENT)
Age: 64
Setting detail: SPECIMEN
Discharge: HOME | End: 2024-08-21
Payer: MEDICARE

## 2024-08-21 LAB
ALBUMIN SERPL-MCNC: 4.3 GM/DL (ref 3.4–5)
ALP BLD-CCNC: 144 IU/L (ref 40–129)
ALT SERPL-CCNC: 40 U/L (ref 10–40)
ANION GAP SERPL CALCULATED.3IONS-SCNC: 13 MMOL/L (ref 7–16)
AST SERPL-CCNC: 24 IU/L (ref 15–37)
BASOPHILS ABSOLUTE: 0 K/CU MM
BASOPHILS RELATIVE PERCENT: 0.1 % (ref 0–1)
BILIRUB SERPL-MCNC: 0.4 MG/DL (ref 0–1)
BUN SERPL-MCNC: 39 MG/DL (ref 6–23)
CALCIUM SERPL-MCNC: 9.5 MG/DL (ref 8.3–10.6)
CHLORIDE BLD-SCNC: 107 MMOL/L (ref 99–110)
CO2: 21 MMOL/L (ref 21–32)
CREAT SERPL-MCNC: 1.8 MG/DL (ref 0.6–1.1)
DIFFERENTIAL TYPE: ABNORMAL
EOSINOPHILS ABSOLUTE: 0 K/CU MM
EOSINOPHILS RELATIVE PERCENT: 0.1 % (ref 0–3)
GFR, ESTIMATED: 31 ML/MIN/1.73M2
GLUCOSE SERPL-MCNC: 116 MG/DL (ref 70–99)
HCT VFR BLD CALC: 47.7 % (ref 37–47)
HEMOGLOBIN: 14.4 GM/DL (ref 12.5–16)
IMMATURE NEUTROPHIL %: 0.3 % (ref 0–0.43)
LYMPHOCYTES ABSOLUTE: 2.2 K/CU MM
LYMPHOCYTES RELATIVE PERCENT: 31.3 % (ref 24–44)
MAGNESIUM: 2.1 MG/DL (ref 1.8–2.4)
MCH RBC QN AUTO: 28.5 PG (ref 27–31)
MCHC RBC AUTO-ENTMCNC: 30.2 % (ref 32–36)
MCV RBC AUTO: 94.3 FL (ref 78–100)
MONOCYTES ABSOLUTE: 0.7 K/CU MM
MONOCYTES RELATIVE PERCENT: 9.4 % (ref 0–4)
NEUTROPHILS ABSOLUTE: 4.1 K/CU MM
NEUTROPHILS RELATIVE PERCENT: 58.8 % (ref 36–66)
NUCLEATED RBC %: 0 %
PDW BLD-RTO: 13.2 % (ref 11.7–14.9)
PHOSPHORUS: 4.4 MG/DL (ref 2.5–4.9)
PLATELET # BLD: 279 K/CU MM (ref 140–440)
PMV BLD AUTO: 10.4 FL (ref 7.5–11.1)
POTASSIUM SERPL-SCNC: 4.8 MMOL/L (ref 3.5–5.1)
RBC # BLD: 5.06 M/CU MM (ref 4.2–5.4)
SODIUM BLD-SCNC: 141 MMOL/L (ref 135–145)
TOTAL IMMATURE NEUTOROPHIL: 0.02 K/CU MM
TOTAL NUCLEATED RBC: 0 K/CU MM
TOTAL PROTEIN: 6.7 GM/DL (ref 6.4–8.2)
WBC # BLD: 7 K/CU MM (ref 4–10.5)

## 2024-08-21 PROCEDURE — 85025 COMPLETE CBC W/AUTO DIFF WBC: CPT

## 2024-08-21 PROCEDURE — 36415 COLL VENOUS BLD VENIPUNCTURE: CPT

## 2024-08-21 PROCEDURE — 83735 ASSAY OF MAGNESIUM: CPT

## 2024-08-21 PROCEDURE — 80053 COMPREHEN METABOLIC PANEL: CPT

## 2024-08-21 PROCEDURE — 84100 ASSAY OF PHOSPHORUS: CPT

## 2024-09-11 ENCOUNTER — HOSPITAL ENCOUNTER (OUTPATIENT)
Age: 64
Setting detail: SPECIMEN
Discharge: HOME | End: 2024-09-11
Payer: MEDICARE

## 2024-09-11 LAB
EST. AVERAGE GLUCOSE BLD GHB EST-MCNC: 169 MG/DL
HBA1C MFR BLD: 7.5 % (ref 4.2–6.3)

## 2024-09-11 PROCEDURE — 84443 ASSAY THYROID STIM HORMONE: CPT

## 2024-09-11 PROCEDURE — 84439 ASSAY OF FREE THYROXINE: CPT

## 2024-09-11 PROCEDURE — 36415 COLL VENOUS BLD VENIPUNCTURE: CPT

## 2024-09-11 PROCEDURE — 83036 HEMOGLOBIN GLYCOSYLATED A1C: CPT

## 2024-09-11 PROCEDURE — 82306 VITAMIN D 25 HYDROXY: CPT

## 2024-09-12 LAB
25(OH)D3 SERPL-MCNC: 37.7 NG/ML (ref 30–150)
T4 FREE SERPL-MCNC: 1.2 NG/DL (ref 0.9–1.8)
TSH SERPL DL<=0.05 MIU/L-ACNC: 3.17 UIU/ML (ref 0.27–4.2)

## 2024-09-18 ENCOUNTER — HOSPITAL ENCOUNTER (OUTPATIENT)
Age: 64
Setting detail: SPECIMEN
Discharge: HOME | End: 2024-09-18
Payer: MEDICARE

## 2024-09-18 LAB
ALBUMIN SERPL-MCNC: 3.9 G/DL (ref 3.4–5)
ALBUMIN/GLOB SERPL: 2 {RATIO} (ref 1.1–2.2)
ALP SERPL-CCNC: 121 U/L (ref 40–129)
ALT SERPL-CCNC: 33 U/L (ref 10–40)
ANION GAP SERPL CALCULATED.3IONS-SCNC: 14 MMOL/L (ref 9–17)
AST SERPL-CCNC: 25 U/L (ref 15–37)
BASOPHILS # BLD: 0.01 K/UL
BASOPHILS NFR BLD: 0 % (ref 0–1)
BILIRUB SERPL-MCNC: 0.4 MG/DL (ref 0–1)
BUN SERPL-MCNC: 31 MG/DL (ref 7–20)
CALCIUM SERPL-MCNC: 8.6 MG/DL (ref 8.3–10.6)
CHLORIDE SERPL-SCNC: 104 MMOL/L (ref 99–110)
CO2 SERPL-SCNC: 20 MMOL/L (ref 21–32)
CREAT SERPL-MCNC: 1.4 MG/DL (ref 0.6–1.2)
EOSINOPHIL # BLD: 0 K/UL
EOSINOPHILS RELATIVE PERCENT: 0 % (ref 0–3)
ERYTHROCYTE [DISTWIDTH] IN BLOOD BY AUTOMATED COUNT: 13.9 % (ref 11.7–14.9)
GFR, ESTIMATED: 38 ML/MIN/1.73M2
GLUCOSE SERPL-MCNC: 204 MG/DL (ref 74–99)
HCT VFR BLD AUTO: 41.1 % (ref 37–47)
HGB BLD-MCNC: 12.7 G/DL (ref 12.5–16)
IMM GRANULOCYTES # BLD AUTO: 0.05 K/UL
IMM GRANULOCYTES NFR BLD: 1 %
LYMPHOCYTES NFR BLD: 2.03 K/UL
LYMPHOCYTES RELATIVE PERCENT: 37 % (ref 24–44)
MAGNESIUM SERPL-MCNC: 2.1 MG/DL (ref 1.8–2.4)
MCH RBC QN AUTO: 29.3 PG (ref 27–31)
MCHC RBC AUTO-ENTMCNC: 30.9 G/DL (ref 32–36)
MCV RBC AUTO: 94.9 FL (ref 78–100)
MONOCYTES NFR BLD: 0.43 K/UL
MONOCYTES NFR BLD: 8 % (ref 0–4)
NEUTROPHILS NFR BLD: 54 % (ref 36–66)
NEUTS SEG NFR BLD: 3 K/UL
PHOSPHATE SERPL-MCNC: 3.9 MG/DL (ref 2.5–4.9)
PLATELET # BLD AUTO: 223 K/UL (ref 140–440)
PMV BLD AUTO: 10.3 FL (ref 7.5–11.1)
POTASSIUM SERPL-SCNC: 4.8 MMOL/L (ref 3.5–5.1)
PROT SERPL-MCNC: 5.8 G/DL (ref 6.4–8.2)
RBC # BLD AUTO: 4.33 M/UL (ref 4.2–5.4)
SODIUM SERPL-SCNC: 139 MMOL/L (ref 136–145)
WBC OTHER # BLD: 5.5 K/UL (ref 4–10.5)

## 2024-09-18 PROCEDURE — 85025 COMPLETE CBC W/AUTO DIFF WBC: CPT

## 2024-09-18 PROCEDURE — 80053 COMPREHEN METABOLIC PANEL: CPT

## 2024-09-18 PROCEDURE — 84100 ASSAY OF PHOSPHORUS: CPT

## 2024-09-18 PROCEDURE — 83735 ASSAY OF MAGNESIUM: CPT

## 2024-09-18 PROCEDURE — 36415 COLL VENOUS BLD VENIPUNCTURE: CPT

## 2024-09-25 ENCOUNTER — HOSPITAL ENCOUNTER (OUTPATIENT)
Dept: INFUSION THERAPY | Age: 64
Discharge: HOME | End: 2024-09-25
Payer: MEDICARE

## 2024-09-25 ENCOUNTER — INITIAL CONSULT (OUTPATIENT)
Dept: ONCOLOGY | Age: 64
End: 2024-09-25

## 2024-09-25 VITALS
BODY MASS INDEX: 36.02 KG/M2 | DIASTOLIC BLOOD PRESSURE: 65 MMHG | OXYGEN SATURATION: 97 % | TEMPERATURE: 97.1 F | HEIGHT: 65 IN | WEIGHT: 216.2 LBS | HEART RATE: 85 BPM | SYSTOLIC BLOOD PRESSURE: 137 MMHG

## 2024-09-25 DIAGNOSIS — M81.0 OSTEOPOROSIS WITHOUT CURRENT PATHOLOGICAL FRACTURE, UNSPECIFIED OSTEOPOROSIS TYPE: Primary | ICD-10-CM

## 2024-09-25 DIAGNOSIS — M81.0 OSTEOPOROSIS, UNSPECIFIED OSTEOPOROSIS TYPE, UNSPECIFIED PATHOLOGICAL FRACTURE PRESENCE: Primary | ICD-10-CM

## 2024-09-25 DIAGNOSIS — D68.9 COAGULOPATHY (HCC): Primary | ICD-10-CM

## 2024-09-25 DIAGNOSIS — D68.9 COAGULOPATHY (HCC): ICD-10-CM

## 2024-09-25 LAB
ALBUMIN SERPL-MCNC: 4.2 G/DL (ref 3.4–5)
ALBUMIN/GLOB SERPL: 2.3 {RATIO} (ref 1.1–2.2)
ALP SERPL-CCNC: 120 U/L (ref 40–129)
ALT SERPL-CCNC: 40 U/L (ref 10–40)
ANION GAP SERPL CALCULATED.3IONS-SCNC: 13 MMOL/L (ref 9–17)
AST SERPL-CCNC: 27 U/L (ref 15–37)
BASOPHILS # BLD: 0.01 K/UL
BASOPHILS NFR BLD: 0 % (ref 0–1)
BILIRUB SERPL-MCNC: 0.5 MG/DL (ref 0–1)
BUN SERPL-MCNC: 34 MG/DL (ref 7–20)
CALCIUM SERPL-MCNC: 9.1 MG/DL (ref 8.3–10.6)
CHLORIDE SERPL-SCNC: 104 MMOL/L (ref 99–110)
CO2 SERPL-SCNC: 21 MMOL/L (ref 21–32)
CREAT SERPL-MCNC: 1.6 MG/DL (ref 0.6–1.2)
EOSINOPHIL # BLD: 0.01 K/UL
EOSINOPHILS RELATIVE PERCENT: 0 % (ref 0–3)
ERYTHROCYTE [DISTWIDTH] IN BLOOD BY AUTOMATED COUNT: 14.5 % (ref 11.7–14.9)
GFR, ESTIMATED: 32 ML/MIN/1.73M2
GLUCOSE SERPL-MCNC: 200 MG/DL (ref 74–99)
HCT VFR BLD AUTO: 42.8 % (ref 37–47)
HGB BLD-MCNC: 13.2 G/DL (ref 12.5–16)
INR PPP: 0.9
LDH SERPL-CCNC: 215 U/L (ref 100–190)
LYMPHOCYTES NFR BLD: 1.56 K/UL
LYMPHOCYTES RELATIVE PERCENT: 23 % (ref 24–44)
MCH RBC QN AUTO: 29.3 PG (ref 27–31)
MCHC RBC AUTO-ENTMCNC: 30.8 G/DL (ref 32–36)
MCV RBC AUTO: 94.9 FL (ref 78–100)
MONOCYTES NFR BLD: 0.41 K/UL
MONOCYTES NFR BLD: 6 % (ref 0–4)
NEUTROPHILS NFR BLD: 71 % (ref 36–66)
NEUTS SEG NFR BLD: 4.81 K/UL
PARTIAL THROMBOPLASTIN TIME: 28.4 SEC (ref 25.1–37.1)
PLATELET # BLD AUTO: 217 K/UL (ref 140–440)
PMV BLD AUTO: 10.3 FL (ref 7.5–11.1)
POTASSIUM SERPL-SCNC: 4.5 MMOL/L (ref 3.5–5.1)
PROT SERPL-MCNC: 6 G/DL (ref 6.4–8.2)
PROTHROMBIN TIME: 12.7 SEC (ref 11.7–14.5)
RBC # BLD AUTO: 4.51 M/UL (ref 4.2–5.4)
SODIUM SERPL-SCNC: 138 MMOL/L (ref 136–145)
WBC OTHER # BLD: 6.8 K/UL (ref 4–10.5)

## 2024-09-25 PROCEDURE — 85610 PROTHROMBIN TIME: CPT

## 2024-09-25 PROCEDURE — 85240 CLOT FACTOR VIII AHG 1 STAGE: CPT

## 2024-09-25 PROCEDURE — 86039 ANTINUCLEAR ANTIBODIES (ANA): CPT

## 2024-09-25 PROCEDURE — 85730 THROMBOPLASTIN TIME PARTIAL: CPT

## 2024-09-25 PROCEDURE — 84155 ASSAY OF PROTEIN SERUM: CPT

## 2024-09-25 PROCEDURE — 85245 CLOT FACTOR VIII VW RISTOCTN: CPT

## 2024-09-25 PROCEDURE — 36415 COLL VENOUS BLD VENIPUNCTURE: CPT

## 2024-09-25 PROCEDURE — 84155 ASSAY OF PROTEIN SERUM: CPT | Performed by: INTERNAL MEDICINE

## 2024-09-25 PROCEDURE — 85246 CLOT FACTOR VIII VW ANTIGEN: CPT

## 2024-09-25 PROCEDURE — 99211 OFF/OP EST MAY X REQ PHY/QHP: CPT

## 2024-09-25 PROCEDURE — 86038 ANTINUCLEAR ANTIBODIES: CPT

## 2024-09-25 PROCEDURE — 86431 RHEUMATOID FACTOR QUANT: CPT

## 2024-09-25 PROCEDURE — 83615 LACTATE (LD) (LDH) ENZYME: CPT

## 2024-09-25 PROCEDURE — 84165 PROTEIN E-PHORESIS SERUM: CPT

## 2024-09-25 PROCEDURE — 80053 COMPREHEN METABOLIC PANEL: CPT

## 2024-09-25 PROCEDURE — 85025 COMPLETE CBC W/AUTO DIFF WBC: CPT

## 2024-09-25 RX ORDER — EPINEPHRINE 1 MG/ML
0.3 INJECTION, SOLUTION, CONCENTRATE INTRAVENOUS PRN
OUTPATIENT
Start: 2024-10-02

## 2024-09-25 RX ORDER — ONDANSETRON 2 MG/ML
8 INJECTION INTRAMUSCULAR; INTRAVENOUS
OUTPATIENT
Start: 2024-10-02

## 2024-09-25 RX ORDER — ACETAMINOPHEN 325 MG/1
650 TABLET ORAL
OUTPATIENT
Start: 2024-10-02

## 2024-09-25 RX ORDER — DIPHENHYDRAMINE HYDROCHLORIDE 50 MG/ML
50 INJECTION INTRAMUSCULAR; INTRAVENOUS
OUTPATIENT
Start: 2024-10-02

## 2024-09-25 RX ORDER — ALBUTEROL SULFATE 90 UG/1
4 INHALANT RESPIRATORY (INHALATION) PRN
OUTPATIENT
Start: 2024-10-02

## 2024-09-25 RX ORDER — PEN NEEDLE, DIABETIC, SAFETY 30 GX3/16"
NEEDLE, DISPOSABLE MISCELLANEOUS
COMMUNITY

## 2024-09-25 RX ORDER — SODIUM CHLORIDE 9 MG/ML
INJECTION, SOLUTION INTRAVENOUS CONTINUOUS
OUTPATIENT
Start: 2024-10-02

## 2024-09-25 RX ORDER — FAMOTIDINE 10 MG/ML
20 INJECTION, SOLUTION INTRAVENOUS
OUTPATIENT
Start: 2024-10-02

## 2024-09-25 ASSESSMENT — PATIENT HEALTH QUESTIONNAIRE - PHQ9
SUM OF ALL RESPONSES TO PHQ QUESTIONS 1-9: 0
1. LITTLE INTEREST OR PLEASURE IN DOING THINGS: NOT AT ALL
2. FEELING DOWN, DEPRESSED OR HOPELESS: NOT AT ALL
SUM OF ALL RESPONSES TO PHQ QUESTIONS 1-9: 0
SUM OF ALL RESPONSES TO PHQ9 QUESTIONS 1 & 2: 0
SUM OF ALL RESPONSES TO PHQ QUESTIONS 1-9: 0
SUM OF ALL RESPONSES TO PHQ QUESTIONS 1-9: 0

## 2024-09-26 LAB
ANTI-NUCLEAR ANTIBODY (ANA): NEGATIVE
RHEUMATOID FACTOR: <10 IU/ML

## 2024-09-27 ENCOUNTER — TELEPHONE (OUTPATIENT)
Dept: ONCOLOGY | Age: 64
End: 2024-09-27

## 2024-09-27 LAB
MISCELLANEOUS LAB TEST RESULT: NORMAL
TEST NAME: NORMAL

## 2024-09-28 LAB
FACTOR VIII ACTIVITY: 198 % (ref 56–191)
VWF AG ACT/NOR PPP IA: 197 % (ref 52–214)
VWF:RCO ACT/NOR PPP PL AGG: 184 % (ref 51–215)

## 2024-09-30 ENCOUNTER — CLINICAL DOCUMENTATION (OUTPATIENT)
Dept: ONCOLOGY | Age: 64
End: 2024-09-30

## 2024-09-30 LAB
MISCELLANEOUS LAB TEST RESULT: ABNORMAL
TEST NAME: ABNORMAL

## 2024-09-30 NOTE — PROGRESS NOTES
Physician ordered prolia for osteoporosis and requesting dental clearance prior to tx. Called patent @ [phone] to notify. Patient states she has not seen a dentist in years and has been meaning to make appointment. Patient instructed to call office back once appointment scheduled with DDS so that dental eval from can be faxed. This RN direct number provided.

## 2024-10-02 ENCOUNTER — CLINICAL DOCUMENTATION (OUTPATIENT)
Dept: ONCOLOGY | Age: 64
End: 2024-10-02

## 2024-10-02 NOTE — PROGRESS NOTES
Received VM from patient stating that she has made a dental appointment (please see previous note from this RN on 09/30/2024). Attempted to call patient back @ [phone] to inquire which DDS she is established with so that dental eval form can be faxed for clearance prior to start of prolia; however, no answer. VM left with this RN direct number requesting call back to confirm message received.

## 2024-10-08 ENCOUNTER — CLINICAL DOCUMENTATION (OUTPATIENT)
Dept: ONCOLOGY | Age: 64
End: 2024-10-08

## 2024-10-08 NOTE — PROGRESS NOTES
Called patient @ [phone] to to inquire which DDS patient is established with. Patient states she has an upcoming appointment with Matt Vela at [facility]. Dental clearance needed prior to start of prolia. Dental eval form faxed successfully to [phone] with request to fax back to [facility] with attention to this RN. Awaiting correspondence.

## 2024-10-10 ENCOUNTER — CLINICAL DOCUMENTATION (OUTPATIENT)
Dept: ONCOLOGY | Age: 64
End: 2024-10-10

## 2024-10-10 NOTE — PROGRESS NOTES
Dental eval received and scanned into media. Patient has been cleared by DDS to start tx.  aware to schedule patient for prolia.

## 2024-10-16 ENCOUNTER — HOSPITAL ENCOUNTER (OUTPATIENT)
Dept: WOMENS IMAGING | Age: 64
Discharge: HOME | End: 2024-10-16
Attending: INTERNAL MEDICINE
Payer: MEDICARE

## 2024-10-16 ENCOUNTER — HOSPITAL ENCOUNTER (OUTPATIENT)
Age: 64
Setting detail: SPECIMEN
Discharge: HOME | End: 2024-10-16
Payer: MEDICARE

## 2024-10-16 DIAGNOSIS — M81.0 OSTEOPOROSIS, UNSPECIFIED OSTEOPOROSIS TYPE, UNSPECIFIED PATHOLOGICAL FRACTURE PRESENCE: ICD-10-CM

## 2024-10-16 LAB
EST. AVERAGE GLUCOSE BLD GHB EST-MCNC: 173 MG/DL
HBA1C MFR BLD: 7.7 % (ref 4.2–6.3)
T4 FREE SERPL-MCNC: 1.1 NG/DL (ref 0.9–1.8)
TSH SERPL DL<=0.05 MIU/L-ACNC: 2.33 UIU/ML (ref 0.27–4.2)

## 2024-10-16 PROCEDURE — 77080 DXA BONE DENSITY AXIAL: CPT

## 2024-10-16 PROCEDURE — 36415 COLL VENOUS BLD VENIPUNCTURE: CPT

## 2024-10-16 PROCEDURE — 84439 ASSAY OF FREE THYROXINE: CPT

## 2024-10-16 PROCEDURE — 83036 HEMOGLOBIN GLYCOSYLATED A1C: CPT

## 2024-10-16 PROCEDURE — 84443 ASSAY THYROID STIM HORMONE: CPT

## 2024-10-29 ENCOUNTER — HOSPITAL ENCOUNTER (OUTPATIENT)
Age: 64
Setting detail: SPECIMEN
Discharge: HOME | End: 2024-10-29
Payer: MEDICARE

## 2024-10-29 PROCEDURE — 87077 CULTURE AEROBIC IDENTIFY: CPT

## 2024-10-30 ENCOUNTER — HOSPITAL ENCOUNTER (OUTPATIENT)
Age: 64
Setting detail: SPECIMEN
Discharge: HOME | End: 2024-10-30
Payer: MEDICARE

## 2024-10-30 LAB
ALBUMIN SERPL-MCNC: 3.9 G/DL (ref 3.4–5)
ALBUMIN/GLOB SERPL: 1.9 {RATIO} (ref 1.1–2.2)
ALP SERPL-CCNC: 111 U/L (ref 40–129)
ALT SERPL-CCNC: 31 U/L (ref 10–40)
ANION GAP SERPL CALCULATED.3IONS-SCNC: 12 MMOL/L (ref 9–17)
AST SERPL-CCNC: 26 U/L (ref 15–37)
BACTERIA URNS QL MICRO: ABNORMAL
BASOPHILS # BLD: 0 K/UL
BASOPHILS NFR BLD: 0 % (ref 0–1)
BILIRUB SERPL-MCNC: 0.3 MG/DL (ref 0–1)
BILIRUB UR QL STRIP: NEGATIVE
BUN SERPL-MCNC: 35 MG/DL (ref 7–20)
CALCIUM SERPL-MCNC: 9.1 MG/DL (ref 8.3–10.6)
CASTS #/AREA URNS LPF: ABNORMAL /LPF
CHARACTER UR: ABNORMAL
CHLORIDE SERPL-SCNC: 109 MMOL/L (ref 99–110)
CLARITY UR: CLEAR
CO2 SERPL-SCNC: 24 MMOL/L (ref 21–32)
COLOR UR: YELLOW
CREAT SERPL-MCNC: 1.6 MG/DL (ref 0.6–1.2)
EOSINOPHIL # BLD: 0 K/UL
EOSINOPHILS RELATIVE PERCENT: 0 % (ref 0–3)
EPI CELLS #/AREA URNS HPF: 6 /HPF
ERYTHROCYTE [DISTWIDTH] IN BLOOD BY AUTOMATED COUNT: 13.7 % (ref 11.7–14.9)
GFR, ESTIMATED: 33 ML/MIN/1.73M2
GLUCOSE SERPL-MCNC: 43 MG/DL (ref 74–99)
GLUCOSE UR STRIP-MCNC: >=1000 MG/DL
HCT VFR BLD AUTO: 45 % (ref 37–47)
HGB BLD-MCNC: 13.8 G/DL (ref 12.5–16)
HGB UR QL STRIP.AUTO: NEGATIVE
IMM GRANULOCYTES # BLD AUTO: 0.01 K/UL
IMM GRANULOCYTES NFR BLD: 0 %
KETONES UR STRIP-MCNC: ABNORMAL MG/DL
LEUKOCYTE ESTERASE UR QL STRIP: ABNORMAL
LYMPHOCYTES NFR BLD: 2.18 K/UL
LYMPHOCYTES RELATIVE PERCENT: 42 % (ref 24–44)
MAGNESIUM SERPL-MCNC: 2.4 MG/DL (ref 1.8–2.4)
MCH RBC QN AUTO: 29.7 PG (ref 27–31)
MCHC RBC AUTO-ENTMCNC: 30.7 G/DL (ref 32–36)
MCV RBC AUTO: 96.8 FL (ref 78–100)
MONOCYTES NFR BLD: 0.48 K/UL
MONOCYTES NFR BLD: 9 % (ref 0–4)
MUCOUS THREADS URNS QL MICRO: ABNORMAL
NEUTROPHILS NFR BLD: 49 % (ref 36–66)
NEUTS SEG NFR BLD: 2.55 K/UL
NITRITE UR QL STRIP: NEGATIVE
PH UR STRIP: 5.5 [PH] (ref 5–8)
PHOSPHATE SERPL-MCNC: 4 MG/DL (ref 2.5–4.9)
PLATELET # BLD AUTO: 242 K/UL (ref 140–440)
PMV BLD AUTO: 10.1 FL (ref 7.5–11.1)
POTASSIUM SERPL-SCNC: 4.6 MMOL/L (ref 3.5–5.1)
PROT SERPL-MCNC: 6 G/DL (ref 6.4–8.2)
PROT UR STRIP-MCNC: 30 MG/DL
RBC # BLD AUTO: 4.65 M/UL (ref 4.2–5.4)
RBC #/AREA URNS HPF: 0 /HPF (ref 0–2)
SODIUM SERPL-SCNC: 145 MMOL/L (ref 136–145)
SP GR UR STRIP: 1.02 (ref 1–1.03)
TRICHOMONAS #/AREA URNS HPF: ABNORMAL /[HPF]
UROBILINOGEN UR STRIP-ACNC: 0.2 EU/DL (ref 0–1)
WBC #/AREA URNS HPF: 49 /HPF (ref 0–5)
WBC OTHER # BLD: 5.2 K/UL (ref 4–10.5)

## 2024-10-30 PROCEDURE — 85025 COMPLETE CBC W/AUTO DIFF WBC: CPT

## 2024-10-30 PROCEDURE — 36415 COLL VENOUS BLD VENIPUNCTURE: CPT

## 2024-10-30 PROCEDURE — 81001 URINALYSIS AUTO W/SCOPE: CPT

## 2024-10-30 PROCEDURE — 80053 COMPREHEN METABOLIC PANEL: CPT

## 2024-10-30 PROCEDURE — 84100 ASSAY OF PHOSPHORUS: CPT

## 2024-10-30 PROCEDURE — 87086 URINE CULTURE/COLONY COUNT: CPT

## 2024-10-30 PROCEDURE — 83735 ASSAY OF MAGNESIUM: CPT

## 2024-10-31 ENCOUNTER — HOSPITAL ENCOUNTER (OUTPATIENT)
Age: 64
Setting detail: SPECIMEN
Discharge: HOME | End: 2024-10-31
Payer: MEDICARE

## 2024-10-31 PROCEDURE — 82043 UR ALBUMIN QUANTITATIVE: CPT

## 2024-10-31 PROCEDURE — 82570 ASSAY OF URINE CREATININE: CPT

## 2024-11-01 LAB
MICROORGANISM SPEC CULT: ABNORMAL
SERVICE CMNT-IMP: ABNORMAL
SPECIMEN DESCRIPTION: ABNORMAL

## 2024-11-02 LAB
CREAT UR-MCNC: 66.2 MG/DL (ref 28–217)
MICROALBUMIN UR-MCNC: 24 MG/L
MICROALBUMIN/CREAT UR-RTO: 36 MCG/MG CREAT (ref 0–2)

## 2024-11-04 ENCOUNTER — HOSPITAL ENCOUNTER (OUTPATIENT)
Dept: INFUSION THERAPY | Age: 64
Discharge: HOME | End: 2024-11-04
Payer: MEDICARE

## 2024-11-04 ENCOUNTER — OFFICE VISIT (OUTPATIENT)
Dept: ONCOLOGY | Age: 64
End: 2024-11-04
Payer: MEDICARE

## 2024-11-04 VITALS
DIASTOLIC BLOOD PRESSURE: 59 MMHG | SYSTOLIC BLOOD PRESSURE: 122 MMHG | BODY MASS INDEX: 36.6 KG/M2 | HEIGHT: 64 IN | HEART RATE: 80 BPM | TEMPERATURE: 97.3 F | OXYGEN SATURATION: 97 % | WEIGHT: 214.4 LBS

## 2024-11-04 DIAGNOSIS — M81.0 OSTEOPOROSIS WITHOUT CURRENT PATHOLOGICAL FRACTURE, UNSPECIFIED OSTEOPOROSIS TYPE: Primary | ICD-10-CM

## 2024-11-04 DIAGNOSIS — D68.9 COAGULOPATHY (HCC): Primary | ICD-10-CM

## 2024-11-04 DIAGNOSIS — M81.0 OSTEOPOROSIS, UNSPECIFIED OSTEOPOROSIS TYPE, UNSPECIFIED PATHOLOGICAL FRACTURE PRESENCE: ICD-10-CM

## 2024-11-04 PROCEDURE — G8399 PT W/DXA RESULTS DOCUMENT: HCPCS | Performed by: INTERNAL MEDICINE

## 2024-11-04 PROCEDURE — 3017F COLORECTAL CA SCREEN DOC REV: CPT | Performed by: INTERNAL MEDICINE

## 2024-11-04 PROCEDURE — G8427 DOCREV CUR MEDS BY ELIG CLIN: HCPCS | Performed by: INTERNAL MEDICINE

## 2024-11-04 PROCEDURE — G8484 FLU IMMUNIZE NO ADMIN: HCPCS | Performed by: INTERNAL MEDICINE

## 2024-11-04 PROCEDURE — 3074F SYST BP LT 130 MM HG: CPT | Performed by: INTERNAL MEDICINE

## 2024-11-04 PROCEDURE — G8417 CALC BMI ABV UP PARAM F/U: HCPCS | Performed by: INTERNAL MEDICINE

## 2024-11-04 PROCEDURE — 1090F PRES/ABSN URINE INCON ASSESS: CPT | Performed by: INTERNAL MEDICINE

## 2024-11-04 PROCEDURE — 6360000002 HC RX W HCPCS: Performed by: INTERNAL MEDICINE

## 2024-11-04 PROCEDURE — 3078F DIAST BP <80 MM HG: CPT | Performed by: INTERNAL MEDICINE

## 2024-11-04 PROCEDURE — 99214 OFFICE O/P EST MOD 30 MIN: CPT | Performed by: INTERNAL MEDICINE

## 2024-11-04 PROCEDURE — 1036F TOBACCO NON-USER: CPT | Performed by: INTERNAL MEDICINE

## 2024-11-04 PROCEDURE — 1123F ACP DISCUSS/DSCN MKR DOCD: CPT | Performed by: INTERNAL MEDICINE

## 2024-11-04 PROCEDURE — 96372 THER/PROPH/DIAG INJ SC/IM: CPT

## 2024-11-04 RX ORDER — DIPHENHYDRAMINE HYDROCHLORIDE 50 MG/ML
50 INJECTION INTRAMUSCULAR; INTRAVENOUS
OUTPATIENT
Start: 2025-05-04

## 2024-11-04 RX ORDER — ACETAMINOPHEN 325 MG/1
650 TABLET ORAL
OUTPATIENT
Start: 2025-05-04

## 2024-11-04 RX ORDER — EPINEPHRINE 1 MG/ML
0.3 INJECTION, SOLUTION, CONCENTRATE INTRAVENOUS PRN
OUTPATIENT
Start: 2025-05-04

## 2024-11-04 RX ORDER — ALBUTEROL SULFATE 90 UG/1
4 INHALANT RESPIRATORY (INHALATION) PRN
OUTPATIENT
Start: 2025-05-04

## 2024-11-04 RX ORDER — SODIUM CHLORIDE 9 MG/ML
INJECTION, SOLUTION INTRAVENOUS CONTINUOUS
OUTPATIENT
Start: 2025-05-04

## 2024-11-04 RX ORDER — FAMOTIDINE 10 MG/ML
20 INJECTION, SOLUTION INTRAVENOUS
OUTPATIENT
Start: 2025-05-04

## 2024-11-04 RX ORDER — ONDANSETRON 2 MG/ML
8 INJECTION INTRAMUSCULAR; INTRAVENOUS
OUTPATIENT
Start: 2025-05-04

## 2024-11-04 RX ADMIN — DENOSUMAB 60 MG: 60 INJECTION SUBCUTANEOUS at 10:50

## 2024-11-04 NOTE — PROGRESS NOTES
Patient Name:  Arabella Grey  Patient :  1960  Patient MRN:  [id number]     Primary Oncologist: Rachel Fuentes MD  Referring Provider: Loulou Skinner APRN - NP     Date of Service: 2024      Reason for Consult: Abnormal labs   Chief Complaint:    Chief Complaint   Patient presents with    Follow-up       Encounter Diagnoses   Name Primary?    Coagulopathy (HCC) Yes    Osteoporosis, unspecified osteoporosis type, unspecified pathological fracture presence         HPI:   2024, she is at assisted living resident.  Last 5 months or so she has been bruising a lot especially in the thighs left nipple area and also upper extremities.  Reported that she has been on a blood thinner but has been on it for a while, Plavix.  Denies any chest pain, increased shortness of breath.  No overt bleeding.  No abdominal pain.  No  symptoms.  No fever.  Intentional weight loss of 5 pounds.  Reported that she takes melatonin but no other over-the-counter medications.    2024Their CBC with WBC of 5.5 hemoglobin of 12.7 hematocrit 41.4 MCV of 94.9 and platelets of 223 absolute monocyte count 430 CMP with sodium of 139 potassium of 4.8 BUN of 31 creatinine 1.4 albumin 3.9 rest within normal limits phosphorus of 3.9 magnesium of 2.1    10/16/2024 bone density scan with osteoporosis    4Rheumatoid factor less than 10 RAHEEM negative PT 12.7 PTT 28.4  CMP with sodium of 138 potassium of 5.5 BUN of 34 creatinine 1.6 albumin 4.2 CBC with WBC of 6.8 hemoglobin of 13.2 hematocrit 42.8 MCV of 94.9 and platelets of 207 von Willebrand serum protein electrophoresis with no M protein T4 TSH 2.33.  Hemoglobin A1c 7.7     Past Medical History:     Hypothyroidism, diabetes mellitus type 1, hyperlipidemia, hypertension                                                             Past Surgery History:    Bilateral rotator cuff tear surgery, left upper extremity fracture repair, left pneumonectomy or

## 2024-11-04 NOTE — PROGRESS NOTES
Patient arrived to doctor appointment and receiving first Prolia injection there.  Labs done 10/30/24 and those are acceptable for today's injection.  Injection approved and given subcutaneously in right arm, band-aid applied.  Patient tolerated well.  Education on injection provided.  Discharge instructions provided at check-out.  Left doctor's office ambulatory.

## 2024-11-04 NOTE — PROGRESS NOTES
MA Rooming Questions  Patient: Arabella Grey  MRN: [id number]    Date: 11/4/2024        1. Do you have any new issues?   no         2. Do you need any refills on medications?    no    3. Have you had any imaging done since your last visit?   yes - Dexa scan 10/16    4. Have you been hospitalized or seen in the emergency room since your last visit here?   no    5. Did the patient have a depression screening completed today? No    No data recorded     PHQ-9 Given to (if applicable):               PHQ-9 Score (if applicable):                     [] Positive     []  Negative              Does question #9 need addressed (if applicable)                     [] Yes    []  No               Aubrie Ordaz CMA

## 2024-11-12 ENCOUNTER — HOSPITAL ENCOUNTER (INPATIENT)
Age: 64
LOS: 2 days | Discharge: HOME | DRG: 638 | End: 2024-11-14
Attending: EMERGENCY MEDICINE | Admitting: SPECIALIST
Payer: MEDICARE

## 2024-11-12 ENCOUNTER — APPOINTMENT (OUTPATIENT)
Dept: CT IMAGING | Age: 64
DRG: 638 | End: 2024-11-12
Payer: MEDICARE

## 2024-11-12 DIAGNOSIS — R11.2 NAUSEA AND VOMITING, UNSPECIFIED VOMITING TYPE: Primary | ICD-10-CM

## 2024-11-12 DIAGNOSIS — E10.69 TYPE 1 DIABETES MELLITUS WITH OTHER SPECIFIED COMPLICATION (HCC): ICD-10-CM

## 2024-11-12 DIAGNOSIS — N17.9 ACUTE KIDNEY INJURY (HCC): ICD-10-CM

## 2024-11-12 DIAGNOSIS — E86.0 DEHYDRATION: ICD-10-CM

## 2024-11-12 DIAGNOSIS — E88.89 KETOSIS (HCC): ICD-10-CM

## 2024-11-12 DIAGNOSIS — E87.5 HYPERKALEMIA: ICD-10-CM

## 2024-11-12 DIAGNOSIS — E87.20 METABOLIC ACIDOSIS: ICD-10-CM

## 2024-11-12 PROBLEM — E10.10 DKA, TYPE 1, NOT AT GOAL (HCC): Status: ACTIVE | Noted: 2024-11-12

## 2024-11-12 LAB
ALBUMIN SERPL-MCNC: 3.8 G/DL (ref 3.4–5)
ALBUMIN/GLOB SERPL: 1.7 {RATIO} (ref 1.1–2.2)
ALP SERPL-CCNC: 114 U/L (ref 40–129)
ALT SERPL-CCNC: 26 U/L (ref 10–40)
ANION GAP SERPL CALCULATED.3IONS-SCNC: 12 MMOL/L (ref 9–17)
ANION GAP SERPL CALCULATED.3IONS-SCNC: 22 MMOL/L (ref 9–17)
ANION GAP SERPL CALCULATED.3IONS-SCNC: 24 MMOL/L (ref 9–17)
ARTERIAL PATENCY WRIST A: NO
AST SERPL-CCNC: 24 U/L (ref 15–37)
B-OH-BUTYR SERPL-MCNC: >8 MMOL/L (ref 0–0.27)
BASOPHILS # BLD: 0.01 K/UL
BASOPHILS NFR BLD: 0 % (ref 0–1)
BILIRUB SERPL-MCNC: 0.4 MG/DL (ref 0–1)
BILIRUB UR QL STRIP: ABNORMAL
BODY TEMPERATURE: 37
BUN SERPL-MCNC: 31 MG/DL (ref 7–20)
BUN SERPL-MCNC: 33 MG/DL (ref 7–20)
BUN SERPL-MCNC: 35 MG/DL (ref 7–20)
CALCIUM SERPL-MCNC: 6.8 MG/DL (ref 8.3–10.6)
CALCIUM SERPL-MCNC: 7.2 MG/DL (ref 8.3–10.6)
CALCIUM SERPL-MCNC: 7.7 MG/DL (ref 8.3–10.6)
CHLORIDE SERPL-SCNC: 103 MMOL/L (ref 99–110)
CHLORIDE SERPL-SCNC: 105 MMOL/L (ref 99–110)
CHLORIDE SERPL-SCNC: 109 MMOL/L (ref 99–110)
CHP ED QC CHECK: YES
CLARITY UR: CLEAR
CO2 SERPL-SCNC: 10 MMOL/L (ref 21–32)
CO2 SERPL-SCNC: 11 MMOL/L (ref 21–32)
CO2 SERPL-SCNC: 17 MMOL/L (ref 21–32)
COHGB MFR BLD: 0.6 % (ref 0.5–1.5)
COLOR UR: YELLOW
CREAT SERPL-MCNC: 1.6 MG/DL (ref 0.6–1.2)
CREAT SERPL-MCNC: 1.7 MG/DL (ref 0.6–1.2)
CREAT SERPL-MCNC: 1.7 MG/DL (ref 0.6–1.2)
EOSINOPHIL # BLD: 0 K/UL
EOSINOPHILS RELATIVE PERCENT: 0 % (ref 0–3)
ERYTHROCYTE [DISTWIDTH] IN BLOOD BY AUTOMATED COUNT: 13.5 % (ref 11.7–14.9)
GFR, ESTIMATED: 31 ML/MIN/1.73M2
GFR, ESTIMATED: 33 ML/MIN/1.73M2
GFR, ESTIMATED: 33 ML/MIN/1.73M2
GLUCOSE BLD-MCNC: 147 MG/DL (ref 74–99)
GLUCOSE BLD-MCNC: 157 MG/DL (ref 74–99)
GLUCOSE BLD-MCNC: 179 MG/DL (ref 74–99)
GLUCOSE BLD-MCNC: 181 MG/DL (ref 74–99)
GLUCOSE BLD-MCNC: 181 MG/DL (ref 74–99)
GLUCOSE BLD-MCNC: 184 MG/DL (ref 74–99)
GLUCOSE BLD-MCNC: 200 MG/DL
GLUCOSE BLD-MCNC: 200 MG/DL (ref 74–99)
GLUCOSE BLD-MCNC: 205 MG/DL (ref 74–99)
GLUCOSE BLD-MCNC: 205 MG/DL (ref 74–99)
GLUCOSE BLD-MCNC: 215 MG/DL (ref 74–99)
GLUCOSE SERPL-MCNC: 181 MG/DL (ref 74–99)
GLUCOSE SERPL-MCNC: 199 MG/DL (ref 74–99)
GLUCOSE SERPL-MCNC: 234 MG/DL (ref 74–99)
GLUCOSE UR STRIP-MCNC: >=1000 MG/DL
HCO3 VENOUS: 10.8 MMOL/L (ref 22–29)
HCT VFR BLD AUTO: 46.4 % (ref 37–47)
HGB BLD-MCNC: 13.4 G/DL (ref 12.5–16)
HGB UR QL STRIP.AUTO: ABNORMAL
IMM GRANULOCYTES # BLD AUTO: 0.05 K/UL
IMM GRANULOCYTES NFR BLD: 0 %
KETONES UR STRIP-MCNC: >80 MG/DL
LEUKOCYTE ESTERASE UR QL STRIP: ABNORMAL
LIPASE SERPL-CCNC: 20 U/L (ref 13–60)
LYMPHOCYTES NFR BLD: 1.32 K/UL
LYMPHOCYTES RELATIVE PERCENT: 10 % (ref 24–44)
MAGNESIUM SERPL-MCNC: 2.2 MG/DL (ref 1.8–2.4)
MAGNESIUM SERPL-MCNC: 2.2 MG/DL (ref 1.8–2.4)
MAGNESIUM SERPL-MCNC: 2.4 MG/DL (ref 1.8–2.4)
MCH RBC QN AUTO: 29.6 PG (ref 27–31)
MCHC RBC AUTO-ENTMCNC: 28.9 G/DL (ref 32–36)
MCV RBC AUTO: 102.4 FL (ref 78–100)
METHEMOGLOBIN: 0.5 % (ref 0.5–1.5)
MONOCYTES NFR BLD: 0.8 K/UL
MONOCYTES NFR BLD: 6 % (ref 0–4)
NEGATIVE BASE EXCESS, VEN: 15.9 MMOL/L (ref 0–3)
NEUTROPHILS NFR BLD: 83 % (ref 36–66)
NEUTS SEG NFR BLD: 10.91 K/UL
NITRITE UR QL STRIP: NEGATIVE
OXYHGB MFR BLD: 60.8 %
PCO2 VENOUS: 29 MM HG (ref 38–54)
PH UR STRIP: 5.5 [PH] (ref 5–8)
PH VENOUS: 7.19 (ref 7.32–7.43)
PHOSPHATE SERPL-MCNC: 1.7 MG/DL (ref 2.5–4.9)
PHOSPHATE SERPL-MCNC: 3 MG/DL (ref 2.5–4.9)
PLATELET # BLD AUTO: 271 K/UL (ref 140–440)
PMV BLD AUTO: 9.6 FL (ref 7.5–11.1)
PO2 VENOUS: 33.3 MM HG (ref 23–48)
POTASSIUM SERPL-SCNC: 4 MMOL/L (ref 3.5–5.1)
POTASSIUM SERPL-SCNC: 4.8 MMOL/L (ref 3.5–5.1)
POTASSIUM SERPL-SCNC: 5.8 MMOL/L (ref 3.5–5.1)
PROT SERPL-MCNC: 6 G/DL (ref 6.4–8.2)
PROT UR STRIP-MCNC: 100 MG/DL
RBC # BLD AUTO: 4.53 M/UL (ref 4.2–5.4)
SODIUM SERPL-SCNC: 137 MMOL/L (ref 136–145)
SODIUM SERPL-SCNC: 138 MMOL/L (ref 136–145)
SODIUM SERPL-SCNC: 138 MMOL/L (ref 136–145)
SP GR UR STRIP: 1.02 (ref 1–1.03)
TEXT FOR RESPIRATORY: ABNORMAL
TROPONIN I SERPL HS-MCNC: 19 NG/L (ref 0–14)
TROPONIN I SERPL HS-MCNC: 22 NG/L (ref 0–14)
UROBILINOGEN UR STRIP-ACNC: 0.2 EU/DL (ref 0–1)
WBC OTHER # BLD: 13.1 K/UL (ref 4–10.5)

## 2024-11-12 PROCEDURE — 80053 COMPREHEN METABOLIC PANEL: CPT

## 2024-11-12 PROCEDURE — 96375 TX/PRO/DX INJ NEW DRUG ADDON: CPT

## 2024-11-12 PROCEDURE — 96374 THER/PROPH/DIAG INJ IV PUSH: CPT

## 2024-11-12 PROCEDURE — 2500000003 HC RX 250 WO HCPCS: Performed by: NURSE PRACTITIONER

## 2024-11-12 PROCEDURE — 2580000003 HC RX 258: Performed by: PHYSICIAN ASSISTANT

## 2024-11-12 PROCEDURE — 83690 ASSAY OF LIPASE: CPT

## 2024-11-12 PROCEDURE — 74177 CT ABD & PELVIS W/CONTRAST: CPT

## 2024-11-12 PROCEDURE — 6360000002 HC RX W HCPCS: Performed by: PHYSICIAN ASSISTANT

## 2024-11-12 PROCEDURE — 82962 GLUCOSE BLOOD TEST: CPT

## 2024-11-12 PROCEDURE — 82805 BLOOD GASES W/O2 SATURATION: CPT

## 2024-11-12 PROCEDURE — 84100 ASSAY OF PHOSPHORUS: CPT

## 2024-11-12 PROCEDURE — 82010 KETONE BODYS QUAN: CPT

## 2024-11-12 PROCEDURE — 36415 COLL VENOUS BLD VENIPUNCTURE: CPT

## 2024-11-12 PROCEDURE — 83036 HEMOGLOBIN GLYCOSYLATED A1C: CPT

## 2024-11-12 PROCEDURE — 83735 ASSAY OF MAGNESIUM: CPT

## 2024-11-12 PROCEDURE — 99285 EMERGENCY DEPT VISIT HI MDM: CPT

## 2024-11-12 PROCEDURE — 6360000002 HC RX W HCPCS: Performed by: STUDENT IN AN ORGANIZED HEALTH CARE EDUCATION/TRAINING PROGRAM

## 2024-11-12 PROCEDURE — 2000000000 HC ICU R&B

## 2024-11-12 PROCEDURE — 6370000000 HC RX 637 (ALT 250 FOR IP): Performed by: PHYSICIAN ASSISTANT

## 2024-11-12 PROCEDURE — 85025 COMPLETE CBC W/AUTO DIFF WBC: CPT

## 2024-11-12 PROCEDURE — 93005 ELECTROCARDIOGRAM TRACING: CPT | Performed by: NURSE PRACTITIONER

## 2024-11-12 PROCEDURE — 6370000000 HC RX 637 (ALT 250 FOR IP): Performed by: NURSE PRACTITIONER

## 2024-11-12 PROCEDURE — 6360000004 HC RX CONTRAST MEDICATION: Performed by: NURSE PRACTITIONER

## 2024-11-12 PROCEDURE — 2580000003 HC RX 258: Performed by: NURSE PRACTITIONER

## 2024-11-12 PROCEDURE — 84484 ASSAY OF TROPONIN QUANT: CPT

## 2024-11-12 PROCEDURE — 80048 BASIC METABOLIC PNL TOTAL CA: CPT

## 2024-11-12 PROCEDURE — 6360000002 HC RX W HCPCS: Performed by: NURSE PRACTITIONER

## 2024-11-12 RX ORDER — METOCLOPRAMIDE HYDROCHLORIDE 5 MG/ML
5 INJECTION INTRAMUSCULAR; INTRAVENOUS EVERY 6 HOURS
Status: COMPLETED | OUTPATIENT
Start: 2024-11-12 | End: 2024-11-13

## 2024-11-12 RX ORDER — CLOPIDOGREL BISULFATE 75 MG/1
75 TABLET ORAL DAILY
Status: DISCONTINUED | OUTPATIENT
Start: 2024-11-13 | End: 2024-11-14 | Stop reason: HOSPADM

## 2024-11-12 RX ORDER — MORPHINE SULFATE 4 MG/ML
8 INJECTION, SOLUTION INTRAMUSCULAR; INTRAVENOUS EVERY 30 MIN PRN
Status: DISCONTINUED | OUTPATIENT
Start: 2024-11-12 | End: 2024-11-12

## 2024-11-12 RX ORDER — SODIUM CHLORIDE 9 MG/ML
INJECTION, SOLUTION INTRAVENOUS CONTINUOUS
Status: DISCONTINUED | OUTPATIENT
Start: 2024-11-12 | End: 2024-11-13

## 2024-11-12 RX ORDER — ATORVASTATIN CALCIUM 40 MG/1
80 TABLET, FILM COATED ORAL NIGHTLY
Status: DISCONTINUED | OUTPATIENT
Start: 2024-11-12 | End: 2024-11-14 | Stop reason: HOSPADM

## 2024-11-12 RX ORDER — ONDANSETRON 2 MG/ML
4 INJECTION INTRAMUSCULAR; INTRAVENOUS EVERY 6 HOURS PRN
Status: DISCONTINUED | OUTPATIENT
Start: 2024-11-12 | End: 2024-11-12

## 2024-11-12 RX ORDER — MORPHINE SULFATE 4 MG/ML
4 INJECTION, SOLUTION INTRAMUSCULAR; INTRAVENOUS EVERY 30 MIN PRN
Status: DISCONTINUED | OUTPATIENT
Start: 2024-11-12 | End: 2024-11-14 | Stop reason: HOSPADM

## 2024-11-12 RX ORDER — MAGNESIUM HYDROXIDE/ALUMINUM HYDROXICE/SIMETHICONE 120; 1200; 1200 MG/30ML; MG/30ML; MG/30ML
30 SUSPENSION ORAL EVERY 6 HOURS PRN
Status: DISCONTINUED | OUTPATIENT
Start: 2024-11-12 | End: 2024-11-14 | Stop reason: HOSPADM

## 2024-11-12 RX ORDER — HYDRALAZINE HYDROCHLORIDE 20 MG/ML
10 INJECTION INTRAMUSCULAR; INTRAVENOUS EVERY 4 HOURS PRN
Status: DISCONTINUED | OUTPATIENT
Start: 2024-11-12 | End: 2024-11-14 | Stop reason: HOSPADM

## 2024-11-12 RX ORDER — HEPARIN SODIUM 5000 [USP'U]/ML
5000 INJECTION, SOLUTION INTRAVENOUS; SUBCUTANEOUS EVERY 8 HOURS SCHEDULED
Status: DISCONTINUED | OUTPATIENT
Start: 2024-11-12 | End: 2024-11-14 | Stop reason: HOSPADM

## 2024-11-12 RX ORDER — MAGNESIUM HYDROXIDE/ALUMINUM HYDROXICE/SIMETHICONE 120; 1200; 1200 MG/30ML; MG/30ML; MG/30ML
30 SUSPENSION ORAL
Status: COMPLETED | OUTPATIENT
Start: 2024-11-12 | End: 2024-11-12

## 2024-11-12 RX ORDER — POTASSIUM CHLORIDE 7.45 MG/ML
10 INJECTION INTRAVENOUS PRN
Status: DISCONTINUED | OUTPATIENT
Start: 2024-11-12 | End: 2024-11-14 | Stop reason: HOSPADM

## 2024-11-12 RX ORDER — PANTOPRAZOLE SODIUM 40 MG/10ML
40 INJECTION, POWDER, LYOPHILIZED, FOR SOLUTION INTRAVENOUS DAILY
Status: DISCONTINUED | OUTPATIENT
Start: 2024-11-12 | End: 2024-11-14 | Stop reason: HOSPADM

## 2024-11-12 RX ORDER — ONDANSETRON 2 MG/ML
8 INJECTION INTRAMUSCULAR; INTRAVENOUS EVERY 6 HOURS PRN
Status: DISCONTINUED | OUTPATIENT
Start: 2024-11-12 | End: 2024-11-14 | Stop reason: HOSPADM

## 2024-11-12 RX ORDER — DEXTROSE MONOHYDRATE AND SODIUM CHLORIDE 5; .45 G/100ML; G/100ML
INJECTION, SOLUTION INTRAVENOUS CONTINUOUS PRN
Status: DISCONTINUED | OUTPATIENT
Start: 2024-11-12 | End: 2024-11-13

## 2024-11-12 RX ORDER — VENLAFAXINE 75 MG/1
75 TABLET ORAL NIGHTLY
COMMUNITY

## 2024-11-12 RX ORDER — METOPROLOL SUCCINATE 50 MG/1
50 TABLET, EXTENDED RELEASE ORAL DAILY
Status: DISCONTINUED | OUTPATIENT
Start: 2024-11-13 | End: 2024-11-12

## 2024-11-12 RX ORDER — IOPAMIDOL 755 MG/ML
80 INJECTION, SOLUTION INTRAVASCULAR
Status: COMPLETED | OUTPATIENT
Start: 2024-11-12 | End: 2024-11-12

## 2024-11-12 RX ORDER — LEVOTHYROXINE SODIUM 75 UG/1
75 TABLET ORAL
Status: DISCONTINUED | OUTPATIENT
Start: 2024-11-13 | End: 2024-11-14 | Stop reason: HOSPADM

## 2024-11-12 RX ORDER — POLYETHYLENE GLYCOL 3350 17 G/17G
17 POWDER, FOR SOLUTION ORAL DAILY PRN
Status: DISCONTINUED | OUTPATIENT
Start: 2024-11-12 | End: 2024-11-14 | Stop reason: HOSPADM

## 2024-11-12 RX ORDER — ENOXAPARIN SODIUM 100 MG/ML
40 INJECTION SUBCUTANEOUS DAILY
Status: DISCONTINUED | OUTPATIENT
Start: 2024-11-12 | End: 2024-11-12

## 2024-11-12 RX ORDER — MAGNESIUM SULFATE IN WATER 40 MG/ML
2000 INJECTION, SOLUTION INTRAVENOUS PRN
Status: DISCONTINUED | OUTPATIENT
Start: 2024-11-12 | End: 2024-11-14 | Stop reason: HOSPADM

## 2024-11-12 RX ORDER — SODIUM CHLORIDE 450 MG/100ML
INJECTION, SOLUTION INTRAVENOUS CONTINUOUS
Status: DISCONTINUED | OUTPATIENT
Start: 2024-11-12 | End: 2024-11-13

## 2024-11-12 RX ORDER — VENLAFAXINE 37.5 MG/1
75 TABLET ORAL NIGHTLY
Status: DISCONTINUED | OUTPATIENT
Start: 2024-11-12 | End: 2024-11-14 | Stop reason: HOSPADM

## 2024-11-12 RX ORDER — METOPROLOL TARTRATE 1 MG/ML
5 INJECTION, SOLUTION INTRAVENOUS ONCE
Status: COMPLETED | OUTPATIENT
Start: 2024-11-12 | End: 2024-11-12

## 2024-11-12 RX ORDER — CARVEDILOL 6.25 MG/1
12.5 TABLET ORAL 2 TIMES DAILY
Status: DISCONTINUED | OUTPATIENT
Start: 2024-11-12 | End: 2024-11-14 | Stop reason: HOSPADM

## 2024-11-12 RX ORDER — 0.9 % SODIUM CHLORIDE 0.9 %
1000 INTRAVENOUS SOLUTION INTRAVENOUS ONCE
Status: COMPLETED | OUTPATIENT
Start: 2024-11-12 | End: 2024-11-12

## 2024-11-12 RX ORDER — PRAZOSIN HYDROCHLORIDE 5 MG/1
5 CAPSULE ORAL NIGHTLY
Status: DISCONTINUED | OUTPATIENT
Start: 2024-11-12 | End: 2024-11-14 | Stop reason: HOSPADM

## 2024-11-12 RX ORDER — 0.9 % SODIUM CHLORIDE 0.9 %
15 INTRAVENOUS SOLUTION INTRAVENOUS ONCE
Status: COMPLETED | OUTPATIENT
Start: 2024-11-12 | End: 2024-11-12

## 2024-11-12 RX ADMIN — METOCLOPRAMIDE HYDROCHLORIDE 5 MG: 5 INJECTION, SOLUTION INTRAMUSCULAR; INTRAVENOUS at 14:03

## 2024-11-12 RX ADMIN — SODIUM CHLORIDE 1430 ML: 9 INJECTION, SOLUTION INTRAVENOUS at 13:39

## 2024-11-12 RX ADMIN — MORPHINE SULFATE 4 MG: 4 INJECTION, SOLUTION INTRAMUSCULAR; INTRAVENOUS at 09:34

## 2024-11-12 RX ADMIN — HEPARIN SODIUM 5000 UNITS: 5000 INJECTION INTRAVENOUS; SUBCUTANEOUS at 21:01

## 2024-11-12 RX ADMIN — PANTOPRAZOLE SODIUM 40 MG: 40 INJECTION, POWDER, FOR SOLUTION INTRAVENOUS at 14:15

## 2024-11-12 RX ADMIN — ATORVASTATIN CALCIUM 80 MG: 40 TABLET, FILM COATED ORAL at 21:00

## 2024-11-12 RX ADMIN — ALUMINUM HYDROXIDE, MAGNESIUM HYDROXIDE, AND SIMETHICONE 30 ML: 200; 200; 20 SUSPENSION ORAL at 14:15

## 2024-11-12 RX ADMIN — HEPARIN SODIUM 5000 UNITS: 5000 INJECTION INTRAVENOUS; SUBCUTANEOUS at 14:14

## 2024-11-12 RX ADMIN — CARVEDILOL 12.5 MG: 6.25 TABLET, FILM COATED ORAL at 21:00

## 2024-11-12 RX ADMIN — ONDANSETRON 4 MG: 2 INJECTION INTRAMUSCULAR; INTRAVENOUS at 09:33

## 2024-11-12 RX ADMIN — PRAZOSIN HYDROCHLORIDE 5 MG: 5 CAPSULE ORAL at 21:00

## 2024-11-12 RX ADMIN — METOPROLOL TARTRATE 5 MG: 5 INJECTION INTRAVENOUS at 12:13

## 2024-11-12 RX ADMIN — ONDANSETRON 4 MG: 2 INJECTION INTRAMUSCULAR; INTRAVENOUS at 09:05

## 2024-11-12 RX ADMIN — SODIUM CHLORIDE 1000 ML: 9 INJECTION, SOLUTION INTRAVENOUS at 12:13

## 2024-11-12 RX ADMIN — SODIUM ZIRCONIUM CYCLOSILICATE 5 G: 5 POWDER, FOR SUSPENSION ORAL at 12:13

## 2024-11-12 RX ADMIN — DEXTROSE AND SODIUM CHLORIDE: 5; 450 INJECTION, SOLUTION INTRAVENOUS at 15:02

## 2024-11-12 RX ADMIN — HYDRALAZINE HYDROCHLORIDE 10 MG: 20 INJECTION INTRAMUSCULAR; INTRAVENOUS at 16:07

## 2024-11-12 RX ADMIN — METOCLOPRAMIDE HYDROCHLORIDE 5 MG: 5 INJECTION, SOLUTION INTRAMUSCULAR; INTRAVENOUS at 21:01

## 2024-11-12 RX ADMIN — DEXTROSE AND SODIUM CHLORIDE: 5; 450 INJECTION, SOLUTION INTRAVENOUS at 22:10

## 2024-11-12 RX ADMIN — CARVEDILOL 12.5 MG: 6.25 TABLET, FILM COATED ORAL at 16:05

## 2024-11-12 RX ADMIN — VENLAFAXINE 75 MG: 37.5 TABLET ORAL at 21:00

## 2024-11-12 RX ADMIN — HYALURONIDASE (HUMAN RECOMBINANT) 15 UNITS: 150 INJECTION, SOLUTION SUBCUTANEOUS at 23:14

## 2024-11-12 RX ADMIN — IOPAMIDOL 80 ML: 755 INJECTION, SOLUTION INTRAVENOUS at 10:58

## 2024-11-12 ASSESSMENT — PAIN - FUNCTIONAL ASSESSMENT
PAIN_FUNCTIONAL_ASSESSMENT: 0-10
PAIN_FUNCTIONAL_ASSESSMENT: ACTIVITIES ARE NOT PREVENTED
PAIN_FUNCTIONAL_ASSESSMENT: ACTIVITIES ARE NOT PREVENTED
PAIN_FUNCTIONAL_ASSESSMENT: 0-10

## 2024-11-12 ASSESSMENT — PAIN SCALES - GENERAL
PAINLEVEL_OUTOF10: 4
PAINLEVEL_OUTOF10: 6
PAINLEVEL_OUTOF10: 7
PAINLEVEL_OUTOF10: 2

## 2024-11-12 ASSESSMENT — PAIN DESCRIPTION - PAIN TYPE: TYPE: ACUTE PAIN

## 2024-11-12 ASSESSMENT — PAIN DESCRIPTION - FREQUENCY: FREQUENCY: INTERMITTENT

## 2024-11-12 ASSESSMENT — ENCOUNTER SYMPTOMS
VOMITING: 1
SHORTNESS OF BREATH: 0
NAUSEA: 1
CONSTIPATION: 1
ABDOMINAL PAIN: 1
BACK PAIN: 0

## 2024-11-12 ASSESSMENT — PAIN DESCRIPTION - ORIENTATION
ORIENTATION: INNER
ORIENTATION: RIGHT;LEFT

## 2024-11-12 ASSESSMENT — PAIN DESCRIPTION - LOCATION
LOCATION: ABDOMEN

## 2024-11-12 ASSESSMENT — PAIN DESCRIPTION - DESCRIPTORS: DESCRIPTORS: OTHER (COMMENT)

## 2024-11-12 NOTE — ED NOTES
Medication History  [facility]    Patient Name: Arabella Grey 1960     Medication history has been completed by: Bev Anderson CPhT    Source(s) of information: Medication list provided by [facility]     Primary Care Physician: Loulou Skinner APRN - NP     Pharmacy: [facility]    Allergies as of 11/12/2024    (No Known Allergies)        Prior to Admission medications    Medication Sig Start Date End Date Taking? Authorizing Provider   venlafaxine (EFFEXOR) 75 MG tablet Take 1 tablet by mouth nightly   Yes Jasen Oconnell MD   Semaglutide,0.25 or 0.5MG/DOS, 2 MG/3ML SOPN Inject 2 mg into the skin once a week   Receives on Friday 11/1/24  Yes Facundo Schulte MD   insulin lispro, 1 Unit Dial, (HUMALOG KWIKPEN) 100 UNIT/ML SOPN Inject 8 Units into the skin 3 times daily 11/12/24 Sliding scale in addition to 8 units   Yes Jasen Oconnell MD   insulin glargine (LANTUS) 100 UNIT/ML injection vial Inject 35 Units into the skin nightly   Yes Jasen Oconnell MD   atorvastatin (LIPITOR) 80 MG tablet Take 1 tablet by mouth nightly   Yes Jasen Oconnell MD   Finerenone (KERENDIA) 10 MG TABS Take by mouth   Yes Jasen Oconnell MD   prazosin (MINIPRESS) 5 MG capsule Take 1 capsule by mouth nightly   Yes Jasen Oconnell MD   venlafaxine (EFFEXOR) 75 MG tablet Take 2 tablets by mouth every morning   Yes Jasen Oconnell MD   chlorthalidone (HYGROTON) 25 MG tablet Take 1 tablet by mouth daily 10/10/23  Yes Facundo Schulte MD   empagliflozin (JARDIANCE) 10 MG tablet Take 1 tablet by mouth daily 7/7/23  Yes Facundo Schulte MD   carvedilol (COREG) 12.5 MG tablet Take 1 tablet by mouth 2 times daily (with meals) 5/13/23  Yes Lucien Martin MD   levothyroxine (SYNTHROID) 75 MCG tablet Take 1 tablet by mouth every morning (before breakfast) 7/19/22  Yes Pedrito Cruz MD   busPIRone (BUSPAR)

## 2024-11-12 NOTE — ED PROVIDER NOTES
THIS IS MY [facility] SUPERVISORY AND SHARED VISIT NOTE    I independently examined and evaluated Arabella Grey.    In brief, 64yof with complaint of vomiting. Started three hours after starting Ozempic on Friday. Has been having continued vomiting since then, not able to keep anything down. Has h/o diabetes. Complains of epigastric pain radiating up into throat and chest. No SOB. No passing out.    Focused exam revealed patient appears uncomfortable, but nontoxic, mild tachycardia noted, regular rhythm, nonlabored respirations.  Abdomen soft and nondistended.  Mild tenderness over epigastrium without rebound or guarding.  No peripheral edema noted.        CC/HPI Summary, DDx, ED Course, and Reassessment: Patient presented with concern for nausea and vomiting after starting Ozempic on Friday.  She is slightly tachycardic, otherwise vitals are stable, giving fluids, Zofran and reassess.        History from : Patient    Limitations to history : None    Patient was given the following medications:  Medications   morphine sulfate (PF) injection 4 mg (4 mg IntraVENous Given 11/12/24 0934)   ondansetron (ZOFRAN) injection 8 mg (4 mg IntraVENous Given 11/12/24 0933)   sodium chloride 0.9 % bolus 1,000 mL (1,000 mLs IntraVENous New Bag 11/12/24 1213)   sodium zirconium cyclosilicate (LOKELMA) oral suspension 5 g (5 g Oral Given 11/12/24 1213)   dextrose bolus 10% 125 mL (has no administration in time range)     Or   dextrose bolus 10% 250 mL (has no administration in time range)   potassium chloride 10 mEq/100 mL IVPB (Peripheral Line) (has no administration in time range)   magnesium sulfate 2000 mg in 50 mL IVPB premix (has no administration in time range)   sodium phosphate 15 mmol in sodium chloride 0.9 % 250 mL IVPB (has no administration in time range)   0.9 % sodium chloride infusion (has no administration in time range)   dextrose 5 % and 0.45 % sodium chloride infusion (has no administration in time range) 
due to DKA however this is secondary to dehydration so therefore did not.  This was discussed with Dr. Farmer.    CONSULTS: (Who and What was discussed)  None            Disposition Considerations (include 1 Tests not done, Shared Decision Making, Pt Expectation of Test or Tx.): Escalation of care considered for admission/observation. Shared decision making with patient/family engaged.  Based on that discussion, I will admit for further treatment and management.. I have discussed Arabella Grey's ED presentation and ED course with Dr. Lopez from the IM service. Based on that discussion we have decided to admit Arabella Grey to their service  for further evaluation and care.  Dr. Lopez discussed the case with Dr. Farmer.  See Dr. Farmer note for further disposition.      FINAL IMPRESSION      1. Nausea and vomiting, unspecified vomiting type    2. Ketosis (HCC)    3. Metabolic acidosis    4. Acute kidney injury (HCC)    5. Dehydration    6. Hyperkalemia    7. Type 1 diabetes mellitus with other specified complication (HCC)          DISPOSITION/PLAN     DISPOSITION Decision To Admit 11/12/2024 11:57:32 AM           PATIENT REFERRED TO:  No follow-up provider specified.    DISCHARGE MEDICATIONS:  New Prescriptions    No medications on file       DISCONTINUED MEDICATIONS:  Discontinued Medications    No medications on file            (Please note that portions of this note were completed with a voice recognition program.  Efforts were made to edit the dictations but occasionally words are mis-transcribed.)    BRIAN Smith CNP (electronically signed)      Hillary Wong APRN - CNP  11/12/24 [id number]

## 2024-11-12 NOTE — ED NOTES
ED TO INPATIENT SBAR HANDOFF    Patient Name: Arabella Grey   :  1960  64 y.o.   Preferred Name  Tess   Family/Caregiver Present no   Restraints no   C-SSRS: Risk of Suicide: No Risk  Sitter no   Sepsis Risk Score        Situation  Chief Complaint   Patient presents with    Vomiting     Started Ozempic Friday and has been throwing up since. Zofran was given by ems and it did give pt relief      Brief Description of Patient's Condition: Patient presented to ED with complaints of abdominal pain and vomiting. Patient states she started ozempic on Friday and has been vomiting since then. Patient is Aox4 and can ambulate with standby assitance to the bathroom.   Mental Status: oriented  Arrived from: home    Imaging:   CT ABDOMEN PELVIS W IV CONTRAST Additional Contrast? None   Final Result   1. Status post cholecystectomy.   2. Prominent common bile duct most likely compensatory.   3. Small fat-containing umbilical hernia.   4. Markedly distended urinary bladder..      Electronically signed by Jayson Sharif        Abnormal labs:   Abnormal Labs Reviewed   URINALYSIS - Abnormal; Notable for the following components:       Result Value    Glucose, Ur >=1000 (*)     Bilirubin, Urine SMALL (*)     Ketones, Urine >80 (*)     Urine Hgb TRACE (*)     Protein,  (*)     Leukocyte Esterase, Urine TRACE (*)     All other components within normal limits   TROPONIN - Abnormal; Notable for the following components:    Troponin, High Sensitivity 19 (*)     All other components within normal limits   TROPONIN - Abnormal; Notable for the following components:    Troponin, High Sensitivity 22 (*)     All other components within normal limits   CBC WITH AUTO DIFFERENTIAL - Abnormal; Notable for the following components:    WBC 13.1 (*)     .4 (*)     MCHC 28.9 (*)     Neutrophils % 83 (*)     Lymphocytes % 10 (*)     Monocytes % 6 (*)     All other components within normal limits   COMPREHENSIVE METABOLIC

## 2024-11-12 NOTE — CARE COORDINATION
11/12/24 1400   Service Assessment   Patient Orientation Alert and Oriented   Cognition Alert   History Provided By Medical Record;Other (see comment)  (DON at [facility] (Bev Pritchett))   Primary Caregiver Self   Support Systems Other (Comment)  (Staff at [facility])   Patient's Healthcare Decision Maker is: Legal Next of Kin   PCP Verified by CM Yes   Prior Functional Level Independent in ADLs/IADLs   Current Functional Level Assistance with the following:;Toileting;Mobility  (Hospital policy)   Can patient return to prior living arrangement Yes   Ability to make needs known: Good   Family able to assist with home care needs: No   Would you like for me to discuss the discharge plan with any other family members/significant others, and if so, who? Yes   Financial Resources Medicaid;Medicare   Community Resources Assisted Living   Social/Functional History   Lives With Alone   Type of Home Assisted living  ([facility])     Patient received to ICU. She is from [facility]. Contacted [facility] TRICIA Pritchett. Per her records- patient is completely independent PTA. She has a PCP and insurance that assists with Rx when needed. Per DON- she may return at any time. Bev Logan, RN

## 2024-11-12 NOTE — H&P
History and Physical      Name:  Arabella Grey /Age/Sex: 1960  (64 y.o. female)   MRN & CSN:  [id number] & [id number] Admission Date/Time: 2024  8:42 AM   Location:  -A PCP: Loulou Skinner APRN - NP       Hospital Day: 1     Attending physician: Dr. Medina    Assessment and Plan:   Arabella Grey is a 64 y.o.  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  DM-2 complicated with gastroparesis and  neuropathy  Depression and anxiety  Coagulopathy    Plan:   Neuro: Alert and oriented x 3, pain management as needed  CV: Hemodynamically stable, resume home statin, Coreg, Plavix  S/p LHC in 23-with 90% stenosis of D1, echo-EF 50 to 55%  Resp: On room air, no signs of respiratory distress, maintain O2 sats> 96% bronchodilators and nebulizers as needed as needed for wheezing/SOB  GI: Protonix for GI PPx, Reglan 5 mg every 6 hours 4 doses for delayed gastric emptying s/t Ozempic (received 1 mg of Ozempic on Friday)  Can be started on diet once the patient is off insulin drip  Complaining of epigastric pain-ordered Mylanta  : HAGMA- AG-24, Cr-1.7 ( baseline 1.5-1.6) bicarb-10, K-5.8 s/p lokelma 5mg iv, and started on insulin gtt, stat repeat labs pending at this time  Monitor every 4 hours labs, fluid and electrolyte replacement per DKA protocol  Heme: Hb-13.4, platelet count-2 71K, patient is following hematology as outpatient for coagulopathy and easy bruisability, monitor for signs of bleeding, transfuse for Hb<7  ID: WBC count-13.1, CT abdomen and pelvis-unremarkable, monitor off antibiotics, no concerns for infection.  Endo: Presented with high anion gap anabolic

## 2024-11-13 LAB
ANION GAP SERPL CALCULATED.3IONS-SCNC: 10 MMOL/L (ref 9–17)
ANION GAP SERPL CALCULATED.3IONS-SCNC: 11 MMOL/L (ref 9–17)
BASOPHILS # BLD: 0.01 K/UL
BASOPHILS NFR BLD: 0 % (ref 0–1)
BUN SERPL-MCNC: 29 MG/DL (ref 7–20)
BUN SERPL-MCNC: 31 MG/DL (ref 7–20)
CALCIUM SERPL-MCNC: 6.7 MG/DL (ref 8.3–10.6)
CALCIUM SERPL-MCNC: 6.9 MG/DL (ref 8.3–10.6)
CHLORIDE SERPL-SCNC: 108 MMOL/L (ref 99–110)
CHLORIDE SERPL-SCNC: 109 MMOL/L (ref 99–110)
CO2 SERPL-SCNC: 17 MMOL/L (ref 21–32)
CO2 SERPL-SCNC: 18 MMOL/L (ref 21–32)
CREAT SERPL-MCNC: 1.6 MG/DL (ref 0.6–1.2)
CREAT SERPL-MCNC: 1.6 MG/DL (ref 0.6–1.2)
EKG ATRIAL RATE: 95 BPM
EKG DIAGNOSIS: NORMAL
EKG P AXIS: 66 DEGREES
EKG P-R INTERVAL: 158 MS
EKG Q-T INTERVAL: 422 MS
EKG QRS DURATION: 122 MS
EKG QTC CALCULATION (BAZETT): 530 MS
EKG R AXIS: -41 DEGREES
EKG T AXIS: 70 DEGREES
EKG VENTRICULAR RATE: 95 BPM
EOSINOPHIL # BLD: 0.01 K/UL
EOSINOPHILS RELATIVE PERCENT: 0 % (ref 0–3)
ERYTHROCYTE [DISTWIDTH] IN BLOOD BY AUTOMATED COUNT: 14 % (ref 11.7–14.9)
GFR, ESTIMATED: 31 ML/MIN/1.73M2
GFR, ESTIMATED: 33 ML/MIN/1.73M2
GLUCOSE BLD-MCNC: 112 MG/DL (ref 74–99)
GLUCOSE BLD-MCNC: 127 MG/DL (ref 74–99)
GLUCOSE BLD-MCNC: 160 MG/DL (ref 74–99)
GLUCOSE BLD-MCNC: 179 MG/DL (ref 74–99)
GLUCOSE BLD-MCNC: 194 MG/DL (ref 74–99)
GLUCOSE BLD-MCNC: 195 MG/DL (ref 74–99)
GLUCOSE BLD-MCNC: 195 MG/DL (ref 74–99)
GLUCOSE BLD-MCNC: 198 MG/DL (ref 74–99)
GLUCOSE BLD-MCNC: 200 MG/DL (ref 74–99)
GLUCOSE BLD-MCNC: 202 MG/DL (ref 74–99)
GLUCOSE BLD-MCNC: 202 MG/DL (ref 74–99)
GLUCOSE BLD-MCNC: 207 MG/DL (ref 74–99)
GLUCOSE BLD-MCNC: 231 MG/DL (ref 74–99)
GLUCOSE BLD-MCNC: 283 MG/DL (ref 74–99)
GLUCOSE BLD-MCNC: 287 MG/DL (ref 74–99)
GLUCOSE SERPL-MCNC: 145 MG/DL (ref 74–99)
GLUCOSE SERPL-MCNC: 181 MG/DL (ref 74–99)
HCT VFR BLD AUTO: 35.9 % (ref 37–47)
HGB BLD-MCNC: 11.2 G/DL (ref 12.5–16)
IMM GRANULOCYTES # BLD AUTO: 0.01 K/UL
IMM GRANULOCYTES NFR BLD: 0 %
LYMPHOCYTES NFR BLD: 1.78 K/UL
LYMPHOCYTES RELATIVE PERCENT: 28 % (ref 24–44)
MAGNESIUM SERPL-MCNC: 2.2 MG/DL (ref 1.8–2.4)
MAGNESIUM SERPL-MCNC: 2.2 MG/DL (ref 1.8–2.4)
MCH RBC QN AUTO: 29.6 PG (ref 27–31)
MCHC RBC AUTO-ENTMCNC: 31.2 G/DL (ref 32–36)
MCV RBC AUTO: 95 FL (ref 78–100)
MONOCYTES NFR BLD: 0.55 K/UL
MONOCYTES NFR BLD: 9 % (ref 0–4)
NEUTROPHILS NFR BLD: 63 % (ref 36–66)
NEUTS SEG NFR BLD: 3.99 K/UL
PHOSPHATE SERPL-MCNC: 1.5 MG/DL (ref 2.5–4.9)
PHOSPHATE SERPL-MCNC: 1.9 MG/DL (ref 2.5–4.9)
PLATELET # BLD AUTO: 222 K/UL (ref 140–440)
PMV BLD AUTO: 9.5 FL (ref 7.5–11.1)
POTASSIUM SERPL-SCNC: 3.9 MMOL/L (ref 3.5–5.1)
POTASSIUM SERPL-SCNC: 4.5 MMOL/L (ref 3.5–5.1)
RBC # BLD AUTO: 3.78 M/UL (ref 4.2–5.4)
SODIUM SERPL-SCNC: 135 MMOL/L (ref 136–145)
SODIUM SERPL-SCNC: 138 MMOL/L (ref 136–145)
WBC OTHER # BLD: 6.4 K/UL (ref 4–10.5)

## 2024-11-13 PROCEDURE — 94761 N-INVAS EAR/PLS OXIMETRY MLT: CPT

## 2024-11-13 PROCEDURE — 80048 BASIC METABOLIC PNL TOTAL CA: CPT

## 2024-11-13 PROCEDURE — 2500000003 HC RX 250 WO HCPCS: Performed by: EMERGENCY MEDICINE

## 2024-11-13 PROCEDURE — 82962 GLUCOSE BLOOD TEST: CPT

## 2024-11-13 PROCEDURE — 1200000000 HC SEMI PRIVATE

## 2024-11-13 PROCEDURE — 85025 COMPLETE CBC W/AUTO DIFF WBC: CPT

## 2024-11-13 PROCEDURE — 6370000000 HC RX 637 (ALT 250 FOR IP): Performed by: PHYSICIAN ASSISTANT

## 2024-11-13 PROCEDURE — 6360000002 HC RX W HCPCS: Performed by: PHYSICIAN ASSISTANT

## 2024-11-13 PROCEDURE — 84100 ASSAY OF PHOSPHORUS: CPT

## 2024-11-13 PROCEDURE — 2580000003 HC RX 258: Performed by: PHYSICIAN ASSISTANT

## 2024-11-13 PROCEDURE — 2580000003 HC RX 258: Performed by: EMERGENCY MEDICINE

## 2024-11-13 PROCEDURE — 83735 ASSAY OF MAGNESIUM: CPT

## 2024-11-13 PROCEDURE — 6370000000 HC RX 637 (ALT 250 FOR IP): Performed by: INTERNAL MEDICINE

## 2024-11-13 PROCEDURE — 93010 ELECTROCARDIOGRAM REPORT: CPT | Performed by: INTERNAL MEDICINE

## 2024-11-13 PROCEDURE — 2000000000 HC ICU R&B

## 2024-11-13 PROCEDURE — 6370000000 HC RX 637 (ALT 250 FOR IP): Performed by: NURSE PRACTITIONER

## 2024-11-13 PROCEDURE — 2580000003 HC RX 258: Performed by: INTERNAL MEDICINE

## 2024-11-13 PROCEDURE — 6360000002 HC RX W HCPCS: Performed by: NURSE PRACTITIONER

## 2024-11-13 RX ORDER — INSULIN GLARGINE 100 [IU]/ML
35 INJECTION, SOLUTION SUBCUTANEOUS NIGHTLY
Status: DISCONTINUED | OUTPATIENT
Start: 2024-11-13 | End: 2024-11-14 | Stop reason: HOSPADM

## 2024-11-13 RX ORDER — INSULIN LISPRO 100 [IU]/ML
10 INJECTION, SOLUTION INTRAVENOUS; SUBCUTANEOUS
Status: DISCONTINUED | OUTPATIENT
Start: 2024-11-13 | End: 2024-11-13

## 2024-11-13 RX ORDER — INSULIN GLARGINE 100 [IU]/ML
10 INJECTION, SOLUTION SUBCUTANEOUS ONCE
Status: COMPLETED | OUTPATIENT
Start: 2024-11-13 | End: 2024-11-13

## 2024-11-13 RX ORDER — DEXTROSE MONOHYDRATE AND SODIUM CHLORIDE 5; .45 G/100ML; G/100ML
INJECTION, SOLUTION INTRAVENOUS CONTINUOUS
Status: DISCONTINUED | OUTPATIENT
Start: 2024-11-13 | End: 2024-11-13

## 2024-11-13 RX ORDER — SODIUM CHLORIDE, SODIUM LACTATE, POTASSIUM CHLORIDE, CALCIUM CHLORIDE 600; 310; 30; 20 MG/100ML; MG/100ML; MG/100ML; MG/100ML
INJECTION, SOLUTION INTRAVENOUS CONTINUOUS
Status: DISCONTINUED | OUTPATIENT
Start: 2024-11-13 | End: 2024-11-14 | Stop reason: HOSPADM

## 2024-11-13 RX ORDER — POLYETHYLENE GLYCOL 3350 17 G/17G
17 POWDER, FOR SOLUTION ORAL DAILY
Status: DISCONTINUED | OUTPATIENT
Start: 2024-11-14 | End: 2024-11-14 | Stop reason: HOSPADM

## 2024-11-13 RX ORDER — INSULIN LISPRO 100 [IU]/ML
0-8 INJECTION, SOLUTION INTRAVENOUS; SUBCUTANEOUS
Status: DISCONTINUED | OUTPATIENT
Start: 2024-11-13 | End: 2024-11-14

## 2024-11-13 RX ORDER — DOCUSATE SODIUM 100 MG/1
100 CAPSULE, LIQUID FILLED ORAL 2 TIMES DAILY
Status: DISCONTINUED | OUTPATIENT
Start: 2024-11-14 | End: 2024-11-14 | Stop reason: HOSPADM

## 2024-11-13 RX ORDER — INSULIN LISPRO 100 [IU]/ML
0-8 INJECTION, SOLUTION INTRAVENOUS; SUBCUTANEOUS
Status: DISCONTINUED | OUTPATIENT
Start: 2024-11-13 | End: 2024-11-13

## 2024-11-13 RX ADMIN — INSULIN GLARGINE 10 UNITS: 100 INJECTION, SOLUTION SUBCUTANEOUS at 20:35

## 2024-11-13 RX ADMIN — ALUMINUM HYDROXIDE, MAGNESIUM HYDROXIDE, AND SIMETHICONE 30 ML: 200; 200; 20 SUSPENSION ORAL at 09:17

## 2024-11-13 RX ADMIN — ONDANSETRON 8 MG: 2 INJECTION INTRAMUSCULAR; INTRAVENOUS at 20:02

## 2024-11-13 RX ADMIN — CARVEDILOL 12.5 MG: 6.25 TABLET, FILM COATED ORAL at 20:02

## 2024-11-13 RX ADMIN — DEXTROSE AND SODIUM CHLORIDE: 5; 450 INJECTION, SOLUTION INTRAVENOUS at 13:20

## 2024-11-13 RX ADMIN — ONDANSETRON 8 MG: 2 INJECTION INTRAMUSCULAR; INTRAVENOUS at 09:05

## 2024-11-13 RX ADMIN — HEPARIN SODIUM 5000 UNITS: 5000 INJECTION INTRAVENOUS; SUBCUTANEOUS at 07:53

## 2024-11-13 RX ADMIN — PANTOPRAZOLE SODIUM 40 MG: 40 INJECTION, POWDER, FOR SOLUTION INTRAVENOUS at 09:05

## 2024-11-13 RX ADMIN — METOCLOPRAMIDE HYDROCHLORIDE 5 MG: 5 INJECTION, SOLUTION INTRAMUSCULAR; INTRAVENOUS at 09:06

## 2024-11-13 RX ADMIN — LEVOTHYROXINE SODIUM 75 MCG: 0.07 TABLET ORAL at 07:53

## 2024-11-13 RX ADMIN — INSULIN LISPRO 4 UNITS: 100 INJECTION, SOLUTION INTRAVENOUS; SUBCUTANEOUS at 20:19

## 2024-11-13 RX ADMIN — SODIUM PHOSPHATE, MONOBASIC, MONOHYDRATE AND SODIUM PHOSPHATE, DIBASIC, ANHYDROUS 15 MMOL: 142; 276 INJECTION, SOLUTION INTRAVENOUS at 09:32

## 2024-11-13 RX ADMIN — CLOPIDOGREL BISULFATE 75 MG: 75 TABLET ORAL at 09:05

## 2024-11-13 RX ADMIN — ALUMINUM HYDROXIDE, MAGNESIUM HYDROXIDE, AND SIMETHICONE 30 ML: 200; 200; 20 SUSPENSION ORAL at 17:42

## 2024-11-13 RX ADMIN — HEPARIN SODIUM 5000 UNITS: 5000 INJECTION INTRAVENOUS; SUBCUTANEOUS at 15:13

## 2024-11-13 RX ADMIN — CARVEDILOL 12.5 MG: 6.25 TABLET, FILM COATED ORAL at 09:05

## 2024-11-13 RX ADMIN — METOCLOPRAMIDE HYDROCHLORIDE 5 MG: 5 INJECTION, SOLUTION INTRAMUSCULAR; INTRAVENOUS at 04:26

## 2024-11-13 RX ADMIN — INSULIN LISPRO 4 UNITS: 100 INJECTION, SOLUTION INTRAVENOUS; SUBCUTANEOUS at 17:43

## 2024-11-13 RX ADMIN — HYDRALAZINE HYDROCHLORIDE 10 MG: 20 INJECTION INTRAMUSCULAR; INTRAVENOUS at 18:49

## 2024-11-13 RX ADMIN — DEXTROSE AND SODIUM CHLORIDE: 5; 450 INJECTION, SOLUTION INTRAVENOUS at 04:59

## 2024-11-13 RX ADMIN — SODIUM CHLORIDE, POTASSIUM CHLORIDE, SODIUM LACTATE AND CALCIUM CHLORIDE: 600; 310; 30; 20 INJECTION, SOLUTION INTRAVENOUS at 15:24

## 2024-11-13 RX ADMIN — MAJOR MAGNESIUM CITRATE ORAL SOLUTION - LEMON 296 ML: 1.75 LIQUID ORAL at 17:52

## 2024-11-13 ASSESSMENT — ENCOUNTER SYMPTOMS
BACK PAIN: 0
COUGH: 0
ABDOMINAL DISTENTION: 0
CONSTIPATION: 0
SHORTNESS OF BREATH: 0
EYE DISCHARGE: 0
DIARRHEA: 0
SORE THROAT: 0
WHEEZING: 0

## 2024-11-13 ASSESSMENT — PAIN DESCRIPTION - PAIN TYPE: TYPE: ACUTE PAIN

## 2024-11-13 ASSESSMENT — PAIN DESCRIPTION - LOCATION
LOCATION: ABDOMEN

## 2024-11-13 ASSESSMENT — PAIN - FUNCTIONAL ASSESSMENT
PAIN_FUNCTIONAL_ASSESSMENT: ACTIVITIES ARE NOT PREVENTED

## 2024-11-13 ASSESSMENT — PAIN SCALES - GENERAL
PAINLEVEL_OUTOF10: 4
PAINLEVEL_OUTOF10: 0
PAINLEVEL_OUTOF10: 7
PAINLEVEL_OUTOF10: 0
PAINLEVEL_OUTOF10: 0
PAINLEVEL_OUTOF10: 8
PAINLEVEL_OUTOF10: 2

## 2024-11-13 ASSESSMENT — PAIN DESCRIPTION - DESCRIPTORS
DESCRIPTORS: CRAMPING
DESCRIPTORS: DISCOMFORT
DESCRIPTORS: CRAMPING

## 2024-11-13 ASSESSMENT — PAIN DESCRIPTION - ORIENTATION
ORIENTATION: MID;UPPER

## 2024-11-13 ASSESSMENT — PAIN DESCRIPTION - FREQUENCY: FREQUENCY: INTERMITTENT

## 2024-11-13 NOTE — CONSULTS
Consult completed. Procedure/rationale explained to pt & consent obtained. #22ga Nexiva extra-long, 1.75\" PIV initiated as secondary access using UltraSound-guided technique without difficulty/complications. Pt tolerated well and no other c/o or needs noted or reported. RN notified.     
Endocrinology   Consult Note  11/12/2024  8:42 AM     Primary Care provider: Loulou Skinner APRN - NP     Referring physician:  Theo Medina Jr., DO     Dear Doctor Carol    Thank You for the Consult     Pt. Was Admitted for : Mild hyperglycemia and DKA    Reason for Consult: Better control of blood glucose      History Obtained From:  Patient/ EMR       HISTORY OF PRESENT ILLNESS:                The patient is a 64 y.o. female with significant past medical history of type 2 diabetes mellitus, CKD, COPD, diabetic neuropathy, asthma, hypertension, hyperlipidemia was supposed to be taking insulin for diabetes mellitus however she was started on Ozempic and attempt to not only better control blood glucose but also to lose weight.  Patient also was given Jardiance.  Soon after she took the Ozempic she developed symptoms of gastritis and developed severe nausea and vomiting.  She was brought to emergency room if she found to be mildly hyperglycemic with DKA possibly related to her Jardiance administration.  Also contributed by dehydration from nausea and vomiting from Ozempic.  Patient started on IV insulin infusion drip protocol and admitted to ICU.  I was  consulted for better control of blood glucose.       ROS:   Pt's ROS done in detail.  Abnormal ROS are noted in Medical and Surgical History Section below:     Other Medical History:        Diagnosis Date    Arthritis     Asthma     Cerebral artery occlusion with cerebral infarction (HCC)     per old chart 8/2015- TIA    CKD stage G1/A2, GFR > 90 and albumin creatinine ratio  mg/g 09/01/2022    Sees Dr. Schulte    COPD (chronic obstructive pulmonary disease) (HCC)     Diabetes mellitus (HCC)     Diabetic neuropathy (HCC)     per old chart    Fracture     per old chart pt in ER 12/9/2018- after 2 days of arm pain after fall- dx with left humerus fx- for surg 12/31/2018    History of blood transfusion     Hyperlipidemia     Hypertension     
was performed using one or more of the following dose reduction techniques: automated exposure control, adjustment of the mA and/or kV according to patient size, and/or use of iterative reconstruction technique. Sagittal and coronal reformatted images were created and reviewed. Quality: Satisfactory. Oral contrast: Not given. IV Contrast: Isovue 370 Volume: 80 mL FINDINGS: ABDOMEN: Liver: Normal in size. Normal parenchymal attenuation. Spleen: Unremarkable. Gallbladder: Surgically absent. Bile ducts: Prominent common bile duct. Pancreas: No ductal dilatation. No masses. Adrenals: Normal. Kidneys:  Unremarkable size. No masses are identified. No calcifications. Symmetrical excretion of contrast. No signs of hydronephrosis. ABDOMEN & PELVIS: Gastrointestinal:  Normal in caliber. No pathologic air-fluid levels. Lymph nodes:         Retroperitoneal: No enlarged retroperitoneal lymph nodes.         Mesenteric: No enlarged mesenteric lymph nodes.         Pelvic: No enlarged pelvic lymph nodes. Peritoneum: No ascites or free air. No other fluid collections.. Vessels: Visceral artery calcifications. Retroperitoneum: Normal. Abdominal wall/Soft tissue: Small fat-containing umbilical hernia. PELVIS: Bladder: Markedly distended. Reproductive: No pelvic masses. Appendix: No evidence of acute appendicitis. OTHER: Bones: Normal. Lung bases: Normal.     1. Status post cholecystectomy. 2. Prominent common bile duct most likely compensatory. 3. Small fat-containing umbilical hernia. 4. Markedly distended urinary bladder.. Electronically signed by Jayson Sharif      CBC:   Recent Labs     11/12/24  0950 11/13/24  0500   WBC 13.1* 6.4   HGB 13.4 11.2*    222     BMP:    Recent Labs     11/12/24  2048 11/13/24  0030 11/13/24  0500    138 135*   K 4.0 3.9 4.5    109 108   CO2 17* 18* 17*   BUN 31* 31* 29*   CREATININE 1.7* 1.6* 1.6*   GLUCOSE 181* 145* 181*     Hepatic:   Recent Labs     11/12/24  0950   AST 24   ALT

## 2024-11-14 VITALS
SYSTOLIC BLOOD PRESSURE: 163 MMHG | OXYGEN SATURATION: 98 % | RESPIRATION RATE: 17 BRPM | HEIGHT: 64 IN | DIASTOLIC BLOOD PRESSURE: 68 MMHG | HEART RATE: 80 BPM | WEIGHT: 220.46 LBS | TEMPERATURE: 98.7 F | BODY MASS INDEX: 37.64 KG/M2

## 2024-11-14 LAB
ANION GAP SERPL CALCULATED.3IONS-SCNC: 14 MMOL/L (ref 9–17)
BASOPHILS # BLD: 0 K/UL
BASOPHILS NFR BLD: 0 % (ref 0–1)
BUN SERPL-MCNC: 20 MG/DL (ref 7–20)
CALCIUM SERPL-MCNC: 6.8 MG/DL (ref 8.3–10.6)
CHLORIDE SERPL-SCNC: 107 MMOL/L (ref 99–110)
CO2 SERPL-SCNC: 16 MMOL/L (ref 21–32)
CREAT SERPL-MCNC: 1.2 MG/DL (ref 0.6–1.2)
EOSINOPHIL # BLD: 0 K/UL
EOSINOPHILS RELATIVE PERCENT: 0 % (ref 0–3)
ERYTHROCYTE [DISTWIDTH] IN BLOOD BY AUTOMATED COUNT: 13.9 % (ref 11.7–14.9)
GFR, ESTIMATED: 45 ML/MIN/1.73M2
GLUCOSE BLD-MCNC: 176 MG/DL (ref 74–99)
GLUCOSE BLD-MCNC: 192 MG/DL (ref 74–99)
GLUCOSE BLD-MCNC: 221 MG/DL (ref 74–99)
GLUCOSE SERPL-MCNC: 214 MG/DL (ref 74–99)
HCT VFR BLD AUTO: 36 % (ref 37–47)
HGB BLD-MCNC: 11.2 G/DL (ref 12.5–16)
IMM GRANULOCYTES # BLD AUTO: 0 K/UL
IMM GRANULOCYTES NFR BLD: 0 %
LYMPHOCYTES NFR BLD: 1.36 K/UL
LYMPHOCYTES RELATIVE PERCENT: 31 % (ref 24–44)
MAGNESIUM SERPL-MCNC: 2.3 MG/DL (ref 1.8–2.4)
MCH RBC QN AUTO: 29.2 PG (ref 27–31)
MCHC RBC AUTO-ENTMCNC: 31.1 G/DL (ref 32–36)
MCV RBC AUTO: 94 FL (ref 78–100)
MONOCYTES NFR BLD: 0.38 K/UL
MONOCYTES NFR BLD: 9 % (ref 0–4)
NEUTROPHILS NFR BLD: 61 % (ref 36–66)
NEUTS SEG NFR BLD: 2.68 K/UL
PHOSPHATE SERPL-MCNC: 1.4 MG/DL (ref 2.5–4.9)
PLATELET # BLD AUTO: 171 K/UL (ref 140–440)
PMV BLD AUTO: 9.5 FL (ref 7.5–11.1)
POTASSIUM SERPL-SCNC: 4.5 MMOL/L (ref 3.5–5.1)
RBC # BLD AUTO: 3.83 M/UL (ref 4.2–5.4)
SODIUM SERPL-SCNC: 137 MMOL/L (ref 136–145)
WBC OTHER # BLD: 4.4 K/UL (ref 4–10.5)

## 2024-11-14 PROCEDURE — 2580000003 HC RX 258: Performed by: EMERGENCY MEDICINE

## 2024-11-14 PROCEDURE — 6370000000 HC RX 637 (ALT 250 FOR IP): Performed by: PHYSICIAN ASSISTANT

## 2024-11-14 PROCEDURE — 84100 ASSAY OF PHOSPHORUS: CPT

## 2024-11-14 PROCEDURE — 6370000000 HC RX 637 (ALT 250 FOR IP): Performed by: INTERNAL MEDICINE

## 2024-11-14 PROCEDURE — 83735 ASSAY OF MAGNESIUM: CPT

## 2024-11-14 PROCEDURE — 80048 BASIC METABOLIC PNL TOTAL CA: CPT

## 2024-11-14 PROCEDURE — 2500000003 HC RX 250 WO HCPCS: Performed by: EMERGENCY MEDICINE

## 2024-11-14 PROCEDURE — 6360000002 HC RX W HCPCS: Performed by: PHYSICIAN ASSISTANT

## 2024-11-14 PROCEDURE — 2580000003 HC RX 258: Performed by: INTERNAL MEDICINE

## 2024-11-14 PROCEDURE — 85025 COMPLETE CBC W/AUTO DIFF WBC: CPT

## 2024-11-14 PROCEDURE — 82962 GLUCOSE BLOOD TEST: CPT

## 2024-11-14 PROCEDURE — 6360000002 HC RX W HCPCS: Performed by: NURSE PRACTITIONER

## 2024-11-14 RX ORDER — INSULIN LISPRO 100 [IU]/ML
0-16 INJECTION, SOLUTION INTRAVENOUS; SUBCUTANEOUS
Status: DISCONTINUED | OUTPATIENT
Start: 2024-11-14 | End: 2024-11-14

## 2024-11-14 RX ORDER — INSULIN LISPRO 100 [IU]/ML
0-16 INJECTION, SOLUTION INTRAVENOUS; SUBCUTANEOUS
Status: DISCONTINUED | OUTPATIENT
Start: 2024-11-14 | End: 2024-11-14 | Stop reason: HOSPADM

## 2024-11-14 RX ORDER — MAGNESIUM HYDROXIDE/ALUMINUM HYDROXICE/SIMETHICONE 120; 1200; 1200 MG/30ML; MG/30ML; MG/30ML
30 SUSPENSION ORAL EVERY 6 HOURS PRN
Qty: 1 EACH | Refills: 0 | Status: SHIPPED | OUTPATIENT
Start: 2024-11-14 | End: 2024-11-19

## 2024-11-14 RX ADMIN — CARVEDILOL 12.5 MG: 6.25 TABLET, FILM COATED ORAL at 08:24

## 2024-11-14 RX ADMIN — SODIUM CHLORIDE, POTASSIUM CHLORIDE, SODIUM LACTATE AND CALCIUM CHLORIDE: 600; 310; 30; 20 INJECTION, SOLUTION INTRAVENOUS at 03:39

## 2024-11-14 RX ADMIN — ONDANSETRON 8 MG: 2 INJECTION INTRAMUSCULAR; INTRAVENOUS at 06:16

## 2024-11-14 RX ADMIN — PANTOPRAZOLE SODIUM 40 MG: 40 INJECTION, POWDER, FOR SOLUTION INTRAVENOUS at 08:22

## 2024-11-14 RX ADMIN — PRAZOSIN HYDROCHLORIDE 5 MG: 5 CAPSULE ORAL at 00:01

## 2024-11-14 RX ADMIN — VENLAFAXINE 75 MG: 37.5 TABLET ORAL at 00:01

## 2024-11-14 RX ADMIN — CLOPIDOGREL BISULFATE 75 MG: 75 TABLET ORAL at 08:24

## 2024-11-14 RX ADMIN — HEPARIN SODIUM 5000 UNITS: 5000 INJECTION INTRAVENOUS; SUBCUTANEOUS at 00:01

## 2024-11-14 RX ADMIN — HEPARIN SODIUM 5000 UNITS: 5000 INJECTION INTRAVENOUS; SUBCUTANEOUS at 06:06

## 2024-11-14 RX ADMIN — INSULIN LISPRO 2 UNITS: 100 INJECTION, SOLUTION INTRAVENOUS; SUBCUTANEOUS at 06:16

## 2024-11-14 RX ADMIN — ATORVASTATIN CALCIUM 80 MG: 40 TABLET, FILM COATED ORAL at 00:01

## 2024-11-14 RX ADMIN — LEVOTHYROXINE SODIUM 75 MCG: 0.07 TABLET ORAL at 06:06

## 2024-11-14 RX ADMIN — INSULIN LISPRO 4 UNITS: 100 INJECTION, SOLUTION INTRAVENOUS; SUBCUTANEOUS at 08:22

## 2024-11-14 RX ADMIN — SODIUM PHOSPHATE, MONOBASIC, MONOHYDRATE AND SODIUM PHOSPHATE, DIBASIC, ANHYDROUS 15 MMOL: 142; 276 INJECTION, SOLUTION INTRAVENOUS at 09:21

## 2024-11-14 ASSESSMENT — PAIN SCALES - GENERAL: PAINLEVEL_OUTOF10: 0

## 2024-11-14 NOTE — PROGRESS NOTES
V2.0  [facility] Critical [facility]  Progress Note      Name:  Arabella Grey /Age/Sex: 1960  (64 y.o. female)   MRN & CSN:  [id number] & [id number] Encounter Date/Time: 2024 9:23 AM EST    Location:  -A PCP: Loulou Skinner APRN - NP       Hospital Day: 2    Assessment and Plan:   Arabella Gery is a 64 y.o. 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 No nausea or vomiting  Complaining of epigastric pain-ordered Mylanta  : AG-10, Cr-1.6, improved renal function ( baseline 1.5-1.6) bicarb-improved from 10 to 17 this am, K-4.5,  still on insulin gtt,   Monitor every 4 hours labs until pt is on insulin gtt, fluid and electrolyte replacement per DKA protocol  Heme: Hb-11.2, platelet count-222 K, patient is following hematology as outpatient for coagulopathy and easy bruisability, monitor for signs of bleeding, transfuse for Hb<7  ID: WBC count-6.4, CT abdomen and pelvis-unremarkable, monitor off antibiotics, no concerns for infection.  Endo: Presented with high anion gap metabolic acidosis, 
  Progress Note( Dr. Gill)  11/14/2024  Subjective:   Admit Date: 11/12/2024  PCP: Loulou Skinner APRN - NP    Admitted For : Mildly helpful glycemic DKA possibly related to Jardiance    Consulted For: Better control of blood glucose    Interval History: Patient doing a lot better today   Continues to have nausea all day yesterday was not able to eat much    denies any chest pains,   Denies SOB .   No more alcohol in little nausea but no vomiting.   No new bowel or bladder symptoms.       Intake/Output Summary (Last 24 hours) at 11/14/2024 0830  Last data filed at 11/14/2024 0620  Gross per 24 hour   Intake 1367.18 ml   Output --   Net 1367.18 ml       DATA    CBC:   Recent Labs     11/12/24  0950 11/13/24  0500 11/14/24  0430   WBC 13.1* 6.4 4.4   HGB 13.4 11.2* 11.2*    222 171    CMP:  Recent Labs     11/12/24  0950 11/12/24  1445 11/13/24  0030 11/13/24  0500 11/14/24  0430      < > 138 135* 137   K 5.8*   < > 3.9 4.5 4.5      < > 109 108 107   CO2 10*   < > 18* 17* 16*   BUN 35*   < > 31* 29* 20   CREATININE 1.7*   < > 1.6* 1.6* 1.2   CALCIUM 7.7*   < > 6.7* 6.9* 6.8*   BILITOT 0.4  --   --   --   --    ALKPHOS 114  --   --   --   --    AST 24  --   --   --   --    ALT 26  --   --   --   --     < > = values in this interval not displayed.     Lipids:   Lab Results   Component Value Date/Time    CHOL 203 12/01/2022 06:20 AM    HDL 63 12/01/2022 06:20 AM    TRIG 140 12/01/2022 06:20 AM     Glucose:  Recent Labs     11/13/24  2346 11/14/24  0612 11/14/24  0748   POCGLU 202* 221* 192*     [id number]:  Lab Results   Component Value Date/Time    LABA1C 7.7 10/16/2024 06:30 AM     High Sensitivity TSH:   Lab Results   Component Value Date/Time    TSHHS 2.090 05/01/2024 09:45 AM     Free T3: No results found for: \"FT3\"  Free T4:  Lab Results   Component Value Date/Time    T4FREE 1.1 10/16/2024 06:30 AM       CT ABDOMEN PELVIS W IV CONTRAST Additional Contrast? None   Final Result 
4 Eyes Skin Assessment     NAME:  Arabella Grey  YOB: 1960  MEDICAL RECORD NUMBER:  [id number]    The patient is being assessed for  Admission    I agree that at least one RN has performed a thorough Head to Toe Skin Assessment on the patient. ALL assessment sites listed below have been assessed.      Areas assessed by both nurses:    Head, Face, Ears, Shoulders, Back, Chest, Arms, Elbows, Hands, Sacrum. Buttock, Coccyx, Ischium, Legs. Feet and Heels, and Under Medical Devices         Does the Patient have a Wound? No noted wound(s)       Trent Prevention initiated by RN: Yes  Wound Care Orders initiated by RN: No    Pressure Injury (Stage 3,4, Unstageable, DTI, NWPT, and Complex wounds) if present, place Wound referral order by RN under : No    New Ostomies, if present place, Ostomy referral order under : No     Nurse 1 eSignature: Electronically signed by Virginie Reza RN on 11/13/24 at 1:20 AM EST    **SHARE this note so that the co-signing nurse can place an eSignature**    Nurse 2 eSignature: Electronically signed by Evangelina Sebastian RN on 11/13/24 at 5:00 AM EST   
Called lab to check on results still pending  
Clinical  for [facility]   
Phosphate 1.4 , per PRN order states to notify MD, message sent to Dr. Moore  
Pt identified as a candidate for COPD/GOLD assessment. PFTs are required for confirmation of diagnosis and GOLD grading used for pharmacological and nonpharmacological therapy recommendations.  Patient would benefit if a pft were to be ordered, after discharged and able to complete PFT testing as an out-patient.  
Spiritual Health History and Assessment/Progress Note  [facility]    Spiritual/Emotional Needs,  ,  ,      Name: Arabella Grey MRN: [id number]    Age: 64 y.o.     Sex: female   Language: English   Temple: [employer]ian[employer]   DKA, type 1, not at goal (HCC)     Date: 11/13/2024            Total Time Calculated: 8 min              Spiritual Assessment began in [facility] ICU        Referral/Consult From: Rounding   Encounter Overview/Reason: Spiritual/Emotional Needs  Service Provided For: Patient    Samia, Belief, Meaning:   Patient identifies as spiritual  Family/Friends No family/friends present      Importance and Influence:  Patient has spiritual/personal beliefs that influence decisions regarding their health  Family/Friends No family/friends present    Community:  Patient feels well-supported. Support system includes: Friends  Family/Friends No family/friends present    Assessment and Plan of Care:     Patient Interventions include: Facilitated expression of thoughts and feelings  Family/Friends Interventions include: No family/friends present    Patient Plan of Care: Spiritual Care available upon further referral  Family/Friends Plan of Care: No family/friends present    Electronically signed by Chaplain Shauna on 11/13/2024 at 12:21 PM   
Prominent common bile duct most likely compensatory.   3. Small fat-containing umbilical hernia.   4. Markedly distended urinary bladder..      Electronically signed by Jayson Sharif           Scheduled Medicines   Medications:    insulin lispro  10 Units SubCUTAneous TID WC    insulin glargine  35 Units SubCUTAneous Nightly    insulin lispro  0-8 Units SubCUTAneous TID     sodium zirconium cyclosilicate  5 g Oral Daily    insulin regular  1-20 Units IntraVENous Once    metoclopramide  5 mg IntraVENous Q6H    pantoprazole  40 mg IntraVENous Daily    heparin (porcine)  5,000 Units SubCUTAneous 3 times per day    venlafaxine  75 mg Oral Nightly    atorvastatin  80 mg Oral Nightly    prazosin  5 mg Oral Nightly    carvedilol  12.5 mg Oral BID    levothyroxine  75 mcg Oral QAM AC    clopidogrel  75 mg Oral Daily      Infusions:    sodium chloride      sodium chloride      insulin 1.3 Units/hr (11/13/24 0628)         Objective:   Vitals: BP (!) 115/44   Pulse 70   Temp 98.2 °F (36.8 °C) (Oral)   Resp 16   Ht 1.626 m (5' 4\")   Wt 95.3 kg (210 lb)   SpO2 100%   BMI 36.05 kg/m²   General appearance: alert and cooperative with exam  Neck: no JVD or bruit  Thyroid : Normal lobes   Lungs: Has Vesicular Breath sounds   Heart:  regular rate and rhythm  Abdomen: soft, non-tender; bowel sounds normal; no masses,  no organomegaly  Musculoskeletal: Normal  Extremities: extremities normal, , no edema  Neurologic:  Awake, alert, oriented to name, place and time.  Cranial nerves II-XII are grossly intact.  Motor is  intact.  Sensory neuropathy,  and gait is normal.    Assessment:     Patient Active Problem List:     Proximal humeral fracture     Asthma     Bereavement     Pain of both hip joints     Chest pain     Chronic abdominal pain     Chronic back pain     Chronic obstructive lung disease (HCC)     Corns and callosities     Depressive disorder     Type II diabetes mellitus, uncontrolled     Diabetic polyneuropathy (HCC)

## 2024-11-14 NOTE — CARE COORDINATION
CM informed per Dr Moore that pt is ready for Discharge.  CM called Bev at [facility] and informed of pending discharge. Bev informed CM that they are able to accept today. CM asked informed her that pt would need transport. Bev is to return call regarding transport.  When discharged pt will be returning to   [facility].  Fax AVS,D/S and H/P to [phone]  Call report to [phone] 1350 Per Bev at [facility] they will pick pt up at 230. CM spoke with nursing and updated. CM updated pt on discharge time.   1410 CM faxed AVS and D/S to the [facility] as per their request.

## 2024-11-14 NOTE — PLAN OF CARE
Problem: Chronic Conditions and Co-morbidities  Goal: Patient's chronic conditions and co-morbidity symptoms are monitored and maintained or improved  Outcome: Progressing     Problem: Discharge Planning  Goal: Discharge to home or other facility with appropriate resources  Outcome: Progressing     Problem: Pain  Goal: Verbalizes/displays adequate comfort level or baseline comfort level  Outcome: Adequate for Discharge     Problem: Skin/Tissue Integrity  Goal: Absence of new skin breakdown  Description: 1.  Monitor for areas of redness and/or skin breakdown  2.  Assess vascular access sites hourly  3.  Every 4-6 hours minimum:  Change oxygen saturation probe site  4.  Every 4-6 hours:  If on nasal continuous positive airway pressure, respiratory therapy assess nares and determine need for appliance change or resting period.  Outcome: Adequate for Discharge     Problem: ABCDS Injury Assessment  Goal: Absence of physical injury  Outcome: Adequate for Discharge     Problem: Safety - Adult  Goal: Free from fall injury  Outcome: Adequate for Discharge     
  Problem: Chronic Conditions and Co-morbidities  Goal: Patient's chronic conditions and co-morbidity symptoms are monitored and maintained or improved  Outcome: Progressing     Problem: Discharge Planning  Goal: Discharge to home or other facility with appropriate resources  Outcome: Progressing     Problem: Pain  Goal: Verbalizes/displays adequate comfort level or baseline comfort level  Outcome: Adequate for Discharge     Problem: Skin/Tissue Integrity  Goal: Absence of new skin breakdown  Description: 1.  Monitor for areas of redness and/or skin breakdown  2.  Assess vascular access sites hourly  3.  Every 4-6 hours minimum:  Change oxygen saturation probe site  4.  Every 4-6 hours:  If on nasal continuous positive airway pressure, respiratory therapy assess nares and determine need for appliance change or resting period.  Outcome: Adequate for Discharge     Problem: ABCDS Injury Assessment  Goal: Absence of physical injury  Outcome: Adequate for Discharge     Problem: Safety - Adult  Goal: Free from fall injury  Outcome: Adequate for Discharge     
  Problem: Discharge Planning  Goal: Discharge to home or other facility with appropriate resources  11/14/2024 0858 by Freda Sol  Outcome: Progressing  11/14/2024 0409 by Virginie Reza RN  Outcome: Progressing     Problem: Pain  Goal: Verbalizes/displays adequate comfort level or baseline comfort level  11/14/2024 0858 by Freda Sol  Outcome: Progressing  11/14/2024 0409 by Virginie Reza, RN  Outcome: Adequate for Discharge     Problem: Skin/Tissue Integrity  Goal: Absence of new skin breakdown  Description: 1.  Monitor for areas of redness and/or skin breakdown  2.  Assess vascular access sites hourly  3.  Every 4-6 hours minimum:  Change oxygen saturation probe site  4.  Every 4-6 hours:  If on nasal continuous positive airway pressure, respiratory therapy assess nares and determine need for appliance change or resting period.  11/14/2024 0858 by Freda Sol  Outcome: Progressing  11/14/2024 0409 by Virginie Reza, RN  Outcome: Adequate for Discharge     Problem: ABCDS Injury Assessment  Goal: Absence of physical injury  11/14/2024 0858 by Freda Sol  Outcome: Progressing  11/14/2024 0409 by Virginie Reza, RN  Outcome: Adequate for Discharge     Problem: Safety - Adult  Goal: Free from fall injury  11/14/2024 0858 by Freda Sol  Outcome: Progressing  11/14/2024 0409 by Virginie Reza, RN  Outcome: Adequate for Discharge     
Freda  Outcome: Progressing  11/14/2024 0409 by Virginie Reza RN  Outcome: Adequate for Discharge     Problem: ABCDS Injury Assessment  Goal: Absence of physical injury  11/14/2024 1354 by Tami Young RN  Outcome: Adequate for Discharge  11/14/2024 0858 by Freda Sol  Outcome: Progressing  11/14/2024 0409 by Virginie Reza RN  Outcome: Adequate for Discharge     Problem: Safety - Adult  Goal: Free from fall injury  11/14/2024 1354 by Tami Young RN  Outcome: Adequate for Discharge  11/14/2024 0858 by Freda Sol  Outcome: Progressing  11/14/2024 0409 by Virginie Reza RN  Outcome: Adequate for Discharge

## 2024-11-14 NOTE — DISCHARGE SUMMARY
range of motion.      Cervical back: Normal range of motion.   Skin:     General: Skin is warm.      Capillary Refill: Capillary refill takes less than 2 seconds.   Neurological:      General: No focal deficit present.      Mental Status: She is alert and oriented to person, place, and time.   Psychiatric:         Mood and Affect: Mood normal.         Behavior: Behavior normal.              Labs and Imaging   CT ABDOMEN PELVIS W IV CONTRAST Additional Contrast? None    Result Date: 11/12/2024  CT ABDOMEN-PELVIS CLINICAL INFORMATION: Abdominal pain. Nausea. Vomiting. COMPARISON: No previous. Technique: Contiguous axial scans of 2.5 mm slice thicknesses. The exam was performed using one or more of the following dose reduction techniques: automated exposure control, adjustment of the mA and/or kV according to patient size, and/or use of iterative reconstruction technique. Sagittal and coronal reformatted images were created and reviewed. Quality: Satisfactory. Oral contrast: Not given. IV Contrast: Isovue 370 Volume: 80 mL FINDINGS: ABDOMEN: Liver: Normal in size. Normal parenchymal attenuation. Spleen: Unremarkable. Gallbladder: Surgically absent. Bile ducts: Prominent common bile duct. Pancreas: No ductal dilatation. No masses. Adrenals: Normal. Kidneys:  Unremarkable size. No masses are identified. No calcifications. Symmetrical excretion of contrast. No signs of hydronephrosis. ABDOMEN & PELVIS: Gastrointestinal:  Normal in caliber. No pathologic air-fluid levels. Lymph nodes:         Retroperitoneal: No enlarged retroperitoneal lymph nodes.         Mesenteric: No enlarged mesenteric lymph nodes.         Pelvic: No enlarged pelvic lymph nodes. Peritoneum: No ascites or free air. No other fluid collections.. Vessels: Visceral artery calcifications. Retroperitoneum: Normal. Abdominal wall/Soft tissue: Small fat-containing umbilical hernia. PELVIS: Bladder: Markedly distended. Reproductive: No pelvic masses. Appendix:

## 2024-11-15 LAB
EST. AVERAGE GLUCOSE BLD GHB EST-MCNC: 179 MG/DL
HBA1C MFR BLD: 7.9 % (ref 4.2–6.3)

## 2024-11-20 ENCOUNTER — HOSPITAL ENCOUNTER (OUTPATIENT)
Age: 64
Setting detail: SPECIMEN
Discharge: HOME | End: 2024-11-20
Payer: MEDICARE

## 2024-11-20 PROCEDURE — 81001 URINALYSIS AUTO W/SCOPE: CPT

## 2024-11-20 PROCEDURE — 87186 SC STD MICRODIL/AGAR DIL: CPT

## 2024-11-20 PROCEDURE — 87077 CULTURE AEROBIC IDENTIFY: CPT

## 2024-11-20 PROCEDURE — 87086 URINE CULTURE/COLONY COUNT: CPT

## 2024-11-21 ENCOUNTER — HOSPITAL ENCOUNTER (OUTPATIENT)
Age: 64
Setting detail: SPECIMEN
Discharge: HOME | End: 2024-11-21
Payer: MEDICARE

## 2024-11-21 LAB
ALBUMIN SERPL-MCNC: 3.2 G/DL (ref 3.4–5)
ALBUMIN/GLOB SERPL: 1.8 {RATIO} (ref 1.1–2.2)
ALP SERPL-CCNC: 112 U/L (ref 40–129)
ALT SERPL-CCNC: 25 U/L (ref 10–40)
ANION GAP SERPL CALCULATED.3IONS-SCNC: 10 MMOL/L (ref 9–17)
AST SERPL-CCNC: 25 U/L (ref 15–37)
BACTERIA URNS QL MICRO: ABNORMAL
BASOPHILS # BLD: 0.01 K/UL
BASOPHILS NFR BLD: 0 % (ref 0–1)
BILIRUB SERPL-MCNC: 0.3 MG/DL (ref 0–1)
BILIRUB UR QL STRIP: NEGATIVE
BUN SERPL-MCNC: 29 MG/DL (ref 7–20)
CALCIUM SERPL-MCNC: 8.6 MG/DL (ref 8.3–10.6)
CASTS #/AREA URNS LPF: ABNORMAL /LPF
CHARACTER UR: ABNORMAL
CHLORIDE SERPL-SCNC: 101 MMOL/L (ref 99–110)
CLARITY UR: ABNORMAL
CO2 SERPL-SCNC: 27 MMOL/L (ref 21–32)
COLOR UR: YELLOW
CREAT SERPL-MCNC: 1.4 MG/DL (ref 0.6–1.2)
EOSINOPHIL # BLD: 0 K/UL
EOSINOPHILS RELATIVE PERCENT: 0 % (ref 0–3)
EPI CELLS #/AREA URNS HPF: 2 /HPF
ERYTHROCYTE [DISTWIDTH] IN BLOOD BY AUTOMATED COUNT: 13.7 % (ref 11.7–14.9)
GFR, ESTIMATED: 38 ML/MIN/1.73M2
GLUCOSE SERPL-MCNC: 254 MG/DL (ref 74–99)
GLUCOSE UR STRIP-MCNC: 500 MG/DL
HCT VFR BLD AUTO: 39 % (ref 37–47)
HGB BLD-MCNC: 12.1 G/DL (ref 12.5–16)
HGB UR QL STRIP.AUTO: NEGATIVE
IMM GRANULOCYTES # BLD AUTO: 0.01 K/UL
IMM GRANULOCYTES NFR BLD: 0 %
KETONES UR STRIP-MCNC: NEGATIVE MG/DL
LEUKOCYTE ESTERASE UR QL STRIP: ABNORMAL
LYMPHOCYTES NFR BLD: 1.57 K/UL
LYMPHOCYTES RELATIVE PERCENT: 27 % (ref 24–44)
MCH RBC QN AUTO: 29.7 PG (ref 27–31)
MCHC RBC AUTO-ENTMCNC: 31 G/DL (ref 32–36)
MCV RBC AUTO: 95.8 FL (ref 78–100)
MONOCYTES NFR BLD: 0.77 K/UL
MONOCYTES NFR BLD: 13 % (ref 0–4)
MUCOUS THREADS URNS QL MICRO: ABNORMAL
NEUTROPHILS NFR BLD: 59 % (ref 36–66)
NEUTS SEG NFR BLD: 3.42 K/UL
NITRITE UR QL STRIP: NEGATIVE
PH UR STRIP: 6 [PH] (ref 5–8)
PLATELET # BLD AUTO: 237 K/UL (ref 140–440)
PMV BLD AUTO: 10 FL (ref 7.5–11.1)
POTASSIUM SERPL-SCNC: 5.4 MMOL/L (ref 3.5–5.1)
PROT SERPL-MCNC: 5 G/DL (ref 6.4–8.2)
PROT UR STRIP-MCNC: 30 MG/DL
RBC # BLD AUTO: 4.07 M/UL (ref 4.2–5.4)
RBC #/AREA URNS HPF: <1 /HPF (ref 0–2)
SODIUM SERPL-SCNC: 138 MMOL/L (ref 136–145)
SP GR UR STRIP: 1.01 (ref 1–1.03)
TRICHOMONAS #/AREA URNS HPF: ABNORMAL /[HPF]
UROBILINOGEN UR STRIP-ACNC: 0.2 EU/DL (ref 0.2–1)
WBC #/AREA URNS HPF: 13 /HPF (ref 0–5)
WBC OTHER # BLD: 5.8 K/UL (ref 4–10.5)

## 2024-11-21 PROCEDURE — 36415 COLL VENOUS BLD VENIPUNCTURE: CPT

## 2024-11-21 PROCEDURE — 80053 COMPREHEN METABOLIC PANEL: CPT

## 2024-11-21 PROCEDURE — 85025 COMPLETE CBC W/AUTO DIFF WBC: CPT

## 2024-11-23 LAB
MICROORGANISM SPEC CULT: ABNORMAL
SERVICE CMNT-IMP: ABNORMAL
SPECIMEN DESCRIPTION: ABNORMAL

## 2024-12-19 NOTE — FLOWSHEET NOTE
Per ELIZABETH Smith at [facility], patient has no POLST forms completed with the facility.    Signed poc, scanned.

## 2025-01-29 ENCOUNTER — HOSPITAL ENCOUNTER (OUTPATIENT)
Age: 65
Setting detail: SPECIMEN
Discharge: HOME | End: 2025-01-29
Payer: MEDICARE

## 2025-01-29 LAB
ALBUMIN SERPL-MCNC: 3.5 G/DL (ref 3.4–5)
ALBUMIN/GLOB SERPL: 2.2 {RATIO} (ref 1.1–2.2)
ALP SERPL-CCNC: 77 U/L (ref 40–129)
ALT SERPL-CCNC: 30 U/L (ref 10–40)
ANION GAP SERPL CALCULATED.3IONS-SCNC: 9 MMOL/L (ref 9–17)
AST SERPL-CCNC: 24 U/L (ref 15–37)
BILIRUB SERPL-MCNC: 0.3 MG/DL (ref 0–1)
BUN SERPL-MCNC: 33 MG/DL (ref 7–20)
CALCIUM SERPL-MCNC: 8.2 MG/DL (ref 8.3–10.6)
CHLORIDE SERPL-SCNC: 110 MMOL/L (ref 99–110)
CK SERPL-CCNC: 74 U/L (ref 26–192)
CO2 SERPL-SCNC: 23 MMOL/L (ref 21–32)
CREAT SERPL-MCNC: 1.4 MG/DL (ref 0.6–1.2)
EST. AVERAGE GLUCOSE BLD GHB EST-MCNC: 200 MG/DL
GFR, ESTIMATED: 37 ML/MIN/1.73M2
GLUCOSE SERPL-MCNC: 251 MG/DL (ref 74–99)
HBA1C MFR BLD: 8.6 % (ref 4.2–6.3)
POTASSIUM SERPL-SCNC: 5 MMOL/L (ref 3.5–5.1)
PROT SERPL-MCNC: 5.1 G/DL (ref 6.4–8.2)
SODIUM SERPL-SCNC: 141 MMOL/L (ref 136–145)

## 2025-01-29 PROCEDURE — 82550 ASSAY OF CK (CPK): CPT

## 2025-01-29 PROCEDURE — 83036 HEMOGLOBIN GLYCOSYLATED A1C: CPT

## 2025-01-29 PROCEDURE — 80053 COMPREHEN METABOLIC PANEL: CPT

## 2025-03-19 ENCOUNTER — HOSPITAL ENCOUNTER (OUTPATIENT)
Age: 65
Setting detail: SPECIMEN
Discharge: HOME | End: 2025-03-19
Payer: MEDICARE

## 2025-03-19 LAB
ALBUMIN SERPL-MCNC: 3.6 G/DL (ref 3.4–5)
ALBUMIN/GLOB SERPL: 2.1 {RATIO} (ref 1.1–2.2)
ALP SERPL-CCNC: 99 U/L (ref 40–129)
ALT SERPL-CCNC: 47 U/L (ref 10–40)
ANION GAP SERPL CALCULATED.3IONS-SCNC: 11 MMOL/L (ref 9–17)
AST SERPL-CCNC: 45 U/L (ref 15–37)
BILIRUB SERPL-MCNC: 0.2 MG/DL (ref 0–1)
BUN SERPL-MCNC: 47 MG/DL (ref 7–20)
CALCIUM SERPL-MCNC: 8.9 MG/DL (ref 8.3–10.6)
CHLORIDE SERPL-SCNC: 105 MMOL/L (ref 99–110)
CO2 SERPL-SCNC: 22 MMOL/L (ref 21–32)
CREAT SERPL-MCNC: 1.9 MG/DL (ref 0.6–1.2)
ERYTHROCYTE [DISTWIDTH] IN BLOOD BY AUTOMATED COUNT: 12.4 % (ref 11.7–14.9)
EST. AVERAGE GLUCOSE BLD GHB EST-MCNC: 195 MG/DL
GFR, ESTIMATED: 27 ML/MIN/1.73M2
GLUCOSE SERPL-MCNC: 413 MG/DL (ref 74–99)
HBA1C MFR BLD: 8.4 % (ref 4.2–6.3)
HCT VFR BLD AUTO: 36.3 % (ref 37–47)
HGB BLD-MCNC: 11.4 G/DL (ref 12.5–16)
MCH RBC QN AUTO: 29.5 PG (ref 27–31)
MCHC RBC AUTO-ENTMCNC: 31.4 G/DL (ref 32–36)
MCV RBC AUTO: 94 FL (ref 78–100)
PLATELET # BLD AUTO: 187 K/UL (ref 140–440)
PMV BLD AUTO: 10.7 FL (ref 7.5–11.1)
POTASSIUM SERPL-SCNC: 5.1 MMOL/L (ref 3.5–5.1)
PROT SERPL-MCNC: 5.3 G/DL (ref 6.4–8.2)
RBC # BLD AUTO: 3.86 M/UL (ref 4.2–5.4)
SODIUM SERPL-SCNC: 137 MMOL/L (ref 136–145)
TSH SERPL DL<=0.05 MIU/L-ACNC: 2.06 UIU/ML (ref 0.27–4.2)
WBC OTHER # BLD: 5.8 K/UL (ref 4–10.5)

## 2025-03-19 PROCEDURE — 83036 HEMOGLOBIN GLYCOSYLATED A1C: CPT

## 2025-03-19 PROCEDURE — 84443 ASSAY THYROID STIM HORMONE: CPT

## 2025-03-19 PROCEDURE — 85027 COMPLETE CBC AUTOMATED: CPT

## 2025-03-19 PROCEDURE — 80053 COMPREHEN METABOLIC PANEL: CPT

## 2025-03-24 ENCOUNTER — OFFICE VISIT (OUTPATIENT)
Dept: OBGYN | Age: 65
End: 2025-03-24
Payer: MEDICARE

## 2025-03-24 ENCOUNTER — HOSPITAL ENCOUNTER (OUTPATIENT)
Age: 65
Setting detail: SPECIMEN
Discharge: HOME | End: 2025-03-24
Payer: MEDICARE

## 2025-03-24 VITALS
BODY MASS INDEX: 34.76 KG/M2 | DIASTOLIC BLOOD PRESSURE: 82 MMHG | WEIGHT: 203.6 LBS | HEART RATE: 83 BPM | HEIGHT: 64 IN | SYSTOLIC BLOOD PRESSURE: 167 MMHG

## 2025-03-24 DIAGNOSIS — Z12.31 SCREENING MAMMOGRAM FOR BREAST CANCER: ICD-10-CM

## 2025-03-24 DIAGNOSIS — N81.10 VAGINAL PROLAPSE: ICD-10-CM

## 2025-03-24 DIAGNOSIS — N94.10 DYSPAREUNIA IN FEMALE: ICD-10-CM

## 2025-03-24 DIAGNOSIS — Z01.419 ENCOUNTER FOR ANNUAL ROUTINE GYNECOLOGICAL EXAMINATION: Primary | ICD-10-CM

## 2025-03-24 PROCEDURE — G0101 CA SCREEN;PELVIC/BREAST EXAM: HCPCS

## 2025-03-24 PROCEDURE — G0123 SCREEN CERV/VAG THIN LAYER: HCPCS

## 2025-03-24 RX ORDER — ESTRADIOL 0.1 MG/G
CREAM VAGINAL
Qty: 42.5 G | Refills: 2 | Status: SHIPPED | OUTPATIENT
Start: 2025-03-24

## 2025-03-24 ASSESSMENT — ENCOUNTER SYMPTOMS
RESPIRATORY NEGATIVE: 1
NAUSEA: 0
GASTROINTESTINAL NEGATIVE: 1
SHORTNESS OF BREATH: 0
DIARRHEA: 0
VOMITING: 0
CHEST TIGHTNESS: 0
CONSTIPATION: 0
ABDOMINAL PAIN: 0

## 2025-03-24 ASSESSMENT — PATIENT HEALTH QUESTIONNAIRE - PHQ9
4. FEELING TIRED OR HAVING LITTLE ENERGY: NOT AT ALL
SUM OF ALL RESPONSES TO PHQ QUESTIONS 1-9: 0
3. TROUBLE FALLING OR STAYING ASLEEP: NOT AT ALL
2. FEELING DOWN, DEPRESSED OR HOPELESS: NOT AT ALL
9. THOUGHTS THAT YOU WOULD BE BETTER OFF DEAD, OR OF HURTING YOURSELF: NOT AT ALL
SUM OF ALL RESPONSES TO PHQ QUESTIONS 1-9: 0
8. MOVING OR SPEAKING SO SLOWLY THAT OTHER PEOPLE COULD HAVE NOTICED. OR THE OPPOSITE, BEING SO FIGETY OR RESTLESS THAT YOU HAVE BEEN MOVING AROUND A LOT MORE THAN USUAL: NOT AT ALL
10. IF YOU CHECKED OFF ANY PROBLEMS, HOW DIFFICULT HAVE THESE PROBLEMS MADE IT FOR YOU TO DO YOUR WORK, TAKE CARE OF THINGS AT HOME, OR GET ALONG WITH OTHER PEOPLE: NOT DIFFICULT AT ALL
SUM OF ALL RESPONSES TO PHQ QUESTIONS 1-9: 0
6. FEELING BAD ABOUT YOURSELF - OR THAT YOU ARE A FAILURE OR HAVE LET YOURSELF OR YOUR FAMILY DOWN: NOT AT ALL
1. LITTLE INTEREST OR PLEASURE IN DOING THINGS: NOT AT ALL
5. POOR APPETITE OR OVEREATING: NOT AT ALL
SUM OF ALL RESPONSES TO PHQ QUESTIONS 1-9: 0
7. TROUBLE CONCENTRATING ON THINGS, SUCH AS READING THE NEWSPAPER OR WATCHING TELEVISION: NOT AT ALL

## 2025-03-24 NOTE — PROGRESS NOTES
3/24/25    [facility]  1960    Chief Complaint   Patient presents with    New Patient     Patient presents today as new gyn. Non-smoker. No known h/o dvt. Patient is postmenopausal is not on hrt . Patient is sexually active. Pap Smear was unknown and results were negativeHPV negative per pt. Patient had a recent mammogram 4 years ago per pt which was negative for malignancy. Patient had bone density scan in 10/16/2024 revealing Osteoporosis using Prolia. Colonoscopy 10 yrs normal. Patient complains of dyspareunia, denies bleeding after intercourse.          The patient is a 64 y.o. female,  who presents for her annual exam.  She is menopausal.  She is not taking HRT. She is  sexually active. Recently became sexually active with new partner. Had not been sexually active for many years prior. Reports vaginal pain/discomfort with intercourse, feeling of \"something in there.\" Denies vaginal dryness.    Pap smear history: Her last PAP smear was in years ago.  Her results were normal per patient.     Breast history: her most recent mammogram was in .  The results were: Normal per patient.     Osteoporosis Status: her bone density scan was in .  The results were osteoporosis - treatment: Prolia. Getting her second injection 2025.    Colonoscopy Status: she had a colonoscopy in .  The results were normal per patient. She is due for colonoscopy this summer.     Past Medical History:   Diagnosis Date    Anxiety     Arthritis     Asthma     Cerebral artery occlusion with cerebral infarction (HCC)     per old chart 2015- TIA    CKD stage G1/A2, GFR > 90 and albumin creatinine ratio  mg/g 2022    Sees Dr. Schulte    COPD (chronic obstructive pulmonary disease) (HCC)     Diabetes mellitus (HCC)     Diabetic neuropathy (HCC)     per old chart    Fracture     per old chart pt in ER 2018- after 2 days of arm pain after fall- dx with left humerus fx- for surg 2018

## 2025-03-28 LAB — GYNECOLOGY CYTOLOGY REPORT: NORMAL

## 2025-04-30 ENCOUNTER — HOSPITAL ENCOUNTER (OUTPATIENT)
Age: 65
Setting detail: SPECIMEN
Discharge: HOME | End: 2025-04-30
Payer: MEDICARE

## 2025-04-30 ENCOUNTER — TELEPHONE (OUTPATIENT)
Dept: ONCOLOGY | Age: 65
End: 2025-04-30

## 2025-04-30 LAB
ALBUMIN SERPL-MCNC: 3.7 G/DL (ref 3.4–5)
ALBUMIN/GLOB SERPL: 2 {RATIO} (ref 1.1–2.2)
ALP SERPL-CCNC: 80 U/L (ref 40–129)
ALT SERPL-CCNC: 36 U/L (ref 10–40)
ANION GAP SERPL CALCULATED.3IONS-SCNC: 10 MMOL/L (ref 9–17)
AST SERPL-CCNC: 21 U/L (ref 15–37)
BASOPHILS # BLD: 0.01 K/UL
BASOPHILS NFR BLD: 0 % (ref 0–1)
BILIRUB SERPL-MCNC: 0.2 MG/DL (ref 0–1)
BUN SERPL-MCNC: 36 MG/DL (ref 7–20)
CALCIUM SERPL-MCNC: 8.9 MG/DL (ref 8.3–10.6)
CHLORIDE SERPL-SCNC: 106 MMOL/L (ref 99–110)
CHOLEST SERPL-MCNC: 125 MG/DL (ref 125–199)
CO2 SERPL-SCNC: 23 MMOL/L (ref 21–32)
CREAT SERPL-MCNC: 1.5 MG/DL (ref 0.6–1.2)
EOSINOPHIL # BLD: 0.02 K/UL
EOSINOPHILS RELATIVE PERCENT: 0 % (ref 0–3)
ERYTHROCYTE [DISTWIDTH] IN BLOOD BY AUTOMATED COUNT: 12.3 % (ref 11.7–14.9)
EST. AVERAGE GLUCOSE BLD GHB EST-MCNC: 226 MG/DL
GFR, ESTIMATED: 34 ML/MIN/1.73M2
GLUCOSE SERPL-MCNC: 142 MG/DL (ref 74–99)
HBA1C MFR BLD: 9.5 % (ref 4.2–6.3)
HCT VFR BLD AUTO: 36.5 % (ref 37–47)
HDLC SERPL-MCNC: 48 MG/DL
HGB BLD-MCNC: 11.5 G/DL (ref 12.5–16)
IMM GRANULOCYTES # BLD AUTO: 0.01 K/UL
IMM GRANULOCYTES NFR BLD: 0 %
LDLC SERPL CALC-MCNC: 59 MG/DL
LYMPHOCYTES NFR BLD: 2.2 K/UL
LYMPHOCYTES RELATIVE PERCENT: 46 % (ref 24–44)
MAGNESIUM SERPL-MCNC: 1.9 MG/DL (ref 1.8–2.4)
MCH RBC QN AUTO: 29.6 PG (ref 27–31)
MCHC RBC AUTO-ENTMCNC: 31.5 G/DL (ref 32–36)
MCV RBC AUTO: 93.8 FL (ref 78–100)
MONOCYTES NFR BLD: 0.43 K/UL
MONOCYTES NFR BLD: 9 % (ref 0–5)
NEUTROPHILS NFR BLD: 45 % (ref 36–66)
NEUTS SEG NFR BLD: 2.14 K/UL
PHOSPHATE SERPL-MCNC: 3.5 MG/DL (ref 2.5–4.9)
PLATELET # BLD AUTO: 204 K/UL (ref 140–440)
PMV BLD AUTO: 10.4 FL (ref 7.5–11.1)
POTASSIUM SERPL-SCNC: 4.7 MMOL/L (ref 3.5–5.1)
PROT SERPL-MCNC: 5.5 G/DL (ref 6.4–8.2)
RBC # BLD AUTO: 3.89 M/UL (ref 4.2–5.4)
SODIUM SERPL-SCNC: 139 MMOL/L (ref 136–145)
TRIGL SERPL-MCNC: 93 MG/DL
WBC OTHER # BLD: 4.8 K/UL (ref 4–10.5)

## 2025-04-30 PROCEDURE — 85025 COMPLETE CBC W/AUTO DIFF WBC: CPT

## 2025-04-30 PROCEDURE — 80053 COMPREHEN METABOLIC PANEL: CPT

## 2025-04-30 PROCEDURE — 83735 ASSAY OF MAGNESIUM: CPT

## 2025-04-30 PROCEDURE — 84100 ASSAY OF PHOSPHORUS: CPT

## 2025-04-30 PROCEDURE — 83036 HEMOGLOBIN GLYCOSYLATED A1C: CPT

## 2025-04-30 PROCEDURE — 80061 LIPID PANEL: CPT

## 2025-04-30 NOTE — TELEPHONE ENCOUNTER
Patient returned my call and is good with her new appt times     Returned call to patient, no answer, left  with information about her appt she wanted to change her appt on 05/05 to a Tues or Thurs, appt has been changed to 05/06 @ 3254

## 2025-05-06 ENCOUNTER — HOSPITAL ENCOUNTER (OUTPATIENT)
Dept: INFUSION THERAPY | Age: 65
Discharge: HOME | End: 2025-05-06
Payer: MEDICARE

## 2025-05-06 VITALS
HEART RATE: 69 BPM | TEMPERATURE: 97.5 F | DIASTOLIC BLOOD PRESSURE: 98 MMHG | WEIGHT: 203.8 LBS | OXYGEN SATURATION: 98 % | HEIGHT: 64 IN | BODY MASS INDEX: 34.79 KG/M2 | SYSTOLIC BLOOD PRESSURE: 155 MMHG

## 2025-05-06 DIAGNOSIS — M81.0 OSTEOPOROSIS WITHOUT CURRENT PATHOLOGICAL FRACTURE, UNSPECIFIED OSTEOPOROSIS TYPE: Primary | ICD-10-CM

## 2025-05-06 PROCEDURE — 96372 THER/PROPH/DIAG INJ SC/IM: CPT

## 2025-05-06 PROCEDURE — 6360000002 HC RX W HCPCS: Performed by: INTERNAL MEDICINE

## 2025-05-06 RX ORDER — FAMOTIDINE 10 MG/ML
20 INJECTION, SOLUTION INTRAVENOUS
OUTPATIENT
Start: 2025-11-02

## 2025-05-06 RX ORDER — ACETAMINOPHEN 325 MG/1
650 TABLET ORAL
OUTPATIENT
Start: 2025-11-02

## 2025-05-06 RX ORDER — ALBUTEROL SULFATE 90 UG/1
4 INHALANT RESPIRATORY (INHALATION) PRN
OUTPATIENT
Start: 2025-11-02

## 2025-05-06 RX ORDER — DIPHENHYDRAMINE HYDROCHLORIDE 50 MG/ML
50 INJECTION, SOLUTION INTRAMUSCULAR; INTRAVENOUS
OUTPATIENT
Start: 2025-11-02

## 2025-05-06 RX ORDER — ONDANSETRON 2 MG/ML
8 INJECTION INTRAMUSCULAR; INTRAVENOUS
OUTPATIENT
Start: 2025-11-02

## 2025-05-06 RX ORDER — HYDROCORTISONE SODIUM SUCCINATE 100 MG/2ML
100 INJECTION INTRAMUSCULAR; INTRAVENOUS
OUTPATIENT
Start: 2025-11-02

## 2025-05-06 RX ORDER — SODIUM CHLORIDE 9 MG/ML
INJECTION, SOLUTION INTRAVENOUS CONTINUOUS
OUTPATIENT
Start: 2025-11-02

## 2025-05-06 RX ORDER — EPINEPHRINE 1 MG/ML
0.3 INJECTION, SOLUTION, CONCENTRATE INTRAVENOUS PRN
OUTPATIENT
Start: 2025-11-02

## 2025-05-06 RX ADMIN — DENOSUMAB 60 MG: 60 INJECTION SUBCUTANEOUS at 14:00

## 2025-05-06 NOTE — PROGRESS NOTES
Pt arrived for scheduled Prolia injection.  Labs reviewed, calcium 8.9 on 4/30/25.  Pt denies concerns or questions at this time.  Treatment plan approved, released and completed as ordered.  Prolia injection given SC in right arm, band aid applied at injection site.  Pt tolerated well.  AVS provided.  Discharge ambulatory in stable condition. Yolanda Clayton RN    Next Prolia injection scheduled on 11/4/25, next OV with Dr. Fuentes on 11/6/25.  Verified before discharge.

## 2025-05-14 DIAGNOSIS — Z12.31 SCREENING MAMMOGRAM FOR BREAST CANCER: ICD-10-CM

## 2025-05-16 ENCOUNTER — HOSPITAL ENCOUNTER (OUTPATIENT)
Age: 65
Setting detail: SPECIMEN
Discharge: HOME | End: 2025-05-16

## 2025-05-21 ENCOUNTER — RESULTS FOLLOW-UP (OUTPATIENT)
Dept: OBGYN | Age: 65
End: 2025-05-21

## (undated) DEVICE — PACK,BASIC,SIRUS,V: Brand: MEDLINE

## (undated) DEVICE — GLOVE SURG SZ 65 L12IN FNGR THK79MIL GRN LTX FREE

## (undated) DEVICE — GLOVE ORANGE PI 7 1/2   MSG9075

## (undated) DEVICE — LINER SUCT CANSTR 1500CC SEMI RIG W/ POR HYDROPHOBIC SHUT

## (undated) DEVICE — 3M™ STERI-DRAPE™ U-DRAPE 1015: Brand: STERI-DRAPE™

## (undated) DEVICE — SKIN MARKER REGULAR TIP WITH RULER CAP AND LABELS: Brand: DEVON

## (undated) DEVICE — YANKAUER,FLEXIBLE HANDLE,REGLR CAPACITY: Brand: MEDLINE INDUSTRIES, INC.

## (undated) DEVICE — SPONGE LAP W18XL18IN WHT COT 4 PLY FLD STRUNG RADPQ DISP ST

## (undated) DEVICE — PENCIL ES CRD L10FT HND SWCHING ROCK SWCH W/ EDGE COAT BLDE

## (undated) DEVICE — SURGICAL INSTRUMENT OR ACCESSORY FOR ORTHOPEDIC BONE FIXATION: Brand: POWER DRIVER G-WIRE KIT, 3.0-4.5MM CANNULATED SCREWS

## (undated) DEVICE — PAD,ABDOMINAL,5"X9",ST,LF,25/BX: Brand: MEDLINE INDUSTRIES, INC.

## (undated) DEVICE — CORD ES L15FT PT RET REUSE VALLEYLAB REM

## (undated) DEVICE — BANDAGE,ELASTIC,ESMARK,STERILE,4"X9',LF: Brand: MEDLINE

## (undated) DEVICE — TWIST DRILL Ø3.5, L 110, COIL 50, QUICK COUPLING, SINGLE USE

## (undated) DEVICE — CONVERTORS STOCKINETTE: Brand: CONVERTORS

## (undated) DEVICE — SHEET,DRAPE,53X77,STERILE: Brand: MEDLINE

## (undated) DEVICE — SOLUTION IV IRRIG WATER 1000ML POUR BRL 2F7114

## (undated) DEVICE — CHLORAPREP 26ML ORANGE

## (undated) DEVICE — BANDAGE,SELF ADHRNT,COFLEX,4"X5YD,STRL: Brand: COLABEL

## (undated) DEVICE — SOLUTION PREP POVIDONE IOD FOR SKIN MUCOUS MEM PRIOR TO

## (undated) DEVICE — INTENDED FOR TISSUE SEPARATION, AND OTHER PROCEDURES THAT REQUIRE A SHARP SURGICAL BLADE TO PUNCTURE OR CUT.: Brand: BARD-PARKER ® STAINLESS STEEL BLADES

## (undated) DEVICE — SNAP KOVER: Brand: UNBRANDED

## (undated) DEVICE — DRESSING PETRO W3XL3IN OIL EMUL N ADH GZ KNIT IMPREG CELOS

## (undated) DEVICE — ZIMMER® STERILE DISPOSABLE TOURNIQUET CUFF WITH PLC, DUAL PORT, SINGLE BLADDER, 18 IN. (46 CM)

## (undated) DEVICE — MARKER SURG SKIN UTIL REGULAR/FINE 2 TIP W/ RUL AND 9 LBL

## (undated) DEVICE — SURGICAL INSTRUMENT OR ACCESSORY FOR ORTHOPEDIC BONE FIXATION: Brand: 2.5MM DRILL BIT, AO, FOR 3.5MM SCREWS

## (undated) DEVICE — SUTURE ABSORBABLE BRAIDED 2-0 CT-1 27 IN UD VICRYL J259H

## (undated) DEVICE — GLOVE SURG SZ 6 CRM LTX FREE POLYISOPRENE POLYMER BEAD ANTI

## (undated) DEVICE — TWIST DRILL Ø2.0, L 180, COIL 50, QUICK COUPLING, SINGLE USE

## (undated) DEVICE — Device

## (undated) DEVICE — GOWN,SIRUS,POLYRNF,BRTHSLV,XLN/XL,20/CS: Brand: MEDLINE

## (undated) DEVICE — COUNTER NDL 30 COUNT FOAM STRP SGL MAG

## (undated) DEVICE — SLING ARM L L165IN D75IN WHT POLY MESH ENVELOP MTL SIDE

## (undated) DEVICE — GLOVE SURG SZ 65 CRM LTX FREE POLYISOPRENE POLYMER BEAD ANTI

## (undated) DEVICE — LOOP,VESSEL,MINI,BLUE,2/PK,STERILE: Brand: MEDLINE

## (undated) DEVICE — SYRINGE IRRIG 60ML SFT PLIABLE BLB EZ TO GRP 1 HND USE W/

## (undated) DEVICE — SURGICAL INSTRUMENT OR ACCESSORY FOR ORTHOPEDIC BONE FIXATION: Brand: FLOWER E-KIT, ADVANCED

## (undated) DEVICE — SUTURE VCRL SZ 2-0 L18IN ABSRB UD CT-1 L36MM 1/2 CIR J839D

## (undated) DEVICE — 3M™ MICROFOAM™ SURGICAL TAPE 4 ROLLS/CARTON 6 CARTONS/CASE 1528-3: Brand: 3M™ MICROFOAM™

## (undated) DEVICE — NEEDLE SPNL L3.5IN PNK HUB S STL REG WALL FIT STYL W/ QNCKE

## (undated) DEVICE — TOWEL,OR,DSP,ST,BLUE,STD,6/PK,12PK/CS: Brand: MEDLINE

## (undated) DEVICE — TUBING, SUCTION, 9/32" X 10', STRAIGHT: Brand: MEDLINE

## (undated) DEVICE — DRAPE,U/ SHT,SPLIT,PLAS,STERIL: Brand: MEDLINE

## (undated) DEVICE — CIRCUIT BREATHING SINGLE LIMB AD 72 IN 3 LT 2 FILTER LIMBO

## (undated) DEVICE — C-ARM: Brand: UNBRANDED

## (undated) DEVICE — PADDING,UNDERCAST,COTTON, 4"X4YD STERILE: Brand: MEDLINE

## (undated) DEVICE — SURGICAL INSTRUMENT OR ACCESSORY FOR ORTHOPEDIC BONE FIXATION: Brand: HUMERUS PLATE TRIALS

## (undated) DEVICE — DRAPE,EXTREMITY,89X128,STERILE: Brand: MEDLINE

## (undated) DEVICE — STERILE LATEX POWDER-FREE SURGICAL GLOVESWITH NITRILE COATING: Brand: PROTEXIS

## (undated) DEVICE — 3M™ IOBAN™ 2 ANTIMICROBIAL INCISE DRAPE 6650EZ: Brand: IOBAN™ 2

## (undated) DEVICE — 3M™ STERI-DRAPE™ INSTRUMENT POUCH 1018: Brand: STERI-DRAPE™

## (undated) DEVICE — APPLICATOR MEDICATED 26 CC SOLUTION HI LT ORNG CHLORAPREP

## (undated) DEVICE — 3M™ COBAN™ STERILE SELF-ADHERENT WRAP, 1584S, 4 IN X 5 YD (10 CM X 4,5 M), 18 ROLLS/CASE: Brand: 3M™ COBAN™

## (undated) DEVICE — SYRINGE MED 10ML LUERLOCK TIP W/O SFTY DISP

## (undated) DEVICE — PROTECTOR EYE PT SELF ADH NS OPT GRD LF

## (undated) DEVICE — DRESSING HYDROFIBER AQUACEL AG ADVANTAGE 3.5X14 IN

## (undated) DEVICE — SYRINGE BLB 50CC IRRIG PLIABLE FNGR FLNG GRAD FLSK DISP

## (undated) DEVICE — COUNTER NDL 60 COUNT FOAM STRP SGL MAG

## (undated) DEVICE — MARKER,SKIN,WI/RULER AND LABELS: Brand: MEDLINE

## (undated) DEVICE — ELECTRODE ES AD CRDLSS PT RET REM POLYHESIVE

## (undated) DEVICE — LINER,SEMI-RIGID,3000CC,50EA/CS: Brand: MEDLINE

## (undated) DEVICE — STANDARD HYPODERMIC NEEDLE,POLYPROPYLENE HUB: Brand: MONOJECT

## (undated) DEVICE — SUTURE VCRL SZ 0 L18IN ABSRB UD L36MM CT-1 1/2 CIR J840D

## (undated) DEVICE — GLOVE ORANGE PI 8   MSG9080

## (undated) DEVICE — GLOVE SURG SZ 8 L12IN THK75MIL DK GRN LTX FREE

## (undated) DEVICE — SPONGE GZ W4XL8IN COT WVN 12 PLY

## (undated) DEVICE — TWIST DRILL Ø2.7, L 150, COIL 50, QUICK COUPLING, SINGLE USE

## (undated) DEVICE — SOLUTION IV 1000ML 0.9% SOD CHL FOR IRRIG PLAS CONT

## (undated) DEVICE — 1315 FOAM STRIP NEEDLE COUNTER: Brand: DEVON

## (undated) DEVICE — SUTURE VCRL 0 L27IN ABSRB UD CT L40MM 1/2 CIR TAPERPOINT J280H